# Patient Record
Sex: FEMALE | Race: WHITE | Employment: OTHER | URBAN - NONMETROPOLITAN AREA
[De-identification: names, ages, dates, MRNs, and addresses within clinical notes are randomized per-mention and may not be internally consistent; named-entity substitution may affect disease eponyms.]

---

## 2017-08-11 ENCOUNTER — HOSPITAL ENCOUNTER (INPATIENT)
Age: 72
LOS: 3 days | Discharge: HOME | DRG: 177 | End: 2017-08-14
Attending: INTERNAL MEDICINE | Admitting: INTERNAL MEDICINE
Payer: MEDICARE

## 2017-08-11 ENCOUNTER — APPOINTMENT (OUTPATIENT)
Dept: CT IMAGING | Age: 72
DRG: 177 | End: 2017-08-11
Payer: MEDICARE

## 2017-08-11 DIAGNOSIS — J18.9 PNEUMONIA DUE TO INFECTIOUS ORGANISM, UNSPECIFIED LATERALITY, UNSPECIFIED PART OF LUNG: Primary | ICD-10-CM

## 2017-08-11 PROBLEM — I95.1 ORTHOSTATIC HYPOTENSION: Status: ACTIVE | Noted: 2017-08-11

## 2017-08-11 PROBLEM — J96.21 ACUTE ON CHRONIC RESPIRATORY FAILURE WITH HYPOXIA (HCC): Status: ACTIVE | Noted: 2017-08-11

## 2017-08-11 PROBLEM — M54.50 CHRONIC MIDLINE LOW BACK PAIN WITHOUT SCIATICA: Status: ACTIVE | Noted: 2017-08-11

## 2017-08-11 PROBLEM — C34.90 RECURRENT NON-SMALL CELL LUNG CANCER (HCC): Status: ACTIVE | Noted: 2017-08-11

## 2017-08-11 PROBLEM — E86.0 DEHYDRATION: Status: ACTIVE | Noted: 2017-08-11

## 2017-08-11 PROBLEM — G89.29 CHRONIC MIDLINE LOW BACK PAIN WITHOUT SCIATICA: Status: ACTIVE | Noted: 2017-08-11

## 2017-08-11 PROBLEM — C34.91 PRIMARY CANCER OF RIGHT LUNG METASTATIC TO OTHER SITE (HCC): Status: ACTIVE | Noted: 2017-08-11

## 2017-08-11 LAB
ANION GAP SERPL CALCULATED.3IONS-SCNC: 8 MEQ/L (ref 8–16)
ANISOCYTOSIS: ABNORMAL
BASOPHILS # BLD: 0.4 %
BASOPHILS ABSOLUTE: 0 THOU/MM3 (ref 0–0.1)
BUN BLDV-MCNC: 16 MG/DL (ref 7–22)
CALCIUM SERPL-MCNC: 9.6 MG/DL (ref 8.5–10.5)
CHLORIDE BLD-SCNC: 102 MEQ/L (ref 98–111)
CO2: 34 MEQ/L (ref 23–33)
CREAT SERPL-MCNC: 0.8 MG/DL (ref 0.4–1.2)
EKG ATRIAL RATE: 105 BPM
EKG P AXIS: 0 DEGREES
EKG P-R INTERVAL: 144 MS
EKG Q-T INTERVAL: 320 MS
EKG QRS DURATION: 84 MS
EKG QTC CALCULATION (BAZETT): 422 MS
EKG R AXIS: 15 DEGREES
EKG T AXIS: 41 DEGREES
EKG VENTRICULAR RATE: 105 BPM
EOSINOPHIL # BLD: 1.5 %
EOSINOPHILS ABSOLUTE: 0.1 THOU/MM3 (ref 0–0.4)
GFR SERPL CREATININE-BSD FRML MDRD: 70 ML/MIN/1.73M2
GLUCOSE BLD-MCNC: 116 MG/DL (ref 70–108)
HCT VFR BLD CALC: 36.7 % (ref 37–47)
HEMOGLOBIN: 11.8 GM/DL (ref 12–16)
LACTIC ACID: 1 MMOL/L (ref 0.5–2.2)
LYMPHOCYTES # BLD: 11.4 %
LYMPHOCYTES ABSOLUTE: 0.9 THOU/MM3 (ref 1–4.8)
MCH RBC QN AUTO: 29.7 PG (ref 27–31)
MCHC RBC AUTO-ENTMCNC: 32.1 GM/DL (ref 33–37)
MCV RBC AUTO: 92.7 FL (ref 81–99)
MONOCYTES # BLD: 7.5 %
MONOCYTES ABSOLUTE: 0.6 THOU/MM3 (ref 0.4–1.3)
NUCLEATED RED BLOOD CELLS: 0 /100 WBC
OSMOLALITY CALCULATION: 289 MOSMOL/KG (ref 275–300)
PDW BLD-RTO: 15.5 % (ref 11.5–14.5)
PLATELET # BLD: 244 THOU/MM3 (ref 130–400)
PMV BLD AUTO: 8.1 MCM (ref 7.4–10.4)
POTASSIUM SERPL-SCNC: 4.8 MEQ/L (ref 3.5–5.2)
PROCALCITONIN: 0.23 NG/ML (ref 0.01–0.09)
RBC # BLD: 3.96 MILL/MM3 (ref 4.2–5.4)
RBC # BLD: NORMAL 10*6/UL
SEG NEUTROPHILS: 79.2 %
SEGMENTED NEUTROPHILS ABSOLUTE COUNT: 6.4 THOU/MM3 (ref 1.8–7.7)
SODIUM BLD-SCNC: 144 MEQ/L (ref 135–145)
TROPONIN T: < 0.01 NG/ML
WBC # BLD: 8.1 THOU/MM3 (ref 4.8–10.8)

## 2017-08-11 PROCEDURE — 99223 1ST HOSP IP/OBS HIGH 75: CPT | Performed by: INTERNAL MEDICINE

## 2017-08-11 PROCEDURE — B3201ZZ COMPUTERIZED TOMOGRAPHY (CT SCAN) OF THORACIC AORTA USING LOW OSMOLAR CONTRAST: ICD-10-PCS | Performed by: RADIOLOGY

## 2017-08-11 PROCEDURE — 94640 AIRWAY INHALATION TREATMENT: CPT

## 2017-08-11 PROCEDURE — 99285 EMERGENCY DEPT VISIT HI MDM: CPT

## 2017-08-11 PROCEDURE — B32T1ZZ COMPUTERIZED TOMOGRAPHY (CT SCAN) OF LEFT PULMONARY ARTERY USING LOW OSMOLAR CONTRAST: ICD-10-PCS | Performed by: RADIOLOGY

## 2017-08-11 PROCEDURE — 83605 ASSAY OF LACTIC ACID: CPT

## 2017-08-11 PROCEDURE — 87040 BLOOD CULTURE FOR BACTERIA: CPT

## 2017-08-11 PROCEDURE — 81001 URINALYSIS AUTO W/SCOPE: CPT

## 2017-08-11 PROCEDURE — 96374 THER/PROPH/DIAG INJ IV PUSH: CPT

## 2017-08-11 PROCEDURE — 2700000000 HC OXYGEN THERAPY PER DAY

## 2017-08-11 PROCEDURE — B32S1ZZ COMPUTERIZED TOMOGRAPHY (CT SCAN) OF RIGHT PULMONARY ARTERY USING LOW OSMOLAR CONTRAST: ICD-10-PCS | Performed by: RADIOLOGY

## 2017-08-11 PROCEDURE — 36415 COLL VENOUS BLD VENIPUNCTURE: CPT

## 2017-08-11 PROCEDURE — 6370000000 HC RX 637 (ALT 250 FOR IP): Performed by: PHYSICIAN ASSISTANT

## 2017-08-11 PROCEDURE — 71275 CT ANGIOGRAPHY CHEST: CPT

## 2017-08-11 PROCEDURE — 6360000002 HC RX W HCPCS: Performed by: INTERNAL MEDICINE

## 2017-08-11 PROCEDURE — 93005 ELECTROCARDIOGRAM TRACING: CPT

## 2017-08-11 PROCEDURE — 1200000003 HC TELEMETRY R&B

## 2017-08-11 PROCEDURE — 6360000004 HC RX CONTRAST MEDICATION: Performed by: PHYSICIAN ASSISTANT

## 2017-08-11 PROCEDURE — 80048 BASIC METABOLIC PNL TOTAL CA: CPT

## 2017-08-11 PROCEDURE — 84484 ASSAY OF TROPONIN QUANT: CPT

## 2017-08-11 PROCEDURE — 2580000003 HC RX 258: Performed by: PHYSICIAN ASSISTANT

## 2017-08-11 PROCEDURE — 6360000002 HC RX W HCPCS: Performed by: PHYSICIAN ASSISTANT

## 2017-08-11 PROCEDURE — 6370000000 HC RX 637 (ALT 250 FOR IP): Performed by: INTERNAL MEDICINE

## 2017-08-11 PROCEDURE — 70450 CT HEAD/BRAIN W/O DYE: CPT

## 2017-08-11 PROCEDURE — 84145 PROCALCITONIN (PCT): CPT

## 2017-08-11 PROCEDURE — 85025 COMPLETE CBC W/AUTO DIFF WBC: CPT

## 2017-08-11 RX ORDER — 0.9 % SODIUM CHLORIDE 0.9 %
2000 INTRAVENOUS SOLUTION INTRAVENOUS ONCE
Status: COMPLETED | OUTPATIENT
Start: 2017-08-11 | End: 2017-08-11

## 2017-08-11 RX ORDER — IPRATROPIUM BROMIDE AND ALBUTEROL SULFATE 2.5; .5 MG/3ML; MG/3ML
1 SOLUTION RESPIRATORY (INHALATION) ONCE
Status: COMPLETED | OUTPATIENT
Start: 2017-08-11 | End: 2017-08-11

## 2017-08-11 RX ORDER — HYDROCODONE BITARTRATE AND ACETAMINOPHEN 5; 325 MG/1; MG/1
1 TABLET ORAL EVERY 4 HOURS PRN
Status: DISCONTINUED | OUTPATIENT
Start: 2017-08-11 | End: 2017-08-14 | Stop reason: HOSPADM

## 2017-08-11 RX ORDER — SODIUM CHLORIDE AND POTASSIUM CHLORIDE .9; .15 G/100ML; G/100ML
SOLUTION INTRAVENOUS CONTINUOUS
Status: DISCONTINUED | OUTPATIENT
Start: 2017-08-11 | End: 2017-08-12

## 2017-08-11 RX ORDER — PROMETHAZINE HYDROCHLORIDE 25 MG/1
25 TABLET ORAL EVERY 6 HOURS PRN
Status: DISCONTINUED | OUTPATIENT
Start: 2017-08-11 | End: 2017-08-14 | Stop reason: HOSPADM

## 2017-08-11 RX ORDER — ONDANSETRON 2 MG/ML
4 INJECTION INTRAMUSCULAR; INTRAVENOUS ONCE
Status: COMPLETED | OUTPATIENT
Start: 2017-08-11 | End: 2017-08-11

## 2017-08-11 RX ORDER — FLUTICASONE PROPIONATE 50 MCG
1 SPRAY, SUSPENSION (ML) NASAL DAILY
Status: DISCONTINUED | OUTPATIENT
Start: 2017-08-11 | End: 2017-08-14 | Stop reason: HOSPADM

## 2017-08-11 RX ORDER — ONDANSETRON 2 MG/ML
4 INJECTION INTRAMUSCULAR; INTRAVENOUS EVERY 6 HOURS PRN
Status: DISCONTINUED | OUTPATIENT
Start: 2017-08-11 | End: 2017-08-14 | Stop reason: HOSPADM

## 2017-08-11 RX ORDER — SODIUM CHLORIDE 0.9 % (FLUSH) 0.9 %
10 SYRINGE (ML) INJECTION PRN
Status: DISCONTINUED | OUTPATIENT
Start: 2017-08-11 | End: 2017-08-14 | Stop reason: HOSPADM

## 2017-08-11 RX ORDER — SODIUM CHLORIDE 9 MG/ML
INJECTION, SOLUTION INTRAVENOUS CONTINUOUS
Status: DISCONTINUED | OUTPATIENT
Start: 2017-08-11 | End: 2017-08-11

## 2017-08-11 RX ORDER — HYDROCODONE BITARTRATE AND ACETAMINOPHEN 5; 325 MG/1; MG/1
2 TABLET ORAL EVERY 4 HOURS PRN
Status: DISCONTINUED | OUTPATIENT
Start: 2017-08-11 | End: 2017-08-14 | Stop reason: HOSPADM

## 2017-08-11 RX ORDER — SODIUM CHLORIDE 0.9 % (FLUSH) 0.9 %
10 SYRINGE (ML) INJECTION EVERY 12 HOURS SCHEDULED
Status: DISCONTINUED | OUTPATIENT
Start: 2017-08-11 | End: 2017-08-14 | Stop reason: HOSPADM

## 2017-08-11 RX ORDER — AMOXICILLIN AND CLAVULANATE POTASSIUM 875; 125 MG/1; MG/1
1 TABLET, FILM COATED ORAL EVERY 12 HOURS SCHEDULED
Status: DISCONTINUED | OUTPATIENT
Start: 2017-08-11 | End: 2017-08-14 | Stop reason: HOSPADM

## 2017-08-11 RX ORDER — ACETAMINOPHEN 325 MG/1
650 TABLET ORAL EVERY 4 HOURS PRN
Status: DISCONTINUED | OUTPATIENT
Start: 2017-08-11 | End: 2017-08-14 | Stop reason: HOSPADM

## 2017-08-11 RX ORDER — OXYBUTYNIN CHLORIDE 5 MG/1
5 TABLET, EXTENDED RELEASE ORAL DAILY
Status: DISCONTINUED | OUTPATIENT
Start: 2017-08-12 | End: 2017-08-14 | Stop reason: HOSPADM

## 2017-08-11 RX ORDER — PREDNISONE 20 MG/1
40 TABLET ORAL DAILY
Status: DISCONTINUED | OUTPATIENT
Start: 2017-08-11 | End: 2017-08-14 | Stop reason: HOSPADM

## 2017-08-11 RX ORDER — PANTOPRAZOLE SODIUM 40 MG/1
40 TABLET, DELAYED RELEASE ORAL
Status: DISCONTINUED | OUTPATIENT
Start: 2017-08-12 | End: 2017-08-14 | Stop reason: HOSPADM

## 2017-08-11 RX ORDER — MECLIZINE HYDROCHLORIDE CHEWABLE TABLETS 25 MG/1
25 TABLET, CHEWABLE ORAL ONCE
Status: COMPLETED | OUTPATIENT
Start: 2017-08-11 | End: 2017-08-11

## 2017-08-11 RX ADMIN — ENOXAPARIN SODIUM 40 MG: 40 INJECTION SUBCUTANEOUS at 15:19

## 2017-08-11 RX ADMIN — SODIUM CHLORIDE: 9 INJECTION, SOLUTION INTRAVENOUS at 09:03

## 2017-08-11 RX ADMIN — ONDANSETRON 4 MG: 2 INJECTION INTRAMUSCULAR; INTRAVENOUS at 16:23

## 2017-08-11 RX ADMIN — VANCOMYCIN HYDROCHLORIDE 1000 MG: 1 INJECTION, POWDER, LYOPHILIZED, FOR SOLUTION INTRAVENOUS at 12:13

## 2017-08-11 RX ADMIN — FLUTICASONE PROPIONATE 1 SPRAY: 50 SPRAY, METERED NASAL at 15:14

## 2017-08-11 RX ADMIN — SODIUM CHLORIDE 2000 ML: 9 INJECTION, SOLUTION INTRAVENOUS at 11:47

## 2017-08-11 RX ADMIN — HYDROCODONE BITARTRATE AND ACETAMINOPHEN 1 TABLET: 5; 325 TABLET ORAL at 18:19

## 2017-08-11 RX ADMIN — CEFEPIME HYDROCHLORIDE 2 G: 2 INJECTION, POWDER, FOR SOLUTION INTRAVENOUS at 11:47

## 2017-08-11 RX ADMIN — IPRATROPIUM BROMIDE AND ALBUTEROL SULFATE 1 AMPULE: .5; 3 SOLUTION RESPIRATORY (INHALATION) at 10:06

## 2017-08-11 RX ADMIN — ONDANSETRON 4 MG: 2 INJECTION INTRAMUSCULAR; INTRAVENOUS at 09:12

## 2017-08-11 RX ADMIN — MECLIZINE HCL 25 MG: 25 TABLET, CHEWABLE ORAL at 09:11

## 2017-08-11 RX ADMIN — Medication 2 PUFF: at 20:43

## 2017-08-11 RX ADMIN — POTASSIUM CHLORIDE AND SODIUM CHLORIDE: 900; 150 INJECTION, SOLUTION INTRAVENOUS at 15:13

## 2017-08-11 RX ADMIN — PREDNISONE 40 MG: 20 TABLET ORAL at 15:17

## 2017-08-11 RX ADMIN — AMOXICILLIN AND CLAVULANATE POTASSIUM 1 TABLET: 875; 125 TABLET, FILM COATED ORAL at 21:00

## 2017-08-11 RX ADMIN — IOPAMIDOL 85 ML: 755 INJECTION, SOLUTION INTRAVENOUS at 09:55

## 2017-08-11 ASSESSMENT — ENCOUNTER SYMPTOMS
SORE THROAT: 0
DIARRHEA: 1
VOMITING: 1
SHORTNESS OF BREATH: 1
EYE DISCHARGE: 0
EYE PAIN: 0
NAUSEA: 1
WHEEZING: 0
ABDOMINAL PAIN: 0
BACK PAIN: 0
COUGH: 1
RHINORRHEA: 1

## 2017-08-11 ASSESSMENT — PAIN SCALES - GENERAL
PAINLEVEL_OUTOF10: 2
PAINLEVEL_OUTOF10: 0
PAINLEVEL_OUTOF10: 5
PAINLEVEL_OUTOF10: 8

## 2017-08-11 ASSESSMENT — PAIN DESCRIPTION - LOCATION: LOCATION: BACK

## 2017-08-11 ASSESSMENT — PAIN DESCRIPTION - ONSET: ONSET: ON-GOING

## 2017-08-11 ASSESSMENT — PAIN DESCRIPTION - FREQUENCY: FREQUENCY: CONTINUOUS

## 2017-08-11 ASSESSMENT — PAIN DESCRIPTION - PAIN TYPE: TYPE: CHRONIC PAIN

## 2017-08-12 LAB
BACTERIA: ABNORMAL /HPF
BILIRUBIN URINE: NEGATIVE
BLOOD, URINE: NEGATIVE
CASTS 2: ABNORMAL /LPF
CASTS UA: ABNORMAL /LPF
CHARACTER, URINE: ABNORMAL
COLOR: YELLOW
CRYSTALS, UA: ABNORMAL
EPITHELIAL CELLS, UA: ABNORMAL /HPF
GLUCOSE URINE: NEGATIVE MG/DL
KETONES, URINE: NEGATIVE
LEUKOCYTE ESTERASE, URINE: NEGATIVE
MISCELLANEOUS 2: ABNORMAL
NITRITE, URINE: NEGATIVE
PH UA: 5.5
PROTEIN UA: NEGATIVE
RBC URINE: ABNORMAL /HPF
RENAL EPITHELIAL, UA: ABNORMAL
SPECIFIC GRAVITY, URINE: 1.02 (ref 1–1.03)
UROBILINOGEN, URINE: 0.2 EU/DL
WBC UA: ABNORMAL /HPF
YEAST: ABNORMAL

## 2017-08-12 PROCEDURE — 97162 PT EVAL MOD COMPLEX 30 MIN: CPT

## 2017-08-12 PROCEDURE — 97535 SELF CARE MNGMENT TRAINING: CPT

## 2017-08-12 PROCEDURE — 87899 AGENT NOS ASSAY W/OPTIC: CPT

## 2017-08-12 PROCEDURE — G8978 MOBILITY CURRENT STATUS: HCPCS

## 2017-08-12 PROCEDURE — 97110 THERAPEUTIC EXERCISES: CPT

## 2017-08-12 PROCEDURE — 2700000000 HC OXYGEN THERAPY PER DAY

## 2017-08-12 PROCEDURE — 1200000003 HC TELEMETRY R&B

## 2017-08-12 PROCEDURE — 87449 NOS EACH ORGANISM AG IA: CPT

## 2017-08-12 PROCEDURE — 2580000003 HC RX 258: Performed by: INTERNAL MEDICINE

## 2017-08-12 PROCEDURE — 97165 OT EVAL LOW COMPLEX 30 MIN: CPT

## 2017-08-12 PROCEDURE — 94640 AIRWAY INHALATION TREATMENT: CPT

## 2017-08-12 PROCEDURE — 6370000000 HC RX 637 (ALT 250 FOR IP): Performed by: INTERNAL MEDICINE

## 2017-08-12 PROCEDURE — G8979 MOBILITY GOAL STATUS: HCPCS

## 2017-08-12 PROCEDURE — 99233 SBSQ HOSP IP/OBS HIGH 50: CPT | Performed by: HOSPITALIST

## 2017-08-12 PROCEDURE — 6360000002 HC RX W HCPCS: Performed by: INTERNAL MEDICINE

## 2017-08-12 RX ADMIN — HYDROCODONE BITARTRATE AND ACETAMINOPHEN 1 TABLET: 5; 325 TABLET ORAL at 11:00

## 2017-08-12 RX ADMIN — PANTOPRAZOLE SODIUM 40 MG: 40 TABLET, DELAYED RELEASE ORAL at 05:53

## 2017-08-12 RX ADMIN — AMOXICILLIN AND CLAVULANATE POTASSIUM 1 TABLET: 875; 125 TABLET, FILM COATED ORAL at 07:46

## 2017-08-12 RX ADMIN — AMOXICILLIN AND CLAVULANATE POTASSIUM 1 TABLET: 875; 125 TABLET, FILM COATED ORAL at 22:12

## 2017-08-12 RX ADMIN — ONDANSETRON 4 MG: 2 INJECTION INTRAMUSCULAR; INTRAVENOUS at 01:00

## 2017-08-12 RX ADMIN — FLUTICASONE PROPIONATE 1 SPRAY: 50 SPRAY, METERED NASAL at 07:45

## 2017-08-12 RX ADMIN — Medication 2 PUFF: at 21:36

## 2017-08-12 RX ADMIN — HYDROCODONE BITARTRATE AND ACETAMINOPHEN 1 TABLET: 5; 325 TABLET ORAL at 06:20

## 2017-08-12 RX ADMIN — Medication 10 ML: at 22:12

## 2017-08-12 RX ADMIN — POTASSIUM CHLORIDE AND SODIUM CHLORIDE: 900; 150 INJECTION, SOLUTION INTRAVENOUS at 11:05

## 2017-08-12 RX ADMIN — POTASSIUM CHLORIDE AND SODIUM CHLORIDE: 900; 150 INJECTION, SOLUTION INTRAVENOUS at 00:54

## 2017-08-12 RX ADMIN — PREDNISONE 40 MG: 20 TABLET ORAL at 07:46

## 2017-08-12 RX ADMIN — OXYBUTYNIN CHLORIDE 5 MG: 5 TABLET, FILM COATED, EXTENDED RELEASE ORAL at 07:46

## 2017-08-12 RX ADMIN — Medication 2 PUFF: at 10:10

## 2017-08-12 RX ADMIN — HYDROCODONE BITARTRATE AND ACETAMINOPHEN 2 TABLET: 5; 325 TABLET ORAL at 14:12

## 2017-08-12 RX ADMIN — HYDROCODONE BITARTRATE AND ACETAMINOPHEN 2 TABLET: 5; 325 TABLET ORAL at 19:00

## 2017-08-12 RX ADMIN — ALBUTEROL SULFATE 2.5 MG: 2.5 SOLUTION RESPIRATORY (INHALATION) at 11:07

## 2017-08-12 RX ADMIN — ENOXAPARIN SODIUM 40 MG: 40 INJECTION SUBCUTANEOUS at 14:10

## 2017-08-12 ASSESSMENT — PAIN SCALES - GENERAL
PAINLEVEL_OUTOF10: 5
PAINLEVEL_OUTOF10: 7
PAINLEVEL_OUTOF10: 6
PAINLEVEL_OUTOF10: 6
PAINLEVEL_OUTOF10: 7
PAINLEVEL_OUTOF10: 7
PAINLEVEL_OUTOF10: 0
PAINLEVEL_OUTOF10: 0
PAINLEVEL_OUTOF10: 8
PAINLEVEL_OUTOF10: 7
PAINLEVEL_OUTOF10: 0

## 2017-08-12 ASSESSMENT — PAIN DESCRIPTION - FREQUENCY: FREQUENCY: CONTINUOUS

## 2017-08-12 ASSESSMENT — PAIN DESCRIPTION - ONSET: ONSET: ON-GOING

## 2017-08-12 ASSESSMENT — PAIN DESCRIPTION - LOCATION
LOCATION: BACK;ABDOMEN
LOCATION: ABDOMEN

## 2017-08-12 ASSESSMENT — PAIN DESCRIPTION - PAIN TYPE: TYPE: CHRONIC PAIN

## 2017-08-13 LAB
ANION GAP SERPL CALCULATED.3IONS-SCNC: 11 MEQ/L (ref 8–16)
ANISOCYTOSIS: ABNORMAL
BASOPHILS # BLD: 0.3 %
BASOPHILS ABSOLUTE: 0 THOU/MM3 (ref 0–0.1)
BUN BLDV-MCNC: 14 MG/DL (ref 7–22)
CALCIUM SERPL-MCNC: 8.9 MG/DL (ref 8.5–10.5)
CHLORIDE BLD-SCNC: 100 MEQ/L (ref 98–111)
CO2: 31 MEQ/L (ref 23–33)
CREAT SERPL-MCNC: 0.7 MG/DL (ref 0.4–1.2)
EOSINOPHIL # BLD: 0.7 %
EOSINOPHILS ABSOLUTE: 0.1 THOU/MM3 (ref 0–0.4)
GFR SERPL CREATININE-BSD FRML MDRD: 82 ML/MIN/1.73M2
GLUCOSE BLD-MCNC: 120 MG/DL (ref 70–108)
HCT VFR BLD CALC: 31.6 % (ref 37–47)
HEMOGLOBIN: 10.4 GM/DL (ref 12–16)
LYMPHOCYTES # BLD: 13.6 %
LYMPHOCYTES ABSOLUTE: 1.1 THOU/MM3 (ref 1–4.8)
MAGNESIUM: 1.4 MG/DL (ref 1.6–2.4)
MCH RBC QN AUTO: 30.6 PG (ref 27–31)
MCHC RBC AUTO-ENTMCNC: 33 GM/DL (ref 33–37)
MCV RBC AUTO: 92.8 FL (ref 81–99)
MONOCYTES # BLD: 6.6 %
MONOCYTES ABSOLUTE: 0.5 THOU/MM3 (ref 0.4–1.3)
NUCLEATED RED BLOOD CELLS: 0 /100 WBC
PDW BLD-RTO: 14.8 % (ref 11.5–14.5)
PLATELET # BLD: 268 THOU/MM3 (ref 130–400)
PMV BLD AUTO: 8.1 MCM (ref 7.4–10.4)
POTASSIUM SERPL-SCNC: 5.2 MEQ/L (ref 3.5–5.2)
RBC # BLD: 3.41 MILL/MM3 (ref 4.2–5.4)
RBC # BLD: NORMAL 10*6/UL
SEG NEUTROPHILS: 78.8 %
SEGMENTED NEUTROPHILS ABSOLUTE COUNT: 6.5 THOU/MM3 (ref 1.8–7.7)
SODIUM BLD-SCNC: 142 MEQ/L (ref 135–145)
TSH SERPL DL<=0.05 MIU/L-ACNC: 1.93 UIU/ML (ref 0.4–4.2)
WBC # BLD: 8.3 THOU/MM3 (ref 4.8–10.8)

## 2017-08-13 PROCEDURE — 2580000003 HC RX 258: Performed by: INTERNAL MEDICINE

## 2017-08-13 PROCEDURE — 36415 COLL VENOUS BLD VENIPUNCTURE: CPT

## 2017-08-13 PROCEDURE — 6360000002 HC RX W HCPCS: Performed by: INTERNAL MEDICINE

## 2017-08-13 PROCEDURE — 85025 COMPLETE CBC W/AUTO DIFF WBC: CPT

## 2017-08-13 PROCEDURE — 6370000000 HC RX 637 (ALT 250 FOR IP): Performed by: INTERNAL MEDICINE

## 2017-08-13 PROCEDURE — 94640 AIRWAY INHALATION TREATMENT: CPT

## 2017-08-13 PROCEDURE — 80048 BASIC METABOLIC PNL TOTAL CA: CPT

## 2017-08-13 PROCEDURE — 2700000000 HC OXYGEN THERAPY PER DAY

## 2017-08-13 PROCEDURE — 84443 ASSAY THYROID STIM HORMONE: CPT

## 2017-08-13 PROCEDURE — 1200000000 HC SEMI PRIVATE

## 2017-08-13 PROCEDURE — 99233 SBSQ HOSP IP/OBS HIGH 50: CPT | Performed by: HOSPITALIST

## 2017-08-13 PROCEDURE — 83735 ASSAY OF MAGNESIUM: CPT

## 2017-08-13 RX ORDER — AMLODIPINE BESYLATE 10 MG/1
10 TABLET ORAL DAILY
Status: DISCONTINUED | OUTPATIENT
Start: 2017-08-13 | End: 2017-08-13

## 2017-08-13 RX ADMIN — PROMETHAZINE HYDROCHLORIDE 25 MG: 25 TABLET ORAL at 12:46

## 2017-08-13 RX ADMIN — Medication 2 PUFF: at 20:18

## 2017-08-13 RX ADMIN — HYDROCODONE BITARTRATE AND ACETAMINOPHEN 2 TABLET: 5; 325 TABLET ORAL at 11:54

## 2017-08-13 RX ADMIN — HYDROCODONE BITARTRATE AND ACETAMINOPHEN 2 TABLET: 5; 325 TABLET ORAL at 23:28

## 2017-08-13 RX ADMIN — FLUTICASONE PROPIONATE 1 SPRAY: 50 SPRAY, METERED NASAL at 08:28

## 2017-08-13 RX ADMIN — ONDANSETRON 4 MG: 2 INJECTION INTRAMUSCULAR; INTRAVENOUS at 01:45

## 2017-08-13 RX ADMIN — ALBUTEROL SULFATE 2.5 MG: 2.5 SOLUTION RESPIRATORY (INHALATION) at 17:13

## 2017-08-13 RX ADMIN — AMOXICILLIN AND CLAVULANATE POTASSIUM 1 TABLET: 875; 125 TABLET, FILM COATED ORAL at 20:16

## 2017-08-13 RX ADMIN — PREDNISONE 40 MG: 20 TABLET ORAL at 08:29

## 2017-08-13 RX ADMIN — Medication 10 ML: at 08:29

## 2017-08-13 RX ADMIN — ENOXAPARIN SODIUM 40 MG: 40 INJECTION SUBCUTANEOUS at 12:47

## 2017-08-13 RX ADMIN — OXYBUTYNIN CHLORIDE 5 MG: 5 TABLET, FILM COATED, EXTENDED RELEASE ORAL at 08:30

## 2017-08-13 RX ADMIN — AMOXICILLIN AND CLAVULANATE POTASSIUM 1 TABLET: 875; 125 TABLET, FILM COATED ORAL at 08:29

## 2017-08-13 RX ADMIN — Medication 10 ML: at 20:16

## 2017-08-13 RX ADMIN — Medication 2 PUFF: at 08:47

## 2017-08-13 RX ADMIN — PANTOPRAZOLE SODIUM 40 MG: 40 TABLET, DELAYED RELEASE ORAL at 06:11

## 2017-08-13 ASSESSMENT — PAIN SCALES - GENERAL
PAINLEVEL_OUTOF10: 0
PAINLEVEL_OUTOF10: 2
PAINLEVEL_OUTOF10: 7
PAINLEVEL_OUTOF10: 8
PAINLEVEL_OUTOF10: 4
PAINLEVEL_OUTOF10: 0
PAINLEVEL_OUTOF10: 6

## 2017-08-14 VITALS
HEIGHT: 61 IN | TEMPERATURE: 97.7 F | SYSTOLIC BLOOD PRESSURE: 147 MMHG | DIASTOLIC BLOOD PRESSURE: 70 MMHG | RESPIRATION RATE: 16 BRPM | BODY MASS INDEX: 26.83 KG/M2 | OXYGEN SATURATION: 93 % | HEART RATE: 97 BPM | WEIGHT: 142.1 LBS

## 2017-08-14 PROCEDURE — 6360000002 HC RX W HCPCS: Performed by: INTERNAL MEDICINE

## 2017-08-14 PROCEDURE — 6370000000 HC RX 637 (ALT 250 FOR IP): Performed by: INTERNAL MEDICINE

## 2017-08-14 PROCEDURE — 2700000000 HC OXYGEN THERAPY PER DAY

## 2017-08-14 PROCEDURE — 94760 N-INVAS EAR/PLS OXIMETRY 1: CPT

## 2017-08-14 PROCEDURE — 94640 AIRWAY INHALATION TREATMENT: CPT

## 2017-08-14 PROCEDURE — 2580000003 HC RX 258: Performed by: INTERNAL MEDICINE

## 2017-08-14 PROCEDURE — 99223 1ST HOSP IP/OBS HIGH 75: CPT | Performed by: INTERNAL MEDICINE

## 2017-08-14 PROCEDURE — 99239 HOSP IP/OBS DSCHRG MGMT >30: CPT | Performed by: NURSE PRACTITIONER

## 2017-08-14 PROCEDURE — A6198 ALGINATE DRESSING > 48 SQ IN: HCPCS

## 2017-08-14 RX ORDER — AMOXICILLIN AND CLAVULANATE POTASSIUM 875; 125 MG/1; MG/1
1 TABLET, FILM COATED ORAL EVERY 12 HOURS SCHEDULED
Qty: 8 TABLET | Refills: 0 | Status: SHIPPED | OUTPATIENT
Start: 2017-08-14 | End: 2017-08-18

## 2017-08-14 RX ORDER — AMOXICILLIN AND CLAVULANATE POTASSIUM 875; 125 MG/1; MG/1
1 TABLET, FILM COATED ORAL EVERY 12 HOURS SCHEDULED
Qty: 10 TABLET | Refills: 0 | Status: SHIPPED | OUTPATIENT
Start: 2017-08-14 | End: 2017-08-14

## 2017-08-14 RX ORDER — PREDNISONE 20 MG/1
40 TABLET ORAL DAILY
Qty: 2 TABLET | Refills: 0 | Status: SHIPPED | OUTPATIENT
Start: 2017-08-14 | End: 2017-08-15

## 2017-08-14 RX ADMIN — AMOXICILLIN AND CLAVULANATE POTASSIUM 1 TABLET: 875; 125 TABLET, FILM COATED ORAL at 08:01

## 2017-08-14 RX ADMIN — FLUTICASONE PROPIONATE 1 SPRAY: 50 SPRAY, METERED NASAL at 08:01

## 2017-08-14 RX ADMIN — PROMETHAZINE HYDROCHLORIDE 25 MG: 25 TABLET ORAL at 05:36

## 2017-08-14 RX ADMIN — PREDNISONE 40 MG: 20 TABLET ORAL at 08:00

## 2017-08-14 RX ADMIN — Medication 2 PUFF: at 07:48

## 2017-08-14 RX ADMIN — PANTOPRAZOLE SODIUM 40 MG: 40 TABLET, DELAYED RELEASE ORAL at 05:36

## 2017-08-14 RX ADMIN — HYDROCODONE BITARTRATE AND ACETAMINOPHEN 1 TABLET: 5; 325 TABLET ORAL at 08:10

## 2017-08-14 RX ADMIN — OXYBUTYNIN CHLORIDE 5 MG: 5 TABLET, FILM COATED, EXTENDED RELEASE ORAL at 08:01

## 2017-08-14 RX ADMIN — Medication 10 ML: at 08:01

## 2017-08-14 RX ADMIN — ALBUTEROL SULFATE 2.5 MG: 2.5 SOLUTION RESPIRATORY (INHALATION) at 04:57

## 2017-08-14 ASSESSMENT — PAIN DESCRIPTION - FREQUENCY: FREQUENCY: CONTINUOUS

## 2017-08-14 ASSESSMENT — PAIN DESCRIPTION - LOCATION: LOCATION: ABDOMEN

## 2017-08-14 ASSESSMENT — PAIN DESCRIPTION - PAIN TYPE: TYPE: CHRONIC PAIN

## 2017-08-14 ASSESSMENT — PAIN SCALES - GENERAL
PAINLEVEL_OUTOF10: 5
PAINLEVEL_OUTOF10: 7
PAINLEVEL_OUTOF10: 0
PAINLEVEL_OUTOF10: 7

## 2017-08-14 ASSESSMENT — PAIN DESCRIPTION - ORIENTATION: ORIENTATION: LEFT;LOWER

## 2017-08-16 LAB
LEGIONELLA URINARY AG: NEGATIVE
STREP PNEUMO AG, UR: NEGATIVE

## 2017-08-17 LAB
BLOOD CULTURE, ROUTINE: NORMAL
BLOOD CULTURE, ROUTINE: NORMAL

## 2017-10-18 ENCOUNTER — HOSPITAL ENCOUNTER (OUTPATIENT)
Dept: CT IMAGING | Age: 72
Discharge: HOME | End: 2017-10-18
Payer: MEDICARE

## 2017-10-18 ENCOUNTER — HOSPITAL ENCOUNTER (OUTPATIENT)
Dept: PULMONOLOGY | Age: 72
Discharge: HOME | End: 2017-10-18
Payer: MEDICARE

## 2017-10-18 ENCOUNTER — HOSPITAL ENCOUNTER (OUTPATIENT)
Age: 72
Discharge: HOME | End: 2017-10-18
Payer: MEDICARE

## 2017-10-18 DIAGNOSIS — C34.31 MALIGNANT NEOPLASM OF LOWER LOBE, RIGHT BRONCHUS OR LUNG (HCC): ICD-10-CM

## 2017-10-18 DIAGNOSIS — C34.30 MALIGNANT NEOPLASM OF LOWER LOBE OF LUNG, UNSPECIFIED LATERALITY (HCC): ICD-10-CM

## 2017-10-18 LAB
ALBUMIN SERPL-MCNC: 3.9 G/DL (ref 3.5–5.1)
ALP BLD-CCNC: 80 U/L (ref 38–126)
ALT SERPL-CCNC: 8 U/L (ref 11–66)
ANION GAP SERPL CALCULATED.3IONS-SCNC: 14 MEQ/L (ref 8–16)
ANISOCYTOSIS: ABNORMAL
AST SERPL-CCNC: 12 U/L (ref 5–40)
BASOPHILS # BLD: 0.5 %
BASOPHILS ABSOLUTE: 0 THOU/MM3 (ref 0–0.1)
BILIRUB SERPL-MCNC: 0.2 MG/DL (ref 0.3–1.2)
BUN BLDV-MCNC: 17 MG/DL (ref 7–22)
CALCIUM SERPL-MCNC: 9.6 MG/DL (ref 8.5–10.5)
CEA: 2.7 NG/ML (ref 0–5)
CHLORIDE BLD-SCNC: 96 MEQ/L (ref 98–111)
CO2: 31 MEQ/L (ref 23–33)
CREAT SERPL-MCNC: 0.8 MG/DL (ref 0.4–1.2)
EOSINOPHIL # BLD: 2.1 %
EOSINOPHILS ABSOLUTE: 0.2 THOU/MM3 (ref 0–0.4)
GFR SERPL CREATININE-BSD FRML MDRD: 70 ML/MIN/1.73M2
GLUCOSE BLD-MCNC: 99 MG/DL (ref 70–108)
HCT VFR BLD CALC: 33.4 % (ref 37–47)
HEMOGLOBIN: 10.6 GM/DL (ref 12–16)
LYMPHOCYTES # BLD: 15 %
LYMPHOCYTES ABSOLUTE: 1.3 THOU/MM3 (ref 1–4.8)
MCH RBC QN AUTO: 28 PG (ref 27–31)
MCHC RBC AUTO-ENTMCNC: 31.9 GM/DL (ref 33–37)
MCV RBC AUTO: 88 FL (ref 81–99)
MONOCYTES # BLD: 6.5 %
MONOCYTES ABSOLUTE: 0.6 THOU/MM3 (ref 0.4–1.3)
NUCLEATED RED BLOOD CELLS: 0 /100 WBC
PDW BLD-RTO: 15.3 % (ref 11.5–14.5)
PLATELET # BLD: 338 THOU/MM3 (ref 130–400)
PMV BLD AUTO: 8.1 MCM (ref 7.4–10.4)
POC CREATININE WHOLE BLOOD: 0.9 MG/DL (ref 0.5–1.2)
POTASSIUM SERPL-SCNC: 4.7 MEQ/L (ref 3.5–5.2)
RBC # BLD: 3.8 MILL/MM3 (ref 4.2–5.4)
RBC # BLD: NORMAL 10*6/UL
SEG NEUTROPHILS: 75.9 %
SEGMENTED NEUTROPHILS ABSOLUTE COUNT: 6.5 THOU/MM3 (ref 1.8–7.7)
SODIUM BLD-SCNC: 141 MEQ/L (ref 135–145)
TOTAL PROTEIN: 7.8 G/DL (ref 6.1–8)
WBC # BLD: 8.5 THOU/MM3 (ref 4.8–10.8)

## 2017-10-18 PROCEDURE — 36415 COLL VENOUS BLD VENIPUNCTURE: CPT

## 2017-10-18 PROCEDURE — 82378 CARCINOEMBRYONIC ANTIGEN: CPT

## 2017-10-18 PROCEDURE — 71260 CT THORAX DX C+: CPT

## 2017-10-18 PROCEDURE — 94729 DIFFUSING CAPACITY: CPT

## 2017-10-18 PROCEDURE — 82565 ASSAY OF CREATININE: CPT

## 2017-10-18 PROCEDURE — 94060 EVALUATION OF WHEEZING: CPT

## 2017-10-18 PROCEDURE — 6360000004 HC RX CONTRAST MEDICATION: Performed by: INTERNAL MEDICINE

## 2017-10-18 PROCEDURE — 80053 COMPREHEN METABOLIC PANEL: CPT

## 2017-10-18 PROCEDURE — 94726 PLETHYSMOGRAPHY LUNG VOLUMES: CPT

## 2017-10-18 PROCEDURE — 85025 COMPLETE CBC W/AUTO DIFF WBC: CPT

## 2017-10-18 RX ADMIN — IOPAMIDOL 85 ML: 755 INJECTION, SOLUTION INTRAVENOUS at 10:08

## 2017-12-11 ENCOUNTER — OFFICE VISIT (OUTPATIENT)
Dept: PULMONOLOGY | Age: 72
End: 2017-12-11
Payer: MEDICARE

## 2017-12-11 VITALS
BODY MASS INDEX: 26.62 KG/M2 | SYSTOLIC BLOOD PRESSURE: 130 MMHG | DIASTOLIC BLOOD PRESSURE: 84 MMHG | OXYGEN SATURATION: 96 % | HEART RATE: 107 BPM | HEIGHT: 61 IN | WEIGHT: 141 LBS | RESPIRATION RATE: 16 BRPM | TEMPERATURE: 98.2 F

## 2017-12-11 DIAGNOSIS — J44.9 STAGE 3 SEVERE COPD BY GOLD CLASSIFICATION (HCC): Primary | ICD-10-CM

## 2017-12-11 DIAGNOSIS — Z72.0 TOBACCO ABUSE: ICD-10-CM

## 2017-12-11 DIAGNOSIS — J44.9 STAGE 3 SEVERE COPD BY GOLD CLASSIFICATION (HCC): ICD-10-CM

## 2017-12-11 PROCEDURE — 3014F SCREEN MAMMO DOC REV: CPT | Performed by: INTERNAL MEDICINE

## 2017-12-11 PROCEDURE — 3023F SPIROM DOC REV: CPT | Performed by: INTERNAL MEDICINE

## 2017-12-11 PROCEDURE — 1123F ACP DISCUSS/DSCN MKR DOCD: CPT | Performed by: INTERNAL MEDICINE

## 2017-12-11 PROCEDURE — 99215 OFFICE O/P EST HI 40 MIN: CPT | Performed by: INTERNAL MEDICINE

## 2017-12-11 PROCEDURE — 3017F COLORECTAL CA SCREEN DOC REV: CPT | Performed by: INTERNAL MEDICINE

## 2017-12-11 PROCEDURE — G8400 PT W/DXA NO RESULTS DOC: HCPCS | Performed by: INTERNAL MEDICINE

## 2017-12-11 PROCEDURE — G8484 FLU IMMUNIZE NO ADMIN: HCPCS | Performed by: INTERNAL MEDICINE

## 2017-12-11 PROCEDURE — 1036F TOBACCO NON-USER: CPT | Performed by: INTERNAL MEDICINE

## 2017-12-11 PROCEDURE — 4040F PNEUMOC VAC/ADMIN/RCVD: CPT | Performed by: INTERNAL MEDICINE

## 2017-12-11 PROCEDURE — G8427 DOCREV CUR MEDS BY ELIG CLIN: HCPCS | Performed by: INTERNAL MEDICINE

## 2017-12-11 PROCEDURE — 1090F PRES/ABSN URINE INCON ASSESS: CPT | Performed by: INTERNAL MEDICINE

## 2017-12-11 PROCEDURE — G8419 CALC BMI OUT NRM PARAM NOF/U: HCPCS | Performed by: INTERNAL MEDICINE

## 2017-12-11 PROCEDURE — 94620 6 MIN WALK TEST: CPT | Performed by: INTERNAL MEDICINE

## 2017-12-11 PROCEDURE — G8926 SPIRO NO PERF OR DOC: HCPCS | Performed by: INTERNAL MEDICINE

## 2017-12-11 RX ORDER — ALBUTEROL SULFATE 90 UG/1
2 AEROSOL, METERED RESPIRATORY (INHALATION) EVERY 6 HOURS PRN
COMMUNITY
End: 2017-12-11 | Stop reason: SDUPTHER

## 2017-12-11 RX ORDER — ALBUTEROL SULFATE 2.5 MG/3ML
2.5 SOLUTION RESPIRATORY (INHALATION) EVERY 6 HOURS PRN
Qty: 360 VIAL | Refills: 1 | Status: SHIPPED | OUTPATIENT
Start: 2017-12-11 | End: 2019-05-06 | Stop reason: SDUPTHER

## 2017-12-11 RX ORDER — ALBUTEROL SULFATE 90 UG/1
2 AEROSOL, METERED RESPIRATORY (INHALATION) EVERY 6 HOURS PRN
Qty: 3 INHALER | Refills: 2 | Status: SHIPPED | OUTPATIENT
Start: 2017-12-11 | End: 2019-05-06 | Stop reason: SDUPTHER

## 2017-12-11 NOTE — PROGRESS NOTES
Sig Dispense Refill    albuterol sulfate HFA (PROAIR HFA) 108 (90 Base) MCG/ACT inhaler Inhale 2 puffs into the lungs every 6 hours as needed for Wheezing      tiotropium (SPIRIVA RESPIMAT) 2.5 MCG/ACT AERS inhaler Inhale 2 puffs into the lungs daily      Mometasone Furo-Formoterol Fum (DULERA IN) Inhale 200 mcg into the lungs 2 times daily       OXYGEN Inhale 2 L into the lungs as needed Between 1 & 3 L depending on activity      fluticasone (FLONASE) 50 MCG/ACT nasal spray 1 spray by Nasal route daily 1 Bottle 5    oxybutynin (DITROPAN-XL) 5 MG extended release tablet Take 1 tablet by mouth daily 90 tablet 1    betamethasone dipropionate (DIPROLENE) 0.05 % ointment Apply topically daily. 50 g 5    omeprazole (PRILOSEC) 20 MG delayed release capsule Take 1 capsule by mouth daily (Patient taking differently: Take 40 mg by mouth Daily ) 90 capsule 3    HYDROcodone-acetaminophen (NORCO) 5-325 MG per tablet Take 1 tablet by mouth 3 times daily as needed for Pain 90 tablet 0    albuterol (PROVENTIL HFA;VENTOLIN HFA) 108 (90 BASE) MCG/ACT inhaler Inhale 2 puffs into the lungs every 6 hours as needed for Wheezing.  promethazine (PHENERGAN) 25 MG tablet Take 25 mg by mouth as needed       ranitidine (ZANTAC) 75 MG tablet Take 1 tablet by mouth 2 times daily (before meals) 180 tablet 3     No current facility-administered medications for this visit.         Family History   Problem Relation Age of Onset   [facility] Cancer Mother     Heart Disease Father     High Blood Pressure Father     High Cholesterol Father     Arthritis Sister     Heart Disease Sister     Cancer Sister     Heart Disease Sister     Stroke Sister     High Cholesterol Sister     High Blood Pressure Sister     Stroke Paternal Grandfather           Vitals: /84   Pulse 107   Temp 98.2 °F (36.8 °C) (Tympanic)   Resp 16   Ht 5' 1\" (1.549 m)   Wt 141 lb (64 kg)   SpO2 96% Comment: 1L at rest  BMI 26.64 kg/m²               Exam General Appearance: Patient appears moderately built and moderately nourished in no acute distress on O2 via nasal cannula  HEENT: Normal, Head is normocephalic, atraumatic. Oropharynx is clear and moist.  No oral thrush. PERRLA  Neck - Supple, No JVD present. No tracheal deviation present. Lungs - Bilateral air entry present. Bilateral aeration good. Bilateral occasional expiratory wheezes. No rales. Cardiovascular - Heart sounds are normal.  Regular rhythm normal rate without murmur, gallop or rub. Abdomen - Soft, nontender, nondistended, no masses or organomegaly. Neurologic - Awake, alert, oriented. There are no focal motor or sensory deficits  Extremities - No cyanosis, clubbing or edema. Musculoskeletal: Normal range of motion. Patient exhibits no tenderness. Lymphadenopathy:  No cervical adenopathy. Psychiatric: Patient  has a normal mood and affect. Skin - No bruising or bleeding.     Diagnostic Data:    PFTs                           Cultures    Blood cultures- negative. Urine for legionella and streptococcal antigens- Negative     Radiology    CXR    Nov 2, 2016  PROCEDURE: XR CHEST PA INSPIRATION 1 VW  1. Normal heart size. Postsurgical changes right side of chest with mild shift of heart and mediastinal structures to the right. Chronic pleural thickening both lung phrenic angle. Mild chronic fibrotic scarring left lung base. Moderate scoliotic curvatures. 2. Chest tube on left side seen on the prior study has been removed. Again, no pneumothorax is seen. Patient was discharged from our Department.       CT Scans  (See actual reports for details)  Aug 11, 2017  PROCEDURE: CTA CHEST W WO CONTRAST  1. Patchy bilateral airspace disease with a nodular component on the left, superimposed on chronic and postsurgical lung changes. Progress imaging and follow-up is recommended to exclude a true lung nodule on the left. 2. No central or large vessel pulmonary embolism.         CT chest with , DO  Hx of right lower lobe lung cancer S/p right lower lobectomy. She follows with Dr. Arvind Singh, DO  Hx of chronic tobacco smoking. She quit smoking in 2007.     Plan   Please obtain old records from [address] [facility] [place] office for review.  -Please obtain latest progress notes from Dr. Jayne Sharma, DO office  -Continue Dulera 200/5mcg spray MDI, 2 puffs via inhalation BID. -Continue Spiriva Respimat 2 INH once daily in am.  -Continue and Albuterol HFA  inhaler 2 puffs Q6h prn or Albuterol 2.5mg nebs Q6h prn (the nebulizer) at one time as rescue medication not both at the same time.  -At the request of patient, her inhalers were refilled for 3 months with 3 refills for 1year supply. - She was educated and demonstrated in my office how to use inhalers.   -Kemar Elliott advised to continue prescribed inhalers and keep good compliance. - Patient educated to update her pneumococcal vaccine with family physician and take influenza vaccine in coming season with out fail.   -Kemar Elliott needs no home O2 at rest. She needs 4LPM of home O2 with exercise. She will be evaluated for nocturnal home O2 requirement- see separte order.  -Nocturnal pulse ox study on room air to check for the continuation/discontinuation of patient current home O2 at night time. - Kemar Elliott educated about my impression and plan. She verbalizes understanding.   - Schedule patient for follow up with my clinic in 6months with letter from [address] She advised to make early appointment if needed. Kimani Weaver Dietzeducated about my impression and plan. She verbalizes understanding.       - She refused to go for Pulmonary rehab consult for pulmonary rehab therapy for her COPD. - She refused to go for [id number] level. Addendum done on 12/14/17 at 5:04 PM:  Nocturnal pulse oximetry study results:                    Plan:  Patient need no home O2 at rest. She needs 4LPM of home O2 with exercise. She need to continue 2LPM of O2 at night time.

## 2017-12-11 NOTE — PROGRESS NOTES
Neck Circumference -  13.75   Mallampati - IV    Lung Nodule Screening     [x] Qualifies    [] Does not qualify   [] Declined    [x] Completed 10/18/17

## 2017-12-11 NOTE — PATIENT INSTRUCTIONS
Plan   Please obtain old records from [address] [facility] [place] office for review.  -Please obtain latest progress notes from Dr. Chambers,  office  -Continue Dulera 200/5mcg spray MDI, 2 puffs via inhalation BID. -Continue Spiriva Respimat 2 INH once daily in am.  -Continue and Albuterol HFA  inhaler 2 puffs Q6h prn or Albuterol 2.5mg nebs Q6h prn (the nebulizer) at one time as rescue medication not both at the same time.  -At the request of patient, her inhalers were refilled for 3 months with 3 refills for 1year supply. - She was educated and demonstrated in my office how to use inhalers.   -Yazmin Barba advised to continue prescribed inhalers and keep good compliance. - Patient educated to update her pneumococcal vaccine with family physician and take influenza vaccine in coming season with out fail.   -Yazmin Barba needs no home O2 at rest. She needs 4LPM of home O2 with exercise. She will be evaluated for nocturnal home O2 requirement- see separte order.  -Nocturnal pulse ox study on room air to check for the continuation/discontinuation of patient current home O2 at night time. - Yazmin Barba educated about my impression and plan. She verbalizes understanding.   - Schedule patient for follow up with my clinic in 6months with letter from [address] She advised to make early appointment if needed. Florence Ahuja Dietzeducated about my impression and plan. She verbalizes understanding.

## 2017-12-12 ENCOUNTER — HOSPITAL ENCOUNTER (OUTPATIENT)
Dept: RESPIRATORY THERAPY | Age: 72
Discharge: HOME | End: 2017-12-12
Payer: MEDICARE

## 2017-12-12 PROCEDURE — 94762 N-INVAS EAR/PLS OXIMTRY CONT: CPT

## 2017-12-12 NOTE — PROGRESS NOTES
Instructions were given for an overnight nocturnal pulse oximetry study. The serial number of the pulse oximetry was [id number]. A log sheet was completed. Patient was instructed on documenting any events that occurred throughout the night on the log sheet. The procedure was explained to the learner(s). Patient understanding of the procedure was excellent. Patient does have a mean of transportation to bring back the study the next day. A patient task was placed in the patients chart for the  to download the nocturnal study and fax the results to the ordering provider for interpretation. The pulse oximetrys memory was cleared. Patient had no questions and was sent home with the pulse oximeter.

## 2017-12-14 DIAGNOSIS — Z72.0 TOBACCO ABUSE: ICD-10-CM

## 2017-12-14 DIAGNOSIS — J44.9 STAGE 3 SEVERE COPD BY GOLD CLASSIFICATION (HCC): ICD-10-CM

## 2018-04-19 ENCOUNTER — HOSPITAL ENCOUNTER (OUTPATIENT)
Age: 73
Discharge: HOME | End: 2018-04-19
Payer: MEDICARE

## 2018-04-19 ENCOUNTER — HOSPITAL ENCOUNTER (OUTPATIENT)
Dept: CT IMAGING | Age: 73
Discharge: HOME | End: 2018-04-19
Payer: MEDICARE

## 2018-04-19 DIAGNOSIS — C34.30 MALIGNANT NEOPLASM OF LOWER LOBE OF LUNG, UNSPECIFIED LATERALITY (HCC): ICD-10-CM

## 2018-04-19 LAB
ALBUMIN SERPL-MCNC: 4 G/DL (ref 3.5–5.1)
ALP BLD-CCNC: 85 U/L (ref 38–126)
ALT SERPL-CCNC: 10 U/L (ref 11–66)
ANION GAP SERPL CALCULATED.3IONS-SCNC: 9 MEQ/L (ref 8–16)
ANISOCYTOSIS: ABNORMAL
AST SERPL-CCNC: 14 U/L (ref 5–40)
BASOPHILS # BLD: 1 %
BASOPHILS ABSOLUTE: 0.1 THOU/MM3 (ref 0–0.1)
BILIRUB SERPL-MCNC: 0.3 MG/DL (ref 0.3–1.2)
BUN BLDV-MCNC: 12 MG/DL (ref 7–22)
CALCIUM SERPL-MCNC: 9.9 MG/DL (ref 8.5–10.5)
CEA: 3.9 NG/ML (ref 0–5)
CHLORIDE BLD-SCNC: 100 MEQ/L (ref 98–111)
CO2: 33 MEQ/L (ref 23–33)
CREAT SERPL-MCNC: 0.8 MG/DL (ref 0.4–1.2)
EOSINOPHIL # BLD: 2 %
EOSINOPHILS ABSOLUTE: 0.1 THOU/MM3 (ref 0–0.4)
GFR SERPL CREATININE-BSD FRML MDRD: 70 ML/MIN/1.73M2
GLUCOSE BLD-MCNC: 95 MG/DL (ref 70–108)
HCT VFR BLD CALC: 33.4 % (ref 37–47)
HEMOGLOBIN: 11.1 GM/DL (ref 12–16)
LYMPHOCYTES # BLD: 23.8 %
LYMPHOCYTES ABSOLUTE: 1.2 THOU/MM3 (ref 1–4.8)
MCH RBC QN AUTO: 29.8 PG (ref 27–31)
MCHC RBC AUTO-ENTMCNC: 33.3 GM/DL (ref 33–37)
MCV RBC AUTO: 89.4 FL (ref 81–99)
MONOCYTES # BLD: 8.6 %
MONOCYTES ABSOLUTE: 0.4 THOU/MM3 (ref 0.4–1.3)
NUCLEATED RED BLOOD CELLS: 0 /100 WBC
PDW BLD-RTO: 14.9 % (ref 11.5–14.5)
PLATELET # BLD: 194 THOU/MM3 (ref 130–400)
PMV BLD AUTO: 8 FL (ref 7.4–10.4)
POC CREATININE WHOLE BLOOD: 0.9 MG/DL (ref 0.5–1.2)
POTASSIUM SERPL-SCNC: 5 MEQ/L (ref 3.5–5.2)
RBC # BLD: 3.73 MILL/MM3 (ref 4.2–5.4)
SEG NEUTROPHILS: 64.6 %
SEGMENTED NEUTROPHILS ABSOLUTE COUNT: 3.3 THOU/MM3 (ref 1.8–7.7)
SODIUM BLD-SCNC: 142 MEQ/L (ref 135–145)
TOTAL PROTEIN: 7.7 G/DL (ref 6.1–8)
WBC # BLD: 5.1 THOU/MM3 (ref 4.8–10.8)

## 2018-04-19 PROCEDURE — 71260 CT THORAX DX C+: CPT

## 2018-04-19 PROCEDURE — 85025 COMPLETE CBC W/AUTO DIFF WBC: CPT

## 2018-04-19 PROCEDURE — 6360000004 HC RX CONTRAST MEDICATION: Performed by: INTERNAL MEDICINE

## 2018-04-19 PROCEDURE — 82378 CARCINOEMBRYONIC ANTIGEN: CPT

## 2018-04-19 PROCEDURE — 82565 ASSAY OF CREATININE: CPT

## 2018-04-19 PROCEDURE — 36415 COLL VENOUS BLD VENIPUNCTURE: CPT

## 2018-04-19 PROCEDURE — 80053 COMPREHEN METABOLIC PANEL: CPT

## 2018-04-19 RX ADMIN — IOPAMIDOL 85 ML: 755 INJECTION, SOLUTION INTRAVENOUS at 10:02

## 2018-04-30 RX ORDER — MIDAZOLAM HYDROCHLORIDE 1 MG/ML
1 INJECTION INTRAMUSCULAR; INTRAVENOUS ONCE
Status: CANCELLED | OUTPATIENT
Start: 2018-04-30 | End: 2018-04-30

## 2018-04-30 RX ORDER — FENTANYL CITRATE 50 UG/ML
50 INJECTION, SOLUTION INTRAMUSCULAR; INTRAVENOUS ONCE
Status: CANCELLED | OUTPATIENT
Start: 2018-04-30 | End: 2018-04-30

## 2018-04-30 RX ORDER — CLINDAMYCIN PHOSPHATE 600 MG/50ML
600 INJECTION INTRAVENOUS
Status: CANCELLED | OUTPATIENT
Start: 2018-04-30

## 2018-04-30 RX ORDER — HEPARIN SODIUM (PORCINE) LOCK FLUSH IV SOLN 100 UNIT/ML 100 UNIT/ML
500 SOLUTION INTRAVENOUS ONCE
Status: CANCELLED | OUTPATIENT
Start: 2018-04-30 | End: 2018-04-30

## 2018-04-30 RX ORDER — SODIUM CHLORIDE 450 MG/100ML
INJECTION, SOLUTION INTRAVENOUS CONTINUOUS
Status: CANCELLED | OUTPATIENT
Start: 2018-04-30

## 2018-05-01 ENCOUNTER — HOSPITAL ENCOUNTER (OUTPATIENT)
Dept: INTERVENTIONAL RADIOLOGY/VASCULAR | Age: 73
Discharge: HOME | End: 2018-05-01
Payer: MEDICARE

## 2018-05-01 VITALS
RESPIRATION RATE: 18 BRPM | WEIGHT: 145 LBS | BODY MASS INDEX: 27.4 KG/M2 | DIASTOLIC BLOOD PRESSURE: 76 MMHG | OXYGEN SATURATION: 97 % | TEMPERATURE: 98.2 F | SYSTOLIC BLOOD PRESSURE: 151 MMHG | HEART RATE: 112 BPM

## 2018-05-01 LAB
HCT VFR BLD CALC: 38.1 % (ref 37–47)
HEMOGLOBIN: 12.7 GM/DL (ref 12–16)
MCH RBC QN AUTO: 29.8 PG (ref 27–31)
MCHC RBC AUTO-ENTMCNC: 33.3 GM/DL (ref 33–37)
MCV RBC AUTO: 89.7 FL (ref 81–99)
PDW BLD-RTO: 16 % (ref 11.5–14.5)
PLATELET # BLD: 220 THOU/MM3 (ref 130–400)
PMV BLD AUTO: 8.3 FL (ref 7.4–10.4)
RBC # BLD: 4.25 MILL/MM3 (ref 4.2–5.4)
WBC # BLD: 7.4 THOU/MM3 (ref 4.8–10.8)

## 2018-05-01 PROCEDURE — 36561 INSERT TUNNELED CV CATH: CPT | Performed by: RADIOLOGY

## 2018-05-01 PROCEDURE — 77001 FLUOROGUIDE FOR VEIN DEVICE: CPT | Performed by: RADIOLOGY

## 2018-05-01 PROCEDURE — 2500000003 HC RX 250 WO HCPCS: Performed by: RADIOLOGY

## 2018-05-01 PROCEDURE — C1788 PORT, INDWELLING, IMP: HCPCS

## 2018-05-01 PROCEDURE — 2580000003 HC RX 258: Performed by: RADIOLOGY

## 2018-05-01 PROCEDURE — 6370000000 HC RX 637 (ALT 250 FOR IP)

## 2018-05-01 PROCEDURE — 2500000003 HC RX 250 WO HCPCS

## 2018-05-01 PROCEDURE — 36415 COLL VENOUS BLD VENIPUNCTURE: CPT

## 2018-05-01 PROCEDURE — 6360000002 HC RX W HCPCS

## 2018-05-01 PROCEDURE — 76937 US GUIDE VASCULAR ACCESS: CPT | Performed by: RADIOLOGY

## 2018-05-01 PROCEDURE — 85027 COMPLETE CBC AUTOMATED: CPT

## 2018-05-01 PROCEDURE — C1894 INTRO/SHEATH, NON-LASER: HCPCS

## 2018-05-01 RX ORDER — LIDOCAINE AND PRILOCAINE 25; 25 MG/G; MG/G
CREAM TOPICAL PRN
COMMUNITY

## 2018-05-01 RX ORDER — FENTANYL CITRATE 50 UG/ML
25 INJECTION, SOLUTION INTRAMUSCULAR; INTRAVENOUS ONCE
Status: COMPLETED | OUTPATIENT
Start: 2018-05-01 | End: 2018-05-01

## 2018-05-01 RX ORDER — ACETAMINOPHEN 325 MG/1
650 TABLET ORAL ONCE
Status: COMPLETED | OUTPATIENT
Start: 2018-05-01 | End: 2018-05-01

## 2018-05-01 RX ORDER — MIDAZOLAM HYDROCHLORIDE 1 MG/ML
0.5 INJECTION INTRAMUSCULAR; INTRAVENOUS ONCE
Status: COMPLETED | OUTPATIENT
Start: 2018-05-01 | End: 2018-05-01

## 2018-05-01 RX ORDER — CLINDAMYCIN PHOSPHATE 600 MG/50ML
600 INJECTION INTRAVENOUS
Status: COMPLETED | OUTPATIENT
Start: 2018-05-01 | End: 2018-05-01

## 2018-05-01 RX ORDER — FENTANYL CITRATE 50 UG/ML
50 INJECTION, SOLUTION INTRAMUSCULAR; INTRAVENOUS ONCE
Status: COMPLETED | OUTPATIENT
Start: 2018-05-01 | End: 2018-05-01

## 2018-05-01 RX ORDER — HEPARIN SODIUM (PORCINE) LOCK FLUSH IV SOLN 100 UNIT/ML 100 UNIT/ML
500 SOLUTION INTRAVENOUS ONCE
Status: COMPLETED | OUTPATIENT
Start: 2018-05-01 | End: 2018-05-01

## 2018-05-01 RX ORDER — SODIUM CHLORIDE 450 MG/100ML
INJECTION, SOLUTION INTRAVENOUS CONTINUOUS
Status: DISCONTINUED | OUTPATIENT
Start: 2018-05-01 | End: 2018-05-02 | Stop reason: HOSPADM

## 2018-05-01 RX ORDER — MIDAZOLAM HYDROCHLORIDE 1 MG/ML
1 INJECTION INTRAMUSCULAR; INTRAVENOUS ONCE
Status: COMPLETED | OUTPATIENT
Start: 2018-05-01 | End: 2018-05-01

## 2018-05-01 RX ORDER — ACETAMINOPHEN 325 MG/1
TABLET ORAL
Status: COMPLETED
Start: 2018-05-01 | End: 2018-05-01

## 2018-05-01 RX ORDER — DEXAMETHASONE 4 MG/1
4 TABLET ORAL 2 TIMES DAILY WITH MEALS
COMMUNITY
End: 2019-04-15 | Stop reason: ALTCHOICE

## 2018-05-01 RX ADMIN — SODIUM CHLORIDE: 4.5 INJECTION, SOLUTION INTRAVENOUS at 11:54

## 2018-05-01 RX ADMIN — FENTANYL CITRATE 25 MCG: 50 INJECTION, SOLUTION INTRAMUSCULAR; INTRAVENOUS at 13:45

## 2018-05-01 RX ADMIN — SODIUM CHLORIDE 50000 UNITS: 900 IRRIGANT IRRIGATION at 13:59

## 2018-05-01 RX ADMIN — MIDAZOLAM HYDROCHLORIDE 0.5 MG: 1 INJECTION INTRAMUSCULAR; INTRAVENOUS at 13:48

## 2018-05-01 RX ADMIN — CLINDAMYCIN PHOSPHATE 600 MG: 12 INJECTION, SOLUTION INTRAMUSCULAR; INTRAVENOUS at 12:03

## 2018-05-01 RX ADMIN — HEPARIN SODIUM (PORCINE) LOCK FLUSH IV SOLN 100 UNIT/ML 500 UNITS: 100 SOLUTION at 13:59

## 2018-05-01 RX ADMIN — ACETAMINOPHEN 650 MG: 325 TABLET ORAL at 14:54

## 2018-05-01 RX ADMIN — MIDAZOLAM HYDROCHLORIDE 0.5 MG: 1 INJECTION INTRAMUSCULAR; INTRAVENOUS at 13:45

## 2018-05-01 RX ADMIN — FENTANYL CITRATE 25 MCG: 50 INJECTION, SOLUTION INTRAMUSCULAR; INTRAVENOUS at 13:48

## 2018-05-01 ASSESSMENT — PAIN - FUNCTIONAL ASSESSMENT: PAIN_FUNCTIONAL_ASSESSMENT: 0-10

## 2018-05-01 ASSESSMENT — PAIN SCALES - GENERAL
PAINLEVEL_OUTOF10: 0
PAINLEVEL_OUTOF10: 5
PAINLEVEL_OUTOF10: 0
PAINLEVEL_OUTOF10: 0

## 2018-05-01 ASSESSMENT — PAIN DESCRIPTION - DESCRIPTORS
DESCRIPTORS: ACHING
DESCRIPTORS: ACHING

## 2018-05-01 ASSESSMENT — PAIN DESCRIPTION - LOCATION: LOCATION: HEAD

## 2018-05-01 ASSESSMENT — PAIN DESCRIPTION - PAIN TYPE: TYPE: ACUTE PAIN

## 2018-07-12 ENCOUNTER — HOSPITAL ENCOUNTER (OUTPATIENT)
Dept: INFUSION THERAPY | Age: 73
Discharge: HOME | End: 2018-07-12
Payer: MEDICARE

## 2018-07-12 VITALS
RESPIRATION RATE: 18 BRPM | SYSTOLIC BLOOD PRESSURE: 158 MMHG | OXYGEN SATURATION: 99 % | HEART RATE: 100 BPM | BODY MASS INDEX: 26.62 KG/M2 | HEIGHT: 61 IN | DIASTOLIC BLOOD PRESSURE: 78 MMHG | TEMPERATURE: 98.5 F | WEIGHT: 141 LBS

## 2018-07-12 DIAGNOSIS — R91.1 NODULE OF LEFT LUNG: ICD-10-CM

## 2018-07-12 DIAGNOSIS — C34.91 CARCINOMA OF RIGHT LUNG (HCC): ICD-10-CM

## 2018-07-12 DIAGNOSIS — Z85.118 H/O: LUNG CANCER: ICD-10-CM

## 2018-07-12 DIAGNOSIS — J96.21 ACUTE ON CHRONIC RESPIRATORY FAILURE WITH HYPOXIA (HCC): ICD-10-CM

## 2018-07-12 DIAGNOSIS — C34.90 RECURRENT NON-SMALL CELL LUNG CANCER (HCC): ICD-10-CM

## 2018-07-12 PROCEDURE — 2580000003 HC RX 258: Performed by: INTERNAL MEDICINE

## 2018-07-12 PROCEDURE — P9016 RBC LEUKOCYTES REDUCED: HCPCS

## 2018-07-12 PROCEDURE — 6360000002 HC RX W HCPCS: Performed by: INTERNAL MEDICINE

## 2018-07-12 PROCEDURE — 36430 TRANSFUSION BLD/BLD COMPNT: CPT

## 2018-07-12 PROCEDURE — 96374 THER/PROPH/DIAG INJ IV PUSH: CPT

## 2018-07-12 RX ORDER — FUROSEMIDE 10 MG/ML
40 INJECTION INTRAMUSCULAR; INTRAVENOUS ONCE
Status: CANCELLED
Start: 2018-07-12 | End: 2018-07-12

## 2018-07-12 RX ORDER — FUROSEMIDE 10 MG/ML
40 INJECTION INTRAMUSCULAR; INTRAVENOUS ONCE
Status: COMPLETED | OUTPATIENT
Start: 2018-07-12 | End: 2018-07-12

## 2018-07-12 RX ORDER — SODIUM CHLORIDE 0.9 % (FLUSH) 0.9 %
10 SYRINGE (ML) INJECTION PRN
Status: DISCONTINUED | OUTPATIENT
Start: 2018-07-12 | End: 2018-07-13 | Stop reason: HOSPADM

## 2018-07-12 RX ORDER — 0.9 % SODIUM CHLORIDE 0.9 %
250 INTRAVENOUS SOLUTION INTRAVENOUS ONCE
Status: CANCELLED
Start: 2018-07-12 | End: 2018-07-12

## 2018-07-12 RX ORDER — SODIUM CHLORIDE 0.9 % (FLUSH) 0.9 %
10 SYRINGE (ML) INJECTION PRN
Status: CANCELLED | OUTPATIENT
Start: 2018-07-12

## 2018-07-12 RX ORDER — SODIUM CHLORIDE 0.9 % (FLUSH) 0.9 %
20 SYRINGE (ML) INJECTION PRN
Status: CANCELLED | OUTPATIENT
Start: 2018-07-12

## 2018-07-12 RX ORDER — 0.9 % SODIUM CHLORIDE 0.9 %
250 INTRAVENOUS SOLUTION INTRAVENOUS ONCE
Status: COMPLETED | OUTPATIENT
Start: 2018-07-12 | End: 2018-07-12

## 2018-07-12 RX ORDER — HEPARIN SODIUM (PORCINE) LOCK FLUSH IV SOLN 100 UNIT/ML 100 UNIT/ML
500 SOLUTION INTRAVENOUS PRN
Status: CANCELLED | OUTPATIENT
Start: 2018-07-12

## 2018-07-12 RX ORDER — HEPARIN SODIUM (PORCINE) LOCK FLUSH IV SOLN 100 UNIT/ML 100 UNIT/ML
500 SOLUTION INTRAVENOUS PRN
Status: DISCONTINUED | OUTPATIENT
Start: 2018-07-12 | End: 2018-07-13 | Stop reason: HOSPADM

## 2018-07-12 RX ADMIN — Medication 10 ML: at 12:15

## 2018-07-12 RX ADMIN — Medication 10 ML: at 14:31

## 2018-07-12 RX ADMIN — FUROSEMIDE 40 MG: 10 INJECTION, SOLUTION INTRAMUSCULAR; INTRAVENOUS at 14:26

## 2018-07-12 RX ADMIN — SODIUM CHLORIDE 250 ML: 9 INJECTION, SOLUTION INTRAVENOUS at 12:15

## 2018-07-12 RX ADMIN — Medication 10 ML: at 14:25

## 2018-07-12 ASSESSMENT — PAIN DESCRIPTION - PAIN TYPE: TYPE: CHRONIC PAIN

## 2018-07-12 ASSESSMENT — PAIN DESCRIPTION - LOCATION: LOCATION: BACK

## 2018-07-12 ASSESSMENT — PAIN DESCRIPTION - ORIENTATION: ORIENTATION: LOWER

## 2018-07-12 ASSESSMENT — PAIN SCALES - GENERAL
PAINLEVEL_OUTOF10: 6
PAINLEVEL_OUTOF10: 8

## 2018-07-12 ASSESSMENT — PAIN DESCRIPTION - DESCRIPTORS: DESCRIPTORS: ACHING

## 2018-07-12 NOTE — PLAN OF CARE
Problem: Pain:  Intervention: Promote participation in pain management plan  Patient encouraged to take prescribed pain medications and call Physician if pain is not controlled. Goal: Control of chronic pain  Control of chronic pain   Outcome: Met This Shift  Patient states her chronic pain in back is tolerable with current medication. Problem: Musculor/Skeletal Functional Status  Intervention: Fall precautions  Verbalized understanding of fall prevention to ask for assistance with ambulation. Call light within reach. Goal: Absence of falls  Outcome: Met This Shift  No falls occurred with visit today. Problem: Intellectual/Education/Knowledge Deficit  Intervention: Verbal/written education provided  Patient educated blood product transfusion protocol:    Patient receiving 1 unit blood:      - Blood product transfusion information sheet given: questions answered and consent signed  - Take vital signs/ monitor lungs sound prior to transfusion  - Monitor patient for 15 minutes after transfusion started  - Take vital signs / monitor lungs sound in 15 minutes and post transfusion  - Assess IV site   - Monitor patient closely for potential transfusion reaction    Call MD if develop complications once discharged. Goal: Teaching initiated upon admission  Outcome: Met This Shift  Patient verbalize understanding to  verbal and written information given  on blood transfusion,procedure ,and possible reaction. Instructed to call MD if develop any complications once discharged. Problem: Discharge Planning  Intervention: Interaction with patient/family and care team  Discuss understanding of discharge instructions,follow-up appointments, and when to call the physician. Goal: Knowledge of discharge instructions  Knowledge of discharge instructions    Outcome: Met This Shift  Verbalized understanding of discharge instructions, follow-up appointments, and when to call the physician.     Comments: Care plan reviewed with patient and spouse  Patient and spouse verbalize understanding of the plan of care and contribute to goal setting.

## 2018-07-12 NOTE — PROGRESS NOTES
Patient assessed for the following post blood transfusion:    Dizziness   No  Lightheadedness  No      Acute nausea/vomiting No  Headache   No  Chest pain/pressure  No  Rash/itching   No  Shortness of breath  No    Patient kept for 20 minutes observation post blood transfusion. Patient tolerated blood transfusion without any complications. Last vital signs:   BP (!) 158/78   Pulse 100   Temp 98.5 °F (36.9 °C) (Oral)   Resp 18   Ht 5' 1\" (1.549 m)   Wt 141 lb (64 kg)   SpO2 99%   BMI 26.64 kg/m²         Patient instructed if experience any of the above symptoms following today's infusion,he/she is to notify MD immediately or go to the emergency department. Discharge instructions given to patient. Verbalizes understanding. Ambulated off unit per self with belongings accompanied by spouse.

## 2018-07-18 ENCOUNTER — HOSPITAL ENCOUNTER (OUTPATIENT)
Dept: CT IMAGING | Age: 73
Discharge: HOME | End: 2018-07-18
Payer: MEDICARE

## 2018-07-18 DIAGNOSIS — C34.31 MALIGNANT NEOPLASM OF LOWER LOBE OF RIGHT LUNG (HCC): ICD-10-CM

## 2018-07-18 DIAGNOSIS — R11.2 NAUSEA AND VOMITING, INTRACTABILITY OF VOMITING NOT SPECIFIED, UNSPECIFIED VOMITING TYPE: ICD-10-CM

## 2018-07-18 PROCEDURE — 6360000004 HC RX CONTRAST MEDICATION: Performed by: INTERNAL MEDICINE

## 2018-07-18 PROCEDURE — 6360000002 HC RX W HCPCS

## 2018-07-18 PROCEDURE — 71260 CT THORAX DX C+: CPT

## 2018-07-18 RX ORDER — HEPARIN SODIUM (PORCINE) LOCK FLUSH IV SOLN 100 UNIT/ML 100 UNIT/ML
500 SOLUTION INTRAVENOUS ONCE
Status: DISCONTINUED | OUTPATIENT
Start: 2018-07-18 | End: 2018-07-19 | Stop reason: HOSPADM

## 2018-07-18 RX ADMIN — IOPAMIDOL 85 ML: 755 INJECTION, SOLUTION INTRAVENOUS at 13:10

## 2018-07-20 ENCOUNTER — OFFICE VISIT (OUTPATIENT)
Dept: PULMONOLOGY | Age: 73
End: 2018-07-20
Payer: MEDICARE

## 2018-07-20 VITALS
HEART RATE: 129 BPM | SYSTOLIC BLOOD PRESSURE: 136 MMHG | OXYGEN SATURATION: 94 % | HEIGHT: 61 IN | WEIGHT: 141 LBS | BODY MASS INDEX: 26.62 KG/M2 | DIASTOLIC BLOOD PRESSURE: 84 MMHG

## 2018-07-20 DIAGNOSIS — J44.9 STAGE 3 SEVERE COPD BY GOLD CLASSIFICATION (HCC): ICD-10-CM

## 2018-07-20 DIAGNOSIS — Z87.891 HISTORY OF TOBACCO ABUSE: ICD-10-CM

## 2018-07-20 DIAGNOSIS — J96.11 CHRONIC RESPIRATORY FAILURE WITH HYPOXIA (HCC): ICD-10-CM

## 2018-07-20 DIAGNOSIS — Z90.2 HISTORY OF LOBECTOMY OF LUNG: ICD-10-CM

## 2018-07-20 DIAGNOSIS — C34.92 ADENOCARCINOMA, LUNG, LEFT (HCC): Primary | ICD-10-CM

## 2018-07-20 PROCEDURE — G8427 DOCREV CUR MEDS BY ELIG CLIN: HCPCS | Performed by: NURSE PRACTITIONER

## 2018-07-20 PROCEDURE — G8926 SPIRO NO PERF OR DOC: HCPCS | Performed by: NURSE PRACTITIONER

## 2018-07-20 PROCEDURE — 1123F ACP DISCUSS/DSCN MKR DOCD: CPT | Performed by: NURSE PRACTITIONER

## 2018-07-20 PROCEDURE — 1101F PT FALLS ASSESS-DOCD LE1/YR: CPT | Performed by: NURSE PRACTITIONER

## 2018-07-20 PROCEDURE — 3023F SPIROM DOC REV: CPT | Performed by: NURSE PRACTITIONER

## 2018-07-20 PROCEDURE — 1036F TOBACCO NON-USER: CPT | Performed by: NURSE PRACTITIONER

## 2018-07-20 PROCEDURE — G8400 PT W/DXA NO RESULTS DOC: HCPCS | Performed by: NURSE PRACTITIONER

## 2018-07-20 PROCEDURE — G8419 CALC BMI OUT NRM PARAM NOF/U: HCPCS | Performed by: NURSE PRACTITIONER

## 2018-07-20 PROCEDURE — 3017F COLORECTAL CA SCREEN DOC REV: CPT | Performed by: NURSE PRACTITIONER

## 2018-07-20 PROCEDURE — 99214 OFFICE O/P EST MOD 30 MIN: CPT | Performed by: NURSE PRACTITIONER

## 2018-07-20 PROCEDURE — 4040F PNEUMOC VAC/ADMIN/RCVD: CPT | Performed by: NURSE PRACTITIONER

## 2018-07-20 PROCEDURE — 1090F PRES/ABSN URINE INCON ASSESS: CPT | Performed by: NURSE PRACTITIONER

## 2018-07-20 RX ORDER — GUAIFENESIN 600 MG/1
600 TABLET, EXTENDED RELEASE ORAL 2 TIMES DAILY PRN
Qty: 60 TABLET | Refills: 2 | Status: ON HOLD | OUTPATIENT
Start: 2018-07-20 | End: 2019-07-19 | Stop reason: ALTCHOICE

## 2018-07-20 RX ORDER — FUROSEMIDE 20 MG/1
20 TABLET ORAL DAILY PRN
Qty: 5 TABLET | Refills: 5 | Status: SHIPPED | OUTPATIENT
Start: 2018-07-20 | End: 2019-05-08 | Stop reason: SDUPTHER

## 2018-07-20 ASSESSMENT — ENCOUNTER SYMPTOMS
EYES NEGATIVE: 1
WHEEZING: 1
SHORTNESS OF BREATH: 0
COUGH: 1
DIARRHEA: 0
VOMITING: 0
HEMOPTYSIS: 0
ABDOMINAL PAIN: 0
SORE THROAT: 1
NAUSEA: 0
SPUTUM PRODUCTION: 0

## 2018-07-20 NOTE — PROGRESS NOTES
HISTORY:  Past Surgical History:   Procedure Laterality Date    LOBECTOMY  10/19/2012    rt lower lobectomy     LUNG BIOPSY  8/2012     SOCIAL HISTORY:  Social History   Substance Use Topics    Smoking status: Former Smoker     Packs/day: 1.00     Years: 45.00     Types: Cigarettes     Quit date: 2007    Smokeless tobacco: Never Used    Alcohol use No     ALLERGIES:  Allergies   Allergen Reactions    Advil Cold & Sinus Liqui-Gels [Pseudoephedrine-Ibuprofen] Anaphylaxis    Percocet [Oxycodone-Acetaminophen] Nausea Only    Sulfa Antibiotics Hives     FAMILY HISTORY:  Family History   Problem Relation Age of Onset   [facility] Cancer Mother     Heart Disease Father     High Blood Pressure Father     High Cholesterol Father     Arthritis Sister     Heart Disease Sister     Cancer Sister     Heart Disease Sister     Stroke Sister     High Cholesterol Sister     High Blood Pressure Sister     Stroke Paternal Grandfather      CURRENT MEDICATIONS:  Current Outpatient Prescriptions   Medication Sig Dispense Refill    guaiFENesin (MUCINEX) 600 MG extended release tablet Take 1 tablet by mouth 2 times daily as needed for Congestion 60 tablet 2    furosemide (LASIX) 20 MG tablet Take 1 tablet by mouth daily as needed (leg swelling) 5 tablet 5    dexamethasone (DECADRON) 4 MG tablet Take 4 mg by mouth 2 times daily (with meals)      lidocaine-prilocaine (EMLA) 2.5-2.5 % cream Apply topically as needed for Pain Apply topically as needed.       albuterol sulfate HFA (PROAIR HFA) 108 (90 Base) MCG/ACT inhaler Inhale 2 puffs into the lungs every 6 hours as needed for Wheezing 3 Inhaler 2    tiotropium (SPIRIVA RESPIMAT) 2.5 MCG/ACT AERS inhaler Inhale 2 puffs into the lungs daily 3 Inhaler 3    mometasone-formoterol (DULERA) 200-5 MCG/ACT inhaler Inhale 2 puffs into the lungs 2 times daily 3 Inhaler 3    albuterol (PROVENTIL) (2.5 MG/3ML) 0.083% nebulizer solution Take 3 mLs by nebulization every 6 hours as needed JESUS Adames - CNP on 7/20/2018 at 1:25 PM  7/20/2018

## 2018-08-01 ENCOUNTER — OFFICE VISIT (OUTPATIENT)
Dept: CARDIOLOGY CLINIC | Age: 73
End: 2018-08-01
Payer: MEDICARE

## 2018-08-01 VITALS
HEART RATE: 131 BPM | SYSTOLIC BLOOD PRESSURE: 126 MMHG | BODY MASS INDEX: 25.6 KG/M2 | DIASTOLIC BLOOD PRESSURE: 83 MMHG | HEIGHT: 61 IN | WEIGHT: 135.6 LBS

## 2018-08-01 DIAGNOSIS — R00.0 SINUS TACHYCARDIA: Primary | ICD-10-CM

## 2018-08-01 DIAGNOSIS — R42 DIZZINESS: ICD-10-CM

## 2018-08-01 DIAGNOSIS — R00.2 INTERMITTENT PALPITATIONS: ICD-10-CM

## 2018-08-01 PROCEDURE — 3017F COLORECTAL CA SCREEN DOC REV: CPT | Performed by: INTERNAL MEDICINE

## 2018-08-01 PROCEDURE — 1101F PT FALLS ASSESS-DOCD LE1/YR: CPT | Performed by: INTERNAL MEDICINE

## 2018-08-01 PROCEDURE — G8400 PT W/DXA NO RESULTS DOC: HCPCS | Performed by: INTERNAL MEDICINE

## 2018-08-01 PROCEDURE — 93000 ELECTROCARDIOGRAM COMPLETE: CPT | Performed by: INTERNAL MEDICINE

## 2018-08-01 PROCEDURE — 4040F PNEUMOC VAC/ADMIN/RCVD: CPT | Performed by: INTERNAL MEDICINE

## 2018-08-01 PROCEDURE — 99204 OFFICE O/P NEW MOD 45 MIN: CPT | Performed by: INTERNAL MEDICINE

## 2018-08-01 PROCEDURE — G8427 DOCREV CUR MEDS BY ELIG CLIN: HCPCS | Performed by: INTERNAL MEDICINE

## 2018-08-01 PROCEDURE — 1090F PRES/ABSN URINE INCON ASSESS: CPT | Performed by: INTERNAL MEDICINE

## 2018-08-01 PROCEDURE — 1123F ACP DISCUSS/DSCN MKR DOCD: CPT | Performed by: INTERNAL MEDICINE

## 2018-08-01 PROCEDURE — G8419 CALC BMI OUT NRM PARAM NOF/U: HCPCS | Performed by: INTERNAL MEDICINE

## 2018-08-01 PROCEDURE — 1036F TOBACCO NON-USER: CPT | Performed by: INTERNAL MEDICINE

## 2018-08-01 RX ORDER — CITALOPRAM 20 MG/1
20 TABLET ORAL DAILY
COMMUNITY
End: 2018-12-18 | Stop reason: ALTCHOICE

## 2018-08-01 NOTE — PROGRESS NOTES
Chief Complaint   Patient presents with    New Patient     HIGH HEART RATE     GETTING THERAPY FOR lUNG c- COMPLETED rADIATION RX AND NOW ON CHEMOTHERAPY  OCT 2012 HAD rT LOWER LOBE RESECTION  HR WAS GOING UP IN THE LAST FEW WEEKS    NEW PATIENT REFERRED FOR HIGH HR.    dENIED CP    INTERMITTENT PALPITATIONS    NO FEVER OR CHILLS    COPD ON hOME O2    SOB BETTER AFTER RECENT rX FOR COPD EXACER    DIZZINESS ON GETTING UP     EKG DONE TODAY.     fHX OF EARLY cad FATHER AND HER SISTER    Patient Active Problem List   Diagnosis    [facility] lung ([facility]    COPD exacerbation ([facility]    Aspiration pneumonia of right upper lobe due to vomit ([address]    HTN (hypertension)    GERD (gastroesophageal reflux disease)    H/O: lung cancer    OAB (overactive bladder)    Nodule of left lung    Recurrent non-small cell lung cancer ([facility]    Acute on chronic respiratory failure with hypoxia (HCC)    Dehydration    Orthostatic hypotension    Chronic midline low back pain without sciatica    Pneumonia due to infectious organism    Sinus tachycardia    Intermittent palpitations    Dizziness       Past Surgical History:   Procedure Laterality Date    LOBECTOMY  10/19/2012    rt lower lobectomy     LUNG BIOPSY  8/2012       Allergies   Allergen Reactions    Advil Cold & Sinus Liqui-Gels [Pseudoephedrine-Ibuprofen] Anaphylaxis    Percocet [Oxycodone-Acetaminophen] Nausea Only    Sulfa Antibiotics Hives        Family History   Problem Relation Age of Onset    Cancer Mother     Heart Disease Father     High Blood Pressure Father     High Cholesterol Father     Arthritis Sister     Heart Disease Sister     Cancer Sister     Heart Disease Sister     Stroke Sister     High Cholesterol Sister     High Blood Pressure Sister     Stroke Paternal Grandfather         Social History     Social History    Marital status:      Spouse name: Cristina Cruz Number of children: 2    Years of education: N/A     Occupational History    Not on file. Social History Main Topics    Smoking status: Former Smoker     Packs/day: 1.00     Years: 45.00     Types: Cigarettes     Quit date: 2007    Smokeless tobacco: Never Used    Alcohol use No    Drug use: No    Sexual activity: Not on file     Other Topics Concern    Not on file     Social History Narrative    No narrative on file       Current Outpatient Prescriptions   Medication Sig Dispense Refill    citalopram (CELEXA) 20 MG tablet Take 20 mg by mouth daily      guaiFENesin (MUCINEX) 600 MG extended release tablet Take 1 tablet by mouth 2 times daily as needed for Congestion 60 tablet 2    furosemide (LASIX) 20 MG tablet Take 1 tablet by mouth daily as needed (leg swelling) 5 tablet 5    dexamethasone (DECADRON) 4 MG tablet Take 4 mg by mouth 2 times daily (with meals)      lidocaine-prilocaine (EMLA) 2.5-2.5 % cream Apply topically as needed for Pain Apply topically as needed.  albuterol sulfate HFA (PROAIR HFA) 108 (90 Base) MCG/ACT inhaler Inhale 2 puffs into the lungs every 6 hours as needed for Wheezing 3 Inhaler 2    tiotropium (SPIRIVA RESPIMAT) 2.5 MCG/ACT AERS inhaler Inhale 2 puffs into the lungs daily 3 Inhaler 3    mometasone-formoterol (DULERA) 200-5 MCG/ACT inhaler Inhale 2 puffs into the lungs 2 times daily 3 Inhaler 3    albuterol (PROVENTIL) (2.5 MG/3ML) 0.083% nebulizer solution Take 3 mLs by nebulization every 6 hours as needed for Wheezing or Shortness of Breath 360 vial 1    OXYGEN Inhale 2 L into the lungs as needed Between 1 & 3 L depending on activity      fluticasone (FLONASE) 50 MCG/ACT nasal spray 1 spray by Nasal route daily 1 Bottle 5    oxybutynin (DITROPAN-XL) 5 MG extended release tablet Take 1 tablet by mouth daily 90 tablet 1    betamethasone dipropionate (DIPROLENE) 0.05 % ointment Apply topically daily.  50 g 5    omeprazole (PRILOSEC) 20 MG delayed release capsule Take 1 capsule by mouth daily (Patient taking differently: Take 40 mg by mouth Daily ) 90 capsule 3    ranitidine (ZANTAC) 75 MG tablet Take 1 tablet by mouth 2 times daily (before meals) 180 tablet 3    HYDROcodone-acetaminophen (NORCO) 5-325 MG per tablet Take 1 tablet by mouth 3 times daily as needed for Pain 90 tablet 0    promethazine (PHENERGAN) 25 MG tablet Take 25 mg by mouth as needed        No current facility-administered medications for this visit. Review of Systems -     General ROS: negative  Psychological ROS: negative  Hematological and Lymphatic ROS: No history of blood clots or bleeding disorder. Respiratory ROS: no cough, shortness of breath, or wheezing  Cardiovascular ROS: no chest pain or dyspnea on exertion  Gastrointestinal ROS: negative  Genito-Urinary ROS: no dysuria, trouble voiding, or hematuria  Musculoskeletal ROS: negative  Neurological ROS: no TIA or stroke symptoms  Dermatological ROS: negative      Blood pressure 126/83, pulse 131, height 5' 1\" (1.549 m), weight 135 lb 9.6 oz (61.5 kg). Physical Examination:    General appearance - alert, well appearing, and in no distress  Mental status - alert, oriented to person, place, and time  Neck - supple, no significant adenopathy, no JVD, or carotid bruits  Chest - clear to auscultation, no wheezes, rales or rhonchi, symmetric air entry  Heart - normal rate, regular rhythm, normal S1, S2, no murmurs, rubs, clicks or gallops  Abdomen - soft, nontender, nondistended, no masses or organomegaly  Neurological - alert, oriented, normal speech, no focal findings or movement disorder noted  Musculoskeletal - no joint tenderness, deformity or swelling  Extremities - peripheral pulses normal, no pedal edema, no clubbing or cyanosis  Skin - normal coloration and turgor, no rashes, no suspicious skin lesions noted    Lab  No results for input(s): CKTOTAL, CKMB, CKMBINDEX, TROPONINI in the last 72 hours.   CBC:   Lab Results   Component Value Date    WBC 7.4 05/01/2018    RBC 4.25 05/01/2018 Saucerization Excision Additional Text (Leave Blank If You Do Not Want): The margin was drawn around the clinically apparent lesion.  Incisions were then made along these lines, in a tangential fashion, to the appropriate tissue plane and the lesion was extirpated.

## 2018-08-14 NOTE — PROGRESS NOTES
Confirmed with [facility] at Dr Martha Fuller office that the CBC will be drawn in their office and a copy sent with patient for our records

## 2018-08-15 ENCOUNTER — HOSPITAL ENCOUNTER (OUTPATIENT)
Dept: GENERAL RADIOLOGY | Age: 73
Discharge: HOME | End: 2018-08-15
Payer: MEDICARE

## 2018-08-15 VITALS
OXYGEN SATURATION: 95 % | WEIGHT: 133 LBS | HEIGHT: 61 IN | TEMPERATURE: 98.3 F | DIASTOLIC BLOOD PRESSURE: 71 MMHG | BODY MASS INDEX: 25.11 KG/M2 | RESPIRATION RATE: 20 BRPM | SYSTOLIC BLOOD PRESSURE: 111 MMHG | HEART RATE: 115 BPM

## 2018-08-15 DIAGNOSIS — R91.1 NODULE OF LEFT LUNG: ICD-10-CM

## 2018-08-15 DIAGNOSIS — C34.90 RECURRENT NON-SMALL CELL LUNG CANCER (HCC): ICD-10-CM

## 2018-08-15 DIAGNOSIS — Z85.118 H/O: LUNG CANCER: ICD-10-CM

## 2018-08-15 DIAGNOSIS — J96.21 ACUTE ON CHRONIC RESPIRATORY FAILURE WITH HYPOXIA (HCC): ICD-10-CM

## 2018-08-15 DIAGNOSIS — C34.91 CARCINOMA OF RIGHT LUNG (HCC): ICD-10-CM

## 2018-08-15 PROCEDURE — 6360000002 HC RX W HCPCS

## 2018-08-15 PROCEDURE — 6360000002 HC RX W HCPCS: Performed by: INTERNAL MEDICINE

## 2018-08-15 PROCEDURE — 96372 THER/PROPH/DIAG INJ SC/IM: CPT

## 2018-08-15 PROCEDURE — 2709999900 HC NON-CHARGEABLE SUPPLY

## 2018-08-15 RX ORDER — 0.9 % SODIUM CHLORIDE 0.9 %
250 INTRAVENOUS SOLUTION INTRAVENOUS ONCE
Status: CANCELLED
Start: 2018-08-15 | End: 2018-08-15

## 2018-08-15 RX ORDER — ESOMEPRAZOLE MAGNESIUM 20 MG/1
20 FOR SUSPENSION ORAL DAILY
COMMUNITY
End: 2019-04-15

## 2018-08-15 RX ORDER — CETIRIZINE HYDROCHLORIDE 10 MG/1
10 TABLET ORAL DAILY
Status: ON HOLD | COMMUNITY
End: 2019-02-28 | Stop reason: HOSPADM

## 2018-08-15 RX ORDER — HEPARIN SODIUM (PORCINE) LOCK FLUSH IV SOLN 100 UNIT/ML 100 UNIT/ML
500 SOLUTION INTRAVENOUS PRN
Status: CANCELLED | OUTPATIENT
Start: 2018-08-15

## 2018-08-15 RX ORDER — SODIUM CHLORIDE 0.9 % (FLUSH) 0.9 %
20 SYRINGE (ML) INJECTION PRN
Status: CANCELLED | OUTPATIENT
Start: 2018-08-15

## 2018-08-15 RX ORDER — SODIUM CHLORIDE 0.9 % (FLUSH) 0.9 %
10 SYRINGE (ML) INJECTION PRN
Status: CANCELLED | OUTPATIENT
Start: 2018-08-15

## 2018-08-15 RX ADMIN — ERYTHROPOIETIN 40000 UNITS: 20000 INJECTION, SOLUTION INTRAVENOUS; SUBCUTANEOUS at 11:25

## 2018-08-15 NOTE — PROGRESS NOTES
_met___ Safety:       (Environmental)   [place] to environment   Ensure ID band is correct and in place/ allergy band as needed   Assess for fall risk   Initiate fall precautions as applicable (fall band, side rails, etc.)   Call light within reach   Bed in low position/ wheels locked    __met__ Pain:        Assess pain level and characteristics   Administer analgesics as ordered   Assess effectiveness of pain management and report to MD as needed    met____ Knowledge Deficit:   Assess baseline knowledge   Provide teaching at level of understanding   Provide teaching via preferred learning method   Evaluate teaching effectiveness    __met__ Hemodynamic/Respiratory Status:       (Pre and Post Procedure Monitoring)   Assess/Monitor vital signs and LOC   Assess Baseline SpO2 prior to any sedation   Obtain weight/height   Assess vital signs/ LOC until patient meets discharge criteria   Monitor procedure site and notify MD of any issues    _met___ Infection-Risk of Central Venous Catheter:   Monitor for infection signs and symptoms (catheter site redness, temperature elevation, etc)   Assess for infection risks   Educate regarding infection prevention   Manage central venous catheter (flushes/ dressing changes per protocol)  ____ Safety:       (Environmental)   [place] to environment   Ensure ID band is correct and in place/ allergy band as needed   Assess for fall risk   Initiate fall precautions as applicable (fall band, side rails, etc.)   Call light within reach   Bed in low position/ wheels locked    ____ Pain:        Assess pain level and characteristics   Administer analgesics as ordered   Assess effectiveness of pain management and report to MD as needed    ____ Knowledge Deficit:   Assess baseline knowledge   Provide teaching at level of understanding   Provide teaching via preferred learning method   Evaluate teaching effectiveness    ____ Hemodynamic/Respiratory Status: (Pre and Post Procedure Monitoring)   Assess/Monitor vital signs and LOC   Assess Baseline SpO2 prior to any sedation   Obtain weight/height   Assess vital signs/ LOC until patient meets discharge criteria   Monitor procedure site and notify MD of any issues    ____ Infection-Risk of Central Venous Catheter:   Monitor for infection signs and symptoms (catheter site redness, temperature elevation, etc)   Assess for infection risks   Educate regarding infection prevention   Manage central venous catheter (flushes/ dressing changes per protocol)  ____ Safety:       (Environmental)   [place] to environment   Ensure ID band is correct and in place/ allergy band as needed   Assess for fall risk   Initiate fall precautions as applicable (fall band, side rails, etc.)   Call light within reach   Bed in low position/ wheels locked    ____ Pain:        Assess pain level and characteristics   Administer analgesics as ordered   Assess effectiveness of pain management and report to MD as needed    ____ Knowledge Deficit:   Assess baseline knowledge   Provide teaching at level of understanding   Provide teaching via preferred learning method   Evaluate teaching effectiveness    ____ Hemodynamic/Respiratory Status:       (Pre and Post Procedure Monitoring)   Assess/Monitor vital signs and LOC   Assess Baseline SpO2 prior to any sedation   Obtain weight/height   Assess vital signs/ LOC until patient meets discharge criteria   Monitor procedure site and notify MD of any issues    ____ Infection-Risk of Central Venous Catheter:   Monitor for infection signs and symptoms (catheter site redness, temperature elevation, etc)   Assess for infection risks   Educate regarding infection prevention   Manage central venous catheter (flushes/ dressing changes per protocol)  ____ Safety:       (Environmental)   [place] to environment   Ensure ID band is correct and in place/ allergy band as needed   Assess for fall risk   Initiate fall precautions as applicable (fall band, side rails, etc.)   Call light within reach   Bed in low position/ wheels locked    ____ Pain:        Assess pain level and characteristics   Administer analgesics as ordered   Assess effectiveness of pain management and report to MD as needed    ____ Knowledge Deficit:   Assess baseline knowledge   Provide teaching at level of understanding   Provide teaching via preferred learning method   Evaluate teaching effectiveness    ____ Hemodynamic/Respiratory Status:       (Pre and Post Procedure Monitoring)   Assess/Monitor vital signs and LOC   Assess Baseline SpO2 prior to any sedation   Obtain weight/height   Assess vital signs/ LOC until patient meets discharge criteria   Monitor procedure site and notify MD of any issues    ____ Infection-Risk of Central Venous Catheter:   Monitor for infection signs and symptoms (catheter site redness, temperature elevation, etc)   Assess for infection risks   Educate regarding infection prevention   Manage central venous catheter (flushes/ dressing changes per protocol)

## 2018-08-22 ENCOUNTER — HOSPITAL ENCOUNTER (OUTPATIENT)
Dept: GENERAL RADIOLOGY | Age: 73
Discharge: HOME | End: 2018-08-22
Payer: MEDICARE

## 2018-08-22 DIAGNOSIS — R91.1 NODULE OF LEFT LUNG: ICD-10-CM

## 2018-08-22 DIAGNOSIS — C34.90 RECURRENT NON-SMALL CELL LUNG CANCER (HCC): ICD-10-CM

## 2018-08-22 DIAGNOSIS — J96.21 ACUTE ON CHRONIC RESPIRATORY FAILURE WITH HYPOXIA (HCC): ICD-10-CM

## 2018-08-22 DIAGNOSIS — Z85.118 H/O: LUNG CANCER: ICD-10-CM

## 2018-08-22 DIAGNOSIS — C34.91 CARCINOMA OF RIGHT LUNG (HCC): ICD-10-CM

## 2018-08-22 RX ORDER — SODIUM CHLORIDE 0.9 % (FLUSH) 0.9 %
10 SYRINGE (ML) INJECTION PRN
Status: CANCELLED | OUTPATIENT
Start: 2018-08-22

## 2018-08-22 RX ORDER — 0.9 % SODIUM CHLORIDE 0.9 %
250 INTRAVENOUS SOLUTION INTRAVENOUS ONCE
Status: CANCELLED
Start: 2018-08-22 | End: 2018-08-22

## 2018-08-22 RX ORDER — HEPARIN SODIUM (PORCINE) LOCK FLUSH IV SOLN 100 UNIT/ML 100 UNIT/ML
500 SOLUTION INTRAVENOUS PRN
Status: CANCELLED | OUTPATIENT
Start: 2018-08-22

## 2018-08-22 RX ORDER — SODIUM CHLORIDE 0.9 % (FLUSH) 0.9 %
20 SYRINGE (ML) INJECTION PRN
Status: CANCELLED | OUTPATIENT
Start: 2018-08-22

## 2018-08-22 NOTE — PROGRESS NOTES
Contacted Dr Danay Mcgee office and requested Hgb and Hct results from this week be faxed to us for injection today.   Spoke woth Kathrin Leyden stated \" SHes due for in office later this morning call us back later for those\"

## 2018-08-23 ENCOUNTER — HOSPITAL ENCOUNTER (OUTPATIENT)
Dept: INFUSION THERAPY | Age: 73
Discharge: HOME | End: 2018-08-23
Payer: MEDICARE

## 2018-08-23 VITALS
TEMPERATURE: 97.6 F | RESPIRATION RATE: 22 BRPM | HEART RATE: 110 BPM | DIASTOLIC BLOOD PRESSURE: 62 MMHG | OXYGEN SATURATION: 98 % | SYSTOLIC BLOOD PRESSURE: 120 MMHG

## 2018-08-23 DIAGNOSIS — J96.21 ACUTE ON CHRONIC RESPIRATORY FAILURE WITH HYPOXIA (HCC): ICD-10-CM

## 2018-08-23 DIAGNOSIS — C34.90 RECURRENT NON-SMALL CELL LUNG CANCER (HCC): ICD-10-CM

## 2018-08-23 DIAGNOSIS — C34.91 CARCINOMA OF RIGHT LUNG (HCC): ICD-10-CM

## 2018-08-23 DIAGNOSIS — Z85.118 H/O: LUNG CANCER: ICD-10-CM

## 2018-08-23 DIAGNOSIS — R91.1 NODULE OF LEFT LUNG: ICD-10-CM

## 2018-08-23 PROCEDURE — 2709999900 HC NON-CHARGEABLE SUPPLY

## 2018-08-23 PROCEDURE — 2580000003 HC RX 258: Performed by: INTERNAL MEDICINE

## 2018-08-23 PROCEDURE — 36430 TRANSFUSION BLD/BLD COMPNT: CPT

## 2018-08-23 PROCEDURE — P9016 RBC LEUKOCYTES REDUCED: HCPCS

## 2018-08-23 PROCEDURE — 6360000002 HC RX W HCPCS: Performed by: INTERNAL MEDICINE

## 2018-08-23 RX ORDER — 0.9 % SODIUM CHLORIDE 0.9 %
250 INTRAVENOUS SOLUTION INTRAVENOUS ONCE
Status: CANCELLED
Start: 2018-08-29 | End: 2018-08-29

## 2018-08-23 RX ORDER — HEPARIN SODIUM (PORCINE) LOCK FLUSH IV SOLN 100 UNIT/ML 100 UNIT/ML
500 SOLUTION INTRAVENOUS PRN
Status: DISCONTINUED | OUTPATIENT
Start: 2018-08-23 | End: 2018-08-24 | Stop reason: HOSPADM

## 2018-08-23 RX ORDER — 0.9 % SODIUM CHLORIDE 0.9 %
250 INTRAVENOUS SOLUTION INTRAVENOUS ONCE
Status: COMPLETED | OUTPATIENT
Start: 2018-08-23 | End: 2018-08-23

## 2018-08-23 RX ORDER — HEPARIN SODIUM (PORCINE) LOCK FLUSH IV SOLN 100 UNIT/ML 100 UNIT/ML
500 SOLUTION INTRAVENOUS PRN
Status: CANCELLED | OUTPATIENT
Start: 2018-08-29

## 2018-08-23 RX ORDER — SODIUM CHLORIDE 0.9 % (FLUSH) 0.9 %
10 SYRINGE (ML) INJECTION PRN
Status: DISCONTINUED | OUTPATIENT
Start: 2018-08-23 | End: 2018-08-24 | Stop reason: HOSPADM

## 2018-08-23 RX ORDER — SODIUM CHLORIDE 0.9 % (FLUSH) 0.9 %
20 SYRINGE (ML) INJECTION PRN
Status: CANCELLED | OUTPATIENT
Start: 2018-08-29

## 2018-08-23 RX ORDER — SODIUM CHLORIDE 0.9 % (FLUSH) 0.9 %
10 SYRINGE (ML) INJECTION PRN
Status: CANCELLED | OUTPATIENT
Start: 2018-08-29

## 2018-08-23 RX ADMIN — SODIUM CHLORIDE 250 ML: 9 INJECTION, SOLUTION INTRAVENOUS at 10:45

## 2018-08-23 RX ADMIN — Medication 500 UNITS: at 13:26

## 2018-08-23 RX ADMIN — Medication 20 ML: at 13:26

## 2018-08-23 RX ADMIN — Medication 10 ML: at 10:45

## 2018-08-23 ASSESSMENT — PAIN SCALES - GENERAL
PAINLEVEL_OUTOF10: 6
PAINLEVEL_OUTOF10: 8

## 2018-08-23 ASSESSMENT — PAIN DESCRIPTION - PROGRESSION: CLINICAL_PROGRESSION: NOT CHANGED

## 2018-08-23 ASSESSMENT — PAIN DESCRIPTION - FREQUENCY: FREQUENCY: CONTINUOUS

## 2018-08-23 ASSESSMENT — PAIN DESCRIPTION - LOCATION: LOCATION: BACK

## 2018-08-23 ASSESSMENT — PAIN DESCRIPTION - ORIENTATION: ORIENTATION: LOWER

## 2018-08-23 ASSESSMENT — PAIN DESCRIPTION - DESCRIPTORS: DESCRIPTORS: CONSTANT

## 2018-08-23 NOTE — PLAN OF CARE
Problem: Pain:  Intervention: Promote participation in pain management plan  Patient encouraged to take prescribed pain medications and call Physician if pain is not controlled. Goal: Control of chronic pain  Control of chronic pain   Outcome: Met This Shift  Patient states  Chronic pain in back has improved since admission since took pain medication at home prior to appointment. Problem: Discharge Planning  Intervention: Interaction with patient/family and care team  Discuss understanding of discharge instructions,follow-up appointments, and when to call the physician. Goal: Knowledge of discharge instructions  Knowledge of discharge instructions     Outcome: Met This Shift  Verbalized understanding of discharge instructions, follow-up appointments, and when to call the physician. Problem: Intellectual/Education/Knowledge Deficit  Intervention: Verbal/written education provided  Patient educated blood product transfusion protocol:    Patient receiving 1 unit blood:      - Blood product transfusion information sheet given: questions answered and consent signed  - Take vital signs/ monitor lungs sound prior to transfusion  - Monitor patient for 15 minutes after transfusion started  - Take vital signs / monitor lungs sound in 15 minutes and post transfusion  - Assess IV site   - Monitor patient closely for potential transfusion reaction    Call MD if develop complications once discharged. Goal: Teaching initiated upon admission  Outcome: Met This Shift  Patient verbalize understanding to  verbal and written information given  on blood transfusion,procedure ,and possible reaction. Instructed to call MD if develop any complications once discharged. Problem: Musculor/Skeletal Functional Status  Intervention: Fall precautions  Verbalized understanding of fall prevention to ask for assistance with ambulation. Call light within reach.     Goal: Absence of falls  Outcome: Met This Shift  No falls occurred with visit today. Problem: Infection - Central Venous Catheter-Associated Bloodstream Infection:  Goal: Will show no infection signs and symptoms  Will show no infection signs and symptoms     Outcome: Met This Shift  Mediport site with no redness or warmth. Skin over port site intact with no signs of breakdown noted. Patient verbalizes signs/symptoms of port infection and when to notify the physician. Intervention: EDUCATE PATIENT ABOUT SIGNS AND SYMPTOMS OF INFECTION  Instructed to monitor for signs/symptoms of infection at mediport and call MD if problems develop. Comments: Care plan reviewed with patient . Patient  verbalize understanding of the plan of care and contribute to goal setting.

## 2018-09-26 PROBLEM — E86.0 DEHYDRATION: Status: RESOLVED | Noted: 2017-08-11 | Resolved: 2018-09-26

## 2018-10-14 ENCOUNTER — APPOINTMENT (OUTPATIENT)
Dept: CT IMAGING | Age: 73
DRG: 189 | End: 2018-10-14
Payer: MEDICARE

## 2018-10-14 ENCOUNTER — HOSPITAL ENCOUNTER (INPATIENT)
Age: 73
LOS: 10 days | Discharge: HOME HEALTH | DRG: 189 | End: 2018-10-24
Attending: INTERNAL MEDICINE | Admitting: INTERNAL MEDICINE
Payer: MEDICARE

## 2018-10-14 DIAGNOSIS — R93.89 OPACITY NOTED ON IMAGING STUDY: ICD-10-CM

## 2018-10-14 DIAGNOSIS — J90 BILATERAL PLEURAL EFFUSION: ICD-10-CM

## 2018-10-14 DIAGNOSIS — R06.00 DYSPNEA, UNSPECIFIED TYPE: ICD-10-CM

## 2018-10-14 DIAGNOSIS — R09.02 HYPOXEMIA: Primary | ICD-10-CM

## 2018-10-14 PROBLEM — J96.21 ACUTE ON CHRONIC RESPIRATORY FAILURE WITH HYPOXEMIA (HCC): Status: ACTIVE | Noted: 2018-10-14

## 2018-10-14 LAB
ALLEN TEST: POSITIVE
ANION GAP SERPL CALCULATED.3IONS-SCNC: 12 MEQ/L (ref 8–16)
BASE EXCESS (CALCULATED): 8.7 MMOL/L (ref -2.5–2.5)
BASOPHILS # BLD: 0.5 %
BASOPHILS ABSOLUTE: 0 THOU/MM3 (ref 0–0.1)
BUN BLDV-MCNC: 14 MG/DL (ref 7–22)
CALCIUM SERPL-MCNC: 9.5 MG/DL (ref 8.5–10.5)
CHLORIDE BLD-SCNC: 97 MEQ/L (ref 98–111)
CO2: 34 MEQ/L (ref 23–33)
COLLECTED BY:: ABNORMAL
CREAT SERPL-MCNC: 0.7 MG/DL (ref 0.4–1.2)
DEVICE: ABNORMAL
EOSINOPHIL # BLD: 0.4 %
EOSINOPHILS ABSOLUTE: 0 THOU/MM3 (ref 0–0.4)
ERYTHROCYTE [DISTWIDTH] IN BLOOD BY AUTOMATED COUNT: 16.4 % (ref 11.5–14.5)
ERYTHROCYTE [DISTWIDTH] IN BLOOD BY AUTOMATED COUNT: 54.9 FL (ref 35–45)
GFR SERPL CREATININE-BSD FRML MDRD: 82 ML/MIN/1.73M2
GLUCOSE BLD-MCNC: 112 MG/DL (ref 70–108)
HCO3: 36 MMOL/L (ref 23–28)
HCT VFR BLD CALC: 29.2 % (ref 37–47)
HEMOGLOBIN: 9 GM/DL (ref 12–16)
IMMATURE GRANS (ABS): 0.17 THOU/MM3 (ref 0–0.07)
IMMATURE GRANULOCYTES: 2.2 %
LACTIC ACID, SEPSIS: 0.9 MMOL/L (ref 0.5–1.9)
LACTIC ACID, SEPSIS: 1.4 MMOL/L (ref 0.5–1.9)
LYMPHOCYTES # BLD: 7.9 %
LYMPHOCYTES ABSOLUTE: 0.6 THOU/MM3 (ref 1–4.8)
MCH RBC QN AUTO: 29.2 PG (ref 26–33)
MCHC RBC AUTO-ENTMCNC: 30.8 GM/DL (ref 32.2–35.5)
MCV RBC AUTO: 94.8 FL (ref 81–99)
MONOCYTES # BLD: 11.9 %
MONOCYTES ABSOLUTE: 0.9 THOU/MM3 (ref 0.4–1.3)
NUCLEATED RED BLOOD CELLS: 0 /100 WBC
O2 SATURATION: 100 %
OSMOLALITY CALCULATION: 286.2 MOSMOL/KG (ref 275–300)
PCO2: 64 MMHG (ref 35–45)
PH BLOOD GAS: 7.35 (ref 7.35–7.45)
PLATELET # BLD: 274 THOU/MM3 (ref 130–400)
PMV BLD AUTO: 10.3 FL (ref 9.4–12.4)
PO2: 207 MMHG (ref 71–104)
POTASSIUM SERPL-SCNC: 4.2 MEQ/L (ref 3.5–5.2)
PRO-BNP: 3614 PG/ML (ref 0–900)
PROCALCITONIN: 0.23 NG/ML (ref 0.01–0.09)
RBC # BLD: 3.08 MILL/MM3 (ref 4.2–5.4)
SEG NEUTROPHILS: 77.1 %
SEGMENTED NEUTROPHILS ABSOLUTE COUNT: 5.9 THOU/MM3 (ref 1.8–7.7)
SODIUM BLD-SCNC: 143 MEQ/L (ref 135–145)
SOURCE, BLOOD GAS: ABNORMAL
TROPONIN T: < 0.01 NG/ML
WBC # BLD: 7.6 THOU/MM3 (ref 4.8–10.8)

## 2018-10-14 PROCEDURE — 84145 PROCALCITONIN (PCT): CPT

## 2018-10-14 PROCEDURE — 2709999900 HC NON-CHARGEABLE SUPPLY

## 2018-10-14 PROCEDURE — 87040 BLOOD CULTURE FOR BACTERIA: CPT

## 2018-10-14 PROCEDURE — 83605 ASSAY OF LACTIC ACID: CPT

## 2018-10-14 PROCEDURE — 94760 N-INVAS EAR/PLS OXIMETRY 1: CPT

## 2018-10-14 PROCEDURE — 36415 COLL VENOUS BLD VENIPUNCTURE: CPT

## 2018-10-14 PROCEDURE — 6370000000 HC RX 637 (ALT 250 FOR IP): Performed by: PHYSICIAN ASSISTANT

## 2018-10-14 PROCEDURE — 85025 COMPLETE CBC W/AUTO DIFF WBC: CPT

## 2018-10-14 PROCEDURE — 94640 AIRWAY INHALATION TREATMENT: CPT

## 2018-10-14 PROCEDURE — 2500000003 HC RX 250 WO HCPCS: Performed by: HOSPITALIST

## 2018-10-14 PROCEDURE — 80048 BASIC METABOLIC PNL TOTAL CA: CPT

## 2018-10-14 PROCEDURE — 87641 MR-STAPH DNA AMP PROBE: CPT

## 2018-10-14 PROCEDURE — 36600 WITHDRAWAL OF ARTERIAL BLOOD: CPT

## 2018-10-14 PROCEDURE — 2060000000 HC ICU INTERMEDIATE R&B

## 2018-10-14 PROCEDURE — 93005 ELECTROCARDIOGRAM TRACING: CPT | Performed by: HOSPITALIST

## 2018-10-14 PROCEDURE — 99223 1ST HOSP IP/OBS HIGH 75: CPT | Performed by: INTERNAL MEDICINE

## 2018-10-14 PROCEDURE — 6360000002 HC RX W HCPCS: Performed by: INTERNAL MEDICINE

## 2018-10-14 PROCEDURE — 87081 CULTURE SCREEN ONLY: CPT

## 2018-10-14 PROCEDURE — 93005 ELECTROCARDIOGRAM TRACING: CPT | Performed by: PHYSICIAN ASSISTANT

## 2018-10-14 PROCEDURE — 6360000004 HC RX CONTRAST MEDICATION: Performed by: PHYSICIAN ASSISTANT

## 2018-10-14 PROCEDURE — 2700000000 HC OXYGEN THERAPY PER DAY

## 2018-10-14 PROCEDURE — 6370000000 HC RX 637 (ALT 250 FOR IP): Performed by: INTERNAL MEDICINE

## 2018-10-14 PROCEDURE — 71275 CT ANGIOGRAPHY CHEST: CPT

## 2018-10-14 PROCEDURE — 83880 ASSAY OF NATRIURETIC PEPTIDE: CPT

## 2018-10-14 PROCEDURE — 2580000003 HC RX 258: Performed by: PHYSICIAN ASSISTANT

## 2018-10-14 PROCEDURE — 99285 EMERGENCY DEPT VISIT HI MDM: CPT

## 2018-10-14 PROCEDURE — 2580000003 HC RX 258: Performed by: INTERNAL MEDICINE

## 2018-10-14 PROCEDURE — 84484 ASSAY OF TROPONIN QUANT: CPT

## 2018-10-14 PROCEDURE — 82803 BLOOD GASES ANY COMBINATION: CPT

## 2018-10-14 RX ORDER — BETAMETHASONE DIPROPIONATE 0.05 %
OINTMENT (GRAM) TOPICAL DAILY
Status: DISCONTINUED | OUTPATIENT
Start: 2018-10-14 | End: 2018-10-24 | Stop reason: HOSPADM

## 2018-10-14 RX ORDER — CITALOPRAM 20 MG/1
20 TABLET ORAL DAILY
Status: DISCONTINUED | OUTPATIENT
Start: 2018-10-14 | End: 2018-10-24 | Stop reason: HOSPADM

## 2018-10-14 RX ORDER — DEXAMETHASONE 4 MG/1
4 TABLET ORAL 2 TIMES DAILY WITH MEALS
Status: DISCONTINUED | OUTPATIENT
Start: 2018-10-14 | End: 2018-10-15

## 2018-10-14 RX ORDER — LIDOCAINE AND PRILOCAINE 25; 25 MG/G; MG/G
CREAM TOPICAL PRN
Status: DISCONTINUED | OUTPATIENT
Start: 2018-10-14 | End: 2018-10-24 | Stop reason: HOSPADM

## 2018-10-14 RX ORDER — BUTALBITAL, ACETAMINOPHEN AND CAFFEINE 50; 325; 40 MG/1; MG/1; MG/1
1 TABLET ORAL ONCE
Status: COMPLETED | OUTPATIENT
Start: 2018-10-14 | End: 2018-10-14

## 2018-10-14 RX ORDER — ALBUTEROL SULFATE 90 UG/1
2 AEROSOL, METERED RESPIRATORY (INHALATION) EVERY 6 HOURS PRN
Status: DISCONTINUED | OUTPATIENT
Start: 2018-10-14 | End: 2018-10-24 | Stop reason: HOSPADM

## 2018-10-14 RX ORDER — AZITHROMYCIN 250 MG/1
250 TABLET, FILM COATED ORAL DAILY
Status: COMPLETED | OUTPATIENT
Start: 2018-10-15 | End: 2018-10-18

## 2018-10-14 RX ORDER — PROMETHAZINE HYDROCHLORIDE 25 MG/1
25 TABLET ORAL EVERY 6 HOURS PRN
Status: DISCONTINUED | OUTPATIENT
Start: 2018-10-14 | End: 2018-10-24 | Stop reason: HOSPADM

## 2018-10-14 RX ORDER — IPRATROPIUM BROMIDE AND ALBUTEROL SULFATE 2.5; .5 MG/3ML; MG/3ML
1 SOLUTION RESPIRATORY (INHALATION) ONCE
Status: COMPLETED | OUTPATIENT
Start: 2018-10-14 | End: 2018-10-14

## 2018-10-14 RX ORDER — SODIUM CHLORIDE 0.9 % (FLUSH) 0.9 %
10 SYRINGE (ML) INJECTION PRN
Status: DISCONTINUED | OUTPATIENT
Start: 2018-10-14 | End: 2018-10-24 | Stop reason: HOSPADM

## 2018-10-14 RX ORDER — ESOMEPRAZOLE MAGNESIUM 20 MG/1
20 FOR SUSPENSION ORAL DAILY
Status: DISCONTINUED | OUTPATIENT
Start: 2018-10-14 | End: 2018-10-14 | Stop reason: CLARIF

## 2018-10-14 RX ORDER — ALBUTEROL SULFATE 2.5 MG/3ML
2.5 SOLUTION RESPIRATORY (INHALATION)
Status: DISCONTINUED | OUTPATIENT
Start: 2018-10-14 | End: 2018-10-24 | Stop reason: HOSPADM

## 2018-10-14 RX ORDER — GUAIFENESIN 600 MG/1
600 TABLET, EXTENDED RELEASE ORAL 2 TIMES DAILY PRN
Status: DISCONTINUED | OUTPATIENT
Start: 2018-10-14 | End: 2018-10-24 | Stop reason: HOSPADM

## 2018-10-14 RX ORDER — AZITHROMYCIN 250 MG/1
500 TABLET, FILM COATED ORAL DAILY
Status: COMPLETED | OUTPATIENT
Start: 2018-10-14 | End: 2018-10-14

## 2018-10-14 RX ORDER — IPRATROPIUM BROMIDE AND ALBUTEROL SULFATE 2.5; .5 MG/3ML; MG/3ML
1 SOLUTION RESPIRATORY (INHALATION)
Status: DISCONTINUED | OUTPATIENT
Start: 2018-10-14 | End: 2018-10-15

## 2018-10-14 RX ORDER — ONDANSETRON 2 MG/ML
4 INJECTION INTRAMUSCULAR; INTRAVENOUS EVERY 6 HOURS PRN
Status: DISCONTINUED | OUTPATIENT
Start: 2018-10-14 | End: 2018-10-24 | Stop reason: HOSPADM

## 2018-10-14 RX ORDER — FLUTICASONE PROPIONATE 50 MCG
1 SPRAY, SUSPENSION (ML) NASAL DAILY
Status: DISCONTINUED | OUTPATIENT
Start: 2018-10-14 | End: 2018-10-24 | Stop reason: HOSPADM

## 2018-10-14 RX ORDER — SODIUM CHLORIDE 9 MG/ML
INJECTION, SOLUTION INTRAVENOUS CONTINUOUS
Status: DISCONTINUED | OUTPATIENT
Start: 2018-10-14 | End: 2018-10-14

## 2018-10-14 RX ORDER — HYDROCODONE BITARTRATE AND ACETAMINOPHEN 5; 325 MG/1; MG/1
1 TABLET ORAL 3 TIMES DAILY PRN
Status: DISCONTINUED | OUTPATIENT
Start: 2018-10-14 | End: 2018-10-24 | Stop reason: HOSPADM

## 2018-10-14 RX ORDER — BUTALBITAL, ACETAMINOPHEN AND CAFFEINE 50; 325; 40 MG/1; MG/1; MG/1
1 TABLET ORAL EVERY 4 HOURS PRN
Status: DISCONTINUED | OUTPATIENT
Start: 2018-10-14 | End: 2018-10-24 | Stop reason: HOSPADM

## 2018-10-14 RX ORDER — LABETALOL HYDROCHLORIDE 5 MG/ML
10 INJECTION, SOLUTION INTRAVENOUS EVERY 4 HOURS PRN
Status: DISCONTINUED | OUTPATIENT
Start: 2018-10-14 | End: 2018-10-24 | Stop reason: HOSPADM

## 2018-10-14 RX ORDER — SODIUM CHLORIDE 0.9 % (FLUSH) 0.9 %
10 SYRINGE (ML) INJECTION EVERY 12 HOURS SCHEDULED
Status: DISCONTINUED | OUTPATIENT
Start: 2018-10-14 | End: 2018-10-24 | Stop reason: HOSPADM

## 2018-10-14 RX ORDER — CETIRIZINE HYDROCHLORIDE 10 MG/1
10 TABLET ORAL DAILY
Status: DISCONTINUED | OUTPATIENT
Start: 2018-10-14 | End: 2018-10-24 | Stop reason: HOSPADM

## 2018-10-14 RX ORDER — HYDROCODONE POLISTIREX AND CHLORPHENIRAMINE POLISTIREX 10; 8 MG/5ML; MG/5ML
5 SUSPENSION, EXTENDED RELEASE ORAL NIGHTLY PRN
Status: DISCONTINUED | OUTPATIENT
Start: 2018-10-14 | End: 2018-10-24 | Stop reason: HOSPADM

## 2018-10-14 RX ORDER — OXYBUTYNIN CHLORIDE 5 MG/1
5 TABLET, EXTENDED RELEASE ORAL DAILY
Status: DISCONTINUED | OUTPATIENT
Start: 2018-10-14 | End: 2018-10-23

## 2018-10-14 RX ORDER — PANTOPRAZOLE SODIUM 40 MG/1
40 TABLET, DELAYED RELEASE ORAL
Status: DISCONTINUED | OUTPATIENT
Start: 2018-10-15 | End: 2018-10-24 | Stop reason: HOSPADM

## 2018-10-14 RX ORDER — SODIUM CHLORIDE 9 MG/ML
INJECTION, SOLUTION INTRAVENOUS CONTINUOUS
Status: DISCONTINUED | OUTPATIENT
Start: 2018-10-14 | End: 2018-10-15

## 2018-10-14 RX ORDER — PROMETHAZINE HYDROCHLORIDE AND CODEINE PHOSPHATE 6.25; 1 MG/5ML; MG/5ML
5 SYRUP ORAL ONCE
Status: COMPLETED | OUTPATIENT
Start: 2018-10-14 | End: 2018-10-14

## 2018-10-14 RX ADMIN — IPRATROPIUM BROMIDE AND ALBUTEROL SULFATE 1 AMPULE: .5; 3 SOLUTION RESPIRATORY (INHALATION) at 09:58

## 2018-10-14 RX ADMIN — IOPAMIDOL 80 ML: 755 INJECTION, SOLUTION INTRAVENOUS at 10:37

## 2018-10-14 RX ADMIN — LABETALOL HYDROCHLORIDE 10 MG: 5 INJECTION INTRAVENOUS at 21:27

## 2018-10-14 RX ADMIN — CEFTRIAXONE SODIUM 1 G: 1 INJECTION, POWDER, FOR SOLUTION INTRAMUSCULAR; INTRAVENOUS at 15:46

## 2018-10-14 RX ADMIN — BUTALBITAL, ACETAMINOPHEN, AND CAFFEINE 1 TABLET: 50; 325; 40 TABLET ORAL at 15:45

## 2018-10-14 RX ADMIN — SODIUM CHLORIDE: 9 INJECTION, SOLUTION INTRAVENOUS at 15:36

## 2018-10-14 RX ADMIN — CETIRIZINE HYDROCHLORIDE 10 MG: 10 TABLET ORAL at 15:45

## 2018-10-14 RX ADMIN — ONDANSETRON 4 MG: 2 INJECTION INTRAMUSCULAR; INTRAVENOUS at 15:46

## 2018-10-14 RX ADMIN — AZITHROMYCIN 500 MG: 250 TABLET, FILM COATED ORAL at 15:45

## 2018-10-14 RX ADMIN — FLUTICASONE PROPIONATE 1 SPRAY: 50 SPRAY, METERED NASAL at 15:45

## 2018-10-14 RX ADMIN — Medication 5 ML: at 11:40

## 2018-10-14 RX ADMIN — ENOXAPARIN SODIUM 40 MG: 40 INJECTION SUBCUTANEOUS at 15:46

## 2018-10-14 RX ADMIN — IPRATROPIUM BROMIDE AND ALBUTEROL SULFATE 1 AMPULE: .5; 3 SOLUTION RESPIRATORY (INHALATION) at 11:28

## 2018-10-14 RX ADMIN — SODIUM CHLORIDE 1 ML: 9 INJECTION, SOLUTION INTRAVENOUS at 10:17

## 2018-10-14 RX ADMIN — BETAMETHASONE DIPROPIONATE: 0.5 OINTMENT TOPICAL at 15:36

## 2018-10-14 RX ADMIN — IPRATROPIUM BROMIDE AND ALBUTEROL SULFATE 1 AMPULE: .5; 3 SOLUTION RESPIRATORY (INHALATION) at 22:40

## 2018-10-14 RX ADMIN — Medication 2 PUFF: at 22:40

## 2018-10-14 RX ADMIN — IPRATROPIUM BROMIDE AND ALBUTEROL SULFATE 1 AMPULE: .5; 3 SOLUTION RESPIRATORY (INHALATION) at 18:35

## 2018-10-14 ASSESSMENT — ENCOUNTER SYMPTOMS
COUGH: 1
RHINORRHEA: 0
DIARRHEA: 0
SORE THROAT: 0
NAUSEA: 0
EYE PAIN: 0
VOMITING: 0
SHORTNESS OF BREATH: 1
WHEEZING: 0
ABDOMINAL PAIN: 0
BACK PAIN: 0
EYE DISCHARGE: 0

## 2018-10-14 ASSESSMENT — PAIN SCALES - GENERAL
PAINLEVEL_OUTOF10: 0
PAINLEVEL_OUTOF10: 3
PAINLEVEL_OUTOF10: 3

## 2018-10-14 ASSESSMENT — PAIN DESCRIPTION - ONSET: ONSET: GRADUAL

## 2018-10-14 ASSESSMENT — PAIN DESCRIPTION - FREQUENCY: FREQUENCY: CONTINUOUS

## 2018-10-14 ASSESSMENT — PAIN DESCRIPTION - LOCATION: LOCATION: HEAD

## 2018-10-14 ASSESSMENT — PAIN DESCRIPTION - DESCRIPTORS: DESCRIPTORS: HEADACHE

## 2018-10-14 NOTE — H&P
(62.1 kg)   SpO2 (!) 88%   BMI 25.89 kg/m²     General appearance:  No apparent distress, appears stated age and cooperative. HEENT:  Normal cephalic, atraumatic without obvious deformity. Pupils equal, round, and reactive to light. Extra ocular muscles intact. Conjunctivae/corneas clear. Neck: Supple, with full range of motion. No jugular venous distention. Trachea midline. Respiratory: Patient has a cough. Increased respiratory effort. Clear to auscultation, bilaterally without Rales/Wheezes/Rhonchi. Cardiovascular:  Regular rate and rhythm with normal S1/S2 without murmurs, rubs or gallops. Abdomen: Soft, non-tender, non-distended with normal bowel sounds. Musculoskeletal:  No clubbing, cyanosis or edema bilaterally. Full range of motion without deformity. Skin: Skin dry, color normal.  No rashes or lesions. Neurologic:  Neurovascularly intact without any focal sensory/motor deficits. Cranial nerves: II-XII intact, grossly non-focal.  Psychiatric:  Alert and oriented, thought content appropriate, normal insight  Capillary Refill: Brisk,< 3 seconds   Peripheral Pulses: +2 palpable, equal bilaterally       Labs:     Recent Labs      10/14/18   0943   WBC  7.6   HGB  9.0*   HCT  29.2*   PLT  274     Recent Labs      10/14/18   0943   NA  143   K  4.2   CL  97*   CO2  34*   BUN  14   CREATININE  0.7   CALCIUM  9.5     No results for input(s): AST, ALT, BILIDIR, BILITOT, ALKPHOS in the last 72 hours. No results for input(s): INR in the last 72 hours. No results for input(s): Jose Thrasher in the last 72 hours. Urinalysis:      Lab Results   Component Value Date    NITRU NEGATIVE 08/11/2017    WBCUA 0-2 08/11/2017    BACTERIA NONE 08/11/2017    RBCUA 0-2 08/11/2017    BLOODU NEGATIVE 08/11/2017    SPECGRAV 1.013 10/17/2012    GLUCOSEU NEGATIVE 08/11/2017       Radiology:   I have reviewed the imaging studies with the following interpretation:  CTA CHEST W WO CONTRAST   Final Result       1.  There

## 2018-10-14 NOTE — ED PROVIDER NOTES
Associated Diagnoses: Stage 3 severe COPD by GOLD classification ([address] Tobacco abuse      albuterol (PROVENTIL) (2.5 MG/3ML) 0.083% nebulizer solution Take 3 mLs by nebulization every 6 hours as needed for Wheezing or Shortness of Breath  Qty: 360 vial, Refills: 1    Associated Diagnoses: Stage 3 severe COPD by GOLD classification ([address] Tobacco abuse      fluticasone (FLONASE) 50 MCG/ACT nasal spray 1 spray by Nasal route daily  Qty: 1 Bottle, Refills: 5      betamethasone dipropionate (DIPROLENE) 0.05 % ointment Apply topically daily. Qty: 50 g, Refills: 5      HYDROcodone-acetaminophen (NORCO) 5-325 MG per tablet Take 1 tablet by mouth 3 times daily as needed for Pain  Qty: 90 tablet, Refills: 0    Comments: Fill on or after 8/10/16  Associated Diagnoses: Carcinoma of lung, right (HCC)      promethazine (PHENERGAN) 25 MG tablet Take 25 mg by mouth as needed       citalopram (CELEXA) 20 MG tablet Take 20 mg by mouth daily      guaiFENesin (MUCINEX) 600 MG extended release tablet Take 1 tablet by mouth 2 times daily as needed for Congestion  Qty: 60 tablet, Refills: 2      furosemide (LASIX) 20 MG tablet Take 1 tablet by mouth daily as needed (leg swelling)  Qty: 5 tablet, Refills: 5      OXYGEN Inhale 2 L into the lungs as needed Between 1 & 3 L depending on activity      oxybutynin (DITROPAN-XL) 5 MG extended release tablet Take 1 tablet by mouth daily  Qty: 90 tablet, Refills: 1    Associated Diagnoses: OAB (overactive bladder)             ALLERGIES     is allergic to advil cold & sinus liqui-gels [pseudoephedrine-ibuprofen]; percocet [oxycodone-acetaminophen]; and sulfa antibiotics. FAMILY HISTORY     indicated that her mother is . She indicated that her father is . She indicated that all of her three sisters are . She indicated that the status of her paternal grandfather is unknown.    family history includes Arthritis in her sister; Cancer in her mother and sister;  Heart Disease

## 2018-10-14 NOTE — ED NOTES
Coffee and creamer provided. Pt denies all other needs at this time. Call light within reach. Family at bedside.       Elina Prieto RN  10/14/18 [id number]

## 2018-10-15 ENCOUNTER — APPOINTMENT (OUTPATIENT)
Dept: INTERVENTIONAL RADIOLOGY/VASCULAR | Age: 73
DRG: 189 | End: 2018-10-15
Payer: MEDICARE

## 2018-10-15 ENCOUNTER — APPOINTMENT (OUTPATIENT)
Dept: GENERAL RADIOLOGY | Age: 73
DRG: 189 | End: 2018-10-15
Payer: MEDICARE

## 2018-10-15 PROBLEM — E44.0 MODERATE MALNUTRITION (HCC): Chronic | Status: ACTIVE | Noted: 2018-10-15

## 2018-10-15 LAB
ABO: NORMAL
ANION GAP SERPL CALCULATED.3IONS-SCNC: 8 MEQ/L (ref 8–16)
ANTIBODY SCREEN: NORMAL
BACTERIA: ABNORMAL /HPF
BILIRUBIN URINE: NEGATIVE
BLOOD, URINE: NEGATIVE
BUN BLDV-MCNC: 12 MG/DL (ref 7–22)
CALCIUM SERPL-MCNC: 8.9 MG/DL (ref 8.5–10.5)
CASTS 2: ABNORMAL /LPF
CASTS UA: ABNORMAL /LPF
CHARACTER, URINE: CLEAR
CHLORIDE BLD-SCNC: 96 MEQ/L (ref 98–111)
CO2: 34 MEQ/L (ref 23–33)
COLOR: YELLOW
CREAT SERPL-MCNC: 0.7 MG/DL (ref 0.4–1.2)
CRYSTALS, UA: ABNORMAL
EKG ATRIAL RATE: 112 BPM
EKG ATRIAL RATE: 131 BPM
EKG ATRIAL RATE: 144 BPM
EKG P AXIS: 19 DEGREES
EKG P AXIS: 24 DEGREES
EKG P AXIS: 28 DEGREES
EKG P-R INTERVAL: 130 MS
EKG P-R INTERVAL: 134 MS
EKG P-R INTERVAL: 136 MS
EKG Q-T INTERVAL: 308 MS
EKG Q-T INTERVAL: 314 MS
EKG Q-T INTERVAL: 314 MS
EKG QRS DURATION: 84 MS
EKG QRS DURATION: 86 MS
EKG QRS DURATION: 86 MS
EKG QTC CALCULATION (BAZETT): 428 MS
EKG QTC CALCULATION (BAZETT): 454 MS
EKG QTC CALCULATION (BAZETT): 479 MS
EKG R AXIS: 13 DEGREES
EKG R AXIS: 2 DEGREES
EKG R AXIS: 20 DEGREES
EKG T AXIS: 22 DEGREES
EKG T AXIS: 27 DEGREES
EKG T AXIS: 33 DEGREES
EKG VENTRICULAR RATE: 112 BPM
EKG VENTRICULAR RATE: 131 BPM
EKG VENTRICULAR RATE: 140 BPM
EPITHELIAL CELLS, UA: ABNORMAL /HPF
ERYTHROCYTE [DISTWIDTH] IN BLOOD BY AUTOMATED COUNT: 16.3 % (ref 11.5–14.5)
ERYTHROCYTE [DISTWIDTH] IN BLOOD BY AUTOMATED COUNT: 56.4 FL (ref 35–45)
FLU A ANTIGEN: NEGATIVE
FLU B ANTIGEN: NEGATIVE
GFR SERPL CREATININE-BSD FRML MDRD: 82 ML/MIN/1.73M2
GLUCOSE BLD-MCNC: 184 MG/DL (ref 70–108)
GLUCOSE BLD-MCNC: 185 MG/DL (ref 70–108)
GLUCOSE BLD-MCNC: 95 MG/DL (ref 70–108)
GLUCOSE URINE: NEGATIVE MG/DL
HCT VFR BLD CALC: 21.7 % (ref 37–47)
HCT VFR BLD CALC: 30.4 % (ref 37–47)
HEMOGLOBIN: 6.5 GM/DL (ref 12–16)
HEMOGLOBIN: 9.4 GM/DL (ref 12–16)
KETONES, URINE: NEGATIVE
LEUKOCYTE ESTERASE, URINE: NEGATIVE
LV EF: 58 %
LVEF MODALITY: NORMAL
MCH RBC QN AUTO: 28.8 PG (ref 26–33)
MCHC RBC AUTO-ENTMCNC: 30 GM/DL (ref 32.2–35.5)
MCV RBC AUTO: 96 FL (ref 81–99)
MISCELLANEOUS 2: ABNORMAL
MRSA SCREEN RT-PCR: NEGATIVE
NITRITE, URINE: NEGATIVE
PH UA: 6
PLATELET # BLD: 267 THOU/MM3 (ref 130–400)
PMV BLD AUTO: 10 FL (ref 9.4–12.4)
POTASSIUM REFLEX MAGNESIUM: 3.8 MEQ/L (ref 3.5–5.2)
PROTEIN UA: 30
RBC # BLD: 2.26 MILL/MM3 (ref 4.2–5.4)
RBC URINE: ABNORMAL /HPF
RENAL EPITHELIAL, UA: ABNORMAL
RH FACTOR: NORMAL
SODIUM BLD-SCNC: 138 MEQ/L (ref 135–145)
SPECIFIC GRAVITY, URINE: 1.03 (ref 1–1.03)
UROBILINOGEN, URINE: 1 EU/DL
WBC # BLD: 8.1 THOU/MM3 (ref 4.8–10.8)
WBC UA: ABNORMAL /HPF
YEAST: ABNORMAL

## 2018-10-15 PROCEDURE — 85014 HEMATOCRIT: CPT

## 2018-10-15 PROCEDURE — 93010 ELECTROCARDIOGRAM REPORT: CPT | Performed by: INTERNAL MEDICINE

## 2018-10-15 PROCEDURE — 99233 SBSQ HOSP IP/OBS HIGH 50: CPT | Performed by: FAMILY MEDICINE

## 2018-10-15 PROCEDURE — 6360000002 HC RX W HCPCS: Performed by: INTERNAL MEDICINE

## 2018-10-15 PROCEDURE — 6370000000 HC RX 637 (ALT 250 FOR IP): Performed by: INTERNAL MEDICINE

## 2018-10-15 PROCEDURE — 86850 RBC ANTIBODY SCREEN: CPT

## 2018-10-15 PROCEDURE — 93970 EXTREMITY STUDY: CPT

## 2018-10-15 PROCEDURE — 80048 BASIC METABOLIC PNL TOTAL CA: CPT

## 2018-10-15 PROCEDURE — 36430 TRANSFUSION BLD/BLD COMPNT: CPT

## 2018-10-15 PROCEDURE — 85027 COMPLETE CBC AUTOMATED: CPT

## 2018-10-15 PROCEDURE — 86923 COMPATIBILITY TEST ELECTRIC: CPT

## 2018-10-15 PROCEDURE — 94669 MECHANICAL CHEST WALL OSCILL: CPT

## 2018-10-15 PROCEDURE — 86901 BLOOD TYPING SEROLOGIC RH(D): CPT

## 2018-10-15 PROCEDURE — 86900 BLOOD TYPING SEROLOGIC ABO: CPT

## 2018-10-15 PROCEDURE — 93010 ELECTROCARDIOGRAM REPORT: CPT | Performed by: NUCLEAR MEDICINE

## 2018-10-15 PROCEDURE — 6370000000 HC RX 637 (ALT 250 FOR IP): Performed by: PHYSICIAN ASSISTANT

## 2018-10-15 PROCEDURE — 36415 COLL VENOUS BLD VENIPUNCTURE: CPT

## 2018-10-15 PROCEDURE — 85018 HEMOGLOBIN: CPT

## 2018-10-15 PROCEDURE — 87804 INFLUENZA ASSAY W/OPTIC: CPT

## 2018-10-15 PROCEDURE — 87070 CULTURE OTHR SPECIMN AEROBIC: CPT

## 2018-10-15 PROCEDURE — 99222 1ST HOSP IP/OBS MODERATE 55: CPT | Performed by: INTERNAL MEDICINE

## 2018-10-15 PROCEDURE — 71045 X-RAY EXAM CHEST 1 VIEW: CPT

## 2018-10-15 PROCEDURE — 93005 ELECTROCARDIOGRAM TRACING: CPT | Performed by: FAMILY MEDICINE

## 2018-10-15 PROCEDURE — 2709999900 HC NON-CHARGEABLE SUPPLY

## 2018-10-15 PROCEDURE — P9016 RBC LEUKOCYTES REDUCED: HCPCS

## 2018-10-15 PROCEDURE — 2700000000 HC OXYGEN THERAPY PER DAY

## 2018-10-15 PROCEDURE — 2580000003 HC RX 258: Performed by: INTERNAL MEDICINE

## 2018-10-15 PROCEDURE — 81001 URINALYSIS AUTO W/SCOPE: CPT

## 2018-10-15 PROCEDURE — 2500000003 HC RX 250 WO HCPCS: Performed by: FAMILY MEDICINE

## 2018-10-15 PROCEDURE — 2060000000 HC ICU INTERMEDIATE R&B

## 2018-10-15 PROCEDURE — 87899 AGENT NOS ASSAY W/OPTIC: CPT

## 2018-10-15 PROCEDURE — 99223 1ST HOSP IP/OBS HIGH 75: CPT | Performed by: INTERNAL MEDICINE

## 2018-10-15 PROCEDURE — 2580000003 HC RX 258: Performed by: HOSPITALIST

## 2018-10-15 PROCEDURE — 87205 SMEAR GRAM STAIN: CPT

## 2018-10-15 PROCEDURE — 93306 TTE W/DOPPLER COMPLETE: CPT

## 2018-10-15 PROCEDURE — 6360000002 HC RX W HCPCS: Performed by: FAMILY MEDICINE

## 2018-10-15 PROCEDURE — 94640 AIRWAY INHALATION TREATMENT: CPT

## 2018-10-15 PROCEDURE — 82948 REAGENT STRIP/BLOOD GLUCOSE: CPT

## 2018-10-15 PROCEDURE — 94761 N-INVAS EAR/PLS OXIMETRY MLT: CPT

## 2018-10-15 PROCEDURE — 87449 NOS EACH ORGANISM AG IA: CPT

## 2018-10-15 PROCEDURE — 99223 1ST HOSP IP/OBS HIGH 75: CPT | Performed by: PHYSICIAN ASSISTANT

## 2018-10-15 RX ORDER — NICOTINE POLACRILEX 4 MG
15 LOZENGE BUCCAL PRN
Status: DISCONTINUED | OUTPATIENT
Start: 2018-10-15 | End: 2018-10-24 | Stop reason: HOSPADM

## 2018-10-15 RX ORDER — METHYLPREDNISOLONE SODIUM SUCCINATE 40 MG/ML
40 INJECTION, POWDER, LYOPHILIZED, FOR SOLUTION INTRAMUSCULAR; INTRAVENOUS EVERY 12 HOURS
Status: DISPENSED | OUTPATIENT
Start: 2018-10-15 | End: 2018-10-17

## 2018-10-15 RX ORDER — FERROUS SULFATE 325(65) MG
325 TABLET ORAL
Status: DISCONTINUED | OUTPATIENT
Start: 2018-10-16 | End: 2018-10-24 | Stop reason: HOSPADM

## 2018-10-15 RX ORDER — LEVALBUTEROL INHALATION SOLUTION 1.25 MG/3ML
1.25 SOLUTION RESPIRATORY (INHALATION) 4 TIMES DAILY
Status: DISCONTINUED | OUTPATIENT
Start: 2018-10-15 | End: 2018-10-15

## 2018-10-15 RX ORDER — ASCORBIC ACID 500 MG
500 TABLET ORAL DAILY
Status: DISCONTINUED | OUTPATIENT
Start: 2018-10-15 | End: 2018-10-24 | Stop reason: HOSPADM

## 2018-10-15 RX ORDER — GUAIFENESIN 600 MG/1
600 TABLET, EXTENDED RELEASE ORAL 2 TIMES DAILY
Status: DISCONTINUED | OUTPATIENT
Start: 2018-10-15 | End: 2018-10-24 | Stop reason: HOSPADM

## 2018-10-15 RX ORDER — DEXTROSE MONOHYDRATE 50 MG/ML
100 INJECTION, SOLUTION INTRAVENOUS PRN
Status: DISCONTINUED | OUTPATIENT
Start: 2018-10-15 | End: 2018-10-24 | Stop reason: HOSPADM

## 2018-10-15 RX ORDER — BUMETANIDE 0.25 MG/ML
1 INJECTION, SOLUTION INTRAMUSCULAR; INTRAVENOUS ONCE
Status: COMPLETED | OUTPATIENT
Start: 2018-10-15 | End: 2018-10-15

## 2018-10-15 RX ORDER — LEVALBUTEROL INHALATION SOLUTION 1.25 MG/3ML
0.63 SOLUTION RESPIRATORY (INHALATION) 4 TIMES DAILY
Status: DISCONTINUED | OUTPATIENT
Start: 2018-10-15 | End: 2018-10-23

## 2018-10-15 RX ORDER — ACETAMINOPHEN 325 MG/1
650 TABLET ORAL EVERY 4 HOURS PRN
Status: DISCONTINUED | OUTPATIENT
Start: 2018-10-15 | End: 2018-10-24 | Stop reason: HOSPADM

## 2018-10-15 RX ORDER — DEXTROSE MONOHYDRATE 25 G/50ML
12.5 INJECTION, SOLUTION INTRAVENOUS PRN
Status: DISCONTINUED | OUTPATIENT
Start: 2018-10-15 | End: 2018-10-24 | Stop reason: HOSPADM

## 2018-10-15 RX ORDER — 0.9 % SODIUM CHLORIDE 0.9 %
250 INTRAVENOUS SOLUTION INTRAVENOUS ONCE
Status: COMPLETED | OUTPATIENT
Start: 2018-10-15 | End: 2018-10-15

## 2018-10-15 RX ADMIN — LEVALBUTEROL HYDROCHLORIDE 1.25 MG: 1.25 SOLUTION RESPIRATORY (INHALATION) at 08:51

## 2018-10-15 RX ADMIN — HYDROCODONE BITARTRATE AND ACETAMINOPHEN 1 TABLET: 5; 325 TABLET ORAL at 13:17

## 2018-10-15 RX ADMIN — INSULIN LISPRO 1 UNITS: 100 INJECTION, SOLUTION INTRAVENOUS; SUBCUTANEOUS at 22:13

## 2018-10-15 RX ADMIN — AZITHROMYCIN 250 MG: 250 TABLET, FILM COATED ORAL at 08:33

## 2018-10-15 RX ADMIN — IPRATROPIUM BROMIDE 0.5 MG: 0.5 SOLUTION RESPIRATORY (INHALATION) at 17:00

## 2018-10-15 RX ADMIN — GUAIFENESIN 600 MG: 600 TABLET, EXTENDED RELEASE ORAL at 20:16

## 2018-10-15 RX ADMIN — LEVALBUTEROL HYDROCHLORIDE 0.63 MG: 1.25 SOLUTION RESPIRATORY (INHALATION) at 17:00

## 2018-10-15 RX ADMIN — CETIRIZINE HYDROCHLORIDE 10 MG: 10 TABLET ORAL at 08:33

## 2018-10-15 RX ADMIN — BUTALBITAL, ACETAMINOPHEN, AND CAFFEINE 1 TABLET: 50; 325; 40 TABLET ORAL at 05:46

## 2018-10-15 RX ADMIN — DEXAMETHASONE 4 MG: 4 TABLET ORAL at 08:32

## 2018-10-15 RX ADMIN — PIPERACILLIN SODIUM,TAZOBACTAM SODIUM 3.38 G: 3; .375 INJECTION, POWDER, FOR SOLUTION INTRAVENOUS at 14:50

## 2018-10-15 RX ADMIN — Medication 1 UNITS: at 17:01

## 2018-10-15 RX ADMIN — LABETALOL HYDROCHLORIDE 10 MG: 5 INJECTION INTRAVENOUS at 14:50

## 2018-10-15 RX ADMIN — VANCOMYCIN HYDROCHLORIDE 1000 MG: 1 INJECTION, POWDER, LYOPHILIZED, FOR SOLUTION INTRAVENOUS at 13:12

## 2018-10-15 RX ADMIN — IPRATROPIUM BROMIDE 0.5 MG: 0.5 SOLUTION RESPIRATORY (INHALATION) at 08:50

## 2018-10-15 RX ADMIN — PANTOPRAZOLE SODIUM 40 MG: 40 TABLET, DELAYED RELEASE ORAL at 05:47

## 2018-10-15 RX ADMIN — Medication 10 ML: at 08:31

## 2018-10-15 RX ADMIN — SODIUM CHLORIDE: 9 INJECTION, SOLUTION INTRAVENOUS at 01:11

## 2018-10-15 RX ADMIN — Medication 2 PUFF: at 08:35

## 2018-10-15 RX ADMIN — GUAIFENESIN 600 MG: 600 TABLET, EXTENDED RELEASE ORAL at 13:12

## 2018-10-15 RX ADMIN — SODIUM CHLORIDE 250 ML: 9 INJECTION, SOLUTION INTRAVENOUS at 06:17

## 2018-10-15 RX ADMIN — IPRATROPIUM BROMIDE 0.5 MG: 0.5 SOLUTION RESPIRATORY (INHALATION) at 12:43

## 2018-10-15 RX ADMIN — BUMETANIDE 1 MG: 0.25 INJECTION INTRAMUSCULAR; INTRAVENOUS at 22:21

## 2018-10-15 RX ADMIN — LABETALOL HYDROCHLORIDE 10 MG: 5 INJECTION INTRAVENOUS at 09:28

## 2018-10-15 RX ADMIN — LEVALBUTEROL HYDROCHLORIDE 0.63 MG: 1.25 SOLUTION RESPIRATORY (INHALATION) at 12:43

## 2018-10-15 RX ADMIN — PIPERACILLIN SODIUM,TAZOBACTAM SODIUM 3.38 G: 3; .375 INJECTION, POWDER, FOR SOLUTION INTRAVENOUS at 23:25

## 2018-10-15 RX ADMIN — OXYCODONE HYDROCHLORIDE AND ACETAMINOPHEN 500 MG: 500 TABLET ORAL at 20:16

## 2018-10-15 RX ADMIN — METHYLPREDNISOLONE SODIUM SUCCINATE 40 MG: 40 INJECTION, POWDER, FOR SOLUTION INTRAMUSCULAR; INTRAVENOUS at 20:17

## 2018-10-15 RX ADMIN — BUTALBITAL, ACETAMINOPHEN, AND CAFFEINE 1 TABLET: 50; 325; 40 TABLET ORAL at 16:59

## 2018-10-15 RX ADMIN — FLUTICASONE PROPIONATE 1 SPRAY: 50 SPRAY, METERED NASAL at 08:31

## 2018-10-15 RX ADMIN — ENOXAPARIN SODIUM 40 MG: 40 INJECTION SUBCUTANEOUS at 14:57

## 2018-10-15 ASSESSMENT — PAIN DESCRIPTION - DESCRIPTORS: DESCRIPTORS: HEADACHE

## 2018-10-15 ASSESSMENT — PAIN SCALES - GENERAL
PAINLEVEL_OUTOF10: 4
PAINLEVEL_OUTOF10: 9
PAINLEVEL_OUTOF10: 9

## 2018-10-15 ASSESSMENT — PAIN DESCRIPTION - FREQUENCY: FREQUENCY: CONTINUOUS

## 2018-10-15 ASSESSMENT — PAIN DESCRIPTION - ONSET: ONSET: GRADUAL

## 2018-10-15 ASSESSMENT — PAIN DESCRIPTION - LOCATION: LOCATION: HEAD

## 2018-10-15 ASSESSMENT — PAIN DESCRIPTION - PAIN TYPE: TYPE: ACUTE PAIN

## 2018-10-15 NOTE — PROGRESS NOTES
as above - pulm c/s 10/15  6. Recurrent non-small cell lung cancer -- oncology c/s (p) -- see above - last chemo ~2 weeks ago - monitor counts  7. BLE edema -- check BLE dopplers 10/15 -- ?need diruetic with above - ?need wrapped -- check echo 10/15/18  8. Essential HTN -- not on any tx at home anymore - prn labetalol for tachycardia  But BP borderline -> ? 9. GERD -- PPI  10. OA  11. Depression -- cont celexa  12. Moderate malnutrition -- dieticians following  13. Deconditioning -- c/s PT/OT when resp status improves -- has been declining last couple weeks. SS c/s for d/c planning  14. Code status -- palliative care c/s to assist with goals of care    Dispo  -- 10/15 --> pt clinically not improving and little worse this am -> c/s pulm and repeat CXR now - cont vapotherm and wean as able;  +wet cough -> ?need diuretic - await CXR. Cont steroids. C/s cardio with uncontrolled HR with ? MAT --> ?schedule BB but BP borderline - ?amio/dig. Check echo, tsh. Cont monitor h/h closely after transfusion. Cont monitor lytes, tele/HR, BP, CBC and await consultant recs. palliative care to also see. Chief Complaint: sob    Hospital Course: Prieto Smith is a 68 y.o. female with COPD on home O2, recurrent Lung cancer s/p RLL lobectomy undergoing tx by Dr. Salbador Palm, HTN, GERD presenting with worsening sob and hypoxia with sats in 60's on her home O2, HR up to 150's. Admitted to stepdown and placed on vapotherm/high flow O2, decadron, BD txs   -- HR persistently 120-150's - EKG with ST and repeat 10/14 MAT --> labetatol prn for high HR but BP borderline  -- h/h down to 6.5 on 10/15 -> transfused 1 unit PRBC -> oncology consulted     Subjective:   -- 10/15/2018  --> pt feeling little more sob this am - no chest pain - feels like can't get deep breath. Just went up on high flow O2 to 70% FiO2. Denies cp. Denies abd pain, n/v.  Denies f/c.  Lissy po. Afebrile since admission. Weak. Last BM ?       Medications: Normal respiratory effort. High flow O2. Diffuse rhonchi, no wheezing. Cardiovascular: Regular rate and rhythm with normal S1/S2 without murmurs, rubs or gallops. Port right upper chest wall. Abdomen: Soft, non-tender, non-distended with hypoactive bowel sounds. No rebound or guarding  Musculoskeletal: 1+ BLE pitting edema. Full range of motion without deformity. No calf tenderness palpation  Skin: Skin color, texture, turgor normal.  No rashes or lesions. Neurologic:  Neurovascularly intact without any focal sensory/motor deficits. Cranial nerves: II-XII intact, grossly non-focal.  Psychiatric: Alert and oriented, thought content appropriate, normal insight  Capillary Refill: Brisk,< 3 seconds   Peripheral Pulses: +2 palpable, equal bilaterally       Labs:   Recent Labs      10/14/18   0943  10/15/18   0415   WBC  7.6  8.1   HGB  9.0*  6.5*   HCT  29.2*  21.7*   PLT  274  267     Recent Labs      10/14/18   0943  10/15/18   0415   NA  143  138   K  4.2  3.8   CL  97*  96*   CO2  34*  34*   BUN  14  12   CREATININE  0.7  0.7   CALCIUM  9.5  8.9     No results for input(s): AST, ALT, BILIDIR, BILITOT, ALKPHOS in the last 72 hours. No results for input(s): INR in the last 72 hours. No results for input(s): Marthenia Les in the last 72 hours. Urinalysis:    Lab Results   Component Value Date    NITRU NEGATIVE 08/11/2017    WBCUA 0-2 08/11/2017    BACTERIA NONE 08/11/2017    RBCUA 0-2 08/11/2017    BLOODU NEGATIVE 08/11/2017    SPECGRAV 1.013 10/17/2012    GLUCOSEU NEGATIVE 08/11/2017       Radiology:  CTA CHEST W WO CONTRAST   Final Result       1. There is no evidence for pulmonary embolism. 2. Interval development of diffuse opacities throughout the bilateral lungs since the prior CT scan which may represent multifocal pneumonia. 3. Redemonstration of right lung parenchymal scarring/mass. There are persistent changes of right lobectomy with left to right midline shift of the mediastinum.  Similar

## 2018-10-15 NOTE — CONSULTS
[facility] [address] [place] [address]  Cardiology Consult      Patient:  Dorinda Garcia  YOB: 1945    MRN: [id number]   Acct: [de-identified]     Primary Care Physician: JESUS Christie CNP        REASON FOR CONSULT:         ECG 8/2017      CHIEF COMPLAINT:  Patient is having a hard time oxygenating      HISTORY OF PRESENT ILLNESS:          All labs, EKG's, diagnostic testing and images as well as cardiac cath, stress testing   were reviewed during this encounter    PREVIOUS CARDIAC TESTING    Ekg:     Echo:    Str. Test:    Cath:      Past Medical History:    Past Medical History:   Diagnosis Date    Arthritis     low back pain and scoliosis in lumbar    Cancer ([address] 2012    lower right lobe-lung s/p lobectomy in 2012, radiation and chemo nad later had mediatinal mets and had radationa dn chemo again, and in 2016 had reoccurence on the left side upper and was started on radiation this year and has  a follow up CT in oct 2017    Chronic bronchitis, simple ([address]     Chronic respiratory failure with hypoxia ([address]     COPD (chronic obstructive pulmonary disease) ([address]     GERD (gastroesophageal reflux disease)     HTN (hypertension)     Pneumonia     Postprocedural pneumothorax     Scoliosis     Urinary incontinence        Past Surgical History:    Past Surgical History:   Procedure Laterality Date    LOBECTOMY  10/19/2012    rt lower lobectomy     LUNG BIOPSY  8/2012       Medications Prior to Admission:    Prescriptions Prior to Admission: esomeprazole Magnesium (NEXIUM) 20 MG PACK, Take 20 mg by mouth daily  cetirizine (ZYRTEC) 10 MG tablet, Take 10 mg by mouth daily  dexamethasone (DECADRON) 4 MG tablet, Take 4 mg by mouth 2 times daily (with meals)  lidocaine-prilocaine (EMLA) 2.5-2.5 % cream, Apply topically as needed for Pain Apply topically as needed.   albuterol sulfate HFA (PROAIR HFA) 108 (90 Base) MCG/ACT inhaler, Inhale 2 puffs into the lungs every 6 hours as needed for Wheezing  tiotropium (SPIRIVA RESPIMAT) 2.5 MCG/ACT AERS inhaler, Inhale 2 puffs into the lungs daily  mometasone-formoterol (DULERA) 200-5 MCG/ACT inhaler, Inhale 2 puffs into the lungs 2 times daily  albuterol (PROVENTIL) (2.5 MG/3ML) 0.083% nebulizer solution, Take 3 mLs by nebulization every 6 hours as needed for Wheezing or Shortness of Breath  fluticasone (FLONASE) 50 MCG/ACT nasal spray, 1 spray by Nasal route daily  betamethasone dipropionate (DIPROLENE) 0.05 % ointment, Apply topically daily. HYDROcodone-acetaminophen (NORCO) 5-325 MG per tablet, Take 1 tablet by mouth 3 times daily as needed for Pain  promethazine (PHENERGAN) 25 MG tablet, Take 25 mg by mouth as needed   citalopram (CELEXA) 20 MG tablet, Take 20 mg by mouth daily  guaiFENesin (MUCINEX) 600 MG extended release tablet, Take 1 tablet by mouth 2 times daily as needed for Congestion  furosemide (LASIX) 20 MG tablet, Take 1 tablet by mouth daily as needed (leg swelling)  OXYGEN, Inhale 2 L into the lungs as needed Between 1 & 3 L depending on activity  oxybutynin (DITROPAN-XL) 5 MG extended release tablet, Take 1 tablet by mouth daily    Allergies:    Advil cold & sinus liqui-gels [pseudoephedrine-ibuprofen]; Percocet [oxycodone-acetaminophen]; and Sulfa antibiotics    Social History:    reports that she quit smoking about 11 years ago. Her smoking use included Cigarettes. She has a 45.00 pack-year smoking history. She has never used smokeless tobacco. She reports that she does not drink alcohol or use drugs. Family History:   family history includes Arthritis in her sister; Cancer in her mother and sister; Heart Disease in her father, sister, and sister; High Blood Pressure in her father and sister; High Cholesterol in her father and sister; Stroke in her paternal grandfather and sister.     REVIEW OF SYSTEMS:    Constitutional: negative for anorexia, chills and fevers,weight change  Respiratory: negative for cough, dyspnea on exertion, hemoptysis, shortness of breath and wheezing  Cardiovascular: negative for chest pain, orthopnea, palpitations and syncope  Gastrointestinal: negative for abdominal pain,nausea , vomiting, constipation, diarrhea. Hematologic/lymphatic: negative for bruising,prolonged bleeding,blood clots  Musculoskeletal:negative for muscle weakness, myalgias,wasting  Neurological: negative for coordination problems, dizziness, gait problems and vertigo  Behavioral/Psych:negative for mood/sleep disturbance      PHYSICAL EXAM:  Vitals:Patient Vitals for the past 24 hrs:   BP Temp Temp src Pulse Resp SpO2 Weight   10/15/18 1525 - - - 107 - - -   10/15/18 1430 (!) 114/55 - - 113 18 - -   10/15/18 1243 - - - - - 97 % -   10/15/18 1143 (!) 123/59 98.3 °F (36.8 °C) Oral 124 18 95 % -   10/15/18 1015 (!) 99/53 98.1 °F (36.7 °C) Oral 98 18 98 % -   10/15/18 0936 - - - - - 96 % -   10/15/18 0926 (!) 110/57 - - 129 18 - -   10/15/18 0915 (!) 114/57 - - 135 18 - -   10/15/18 0852 - - - - - 99 % -   10/15/18 0845 - - - - - 100 % -   10/15/18 0836 - - - - - 100 % -   10/15/18 0835 - - - - - 99 % -   10/15/18 0818 (!) 109/54 98.1 °F (36.7 °C) Oral 129 18 99 % -   10/15/18 0745 (!) 99/53 - - 117 20 - -   10/15/18 0720 (!) 112/58 98.1 °F (36.7 °C) Oral 113 18 100 % -   10/15/18 0655 127/87 98.6 °F (37 °C) Oral 116 18 98 % -   10/15/18 0622 (!) 104/57 98.9 °F (37.2 °C) Oral 113 18 97 % -   10/15/18 0431 - - - - 20 96 % -   10/15/18 0415 - - - - - (!) 78 % -   10/15/18 0357 (!) 103/59 98.1 °F (36.7 °C) Oral 108 16 98 % 132 lb (59.9 kg)   10/14/18 2315 109/61 97.7 °F (36.5 °C) Oral 105 18 97 % -   10/14/18 2200 (!) 110/58 - - 95 - - -   10/14/18 2127 - - - 162 - - -   10/14/18 2115 125/67 98 °F (36.7 °C) Oral 135 20 95 % -   10/14/18 2043 (!) 110/55 - - - - - -   10/14/18 2041 (!) 103/57 98.3 °F (36.8 °C) Oral 165 24 94 % -   10/14/18 1752 (!) 124/57 97.5 °F (36.4 °C) Oral 143 24 93 % -       Last 3 weights:    Wt Readings from Last 3 Encounters:

## 2018-10-16 ENCOUNTER — APPOINTMENT (OUTPATIENT)
Dept: GENERAL RADIOLOGY | Age: 73
DRG: 189 | End: 2018-10-16
Payer: MEDICARE

## 2018-10-16 LAB
ANION GAP SERPL CALCULATED.3IONS-SCNC: 7 MEQ/L (ref 8–16)
BASOPHILS # BLD: 0.1 %
BASOPHILS ABSOLUTE: 0 THOU/MM3 (ref 0–0.1)
BUN BLDV-MCNC: 13 MG/DL (ref 7–22)
CALCIUM IONIZED: 1.26 MMOL/L (ref 1.12–1.32)
CALCIUM SERPL-MCNC: 9.1 MG/DL (ref 8.5–10.5)
CHLORIDE BLD-SCNC: 98 MEQ/L (ref 98–111)
CO2: 36 MEQ/L (ref 23–33)
CREAT SERPL-MCNC: 0.7 MG/DL (ref 0.4–1.2)
EOSINOPHIL # BLD: 0 %
EOSINOPHILS ABSOLUTE: 0 THOU/MM3 (ref 0–0.4)
ERYTHROCYTE [DISTWIDTH] IN BLOOD BY AUTOMATED COUNT: 16.5 % (ref 11.5–14.5)
ERYTHROCYTE [DISTWIDTH] IN BLOOD BY AUTOMATED COUNT: 55.8 FL (ref 35–45)
GFR SERPL CREATININE-BSD FRML MDRD: 82 ML/MIN/1.73M2
GLUCOSE BLD-MCNC: 132 MG/DL (ref 70–108)
GLUCOSE BLD-MCNC: 144 MG/DL (ref 70–108)
GLUCOSE BLD-MCNC: 150 MG/DL (ref 70–108)
GLUCOSE BLD-MCNC: 161 MG/DL (ref 70–108)
GLUCOSE BLD-MCNC: 201 MG/DL (ref 70–108)
HCT VFR BLD CALC: 28.8 % (ref 37–47)
HEMOGLOBIN: 8.9 GM/DL (ref 12–16)
IMMATURE GRANS (ABS): 0.14 THOU/MM3 (ref 0–0.07)
IMMATURE GRANULOCYTES: 1.5 %
LYMPHOCYTES # BLD: 3.1 %
LYMPHOCYTES ABSOLUTE: 0.3 THOU/MM3 (ref 1–4.8)
MAGNESIUM: 1.4 MG/DL (ref 1.6–2.4)
MCH RBC QN AUTO: 29.1 PG (ref 26–33)
MCHC RBC AUTO-ENTMCNC: 30.9 GM/DL (ref 32.2–35.5)
MCV RBC AUTO: 94.1 FL (ref 81–99)
MONOCYTES # BLD: 3.7 %
MONOCYTES ABSOLUTE: 0.4 THOU/MM3 (ref 0.4–1.3)
MRSA SCREEN: NORMAL
NUCLEATED RED BLOOD CELLS: 0 /100 WBC
PHOSPHORUS: 3.5 MG/DL (ref 2.4–4.7)
PLATELET # BLD: 231 THOU/MM3 (ref 130–400)
PMV BLD AUTO: 10.1 FL (ref 9.4–12.4)
POTASSIUM SERPL-SCNC: 4.1 MEQ/L (ref 3.5–5.2)
RBC # BLD: 3.06 MILL/MM3 (ref 4.2–5.4)
SEG NEUTROPHILS: 91.6 %
SEGMENTED NEUTROPHILS ABSOLUTE COUNT: 8.7 THOU/MM3 (ref 1.8–7.7)
SODIUM BLD-SCNC: 141 MEQ/L (ref 135–145)
T4 FREE: 1.11 NG/DL (ref 0.93–1.76)
TSH SERPL DL<=0.05 MIU/L-ACNC: 0.22 UIU/ML (ref 0.4–4.2)
VRE CULTURE: NORMAL
WBC # BLD: 9.5 THOU/MM3 (ref 4.8–10.8)

## 2018-10-16 PROCEDURE — 2500000003 HC RX 250 WO HCPCS: Performed by: FAMILY MEDICINE

## 2018-10-16 PROCEDURE — 6370000000 HC RX 637 (ALT 250 FOR IP): Performed by: INTERNAL MEDICINE

## 2018-10-16 PROCEDURE — 99232 SBSQ HOSP IP/OBS MODERATE 35: CPT | Performed by: PHYSICIAN ASSISTANT

## 2018-10-16 PROCEDURE — 6360000002 HC RX W HCPCS: Performed by: INTERNAL MEDICINE

## 2018-10-16 PROCEDURE — 94669 MECHANICAL CHEST WALL OSCILL: CPT

## 2018-10-16 PROCEDURE — 80048 BASIC METABOLIC PNL TOTAL CA: CPT

## 2018-10-16 PROCEDURE — 82330 ASSAY OF CALCIUM: CPT

## 2018-10-16 PROCEDURE — 2709999900 HC NON-CHARGEABLE SUPPLY

## 2018-10-16 PROCEDURE — 6360000002 HC RX W HCPCS: Performed by: FAMILY MEDICINE

## 2018-10-16 PROCEDURE — 6370000000 HC RX 637 (ALT 250 FOR IP): Performed by: FAMILY MEDICINE

## 2018-10-16 PROCEDURE — 94640 AIRWAY INHALATION TREATMENT: CPT

## 2018-10-16 PROCEDURE — 99233 SBSQ HOSP IP/OBS HIGH 50: CPT | Performed by: FAMILY MEDICINE

## 2018-10-16 PROCEDURE — 6370000000 HC RX 637 (ALT 250 FOR IP): Performed by: PHYSICIAN ASSISTANT

## 2018-10-16 PROCEDURE — 83735 ASSAY OF MAGNESIUM: CPT

## 2018-10-16 PROCEDURE — G8979 MOBILITY GOAL STATUS: HCPCS

## 2018-10-16 PROCEDURE — 6370000000 HC RX 637 (ALT 250 FOR IP): Performed by: NURSE PRACTITIONER

## 2018-10-16 PROCEDURE — 82948 REAGENT STRIP/BLOOD GLUCOSE: CPT

## 2018-10-16 PROCEDURE — 84100 ASSAY OF PHOSPHORUS: CPT

## 2018-10-16 PROCEDURE — 2580000003 HC RX 258: Performed by: INTERNAL MEDICINE

## 2018-10-16 PROCEDURE — 94760 N-INVAS EAR/PLS OXIMETRY 1: CPT

## 2018-10-16 PROCEDURE — APPSS30 APP SPLIT SHARED TIME 16-30 MINUTES: Performed by: NURSE PRACTITIONER

## 2018-10-16 PROCEDURE — 2060000000 HC ICU INTERMEDIATE R&B

## 2018-10-16 PROCEDURE — 97110 THERAPEUTIC EXERCISES: CPT

## 2018-10-16 PROCEDURE — 84439 ASSAY OF FREE THYROXINE: CPT

## 2018-10-16 PROCEDURE — 99232 SBSQ HOSP IP/OBS MODERATE 35: CPT | Performed by: NURSE PRACTITIONER

## 2018-10-16 PROCEDURE — G8978 MOBILITY CURRENT STATUS: HCPCS

## 2018-10-16 PROCEDURE — 36415 COLL VENOUS BLD VENIPUNCTURE: CPT

## 2018-10-16 PROCEDURE — 71045 X-RAY EXAM CHEST 1 VIEW: CPT

## 2018-10-16 PROCEDURE — 98960 EDU&TRN PT SELF-MGMT NQHP 1: CPT

## 2018-10-16 PROCEDURE — 99233 SBSQ HOSP IP/OBS HIGH 50: CPT | Performed by: INTERNAL MEDICINE

## 2018-10-16 PROCEDURE — 85025 COMPLETE CBC W/AUTO DIFF WBC: CPT

## 2018-10-16 PROCEDURE — 2700000000 HC OXYGEN THERAPY PER DAY

## 2018-10-16 PROCEDURE — 6360000002 HC RX W HCPCS: Performed by: NURSE PRACTITIONER

## 2018-10-16 PROCEDURE — 97162 PT EVAL MOD COMPLEX 30 MIN: CPT

## 2018-10-16 PROCEDURE — 84443 ASSAY THYROID STIM HORMONE: CPT

## 2018-10-16 RX ORDER — VERAPAMIL HYDROCHLORIDE 240 MG/1
120 TABLET, FILM COATED, EXTENDED RELEASE ORAL NIGHTLY
Status: DISCONTINUED | OUTPATIENT
Start: 2018-10-16 | End: 2018-10-21

## 2018-10-16 RX ORDER — BUMETANIDE 0.25 MG/ML
1 INJECTION, SOLUTION INTRAMUSCULAR; INTRAVENOUS ONCE
Status: COMPLETED | OUTPATIENT
Start: 2018-10-16 | End: 2018-10-16

## 2018-10-16 RX ORDER — MAGNESIUM SULFATE IN WATER 40 MG/ML
2 INJECTION, SOLUTION INTRAVENOUS ONCE
Status: COMPLETED | OUTPATIENT
Start: 2018-10-16 | End: 2018-10-16

## 2018-10-16 RX ORDER — PREDNISONE 20 MG/1
40 TABLET ORAL DAILY
Status: DISCONTINUED | OUTPATIENT
Start: 2018-10-17 | End: 2018-10-18

## 2018-10-16 RX ADMIN — IPRATROPIUM BROMIDE 0.5 MG: 0.5 SOLUTION RESPIRATORY (INHALATION) at 18:22

## 2018-10-16 RX ADMIN — VANCOMYCIN HYDROCHLORIDE 1000 MG: 1 INJECTION, POWDER, LYOPHILIZED, FOR SOLUTION INTRAVENOUS at 12:04

## 2018-10-16 RX ADMIN — BETAMETHASONE DIPROPIONATE: 0.5 OINTMENT TOPICAL at 08:15

## 2018-10-16 RX ADMIN — ENOXAPARIN SODIUM 40 MG: 40 INJECTION SUBCUTANEOUS at 13:59

## 2018-10-16 RX ADMIN — BUMETANIDE 1 MG: 0.25 INJECTION INTRAMUSCULAR; INTRAVENOUS at 13:59

## 2018-10-16 RX ADMIN — HYDROCODONE BITARTRATE AND ACETAMINOPHEN 1 TABLET: 5; 325 TABLET ORAL at 13:59

## 2018-10-16 RX ADMIN — FLUTICASONE PROPIONATE 1 SPRAY: 50 SPRAY, METERED NASAL at 08:15

## 2018-10-16 RX ADMIN — IPRATROPIUM BROMIDE 0.5 MG: 0.5 SOLUTION RESPIRATORY (INHALATION) at 13:57

## 2018-10-16 RX ADMIN — PIPERACILLIN SODIUM,TAZOBACTAM SODIUM 3.38 G: 3; .375 INJECTION, POWDER, FOR SOLUTION INTRAVENOUS at 23:46

## 2018-10-16 RX ADMIN — LEVALBUTEROL HYDROCHLORIDE 0.63 MG: 1.25 SOLUTION RESPIRATORY (INHALATION) at 13:59

## 2018-10-16 RX ADMIN — MAGNESIUM SULFATE HEPTAHYDRATE 2 G: 40 INJECTION, SOLUTION INTRAVENOUS at 13:59

## 2018-10-16 RX ADMIN — INSULIN LISPRO 1 UNITS: 100 INJECTION, SOLUTION INTRAVENOUS; SUBCUTANEOUS at 21:16

## 2018-10-16 RX ADMIN — ONDANSETRON 4 MG: 2 INJECTION INTRAMUSCULAR; INTRAVENOUS at 13:59

## 2018-10-16 RX ADMIN — OXYCODONE HYDROCHLORIDE AND ACETAMINOPHEN 500 MG: 500 TABLET ORAL at 08:14

## 2018-10-16 RX ADMIN — PANTOPRAZOLE SODIUM 40 MG: 40 TABLET, DELAYED RELEASE ORAL at 05:56

## 2018-10-16 RX ADMIN — LEVALBUTEROL HYDROCHLORIDE 0.63 MG: 1.25 SOLUTION RESPIRATORY (INHALATION) at 09:48

## 2018-10-16 RX ADMIN — IPRATROPIUM BROMIDE 0.5 MG: 0.5 SOLUTION RESPIRATORY (INHALATION) at 09:46

## 2018-10-16 RX ADMIN — PROMETHAZINE HYDROCHLORIDE 25 MG: 25 TABLET ORAL at 12:03

## 2018-10-16 RX ADMIN — GUAIFENESIN 600 MG: 600 TABLET, EXTENDED RELEASE ORAL at 21:15

## 2018-10-16 RX ADMIN — METHYLPREDNISOLONE SODIUM SUCCINATE 40 MG: 40 INJECTION, POWDER, FOR SOLUTION INTRAMUSCULAR; INTRAVENOUS at 21:15

## 2018-10-16 RX ADMIN — CITALOPRAM 20 MG: 20 TABLET, FILM COATED ORAL at 08:15

## 2018-10-16 RX ADMIN — FERROUS SULFATE TAB 325 MG (65 MG ELEMENTAL FE) 325 MG: 325 (65 FE) TAB at 05:56

## 2018-10-16 RX ADMIN — HYDROCODONE BITARTRATE AND ACETAMINOPHEN 1 TABLET: 5; 325 TABLET ORAL at 05:56

## 2018-10-16 RX ADMIN — PIPERACILLIN SODIUM,TAZOBACTAM SODIUM 3.38 G: 3; .375 INJECTION, POWDER, FOR SOLUTION INTRAVENOUS at 06:29

## 2018-10-16 RX ADMIN — ALBUTEROL SULFATE 2.5 MG: 2.5 SOLUTION RESPIRATORY (INHALATION) at 02:23

## 2018-10-16 RX ADMIN — LEVALBUTEROL HYDROCHLORIDE 0.63 MG: 1.25 SOLUTION RESPIRATORY (INHALATION) at 18:34

## 2018-10-16 RX ADMIN — AZITHROMYCIN 250 MG: 250 TABLET, FILM COATED ORAL at 08:14

## 2018-10-16 RX ADMIN — VERAPAMIL HYDROCHLORIDE 120 MG: 240 TABLET, FILM COATED, EXTENDED RELEASE ORAL at 21:15

## 2018-10-16 RX ADMIN — Medication 10 ML: at 08:17

## 2018-10-16 RX ADMIN — METHYLPREDNISOLONE SODIUM SUCCINATE 40 MG: 40 INJECTION, POWDER, FOR SOLUTION INTRAMUSCULAR; INTRAVENOUS at 08:15

## 2018-10-16 RX ADMIN — PIPERACILLIN SODIUM,TAZOBACTAM SODIUM 3.38 G: 3; .375 INJECTION, POWDER, FOR SOLUTION INTRAVENOUS at 16:41

## 2018-10-16 RX ADMIN — Medication 1 UNITS: at 16:41

## 2018-10-16 RX ADMIN — GUAIFENESIN 600 MG: 600 TABLET, EXTENDED RELEASE ORAL at 08:16

## 2018-10-16 RX ADMIN — METOPROLOL TARTRATE 25 MG: 25 TABLET ORAL at 12:03

## 2018-10-16 RX ADMIN — LEVALBUTEROL HYDROCHLORIDE 0.63 MG: 1.25 SOLUTION RESPIRATORY (INHALATION) at 22:15

## 2018-10-16 RX ADMIN — ALBUTEROL SULFATE 2.5 MG: 2.5 SOLUTION RESPIRATORY (INHALATION) at 06:10

## 2018-10-16 RX ADMIN — Medication 10 ML: at 14:00

## 2018-10-16 RX ADMIN — Medication 2 UNITS: at 12:27

## 2018-10-16 RX ADMIN — OXYBUTYNIN CHLORIDE 5 MG: 5 TABLET, FILM COATED, EXTENDED RELEASE ORAL at 08:15

## 2018-10-16 RX ADMIN — CETIRIZINE HYDROCHLORIDE 10 MG: 10 TABLET ORAL at 08:15

## 2018-10-16 RX ADMIN — IPRATROPIUM BROMIDE 0.5 MG: 0.5 SOLUTION RESPIRATORY (INHALATION) at 22:14

## 2018-10-16 ASSESSMENT — PAIN SCALES - GENERAL
PAINLEVEL_OUTOF10: 7
PAINLEVEL_OUTOF10: 7
PAINLEVEL_OUTOF10: 9
PAINLEVEL_OUTOF10: 7
PAINLEVEL_OUTOF10: 7
PAINLEVEL_OUTOF10: 5

## 2018-10-16 ASSESSMENT — PAIN DESCRIPTION - FREQUENCY
FREQUENCY: CONTINUOUS
FREQUENCY: CONTINUOUS

## 2018-10-16 ASSESSMENT — PAIN DESCRIPTION - ONSET
ONSET: ON-GOING
ONSET: GRADUAL

## 2018-10-16 ASSESSMENT — PAIN DESCRIPTION - PAIN TYPE
TYPE: CHRONIC PAIN

## 2018-10-16 ASSESSMENT — PAIN DESCRIPTION - DESCRIPTORS
DESCRIPTORS: ACHING;SHOOTING
DESCRIPTORS: ACHING;SHOOTING

## 2018-10-16 ASSESSMENT — ACTIVITIES OF DAILY LIVING (ADL): EFFECT OF PAIN ON DAILY ACTIVITIES: SITTING TOO LONG

## 2018-10-16 ASSESSMENT — PAIN DESCRIPTION - LOCATION
LOCATION: BACK
LOCATION: BACK

## 2018-10-16 ASSESSMENT — PAIN DESCRIPTION - ORIENTATION
ORIENTATION: LOWER
ORIENTATION: LOWER

## 2018-10-16 ASSESSMENT — PAIN DESCRIPTION - PROGRESSION
CLINICAL_PROGRESSION: NOT CHANGED
CLINICAL_PROGRESSION: NOT CHANGED

## 2018-10-16 NOTE — PROGRESS NOTES
refuses transfers/gait due to fear of becoming too short of breath and that makes her anxious. Pt on high flow O2. General:  Overall Orientation Status: Within Functional Limits  Follows Commands: Within Functional Limits    Vision: Within Functional Limits    Hearing: Within functional limits         Pain:   . Pre Treatment Pain Screening  Pain at present: 7  Scale Used: Numeric Score  Intervention List: Patient able to continue with treatment  Comments / Details: Chronic LBPRN     Social/Functional History:    Lives With: Alone ( just passed away Saturday)  Type of Home: House  Home Layout: One level  Home Access: Stairs to enter with rails  Entrance Stairs - Number of Steps: 2 steps with R grab bar  Entrance Stairs - Rails: Right  Home Equipment: 4 wheeled walker, Oxygen         Ambulation Assistance: Independent  Transfer Assistance: Independent    Active : Yes     Additional Comments: Pt amb without AD, uses 2-3 L O2 at home. Objective:       RLE AROM: WFL       LLE AROM : WFL        B LE's grossly 4/5         RLE Tone: Normotonic  LLE Tone: Normotonic         Transfers  Sit to Stand: Unable to assess (Pt refuses)         Exercises:  Comments: Pt performs seated B LE AROM: ankle pumps, marches, LAQ and hip abd/add x 10 reps to increase strength for improved gait. Pt requires extended rest breaks due to fatigue. Activity Tolerance:  Activity Tolerance: Patient limited by fatigue;Patient limited by endurance    Treatment Initiated: See above exercises. Assessment: Body structures, Functions, Activity limitations: Decreased functional mobility , Decreased endurance, Decreased strength  Assessment: Pt tolerates session fair-, refuses functional mobility due to fear of becoming short of breath and anxious. Pt agreeable to attempting tomorrow. PT to continue to progress strength and functional mobility to return to OF.   Prognosis: Good    Clinical Presentation:

## 2018-10-16 NOTE — PROGRESS NOTES
Scans  (See actual reports for details)  Oct 14, 2018  PROCEDURE: CTA CHEST W WO CONTRAST  1. There is no evidence for pulmonary embolism. 2. Interval development of diffuse opacities throughout the bilateral lungs since the prior CT scan which may represent multifocal pneumonia. 3. Redemonstration of right lung parenchymal scarring/mass. There are persistent changes of right lobectomy with left to right midline shift of the mediastinum. Similar to the prior study. Jul 18, 2018  PROCEDURE: CT CHEST W CONTRAST    Stable CT chest status post right lobectomy right lower lobe parenchymal scar/mass. Slightly improved appearance of the anterior left upper lobe focal opacity. Comparison with old CT chest done in 2017. VL DUP LOWER EXTREMITY VENOUS BILATERAL  10/15/2018   No evidence of deep vein thrombosis throughout the bilateral lower extremities. Assessment   -Acute hypoxic respiratory failure due to Bilateral Pneumonia due to community acquired pneumonia ( CAP) Vs Atypical pneumonia Vs HCAP- she had last chemotherapy from Dr. Alea Duarte within last 2 week. -Severe COPD with exacerbation due to pneumonia.  -Hx of Left lower lobe Pneumonia Vs scarring- improving. She was treated with antibiotics during recent hospitalization.  -Hx of right lower lobe lung cancer S/p right lower lobectomy. She follows with Dr. Santos Francisco DO  -Hx of  spiculated density medial right upper lung and anterior left upper lung- She is following with DO Evans.  -Hx of chronic tobacco smoking. She quit smoking in 2007. Plan   -Follow legionella and streptococcal antigens. -Monitor SpO2 wean supplemental O2 to maintain SpO2 >90%  -Continue current antibiotic with azithromycin, Zosyn and Vanc to cover HCAP. -Solumedrol to 40mg IV bid, change to prednisone 40 mg PO in AM.  -Accuchecks monitoring ACHS with regular insulin coverage with Low dose regimen.  -Acapella Q4h as tolerated.   -Unable to maintain mouth seal on

## 2018-10-17 ENCOUNTER — APPOINTMENT (OUTPATIENT)
Dept: GENERAL RADIOLOGY | Age: 73
DRG: 189 | End: 2018-10-17
Payer: MEDICARE

## 2018-10-17 LAB
ANION GAP SERPL CALCULATED.3IONS-SCNC: 8 MEQ/L (ref 8–16)
BASOPHILS # BLD: 0.1 %
BASOPHILS ABSOLUTE: 0 THOU/MM3 (ref 0–0.1)
BUN BLDV-MCNC: 17 MG/DL (ref 7–22)
CALCIUM SERPL-MCNC: 9.1 MG/DL (ref 8.5–10.5)
CHLORIDE BLD-SCNC: 96 MEQ/L (ref 98–111)
CO2: 35 MEQ/L (ref 23–33)
CREAT SERPL-MCNC: 0.9 MG/DL (ref 0.4–1.2)
EOSINOPHIL # BLD: 0 %
EOSINOPHILS ABSOLUTE: 0 THOU/MM3 (ref 0–0.4)
ERYTHROCYTE [DISTWIDTH] IN BLOOD BY AUTOMATED COUNT: 16.6 % (ref 11.5–14.5)
ERYTHROCYTE [DISTWIDTH] IN BLOOD BY AUTOMATED COUNT: 57.4 FL (ref 35–45)
GFR SERPL CREATININE-BSD FRML MDRD: 61 ML/MIN/1.73M2
GLUCOSE BLD-MCNC: 126 MG/DL (ref 70–108)
GLUCOSE BLD-MCNC: 127 MG/DL (ref 70–108)
GLUCOSE BLD-MCNC: 150 MG/DL (ref 70–108)
GLUCOSE BLD-MCNC: 152 MG/DL (ref 70–108)
GLUCOSE BLD-MCNC: 164 MG/DL (ref 70–108)
GRAM STAIN RESULT: NORMAL
HCT VFR BLD CALC: 30.3 % (ref 37–47)
HEMOGLOBIN: 9 GM/DL (ref 12–16)
IMMATURE GRANS (ABS): 0.32 THOU/MM3 (ref 0–0.07)
IMMATURE GRANULOCYTES: 3.1 %
LYMPHOCYTES # BLD: 3.5 %
LYMPHOCYTES ABSOLUTE: 0.4 THOU/MM3 (ref 1–4.8)
MAGNESIUM: 1.9 MG/DL (ref 1.6–2.4)
MCH RBC QN AUTO: 28.8 PG (ref 26–33)
MCHC RBC AUTO-ENTMCNC: 29.7 GM/DL (ref 32.2–35.5)
MCV RBC AUTO: 96.8 FL (ref 81–99)
MONOCYTES # BLD: 3.9 %
MONOCYTES ABSOLUTE: 0.4 THOU/MM3 (ref 0.4–1.3)
NUCLEATED RED BLOOD CELLS: 0 /100 WBC
PLATELET # BLD: 288 THOU/MM3 (ref 130–400)
PMV BLD AUTO: 10.5 FL (ref 9.4–12.4)
POTASSIUM SERPL-SCNC: 4.4 MEQ/L (ref 3.5–5.2)
RBC # BLD: 3.13 MILL/MM3 (ref 4.2–5.4)
RESPIRATORY CULTURE: NORMAL
SEG NEUTROPHILS: 89.4 %
SEGMENTED NEUTROPHILS ABSOLUTE COUNT: 9.2 THOU/MM3 (ref 1.8–7.7)
SODIUM BLD-SCNC: 139 MEQ/L (ref 135–145)
WBC # BLD: 10.3 THOU/MM3 (ref 4.8–10.8)

## 2018-10-17 PROCEDURE — 2580000003 HC RX 258: Performed by: INTERNAL MEDICINE

## 2018-10-17 PROCEDURE — 6360000002 HC RX W HCPCS: Performed by: INTERNAL MEDICINE

## 2018-10-17 PROCEDURE — 6370000000 HC RX 637 (ALT 250 FOR IP): Performed by: NURSE PRACTITIONER

## 2018-10-17 PROCEDURE — 94640 AIRWAY INHALATION TREATMENT: CPT

## 2018-10-17 PROCEDURE — 6360000002 HC RX W HCPCS: Performed by: FAMILY MEDICINE

## 2018-10-17 PROCEDURE — 6370000000 HC RX 637 (ALT 250 FOR IP): Performed by: INTERNAL MEDICINE

## 2018-10-17 PROCEDURE — 2060000000 HC ICU INTERMEDIATE R&B

## 2018-10-17 PROCEDURE — 2500000003 HC RX 250 WO HCPCS: Performed by: FAMILY MEDICINE

## 2018-10-17 PROCEDURE — 83735 ASSAY OF MAGNESIUM: CPT

## 2018-10-17 PROCEDURE — 6370000000 HC RX 637 (ALT 250 FOR IP): Performed by: FAMILY MEDICINE

## 2018-10-17 PROCEDURE — 6370000000 HC RX 637 (ALT 250 FOR IP): Performed by: PHYSICIAN ASSISTANT

## 2018-10-17 PROCEDURE — 99233 SBSQ HOSP IP/OBS HIGH 50: CPT | Performed by: FAMILY MEDICINE

## 2018-10-17 PROCEDURE — 80048 BASIC METABOLIC PNL TOTAL CA: CPT

## 2018-10-17 PROCEDURE — 71045 X-RAY EXAM CHEST 1 VIEW: CPT

## 2018-10-17 PROCEDURE — 85025 COMPLETE CBC W/AUTO DIFF WBC: CPT

## 2018-10-17 PROCEDURE — 36591 DRAW BLOOD OFF VENOUS DEVICE: CPT

## 2018-10-17 PROCEDURE — 82948 REAGENT STRIP/BLOOD GLUCOSE: CPT

## 2018-10-17 PROCEDURE — 99232 SBSQ HOSP IP/OBS MODERATE 35: CPT | Performed by: INTERNAL MEDICINE

## 2018-10-17 PROCEDURE — 97116 GAIT TRAINING THERAPY: CPT

## 2018-10-17 PROCEDURE — 2709999900 HC NON-CHARGEABLE SUPPLY

## 2018-10-17 PROCEDURE — 94761 N-INVAS EAR/PLS OXIMETRY MLT: CPT

## 2018-10-17 PROCEDURE — G8987 SELF CARE CURRENT STATUS: HCPCS

## 2018-10-17 PROCEDURE — 94760 N-INVAS EAR/PLS OXIMETRY 1: CPT

## 2018-10-17 PROCEDURE — G8988 SELF CARE GOAL STATUS: HCPCS

## 2018-10-17 PROCEDURE — APPSS30 APP SPLIT SHARED TIME 16-30 MINUTES: Performed by: NURSE PRACTITIONER

## 2018-10-17 PROCEDURE — 99231 SBSQ HOSP IP/OBS SF/LOW 25: CPT | Performed by: NURSE PRACTITIONER

## 2018-10-17 RX ORDER — POLYETHYLENE GLYCOL 3350 17 G/17G
17 POWDER, FOR SOLUTION ORAL DAILY PRN
Status: DISCONTINUED | OUTPATIENT
Start: 2018-10-17 | End: 2018-10-24 | Stop reason: HOSPADM

## 2018-10-17 RX ORDER — DOCUSATE SODIUM 100 MG/1
100 CAPSULE, LIQUID FILLED ORAL 2 TIMES DAILY
Status: DISCONTINUED | OUTPATIENT
Start: 2018-10-17 | End: 2018-10-24 | Stop reason: HOSPADM

## 2018-10-17 RX ORDER — BUMETANIDE 0.25 MG/ML
1 INJECTION, SOLUTION INTRAMUSCULAR; INTRAVENOUS 2 TIMES DAILY
Status: COMPLETED | OUTPATIENT
Start: 2018-10-17 | End: 2018-10-17

## 2018-10-17 RX ADMIN — LEVALBUTEROL HYDROCHLORIDE 0.63 MG: 1.25 SOLUTION RESPIRATORY (INHALATION) at 20:35

## 2018-10-17 RX ADMIN — BETAMETHASONE DIPROPIONATE: 0.5 OINTMENT TOPICAL at 08:24

## 2018-10-17 RX ADMIN — BUMETANIDE 1 MG: 0.25 INJECTION INTRAMUSCULAR; INTRAVENOUS at 21:43

## 2018-10-17 RX ADMIN — AZITHROMYCIN 250 MG: 250 TABLET, FILM COATED ORAL at 08:30

## 2018-10-17 RX ADMIN — ONDANSETRON 4 MG: 2 INJECTION INTRAMUSCULAR; INTRAVENOUS at 04:19

## 2018-10-17 RX ADMIN — GUAIFENESIN 600 MG: 600 TABLET, EXTENDED RELEASE ORAL at 08:25

## 2018-10-17 RX ADMIN — DOCUSATE SODIUM 100 MG: 100 CAPSULE, LIQUID FILLED ORAL at 21:43

## 2018-10-17 RX ADMIN — FLUTICASONE PROPIONATE 1 SPRAY: 50 SPRAY, METERED NASAL at 08:25

## 2018-10-17 RX ADMIN — Medication 10 ML: at 21:42

## 2018-10-17 RX ADMIN — Medication 10 ML: at 11:24

## 2018-10-17 RX ADMIN — PREDNISONE 40 MG: 20 TABLET ORAL at 08:26

## 2018-10-17 RX ADMIN — PANTOPRAZOLE SODIUM 40 MG: 40 TABLET, DELAYED RELEASE ORAL at 06:45

## 2018-10-17 RX ADMIN — BUMETANIDE 1 MG: 0.25 INJECTION INTRAMUSCULAR; INTRAVENOUS at 11:24

## 2018-10-17 RX ADMIN — PROMETHAZINE HYDROCHLORIDE 25 MG: 25 TABLET ORAL at 17:23

## 2018-10-17 RX ADMIN — CETIRIZINE HYDROCHLORIDE 10 MG: 10 TABLET ORAL at 08:37

## 2018-10-17 RX ADMIN — Medication 1 UNITS: at 12:34

## 2018-10-17 RX ADMIN — Medication 1 UNITS: at 16:42

## 2018-10-17 RX ADMIN — IPRATROPIUM BROMIDE 0.5 MG: 0.5 SOLUTION RESPIRATORY (INHALATION) at 13:28

## 2018-10-17 RX ADMIN — ALBUTEROL SULFATE 2.5 MG: 2.5 SOLUTION RESPIRATORY (INHALATION) at 05:23

## 2018-10-17 RX ADMIN — LEVALBUTEROL HYDROCHLORIDE 0.63 MG: 1.25 SOLUTION RESPIRATORY (INHALATION) at 10:19

## 2018-10-17 RX ADMIN — Medication 5 ML: at 21:42

## 2018-10-17 RX ADMIN — IPRATROPIUM BROMIDE 0.5 MG: 0.5 SOLUTION RESPIRATORY (INHALATION) at 10:11

## 2018-10-17 RX ADMIN — HYDROCODONE BITARTRATE AND ACETAMINOPHEN 1 TABLET: 5; 325 TABLET ORAL at 07:50

## 2018-10-17 RX ADMIN — OXYBUTYNIN CHLORIDE 5 MG: 5 TABLET, FILM COATED, EXTENDED RELEASE ORAL at 08:26

## 2018-10-17 RX ADMIN — GUAIFENESIN 600 MG: 600 TABLET, EXTENDED RELEASE ORAL at 21:43

## 2018-10-17 RX ADMIN — VERAPAMIL HYDROCHLORIDE 120 MG: 240 TABLET, FILM COATED, EXTENDED RELEASE ORAL at 21:43

## 2018-10-17 RX ADMIN — ENOXAPARIN SODIUM 40 MG: 40 INJECTION SUBCUTANEOUS at 13:52

## 2018-10-17 RX ADMIN — PIPERACILLIN SODIUM,TAZOBACTAM SODIUM 3.38 G: 3; .375 INJECTION, POWDER, FOR SOLUTION INTRAVENOUS at 22:13

## 2018-10-17 RX ADMIN — LEVALBUTEROL HYDROCHLORIDE 0.63 MG: 1.25 SOLUTION RESPIRATORY (INHALATION) at 16:35

## 2018-10-17 RX ADMIN — CITALOPRAM 20 MG: 20 TABLET, FILM COATED ORAL at 08:24

## 2018-10-17 RX ADMIN — IPRATROPIUM BROMIDE 0.5 MG: 0.5 SOLUTION RESPIRATORY (INHALATION) at 16:35

## 2018-10-17 RX ADMIN — PIPERACILLIN SODIUM,TAZOBACTAM SODIUM 3.38 G: 3; .375 INJECTION, POWDER, FOR SOLUTION INTRAVENOUS at 16:33

## 2018-10-17 RX ADMIN — OXYCODONE HYDROCHLORIDE AND ACETAMINOPHEN 500 MG: 500 TABLET ORAL at 08:24

## 2018-10-17 RX ADMIN — IPRATROPIUM BROMIDE 0.5 MG: 0.5 SOLUTION RESPIRATORY (INHALATION) at 20:35

## 2018-10-17 RX ADMIN — PIPERACILLIN SODIUM,TAZOBACTAM SODIUM 3.38 G: 3; .375 INJECTION, POWDER, FOR SOLUTION INTRAVENOUS at 06:45

## 2018-10-17 RX ADMIN — LEVALBUTEROL HYDROCHLORIDE 0.63 MG: 1.25 SOLUTION RESPIRATORY (INHALATION) at 13:28

## 2018-10-17 RX ADMIN — HYDROCODONE BITARTRATE AND ACETAMINOPHEN 1 TABLET: 5; 325 TABLET ORAL at 16:43

## 2018-10-17 RX ADMIN — FERROUS SULFATE TAB 325 MG (65 MG ELEMENTAL FE) 325 MG: 325 (65 FE) TAB at 06:46

## 2018-10-17 ASSESSMENT — PAIN DESCRIPTION - DESCRIPTORS: DESCRIPTORS: ACHING

## 2018-10-17 ASSESSMENT — PAIN SCALES - GENERAL
PAINLEVEL_OUTOF10: 6
PAINLEVEL_OUTOF10: 7
PAINLEVEL_OUTOF10: 6
PAINLEVEL_OUTOF10: 6
PAINLEVEL_OUTOF10: 9
PAINLEVEL_OUTOF10: 7
PAINLEVEL_OUTOF10: 6
PAINLEVEL_OUTOF10: 9
PAINLEVEL_OUTOF10: 7

## 2018-10-17 ASSESSMENT — PAIN DESCRIPTION - ORIENTATION
ORIENTATION: LOWER

## 2018-10-17 ASSESSMENT — PAIN DESCRIPTION - PAIN TYPE
TYPE: CHRONIC PAIN

## 2018-10-17 ASSESSMENT — PAIN DESCRIPTION - LOCATION
LOCATION: BACK

## 2018-10-17 NOTE — PROGRESS NOTES
Pt alert and oriented x4. Denies pain other than chronic back pain from arthritis and scoliosis. Eyes equal, round, reactive to light (5-3mm). Hand grasp and pedal push/pull moderate and equal bilaterally. No numbness or tingling voiced in upper or lower extremities. 3+ pitting edema noted in lower extremities. Skin tan, dry, warm, and intact. Mucus membranes pink and moist.  Lung sounds diminished throughout anterior. Rhonchi heard in upper and lower lobes. Hypoactive bowel sounds x4. Pt requested a gentle laxative, primary nurse said she would talk to physician. Pt currently lying in bed with call light and table in reach.

## 2018-10-17 NOTE — PROGRESS NOTES
Ejection fraction was estimated in the range of  55 % to 65 %. There were no regional wall motion abnormalities. Prepared and signed by    Nasim Ojeda MD  Signed 03-Oct-2012 17:08:40      Radiology    CXR    10/17/2018   Greater lung volumes compared to the prior study. Persistent right hilar region possible mass or adenopathy. Stable bilateral interstitial infiltrates likely acute and chronic as discussed above. Worsening medial right lower lobe volume loss and/or pneumonia and worsening medial left lower lobe atelectasis. Worsening small-to-moderate right and small left pleural effusions. 10/16/2018   FINDINGS: Lungs/pleura: There has been near resolution of the right pleural effusion since the prior study. There is a tiny left pleural effusion blunting the left lateral costophrenic angle. There is right apical pleural thickening. There is no pneumothorax. There is a possible right hilar mass or adenopathy. There are right parahilar interstitial infiltrates with patchy mid right lung and right basilar confluence which may represent pneumonia or atelectasis. Interstitial markings are coarse and prominent throughout the left lung, similar compared to the prior study, which may represent pulmonary fibrosis, pulmonary edema, or an atypical viral-type pneumonia. There is persistent retrocardiac medial left lower lobe infiltrate or atelectasis. Heart: Heart size is normal. Mediastinum/lilian: Left hilum is unremarkable. There is again mediastinal shift to the right. Skeleton: There is again rotary levoscoliosis of lower thoracic and lumbar spine. Lines/tubes/devices: No change in the position of the right jugular Port-A-Cath, tip in the region of the central SVC. Impression:  Nearly resolved right pleural effusion. Tiny left pleural effusion. Possible right hilar mass or adenopathy. Right perihilar interstitial infiltrates and patchy mid right lung and right basilar pneumonia or atelectasis.  Stable acute versus chronic interstitial infiltrates throughout the left lung with medial left lower lobe infiltrate or atelectasis. 10/15/2018     1. Total \"whiteout\" of the entire right hemithorax due to combination of atelectasis/pneumonia and pleural fluid. There is shift of heart and mediastinal structures to the right. Note that the appearance has worsened relative to the recent CTA pulmonary embolus study, 10/14/2018. 2. Mediport in place right side, catheter tip in superior vena cava. 3. Moderately increased interstitial markings throughout the left lung, consistent with interstitial edema/pneumonitis. CT Scans  (See actual reports for details)  Oct 14, 2018  PROCEDURE: CTA CHEST W WO CONTRAST  1. There is no evidence for pulmonary embolism. 2. Interval development of diffuse opacities throughout the bilateral lungs since the prior CT scan which may represent multifocal pneumonia. 3. Redemonstration of right lung parenchymal scarring/mass. There are persistent changes of right lobectomy with left to right midline shift of the mediastinum. Similar to the prior study. Jul 18, 2018  PROCEDURE: CT CHEST W CONTRAST    Stable CT chest status post right lobectomy right lower lobe parenchymal scar/mass. Slightly improved appearance of the anterior left upper lobe focal opacity. Comparison with old CT chest done in 2017. VL DUP LOWER EXTREMITY VENOUS BILATERAL  10/15/2018   No evidence of deep vein thrombosis throughout the bilateral lower extremities. Assessment   -Acute hypoxic respiratory failure due to Bilateral Pneumonia due to community acquired pneumonia ( CAP) Vs Atypical pneumonia Vs HCAP- she had last chemotherapy from Dr. Elida Zapata within last 2 week. -Severe COPD with exacerbation due to pneumonia.  -Hx of Left lower lobe Pneumonia Vs scarring- improving. She was treated with antibiotics during recent hospitalization.  -Hx of right lower lobe lung cancer S/p right lower lobectomy.

## 2018-10-17 NOTE — PROGRESS NOTES
interstitial infiltrates and patchy mid right lung and right basilar pneumonia or atelectasis. Stable acute versus chronic interstitial infiltrates throughout the left lung with medial left lower lobe infiltrate or atelectasis. **This report has been created using voice recognition software. It may contain minor errors which are inherent in voice recognition technology. **      Final report electronically signed by Dr. Edwin Swenson on 10/16/2018 3:13 AM      VL DUP LOWER EXTREMITY VENOUS BILATERAL   Final Result   No evidence of deep vein thrombosis throughout the bilateral lower extremities. **This report has been created using voice recognition software. It may contain minor errors which are inherent in voice recognition technology. **      Final report electronically signed by Dr Savannah Mazariegos on 10/15/2018 5:17 PM      XR CHEST PORTABLE   Final Result   1. Total \"whiteout\" of the entire right hemithorax due to combination of atelectasis/pneumonia and pleural fluid. There is shift of heart and mediastinal structures to the right. Note that the appearance has worsened relative to the recent CTA pulmonary    embolus study, 10/14/2018. 2. Mediport in place right side, catheter tip in superior vena cava. 3. Moderately increased interstitial markings throughout the left lung, consistent with interstitial edema/pneumonitis. **This report has been created using voice recognition software. It may contain minor errors which are inherent in voice recognition technology. **      Final report electronically signed by Dr. Madison Shutlz on 10/15/2018 12:36 PM      CTA CHEST W [address]   Final Result       1. There is no evidence for pulmonary embolism. 2. Interval development of diffuse opacities throughout the bilateral lungs since the prior CT scan which may represent multifocal pneumonia. 3. Redemonstration of right lung parenchymal scarring/mass.  There are persistent changes of right

## 2018-10-17 NOTE — PROGRESS NOTES
levalbuterol  0.63 mg Nebulization 4x daily    guaiFENesin  600 mg Oral BID    piperacillin-tazobactam  3.375 g Intravenous Q8H    ferrous sulfate  325 mg Oral QAM AC    vitamin C  500 mg Oral Daily    oxybutynin  5 mg Oral Daily    fluticasone  1 spray Nasal Daily    citalopram  20 mg Oral Daily    cetirizine  10 mg Oral Daily    betamethasone dipropionate   Topical Daily    sodium chloride flush  10 mL Intravenous 2 times per day    enoxaparin  40 mg Subcutaneous Q24H    azithromycin  250 mg Oral Daily    influenza virus vaccine  0.5 mL Intramuscular Once    pantoprazole  40 mg Oral QAM AC      dextrose         glucose 15 g PRN   dextrose 12.5 g PRN   glucagon (rDNA) 1 mg PRN   dextrose 100 mL/hr PRN   acetaminophen 650 mg Q4H PRN   promethazine 25 mg Q6H PRN   lidocaine-prilocaine  PRN   HYDROcodone-acetaminophen 1 tablet TID PRN   guaiFENesin 600 mg BID PRN   albuterol sulfate HFA 2 puff Q6H PRN   sodium chloride flush 10 mL PRN   magnesium hydroxide 30 mL Daily PRN   ondansetron 4 mg Q6H PRN   albuterol 2.5 mg Q2H PRN   butalbital-acetaminophen-caffeine 1 tablet Q4H PRN   hydrocodone-chlorpheniramine 5 mL Nightly PRN   labetalol 10 mg Q4H PRN       Diagnostics:  EKG:  15-OCT-2018 10:07:40 [facility]-4K ROUTINE RETRIEVAL  Sinus tachycardia  Otherwise normal ECG  When compared with ECG of 14-OCT-2018 20:58,  Previous ECG has undetermined rhythm, needs review  Confirmed by Priya Pratt ([id number]) on 10/15/2018 7:43:06 PM    Echo:    Electronically signed by Taisha Berumen MD (Interpreting   physician) on 10/15/2018 at 02:55 PM   ----------------------------------------------------------------      Findings      Mitral Valve   The mitral valve was not well visualized .      Aortic Valve   The aortic valve leaflets were not well visualized.      Tricuspid Valve   Tricuspid valve was not well visualized.      Pulmonic Valve   The pulmonic valve was not well visualized .      Left Atrium   Left

## 2018-10-18 LAB
ALBUMIN SERPL-MCNC: 2.7 G/DL (ref 3.5–5.1)
ALP BLD-CCNC: 63 U/L (ref 38–126)
ALT SERPL-CCNC: 32 U/L (ref 11–66)
ANION GAP SERPL CALCULATED.3IONS-SCNC: 9 MEQ/L (ref 8–16)
AST SERPL-CCNC: 30 U/L (ref 5–40)
BILIRUB SERPL-MCNC: < 0.2 MG/DL (ref 0.3–1.2)
BUN BLDV-MCNC: 19 MG/DL (ref 7–22)
CALCIUM SERPL-MCNC: 9.1 MG/DL (ref 8.5–10.5)
CHLORIDE BLD-SCNC: 94 MEQ/L (ref 98–111)
CO2: 38 MEQ/L (ref 23–33)
CREAT SERPL-MCNC: 0.9 MG/DL (ref 0.4–1.2)
ERYTHROCYTE [DISTWIDTH] IN BLOOD BY AUTOMATED COUNT: 16.6 % (ref 11.5–14.5)
ERYTHROCYTE [DISTWIDTH] IN BLOOD BY AUTOMATED COUNT: 58.4 FL (ref 35–45)
GFR SERPL CREATININE-BSD FRML MDRD: 61 ML/MIN/1.73M2
GLUCOSE BLD-MCNC: 123 MG/DL (ref 70–108)
GLUCOSE BLD-MCNC: 142 MG/DL (ref 70–108)
GLUCOSE BLD-MCNC: 232 MG/DL (ref 70–108)
GLUCOSE BLD-MCNC: 85 MG/DL (ref 70–108)
GLUCOSE BLD-MCNC: 89 MG/DL (ref 70–108)
HCT VFR BLD CALC: 30.3 % (ref 37–47)
HEMOGLOBIN: 9 GM/DL (ref 12–16)
LEGIONELLA URINARY AG: NEGATIVE
MAGNESIUM: 1.6 MG/DL (ref 1.6–2.4)
MCH RBC QN AUTO: 28.9 PG (ref 26–33)
MCHC RBC AUTO-ENTMCNC: 29.7 GM/DL (ref 32.2–35.5)
MCV RBC AUTO: 97.4 FL (ref 81–99)
PLATELET # BLD: 282 THOU/MM3 (ref 130–400)
PMV BLD AUTO: 10.2 FL (ref 9.4–12.4)
POTASSIUM SERPL-SCNC: 4.4 MEQ/L (ref 3.5–5.2)
RBC # BLD: 3.11 MILL/MM3 (ref 4.2–5.4)
SODIUM BLD-SCNC: 141 MEQ/L (ref 135–145)
STREP PNEUMO AG, UR: NEGATIVE
TOTAL PROTEIN: 5.4 G/DL (ref 6.1–8)
WBC # BLD: 8.6 THOU/MM3 (ref 4.8–10.8)

## 2018-10-18 PROCEDURE — 6360000002 HC RX W HCPCS: Performed by: FAMILY MEDICINE

## 2018-10-18 PROCEDURE — 2700000000 HC OXYGEN THERAPY PER DAY

## 2018-10-18 PROCEDURE — 6360000002 HC RX W HCPCS: Performed by: INTERNAL MEDICINE

## 2018-10-18 PROCEDURE — 2709999900 HC NON-CHARGEABLE SUPPLY

## 2018-10-18 PROCEDURE — 6370000000 HC RX 637 (ALT 250 FOR IP): Performed by: NURSE PRACTITIONER

## 2018-10-18 PROCEDURE — 94760 N-INVAS EAR/PLS OXIMETRY 1: CPT

## 2018-10-18 PROCEDURE — 85027 COMPLETE CBC AUTOMATED: CPT

## 2018-10-18 PROCEDURE — 94640 AIRWAY INHALATION TREATMENT: CPT

## 2018-10-18 PROCEDURE — 99232 SBSQ HOSP IP/OBS MODERATE 35: CPT | Performed by: PHYSICIAN ASSISTANT

## 2018-10-18 PROCEDURE — 98960 EDU&TRN PT SELF-MGMT NQHP 1: CPT

## 2018-10-18 PROCEDURE — 80053 COMPREHEN METABOLIC PANEL: CPT

## 2018-10-18 PROCEDURE — 6370000000 HC RX 637 (ALT 250 FOR IP): Performed by: PHYSICIAN ASSISTANT

## 2018-10-18 PROCEDURE — 6370000000 HC RX 637 (ALT 250 FOR IP): Performed by: INTERNAL MEDICINE

## 2018-10-18 PROCEDURE — 99232 SBSQ HOSP IP/OBS MODERATE 35: CPT | Performed by: INTERNAL MEDICINE

## 2018-10-18 PROCEDURE — 99233 SBSQ HOSP IP/OBS HIGH 50: CPT | Performed by: FAMILY MEDICINE

## 2018-10-18 PROCEDURE — 83735 ASSAY OF MAGNESIUM: CPT

## 2018-10-18 PROCEDURE — 94669 MECHANICAL CHEST WALL OSCILL: CPT

## 2018-10-18 PROCEDURE — 2060000000 HC ICU INTERMEDIATE R&B

## 2018-10-18 PROCEDURE — 82948 REAGENT STRIP/BLOOD GLUCOSE: CPT

## 2018-10-18 PROCEDURE — 2580000003 HC RX 258: Performed by: INTERNAL MEDICINE

## 2018-10-18 PROCEDURE — 6370000000 HC RX 637 (ALT 250 FOR IP): Performed by: FAMILY MEDICINE

## 2018-10-18 PROCEDURE — APPSS30 APP SPLIT SHARED TIME 16-30 MINUTES: Performed by: NURSE PRACTITIONER

## 2018-10-18 PROCEDURE — 97110 THERAPEUTIC EXERCISES: CPT

## 2018-10-18 RX ORDER — LIDOCAINE 50 MG/G
1 PATCH TOPICAL DAILY
Status: DISPENSED | OUTPATIENT
Start: 2018-10-18 | End: 2018-10-23

## 2018-10-18 RX ORDER — PREDNISONE 20 MG/1
40 TABLET ORAL DAILY
Status: COMPLETED | OUTPATIENT
Start: 2018-10-19 | End: 2018-10-19

## 2018-10-18 RX ORDER — FUROSEMIDE 20 MG/1
20 TABLET ORAL DAILY
Status: DISCONTINUED | OUTPATIENT
Start: 2018-10-18 | End: 2018-10-24 | Stop reason: HOSPADM

## 2018-10-18 RX ORDER — PREDNISONE 10 MG/1
10 TABLET ORAL DAILY
Status: DISCONTINUED | OUTPATIENT
Start: 2018-10-26 | End: 2018-10-24 | Stop reason: HOSPADM

## 2018-10-18 RX ORDER — MAGNESIUM SULFATE IN WATER 40 MG/ML
2 INJECTION, SOLUTION INTRAVENOUS ONCE
Status: COMPLETED | OUTPATIENT
Start: 2018-10-18 | End: 2018-10-18

## 2018-10-18 RX ORDER — PREDNISONE 20 MG/1
20 TABLET ORAL DAILY
Status: DISCONTINUED | OUTPATIENT
Start: 2018-10-23 | End: 2018-10-24 | Stop reason: HOSPADM

## 2018-10-18 RX ADMIN — OXYBUTYNIN CHLORIDE 5 MG: 5 TABLET, FILM COATED, EXTENDED RELEASE ORAL at 08:46

## 2018-10-18 RX ADMIN — MAGNESIUM SULFATE HEPTAHYDRATE 2 G: 40 INJECTION, SOLUTION INTRAVENOUS at 10:24

## 2018-10-18 RX ADMIN — GUAIFENESIN 600 MG: 600 TABLET, EXTENDED RELEASE ORAL at 20:28

## 2018-10-18 RX ADMIN — IPRATROPIUM BROMIDE 0.5 MG: 0.5 SOLUTION RESPIRATORY (INHALATION) at 08:05

## 2018-10-18 RX ADMIN — Medication 5 ML: at 23:20

## 2018-10-18 RX ADMIN — CETIRIZINE HYDROCHLORIDE 10 MG: 10 TABLET ORAL at 08:48

## 2018-10-18 RX ADMIN — PIPERACILLIN SODIUM,TAZOBACTAM SODIUM 3.38 G: 3; .375 INJECTION, POWDER, FOR SOLUTION INTRAVENOUS at 23:20

## 2018-10-18 RX ADMIN — ENOXAPARIN SODIUM 40 MG: 40 INJECTION SUBCUTANEOUS at 13:39

## 2018-10-18 RX ADMIN — GUAIFENESIN 600 MG: 600 TABLET, EXTENDED RELEASE ORAL at 08:47

## 2018-10-18 RX ADMIN — Medication 1 UNITS: at 11:47

## 2018-10-18 RX ADMIN — FERROUS SULFATE TAB 325 MG (65 MG ELEMENTAL FE) 325 MG: 325 (65 FE) TAB at 06:41

## 2018-10-18 RX ADMIN — CITALOPRAM 20 MG: 20 TABLET, FILM COATED ORAL at 08:47

## 2018-10-18 RX ADMIN — PIPERACILLIN SODIUM,TAZOBACTAM SODIUM 3.38 G: 3; .375 INJECTION, POWDER, FOR SOLUTION INTRAVENOUS at 06:41

## 2018-10-18 RX ADMIN — IPRATROPIUM BROMIDE 0.5 MG: 0.5 SOLUTION RESPIRATORY (INHALATION) at 13:13

## 2018-10-18 RX ADMIN — LEVALBUTEROL HYDROCHLORIDE 0.63 MG: 1.25 SOLUTION RESPIRATORY (INHALATION) at 21:32

## 2018-10-18 RX ADMIN — INSULIN LISPRO 1 UNITS: 100 INJECTION, SOLUTION INTRAVENOUS; SUBCUTANEOUS at 20:31

## 2018-10-18 RX ADMIN — LEVALBUTEROL HYDROCHLORIDE 0.63 MG: 1.25 SOLUTION RESPIRATORY (INHALATION) at 08:05

## 2018-10-18 RX ADMIN — FLUTICASONE PROPIONATE 1 SPRAY: 50 SPRAY, METERED NASAL at 08:48

## 2018-10-18 RX ADMIN — IPRATROPIUM BROMIDE 0.5 MG: 0.5 SOLUTION RESPIRATORY (INHALATION) at 17:07

## 2018-10-18 RX ADMIN — BETAMETHASONE DIPROPIONATE: 0.5 OINTMENT TOPICAL at 08:48

## 2018-10-18 RX ADMIN — PIPERACILLIN SODIUM,TAZOBACTAM SODIUM 3.38 G: 3; .375 INJECTION, POWDER, FOR SOLUTION INTRAVENOUS at 15:01

## 2018-10-18 RX ADMIN — FUROSEMIDE 20 MG: 20 TABLET ORAL at 11:47

## 2018-10-18 RX ADMIN — METOPROLOL TARTRATE 25 MG: 25 TABLET ORAL at 17:00

## 2018-10-18 RX ADMIN — OXYCODONE HYDROCHLORIDE AND ACETAMINOPHEN 500 MG: 500 TABLET ORAL at 08:46

## 2018-10-18 RX ADMIN — HYDROCODONE BITARTRATE AND ACETAMINOPHEN 1 TABLET: 5; 325 TABLET ORAL at 14:51

## 2018-10-18 RX ADMIN — VERAPAMIL HYDROCHLORIDE 120 MG: 240 TABLET, FILM COATED, EXTENDED RELEASE ORAL at 20:28

## 2018-10-18 RX ADMIN — HYDROCODONE BITARTRATE AND ACETAMINOPHEN 1 TABLET: 5; 325 TABLET ORAL at 23:20

## 2018-10-18 RX ADMIN — PREDNISONE 40 MG: 20 TABLET ORAL at 08:46

## 2018-10-18 RX ADMIN — IPRATROPIUM BROMIDE 0.5 MG: 0.5 SOLUTION RESPIRATORY (INHALATION) at 21:32

## 2018-10-18 RX ADMIN — PANTOPRAZOLE SODIUM 40 MG: 40 TABLET, DELAYED RELEASE ORAL at 06:41

## 2018-10-18 RX ADMIN — ACETAMINOPHEN 650 MG: 325 TABLET ORAL at 12:26

## 2018-10-18 RX ADMIN — LEVALBUTEROL HYDROCHLORIDE 0.63 MG: 1.25 SOLUTION RESPIRATORY (INHALATION) at 17:07

## 2018-10-18 RX ADMIN — HYDROCODONE BITARTRATE AND ACETAMINOPHEN 1 TABLET: 5; 325 TABLET ORAL at 06:41

## 2018-10-18 RX ADMIN — AZITHROMYCIN 250 MG: 250 TABLET, FILM COATED ORAL at 08:46

## 2018-10-18 RX ADMIN — LEVALBUTEROL HYDROCHLORIDE 0.63 MG: 1.25 SOLUTION RESPIRATORY (INHALATION) at 13:13

## 2018-10-18 ASSESSMENT — PAIN SCALES - GENERAL
PAINLEVEL_OUTOF10: 7
PAINLEVEL_OUTOF10: 8
PAINLEVEL_OUTOF10: 7
PAINLEVEL_OUTOF10: 8
PAINLEVEL_OUTOF10: 6
PAINLEVEL_OUTOF10: 6
PAINLEVEL_OUTOF10: 0
PAINLEVEL_OUTOF10: 8
PAINLEVEL_OUTOF10: 6
PAINLEVEL_OUTOF10: 6
PAINLEVEL_OUTOF10: 7
PAINLEVEL_OUTOF10: 8

## 2018-10-18 ASSESSMENT — PAIN DESCRIPTION - LOCATION
LOCATION: BACK;RIB CAGE
LOCATION: BACK;RIB CAGE
LOCATION: RIB CAGE
LOCATION: BACK;RIB CAGE

## 2018-10-18 ASSESSMENT — PAIN DESCRIPTION - PAIN TYPE
TYPE: CHRONIC PAIN

## 2018-10-18 ASSESSMENT — PAIN DESCRIPTION - ORIENTATION
ORIENTATION: LOWER
ORIENTATION: LOWER

## 2018-10-18 ASSESSMENT — PAIN DESCRIPTION - PROGRESSION
CLINICAL_PROGRESSION: NOT CHANGED
CLINICAL_PROGRESSION: NOT CHANGED

## 2018-10-18 ASSESSMENT — PAIN DESCRIPTION - DESCRIPTORS
DESCRIPTORS: ACHING

## 2018-10-18 ASSESSMENT — PAIN DESCRIPTION - ONSET
ONSET: ON-GOING
ONSET: ON-GOING

## 2018-10-18 ASSESSMENT — PAIN DESCRIPTION - FREQUENCY
FREQUENCY: CONTINUOUS
FREQUENCY: CONTINUOUS

## 2018-10-18 NOTE — PROGRESS NOTES
[facility]    Patient - Doc Johnson   MRN -  [id number]   [facility]   - 1945      Date of Admission -  10/14/2018  9:14 AM  Date of evaluation -  10/18/2018  Room - [address] [facility] MD [facility] Primary Care Physician - JESUS Patel CNP     Problem List      Active Hospital Problems    Diagnosis Date Noted    Aspiration pneumonia [facility]) [J69.0]      Priority: High    Stage 3 severe COPD by GOLD classification ([address] [J44.9]     Acute pulmonary edema ([address] [J81.0]     Pleural effusion on right [J90]     Hypomagnesemia [E83.42]     Moderate malnutrition ([address] [E44.0] 10/15/2018    Primary lung cancer, right (HCC) [C34.91]     Hypoxemia [R09.02]     Multifocal atrial tachycardia (HCC) [I47.1]     Anemia [D64.9]     Bilateral leg edema [R60.0]     Physical deconditioning [R53.81]     Acute on chronic respiratory failure with hypoxemia ([address] [J96.21] 10/14/2018     Reason for Consult    For management of acute on chronic respiratory failure with hypoxia initially requiring HFNC  HPI   History Obtained From: Patient and electronic medical record. Doc Johnson is a 68 y.o. female  was initially admitted under hospitalist service. Pulmonary medicine was consulted for further management of hypoxia/Hypoxic respiratory failure. She is currently on oxygen via Hi flow. The patient is a 68 y.o. female with past medical hx of severe COPD on home O2, started having frequent cough with sputum production for last 10 days. Her cough is productive with sputum. Her sputum is dark to yellow  in color. Her above symptoms were associated with worsening of shortness of breath and decline in her routine functional status. She also admits to history of  fever, chills or rigors. During her initial work up she was found to have bilateral pneumonia. She denies any history of hemoptysis.  She gives a history of orthopnea and function was normal. Ejection fraction was estimated in the range of  55 % to 65 %. There were no regional wall motion abnormalities. Prepared and signed by    Juwan Viramontes MD  Signed 03-Oct-2012 17:08:40      Radiology    CXR    10/17/2018   Greater lung volumes compared to the prior study. Persistent right hilar region possible mass or adenopathy. Stable bilateral interstitial infiltrates likely acute and chronic as discussed above. Worsening medial right lower lobe volume loss and/or pneumonia and worsening medial left lower lobe atelectasis. Worsening small-to-moderate right and small left pleural effusions. 10/16/2018   FINDINGS: Lungs/pleura: There has been near resolution of the right pleural effusion since the prior study. There is a tiny left pleural effusion blunting the left lateral costophrenic angle. There is right apical pleural thickening. There is no pneumothorax. There is a possible right hilar mass or adenopathy. There are right parahilar interstitial infiltrates with patchy mid right lung and right basilar confluence which may represent pneumonia or atelectasis. Interstitial markings are coarse and prominent throughout the left lung, similar compared to the prior study, which may represent pulmonary fibrosis, pulmonary edema, or an atypical viral-type pneumonia. There is persistent retrocardiac medial left lower lobe infiltrate or atelectasis. Heart: Heart size is normal. Mediastinum/lilian: Left hilum is unremarkable. There is again mediastinal shift to the right. Skeleton: There is again rotary levoscoliosis of lower thoracic and lumbar spine. Lines/tubes/devices: No change in the position of the right jugular Port-A-Cath, tip in the region of the central SVC. Impression:  Nearly resolved right pleural effusion. Tiny left pleural effusion. Possible right hilar mass or adenopathy.  Right perihilar interstitial infiltrates and patchy mid right lung and right basilar pneumonia or

## 2018-10-18 NOTE — PROGRESS NOTES
home.    Subjective:  Chart Reviewed: Yes  Subjective: MADIE Stover therapy. RN stated that patient is currently on 4 liters O2 but requested that I put patient on 6 liters O2 with therapy. Patient in bedside chair upon arrival and agreeable to only complete therapeutic exercises. Patient had vistors in room during treatment session this date. Pain:   .  Pain Assessment  Pain Level: 6       Social/Functional:  Lives With: Alone (Spouse recently passed away )  Type of Home: House  Home Layout: One level  Home Access: Stairs to enter with rails  Entrance Stairs - Number of Steps: 2 steps with R grab bar  Entrance Stairs - Rails: Right  Home Equipment: 4 wheeled walker, Oxygen (2 Liters O2 at rest and 3-4L with activity. )     Objective:    Transfers  Sit to Stand: Stand by assistance  Stand to sit: Stand by assistance  Comment: Patient completed partial stand from bedside chair to readjust gown during treatment session requiring SBA. Exercises:  Exercises  Comments: Patient completed BLE seated and reclined therapeutic exercises 15x each with the performance of seated: LAQ and reclined: glute sets, ankle pumps, heel slides, quad sets, hip abduction, iso hip adduction, SLR, and SAQ. Patient required min verbal cues on proper technique for max contraction to facilitate increased BLE strength and ROM required for increased improvement with functional mobility. Patient requried multiple rest breaks with exercises due to increased fatigue. Activity Tolerance:  Activity Tolerance: Patient limited by fatigue;Patient limited by endurance    Assessment: Body structures, Functions, Activity limitations: Decreased functional mobility , Decreased endurance, Decreased strength, Decreased balance  Assessment: Patient tolerated treatment session fair. Patient requried multiple rest breaks with therapy this date due to increased fatigue.  [place] completed therapy on 6 liters O2 and therapist monitored patient's SPO2

## 2018-10-18 NOTE — PROGRESS NOTES
Intravenous 2 times per day    enoxaparin  40 mg Subcutaneous Q24H    influenza virus vaccine  0.5 mL Intramuscular Once    pantoprazole  40 mg Oral QAM AC     polyethylene glycol, glucose, dextrose, glucagon (rDNA), dextrose, acetaminophen, promethazine, lidocaine-prilocaine, HYDROcodone-acetaminophen, guaiFENesin, albuterol sulfate HFA, sodium chloride flush, magnesium hydroxide, ondansetron, albuterol, butalbital-acetaminophen-caffeine, hydrocodone-chlorpheniramine, labetalol  IV Drips/Infusions   dextrose       Past Medical History         Diagnosis Date    Arthritis     low back pain and scoliosis in lumbar    [facility] ([facility] 2012    lower right lobe-lung s/p lobectomy in 2012, radiation and chemo nad later had mediatinal mets and had radationa dn chemo again, and in 2016 had reoccurence on the left side upper and was started on radiation this year and has  a follow up CT in oct 2017    Chronic bronchitis, simple (HCC)     Chronic respiratory failure with hypoxia ([facility]     COPD (chronic obstructive pulmonary disease) ([facility]     GERD (gastroesophageal reflux disease)     HTN (hypertension)     Pneumonia     Postprocedural pneumothorax     Scoliosis     Urinary incontinence       Past Surgical History           Procedure Laterality Date    LOBECTOMY  10/19/2012    rt lower lobectomy     LUNG BIOPSY  8/2012     Diet    DIET GENERAL;  Dietary Nutrition Supplements: Standard High Calorie Oral Supplement  Allergies    Advil cold & sinus liqui-gels [pseudoephedrine-ibuprofen]; Percocet [oxycodone-acetaminophen]; and Sulfa antibiotics  Social History     Social History     Social History    Marital status:      Spouse name: Leobardo Roldan Number of children: 2    Years of education: N/A     Occupational History    Not on file.      Social History Main Topics    Smoking status: Former Smoker     Packs/day: 1.00     Years: 45.00     Types: Cigarettes     Quit date: 2007    Smokeless tobacco: Never Used  Alcohol use No    Drug use: No    Sexual activity: Not on file     Other Topics Concern    Not on file     Social History Narrative    No narrative on file     Family History      Family History   Problem Relation Age of Onset    Cancer Mother     Heart Disease Father     High Blood Pressure Father     High Cholesterol Father     Arthritis Sister     Heart Disease Sister     Cancer Sister     Heart Disease Sister     Stroke Sister     High Cholesterol Sister     High Blood Pressure Sister     Stroke Paternal Grandfather      ROS     Review of Systems   Pertinent review of systems noted in HPI, all other ROS negative. Vitals     height is 5' 1\" (1.549 m) and weight is 141 lb 12.8 oz (64.3 kg). Her oral temperature is 98.5 °F (36.9 °C). Her blood pressure is 132/60 and her pulse is 105. Her respiration is 19 and oxygen saturation is 97%. O2 Flow Rate (L/min): 4 L/min    Exam   Physical Exam   General appearance: No apparent distress, chronically ill appearing, pale, and cooperative. HEENT: Pupils equal, round, and reactive to light. Conjunctivae/corneas clear. Oral mucosa moist.   Neck: Supple, with full range of motion. Trachea midline. Respiratory:  Normal respiratory effort. Diminished breath sounds bilaterally with scattered rhonchi. Cardiovascular: Tachycardic, S1/S2  Abdomen: Soft, non-tender, non-distended with active bowel sounds. Musculoskeletal: trace bilateral lower extremity edema, no calf tenderness. Skin: Skin color, texture, turgor normal.  No rashes or lesions. Neurologic:  Neurovascularly intact without any focal sensory/motor deficits.    Psychiatric: Alert and oriented     Labs   CBC  Recent Labs      10/16/18   0442  10/17/18   0420  10/18/18   0532   WBC  9.5  10.3  8.6   RBC  3.06*  3.13*  3.11*   HGB  8.9*  9.0*  9.0*   HCT  28.8*  30.3*  30.3*   MCV  94.1  96.8  97.4   MCH  29.1  28.8  28.9   MCHC  30.9*  29.7*  29.7*   PLT  231  288  282   MPV  10.1  10.5

## 2018-10-18 NOTE — PROGRESS NOTES
Patient eating breakfast. Washed faced, would like to finish getting washed up this afternoon. Patient denies any needs at this time, call light in place.  ARIANA Lopez/EL

## 2018-10-18 NOTE — PROGRESS NOTES
influenza 10/15 (-), legionella (-), urine strep (-)  -- see #1 for O2 issues  -- PCT 0.23 on admission 10/14   -- MRSA 10/14 (-) --stopped vanc 10/16  3. Sinus tachycardia/MAT -- per EKG's - c/s cardio apprec -- cardio changed lopressor to verapamil 10/16 -- HR improving 10/17 but trending back up again 10/18 and resp status improving -_> have cardio re-eval 10/18   -- ?contrib to pulm edema  --> checked echo 10/15/18 - EF 55-60%, no wall abn, poor quality due to tachycardia  --  Free T4 10/16 WNL  -- CTA chest 10/14/18 (-) PE  -- changed albuterol to xopenex/atrovent 10/15 due to tachycardia  4. Right pleural effusion/pulmonary edema/small left effusion  -- no dx of CHF -- ?related to tachycardia - bumex 1 mg IV x 1 10/15 and improving CXR 10/16 but still with pulm edema --> repeat bumex 1mg x 1 10/16   -- re-eval 10/17 with CXR with worsening pleural effusions --> bumex 1 mg IV q 12 hrs x 2 and re-eval 10/18 still with edema --> resumed home lasix 20 mg daily 10/18 -?need increase dose  -- with met alkalosis ?need acetazolamide   -- Echo 10/15/18 EF 55-60% but poor quality  5. Hypomagnesemia -- 10/16 --> replaced and monitor closely with above   6. Acute on chronic anemia -- down to 6.5 on 10/15 -> transfused 1 unit PRBC and improved 8-9's and stable as of 10/16 -- apprec oncology assist  -- ?doubt due to chemo as last dose ~2 weeks ago --  FOB (p) -- baseline 9-12 in past -- cont monitor - no signs of bleeding  7. COPD exacerbation -- BD tx, steroids, O2 as above - pulm c/s 10/15 apprec --> duonebs changed to xopenex/atrovent 10/15 due to tachycardia  8. Nausea -- off and on - +am 10/17 --> pt states has at home and takes phenergan every am which helps more than zofran    --> added colace and miralax prn 10/17 and +BM 10/17  -- ?need KUB if no improvement  -- checked LFT 10/18 and WNL  9.  Recurrent non-small cell lung cancer -- right lung scarring/mass per CT -- oncology c/s apprec -- see above - last chemo with BILITOT  <0.2*   ALKPHOS  63     No results for input(s): INR in the last 72 hours. No results for input(s): Rudi Campanile in the last 72 hours. Urinalysis:      Lab Results   Component Value Date    NITRU NEGATIVE 10/15/2018    WBCUA 0-2 10/15/2018    BACTERIA NONE 10/15/2018    RBCUA 0-2 10/15/2018    BLOODU NEGATIVE 10/15/2018    SPECGRAV 1.013 10/17/2012    GLUCOSEU NEGATIVE 10/15/2018       Radiology:  XR CHEST PORTABLE   Final Result      Greater lung volumes compared to the prior study. Persistent right hilar region possible mass or adenopathy. Stable bilateral interstitial infiltrates likely acute and chronic as discussed above. Worsening medial right lower lobe volume loss and/or pneumonia and worsening medial left lower lobe atelectasis. Worsening small-to-moderate right and small left pleural effusions. **This report has been created using voice recognition software. It may contain minor errors which are inherent in voice recognition technology. **      Final report electronically signed by Dr. Ania Aparicio on 10/17/2018 5:21 AM      XR CHEST PORTABLE   Final Result      Nearly resolved right pleural effusion. Tiny left pleural effusion. Possible right hilar mass or adenopathy. Right perihilar interstitial infiltrates and patchy mid right lung and right basilar pneumonia or atelectasis. Stable acute versus chronic interstitial infiltrates throughout the left lung with medial left lower lobe infiltrate or atelectasis. **This report has been created using voice recognition software. It may contain minor errors which are inherent in voice recognition technology. **      Final report electronically signed by Dr. Ania Aparicio on 10/16/2018 3:13 AM      VL DUP LOWER EXTREMITY VENOUS BILATERAL   Final Result   No evidence of deep vein thrombosis throughout the bilateral lower extremities. **This report has been created using voice recognition software.  It may contain

## 2018-10-19 ENCOUNTER — APPOINTMENT (OUTPATIENT)
Dept: GENERAL RADIOLOGY | Age: 73
DRG: 189 | End: 2018-10-19
Payer: MEDICARE

## 2018-10-19 LAB
BLOOD CULTURE, ROUTINE: NORMAL
BLOOD CULTURE, ROUTINE: NORMAL
GLUCOSE BLD-MCNC: 152 MG/DL (ref 70–108)
GLUCOSE BLD-MCNC: 176 MG/DL (ref 70–108)
GLUCOSE BLD-MCNC: 95 MG/DL (ref 70–108)
MAGNESIUM: 1.8 MG/DL (ref 1.6–2.4)

## 2018-10-19 PROCEDURE — 6370000000 HC RX 637 (ALT 250 FOR IP): Performed by: NURSE PRACTITIONER

## 2018-10-19 PROCEDURE — 2060000000 HC ICU INTERMEDIATE R&B

## 2018-10-19 PROCEDURE — 6370000000 HC RX 637 (ALT 250 FOR IP): Performed by: PHYSICIAN ASSISTANT

## 2018-10-19 PROCEDURE — 6370000000 HC RX 637 (ALT 250 FOR IP): Performed by: INTERNAL MEDICINE

## 2018-10-19 PROCEDURE — 99232 SBSQ HOSP IP/OBS MODERATE 35: CPT | Performed by: INTERNAL MEDICINE

## 2018-10-19 PROCEDURE — 99231 SBSQ HOSP IP/OBS SF/LOW 25: CPT | Performed by: PHYSICIAN ASSISTANT

## 2018-10-19 PROCEDURE — 2580000003 HC RX 258: Performed by: INTERNAL MEDICINE

## 2018-10-19 PROCEDURE — 2700000000 HC OXYGEN THERAPY PER DAY

## 2018-10-19 PROCEDURE — 6360000002 HC RX W HCPCS: Performed by: INTERNAL MEDICINE

## 2018-10-19 PROCEDURE — APPSS30 APP SPLIT SHARED TIME 16-30 MINUTES: Performed by: NURSE PRACTITIONER

## 2018-10-19 PROCEDURE — 97530 THERAPEUTIC ACTIVITIES: CPT

## 2018-10-19 PROCEDURE — 2709999900 HC NON-CHARGEABLE SUPPLY

## 2018-10-19 PROCEDURE — 71046 X-RAY EXAM CHEST 2 VIEWS: CPT

## 2018-10-19 PROCEDURE — 83735 ASSAY OF MAGNESIUM: CPT

## 2018-10-19 PROCEDURE — 6360000002 HC RX W HCPCS: Performed by: FAMILY MEDICINE

## 2018-10-19 PROCEDURE — 94761 N-INVAS EAR/PLS OXIMETRY MLT: CPT

## 2018-10-19 PROCEDURE — 99233 SBSQ HOSP IP/OBS HIGH 50: CPT | Performed by: INTERNAL MEDICINE

## 2018-10-19 PROCEDURE — 82948 REAGENT STRIP/BLOOD GLUCOSE: CPT

## 2018-10-19 PROCEDURE — 94640 AIRWAY INHALATION TREATMENT: CPT

## 2018-10-19 PROCEDURE — 6370000000 HC RX 637 (ALT 250 FOR IP): Performed by: FAMILY MEDICINE

## 2018-10-19 PROCEDURE — 97535 SELF CARE MNGMENT TRAINING: CPT

## 2018-10-19 RX ADMIN — ONDANSETRON 4 MG: 2 INJECTION INTRAMUSCULAR; INTRAVENOUS at 23:20

## 2018-10-19 RX ADMIN — LEVALBUTEROL HYDROCHLORIDE 0.63 MG: 1.25 SOLUTION RESPIRATORY (INHALATION) at 08:26

## 2018-10-19 RX ADMIN — PIPERACILLIN SODIUM,TAZOBACTAM SODIUM 3.38 G: 3; .375 INJECTION, POWDER, FOR SOLUTION INTRAVENOUS at 16:32

## 2018-10-19 RX ADMIN — ONDANSETRON 4 MG: 2 INJECTION INTRAMUSCULAR; INTRAVENOUS at 06:28

## 2018-10-19 RX ADMIN — Medication 5 ML: at 21:18

## 2018-10-19 RX ADMIN — VERAPAMIL HYDROCHLORIDE 120 MG: 240 TABLET, FILM COATED, EXTENDED RELEASE ORAL at 20:07

## 2018-10-19 RX ADMIN — FERROUS SULFATE TAB 325 MG (65 MG ELEMENTAL FE) 325 MG: 325 (65 FE) TAB at 05:15

## 2018-10-19 RX ADMIN — PANTOPRAZOLE SODIUM 40 MG: 40 TABLET, DELAYED RELEASE ORAL at 05:15

## 2018-10-19 RX ADMIN — PIPERACILLIN SODIUM,TAZOBACTAM SODIUM 3.38 G: 3; .375 INJECTION, POWDER, FOR SOLUTION INTRAVENOUS at 07:00

## 2018-10-19 RX ADMIN — PIPERACILLIN SODIUM,TAZOBACTAM SODIUM 3.38 G: 3; .375 INJECTION, POWDER, FOR SOLUTION INTRAVENOUS at 23:12

## 2018-10-19 RX ADMIN — Medication 10 ML: at 09:48

## 2018-10-19 RX ADMIN — OXYCODONE HYDROCHLORIDE AND ACETAMINOPHEN 500 MG: 500 TABLET ORAL at 09:45

## 2018-10-19 RX ADMIN — METOPROLOL TARTRATE 12.5 MG: 25 TABLET ORAL at 23:20

## 2018-10-19 RX ADMIN — PREDNISONE 40 MG: 20 TABLET ORAL at 09:45

## 2018-10-19 RX ADMIN — GUAIFENESIN 600 MG: 600 TABLET, EXTENDED RELEASE ORAL at 20:07

## 2018-10-19 RX ADMIN — LEVALBUTEROL HYDROCHLORIDE 0.63 MG: 1.25 SOLUTION RESPIRATORY (INHALATION) at 17:50

## 2018-10-19 RX ADMIN — INSULIN LISPRO 1 UNITS: 100 INJECTION, SOLUTION INTRAVENOUS; SUBCUTANEOUS at 20:08

## 2018-10-19 RX ADMIN — IPRATROPIUM BROMIDE 0.5 MG: 0.5 SOLUTION RESPIRATORY (INHALATION) at 13:39

## 2018-10-19 RX ADMIN — IPRATROPIUM BROMIDE 0.5 MG: 0.5 SOLUTION RESPIRATORY (INHALATION) at 08:24

## 2018-10-19 RX ADMIN — HYDROCODONE BITARTRATE AND ACETAMINOPHEN 1 TABLET: 5; 325 TABLET ORAL at 09:45

## 2018-10-19 RX ADMIN — IPRATROPIUM BROMIDE 0.5 MG: 0.5 SOLUTION RESPIRATORY (INHALATION) at 20:52

## 2018-10-19 RX ADMIN — CETIRIZINE HYDROCHLORIDE 10 MG: 10 TABLET ORAL at 09:47

## 2018-10-19 RX ADMIN — LEVALBUTEROL HYDROCHLORIDE 0.63 MG: 1.25 SOLUTION RESPIRATORY (INHALATION) at 20:52

## 2018-10-19 RX ADMIN — BETAMETHASONE DIPROPIONATE: 0.5 OINTMENT TOPICAL at 16:35

## 2018-10-19 RX ADMIN — FLUTICASONE PROPIONATE 1 SPRAY: 50 SPRAY, METERED NASAL at 09:47

## 2018-10-19 RX ADMIN — FUROSEMIDE 20 MG: 20 TABLET ORAL at 09:47

## 2018-10-19 RX ADMIN — IPRATROPIUM BROMIDE 0.5 MG: 0.5 SOLUTION RESPIRATORY (INHALATION) at 17:49

## 2018-10-19 RX ADMIN — Medication 1 UNITS: at 16:58

## 2018-10-19 RX ADMIN — LEVALBUTEROL HYDROCHLORIDE 0.63 MG: 1.25 SOLUTION RESPIRATORY (INHALATION) at 13:39

## 2018-10-19 RX ADMIN — BUTALBITAL, ACETAMINOPHEN, AND CAFFEINE 1 TABLET: 50; 325; 40 TABLET ORAL at 14:38

## 2018-10-19 RX ADMIN — ENOXAPARIN SODIUM 40 MG: 40 INJECTION SUBCUTANEOUS at 15:15

## 2018-10-19 RX ADMIN — HYDROCODONE BITARTRATE AND ACETAMINOPHEN 1 TABLET: 5; 325 TABLET ORAL at 19:26

## 2018-10-19 RX ADMIN — METOPROLOL TARTRATE 25 MG: 25 TABLET ORAL at 09:45

## 2018-10-19 RX ADMIN — METOPROLOL TARTRATE 25 MG: 25 TABLET ORAL at 20:07

## 2018-10-19 RX ADMIN — GUAIFENESIN 600 MG: 600 TABLET, EXTENDED RELEASE ORAL at 09:45

## 2018-10-19 ASSESSMENT — PAIN DESCRIPTION - PAIN TYPE
TYPE: CHRONIC PAIN

## 2018-10-19 ASSESSMENT — PAIN DESCRIPTION - DESCRIPTORS
DESCRIPTORS: ACHING
DESCRIPTORS: ACHING

## 2018-10-19 ASSESSMENT — PAIN DESCRIPTION - LOCATION
LOCATION: BACK
LOCATION: HEAD

## 2018-10-19 ASSESSMENT — PAIN SCALES - GENERAL
PAINLEVEL_OUTOF10: 7
PAINLEVEL_OUTOF10: 0
PAINLEVEL_OUTOF10: 0
PAINLEVEL_OUTOF10: 8
PAINLEVEL_OUTOF10: 10
PAINLEVEL_OUTOF10: 10

## 2018-10-19 ASSESSMENT — PAIN DESCRIPTION - ORIENTATION: ORIENTATION: LOWER

## 2018-10-19 NOTE — PROGRESS NOTES
tenderness to palp. Musculoskeletal: Moves all extremities   Neurological: Patient is alert and oriented to person, place, and time. Skin: Warm and dry. Labs  - Old records and notes have been reviewed in [facility]   ABG  Lab Results   Component Value Date    PH 7.35 10/14/2018    PO2 207 10/14/2018    PCO2 64 10/14/2018    HCO3 36 10/14/2018    O2SAT 100 10/14/2018     No results found for: IFIO2, MODE, SETTIDVOL, SETPEEP  CBC  Recent Labs      10/17/18   0420  10/18/18   0532   WBC  10.3  8.6   RBC  3.13*  3.11*   HGB  9.0*  9.0*   HCT  30.3*  30.3*   MCV  96.8  97.4   MCH  28.8  28.9   MCHC  29.7*  29.7*   PLT  288  282   MPV  10.5  10.2      BMP  Recent Labs      10/17/18   0420  10/18/18   0532  10/19/18   0505   NA  139  141   --    K  4.4  4.4   --    CL  96*  94*   --    CO2  35*  38*   --    BUN  17 19   --    CREATININE  0.9  0.9   --    GLUCOSE  152*  89   --    MG  1.9  1.6  1.8   CALCIUM  9.1  9.1   --      LFT  Recent Labs      10/18/18   0532   AST  30   ALT  32   BILITOT  <0.2*   ALKPHOS  63     TROP  Lab Results   Component Value Date    TROPONINT < 0.010 10/14/2018    TROPONINT < 0.010 08/11/2017    TROPONINT < 0.010 04/24/2015     BNP  No results for input(s): BNP in the last 72 hours. Lactic Acid  No results for input(s): LACTA in the last 72 hours. INR  No results for input(s): INR, PROTIME in the last 72 hours. PTT  No results for input(s): APTT in the last 72 hours. Glucose  Recent Labs      10/18/18   1606  10/18/18   2030  10/19/18   0954   POCGLU  123*  232*  95     UA   No results for input(s): SPECGRAV, PHUR, COLORU, CLARITYU, MUCUS, PROTEINU, BLOODU, RBCUA, WBCUA, BACTERIA, NITRU, GLUCOSEU, BILIRUBINUR, UROBILINOGEN, KETUA, LABCAST, LABCASTTY, AMORPHOS in the last 72 hours. Invalid input(s): CRYSTALS. PFTs                   Sleep studies   No old studies in Epic. Cultures    Rapid Flu A/B-Negative  Blood cultures x2- no growth.   Strep pneumoniae-Negative Legionella-Negative  Respiratory Culture-Normal Talisha  MRSA-Negative  VRE-Negative    EKG     Echocardiogram   Name: Taisha Lyons  : [id number]  MR #: [id number]  Study date: 03-Oct-2012  Account #: [de-identified]  Ht-Wt-BSA: 62 in- 168.7 lb- 1.78 mÂ²  PULMONARY ARTERY: The size was normal. DOPPLER: Systolic pressure was within  the normal range. SUMMARY:    Left ventricle:  Size was normal.  Systolic function was normal. Ejection fraction was estimated in the range of  55 % to 65 %. There were no regional wall motion abnormalities. Prepared and signed by    Otilia Cat MD  Signed 03-Oct-2012 17:08:40      Radiology    CXR  10/19/2018  PROCEDURE: XR CHEST (2 VW)  COMPARISON: Chest x-ray dated 10/17/2018  FINDINGS:   Lungs/pleura: There are small bilateral pleural effusions, slightly improved compared to the prior chest x-ray. There are again coarse bilateral interstitial infiltrates which may represent a combination of pulmonary edema or viral-type atypical pneumonia possibly superimposed on pulmonary fibrosis. There is a persistent dense right basilar retrocardiac opacity likely representing a combination of volume loss, pneumonia, and pleural fusion. There is persistent mild medial left lower lobe infiltrate or atelectasis. No change in the right hilar masslike fullness and radiating parahilar interstitial infiltrates. Heart: There is again mild to moderate cardiomegaly. Mediastinum/lilian: There is stable mediastinal shift to the right endotracheal deviation to the right. Skeleton: No significant bone or joint abnormality. Lines/tubes/devices: Stable right jugular Mediport. Impression   Mildly improved small bilateral pleural effusions. Remaining findings unchanged. 10/17/2018   Greater lung volumes compared to the prior study. Persistent right hilar region possible mass or adenopathy. Stable bilateral interstitial infiltrates likely acute and chronic as discussed above.  Worsening medial right lower lobe volume loss and/or pneumonia and worsening medial left lower lobe atelectasis. Worsening small-to-moderate right and small left pleural effusions. 10/16/2018   FINDINGS: Lungs/pleura: There has been near resolution of the right pleural effusion since the prior study. There is a tiny left pleural effusion blunting the left lateral costophrenic angle. There is right apical pleural thickening. There is no pneumothorax. There is a possible right hilar mass or adenopathy. There are right parahilar interstitial infiltrates with patchy mid right lung and right basilar confluence which may represent pneumonia or atelectasis. Interstitial markings are coarse and prominent throughout the left lung, similar compared to the prior study, which may represent pulmonary fibrosis, pulmonary edema, or an atypical viral-type pneumonia. There is persistent retrocardiac medial left lower lobe infiltrate or atelectasis. Heart: Heart size is normal. Mediastinum/lilian: Left hilum is unremarkable. There is again mediastinal shift to the right. Skeleton: There is again rotary levoscoliosis of lower thoracic and lumbar spine. Lines/tubes/devices: No change in the position of the right jugular Port-A-Cath, tip in the region of the central SVC. Impression:  Nearly resolved right pleural effusion. Tiny left pleural effusion. Possible right hilar mass or adenopathy. Right perihilar interstitial infiltrates and patchy mid right lung and right basilar pneumonia or atelectasis. Stable acute versus chronic interstitial infiltrates throughout the left lung with medial left lower lobe infiltrate or atelectasis. 10/15/2018     1. Total \"whiteout\" of the entire right hemithorax due to combination of atelectasis/pneumonia and pleural fluid. There is shift of heart and mediastinal structures to the right. Note that the appearance has worsened relative to the recent CTA pulmonary embolus study, 10/14/2018.    2. Mediport in place

## 2018-10-19 NOTE — PROGRESS NOTES
[facility] 60  INPATIENT OCCUPATIONAL THERAPY  [facility] ICU STEPDOWN TELEMETRY 4K  DAILY NOTE    Time:  Time In: 1010  Time Out: 1120  Timed Code Treatment Minutes: 70 Minutes  Minutes: 70          Date: 10/19/2018  Patient Name: Jacques Worthy,   Gender: female      Room: [facility]21/021-A  MRN: [id number]  : 1945  (68 y.o.)  Referring Practitioner: Dr. Andres Calzada MD  Diagnosis: Acute on Chronic Respiratory Failure with Hypoxemia  Additional Pertinent Hx: Pt with hx of COPD on home O2, recurrent Lung cancer s/p RLL lobectomy undergoing tx by Dr. Lowell Ferreira, HTN, GERD presenting with worsening sob and hypoxia with sats in 60's on her home O2, HR up to 150's. Admitted to stepdown and placed on vapotherm/high flow O2, decadron, BD txs    Past Medical History:   Diagnosis Date    Arthritis     low back pain and scoliosis in lumbar    Cancer ([address]     lower right lobe-lung s/p lobectomy in , radiation and chemo nad later had mediatinal mets and had radationa dn chemo again, and in  had reoccurence on the left side upper and was started on radiation this year and has  a follow up CT in oct 2017    Chronic bronchitis, simple (HCC)     Chronic respiratory failure with hypoxia ([address]     COPD (chronic obstructive pulmonary disease) ([address]     GERD (gastroesophageal reflux disease)     HTN (hypertension)     Pneumonia     Postprocedural pneumothorax     Scoliosis     Urinary incontinence      Past Surgical History:   Procedure Laterality Date    LOBECTOMY  10/19/2012    rt lower lobectomy     LUNG BIOPSY  2012       Restrictions/Precautions:  General Precautions, Fall Risk                    Other position/activity restrictions: monitor O2       Prior Level of Function:  ADL Assistance: Independent  Homemaking Assistance: Independent  Ambulation Assistance: Independent  Transfer Assistance: Independent  Additional Comments: Pt amb without AD, uses 2-3 L O2 at home.     Subjective       Subjective:

## 2018-10-20 LAB
GLUCOSE BLD-MCNC: 138 MG/DL (ref 70–108)
GLUCOSE BLD-MCNC: 164 MG/DL (ref 70–108)
GLUCOSE BLD-MCNC: 187 MG/DL (ref 70–108)
GLUCOSE BLD-MCNC: 82 MG/DL (ref 70–108)

## 2018-10-20 PROCEDURE — 82948 REAGENT STRIP/BLOOD GLUCOSE: CPT

## 2018-10-20 PROCEDURE — 6370000000 HC RX 637 (ALT 250 FOR IP): Performed by: INTERNAL MEDICINE

## 2018-10-20 PROCEDURE — 6370000000 HC RX 637 (ALT 250 FOR IP): Performed by: NURSE PRACTITIONER

## 2018-10-20 PROCEDURE — 6370000000 HC RX 637 (ALT 250 FOR IP): Performed by: PHYSICIAN ASSISTANT

## 2018-10-20 PROCEDURE — 94669 MECHANICAL CHEST WALL OSCILL: CPT

## 2018-10-20 PROCEDURE — 2580000003 HC RX 258: Performed by: INTERNAL MEDICINE

## 2018-10-20 PROCEDURE — 99232 SBSQ HOSP IP/OBS MODERATE 35: CPT | Performed by: INTERNAL MEDICINE

## 2018-10-20 PROCEDURE — 6360000002 HC RX W HCPCS: Performed by: INTERNAL MEDICINE

## 2018-10-20 PROCEDURE — APPSS30 APP SPLIT SHARED TIME 16-30 MINUTES: Performed by: NURSE PRACTITIONER

## 2018-10-20 PROCEDURE — 94640 AIRWAY INHALATION TREATMENT: CPT

## 2018-10-20 PROCEDURE — 2700000000 HC OXYGEN THERAPY PER DAY

## 2018-10-20 PROCEDURE — 2060000000 HC ICU INTERMEDIATE R&B

## 2018-10-20 PROCEDURE — 6360000002 HC RX W HCPCS: Performed by: FAMILY MEDICINE

## 2018-10-20 PROCEDURE — 6370000000 HC RX 637 (ALT 250 FOR IP): Performed by: FAMILY MEDICINE

## 2018-10-20 RX ADMIN — VERAPAMIL HYDROCHLORIDE 120 MG: 240 TABLET, FILM COATED, EXTENDED RELEASE ORAL at 19:59

## 2018-10-20 RX ADMIN — IPRATROPIUM BROMIDE 0.5 MG: 0.5 SOLUTION RESPIRATORY (INHALATION) at 12:15

## 2018-10-20 RX ADMIN — BETAMETHASONE DIPROPIONATE: 0.5 OINTMENT TOPICAL at 08:05

## 2018-10-20 RX ADMIN — Medication 10 ML: at 19:56

## 2018-10-20 RX ADMIN — Medication 1 UNITS: at 18:08

## 2018-10-20 RX ADMIN — METOPROLOL TARTRATE 37.5 MG: 25 TABLET ORAL at 08:06

## 2018-10-20 RX ADMIN — GUAIFENESIN 600 MG: 600 TABLET, EXTENDED RELEASE ORAL at 08:06

## 2018-10-20 RX ADMIN — IPRATROPIUM BROMIDE 0.5 MG: 0.5 SOLUTION RESPIRATORY (INHALATION) at 16:15

## 2018-10-20 RX ADMIN — PIPERACILLIN SODIUM,TAZOBACTAM SODIUM 3.38 G: 3; .375 INJECTION, POWDER, FOR SOLUTION INTRAVENOUS at 15:55

## 2018-10-20 RX ADMIN — Medication 5 ML: at 22:07

## 2018-10-20 RX ADMIN — LEVALBUTEROL HYDROCHLORIDE 0.63 MG: 1.25 SOLUTION RESPIRATORY (INHALATION) at 16:15

## 2018-10-20 RX ADMIN — PIPERACILLIN SODIUM,TAZOBACTAM SODIUM 3.38 G: 3; .375 INJECTION, POWDER, FOR SOLUTION INTRAVENOUS at 06:58

## 2018-10-20 RX ADMIN — PIPERACILLIN SODIUM,TAZOBACTAM SODIUM 3.38 G: 3; .375 INJECTION, POWDER, FOR SOLUTION INTRAVENOUS at 23:43

## 2018-10-20 RX ADMIN — LEVALBUTEROL HYDROCHLORIDE 0.63 MG: 1.25 SOLUTION RESPIRATORY (INHALATION) at 12:14

## 2018-10-20 RX ADMIN — FUROSEMIDE 20 MG: 20 TABLET ORAL at 08:06

## 2018-10-20 RX ADMIN — FLUTICASONE PROPIONATE 1 SPRAY: 50 SPRAY, METERED NASAL at 08:06

## 2018-10-20 RX ADMIN — OXYCODONE HYDROCHLORIDE AND ACETAMINOPHEN 500 MG: 500 TABLET ORAL at 08:06

## 2018-10-20 RX ADMIN — PROMETHAZINE HYDROCHLORIDE 25 MG: 25 TABLET ORAL at 08:05

## 2018-10-20 RX ADMIN — METOPROLOL TARTRATE 37.5 MG: 25 TABLET ORAL at 19:59

## 2018-10-20 RX ADMIN — PANTOPRAZOLE SODIUM 40 MG: 40 TABLET, DELAYED RELEASE ORAL at 08:12

## 2018-10-20 RX ADMIN — GUAIFENESIN 600 MG: 600 TABLET, EXTENDED RELEASE ORAL at 19:58

## 2018-10-20 RX ADMIN — HYDROCODONE BITARTRATE AND ACETAMINOPHEN 1 TABLET: 5; 325 TABLET ORAL at 19:56

## 2018-10-20 RX ADMIN — FERROUS SULFATE TAB 325 MG (65 MG ELEMENTAL FE) 325 MG: 325 (65 FE) TAB at 08:06

## 2018-10-20 RX ADMIN — LEVALBUTEROL HYDROCHLORIDE 0.63 MG: 1.25 SOLUTION RESPIRATORY (INHALATION) at 20:15

## 2018-10-20 RX ADMIN — HYDROCODONE BITARTRATE AND ACETAMINOPHEN 1 TABLET: 5; 325 TABLET ORAL at 08:05

## 2018-10-20 RX ADMIN — Medication 10 ML: at 08:06

## 2018-10-20 RX ADMIN — IPRATROPIUM BROMIDE 0.5 MG: 0.5 SOLUTION RESPIRATORY (INHALATION) at 20:15

## 2018-10-20 RX ADMIN — ENOXAPARIN SODIUM 40 MG: 40 INJECTION SUBCUTANEOUS at 15:54

## 2018-10-20 RX ADMIN — PREDNISONE 30 MG: 10 TABLET ORAL at 08:06

## 2018-10-20 RX ADMIN — CETIRIZINE HYDROCHLORIDE 10 MG: 10 TABLET ORAL at 08:06

## 2018-10-20 ASSESSMENT — PAIN DESCRIPTION - DESCRIPTORS
DESCRIPTORS: ACHING
DESCRIPTORS: ACHING

## 2018-10-20 ASSESSMENT — PAIN DESCRIPTION - FREQUENCY
FREQUENCY: CONTINUOUS
FREQUENCY: CONTINUOUS

## 2018-10-20 ASSESSMENT — PAIN DESCRIPTION - ONSET: ONSET: ON-GOING

## 2018-10-20 ASSESSMENT — PAIN DESCRIPTION - LOCATION
LOCATION: BACK

## 2018-10-20 ASSESSMENT — PAIN SCALES - GENERAL
PAINLEVEL_OUTOF10: 0
PAINLEVEL_OUTOF10: 7
PAINLEVEL_OUTOF10: 8
PAINLEVEL_OUTOF10: 9
PAINLEVEL_OUTOF10: 8

## 2018-10-20 ASSESSMENT — PAIN DESCRIPTION - ORIENTATION: ORIENTATION: LOWER

## 2018-10-20 ASSESSMENT — PAIN DESCRIPTION - PROGRESSION: CLINICAL_PROGRESSION: GRADUALLY IMPROVING

## 2018-10-20 ASSESSMENT — PAIN DESCRIPTION - PAIN TYPE
TYPE: CHRONIC PAIN

## 2018-10-20 NOTE — PROGRESS NOTES
labetalol      Intake/Output Summary (Last 24 hours) at 10/20/18 1249  Last data filed at 10/20/18 0745   Gross per 24 hour   Intake           892.55 ml   Output              750 ml   Net           142.55 ml       Diet:  DIET GENERAL;  Dietary Nutrition Supplements: Standard High Calorie Oral Supplement    Exam:  /68   Pulse 91   Temp 98.3 °F (36.8 °C) (Oral)   Resp 18   Ht 5' 1\" (1.549 m)   Wt 146 lb 7 oz (66.4 kg)   SpO2 96%   BMI 27.67 kg/m²     General appearance: Chronically ill appearing  HEENT: Pupils equal, round, and reactive to light. Conjunctivae/corneas clear. Neck: Supple, with full range of motion. No jugular venous distention. Trachea midline. Respiratory:  Normal respiratory effort. Crackles bases  Cardiovascular: Regular rate and rhythm with normal S1/S2 without murmurs, rubs or gallops. Abdomen: Soft, non-tender, non-distended with normal bowel sounds. Musculoskeletal: passive and active ROM x 4 extremities. Skin: Skin color, texture, turgor normal.  No rashes or lesions. Neurologic:  Neurovascularly intact without any focal sensory/motor deficits. Cranial nerves: II-XII intact, grossly non-focal.  Psychiatric: Alert and oriented, thought content appropriate, normal insight        Labs:   Recent Labs      10/18/18   0532   WBC  8.6   HGB  9.0*   HCT  30.3*   PLT  282     Recent Labs      10/18/18   0532   NA  141   K  4.4   CL  94*   CO2  38*   BUN  19   CREATININE  0.9   CALCIUM  9.1     Recent Labs      10/18/18   0532   AST  30   ALT  32   BILITOT  <0.2*   ALKPHOS  63     No results for input(s): INR in the last 72 hours. No results for input(s): Haley Juba in the last 72 hours.     Urinalysis:      Lab Results   Component Value Date    NITRU NEGATIVE 10/15/2018    WBCUA 0-2 10/15/2018    BACTERIA NONE 10/15/2018    RBCUA 0-2 10/15/2018    BLOODU NEGATIVE 10/15/2018    SPECGRAV 1.013 10/17/2012    GLUCOSEU NEGATIVE 10/15/2018       Radiology:  XR CHEST STANDARD (2 VW) report electronically signed by Dr Padmini Francois on 10/15/2018 5:17 PM      XR CHEST PORTABLE   Final Result   1. Total \"whiteout\" of the entire right hemithorax due to combination of atelectasis/pneumonia and pleural fluid. There is shift of heart and mediastinal structures to the right. Note that the appearance has worsened relative to the recent CTA pulmonary    embolus study, 10/14/2018. 2. Mediport in place right side, catheter tip in superior vena cava. 3. Moderately increased interstitial markings throughout the left lung, consistent with interstitial edema/pneumonitis. **This report has been created using voice recognition software. It may contain minor errors which are inherent in voice recognition technology. **      Final report electronically signed by Dr. Manju Vogel on 10/15/2018 12:36 PM      CTA CHEST [address]   Final Result       1. There is no evidence for pulmonary embolism. 2. Interval development of diffuse opacities throughout the bilateral lungs since the prior CT scan which may represent multifocal pneumonia. 3. Redemonstration of right lung parenchymal scarring/mass. There are persistent changes of right lobectomy with left to right midline shift of the mediastinum. Similar to the prior study. **This report has been created using voice recognition software. It may contain minor errors which are inherent in voice recognition technology. **      Final report electronically signed by Dr Padmini Francois on 10/14/2018 11:04 AM          Diet: DIET GENERAL;  Dietary Nutrition Supplements: Standard High Calorie Oral Supplement    DVT prophylaxis: [x] Lovenox                                 [] SCDs                                 [] SQ Heparin                                 [] Encourage ambulation           [] Already on Anticoagulation     Disposition:    [x] Home       [] TCU       [] Rehab       [] Psych       [] SNF       [] [facility]

## 2018-10-20 NOTE — PROGRESS NOTES
[facility]    Patient - Brandi Dunne   MRN -  [id number]   [facility]   - 1945      Date of Admission -  10/14/2018  9:14 AM  Date of evaluation -  10/20/2018  Room - [address] David Ashton MD Primary Care Physician - JESUS Escudero - KRISTIAN     Problem List      Active Hospital Problems    Diagnosis Date Noted    Aspiration pneumonia [facility]) [J69.0]      Priority: High    Stage 3 severe COPD by GOLD classification ([address] [J44.9]     Acute pulmonary edema ([address] [J81.0]     Bilateral pleural effusion [J90]     Hypomagnesemia [E83.42]     Moderate malnutrition ([address] [E44.0] 10/15/2018    Primary lung cancer, right (HCC) [C34.91]     Hypoxemia [R09.02]     Multifocal atrial tachycardia (HCC) [I47.1]     Anemia [D64.9]     Bilateral leg edema [R60.0]     Physical deconditioning [R53.81]     Acute on chronic respiratory failure with hypoxemia [facility]) [J96.21] 10/14/2018     Reason for Consult    For management of acute on chronic respiratory failure with hypoxia initially requiring HFNC  HPI   History Obtained From: Patient and electronic medical record. Brandi Dunne is a 68 y.o. female  was initially admitted under hospitalist service. Pulmonary medicine was consulted for further management of hypoxia/Hypoxic respiratory failure. She is currently on oxygen via Hi flow. The patient is a 68 y.o. female with past medical hx of severe COPD on home O2, started having frequent cough with sputum production for last 10 days. Her cough is productive with sputum. Her sputum is dark to yellow  in color. Her above symptoms were associated with worsening of shortness of breath and decline in her routine functional status. She also admits to history of  fever, chills or rigors. During her initial work up she was found to have bilateral pneumonia. She denies any history of hemoptysis.  She gives a history of orthopnea

## 2018-10-21 LAB
EKG ATRIAL RATE: 118 BPM
EKG ATRIAL RATE: 312 BPM
EKG P AXIS: -102 DEGREES
EKG P AXIS: 29 DEGREES
EKG P-R INTERVAL: 128 MS
EKG Q-T INTERVAL: 320 MS
EKG Q-T INTERVAL: 344 MS
EKG QRS DURATION: 82 MS
EKG QRS DURATION: 84 MS
EKG QTC CALCULATION (BAZETT): 446 MS
EKG QTC CALCULATION (BAZETT): 448 MS
EKG R AXIS: 19 DEGREES
EKG R AXIS: 34 DEGREES
EKG T AXIS: 32 DEGREES
EKG T AXIS: 46 DEGREES
EKG VENTRICULAR RATE: 101 BPM
EKG VENTRICULAR RATE: 118 BPM
GLUCOSE BLD-MCNC: 149 MG/DL (ref 70–108)
GLUCOSE BLD-MCNC: 249 MG/DL (ref 70–108)
GLUCOSE BLD-MCNC: 84 MG/DL (ref 70–108)
MAGNESIUM: 1.4 MG/DL (ref 1.6–2.4)
POTASSIUM SERPL-SCNC: 4.2 MEQ/L (ref 3.5–5.2)

## 2018-10-21 PROCEDURE — 83735 ASSAY OF MAGNESIUM: CPT

## 2018-10-21 PROCEDURE — 2709999900 HC NON-CHARGEABLE SUPPLY

## 2018-10-21 PROCEDURE — 82948 REAGENT STRIP/BLOOD GLUCOSE: CPT

## 2018-10-21 PROCEDURE — 6370000000 HC RX 637 (ALT 250 FOR IP): Performed by: FAMILY MEDICINE

## 2018-10-21 PROCEDURE — 94640 AIRWAY INHALATION TREATMENT: CPT

## 2018-10-21 PROCEDURE — 93005 ELECTROCARDIOGRAM TRACING: CPT | Performed by: INTERNAL MEDICINE

## 2018-10-21 PROCEDURE — 2060000000 HC ICU INTERMEDIATE R&B

## 2018-10-21 PROCEDURE — 99232 SBSQ HOSP IP/OBS MODERATE 35: CPT | Performed by: INTERNAL MEDICINE

## 2018-10-21 PROCEDURE — 84132 ASSAY OF SERUM POTASSIUM: CPT

## 2018-10-21 PROCEDURE — 94669 MECHANICAL CHEST WALL OSCILL: CPT

## 2018-10-21 PROCEDURE — APPSS15 APP SPLIT SHARED TIME 0-15 MINUTES: Performed by: NURSE PRACTITIONER

## 2018-10-21 PROCEDURE — 93010 ELECTROCARDIOGRAM REPORT: CPT | Performed by: NUCLEAR MEDICINE

## 2018-10-21 PROCEDURE — 2700000000 HC OXYGEN THERAPY PER DAY

## 2018-10-21 PROCEDURE — 98960 EDU&TRN PT SELF-MGMT NQHP 1: CPT

## 2018-10-21 PROCEDURE — 6360000002 HC RX W HCPCS: Performed by: INTERNAL MEDICINE

## 2018-10-21 PROCEDURE — 6370000000 HC RX 637 (ALT 250 FOR IP): Performed by: INTERNAL MEDICINE

## 2018-10-21 PROCEDURE — 94760 N-INVAS EAR/PLS OXIMETRY 1: CPT

## 2018-10-21 PROCEDURE — 6370000000 HC RX 637 (ALT 250 FOR IP): Performed by: PHYSICIAN ASSISTANT

## 2018-10-21 PROCEDURE — 6370000000 HC RX 637 (ALT 250 FOR IP): Performed by: NURSE PRACTITIONER

## 2018-10-21 PROCEDURE — 2580000003 HC RX 258: Performed by: INTERNAL MEDICINE

## 2018-10-21 PROCEDURE — 6360000002 HC RX W HCPCS: Performed by: FAMILY MEDICINE

## 2018-10-21 PROCEDURE — 36592 COLLECT BLOOD FROM PICC: CPT

## 2018-10-21 PROCEDURE — 2500000003 HC RX 250 WO HCPCS: Performed by: INTERNAL MEDICINE

## 2018-10-21 RX ORDER — MAGNESIUM SULFATE IN WATER 40 MG/ML
2 INJECTION, SOLUTION INTRAVENOUS ONCE
Status: COMPLETED | OUTPATIENT
Start: 2018-10-21 | End: 2018-10-21

## 2018-10-21 RX ORDER — DILTIAZEM HYDROCHLORIDE 5 MG/ML
10 INJECTION INTRAVENOUS ONCE
Status: COMPLETED | OUTPATIENT
Start: 2018-10-21 | End: 2018-10-21

## 2018-10-21 RX ADMIN — IPRATROPIUM BROMIDE 0.5 MG: 0.5 SOLUTION RESPIRATORY (INHALATION) at 09:16

## 2018-10-21 RX ADMIN — METOPROLOL TARTRATE 37.5 MG: 25 TABLET ORAL at 09:49

## 2018-10-21 RX ADMIN — IPRATROPIUM BROMIDE 0.5 MG: 0.5 SOLUTION RESPIRATORY (INHALATION) at 21:15

## 2018-10-21 RX ADMIN — PREDNISONE 30 MG: 10 TABLET ORAL at 09:49

## 2018-10-21 RX ADMIN — HYDROCODONE BITARTRATE AND ACETAMINOPHEN 1 TABLET: 5; 325 TABLET ORAL at 05:56

## 2018-10-21 RX ADMIN — OXYCODONE HYDROCHLORIDE AND ACETAMINOPHEN 500 MG: 500 TABLET ORAL at 09:50

## 2018-10-21 RX ADMIN — CETIRIZINE HYDROCHLORIDE 10 MG: 10 TABLET ORAL at 09:50

## 2018-10-21 RX ADMIN — IPRATROPIUM BROMIDE 0.5 MG: 0.5 SOLUTION RESPIRATORY (INHALATION) at 18:15

## 2018-10-21 RX ADMIN — GUAIFENESIN 600 MG: 600 TABLET, EXTENDED RELEASE ORAL at 20:03

## 2018-10-21 RX ADMIN — ENOXAPARIN SODIUM 40 MG: 40 INJECTION SUBCUTANEOUS at 17:01

## 2018-10-21 RX ADMIN — DILTIAZEM HYDROCHLORIDE 10 MG: 5 INJECTION INTRAVENOUS at 18:29

## 2018-10-21 RX ADMIN — HYDROCODONE BITARTRATE AND ACETAMINOPHEN 1 TABLET: 5; 325 TABLET ORAL at 21:13

## 2018-10-21 RX ADMIN — PIPERACILLIN SODIUM,TAZOBACTAM SODIUM 3.38 G: 3; .375 INJECTION, POWDER, FOR SOLUTION INTRAVENOUS at 16:52

## 2018-10-21 RX ADMIN — FUROSEMIDE 20 MG: 20 TABLET ORAL at 09:49

## 2018-10-21 RX ADMIN — DILTIAZEM HYDROCHLORIDE 30 MG: 30 TABLET, FILM COATED ORAL at 22:35

## 2018-10-21 RX ADMIN — FLUTICASONE PROPIONATE 1 SPRAY: 50 SPRAY, METERED NASAL at 09:52

## 2018-10-21 RX ADMIN — HYDROCODONE BITARTRATE AND ACETAMINOPHEN 1 TABLET: 5; 325 TABLET ORAL at 12:54

## 2018-10-21 RX ADMIN — LEVALBUTEROL HYDROCHLORIDE 0.63 MG: 1.25 SOLUTION RESPIRATORY (INHALATION) at 21:15

## 2018-10-21 RX ADMIN — GUAIFENESIN 600 MG: 600 TABLET, EXTENDED RELEASE ORAL at 09:49

## 2018-10-21 RX ADMIN — PIPERACILLIN SODIUM,TAZOBACTAM SODIUM 3.38 G: 3; .375 INJECTION, POWDER, FOR SOLUTION INTRAVENOUS at 22:35

## 2018-10-21 RX ADMIN — LEVALBUTEROL HYDROCHLORIDE 0.63 MG: 1.25 SOLUTION RESPIRATORY (INHALATION) at 14:02

## 2018-10-21 RX ADMIN — FERROUS SULFATE TAB 325 MG (65 MG ELEMENTAL FE) 325 MG: 325 (65 FE) TAB at 05:55

## 2018-10-21 RX ADMIN — Medication 5 ML: at 22:35

## 2018-10-21 RX ADMIN — DILTIAZEM HYDROCHLORIDE 5 MG/HR: 5 INJECTION INTRAVENOUS at 18:23

## 2018-10-21 RX ADMIN — PANTOPRAZOLE SODIUM 40 MG: 40 TABLET, DELAYED RELEASE ORAL at 05:55

## 2018-10-21 RX ADMIN — LEVALBUTEROL HYDROCHLORIDE 0.63 MG: 1.25 SOLUTION RESPIRATORY (INHALATION) at 09:16

## 2018-10-21 RX ADMIN — BUTALBITAL, ACETAMINOPHEN, AND CAFFEINE 1 TABLET: 50; 325; 40 TABLET ORAL at 09:53

## 2018-10-21 RX ADMIN — METOPROLOL TARTRATE 37.5 MG: 25 TABLET ORAL at 21:12

## 2018-10-21 RX ADMIN — MAGNESIUM SULFATE HEPTAHYDRATE 2 G: 40 INJECTION, SOLUTION INTRAVENOUS at 20:03

## 2018-10-21 RX ADMIN — ALBUTEROL SULFATE 2.5 MG: 2.5 SOLUTION RESPIRATORY (INHALATION) at 04:50

## 2018-10-21 RX ADMIN — BETAMETHASONE DIPROPIONATE: 0.5 OINTMENT TOPICAL at 09:52

## 2018-10-21 RX ADMIN — Medication 10 ML: at 09:51

## 2018-10-21 RX ADMIN — IPRATROPIUM BROMIDE 0.5 MG: 0.5 SOLUTION RESPIRATORY (INHALATION) at 14:02

## 2018-10-21 RX ADMIN — PIPERACILLIN SODIUM,TAZOBACTAM SODIUM 3.38 G: 3; .375 INJECTION, POWDER, FOR SOLUTION INTRAVENOUS at 05:55

## 2018-10-21 RX ADMIN — LEVALBUTEROL HYDROCHLORIDE 0.63 MG: 1.25 SOLUTION RESPIRATORY (INHALATION) at 18:15

## 2018-10-21 ASSESSMENT — PAIN SCALES - GENERAL
PAINLEVEL_OUTOF10: 7
PAINLEVEL_OUTOF10: 9
PAINLEVEL_OUTOF10: 8
PAINLEVEL_OUTOF10: 8
PAINLEVEL_OUTOF10: 9

## 2018-10-21 ASSESSMENT — PAIN DESCRIPTION - DESCRIPTORS
DESCRIPTORS: ACHING
DESCRIPTORS: ACHING

## 2018-10-21 ASSESSMENT — PAIN DESCRIPTION - FREQUENCY
FREQUENCY: CONTINUOUS
FREQUENCY: CONTINUOUS

## 2018-10-21 ASSESSMENT — PAIN DESCRIPTION - LOCATION
LOCATION: BACK
LOCATION: BACK

## 2018-10-21 ASSESSMENT — PAIN DESCRIPTION - ORIENTATION
ORIENTATION: LOWER
ORIENTATION: LOWER

## 2018-10-21 ASSESSMENT — PAIN DESCRIPTION - ONSET
ONSET: AWAKENED FROM SLEEP
ONSET: ON-GOING

## 2018-10-21 ASSESSMENT — PAIN DESCRIPTION - PROGRESSION
CLINICAL_PROGRESSION: GRADUALLY IMPROVING
CLINICAL_PROGRESSION: GRADUALLY WORSENING

## 2018-10-21 ASSESSMENT — PAIN DESCRIPTION - PAIN TYPE
TYPE: CHRONIC PAIN
TYPE: CHRONIC PAIN

## 2018-10-21 NOTE — PROGRESS NOTES
and paroxysmal nocturnal dyspnea. She was diagnosed with lung cancer in the past. She used to follow with DO Evans in the past. She received chemotherapy 2 weeks back from Dr. Dennie Dupont. No previous history of pulmonary embolism. She was never diagnosed with pulmonary tuberculosis in the past. No recent hx of travel history. She is currently on treatment with   Dulera 200/5mcg spray MDI, 2 puffs via inhalation BID. Spiriva Respimat 2 INH once daily in am.  Albuterol HFA  inhaler 2 puffs Q6h prn or Albuterol 2.5mg nebs Q6h prn (the nebulizer). she is currently on no home O2 at rest. She needs 4LPM of home O2 with exercise. She need to continue 2LPM of O2 at night time.     Past 24 hrs    Looks much better today  Up in chair, alert and conversing  Back to baseline 02 needs of 3 Liters  Cough is improving and feels better, but not ready for discharge  Had 2 episodes of SOB today  Remains afebrile    PMHx   Past Medical History      Diagnosis Date    Arthritis     low back pain and scoliosis in lumbar    Cancer [address] [address] 2012    lower right lobe-lung s/p lobectomy in 2012, radiation and chemo nad later had mediatinal mets and had radationa dn chemo again, and in 2016 had reoccurence on the left side upper and was started on radiation this year and has  a follow up CT in oct 2017    Chronic bronchitis, simple (HCC)     Chronic respiratory failure with hypoxia (HCC)     COPD (chronic obstructive pulmonary disease) (HCC)     GERD (gastroesophageal reflux disease)     HTN (hypertension)     Pneumonia     Postprocedural pneumothorax     Scoliosis     Urinary incontinence       Past Surgical History        Procedure Laterality Date    LOBECTOMY  10/19/2012    rt lower lobectomy     LUNG BIOPSY  8/2012     Meds    Current Medications    insulin lispro  0-6 Units Subcutaneous BID WC    metoprolol tartrate  37.5 mg Oral BID    furosemide  20 mg Oral Daily    lidocaine  1 patch Transdermal Daily   

## 2018-10-22 ENCOUNTER — APPOINTMENT (OUTPATIENT)
Dept: GENERAL RADIOLOGY | Age: 73
DRG: 189 | End: 2018-10-22
Payer: MEDICARE

## 2018-10-22 PROBLEM — I48.92 PAROXYSMAL ATRIAL FLUTTER (HCC): Status: ACTIVE | Noted: 2018-10-22

## 2018-10-22 PROBLEM — I48.0 PAROXYSMAL ATRIAL FIBRILLATION (HCC): Status: ACTIVE | Noted: 2018-10-22

## 2018-10-22 LAB
GLUCOSE BLD-MCNC: 134 MG/DL (ref 70–108)
GLUCOSE BLD-MCNC: 177 MG/DL (ref 70–108)
GLUCOSE BLD-MCNC: 194 MG/DL (ref 70–108)
GLUCOSE BLD-MCNC: 71 MG/DL (ref 70–108)
MAGNESIUM: 2 MG/DL (ref 1.6–2.4)

## 2018-10-22 PROCEDURE — 6360000002 HC RX W HCPCS: Performed by: INTERNAL MEDICINE

## 2018-10-22 PROCEDURE — 2060000000 HC ICU INTERMEDIATE R&B

## 2018-10-22 PROCEDURE — 97530 THERAPEUTIC ACTIVITIES: CPT

## 2018-10-22 PROCEDURE — 82948 REAGENT STRIP/BLOOD GLUCOSE: CPT

## 2018-10-22 PROCEDURE — 6370000000 HC RX 637 (ALT 250 FOR IP): Performed by: FAMILY MEDICINE

## 2018-10-22 PROCEDURE — 83735 ASSAY OF MAGNESIUM: CPT

## 2018-10-22 PROCEDURE — 6370000000 HC RX 637 (ALT 250 FOR IP): Performed by: INTERNAL MEDICINE

## 2018-10-22 PROCEDURE — 6370000000 HC RX 637 (ALT 250 FOR IP): Performed by: NURSE PRACTITIONER

## 2018-10-22 PROCEDURE — 97535 SELF CARE MNGMENT TRAINING: CPT

## 2018-10-22 PROCEDURE — 94640 AIRWAY INHALATION TREATMENT: CPT

## 2018-10-22 PROCEDURE — 94669 MECHANICAL CHEST WALL OSCILL: CPT

## 2018-10-22 PROCEDURE — 2580000003 HC RX 258: Performed by: INTERNAL MEDICINE

## 2018-10-22 PROCEDURE — 98960 EDU&TRN PT SELF-MGMT NQHP 1: CPT

## 2018-10-22 PROCEDURE — 99232 SBSQ HOSP IP/OBS MODERATE 35: CPT | Performed by: INTERNAL MEDICINE

## 2018-10-22 PROCEDURE — 6360000002 HC RX W HCPCS: Performed by: FAMILY MEDICINE

## 2018-10-22 PROCEDURE — 6370000000 HC RX 637 (ALT 250 FOR IP): Performed by: PHYSICIAN ASSISTANT

## 2018-10-22 PROCEDURE — 71046 X-RAY EXAM CHEST 2 VIEWS: CPT

## 2018-10-22 PROCEDURE — 2709999900 HC NON-CHARGEABLE SUPPLY

## 2018-10-22 RX ORDER — DILTIAZEM HYDROCHLORIDE 60 MG/1
60 TABLET, FILM COATED ORAL EVERY 6 HOURS
Status: DISCONTINUED | OUTPATIENT
Start: 2018-10-22 | End: 2018-10-23

## 2018-10-22 RX ADMIN — MAGNESIUM GLUCONATE 500 MG ORAL TABLET 400 MG: 500 TABLET ORAL at 12:54

## 2018-10-22 RX ADMIN — IPRATROPIUM BROMIDE 0.5 MG: 0.5 SOLUTION RESPIRATORY (INHALATION) at 11:45

## 2018-10-22 RX ADMIN — Medication 10 ML: at 08:05

## 2018-10-22 RX ADMIN — LEVALBUTEROL HYDROCHLORIDE 0.63 MG: 1.25 SOLUTION RESPIRATORY (INHALATION) at 21:32

## 2018-10-22 RX ADMIN — DILTIAZEM HYDROCHLORIDE 30 MG: 30 TABLET, FILM COATED ORAL at 12:54

## 2018-10-22 RX ADMIN — MAGNESIUM GLUCONATE 500 MG ORAL TABLET 400 MG: 500 TABLET ORAL at 08:13

## 2018-10-22 RX ADMIN — APIXABAN 5 MG: 5 TABLET, FILM COATED ORAL at 19:14

## 2018-10-22 RX ADMIN — OXYCODONE HYDROCHLORIDE AND ACETAMINOPHEN 500 MG: 500 TABLET ORAL at 08:04

## 2018-10-22 RX ADMIN — HYDROCODONE BITARTRATE AND ACETAMINOPHEN 1 TABLET: 5; 325 TABLET ORAL at 05:21

## 2018-10-22 RX ADMIN — LEVALBUTEROL HYDROCHLORIDE 0.63 MG: 1.25 SOLUTION RESPIRATORY (INHALATION) at 08:16

## 2018-10-22 RX ADMIN — IPRATROPIUM BROMIDE 0.5 MG: 0.5 SOLUTION RESPIRATORY (INHALATION) at 21:28

## 2018-10-22 RX ADMIN — BETAMETHASONE DIPROPIONATE: 0.5 OINTMENT TOPICAL at 08:04

## 2018-10-22 RX ADMIN — IPRATROPIUM BROMIDE 0.5 MG: 0.5 SOLUTION RESPIRATORY (INHALATION) at 08:16

## 2018-10-22 RX ADMIN — PIPERACILLIN SODIUM,TAZOBACTAM SODIUM 3.38 G: 3; .375 INJECTION, POWDER, FOR SOLUTION INTRAVENOUS at 16:29

## 2018-10-22 RX ADMIN — LEVALBUTEROL HYDROCHLORIDE 0.63 MG: 1.25 SOLUTION RESPIRATORY (INHALATION) at 17:59

## 2018-10-22 RX ADMIN — FERROUS SULFATE TAB 325 MG (65 MG ELEMENTAL FE) 325 MG: 325 (65 FE) TAB at 05:21

## 2018-10-22 RX ADMIN — DILTIAZEM HYDROCHLORIDE 60 MG: 60 TABLET, FILM COATED ORAL at 19:15

## 2018-10-22 RX ADMIN — CETIRIZINE HYDROCHLORIDE 10 MG: 10 TABLET ORAL at 08:04

## 2018-10-22 RX ADMIN — PREDNISONE 30 MG: 10 TABLET ORAL at 08:04

## 2018-10-22 RX ADMIN — FLUTICASONE PROPIONATE 1 SPRAY: 50 SPRAY, METERED NASAL at 08:04

## 2018-10-22 RX ADMIN — GUAIFENESIN 600 MG: 600 TABLET, EXTENDED RELEASE ORAL at 21:12

## 2018-10-22 RX ADMIN — IPRATROPIUM BROMIDE 0.5 MG: 0.5 SOLUTION RESPIRATORY (INHALATION) at 17:59

## 2018-10-22 RX ADMIN — METOPROLOL TARTRATE 25 MG: 25 TABLET ORAL at 21:12

## 2018-10-22 RX ADMIN — DILTIAZEM HYDROCHLORIDE 30 MG: 30 TABLET, FILM COATED ORAL at 05:21

## 2018-10-22 RX ADMIN — Medication 5 ML: at 21:13

## 2018-10-22 RX ADMIN — GUAIFENESIN 600 MG: 600 TABLET, EXTENDED RELEASE ORAL at 08:04

## 2018-10-22 RX ADMIN — METOPROLOL TARTRATE 25 MG: 25 TABLET ORAL at 08:04

## 2018-10-22 RX ADMIN — Medication 1 UNITS: at 19:15

## 2018-10-22 RX ADMIN — PIPERACILLIN SODIUM,TAZOBACTAM SODIUM 3.38 G: 3; .375 INJECTION, POWDER, FOR SOLUTION INTRAVENOUS at 06:55

## 2018-10-22 RX ADMIN — PANTOPRAZOLE SODIUM 40 MG: 40 TABLET, DELAYED RELEASE ORAL at 05:21

## 2018-10-22 RX ADMIN — MAGNESIUM GLUCONATE 500 MG ORAL TABLET 400 MG: 500 TABLET ORAL at 21:12

## 2018-10-22 RX ADMIN — ENOXAPARIN SODIUM 70 MG: 80 INJECTION SUBCUTANEOUS at 08:17

## 2018-10-22 RX ADMIN — LEVALBUTEROL HYDROCHLORIDE 0.63 MG: 1.25 SOLUTION RESPIRATORY (INHALATION) at 11:45

## 2018-10-22 RX ADMIN — FUROSEMIDE 20 MG: 20 TABLET ORAL at 08:04

## 2018-10-22 RX ADMIN — HYDROCODONE BITARTRATE AND ACETAMINOPHEN 1 TABLET: 5; 325 TABLET ORAL at 21:13

## 2018-10-22 ASSESSMENT — PAIN SCALES - GENERAL
PAINLEVEL_OUTOF10: 8
PAINLEVEL_OUTOF10: 9
PAINLEVEL_OUTOF10: 8

## 2018-10-22 ASSESSMENT — PAIN DESCRIPTION - LOCATION: LOCATION: BACK

## 2018-10-22 NOTE — PROGRESS NOTES
Hospitalist Progress Note    Patient:  Prieto Smith      Unit/Bed:4K-21/021-A    YOB: 1945    MRN: [id number]       Acct: [de-identified]     PCP: JESUS Jasso CNP    Date of Admission: 10/14/2018    Chief Complaint: [facility] Course:  Pt with lung ca, copd presents with above; found to have pneumonia. On antibiotics. Feels better today. She developed afib/flutter with rvr (160) 10.21. This comes and goes. She states she has had this during chemorx but wasn't pursued. Her mag was low, replaced.    Subjective:  Still sob    Medications:  Reviewed    Infusion Medications    dextrose       Scheduled Medications    metoprolol tartrate  25 mg Oral BID    diltiazem  30 mg Oral 4 times per day    enoxaparin  1 mg/kg Subcutaneous BID    magnesium oxide  400 mg Oral TID    potassium replacement protocol   Other RX Placeholder    magnesium replacement protocol   Other RX Placeholder    insulin lispro  0-6 Units Subcutaneous BID WC    furosemide  20 mg Oral Daily    lidocaine  1 patch Transdermal Daily    predniSONE  30 mg Oral Daily    Followed by   Khadijah Cruz ON 10/23/2018] predniSONE  20 mg Oral Daily    Followed by   Khadijah Cruz ON 10/26/2018] predniSONE  10 mg Oral Daily    docusate sodium  100 mg Oral BID    ipratropium  0.5 mg Nebulization 4x daily    levalbuterol  0.63 mg Nebulization 4x daily    guaiFENesin  600 mg Oral BID    piperacillin-tazobactam  3.375 g Intravenous Q8H    ferrous sulfate  325 mg Oral QAM AC    vitamin C  500 mg Oral Daily    oxybutynin  5 mg Oral Daily    fluticasone  1 spray Nasal Daily    citalopram  20 mg Oral Daily    cetirizine  10 mg Oral Daily    betamethasone dipropionate   Topical Daily    sodium chloride flush  10 mL Intravenous 2 times per day    influenza virus vaccine  0.5 mL Intramuscular Once    pantoprazole  40 mg Oral QAM AC     PRN Meds: polyethylene glycol, glucose, dextrose, glucagon (rDNA), dextrose, acetaminophen, promethazine, lidocaine-prilocaine, HYDROcodone-acetaminophen, guaiFENesin, albuterol sulfate HFA, sodium chloride flush, magnesium hydroxide, ondansetron, albuterol, butalbital-acetaminophen-caffeine, hydrocodone-chlorpheniramine, labetalol      Intake/Output Summary (Last 24 hours) at 10/22/18 0753  Last data filed at 10/22/18 0505   Gross per 24 hour   Intake           1468.4 ml   Output              550 ml   Net            918.4 ml       Diet:  DIET GENERAL;  Dietary Nutrition Supplements: Standard High Calorie Oral Supplement    Exam:  /62   Pulse 99   Temp 97.8 °F (36.6 °C) (Oral)   Resp 16   Ht 5' 1\" (1.549 m)   Wt 148 lb 12.8 oz (67.5 kg)   SpO2 98%   BMI 28.12 kg/m²     General appearance: Chronically ill appearing  HEENT: Pupils equal, round, and reactive to light. Conjunctivae/corneas clear. Neck: Supple, with full range of motion. No jugular venous distention. Trachea midline. Respiratory:  Normal respiratory effort. Crackles bases  Cardiovascular: Regular rate and rhythm with normal S1/S2 without murmurs, rubs or gallops. Abdomen: Soft, non-tender, non-distended with normal bowel sounds. Musculoskeletal: passive and active ROM x 4 extremities. Skin: Skin color, texture, turgor normal.  No rashes or lesions. Neurologic:  Neurovascularly intact without any focal sensory/motor deficits. Cranial nerves: II-XII intact, grossly non-focal.  Psychiatric: Alert and oriented, thought content appropriate, normal insight        Labs:   No results for input(s): WBC, HGB, HCT, PLT in the last 72 hours. Recent Labs      10/21/18   1655   K  4.2     No results for input(s): AST, ALT, BILIDIR, BILITOT, ALKPHOS in the last 72 hours. No results for input(s): INR in the last 72 hours. No results for input(s): Dontae Alvine in the last 72 hours.     Urinalysis:      Lab Results   Component Value Date    NITRU NEGATIVE 10/15/2018    WBCUA 0-2 10/15/2018    BACTERIA NONE 10/15/2018    RBCUA 0-2 10/15/2018    BLOODU NEGATIVE 10/15/2018    SPECGRAV 1.013 10/17/2012    GLUCOSEU NEGATIVE 10/15/2018       Radiology:  XR CHEST STANDARD (2 VW)   Final Result      Mildly improved small bilateral pleural effusions. Remaining findings unchanged. **This report has been created using voice recognition software. It may contain minor errors which are inherent in voice recognition technology. **      Final report electronically signed by Dr. Ricky Hayden on 10/19/2018 7:19 AM      XR CHEST PORTABLE   Final Result      Greater lung volumes compared to the prior study. Persistent right hilar region possible mass or adenopathy. Stable bilateral interstitial infiltrates likely acute and chronic as discussed above. Worsening medial right lower lobe volume loss and/or pneumonia and worsening medial left lower lobe atelectasis. Worsening small-to-moderate right and small left pleural effusions. **This report has been created using voice recognition software. It may contain minor errors which are inherent in voice recognition technology. **      Final report electronically signed by Dr. Ricky Hayden on 10/17/2018 5:21 AM      XR CHEST PORTABLE   Final Result      Nearly resolved right pleural effusion. Tiny left pleural effusion. Possible right hilar mass or adenopathy. Right perihilar interstitial infiltrates and patchy mid right lung and right basilar pneumonia or atelectasis. Stable acute versus chronic interstitial infiltrates throughout the left lung with medial left lower lobe infiltrate or atelectasis. **This report has been created using voice recognition software. It may contain minor errors which are inherent in voice recognition technology. **      Final report electronically signed by Dr. Ricky Hayden on 10/16/2018 3:13 AM      VL DUP LOWER EXTREMITY VENOUS BILATERAL   Final Result   No evidence of deep vein thrombosis throughout the bilateral lower extremities.       **This report has been created using voice recognition software. It may contain minor errors which are inherent in voice recognition technology. **      Final report electronically signed by Dr Carmen Gardiner on 10/15/2018 5:17 PM      XR CHEST PORTABLE   Final Result   1. Total \"whiteout\" of the entire right hemithorax due to combination of atelectasis/pneumonia and pleural fluid. There is shift of heart and mediastinal structures to the right. Note that the appearance has worsened relative to the recent CTA pulmonary    embolus study, 10/14/2018. 2. Mediport in place right side, catheter tip in superior vena cava. 3. Moderately increased interstitial markings throughout the left lung, consistent with interstitial edema/pneumonitis. **This report has been created using voice recognition software. It may contain minor errors which are inherent in voice recognition technology. **      Final report electronically signed by Dr. Tammy Monroy on 10/15/2018 12:36 PM      CTA CHEST [address]   Final Result       1. There is no evidence for pulmonary embolism. 2. Interval development of diffuse opacities throughout the bilateral lungs since the prior CT scan which may represent multifocal pneumonia. 3. Redemonstration of right lung parenchymal scarring/mass. There are persistent changes of right lobectomy with left to right midline shift of the mediastinum. Similar to the prior study. **This report has been created using voice recognition software. It may contain minor errors which are inherent in voice recognition technology. **      Final report electronically signed by Dr Carmen Gardiner on 10/14/2018 11:04 AM      XR CHEST STANDARD (2 VW)    (Results Pending)       Diet: DIET GENERAL;  Dietary Nutrition Supplements: Standard High Calorie Oral Supplement    DVT prophylaxis: [x] Lovenox                                 [] SCDs                                 [] SQ Heparin

## 2018-10-22 NOTE — PROGRESS NOTES
was found to have bilateral pneumonia. She denies any history of hemoptysis. She gives a history of orthopnea and paroxysmal nocturnal dyspnea. She was diagnosed with lung cancer in the past. She used to follow with DO Evans in the past. She received chemotherapy 2 weeks back from Dr. Ashlyn Valle. No previous history of pulmonary embolism. She was never diagnosed with pulmonary tuberculosis in the past. No recent hx of travel history. She is currently on treatment with   Dulera 200/5mcg spray MDI, 2 puffs via inhalation BID. Spiriva Respimat 2 INH once daily in am.  Albuterol HFA  inhaler 2 puffs Q6h prn or Albuterol 2.5mg nebs Q6h prn (the nebulizer). she is currently on no home O2 at rest. She needs 4LPM of home O2 with exercise. She need to continue 2LPM of O2 at night time.     Past 24 hrs    Issues with AF prolonging her stay  CXR is stable  No increased 02 demands  Refusing PD&P  Cough is improved    PMHx   Past Medical History      Diagnosis Date    Arthritis     low back pain and scoliosis in lumbar    Cancer ([facility] [address] 2012    lower right lobe-lung s/p lobectomy in 2012, radiation and chemo nad later had mediatinal mets and had radationa dn chemo again, and in 2016 had reoccurence on the left side upper and was started on radiation this year and has  a follow up CT in oct 2017    Chronic bronchitis, simple (HCC)     Chronic respiratory failure with hypoxia (HCC)     COPD (chronic obstructive pulmonary disease) (HCC)     GERD (gastroesophageal reflux disease)     HTN (hypertension)     Pneumonia     Postprocedural pneumothorax     Scoliosis     Urinary incontinence       Past Surgical History        Procedure Laterality Date    LOBECTOMY  10/19/2012    rt lower lobectomy     LUNG BIOPSY  8/2012     Meds    Current Medications    metoprolol tartrate  25 mg Oral BID    magnesium oxide  400 mg Oral TID    apixaban  5 mg Oral BID    diltiazem  60 mg Oral Q6H    potassium replacement nasal cannula. No chest pain. Not in any distress.     Ynes Gotti MD 10/22/2018 7:22 PM

## 2018-10-23 LAB
GLUCOSE BLD-MCNC: 219 MG/DL (ref 70–108)
GLUCOSE BLD-MCNC: 88 MG/DL (ref 70–108)
MAGNESIUM: 1.8 MG/DL (ref 1.6–2.4)
POTASSIUM SERPL-SCNC: 3.7 MEQ/L (ref 3.5–5.2)

## 2018-10-23 PROCEDURE — G0008 ADMIN INFLUENZA VIRUS VAC: HCPCS | Performed by: INTERNAL MEDICINE

## 2018-10-23 PROCEDURE — 6370000000 HC RX 637 (ALT 250 FOR IP): Performed by: NURSE PRACTITIONER

## 2018-10-23 PROCEDURE — 2700000000 HC OXYGEN THERAPY PER DAY

## 2018-10-23 PROCEDURE — 99232 SBSQ HOSP IP/OBS MODERATE 35: CPT | Performed by: INTERNAL MEDICINE

## 2018-10-23 PROCEDURE — 6360000002 HC RX W HCPCS: Performed by: FAMILY MEDICINE

## 2018-10-23 PROCEDURE — 6370000000 HC RX 637 (ALT 250 FOR IP): Performed by: INTERNAL MEDICINE

## 2018-10-23 PROCEDURE — 97116 GAIT TRAINING THERAPY: CPT

## 2018-10-23 PROCEDURE — 2060000000 HC ICU INTERMEDIATE R&B

## 2018-10-23 PROCEDURE — 84132 ASSAY OF SERUM POTASSIUM: CPT

## 2018-10-23 PROCEDURE — 82948 REAGENT STRIP/BLOOD GLUCOSE: CPT

## 2018-10-23 PROCEDURE — 94640 AIRWAY INHALATION TREATMENT: CPT

## 2018-10-23 PROCEDURE — 2580000003 HC RX 258: Performed by: INTERNAL MEDICINE

## 2018-10-23 PROCEDURE — 6360000002 HC RX W HCPCS: Performed by: INTERNAL MEDICINE

## 2018-10-23 PROCEDURE — 6370000000 HC RX 637 (ALT 250 FOR IP): Performed by: FAMILY MEDICINE

## 2018-10-23 PROCEDURE — 99233 SBSQ HOSP IP/OBS HIGH 50: CPT | Performed by: INTERNAL MEDICINE

## 2018-10-23 PROCEDURE — 90686 IIV4 VACC NO PRSV 0.5 ML IM: CPT | Performed by: INTERNAL MEDICINE

## 2018-10-23 PROCEDURE — 94760 N-INVAS EAR/PLS OXIMETRY 1: CPT

## 2018-10-23 PROCEDURE — 6370000000 HC RX 637 (ALT 250 FOR IP): Performed by: PHYSICIAN ASSISTANT

## 2018-10-23 PROCEDURE — 97530 THERAPEUTIC ACTIVITIES: CPT

## 2018-10-23 PROCEDURE — 83735 ASSAY OF MAGNESIUM: CPT

## 2018-10-23 RX ORDER — ASCORBIC ACID 500 MG
500 TABLET ORAL DAILY
Qty: 30 TABLET | Refills: 3 | Status: ON HOLD | COMMUNITY
Start: 2018-10-24 | End: 2019-07-18

## 2018-10-23 RX ORDER — DILTIAZEM HYDROCHLORIDE 300 MG/1
300 CAPSULE, COATED, EXTENDED RELEASE ORAL DAILY
Status: DISCONTINUED | OUTPATIENT
Start: 2018-10-24 | End: 2018-10-24 | Stop reason: HOSPADM

## 2018-10-23 RX ORDER — DILTIAZEM HYDROCHLORIDE 240 MG/1
240 CAPSULE, COATED, EXTENDED RELEASE ORAL DAILY
Qty: 30 CAPSULE | Refills: 3 | Status: SHIPPED | OUTPATIENT
Start: 2018-10-23 | End: 2018-10-24 | Stop reason: HOSPADM

## 2018-10-23 RX ORDER — DIGOXIN 250 MCG
250 TABLET ORAL EVERY 6 HOURS
Status: COMPLETED | OUTPATIENT
Start: 2018-10-23 | End: 2018-10-23

## 2018-10-23 RX ORDER — FERROUS SULFATE 325(65) MG
325 TABLET ORAL
Qty: 30 TABLET | Refills: 3 | Status: ON HOLD | COMMUNITY
Start: 2018-10-24 | End: 2019-02-28

## 2018-10-23 RX ORDER — LIDOCAINE 4 G/G
1 PATCH TOPICAL DAILY
Status: DISCONTINUED | OUTPATIENT
Start: 2018-10-24 | End: 2018-10-24 | Stop reason: HOSPADM

## 2018-10-23 RX ORDER — DILTIAZEM HYDROCHLORIDE 240 MG/1
240 CAPSULE, COATED, EXTENDED RELEASE ORAL DAILY
Status: DISCONTINUED | OUTPATIENT
Start: 2018-10-23 | End: 2018-10-23

## 2018-10-23 RX ORDER — DIGOXIN 125 MCG
125 TABLET ORAL DAILY
Status: DISCONTINUED | OUTPATIENT
Start: 2018-10-24 | End: 2018-10-24 | Stop reason: HOSPADM

## 2018-10-23 RX ORDER — POTASSIUM CHLORIDE 20 MEQ/1
20 TABLET, EXTENDED RELEASE ORAL 2 TIMES DAILY
Status: DISCONTINUED | OUTPATIENT
Start: 2018-10-23 | End: 2018-10-24 | Stop reason: HOSPADM

## 2018-10-23 RX ADMIN — POTASSIUM CHLORIDE 20 MEQ: 20 TABLET, EXTENDED RELEASE ORAL at 15:34

## 2018-10-23 RX ADMIN — METOPROLOL TARTRATE 25 MG: 25 TABLET ORAL at 21:50

## 2018-10-23 RX ADMIN — METOPROLOL TARTRATE 25 MG: 25 TABLET ORAL at 08:19

## 2018-10-23 RX ADMIN — IPRATROPIUM BROMIDE 0.5 MG: 0.5 SOLUTION RESPIRATORY (INHALATION) at 17:52

## 2018-10-23 RX ADMIN — DILTIAZEM HYDROCHLORIDE 240 MG: 240 CAPSULE, COATED, EXTENDED RELEASE ORAL at 11:31

## 2018-10-23 RX ADMIN — PREDNISONE 20 MG: 20 TABLET ORAL at 08:20

## 2018-10-23 RX ADMIN — LEVALBUTEROL HYDROCHLORIDE 0.63 MG: 1.25 SOLUTION RESPIRATORY (INHALATION) at 13:01

## 2018-10-23 RX ADMIN — IPRATROPIUM BROMIDE 0.5 MG: 0.5 SOLUTION RESPIRATORY (INHALATION) at 13:01

## 2018-10-23 RX ADMIN — DIGOXIN 250 MCG: 250 TABLET ORAL at 15:34

## 2018-10-23 RX ADMIN — MAGNESIUM GLUCONATE 500 MG ORAL TABLET 400 MG: 500 TABLET ORAL at 08:19

## 2018-10-23 RX ADMIN — IPRATROPIUM BROMIDE 0.5 MG: 0.5 SOLUTION RESPIRATORY (INHALATION) at 21:11

## 2018-10-23 RX ADMIN — Medication 10 ML: at 12:21

## 2018-10-23 RX ADMIN — FLUTICASONE PROPIONATE 1 SPRAY: 50 SPRAY, METERED NASAL at 08:20

## 2018-10-23 RX ADMIN — HEPARIN 500 UNITS: 100 SYRINGE at 12:21

## 2018-10-23 RX ADMIN — Medication 10 ML: at 08:20

## 2018-10-23 RX ADMIN — FUROSEMIDE 20 MG: 20 TABLET ORAL at 08:20

## 2018-10-23 RX ADMIN — DIGOXIN 250 MCG: 250 TABLET ORAL at 21:50

## 2018-10-23 RX ADMIN — GUAIFENESIN 600 MG: 600 TABLET, EXTENDED RELEASE ORAL at 21:50

## 2018-10-23 RX ADMIN — Medication 2 UNITS: at 18:39

## 2018-10-23 RX ADMIN — APIXABAN 5 MG: 5 TABLET, FILM COATED ORAL at 08:19

## 2018-10-23 RX ADMIN — APIXABAN 5 MG: 5 TABLET, FILM COATED ORAL at 21:50

## 2018-10-23 RX ADMIN — INFLUENZA A VIRUS A/MICHIGAN/45/2015 X-275 (H1N1) ANTIGEN (FORMALDEHYDE INACTIVATED), INFLUENZA A VIRUS A/SINGAPORE/INFIMH-16-0019/2016 IVR-186 (H3N2) ANTIGEN (FORMALDEHYDE INACTIVATED), INFLUENZA B VIRUS B/PHUKET/3073/2013 ANTIGEN (FORMALDEHYDE INACTIVATED), AND INFLUENZA B VIRUS B/MARYLAND/15/2016 BX-69A ANTIGEN (FORMALDEHYDE INACTIVATED) 0.5 ML: 15; 15; 15; 15 INJECTION, SUSPENSION INTRAMUSCULAR at 08:27

## 2018-10-23 RX ADMIN — MAGNESIUM GLUCONATE 500 MG ORAL TABLET 400 MG: 500 TABLET ORAL at 15:34

## 2018-10-23 RX ADMIN — PANTOPRAZOLE SODIUM 40 MG: 40 TABLET, DELAYED RELEASE ORAL at 05:41

## 2018-10-23 RX ADMIN — Medication 5 ML: at 21:50

## 2018-10-23 RX ADMIN — BETAMETHASONE DIPROPIONATE: 0.5 OINTMENT TOPICAL at 08:20

## 2018-10-23 RX ADMIN — HYDROCODONE BITARTRATE AND ACETAMINOPHEN 1 TABLET: 5; 325 TABLET ORAL at 21:41

## 2018-10-23 RX ADMIN — MAGNESIUM GLUCONATE 500 MG ORAL TABLET 400 MG: 500 TABLET ORAL at 21:50

## 2018-10-23 RX ADMIN — GUAIFENESIN 600 MG: 600 TABLET, EXTENDED RELEASE ORAL at 08:20

## 2018-10-23 RX ADMIN — HYDROCODONE BITARTRATE AND ACETAMINOPHEN 1 TABLET: 5; 325 TABLET ORAL at 04:56

## 2018-10-23 RX ADMIN — DILTIAZEM HYDROCHLORIDE 60 MG: 60 TABLET, FILM COATED ORAL at 00:16

## 2018-10-23 RX ADMIN — DILTIAZEM HYDROCHLORIDE 60 MG: 60 TABLET, FILM COATED ORAL at 05:41

## 2018-10-23 RX ADMIN — OXYCODONE HYDROCHLORIDE AND ACETAMINOPHEN 500 MG: 500 TABLET ORAL at 08:20

## 2018-10-23 RX ADMIN — CETIRIZINE HYDROCHLORIDE 10 MG: 10 TABLET ORAL at 08:20

## 2018-10-23 RX ADMIN — IPRATROPIUM BROMIDE 0.5 MG: 0.5 SOLUTION RESPIRATORY (INHALATION) at 09:21

## 2018-10-23 RX ADMIN — FERROUS SULFATE TAB 325 MG (65 MG ELEMENTAL FE) 325 MG: 325 (65 FE) TAB at 05:41

## 2018-10-23 RX ADMIN — LEVALBUTEROL HYDROCHLORIDE 0.63 MG: 1.25 SOLUTION RESPIRATORY (INHALATION) at 09:27

## 2018-10-23 ASSESSMENT — PAIN DESCRIPTION - DESCRIPTORS: DESCRIPTORS: ACHING

## 2018-10-23 ASSESSMENT — PAIN DESCRIPTION - PAIN TYPE: TYPE: CHRONIC PAIN

## 2018-10-23 ASSESSMENT — PAIN SCALES - GENERAL
PAINLEVEL_OUTOF10: 8
PAINLEVEL_OUTOF10: 9
PAINLEVEL_OUTOF10: 0

## 2018-10-23 ASSESSMENT — PAIN DESCRIPTION - ORIENTATION: ORIENTATION: LOWER

## 2018-10-23 ASSESSMENT — PAIN DESCRIPTION - PROGRESSION: CLINICAL_PROGRESSION: GRADUALLY WORSENING

## 2018-10-23 ASSESSMENT — PAIN DESCRIPTION - LOCATION: LOCATION: BACK

## 2018-10-23 ASSESSMENT — PAIN DESCRIPTION - FREQUENCY: FREQUENCY: CONTINUOUS

## 2018-10-23 ASSESSMENT — PAIN DESCRIPTION - ONSET: ONSET: ON-GOING

## 2018-10-23 NOTE — PROGRESS NOTES
[facility]    Patient - Tami Nunez   MRN -  [id number]   [facility]   - 1945      Date of Admission -  10/14/2018  9:14 AM  Date of evaluation -  10/23/2018  Room - Wilmer Arana MD Primary Care Physician - JESUS Iqbal - CNP     Problem List      Active Hospital Problems    Diagnosis Date Noted    Aspiration pneumonia [facility]) [J69.0]      Priority: High    Paroxysmal atrial fibrillation ([address] [I48.0] 10/22/2018    Paroxysmal atrial flutter ([address] [I48.92] 10/22/2018    Stage 3 severe COPD by GOLD classification ([address] [J44.9]     Acute pulmonary edema (HCC) [J81.0]     Bilateral pleural effusion [J90]     Hypomagnesemia [E83.42]     Moderate malnutrition ([address] [E44.0] 10/15/2018    Primary lung cancer, right (HCC) [C34.91]     Hypoxemia [R09.02]     Multifocal atrial tachycardia (HCC) [I47.1]     Anemia [D64.9]     Bilateral leg edema [R60.0]     Physical deconditioning [R53.81]     Acute on chronic respiratory failure with hypoxemia ([address] [J96.21] 10/14/2018     Reason for Consult    For management of acute on chronic respiratory failure with hypoxia initially requiring HFNC  HPI   History Obtained From: Patient and electronic medical record. Tami Nunez is a 68 y.o. female  was initially admitted under hospitalist service. Pulmonary medicine was consulted for further management of hypoxia/Hypoxic respiratory failure. She is currently on oxygen via Hi flow. The patient is a 68 y.o. female with past medical hx of severe COPD on home O2, started having frequent cough with sputum production for last 10 days. Her cough is productive with sputum. Her sputum is dark to yellow  in color. Her above symptoms were associated with worsening of shortness of breath and decline in her routine functional status. She also admits to history of  fever, chills or rigors.  During her initial work up she was found to have bilateral pneumonia. She denies any history of hemoptysis. She gives a history of orthopnea and paroxysmal nocturnal dyspnea. She was diagnosed with lung cancer in the past. She used to follow with DO Evans in the past. She received chemotherapy 2 weeks back from Dr. Nura Colon. No previous history of pulmonary embolism. She was never diagnosed with pulmonary tuberculosis in the past. No recent hx of travel history. She is currently on treatment with   Dulera 200/5mcg spray MDI, 2 puffs via inhalation BID. Spiriva Respimat 2 INH once daily in am.  Albuterol HFA  inhaler 2 puffs Q6h prn or Albuterol 2.5mg nebs Q6h prn (the nebulizer). she is currently on no home O2 at rest. She needs 4LPM of home O2 with exercise. She need to continue 2LPM of O2 at night time.     Past 24 hrs    No changes  Remains at baseline 02 needs  Plan for discharge today    PMHx   Past Medical History      Diagnosis Date    Arthritis     low back pain and scoliosis in lumbar    Cancer ([facility] [address] 2012    lower right lobe-lung s/p lobectomy in 2012, radiation and chemo nad later had mediatinal mets and had radationa dn chemo again, and in 2016 had reoccurence on the left side upper and was started on radiation this year and has  a follow up CT in oct 2017    Chronic bronchitis, simple (HCC)     Chronic respiratory failure with hypoxia (HCC)     COPD (chronic obstructive pulmonary disease) (HCC)     GERD (gastroesophageal reflux disease)     HTN (hypertension)     Pneumonia     Postprocedural pneumothorax     Scoliosis     Urinary incontinence       Past Surgical History        Procedure Laterality Date    LOBECTOMY  10/19/2012    rt lower lobectomy     LUNG BIOPSY  8/2012     Meds    Current Medications    [START ON 10/24/2018] lidocaine  1 patch Topical Daily    [START ON 10/24/2018] diltiazem  300 mg Oral Daily    digoxin  250 mcg Oral Q6H    [START ON 10/24/2018] digoxin  125 mcg Oral Daily    10/23/18 1524   Gross per 24 hour   Intake           849.71 ml   Output             2700 ml   Net         -1850.29 ml     I/O last 3 completed shifts: In: 849.7 [P.O.:360; I.V.:489.7]  Out: 2150 [Urine:2150]   Patient Vitals for the past 96 hrs (Last 3 readings):   Weight   10/23/18 0645 145 lb 8 oz (66 kg)   10/22/18 0512 148 lb 12.8 oz (67.5 kg)   10/21/18 0330 146 lb 14.4 oz (66.6 kg)       Exam   Physical Exam   Constitutional: No distress on 3 LPM O2 per NC. No distress. Head: Normocephalic and atraumatic. Neck: Neck supple. No tracheal deviation present. Cardiovascular: HRRR normal S1 and S2 present with no murmur. +1 bilateral lower extremity edema. Pulmonary/Chest: Respirations even and unlabored. Lungs are diminished, no wheezing. Right sided chest port. Abdominal: Soft, Bowel sounds audible. No tenderness to palpation. Musculoskeletal: Moves all extremities. Neurological: Patient is alert and oriented to person, place, and time. No focal deficits. Skin: Warm and dry. Labs    ABG  Lab Results   Component Value Date    PH 7.35 10/14/2018    PO2 207 10/14/2018    PCO2 64 10/14/2018    HCO3 36 10/14/2018    O2SAT 100 10/14/2018     No results found for: IFIO2, MODE, SETTIDVOL, SETPEEP  CBC  No results for input(s): WBC, RBC, HGB, HCT, MCV, MCH, MCHC, RDW, PLT, MPV in the last 72 hours. BMP  Recent Labs      10/21/18   1655  10/22/18   0528  10/23/18   0548   K  4.2   --   3.7   MG  1.4*  2.0  1.8     LFT  No results for input(s): AST, ALT, ALB, BILITOT, ALKPHOS, LIPASE in the last 72 hours. Invalid input(s): AMYLASE  TROP  Lab Results   Component Value Date    TROPONINT < 0.010 10/14/2018    TROPONINT < 0.010 08/11/2017    TROPONINT < 0.010 04/24/2015     BNP  No results for input(s): BNP in the last 72 hours. Lactic Acid  No results for input(s): LACTA in the last 72 hours. INR  No results for input(s): INR, PROTIME in the last 72 hours. PTT  No results for input(s):  APTT in the coarse and prominent throughout the left lung, similar compared to the prior study, which may represent pulmonary fibrosis, pulmonary edema, or an atypical viral-type pneumonia. There is persistent retrocardiac medial left lower lobe infiltrate or atelectasis. Heart: Heart size is normal. Mediastinum/lilian: Left hilum is unremarkable. There is again mediastinal shift to the right. Skeleton: There is again rotary levoscoliosis of lower thoracic and lumbar spine. Lines/tubes/devices: No change in the position of the right jugular Port-A-Cath, tip in the region of the central SVC. Impression:  Nearly resolved right pleural effusion. Tiny left pleural effusion. Possible right hilar mass or adenopathy. Right perihilar interstitial infiltrates and patchy mid right lung and right basilar pneumonia or atelectasis. Stable acute versus chronic interstitial infiltrates throughout the left lung with medial left lower lobe infiltrate or atelectasis. 10/15/2018     1. Total \"whiteout\" of the entire right hemithorax due to combination of atelectasis/pneumonia and pleural fluid. There is shift of heart and mediastinal structures to the right. Note that the appearance has worsened relative to the recent CTA pulmonary embolus study, 10/14/2018. 2. Mediport in place right side, catheter tip in superior vena cava. 3. Moderately increased interstitial markings throughout the left lung, consistent with interstitial edema/pneumonitis. CT Scans  (See actual reports for details)  Oct 14, 2018  PROCEDURE: CTA CHEST W WO CONTRAST  1. There is no evidence for pulmonary embolism. 2. Interval development of diffuse opacities throughout the bilateral lungs since the prior CT scan which may represent multifocal pneumonia. 3. Redemonstration of right lung parenchymal scarring/mass. There are persistent changes of right lobectomy with left to right midline shift of the mediastinum. Similar to the prior study.       Jul 18,

## 2018-10-23 NOTE — PROGRESS NOTES
Patient was evaluated today for the diagnosis of COPD. I entered a DME order for home oxygen because the diagnosis and testing requires the patient to have supplemental oxygen. Condition will improve or be benefited by oxygen use. The patient is  able to perform good mobility in a home setting and therefore does require the use of a portable oxygen system. The need for this equipment was discussed with the patient and she understands and is in agreement.

## 2018-10-23 NOTE — PROGRESS NOTES
Yes  Patient assessed for rehabilitation services?: Yes  Response to previous treatment: Patient with no complaints from previous session  Family / Caregiver Present: Yes (Friend present and supportive)    Subjective: Patient cooperative, voicing struggles and frustrations with husbands recent passing and health concerns, max encouragement and active listening provided. Comments: RN approved session. Pt had an episode earlier today where her blood pressure and heart rate was elevated. Pt was frustrated about having her home O2 requirement changed from 2L at rest and 3L with activity to 6L with activity. \"This changes how I bring my portable oxygen with me when I leave my home. \" pt stated. Pt was reporting her pain was being controlled with her pain medication at this time. She was using 3L of O2 at rest at this time. Discussed with pt's nurse the benefit of having pastoral care speak with her about coping with loss of her spouse. The nurse indicated she would place the order for this. Overall Orientation Status: Within Functional Limits         Pain:  Pain Assessment  Patient Currently in Pain: Denies  Response to Pain Intervention: Patient Satisfied  Multiple Pain Sites: No       Objective  Overall Cognitive Status: WNL                                                         Coordination  Fine Motor: Pt was able to do simple tasks such as take a drink from her water pitcher. ADL  Additional Comments: Pt did not demonstrate at this time. Bed mobility  Supine to Sit: Unable to assess    Transfers  Transfer Comments: Pt refused at this time any mobility. Balance  Sitting Balance: Unable to assess(comment)  Standing Balance: Unable to assess(comment)     Comment: Pt did not demonstrate. Functional Mobility  Functional Mobility Comments: Pt had an episode earlier this day where her blood pressure had gotten high. She refused any walking at this time.

## 2018-10-23 NOTE — CARE COORDINATION
10/15/18, 7:22 AM      Raul Muller       Admitted from: ED    10/14/2018/ [facility] day: 1   Location: -21/021-A Reason for admit: Acute on chronic respiratory failure with hypoxemia ([address] [J96.21] Status: Inpatient  Admit order signed?: yes  PMH:  has a past medical history of Arthritis; Cancer ([address] Chronic bronchitis, simple ([address] Chronic respiratory failure with hypoxia (HCC); COPD (chronic obstructive pulmonary disease) ([address] GERD (gastroesophageal reflux disease); HTN (hypertension); Pneumonia; Postprocedural pneumothorax; Scoliosis; and Urinary incontinence. Procedure: na  Pertinent abnormal Imaging:   10/14/18  CTA chest    1. There is no evidence for pulmonary embolism. 2. Interval development of diffuse opacities throughout the bilateral lungs since the prior CT scan which may represent multifocal pneumonia. 3. Redemonstration of right lung parenchymal scarring/mass. There are persistent changes of right lobectomy with left to right midline shift of the mediastinum. Similar to the prior study.       Medications:  Scheduled Meds:   sodium chloride  250 mL Intravenous Once    oxybutynin  5 mg Oral Daily    mometasone-formoterol  2 puff Inhalation BID    fluticasone  1 spray Nasal Daily    dexamethasone  4 mg Oral BID WC    citalopram  20 mg Oral Daily    cetirizine  10 mg Oral Daily    betamethasone dipropionate   Topical Daily    sodium chloride flush  10 mL Intravenous 2 times per day    enoxaparin  40 mg Subcutaneous Q24H    ipratropium-albuterol  1 ampule Inhalation Q4H WA    cefTRIAXone (ROCEPHIN) IV  1 g Intravenous Q24H    azithromycin  250 mg Oral Daily    influenza virus vaccine  0.5 mL Intramuscular Once    pantoprazole  40 mg Oral QAM AC     Continuous Infusions:   sodium chloride 100 mL/hr at 10/15/18 0111      Pertinent Info/Orders/Treatment Plan:  HX of lung CA, on chemo, on Hi-flow oxygen, IV fluids, telemetry, IV antibiotics, palliative care consult, cardiology
10/17/18, 9:56 AM    DISCHARGE BARRIERS    Spoke with patient about discharge planning. She stated that she does not want and ECF but would consider a short TCU stay. MD updated and will place order. Discussed with patient home needs. She stated that she can afford her electric bill and it is not going to be turned off but she does not turn her air or furnace  up to needed temperature due to concerns about the bill. Patient has resource information and AAAO3 number.
10/19/18, 10:01 AM    Discharge plan discussed by  and . Discharge plan reviewed with patient/ family. Patient/ family verbalize understanding of discharge plan and are in agreement with plan. Understanding was demonstrated using the teach back method. IMM Letter  IMM Letter given to Patient/Family/Significant other/Guardian/POA/by[de-identified]   IMM Letter date given[de-identified] 10/19/18  IMM Letter time given[de-identified] 7921     Patient is a possible discharge today home with home [facility]. Referral provided to [facility]. RN updated.
10/23/18, 8:13 AM    Discharge plan discussed by  and . Discharge plan reviewed with patient/ family. Patient/ family verbalize understanding of discharge plan and are in agreement with plan. Understanding was demonstrated using the teach back method. IMM Letter  IMM Letter given to Patient/Family/Significant other/Guardian/POA/by[de-identified] updated care manager  IMM Letter date given[de-identified] 10/22/18  IMM Letter time given[de-identified] 4665     Patient is a discharge home today with new referral to [facility]. SW spoke with Patient in regards to transportation home and she indicated at this time her daughter is working and has no one to transport her home. Patient stated that she also does not have the funds for transport. DEBRA arranged [address] [place] [address] with NEELAM, currently on will-call for RN to arrange time when Patient is ready. Patient does have her own oxygen for transport.
Plan of Care:      Zone management tool for Pneumonia Diagnosis given to the patient as an education reference. Patient verbalizes understanding that the care team will be referencing this tool throughout their hospital stay and again on discharge. Nurse Svetlana Lopez notified of the reference tool being received by the patient.
Plan of care:  Imaging 10/17 Persistent right hilar region possible mass or adenopathy. Stable bilateral interstitial infiltrates likely acute and chronic as discussed above. Worsening medial right lower lobe volume loss and/or pneumonia and worsening medial left lower lobe atelectasis. Worsening small-to-moderate right and small left pleural effusions. Possible TCU eval pending. Off Vapotherm; oxygen 5L/Diuretics/Steroid/ABcontinued.  PT/OT following; await therapy input  10/17/18  11:40 AM
Plan of care: possible Right thoracentesis planned per report-discussed at rounds.  Oxygen 3L continued; Pulmonary following
need HH. DEBRA placed her on the wait list for home delivered meals and provided her with information on homemaker.       Electronically signed by MANOJ Mcgarry on 10/15/2018 at 1:45 PM

## 2018-10-24 VITALS
SYSTOLIC BLOOD PRESSURE: 124 MMHG | RESPIRATION RATE: 16 BRPM | WEIGHT: 142.3 LBS | HEIGHT: 61 IN | OXYGEN SATURATION: 90 % | TEMPERATURE: 98.4 F | DIASTOLIC BLOOD PRESSURE: 58 MMHG | BODY MASS INDEX: 26.87 KG/M2 | HEART RATE: 79 BPM

## 2018-10-24 LAB
ANION GAP SERPL CALCULATED.3IONS-SCNC: 5 MEQ/L (ref 8–16)
BUN BLDV-MCNC: 15 MG/DL (ref 7–22)
CALCIUM SERPL-MCNC: 9.1 MG/DL (ref 8.5–10.5)
CHLORIDE BLD-SCNC: 86 MEQ/L (ref 98–111)
CO2: 47 MEQ/L (ref 23–33)
CREAT SERPL-MCNC: 0.6 MG/DL (ref 0.4–1.2)
GFR SERPL CREATININE-BSD FRML MDRD: > 90 ML/MIN/1.73M2
GLUCOSE BLD-MCNC: 102 MG/DL (ref 70–108)
GLUCOSE BLD-MCNC: 105 MG/DL (ref 70–108)
GLUCOSE BLD-MCNC: 84 MG/DL (ref 70–108)
MAGNESIUM: 1.8 MG/DL (ref 1.6–2.4)
POTASSIUM SERPL-SCNC: 5.2 MEQ/L (ref 3.5–5.2)
POTASSIUM SERPL-SCNC: 5.2 MEQ/L (ref 3.5–5.2)
SODIUM BLD-SCNC: 138 MEQ/L (ref 135–145)

## 2018-10-24 PROCEDURE — 6370000000 HC RX 637 (ALT 250 FOR IP): Performed by: INTERNAL MEDICINE

## 2018-10-24 PROCEDURE — 80048 BASIC METABOLIC PNL TOTAL CA: CPT

## 2018-10-24 PROCEDURE — 99232 SBSQ HOSP IP/OBS MODERATE 35: CPT | Performed by: INTERNAL MEDICINE

## 2018-10-24 PROCEDURE — 36415 COLL VENOUS BLD VENIPUNCTURE: CPT

## 2018-10-24 PROCEDURE — APPSS15 APP SPLIT SHARED TIME 0-15 MINUTES: Performed by: NURSE PRACTITIONER

## 2018-10-24 PROCEDURE — 83735 ASSAY OF MAGNESIUM: CPT

## 2018-10-24 PROCEDURE — 6370000000 HC RX 637 (ALT 250 FOR IP): Performed by: NURSE PRACTITIONER

## 2018-10-24 PROCEDURE — 97116 GAIT TRAINING THERAPY: CPT

## 2018-10-24 PROCEDURE — 99239 HOSP IP/OBS DSCHRG MGMT >30: CPT | Performed by: INTERNAL MEDICINE

## 2018-10-24 PROCEDURE — 82948 REAGENT STRIP/BLOOD GLUCOSE: CPT

## 2018-10-24 PROCEDURE — 6370000000 HC RX 637 (ALT 250 FOR IP): Performed by: FAMILY MEDICINE

## 2018-10-24 PROCEDURE — 84132 ASSAY OF SERUM POTASSIUM: CPT

## 2018-10-24 PROCEDURE — 94640 AIRWAY INHALATION TREATMENT: CPT

## 2018-10-24 PROCEDURE — 6370000000 HC RX 637 (ALT 250 FOR IP): Performed by: PHYSICIAN ASSISTANT

## 2018-10-24 PROCEDURE — 6360000002 HC RX W HCPCS: Performed by: FAMILY MEDICINE

## 2018-10-24 RX ORDER — DILTIAZEM HYDROCHLORIDE 300 MG/1
300 CAPSULE, COATED, EXTENDED RELEASE ORAL DAILY
Qty: 30 CAPSULE | Refills: 3 | Status: ON HOLD | OUTPATIENT
Start: 2018-10-25 | End: 2019-02-28

## 2018-10-24 RX ORDER — DIGOXIN 125 MCG
125 TABLET ORAL DAILY
Qty: 30 TABLET | Refills: 3 | Status: SHIPPED | OUTPATIENT
Start: 2018-10-25 | End: 2019-04-15 | Stop reason: SDDI

## 2018-10-24 RX ADMIN — GUAIFENESIN 600 MG: 600 TABLET, EXTENDED RELEASE ORAL at 08:40

## 2018-10-24 RX ADMIN — IPRATROPIUM BROMIDE 0.5 MG: 0.5 SOLUTION RESPIRATORY (INHALATION) at 07:43

## 2018-10-24 RX ADMIN — FERROUS SULFATE TAB 325 MG (65 MG ELEMENTAL FE) 325 MG: 325 (65 FE) TAB at 05:03

## 2018-10-24 RX ADMIN — FLUTICASONE PROPIONATE 1 SPRAY: 50 SPRAY, METERED NASAL at 08:40

## 2018-10-24 RX ADMIN — BETAMETHASONE DIPROPIONATE: 0.5 OINTMENT TOPICAL at 08:43

## 2018-10-24 RX ADMIN — CETIRIZINE HYDROCHLORIDE 10 MG: 10 TABLET ORAL at 08:40

## 2018-10-24 RX ADMIN — DILTIAZEM HYDROCHLORIDE 300 MG: 300 CAPSULE, COATED, EXTENDED RELEASE ORAL at 08:40

## 2018-10-24 RX ADMIN — OXYCODONE HYDROCHLORIDE AND ACETAMINOPHEN 500 MG: 500 TABLET ORAL at 08:40

## 2018-10-24 RX ADMIN — HYDROCODONE BITARTRATE AND ACETAMINOPHEN 1 TABLET: 5; 325 TABLET ORAL at 05:03

## 2018-10-24 RX ADMIN — IPRATROPIUM BROMIDE 0.5 MG: 0.5 SOLUTION RESPIRATORY (INHALATION) at 12:02

## 2018-10-24 RX ADMIN — FUROSEMIDE 20 MG: 20 TABLET ORAL at 08:40

## 2018-10-24 RX ADMIN — MAGNESIUM GLUCONATE 500 MG ORAL TABLET 400 MG: 500 TABLET ORAL at 15:24

## 2018-10-24 RX ADMIN — METOPROLOL TARTRATE 25 MG: 25 TABLET ORAL at 08:39

## 2018-10-24 RX ADMIN — PREDNISONE 20 MG: 20 TABLET ORAL at 08:39

## 2018-10-24 RX ADMIN — MAGNESIUM GLUCONATE 500 MG ORAL TABLET 400 MG: 500 TABLET ORAL at 08:41

## 2018-10-24 RX ADMIN — DIGOXIN 125 MCG: 125 TABLET ORAL at 08:42

## 2018-10-24 RX ADMIN — APIXABAN 5 MG: 5 TABLET, FILM COATED ORAL at 08:39

## 2018-10-24 RX ADMIN — POTASSIUM CHLORIDE 20 MEQ: 20 TABLET, EXTENDED RELEASE ORAL at 08:40

## 2018-10-24 RX ADMIN — PANTOPRAZOLE SODIUM 40 MG: 40 TABLET, DELAYED RELEASE ORAL at 05:03

## 2018-10-24 ASSESSMENT — PAIN DESCRIPTION - ONSET: ONSET: ON-GOING

## 2018-10-24 ASSESSMENT — PAIN SCALES - GENERAL
PAINLEVEL_OUTOF10: 0
PAINLEVEL_OUTOF10: 8
PAINLEVEL_OUTOF10: 0

## 2018-10-24 ASSESSMENT — PAIN DESCRIPTION - LOCATION: LOCATION: BACK

## 2018-10-24 ASSESSMENT — PAIN DESCRIPTION - PROGRESSION: CLINICAL_PROGRESSION: GRADUALLY IMPROVING

## 2018-10-24 ASSESSMENT — PAIN DESCRIPTION - PAIN TYPE: TYPE: CHRONIC PAIN

## 2018-10-24 ASSESSMENT — PAIN DESCRIPTION - ORIENTATION: ORIENTATION: LOWER

## 2018-10-24 ASSESSMENT — PAIN DESCRIPTION - FREQUENCY: FREQUENCY: CONTINUOUS

## 2018-10-24 ASSESSMENT — PAIN DESCRIPTION - DESCRIPTORS: DESCRIPTORS: ACHING

## 2018-10-24 NOTE — FLOWSHEET NOTE
10/17/18 1524   Encounter Summary   Services provided to: Patient   Referral/Consult From: Aruna   Continue Visiting Yes  (10/17/18)   Complexity of Encounter Moderate   Length of Encounter 15 minutes   Spiritual Assessment Completed Yes   Spiritual/Synagogue   Type Spiritual support   Assessment Calm; Approachable; Hopeful   Intervention Active listening;Nurtured hope;Prayer   Outcome Expressed gratitude   10/17/18  S. Patient was resting in bed. Room was bright. O. Patient stated that she was doing better. Asked me if I had heard about her . When this staff said no, she said he  last Saturday. A. Patient is a person of strong horacio. Prayed with her. P. Recommend spiritual care follow up if she is still here.
10/22/18 1306   Provider Notification   Reason for Communication Evaluate   Provider Name Dr. Anne Coulter   Provider Notification Physician   Method of Communication Secure Message   Response Waiting for response   Notification Time [phone]   notified doctor of episode of extreme tachy, scheduled oral cardizem given. Patient heart rate decreased to 110-120's.
10/22/18 1555   Provider Notification   Provider Name Juan Arredondo   Provider Notification Nurse Practitioner   Method of Communication Call  (message left)   Notification Time [phone]   Updating Locke Resides, as she requested, that patient's medical records are available on the floor and in the chart.
10/23/18 1047   Provider Notification   Reason for Communication Review case   Provider Name Rick Torres   Provider Notification Physician Assistant   Method of Communication Secure Message   Response Waiting for response   Notification Time [phone]   clarifying patient is ok to discharge from cardio standpoint, questioning if follow up is needed. 1110 ok to discharge follow up with Dr. Smiley Marine 4-6 weeks.
Linda Rivera [address]  PHYSICAL THERAPY MISSED TREATMENT NOTE  ACUTE CARE    Date: 10/19/2018  Patient Name: Dorinda Garcia        MRN: [id number]   : 1945  (68 y.o.)  Gender: female   Referring Practitioner: Dr. Nathan Diego MD  Referring Practitioner: Ovidio Randall MD  Diagnosis: Acute on Chronic Respiratory Failure with Hypoxemia  Diagnosis: Acute on chronic respiratory failure with hypoxemia         REASON FOR MISSED TREATMENT:  Spoke with RN Home [facility] that stated patient just finished up with OT not long ago. RN stated that patient O2 drops with increased activity, so let the patient rest for now. Will attempt back later if time allows. Duane Bourdon
- Patient appears to be coping with this situation but in case she isn't able to adjust I provided her with the contact information for our OP . Plan: Follow-up will not be necessary as the patient will be discharged tomorrow.

## 2018-10-24 NOTE — PROGRESS NOTES
CLINICAL PHARMACY: DISCHARGE MED RECONCILIATION/REVIEW    [facility] ([facility] Select Patient?: No  Total # of Interventions Recommended: 0   -   Total # Interventions Accepted: 0  Intervention Severity:   - Level 1 Intervention Present?: No   - Level 2 #: 0   - Level 3 #: 0   Time Spent (min): 30    Additional Documentation:    Mark Wallace, PharmD, BCPS   10/24/2018  3:57 PM

## 2018-10-24 NOTE — PLAN OF CARE
Problem: Falls - Risk of:  Goal: Will remain free from falls  Will remain free from falls   Outcome: Ongoing  Patient alert and oriented x4. Bed alarmed armed. Bed wheels locked. Bedside table in reach. Patient up with assistance when ambulating. Patient verbalizes and demonstrates the use of the call light. Hourly rounding being completed.        Problem: Pain Control  Goal: Maintain pain level at or below patient's acceptable level (or 5 if patient is unable to determine acceptable level)  Outcome: Ongoing  Patient complains of pain in back (chronic). Patients current pain level is a 8/10. Patients pain goal is a 3/10. Patient is receiving norco for pain at this time.         Problem: Respiratory  Goal: O2 Sat > 90%  Outcome: Ongoing  Patient is on 3LNC at this time. Patients oxygen saturation maintains above 92% at this time. Pt has displayed no signs or symptoms of hypoxia throughout shift. Will continue to monitor.        Problem: Skin Integrity/Risk  Goal: No skin breakdown during hospitalization  Outcome: Ongoing  Some redness noted behind ears and on cheeks from oxygen. Continuing to monitor     Problem: Discharge Planning:  Goal: Discharged to appropriate level of care  Discharged to appropriate level of care   Outcome: Ongoing  Patient plans to return home at discharge and no needs voiced at this time. Patient working with social work for discharge planning.      Problem: Nutrition  Goal: Optimal nutrition therapy  Outcome: Ongoing  Patient eating three meals a day. Appetite intact. Patient tolerating PO fluids.      Comments: Care plan reviewed with patient. Patient verbalize understanding of the plan of care and contribute to goal setting.
Problem: Falls - Risk of:  Goal: Will remain free from falls  Will remain free from falls   Outcome: Ongoing  Patient alert and oriented x4. Bed alarmed armed. Bed wheels locked. Bedside table in reach. Patient up with assistance when ambulating. Patient verbalizes and demonstrates the use of the call light. Hourly rounding being completed. Problem: Pain Control  Goal: Maintain pain level at or below patient's acceptable level (or 5 if patient is unable to determine acceptable level)  Outcome: Ongoing  Patient complains of pain in back (chronic). Patients current pain level is a 5/10. Patients pain goal is a 3/10. Patient is receiving norco for pain at this time. Problem: Respiratory  Goal: O2 Sat > 90%  Outcome: Ongoing  Patient is on 3LNC at this time. Patients oxygen saturation maintains above 92% at this time. Pt has displayed no signs or symptoms of hypoxia throughout shift. Will continue to monitor. Problem: Skin Integrity/Risk  Goal: No skin breakdown during hospitalization  Outcome: Ongoing  Some redness noted behind ears and on cheeks from oxygen. Continuing to monitor    Problem: Discharge Planning:  Goal: Discharged to appropriate level of care  Discharged to appropriate level of care   Outcome: Ongoing  Patient plans to return home at discharge and no needs voiced at this time. Patient working with social work for discharge planning. Problem: Nutrition  Goal: Optimal nutrition therapy  Outcome: Ongoing  Patient eating three meals a day. Appetite intact. Patient tolerating PO fluids. Comments: Care plan reviewed with patient. Patient verbalize understanding of the plan of care and contribute to goal setting.
Problem: Nutrition  Goal: Optimal nutrition therapy  Outcome: Ongoing  Nutrition Problem: Moderate malnutrition  Intervention: Food and/or Nutrient Delivery: Continue current diet, Continue current ONS  Nutritional Goals: Pt. will consume 75% or more of meals during LOS.
Problem: RESPIRATORY  Goal: Clear lung sounds  Outcome: Ongoing  Continue supportive therapy as ordered.
Problem: RESPIRATORY  Goal: Clear lung sounds  Outcome: Ongoing  Continue treatments to improve breathsounds
Problem: RESPIRATORY  Goal: Clear lung sounds  Outcome: Ongoing  Continue with aerosols to aid in bronchodilation.   Goal: Ability to maintain adequate oxygenation will improve  Ability to maintain adequate oxygenation will improve     Outcome: Ongoing  Patient weaned down to 3L with SpO2 94%
Problem: RESPIRATORY  Goal: Clear lung sounds  Outcome: Ongoing  Continue with aerosols to aid in bronchodilation. Goal: Ability to maintain adequate oxygenation will improve  Ability to maintain adequate oxygenation will improve    Outcome: Ongoing  On vapotherm. Wean as tolerated.
Problem: Skin Integrity/Risk  Goal: No skin breakdown during hospitalization  Outcome: Ongoing  Consulted to assess patients ears. Patient noted to be wearing Oxygen tubing. Patient stated at home she also wears oxygen tubing. Gauze noted around tubing to cushion the ears. The left ear noted to be dry peeling skin, red and blanchable. Right ear noted to have a stable scabbed area. Area appears to be an unstageable. Will recommend to cushion the tubing and to keep tubing loose around the patients neck. Turned patient on to left side, patient noted in depends. Buttocks, red blanchable and intact. Will recommend zinc cream. Will continue to follow for ear. Care plan reviewed with patient and RN. Patient and RN verbalize understanding of the plan of care and contribute to goal setting. Wound Care Documentation:  Wound 10/17/18 Abrasion(s) Ear Right Unstageable (Active)   Wound Image   10/18/2018  9:34 AM   Wound Type Wound 10/18/2018  9:34 AM   Wound Unstageable 10/18/2018  9:34 AM   Dressing Status Clean;Dry; Intact 10/18/2018  8:00 AM   Dressing/Treatment Dry dressing 10/18/2018  9:34 AM   Wound Length (cm) 1.5 cm 10/18/2018  9:34 AM   Wound Width (cm) 1 cm 10/18/2018  9:34 AM   Calculated Wound Size (cm^2) (l*w) 1.5 cm^2 10/18/2018  9:34 AM   Wound Assessment Dry; Intact; Jennifer Kendall 10/18/2018  9:34 AM   Sia-wound Assessment Red 10/18/2018  9:34 AM   Number of days: 1       Comments:
contribute to goal setting.
to skin integrity associated with not repositioning as recommended. Problem: Discharge Planning:  Goal: Discharged to appropriate level of care  Discharged to appropriate level of care   Outcome: Ongoing  Consult placed for TCU/Rehab, will continue to monitor. Problem: Nutrition  Goal: Optimal nutrition therapy  Outcome: Ongoing  Pt has decent appetite this shift, will continue to encourage good choices and high PO intake. Problem: Pain:  Goal: Pain level will decrease  Pain level will decrease   Outcome: Completed Date Met: 10/07/22  duplicate  Goal: Control of acute pain  Control of acute pain   Outcome: Completed Date Met: 08/33/24  duplicate  Goal: Control of chronic pain  Control of chronic pain   Outcome: Completed Date Met: 17/08/07  Duplicate    Comments: Care plan reviewed with patient and family. Patient and family verbalize understanding of the plan of care and contribute to goal setting.

## 2018-10-25 NOTE — DISCHARGE SUMMARY
Hospital Medicine Discharge Summary      Patient Identification:   Dexter Lopez   : 1945  MRN: [id number]   Account: [de-identified]      Patient's PCP: JESUS Latif CNP    Admit Date: 10/14/2018     Discharge Date: 10/24/2018      Admitting Physician: Nikhil Watkins DO     Discharge Physician: Radha Kilpatrick MD     Discharge Diagnoses: Active Hospital Problems    Diagnosis Date Noted    Aspiration pneumonia ([address] [J69.0]      Priority: High    Paroxysmal atrial fibrillation ([address] [I48.0] 10/22/2018    Paroxysmal atrial flutter ([address] [I48.92] 10/22/2018    Stage 3 severe COPD by GOLD classification ([address] [J44.9]     Acute pulmonary edema (HCC) [J81.0]     Bilateral pleural effusion [J90]     Hypomagnesemia [E83.42]     Moderate malnutrition ([address] [E44.0] 10/15/2018    Primary lung cancer, right (HCC) [C34.91]     Hypoxemia [R09.02]     Multifocal atrial tachycardia (HCC) [I47.1]     Anemia [D64.9]     Bilateral leg edema [R60.0]     Physical deconditioning [R53.81]     Acute on chronic respiratory failure with hypoxemia ([address] [J96.21] 10/14/2018       The patient was seen and examined on day of discharge and this discharge summary is in conjunction with any daily progress note from day of discharge. Hospital Course:   Dexter Lopez is a 68 y.o. female admitted to [address] [place] [address] on 10/14/2018 for sob. She has a hx of lung ca, copd. The patient came to ER and the Hospitalist Service was asked to admit the patient. Her cxr was compatible with bilateral pneumonia and was treated as such. The pt was seen by consultants below. She developed multifocal atrial tach earlier in her stay and atrial flutter/fib later. We decided to pursue rate control. She was placed on Eliquis due to the afib/flutter. The arrhythmia would come and go. She gradually improved from her pneumonia and was discharged home with Home Health.     The patient will follow up with pcp and small-to-moderate right and small left pleural effusions. **This report has been created using voice recognition software. It may contain minor errors which are inherent in voice recognition technology. **      Final report electronically signed by Dr. Feli Xiao on 10/17/2018 5:21 AM      XR CHEST PORTABLE   Final Result      Nearly resolved right pleural effusion. Tiny left pleural effusion. Possible right hilar mass or adenopathy. Right perihilar interstitial infiltrates and patchy mid right lung and right basilar pneumonia or atelectasis. Stable acute versus chronic interstitial infiltrates throughout the left lung with medial left lower lobe infiltrate or atelectasis. **This report has been created using voice recognition software. It may contain minor errors which are inherent in voice recognition technology. **      Final report electronically signed by Dr. Feli Xiao on 10/16/2018 3:13 AM      VL DUP LOWER EXTREMITY VENOUS BILATERAL   Final Result   No evidence of deep vein thrombosis throughout the bilateral lower extremities. **This report has been created using voice recognition software. It may contain minor errors which are inherent in voice recognition technology. **      Final report electronically signed by Dr Venu Rendon on 10/15/2018 5:17 PM      XR CHEST PORTABLE   Final Result   1. Total \"whiteout\" of the entire right hemithorax due to combination of atelectasis/pneumonia and pleural fluid. There is shift of heart and mediastinal structures to the right. Note that the appearance has worsened relative to the recent CTA pulmonary    embolus study, 10/14/2018. 2. Mediport in place right side, catheter tip in superior vena cava. 3. Moderately increased interstitial markings throughout the left lung, consistent with interstitial edema/pneumonitis. **This report has been created using voice recognition software.   It may contain minor errors which are

## 2018-12-04 ENCOUNTER — TELEPHONE (OUTPATIENT)
Dept: CARDIOLOGY CLINIC | Age: 73
End: 2018-12-04

## 2018-12-11 ENCOUNTER — HOSPITAL ENCOUNTER (OUTPATIENT)
Age: 73
Setting detail: SPECIMEN
Discharge: HOME | End: 2018-12-11
Payer: MEDICARE

## 2018-12-11 PROCEDURE — 81276 KRAS GENE ADDL VARIANTS: CPT

## 2018-12-11 PROCEDURE — 81275 KRAS GENE VARIANTS EXON 2: CPT

## 2018-12-11 PROCEDURE — 81235 EGFR GENE COM VARIANTS: CPT

## 2018-12-11 PROCEDURE — 88342 IMHCHEM/IMCYTCHM 1ST ANTB: CPT

## 2018-12-11 PROCEDURE — 88381 MICRODISSECTION MANUAL: CPT

## 2018-12-11 PROCEDURE — 88360 TUMOR IMMUNOHISTOCHEM/MANUAL: CPT

## 2018-12-12 RX ORDER — MEPERIDINE HYDROCHLORIDE 50 MG/ML
12.5 INJECTION INTRAMUSCULAR; INTRAVENOUS; SUBCUTANEOUS ONCE
Status: CANCELLED | OUTPATIENT
Start: 2018-12-18 | End: 2018-12-18

## 2018-12-12 RX ORDER — METHYLPREDNISOLONE SODIUM SUCCINATE 125 MG/2ML
125 INJECTION, POWDER, LYOPHILIZED, FOR SOLUTION INTRAMUSCULAR; INTRAVENOUS ONCE
Status: CANCELLED | OUTPATIENT
Start: 2018-12-18 | End: 2018-12-18

## 2018-12-12 RX ORDER — SODIUM CHLORIDE 0.9 % (FLUSH) 0.9 %
5 SYRINGE (ML) INJECTION PRN
Status: CANCELLED | OUTPATIENT
Start: 2018-12-18

## 2018-12-12 RX ORDER — SODIUM CHLORIDE 0.9 % (FLUSH) 0.9 %
10 SYRINGE (ML) INJECTION PRN
Status: CANCELLED | OUTPATIENT
Start: 2018-12-18

## 2018-12-12 RX ORDER — EPINEPHRINE 1 MG/ML
0.3 INJECTION INTRAMUSCULAR; INTRAVENOUS; SUBCUTANEOUS PRN
Status: CANCELLED | OUTPATIENT
Start: 2018-12-18

## 2018-12-12 RX ORDER — SODIUM CHLORIDE 9 MG/ML
INJECTION, SOLUTION INTRAVENOUS CONTINUOUS
Status: CANCELLED | OUTPATIENT
Start: 2018-12-18

## 2018-12-12 RX ORDER — HEPARIN SODIUM (PORCINE) LOCK FLUSH IV SOLN 100 UNIT/ML 100 UNIT/ML
500 SOLUTION INTRAVENOUS PRN
Status: CANCELLED | OUTPATIENT
Start: 2018-12-18

## 2018-12-12 RX ORDER — SODIUM CHLORIDE 9 MG/ML
INJECTION, SOLUTION INTRAVENOUS ONCE
Status: CANCELLED | OUTPATIENT
Start: 2018-12-18 | End: 2018-12-18

## 2018-12-12 RX ORDER — DIPHENHYDRAMINE HYDROCHLORIDE 50 MG/ML
50 INJECTION INTRAMUSCULAR; INTRAVENOUS ONCE
Status: CANCELLED | OUTPATIENT
Start: 2018-12-18 | End: 2018-12-18

## 2018-12-12 RX ORDER — WATER 1000 ML/1000ML
2.2 INJECTION, SOLUTION INTRAVENOUS ONCE
Status: CANCELLED | OUTPATIENT
Start: 2018-12-18 | End: 2018-12-18

## 2018-12-14 ENCOUNTER — HOSPITAL ENCOUNTER (OUTPATIENT)
Dept: CT IMAGING | Age: 73
Discharge: HOME | End: 2018-12-14
Payer: MEDICARE

## 2018-12-14 ENCOUNTER — HOSPITAL ENCOUNTER (OUTPATIENT)
Age: 73
Discharge: HOME | End: 2018-12-14
Payer: MEDICARE

## 2018-12-14 DIAGNOSIS — C34.31 MALIGNANT NEOPLASM OF LOWER LOBE OF RIGHT LUNG (HCC): ICD-10-CM

## 2018-12-14 PROCEDURE — 6360000004 HC RX CONTRAST MEDICATION: Performed by: INTERNAL MEDICINE

## 2018-12-14 PROCEDURE — 6360000002 HC RX W HCPCS

## 2018-12-14 PROCEDURE — 71260 CT THORAX DX C+: CPT

## 2018-12-14 RX ORDER — HEPARIN SODIUM (PORCINE) LOCK FLUSH IV SOLN 100 UNIT/ML 100 UNIT/ML
500 SOLUTION INTRAVENOUS ONCE
Status: COMPLETED | OUTPATIENT
Start: 2018-12-14 | End: 2018-12-14

## 2018-12-14 RX ADMIN — HEPARIN SODIUM (PORCINE) LOCK FLUSH IV SOLN 100 UNIT/ML 500 UNITS: 100 SOLUTION at 13:07

## 2018-12-14 RX ADMIN — IOPAMIDOL 85 ML: 755 INJECTION, SOLUTION INTRAVENOUS at 12:52

## 2018-12-18 ENCOUNTER — HOSPITAL ENCOUNTER (OUTPATIENT)
Dept: INFUSION THERAPY | Age: 73
Discharge: HOME | End: 2018-12-18
Payer: MEDICARE

## 2018-12-18 VITALS
RESPIRATION RATE: 20 BRPM | TEMPERATURE: 98.5 F | DIASTOLIC BLOOD PRESSURE: 60 MMHG | HEART RATE: 95 BPM | OXYGEN SATURATION: 95 % | SYSTOLIC BLOOD PRESSURE: 129 MMHG

## 2018-12-18 DIAGNOSIS — R91.1 NODULE OF LEFT LUNG: ICD-10-CM

## 2018-12-18 DIAGNOSIS — C34.91 PRIMARY LUNG CANCER, RIGHT (HCC): ICD-10-CM

## 2018-12-18 DIAGNOSIS — C34.90 RECURRENT NON-SMALL CELL LUNG CANCER (NSCLC) (HCC): ICD-10-CM

## 2018-12-18 DIAGNOSIS — Z85.118 H/O: LUNG CANCER: ICD-10-CM

## 2018-12-18 DIAGNOSIS — J96.21 ACUTE ON CHRONIC RESPIRATORY FAILURE WITH HYPOXIA (HCC): ICD-10-CM

## 2018-12-18 PROCEDURE — 2580000003 HC RX 258: Performed by: INTERNAL MEDICINE

## 2018-12-18 PROCEDURE — 6360000002 HC RX W HCPCS: Performed by: INTERNAL MEDICINE

## 2018-12-18 PROCEDURE — 96413 CHEMO IV INFUSION 1 HR: CPT

## 2018-12-18 PROCEDURE — 2709999900 HC NON-CHARGEABLE SUPPLY

## 2018-12-18 RX ORDER — HEPARIN SODIUM (PORCINE) LOCK FLUSH IV SOLN 100 UNIT/ML 100 UNIT/ML
500 SOLUTION INTRAVENOUS PRN
Status: CANCELLED | OUTPATIENT
Start: 2018-12-18

## 2018-12-18 RX ORDER — 0.9 % SODIUM CHLORIDE 0.9 %
250 INTRAVENOUS SOLUTION INTRAVENOUS ONCE
Status: CANCELLED
Start: 2018-12-18 | End: 2018-12-18

## 2018-12-18 RX ORDER — SODIUM CHLORIDE 0.9 % (FLUSH) 0.9 %
20 SYRINGE (ML) INJECTION PRN
Status: CANCELLED | OUTPATIENT
Start: 2018-12-18

## 2018-12-18 RX ORDER — SODIUM CHLORIDE 0.9 % (FLUSH) 0.9 %
10 SYRINGE (ML) INJECTION PRN
Status: CANCELLED | OUTPATIENT
Start: 2018-12-18

## 2018-12-18 RX ORDER — SODIUM CHLORIDE 0.9 % (FLUSH) 0.9 %
20 SYRINGE (ML) INJECTION PRN
Status: DISCONTINUED | OUTPATIENT
Start: 2018-12-18 | End: 2018-12-19 | Stop reason: HOSPADM

## 2018-12-18 RX ORDER — SODIUM CHLORIDE 9 MG/ML
INJECTION, SOLUTION INTRAVENOUS ONCE
Status: COMPLETED | OUTPATIENT
Start: 2018-12-18 | End: 2018-12-18

## 2018-12-18 RX ORDER — SODIUM CHLORIDE 0.9 % (FLUSH) 0.9 %
10 SYRINGE (ML) INJECTION PRN
Status: DISCONTINUED | OUTPATIENT
Start: 2018-12-18 | End: 2018-12-19 | Stop reason: HOSPADM

## 2018-12-18 RX ORDER — HEPARIN SODIUM (PORCINE) LOCK FLUSH IV SOLN 100 UNIT/ML 100 UNIT/ML
500 SOLUTION INTRAVENOUS PRN
Status: DISCONTINUED | OUTPATIENT
Start: 2018-12-18 | End: 2018-12-19 | Stop reason: HOSPADM

## 2018-12-18 RX ADMIN — SODIUM CHLORIDE: 9 INJECTION, SOLUTION INTRAVENOUS at 10:39

## 2018-12-18 RX ADMIN — Medication 500 UNITS: at 12:15

## 2018-12-18 RX ADMIN — ATEZOLIZUMAB 1200 MG: 1200 INJECTION, SOLUTION INTRAVENOUS at 10:51

## 2018-12-18 RX ADMIN — Medication 10 ML: at 12:15

## 2018-12-18 RX ADMIN — Medication 10 ML: at 10:39

## 2018-12-18 NOTE — PROGRESS NOTES
Patient assessed for the following post chemotherapy:    Dizziness   No  Lightheadedness  No     Acute nausea/vomiting No  Headache   No  Chest pain/pressure  No  Rash/itching   No  Shortness of breath  No    Patient kept for 20 minutes observation post infusion chemotherapy. Patient tolerated chemotherapy treatment tecentriq without any complications. O2 @ 3L/NC maintained as at home. Last vital signs:   /60   Pulse 95   Temp 98.5 °F (36.9 °C) (Oral)   Resp 20   SpO2 95%     Patient instructed if experience any of the above symptoms following today's infusion,he/she is to notify MD immediately or go to the emergency department. Discharge instructions given to patient. Verbalizes understanding. Ambulated off unit per self  with belongings.

## 2018-12-18 NOTE — PLAN OF CARE
Problem: Infection - Central Venous Catheter-Associated Bloodstream Infection:  Intervention: Central line needs assessment  Discussed port maintenance, infection prevention, signs and when to call the doctor    Goal: Will show no infection signs and symptoms  Will show no infection signs and symptoms  Outcome: Met This Shift  Mediport site with no redness or warmth. Skin over port intact with no signs of breakdown noted. Patient verbalizes signs/symptoms of port infection and when to notify the physician. Problem: Musculor/Skeletal Functional Status  Intervention: Fall precautions  Discussed fall precautions, call light within reach. Goal: Absence of falls  Outcome: Met This Shift  Patient verbalizes understanding of fall precautions. Patient free from falls this visit. Problem: Intellectual/Education/Knowledge Deficit  Intervention: Verbal/written education provided  Chemotherapy Teaching     What is Chemotherapy   Drug action [x]   Method of Administration [x]   Handouts given []     Side Effects  Nausea/vomiting [x]   Diarrhea [x]   Fatigue [x]   Signs / Symptoms of infection [x]   Neutropenia [x]   Thrombocytopenia [x]   Alopecia [x]   neuropathy [x]   Kern diet &  the importance of fluids [x]       Micellaneous  Importance of nutrition [x]   Importance of oral hygiene [x]   When to call the MD [x]   Monitoring labs [x]   Use of supportive services []     Explanation of Drug Regimen / Frequency  tecentriq     Comments  Verbalized understanding to drug,action,side effects and when to call MD       Goal: Teaching initiated upon admission  Outcome: Met This Shift  Patient verbalizes understanding to verbal information given on tecentriq,action and possible side effects. Aware to call MD if develop complications. Problem: Discharge Planning  Intervention: Interaction with patient/family and care team  Discuss discharge instructions, follow ups, and when to call the doctor.     Goal: Knowledge of discharge instructions  Knowledge of discharge instructions    Outcome: Met This Shift  Verbalized understanding of discharge instructions, follow-up appointments, and when to call the physician. Comments: Care plan reviewed with patient. Patient verbalize understanding of the plan of care and contribute to goal setting.

## 2018-12-20 PROBLEM — C34.31 SQUAMOUS CELL CARCINOMA OF BRONCHUS IN RIGHT LOWER LOBE (HCC): Status: ACTIVE | Noted: 2018-12-20

## 2019-01-08 ENCOUNTER — HOSPITAL ENCOUNTER (OUTPATIENT)
Dept: INFUSION THERAPY | Age: 74
Discharge: HOME | End: 2019-01-08
Payer: MEDICARE

## 2019-01-08 VITALS
TEMPERATURE: 98.8 F | HEART RATE: 111 BPM | DIASTOLIC BLOOD PRESSURE: 56 MMHG | SYSTOLIC BLOOD PRESSURE: 118 MMHG | OXYGEN SATURATION: 99 % | RESPIRATION RATE: 20 BRPM

## 2019-01-08 DIAGNOSIS — J96.21 ACUTE ON CHRONIC RESPIRATORY FAILURE WITH HYPOXIA (HCC): ICD-10-CM

## 2019-01-08 DIAGNOSIS — R91.1 NODULE OF LEFT LUNG: ICD-10-CM

## 2019-01-08 DIAGNOSIS — C34.91 PRIMARY LUNG CANCER, RIGHT (HCC): ICD-10-CM

## 2019-01-08 DIAGNOSIS — Z85.118 H/O: LUNG CANCER: ICD-10-CM

## 2019-01-08 DIAGNOSIS — C34.92 MALIGNANT NEOPLASM OF LEFT LUNG, UNSPECIFIED PART OF LUNG (HCC): ICD-10-CM

## 2019-01-08 DIAGNOSIS — C34.90 RECURRENT NON-SMALL CELL LUNG CANCER (NSCLC) (HCC): ICD-10-CM

## 2019-01-08 DIAGNOSIS — C34.31 SQUAMOUS CELL CARCINOMA OF BRONCHUS IN RIGHT LOWER LOBE (HCC): ICD-10-CM

## 2019-01-08 LAB
T4 FREE: 1.46 NG/DL (ref 0.93–1.76)
TSH SERPL DL<=0.05 MIU/L-ACNC: 0.62 UIU/ML (ref 0.4–4.2)

## 2019-01-08 PROCEDURE — 2580000003 HC RX 258: Performed by: INTERNAL MEDICINE

## 2019-01-08 PROCEDURE — 84443 ASSAY THYROID STIM HORMONE: CPT

## 2019-01-08 PROCEDURE — 84439 ASSAY OF FREE THYROXINE: CPT

## 2019-01-08 PROCEDURE — G0463 HOSPITAL OUTPT CLINIC VISIT: HCPCS

## 2019-01-08 PROCEDURE — 96372 THER/PROPH/DIAG INJ SC/IM: CPT

## 2019-01-08 PROCEDURE — 6360000002 HC RX W HCPCS: Performed by: INTERNAL MEDICINE

## 2019-01-08 PROCEDURE — 2709999900 HC NON-CHARGEABLE SUPPLY

## 2019-01-08 PROCEDURE — 36591 DRAW BLOOD OFF VENOUS DEVICE: CPT

## 2019-01-08 RX ORDER — HEPARIN SODIUM (PORCINE) LOCK FLUSH IV SOLN 100 UNIT/ML 100 UNIT/ML
500 SOLUTION INTRAVENOUS PRN
Status: CANCELLED | OUTPATIENT
Start: 2019-01-08

## 2019-01-08 RX ORDER — 0.9 % SODIUM CHLORIDE 0.9 %
250 INTRAVENOUS SOLUTION INTRAVENOUS ONCE
Status: CANCELLED
Start: 2019-01-08 | End: 2019-01-08

## 2019-01-08 RX ORDER — SODIUM CHLORIDE 0.9 % (FLUSH) 0.9 %
20 SYRINGE (ML) INJECTION PRN
Status: DISCONTINUED | OUTPATIENT
Start: 2019-01-08 | End: 2019-01-09 | Stop reason: HOSPADM

## 2019-01-08 RX ORDER — HEPARIN SODIUM (PORCINE) LOCK FLUSH IV SOLN 100 UNIT/ML 100 UNIT/ML
500 SOLUTION INTRAVENOUS PRN
Status: DISCONTINUED | OUTPATIENT
Start: 2019-01-08 | End: 2019-01-09 | Stop reason: HOSPADM

## 2019-01-08 RX ORDER — SODIUM CHLORIDE 0.9 % (FLUSH) 0.9 %
20 SYRINGE (ML) INJECTION PRN
Status: CANCELLED | OUTPATIENT
Start: 2019-01-08

## 2019-01-08 RX ORDER — SODIUM CHLORIDE 0.9 % (FLUSH) 0.9 %
10 SYRINGE (ML) INJECTION PRN
Status: CANCELLED | OUTPATIENT
Start: 2019-01-08

## 2019-01-08 RX ORDER — SODIUM CHLORIDE 0.9 % (FLUSH) 0.9 %
10 SYRINGE (ML) INJECTION PRN
Status: DISCONTINUED | OUTPATIENT
Start: 2019-01-08 | End: 2019-01-09 | Stop reason: HOSPADM

## 2019-01-08 RX ADMIN — Medication 10 ML: at 15:00

## 2019-01-08 RX ADMIN — Medication 500 UNITS: at 15:01

## 2019-01-08 RX ADMIN — ERYTHROPOIETIN 40000 UNITS: 40000 INJECTION, SOLUTION INTRAVENOUS; SUBCUTANEOUS at 15:05

## 2019-01-08 RX ADMIN — Medication 20 ML: at 15:01

## 2019-01-08 NOTE — PLAN OF CARE
Problem: Intellectual/Education/Knowledge Deficit  Intervention: Verbal/written education provided  Discuss purpose and side effects related to Procrit injection. Discussed purpose and procedure for mediport access and lab draw. Goal: Teaching initiated upon admission  Outcome: Met This Shift  Patient verbalizes understanding to verbal information given on Procrit injection /Mediport access and blood draw ,action and possible side effects. Aware to call MD if develop complications. Problem: Musculor/Skeletal Functional Status  Intervention: Fall precautions  Discussed the need to use the call light for assistance when getting up to ambulate. Goal: Absence of falls  Outcome: Met This Shift  Free from falls while in O.P. Oncology. Problem: Discharge Planning  Intervention: Discharge to appropriate level of care  Discuss understanding of discharge instructions,follow-up appointments, and when to call the physician. Goal: Knowledge of discharge instructions  Knowledge of discharge instructions     Outcome: Met This Shift  Verbalized understanding of discharge instructions, follow-up appointments, and when to call the physician. Problem: Infection - Central Venous Catheter-Associated Bloodstream Infection:  Intervention: Infection risk assessment  Discuss port maintenance, infection prevention, signs and when to call physician. Goal: Will show no infection signs and symptoms  Will show no infection signs and symptoms   Outcome: Met This Shift  Mediport site with no redness or warmth. Skin over port site intact with no signs of breakdown noted. Patient verbalizes signs/symptoms of port infection and when to notify the physician. Comments: Care plan reviewed with patient and daughter. Patient and daughter verbalize understanding of the plan of care and contribute to goal setting.

## 2019-01-08 NOTE — PROGRESS NOTES
Spoke with office staff at Dr. All Pulido office relating to patient voicing she need labs drawn today. Dr. Antoni Sadler reported that office to fax over orders for T4 and TSH, but reported no need for CBC, Hgb reported as 8.1 from office staff. Reported okay to proceed with injection.

## 2019-01-08 NOTE — PROGRESS NOTES
Patient assessed for the following post mediport access and lab draw /Procrit injection:    Dizziness   No  Lightheadedness  No      Acute nausea/vomiting No  Headache   No  Chest pain/pressure  No  Rash/itching   No  Shortness of breath  No    Patient tolerated mediport access and lab draw /Procrit injection without any complications. Last vital signs:   BP (!) 118/56   Pulse 111   Temp 98.8 °F (37.1 °C) (Tympanic)   Resp 20   SpO2 99%     Patient instructed if experience any of the above symptoms following today's infusion, she is to notify MD immediately or go to the emergency department. Discharge instructions given to patient. Verbalizes understanding. Patient returned to her home oxygen tank at 3 liters nasal cannula. Patient assisted to wheelchair, wheelchaired off the unit accompanied by daughter and Stanford Heaton RN, with belongings.

## 2019-01-09 RX ORDER — SODIUM CHLORIDE 9 MG/ML
INJECTION, SOLUTION INTRAVENOUS ONCE
Status: CANCELLED | OUTPATIENT
Start: 2019-01-15 | End: 2019-01-15

## 2019-01-09 RX ORDER — MEPERIDINE HYDROCHLORIDE 50 MG/ML
12.5 INJECTION INTRAMUSCULAR; INTRAVENOUS; SUBCUTANEOUS ONCE
Status: CANCELLED | OUTPATIENT
Start: 2019-01-15 | End: 2019-01-15

## 2019-01-09 RX ORDER — SODIUM CHLORIDE 9 MG/ML
INJECTION, SOLUTION INTRAVENOUS CONTINUOUS
Status: CANCELLED | OUTPATIENT
Start: 2019-01-15

## 2019-01-09 RX ORDER — SODIUM CHLORIDE 0.9 % (FLUSH) 0.9 %
10 SYRINGE (ML) INJECTION PRN
Status: CANCELLED | OUTPATIENT
Start: 2019-01-15

## 2019-01-09 RX ORDER — SODIUM CHLORIDE 0.9 % (FLUSH) 0.9 %
5 SYRINGE (ML) INJECTION PRN
Status: CANCELLED | OUTPATIENT
Start: 2019-01-15

## 2019-01-09 RX ORDER — DIPHENHYDRAMINE HYDROCHLORIDE 50 MG/ML
50 INJECTION INTRAMUSCULAR; INTRAVENOUS ONCE
Status: CANCELLED | OUTPATIENT
Start: 2019-01-15 | End: 2019-01-15

## 2019-01-09 RX ORDER — METHYLPREDNISOLONE SODIUM SUCCINATE 125 MG/2ML
125 INJECTION, POWDER, LYOPHILIZED, FOR SOLUTION INTRAMUSCULAR; INTRAVENOUS ONCE
Status: CANCELLED | OUTPATIENT
Start: 2019-01-15 | End: 2019-01-15

## 2019-01-09 RX ORDER — HEPARIN SODIUM (PORCINE) LOCK FLUSH IV SOLN 100 UNIT/ML 100 UNIT/ML
500 SOLUTION INTRAVENOUS PRN
Status: CANCELLED | OUTPATIENT
Start: 2019-01-15

## 2019-01-09 RX ORDER — WATER 1000 ML/1000ML
2.2 INJECTION, SOLUTION INTRAVENOUS ONCE
Status: CANCELLED | OUTPATIENT
Start: 2019-01-15 | End: 2019-01-15

## 2019-01-09 RX ORDER — EPINEPHRINE 1 MG/ML
0.3 INJECTION INTRAMUSCULAR; INTRAVENOUS; SUBCUTANEOUS PRN
Status: CANCELLED | OUTPATIENT
Start: 2019-01-15

## 2019-01-15 ENCOUNTER — HOSPITAL ENCOUNTER (OUTPATIENT)
Dept: INFUSION THERAPY | Age: 74
Discharge: HOME | End: 2019-01-15
Payer: MEDICARE

## 2019-01-15 VITALS
HEART RATE: 104 BPM | HEIGHT: 60 IN | TEMPERATURE: 97.8 F | WEIGHT: 126 LBS | SYSTOLIC BLOOD PRESSURE: 127 MMHG | RESPIRATION RATE: 20 BRPM | DIASTOLIC BLOOD PRESSURE: 54 MMHG | OXYGEN SATURATION: 95 % | BODY MASS INDEX: 24.74 KG/M2

## 2019-01-15 DIAGNOSIS — C34.92 MALIGNANT NEOPLASM OF LEFT LUNG, UNSPECIFIED PART OF LUNG (HCC): ICD-10-CM

## 2019-01-15 DIAGNOSIS — J96.21 ACUTE ON CHRONIC RESPIRATORY FAILURE WITH HYPOXIA (HCC): ICD-10-CM

## 2019-01-15 DIAGNOSIS — C34.90 RECURRENT NON-SMALL CELL LUNG CANCER (NSCLC) (HCC): ICD-10-CM

## 2019-01-15 DIAGNOSIS — C34.31 SQUAMOUS CELL CARCINOMA OF BRONCHUS IN RIGHT LOWER LOBE (HCC): ICD-10-CM

## 2019-01-15 DIAGNOSIS — R91.1 NODULE OF LEFT LUNG: ICD-10-CM

## 2019-01-15 DIAGNOSIS — C34.91 PRIMARY LUNG CANCER, RIGHT (HCC): ICD-10-CM

## 2019-01-15 DIAGNOSIS — Z85.118 H/O: LUNG CANCER: ICD-10-CM

## 2019-01-15 PROCEDURE — 96413 CHEMO IV INFUSION 1 HR: CPT

## 2019-01-15 PROCEDURE — 2580000003 HC RX 258: Performed by: INTERNAL MEDICINE

## 2019-01-15 PROCEDURE — 2709999900 HC NON-CHARGEABLE SUPPLY

## 2019-01-15 PROCEDURE — 6360000002 HC RX W HCPCS: Performed by: INTERNAL MEDICINE

## 2019-01-15 RX ORDER — SODIUM CHLORIDE 0.9 % (FLUSH) 0.9 %
20 SYRINGE (ML) INJECTION PRN
Status: DISCONTINUED | OUTPATIENT
Start: 2019-01-15 | End: 2019-01-16 | Stop reason: HOSPADM

## 2019-01-15 RX ORDER — 0.9 % SODIUM CHLORIDE 0.9 %
250 INTRAVENOUS SOLUTION INTRAVENOUS ONCE
Status: CANCELLED
Start: 2019-01-15 | End: 2019-01-15

## 2019-01-15 RX ORDER — SODIUM CHLORIDE 9 MG/ML
INJECTION, SOLUTION INTRAVENOUS ONCE
Status: COMPLETED | OUTPATIENT
Start: 2019-01-15 | End: 2019-01-15

## 2019-01-15 RX ORDER — SODIUM CHLORIDE 0.9 % (FLUSH) 0.9 %
10 SYRINGE (ML) INJECTION PRN
Status: DISCONTINUED | OUTPATIENT
Start: 2019-01-15 | End: 2019-01-16 | Stop reason: HOSPADM

## 2019-01-15 RX ORDER — SODIUM CHLORIDE 0.9 % (FLUSH) 0.9 %
10 SYRINGE (ML) INJECTION PRN
Status: CANCELLED | OUTPATIENT
Start: 2019-01-15

## 2019-01-15 RX ORDER — HEPARIN SODIUM (PORCINE) LOCK FLUSH IV SOLN 100 UNIT/ML 100 UNIT/ML
500 SOLUTION INTRAVENOUS PRN
Status: CANCELLED | OUTPATIENT
Start: 2019-01-15

## 2019-01-15 RX ORDER — SODIUM CHLORIDE 0.9 % (FLUSH) 0.9 %
20 SYRINGE (ML) INJECTION PRN
Status: CANCELLED | OUTPATIENT
Start: 2019-01-15

## 2019-01-15 RX ORDER — HEPARIN SODIUM (PORCINE) LOCK FLUSH IV SOLN 100 UNIT/ML 100 UNIT/ML
500 SOLUTION INTRAVENOUS PRN
Status: DISCONTINUED | OUTPATIENT
Start: 2019-01-15 | End: 2019-01-16 | Stop reason: HOSPADM

## 2019-01-15 RX ADMIN — SODIUM CHLORIDE: 9 INJECTION, SOLUTION INTRAVENOUS at 10:56

## 2019-01-15 RX ADMIN — Medication 20 ML: at 12:00

## 2019-01-15 RX ADMIN — Medication 500 UNITS: at 12:00

## 2019-01-15 RX ADMIN — ATEZOLIZUMAB 1200 MG: 1200 INJECTION, SOLUTION INTRAVENOUS at 11:19

## 2019-01-15 ASSESSMENT — PAIN SCALES - GENERAL: PAINLEVEL_OUTOF10: 9

## 2019-01-15 ASSESSMENT — PAIN DESCRIPTION - LOCATION: LOCATION: BACK

## 2019-01-15 NOTE — PROGRESS NOTES
Patient assessed for the following post chemotherapy:    Dizziness   No  Lightheadedness  No      Acute nausea/vomiting No  Headache   No  Chest pain/pressure  No  Rash/itching   No  Shortness of breath  No    Patient decline to be kept for 20 minutes observation post infusion chemotherapy. Patient tolerated chemotherapy treatment Tecentriq without any complications. Last vital signs:   BP (!) 127/54   Pulse 104   Temp 97.8 °F (36.6 °C) (Oral)   Resp 20   Ht 5' (1.524 m)   Wt 126 lb (57.2 kg)   SpO2 95%   BMI 24.61 kg/m²     Patient instructed if experience any of the above symptoms following today's infusion, she is to notify MD immediately or go to the emergency department. Discharge instructions given to patient. Verbalizes understanding. Returned to home oxygen at 3 liters nasal cannula. Wheelchaired  off unit by Sonja Salinas RN, accompanied by daughter with belongings.

## 2019-01-15 NOTE — PLAN OF CARE
Problem: Pain:  Intervention: Assess barriers to pain control  Patient encouraged to take prescribed pain medications and call Physician if pain is not controlled. Goal: Control of chronic pain  Control of chronic pain   Outcome: Met This Shift  Patient rating pain a 9 out of 10 using SIMI scale, Pain located in lower back. Problem: Musculor/Skeletal Functional Status  Intervention: Fall precautions  Discussed the need to use the call light for assistance when getting up to ambulate. Goal: Absence of falls  Outcome: Met This Shift  Free from falls while in [facility].P. Oncology. Problem: Intellectual/Education/Knowledge Deficit  Intervention: Verbal/written education provided  Chemotherapy Teaching     What is Chemotherapy   Drug action [x]   Method of Administration [x]   Handouts given []     Side Effects  Nausea/vomiting [x]   Diarrhea [x]   Fatigue [x]   Signs / Symptoms of infection [x]   Neutropenia [x]   Thrombocytopenia [x]   Alopecia [x]   neuropathy [x]   [place] diet &  the importance of fluids [x]       Micellaneous  Importance of nutrition [x]   Importance of oral hygiene [x]   When to call the MD [x]   Monitoring labs [x]   Use of supportive services []     Explanation of Drug Regimen / Frequency  Tecentriq D1C2     Comments  Verbalized understanding to drug,action,side effects and when to call MD       Goal: Teaching initiated upon admission  Outcome: Met This Shift  Patient verbalizes understanding to verbal information given on Tecentriq ,action and possible side effects. Aware to call MD if develop complications. Problem: Discharge Planning  Intervention: Interaction with patient/family and care team  Discuss understanding of discharge instructions,follow-up appointments, and when to call the physician.     Goal: Knowledge of discharge instructions  Knowledge of discharge instructions     Outcome: Met This Shift  Verbalized understanding of discharge instructions, follow-up appointments, and when to call the physician. Problem: Infection - Central Venous Catheter-Associated Bloodstream Infection:  Intervention: Infection risk assessment  Discuss port maintenance, infection prevention, signs and when to call physician. Goal: Will show no infection signs and symptoms  Will show no infection signs and symptoms   Outcome: Met This Shift  Mediport site with no redness or warmth. Skin over port site intact with no signs of breakdown noted. Patient verbalizes signs/symptoms of port infection and when to notify the physician. Comments: Care plan reviewed with patient and daughter. Patient and daughter verbalize understanding of the plan of care and contribute to goal setting.

## 2019-01-15 NOTE — ONCOLOGY
Chemotherapy Administration    Pre-assessment Data: Antineoplastic Agents  Other:   See toxicity flow sheet for assessment [x]     Physician Notification of Concerns Related to Chemotherapy Administration:   Physician Notified Gerilyn Shone / Time of Notification      Interventions:   Lab work assessed  [x]   Height / Weight verified for dose [x]   Current MAR reviewed [x]   Emergency drugs available as appropriate [x]   Anaphylaxis assessment completed [x]   Pre-medications administered as ordered [x]   Blood return noted upon initiation of chemotherapy [x]   Blood return noted each 1-2ml of a vesicant medication if given IV push []   Blood return noted each 2-3ml of a non-vesicant medication if given IV push []   Monitor for signs / symptoms of hypersensitivity reaction [x]   Chemotherapy orders (drug/dose/rate) verified by 2 Chemo certified RNs [x]   Monitor IV site and blood return throughout the infusion of the medication [x]   Document IV site checks on the IV assessment form [x]   Document chemotherapy teaching on the Patient Education tab [x]   Document patient verbalizes understanding of medications being administered [x]   If IV infiltration, see ONS Guidelines []   Other:     Tecentriq T4U2 []

## 2019-01-17 ENCOUNTER — HOSPITAL ENCOUNTER (OUTPATIENT)
Dept: MRI IMAGING | Age: 74
Discharge: HOME | End: 2019-01-17
Payer: MEDICARE

## 2019-01-17 DIAGNOSIS — C34.90 MALIGNANT NEOPLASM OF LUNG, UNSPECIFIED LATERALITY, UNSPECIFIED PART OF LUNG (HCC): ICD-10-CM

## 2019-01-30 RX ORDER — METHYLPREDNISOLONE SODIUM SUCCINATE 125 MG/2ML
125 INJECTION, POWDER, LYOPHILIZED, FOR SOLUTION INTRAMUSCULAR; INTRAVENOUS ONCE
Status: CANCELLED | OUTPATIENT
Start: 2019-02-05 | End: 2019-02-05

## 2019-01-30 RX ORDER — HEPARIN SODIUM (PORCINE) LOCK FLUSH IV SOLN 100 UNIT/ML 100 UNIT/ML
500 SOLUTION INTRAVENOUS PRN
Status: CANCELLED | OUTPATIENT
Start: 2019-02-05

## 2019-01-30 RX ORDER — MEPERIDINE HYDROCHLORIDE 50 MG/ML
12.5 INJECTION INTRAMUSCULAR; INTRAVENOUS; SUBCUTANEOUS ONCE
Status: CANCELLED | OUTPATIENT
Start: 2019-02-05 | End: 2019-02-05

## 2019-01-30 RX ORDER — SODIUM CHLORIDE 9 MG/ML
INJECTION, SOLUTION INTRAVENOUS ONCE
Status: CANCELLED | OUTPATIENT
Start: 2019-02-05 | End: 2019-02-05

## 2019-01-30 RX ORDER — EPINEPHRINE 1 MG/ML
0.3 INJECTION INTRAMUSCULAR; INTRAVENOUS; SUBCUTANEOUS PRN
Status: CANCELLED | OUTPATIENT
Start: 2019-02-05

## 2019-01-30 RX ORDER — SODIUM CHLORIDE 0.9 % (FLUSH) 0.9 %
5 SYRINGE (ML) INJECTION PRN
Status: CANCELLED | OUTPATIENT
Start: 2019-02-05

## 2019-01-30 RX ORDER — SODIUM CHLORIDE 9 MG/ML
INJECTION, SOLUTION INTRAVENOUS CONTINUOUS
Status: CANCELLED | OUTPATIENT
Start: 2019-02-05

## 2019-01-30 RX ORDER — DIPHENHYDRAMINE HYDROCHLORIDE 50 MG/ML
50 INJECTION INTRAMUSCULAR; INTRAVENOUS ONCE
Status: CANCELLED | OUTPATIENT
Start: 2019-02-05 | End: 2019-02-05

## 2019-01-30 RX ORDER — WATER 1000 ML/1000ML
2.2 INJECTION, SOLUTION INTRAVENOUS ONCE
Status: CANCELLED | OUTPATIENT
Start: 2019-02-05 | End: 2019-02-05

## 2019-01-30 RX ORDER — SODIUM CHLORIDE 0.9 % (FLUSH) 0.9 %
10 SYRINGE (ML) INJECTION PRN
Status: CANCELLED | OUTPATIENT
Start: 2019-02-05

## 2019-02-04 ENCOUNTER — OFFICE VISIT (OUTPATIENT)
Dept: PULMONOLOGY | Age: 74
End: 2019-02-04
Payer: MEDICARE

## 2019-02-04 VITALS
BODY MASS INDEX: 23.98 KG/M2 | TEMPERATURE: 97.3 F | OXYGEN SATURATION: 84 % | DIASTOLIC BLOOD PRESSURE: 68 MMHG | WEIGHT: 127 LBS | HEIGHT: 61 IN | SYSTOLIC BLOOD PRESSURE: 110 MMHG | HEART RATE: 102 BPM

## 2019-02-04 DIAGNOSIS — M79.89 LEG SWELLING: ICD-10-CM

## 2019-02-04 DIAGNOSIS — Z87.891 HISTORY OF TOBACCO ABUSE: ICD-10-CM

## 2019-02-04 DIAGNOSIS — J44.9 STAGE 3 SEVERE COPD BY GOLD CLASSIFICATION (HCC): ICD-10-CM

## 2019-02-04 DIAGNOSIS — C34.31 SQUAMOUS CELL CARCINOMA OF BRONCHUS IN RIGHT LOWER LOBE (HCC): Primary | ICD-10-CM

## 2019-02-04 DIAGNOSIS — J96.21 ACUTE ON CHRONIC RESPIRATORY FAILURE WITH HYPOXEMIA (HCC): ICD-10-CM

## 2019-02-04 PROCEDURE — G8482 FLU IMMUNIZE ORDER/ADMIN: HCPCS | Performed by: NURSE PRACTITIONER

## 2019-02-04 PROCEDURE — 1101F PT FALLS ASSESS-DOCD LE1/YR: CPT | Performed by: NURSE PRACTITIONER

## 2019-02-04 PROCEDURE — 1123F ACP DISCUSS/DSCN MKR DOCD: CPT | Performed by: NURSE PRACTITIONER

## 2019-02-04 PROCEDURE — 1036F TOBACCO NON-USER: CPT | Performed by: NURSE PRACTITIONER

## 2019-02-04 PROCEDURE — 3017F COLORECTAL CA SCREEN DOC REV: CPT | Performed by: NURSE PRACTITIONER

## 2019-02-04 PROCEDURE — 1090F PRES/ABSN URINE INCON ASSESS: CPT | Performed by: NURSE PRACTITIONER

## 2019-02-04 PROCEDURE — G8400 PT W/DXA NO RESULTS DOC: HCPCS | Performed by: NURSE PRACTITIONER

## 2019-02-04 PROCEDURE — G8926 SPIRO NO PERF OR DOC: HCPCS | Performed by: NURSE PRACTITIONER

## 2019-02-04 PROCEDURE — G8420 CALC BMI NORM PARAMETERS: HCPCS | Performed by: NURSE PRACTITIONER

## 2019-02-04 PROCEDURE — 4040F PNEUMOC VAC/ADMIN/RCVD: CPT | Performed by: NURSE PRACTITIONER

## 2019-02-04 PROCEDURE — G8428 CUR MEDS NOT DOCUMENT: HCPCS | Performed by: NURSE PRACTITIONER

## 2019-02-04 PROCEDURE — 99215 OFFICE O/P EST HI 40 MIN: CPT | Performed by: NURSE PRACTITIONER

## 2019-02-04 PROCEDURE — 3023F SPIROM DOC REV: CPT | Performed by: NURSE PRACTITIONER

## 2019-02-04 ASSESSMENT — ENCOUNTER SYMPTOMS
DIARRHEA: 0
ALLERGIC/IMMUNOLOGIC NEGATIVE: 1
STRIDOR: 0
EYES NEGATIVE: 1
COUGH: 1
NAUSEA: 0
BACK PAIN: 0
SHORTNESS OF BREATH: 0
WHEEZING: 0
CHEST TIGHTNESS: 0

## 2019-02-05 ENCOUNTER — HOSPITAL ENCOUNTER (OUTPATIENT)
Dept: INFUSION THERAPY | Age: 74
Discharge: HOME | End: 2019-02-05
Payer: MEDICARE

## 2019-02-05 VITALS
OXYGEN SATURATION: 95 % | TEMPERATURE: 98.5 F | WEIGHT: 128 LBS | RESPIRATION RATE: 18 BRPM | HEART RATE: 88 BPM | SYSTOLIC BLOOD PRESSURE: 98 MMHG | DIASTOLIC BLOOD PRESSURE: 55 MMHG | BODY MASS INDEX: 24.17 KG/M2 | HEIGHT: 61 IN

## 2019-02-05 DIAGNOSIS — C34.31 SQUAMOUS CELL CARCINOMA OF BRONCHUS IN RIGHT LOWER LOBE (HCC): ICD-10-CM

## 2019-02-05 DIAGNOSIS — Z85.118 H/O: LUNG CANCER: ICD-10-CM

## 2019-02-05 DIAGNOSIS — C34.90 RECURRENT NON-SMALL CELL LUNG CANCER (NSCLC) (HCC): ICD-10-CM

## 2019-02-05 DIAGNOSIS — C34.91 PRIMARY LUNG CANCER, RIGHT (HCC): ICD-10-CM

## 2019-02-05 DIAGNOSIS — R91.1 NODULE OF LEFT LUNG: ICD-10-CM

## 2019-02-05 DIAGNOSIS — C34.92 MALIGNANT NEOPLASM OF LEFT LUNG, UNSPECIFIED PART OF LUNG (HCC): ICD-10-CM

## 2019-02-05 DIAGNOSIS — J96.21 ACUTE ON CHRONIC RESPIRATORY FAILURE WITH HYPOXIA (HCC): ICD-10-CM

## 2019-02-05 PROCEDURE — 96372 THER/PROPH/DIAG INJ SC/IM: CPT

## 2019-02-05 PROCEDURE — 96413 CHEMO IV INFUSION 1 HR: CPT

## 2019-02-05 PROCEDURE — 2709999900 HC NON-CHARGEABLE SUPPLY

## 2019-02-05 PROCEDURE — 2580000003 HC RX 258: Performed by: INTERNAL MEDICINE

## 2019-02-05 PROCEDURE — 6360000002 HC RX W HCPCS: Performed by: INTERNAL MEDICINE

## 2019-02-05 RX ORDER — HEPARIN SODIUM (PORCINE) LOCK FLUSH IV SOLN 100 UNIT/ML 100 UNIT/ML
500 SOLUTION INTRAVENOUS PRN
Status: DISCONTINUED | OUTPATIENT
Start: 2019-02-05 | End: 2019-02-05

## 2019-02-05 RX ORDER — SODIUM CHLORIDE 0.9 % (FLUSH) 0.9 %
10 SYRINGE (ML) INJECTION PRN
Status: DISCONTINUED | OUTPATIENT
Start: 2019-02-05 | End: 2019-02-05

## 2019-02-05 RX ORDER — HEPARIN SODIUM (PORCINE) LOCK FLUSH IV SOLN 100 UNIT/ML 100 UNIT/ML
500 SOLUTION INTRAVENOUS PRN
Status: CANCELLED | OUTPATIENT
Start: 2019-02-05

## 2019-02-05 RX ORDER — SODIUM CHLORIDE 0.9 % (FLUSH) 0.9 %
20 SYRINGE (ML) INJECTION PRN
Status: CANCELLED | OUTPATIENT
Start: 2019-02-05

## 2019-02-05 RX ORDER — SODIUM CHLORIDE 0.9 % (FLUSH) 0.9 %
20 SYRINGE (ML) INJECTION PRN
Status: DISCONTINUED | OUTPATIENT
Start: 2019-02-05 | End: 2019-02-05

## 2019-02-05 RX ORDER — SODIUM CHLORIDE 0.9 % (FLUSH) 0.9 %
10 SYRINGE (ML) INJECTION PRN
Status: CANCELLED | OUTPATIENT
Start: 2019-02-05

## 2019-02-05 RX ORDER — 0.9 % SODIUM CHLORIDE 0.9 %
250 INTRAVENOUS SOLUTION INTRAVENOUS ONCE
Status: CANCELLED
Start: 2019-02-05 | End: 2019-02-05

## 2019-02-05 RX ORDER — HEPARIN SODIUM (PORCINE) LOCK FLUSH IV SOLN 100 UNIT/ML 100 UNIT/ML
500 SOLUTION INTRAVENOUS PRN
Status: DISCONTINUED | OUTPATIENT
Start: 2019-02-05 | End: 2019-02-06 | Stop reason: HOSPADM

## 2019-02-05 RX ORDER — SODIUM CHLORIDE 9 MG/ML
INJECTION, SOLUTION INTRAVENOUS ONCE
Status: COMPLETED | OUTPATIENT
Start: 2019-02-05 | End: 2019-02-05

## 2019-02-05 RX ADMIN — ERYTHROPOIETIN 40000 UNITS: 20000 INJECTION, SOLUTION INTRAVENOUS; SUBCUTANEOUS at 11:33

## 2019-02-05 RX ADMIN — ATEZOLIZUMAB 1200 MG: 1200 INJECTION, SOLUTION INTRAVENOUS at 11:00

## 2019-02-05 RX ADMIN — Medication 500 UNITS: at 11:45

## 2019-02-05 RX ADMIN — SODIUM CHLORIDE: 9 INJECTION, SOLUTION INTRAVENOUS at 10:55

## 2019-02-05 ASSESSMENT — PAIN DESCRIPTION - LOCATION: LOCATION: BACK

## 2019-02-05 ASSESSMENT — PAIN SCALES - GENERAL: PAINLEVEL_OUTOF10: 9

## 2019-02-05 ASSESSMENT — PAIN DESCRIPTION - PAIN TYPE: TYPE: CHRONIC PAIN

## 2019-02-05 NOTE — ONCOLOGY
Chemotherapy Administration    Pre-assessment Data: Antineoplastic Agents  Other:   See toxicity flow sheet for assessment [x]     Physician Notification of Concerns Related to Chemotherapy Administration:   Physician Notified Bronson Lions / Time of Notification      Interventions:   Lab work assessed  [x]   Height / Weight verified for dose [x]   Current MAR reviewed [x]   Emergency drugs available as appropriate [x]   Anaphylaxis assessment completed [x]   Pre-medications administered as ordered [x]   Blood return noted upon initiation of chemotherapy [x]   Blood return noted each 1-2ml of a vesicant medication if given IV push []   Blood return noted each 2-3ml of a non-vesicant medication if given IV push []   Monitor for signs / symptoms of hypersensitivity reaction [x]   Chemotherapy orders (drug/dose/rate) verified by 2 Chemo certified RNs [x]   Monitor IV site and blood return throughout the infusion of the medication [x]   Document IV site checks on the IV assessment form [x]   Document chemotherapy teaching on the Patient Education tab [x]   Document patient verbalizes understanding of medications being administered [x]   If IV infiltration, see ONS Guidelines []   Other:     Tecentriq /Procrit []

## 2019-02-05 NOTE — PROGRESS NOTES
Patient assessed for the following post chemotherapy:    Dizziness   No  Lightheadedness  No      Acute nausea/vomiting No  Headache   No  Chest pain/pressure  No  Rash/itching   No  Shortness of breath  No    Patient declined to be kept for 20 minutes observation post infusion chemotherapy. Patient tolerated chemotherapy treatment Tecentriq /Procrit without any complications. Last vital signs:   BP (!) 98/55   Pulse 88   Temp 98.5 °F (36.9 °C) (Oral)   Resp 18   Ht 5' 1\" (1.549 m)   Wt 128 lb (58.1 kg)   SpO2 95%   BMI 24.19 kg/m²     Patient instructed if experience any of the above symptoms following today's infusion, she is to notify MD immediately or go to the emergency department. Discharge instructions given to patient. Verbalizes understanding. Assisted to wheelchair, accompanied by daughter off unit, with belongings.

## 2019-02-05 NOTE — PLAN OF CARE
instructions  Knowledge of discharge instructions     Outcome: Met This Shift  Verbalized understanding of discharge instructions, follow-up appointments, and when to call the physician. Problem: Infection - Central Venous Catheter-Associated Bloodstream Infection:  Intervention: Infection risk assessment  Discuss port maintenance, infection prevention, signs and when to call physician. Goal: Will show no infection signs and symptoms  Will show no infection signs and symptoms   Outcome: Met This Shift  Mediport site with no redness or warmth. Skin over port site intact with no signs of breakdown noted. Patient verbalizes signs/symptoms of port infection and when to notify the physician. Comments: Care plan reviewed with patient and daughter. Patient and daughter verbalize understanding of the plan of care and contribute to goal setting.

## 2019-02-07 RX ORDER — SODIUM CHLORIDE 0.9 % (FLUSH) 0.9 %
5 SYRINGE (ML) INJECTION PRN
Status: CANCELLED | OUTPATIENT
Start: 2019-05-15

## 2019-02-07 RX ORDER — EPINEPHRINE 1 MG/ML
0.3 INJECTION INTRAMUSCULAR; INTRAVENOUS; SUBCUTANEOUS PRN
Status: CANCELLED | OUTPATIENT
Start: 2019-05-15

## 2019-02-07 RX ORDER — SODIUM CHLORIDE 9 MG/ML
INJECTION, SOLUTION INTRAVENOUS CONTINUOUS
Status: CANCELLED | OUTPATIENT
Start: 2019-05-15

## 2019-02-07 RX ORDER — MEPERIDINE HYDROCHLORIDE 50 MG/ML
12.5 INJECTION INTRAMUSCULAR; INTRAVENOUS; SUBCUTANEOUS ONCE
Status: CANCELLED | OUTPATIENT
Start: 2019-05-15 | End: 2019-02-26

## 2019-02-07 RX ORDER — DIPHENHYDRAMINE HYDROCHLORIDE 50 MG/ML
50 INJECTION INTRAMUSCULAR; INTRAVENOUS ONCE
Status: CANCELLED | OUTPATIENT
Start: 2019-05-15 | End: 2019-02-26

## 2019-02-07 RX ORDER — METHYLPREDNISOLONE SODIUM SUCCINATE 125 MG/2ML
125 INJECTION, POWDER, LYOPHILIZED, FOR SOLUTION INTRAMUSCULAR; INTRAVENOUS ONCE
Status: CANCELLED | OUTPATIENT
Start: 2019-05-15 | End: 2019-02-26

## 2019-02-07 RX ORDER — SODIUM CHLORIDE 9 MG/ML
INJECTION, SOLUTION INTRAVENOUS ONCE
Status: CANCELLED | OUTPATIENT
Start: 2019-05-15 | End: 2019-02-26

## 2019-02-07 RX ORDER — WATER 1000 ML/1000ML
2.2 INJECTION, SOLUTION INTRAVENOUS ONCE
Status: CANCELLED | OUTPATIENT
Start: 2019-05-15 | End: 2019-02-26

## 2019-02-07 RX ORDER — HEPARIN SODIUM (PORCINE) LOCK FLUSH IV SOLN 100 UNIT/ML 100 UNIT/ML
500 SOLUTION INTRAVENOUS PRN
Status: CANCELLED | OUTPATIENT
Start: 2019-05-15

## 2019-02-07 RX ORDER — SODIUM CHLORIDE 0.9 % (FLUSH) 0.9 %
10 SYRINGE (ML) INJECTION PRN
Status: CANCELLED | OUTPATIENT
Start: 2019-05-15

## 2019-02-12 ENCOUNTER — HOSPITAL ENCOUNTER (OUTPATIENT)
Dept: INFUSION THERAPY | Age: 74
Discharge: HOME | End: 2019-02-12
Payer: MEDICARE

## 2019-02-12 VITALS
HEART RATE: 109 BPM | SYSTOLIC BLOOD PRESSURE: 130 MMHG | RESPIRATION RATE: 18 BRPM | DIASTOLIC BLOOD PRESSURE: 57 MMHG | TEMPERATURE: 98.7 F | OXYGEN SATURATION: 100 %

## 2019-02-12 DIAGNOSIS — C34.92 MALIGNANT NEOPLASM OF LEFT LUNG, UNSPECIFIED PART OF LUNG (HCC): ICD-10-CM

## 2019-02-12 DIAGNOSIS — R91.1 NODULE OF LEFT LUNG: ICD-10-CM

## 2019-02-12 DIAGNOSIS — C34.90 RECURRENT NON-SMALL CELL LUNG CANCER (NSCLC) (HCC): ICD-10-CM

## 2019-02-12 DIAGNOSIS — C34.31 SQUAMOUS CELL CARCINOMA OF BRONCHUS IN RIGHT LOWER LOBE (HCC): ICD-10-CM

## 2019-02-12 DIAGNOSIS — J96.21 ACUTE ON CHRONIC RESPIRATORY FAILURE WITH HYPOXIA (HCC): ICD-10-CM

## 2019-02-12 DIAGNOSIS — C34.91 PRIMARY LUNG CANCER, RIGHT (HCC): ICD-10-CM

## 2019-02-12 DIAGNOSIS — Z85.118 H/O: LUNG CANCER: ICD-10-CM

## 2019-02-12 PROCEDURE — 96372 THER/PROPH/DIAG INJ SC/IM: CPT

## 2019-02-12 PROCEDURE — 6360000002 HC RX W HCPCS: Performed by: INTERNAL MEDICINE

## 2019-02-12 RX ADMIN — ERYTHROPOIETIN 40000 UNITS: 20000 INJECTION, SOLUTION INTRAVENOUS; SUBCUTANEOUS at 14:56

## 2019-02-12 ASSESSMENT — PAIN DESCRIPTION - PROGRESSION: CLINICAL_PROGRESSION: NOT CHANGED

## 2019-02-12 ASSESSMENT — PAIN DESCRIPTION - DESCRIPTORS: DESCRIPTORS: CONSTANT

## 2019-02-12 ASSESSMENT — PAIN DESCRIPTION - FREQUENCY: FREQUENCY: CONTINUOUS

## 2019-02-12 ASSESSMENT — PAIN SCALES - GENERAL: PAINLEVEL_OUTOF10: 6

## 2019-02-12 ASSESSMENT — PAIN DESCRIPTION - PAIN TYPE: TYPE: CHRONIC PAIN

## 2019-02-12 ASSESSMENT — PAIN DESCRIPTION - ONSET: ONSET: ON-GOING

## 2019-02-12 NOTE — PLAN OF CARE
Problem: Pain:  Intervention: Promote participation in pain management plan  Patient encouraged to take pain med as prescribed and to call the physician if pain is uncontrolled. Goal: Control of chronic pain  Control of chronic pain   Outcome: Met This Shift  Patient rates chronic pain @ \"6\" upon admission and discharge. Problem: Musculor/Skeletal Functional Status  Intervention: Fall precautions  Discussed the need to use the call light for assistance when getting up to ambulate. Patient on O2 continuously. Call light within reach. Goal: Absence of falls  Outcome: Met This Shift  Free from falls while in [facility].P. Oncology. Problem: Intellectual/Education/Knowledge Deficit  Intervention: Verbal/written education provided  Discuss understanding to verbal information given on procrit subq injection. Goal: Teaching initiated upon admission  Outcome: Met This Shift  Patient verbalizes understanding to verbal information given on procrit,action and possible side effects. Aware to call MD if develop complications. Problem: Discharge Planning  Intervention: Interaction with patient/family and care team  Provide discharge instructions. Goal: Knowledge of discharge instructions  Knowledge of discharge instructions     Outcome: Met This Shift  Verbalize understanding of discharge instructions, follow up appointments, and when to call Physician. Comments: Care plan reviewed with patient and daughter. Patient and daughter verbalize understanding of the plan of care and contribute to goal setting.

## 2019-02-12 NOTE — PROGRESS NOTES
Patient assessed for the following post procrit injection:    Dizziness   No  Lightheadedness  No     Acute nausea/vomiting No  Headache   No  Chest pain/pressure  No  Rash/itching   No  Shortness of breath                No    Patient tolerated procrit subq injection without any complications. O2 @ 6L/NC maintained as @ home. Last vital signs:   BP (!) 130/57   Pulse 109   Temp 98.7 °F (37.1 °C) (Oral)   Resp 18   SpO2 100%     Patient instructed if experience any of the above symptoms following today's injection, he/she is to notify MD immediately or go to the emergency department. Discharge instructions given to patient. Verbalizes understanding. Ambulated off unit per self with daughter with belongings.

## 2019-02-23 ENCOUNTER — APPOINTMENT (OUTPATIENT)
Dept: CT IMAGING | Age: 74
DRG: 189 | End: 2019-02-23
Payer: MEDICARE

## 2019-02-23 ENCOUNTER — APPOINTMENT (OUTPATIENT)
Dept: INTERVENTIONAL RADIOLOGY/VASCULAR | Age: 74
DRG: 189 | End: 2019-02-23
Payer: MEDICARE

## 2019-02-23 ENCOUNTER — HOSPITAL ENCOUNTER (INPATIENT)
Age: 74
LOS: 5 days | Discharge: HOME HEALTH | DRG: 189 | End: 2019-02-28
Attending: OPHTHALMOLOGY | Admitting: OPHTHALMOLOGY
Payer: MEDICARE

## 2019-02-23 DIAGNOSIS — J96.11 CHRONIC RESPIRATORY FAILURE WITH HYPOXIA (HCC): ICD-10-CM

## 2019-02-23 DIAGNOSIS — J44.1 COPD EXACERBATION (HCC): ICD-10-CM

## 2019-02-23 DIAGNOSIS — Z85.118 H/O: LUNG CANCER: ICD-10-CM

## 2019-02-23 DIAGNOSIS — R09.02 HYPOXEMIA: ICD-10-CM

## 2019-02-23 DIAGNOSIS — E87.29 RESPIRATORY ACIDOSIS: ICD-10-CM

## 2019-02-23 DIAGNOSIS — C34.32 MALIGNANT NEOPLASM OF LOWER LOBE OF LEFT LUNG (HCC): ICD-10-CM

## 2019-02-23 DIAGNOSIS — J90 RECURRENT LEFT PLEURAL EFFUSION: ICD-10-CM

## 2019-02-23 DIAGNOSIS — J18.1 LUNG CONSOLIDATION (HCC): Primary | ICD-10-CM

## 2019-02-23 DIAGNOSIS — J44.9 STAGE 3 SEVERE COPD BY GOLD CLASSIFICATION (HCC): ICD-10-CM

## 2019-02-23 DIAGNOSIS — C34.31 SQUAMOUS CELL CARCINOMA OF BRONCHUS IN RIGHT LOWER LOBE (HCC): ICD-10-CM

## 2019-02-23 PROBLEM — J96.90 RESPIRATORY FAILURE (HCC): Status: ACTIVE | Noted: 2019-02-23

## 2019-02-23 PROBLEM — J81.0 ACUTE PULMONARY EDEMA (HCC): Status: ACTIVE | Noted: 2019-02-23

## 2019-02-23 PROBLEM — R79.89 ELEVATED LACTIC ACID LEVEL: Status: ACTIVE | Noted: 2019-02-23

## 2019-02-23 LAB
ALLEN TEST: POSITIVE
ANION GAP SERPL CALCULATED.3IONS-SCNC: 6 MEQ/L (ref 8–16)
BACTERIA: ABNORMAL /HPF
BASE EXCESS (CALCULATED): 18.6 MMOL/L (ref -2.5–2.5)
BASOPHILS # BLD: 0.3 %
BASOPHILS ABSOLUTE: 0 THOU/MM3 (ref 0–0.1)
BILIRUBIN URINE: NEGATIVE
BLOOD, URINE: NEGATIVE
BUN BLDV-MCNC: 18 MG/DL (ref 7–22)
CALCIUM SERPL-MCNC: 9.4 MG/DL (ref 8.5–10.5)
CASTS 2: ABNORMAL /LPF
CASTS UA: ABNORMAL /LPF
CHARACTER, URINE: CLEAR
CHLORIDE BLD-SCNC: 94 MEQ/L (ref 98–111)
CO2: 46 MEQ/L (ref 23–33)
COLLECTED BY:: ABNORMAL
COLOR: YELLOW
CREAT SERPL-MCNC: 0.6 MG/DL (ref 0.4–1.2)
CRYSTALS, UA: ABNORMAL
DEVICE: ABNORMAL
DIGOXIN LEVEL: < 0.3 NG/ML (ref 0.5–2)
EOSINOPHIL # BLD: 0.3 %
EOSINOPHILS ABSOLUTE: 0 THOU/MM3 (ref 0–0.4)
EPITHELIAL CELLS, UA: ABNORMAL /HPF
ERYTHROCYTE [DISTWIDTH] IN BLOOD BY AUTOMATED COUNT: 16.3 % (ref 11.5–14.5)
ERYTHROCYTE [DISTWIDTH] IN BLOOD BY AUTOMATED COUNT: 61.6 FL (ref 35–45)
FLU A ANTIGEN: NEGATIVE
FLU B ANTIGEN: NEGATIVE
GFR SERPL CREATININE-BSD FRML MDRD: > 90 ML/MIN/1.73M2
GLUCOSE BLD-MCNC: 104 MG/DL (ref 70–108)
GLUCOSE URINE: NEGATIVE MG/DL
HCO3: 49 MMOL/L (ref 23–28)
HCT VFR BLD CALC: 35.2 % (ref 37–47)
HEMOGLOBIN: 9.4 GM/DL (ref 12–16)
HYPOCHROMIA: PRESENT
IFIO2: 8
IMMATURE GRANS (ABS): 0.02 THOU/MM3 (ref 0–0.07)
IMMATURE GRANULOCYTES: 0.3 %
KETONES, URINE: ABNORMAL
LACTIC ACID, SEPSIS: 1 MMOL/L (ref 0.5–1.9)
LACTIC ACID, SEPSIS: 2.1 MMOL/L (ref 0.5–1.9)
LEUKOCYTE ESTERASE, URINE: NEGATIVE
LYMPHOCYTES # BLD: 4.9 %
LYMPHOCYTES ABSOLUTE: 0.3 THOU/MM3 (ref 1–4.8)
MAGNESIUM: 1.6 MG/DL (ref 1.6–2.4)
MCH RBC QN AUTO: 27.9 PG (ref 26–33)
MCHC RBC AUTO-ENTMCNC: 26.7 GM/DL (ref 32.2–35.5)
MCV RBC AUTO: 104.5 FL (ref 81–99)
MISCELLANEOUS 2: ABNORMAL
MONOCYTES # BLD: 8 %
MONOCYTES ABSOLUTE: 0.5 THOU/MM3 (ref 0.4–1.3)
MRSA SCREEN RT-PCR: NEGATIVE
NITRITE, URINE: NEGATIVE
NUCLEATED RED BLOOD CELLS: 0 /100 WBC
O2 SATURATION: 98 %
OSMOLALITY CALCULATION: 292.8 MOSMOL/KG (ref 275–300)
PCO2: 100 MMHG (ref 35–45)
PH BLOOD GAS: 7.3 (ref 7.35–7.45)
PH UA: 7.5
PLATELET # BLD: 135 THOU/MM3 (ref 130–400)
PLATELET ESTIMATE: ADEQUATE
PMV BLD AUTO: 11 FL (ref 9.4–12.4)
PO2: 134 MMHG (ref 71–104)
POTASSIUM SERPL-SCNC: 4.9 MEQ/L (ref 3.5–5.2)
PRO-BNP: 2109 PG/ML (ref 0–900)
PROCALCITONIN: 0.1 NG/ML (ref 0.01–0.09)
PROTEIN UA: ABNORMAL
RBC # BLD: 3.37 MILL/MM3 (ref 4.2–5.4)
RBC URINE: ABNORMAL /HPF
RENAL EPITHELIAL, UA: ABNORMAL
SCAN OF BLOOD SMEAR: NORMAL
SEG NEUTROPHILS: 86.2 %
SEGMENTED NEUTROPHILS ABSOLUTE COUNT: 5.6 THOU/MM3 (ref 1.8–7.7)
SODIUM BLD-SCNC: 146 MEQ/L (ref 135–145)
SOURCE, BLOOD GAS: ABNORMAL
SPECIFIC GRAVITY, URINE: > 1.03 (ref 1–1.03)
TROPONIN T: 0.01 NG/ML
TROPONIN T: < 0.01 NG/ML
TROPONIN T: < 0.01 NG/ML
UROBILINOGEN, URINE: 1 EU/DL
VANCOMYCIN RESISTANT ENTEROCOCCUS: NEGATIVE
WBC # BLD: 6.5 THOU/MM3 (ref 4.8–10.8)
WBC UA: ABNORMAL /HPF
YEAST: ABNORMAL

## 2019-02-23 PROCEDURE — 94640 AIRWAY INHALATION TREATMENT: CPT

## 2019-02-23 PROCEDURE — 80162 ASSAY OF DIGOXIN TOTAL: CPT

## 2019-02-23 PROCEDURE — 99285 EMERGENCY DEPT VISIT HI MDM: CPT

## 2019-02-23 PROCEDURE — 70450 CT HEAD/BRAIN W/O DYE: CPT

## 2019-02-23 PROCEDURE — 2709999900 HC NON-CHARGEABLE SUPPLY

## 2019-02-23 PROCEDURE — 87040 BLOOD CULTURE FOR BACTERIA: CPT

## 2019-02-23 PROCEDURE — 83615 LACTATE (LD) (LDH) ENZYME: CPT

## 2019-02-23 PROCEDURE — 89050 BODY FLUID CELL COUNT: CPT

## 2019-02-23 PROCEDURE — 36600 WITHDRAWAL OF ARTERIAL BLOOD: CPT

## 2019-02-23 PROCEDURE — 37799 UNLISTED PX VASCULAR SURGERY: CPT

## 2019-02-23 PROCEDURE — 6370000000 HC RX 637 (ALT 250 FOR IP): Performed by: OPHTHALMOLOGY

## 2019-02-23 PROCEDURE — 51702 INSERT TEMP BLADDER CATH: CPT

## 2019-02-23 PROCEDURE — 87500 VANOMYCIN DNA AMP PROBE: CPT

## 2019-02-23 PROCEDURE — 93970 EXTREMITY STUDY: CPT

## 2019-02-23 PROCEDURE — 84484 ASSAY OF TROPONIN QUANT: CPT

## 2019-02-23 PROCEDURE — 71275 CT ANGIOGRAPHY CHEST: CPT

## 2019-02-23 PROCEDURE — 6360000002 HC RX W HCPCS: Performed by: OPHTHALMOLOGY

## 2019-02-23 PROCEDURE — 93005 ELECTROCARDIOGRAM TRACING: CPT | Performed by: OPHTHALMOLOGY

## 2019-02-23 PROCEDURE — 83735 ASSAY OF MAGNESIUM: CPT

## 2019-02-23 PROCEDURE — 94761 N-INVAS EAR/PLS OXIMETRY MLT: CPT

## 2019-02-23 PROCEDURE — 2580000003 HC RX 258: Performed by: INTERNAL MEDICINE

## 2019-02-23 PROCEDURE — 93005 ELECTROCARDIOGRAM TRACING: CPT | Performed by: INTERNAL MEDICINE

## 2019-02-23 PROCEDURE — 6360000004 HC RX CONTRAST MEDICATION: Performed by: PHYSICIAN ASSISTANT

## 2019-02-23 PROCEDURE — 83986 ASSAY PH BODY FLUID NOS: CPT

## 2019-02-23 PROCEDURE — 84145 PROCALCITONIN (PCT): CPT

## 2019-02-23 PROCEDURE — 87081 CULTURE SCREEN ONLY: CPT

## 2019-02-23 PROCEDURE — 87075 CULTR BACTERIA EXCEPT BLOOD: CPT

## 2019-02-23 PROCEDURE — 99223 1ST HOSP IP/OBS HIGH 75: CPT | Performed by: INTERNAL MEDICINE

## 2019-02-23 PROCEDURE — 2580000003 HC RX 258: Performed by: PHYSICIAN ASSISTANT

## 2019-02-23 PROCEDURE — 2060000000 HC ICU INTERMEDIATE R&B

## 2019-02-23 PROCEDURE — 83880 ASSAY OF NATRIURETIC PEPTIDE: CPT

## 2019-02-23 PROCEDURE — 80048 BASIC METABOLIC PNL TOTAL CA: CPT

## 2019-02-23 PROCEDURE — 36415 COLL VENOUS BLD VENIPUNCTURE: CPT

## 2019-02-23 PROCEDURE — 87804 INFLUENZA ASSAY W/OPTIC: CPT

## 2019-02-23 PROCEDURE — 82945 GLUCOSE OTHER FLUID: CPT

## 2019-02-23 PROCEDURE — 81001 URINALYSIS AUTO W/SCOPE: CPT

## 2019-02-23 PROCEDURE — 2700000000 HC OXYGEN THERAPY PER DAY

## 2019-02-23 PROCEDURE — 87641 MR-STAPH DNA AMP PROBE: CPT

## 2019-02-23 PROCEDURE — 87205 SMEAR GRAM STAIN: CPT

## 2019-02-23 PROCEDURE — 82803 BLOOD GASES ANY COMBINATION: CPT

## 2019-02-23 PROCEDURE — 87070 CULTURE OTHR SPECIMN AEROBIC: CPT

## 2019-02-23 PROCEDURE — 85025 COMPLETE CBC W/AUTO DIFF WBC: CPT

## 2019-02-23 PROCEDURE — 6370000000 HC RX 637 (ALT 250 FOR IP): Performed by: PHYSICIAN ASSISTANT

## 2019-02-23 PROCEDURE — 99223 1ST HOSP IP/OBS HIGH 75: CPT | Performed by: OPHTHALMOLOGY

## 2019-02-23 PROCEDURE — 6370000000 HC RX 637 (ALT 250 FOR IP): Performed by: INTERNAL MEDICINE

## 2019-02-23 PROCEDURE — 83605 ASSAY OF LACTIC ACID: CPT

## 2019-02-23 PROCEDURE — 6360000002 HC RX W HCPCS: Performed by: INTERNAL MEDICINE

## 2019-02-23 PROCEDURE — 6360000002 HC RX W HCPCS: Performed by: PHYSICIAN ASSISTANT

## 2019-02-23 PROCEDURE — 84157 ASSAY OF PROTEIN OTHER: CPT

## 2019-02-23 RX ORDER — LEVOFLOXACIN 5 MG/ML
750 INJECTION, SOLUTION INTRAVENOUS ONCE
Status: COMPLETED | OUTPATIENT
Start: 2019-02-23 | End: 2019-02-23

## 2019-02-23 RX ORDER — ARFORMOTEROL TARTRATE 15 UG/2ML
15 SOLUTION RESPIRATORY (INHALATION) 2 TIMES DAILY
Status: DISCONTINUED | OUTPATIENT
Start: 2019-02-23 | End: 2019-02-23 | Stop reason: CLARIF

## 2019-02-23 RX ORDER — FLUTICASONE PROPIONATE 50 MCG
1 SPRAY, SUSPENSION (ML) NASAL DAILY
Status: DISCONTINUED | OUTPATIENT
Start: 2019-02-23 | End: 2019-02-28 | Stop reason: HOSPADM

## 2019-02-23 RX ORDER — FUROSEMIDE 20 MG/1
20 TABLET ORAL DAILY PRN
Status: DISCONTINUED | OUTPATIENT
Start: 2019-02-23 | End: 2019-02-28 | Stop reason: HOSPADM

## 2019-02-23 RX ORDER — FORMOTEROL FUMARATE 20 UG/2ML
20 SOLUTION RESPIRATORY (INHALATION) 2 TIMES DAILY
Status: DISCONTINUED | OUTPATIENT
Start: 2019-02-23 | End: 2019-02-28 | Stop reason: HOSPADM

## 2019-02-23 RX ORDER — IPRATROPIUM BROMIDE AND ALBUTEROL SULFATE 2.5; .5 MG/3ML; MG/3ML
1 SOLUTION RESPIRATORY (INHALATION) ONCE
Status: COMPLETED | OUTPATIENT
Start: 2019-02-23 | End: 2019-02-23

## 2019-02-23 RX ORDER — PANTOPRAZOLE SODIUM 40 MG/1
40 TABLET, DELAYED RELEASE ORAL
Status: DISCONTINUED | OUTPATIENT
Start: 2019-02-23 | End: 2019-02-28 | Stop reason: HOSPADM

## 2019-02-23 RX ORDER — FERROUS SULFATE 325(65) MG
325 TABLET ORAL
Status: DISCONTINUED | OUTPATIENT
Start: 2019-02-23 | End: 2019-02-28 | Stop reason: HOSPADM

## 2019-02-23 RX ORDER — 0.9 % SODIUM CHLORIDE 0.9 %
500 INTRAVENOUS SOLUTION INTRAVENOUS ONCE
Status: COMPLETED | OUTPATIENT
Start: 2019-02-23 | End: 2019-02-23

## 2019-02-23 RX ORDER — ESOMEPRAZOLE MAGNESIUM 20 MG/1
20 FOR SUSPENSION ORAL DAILY
Status: DISCONTINUED | OUTPATIENT
Start: 2019-02-23 | End: 2019-02-23 | Stop reason: CLARIF

## 2019-02-23 RX ORDER — METHYLPREDNISOLONE SODIUM SUCCINATE 40 MG/ML
40 INJECTION, POWDER, LYOPHILIZED, FOR SOLUTION INTRAMUSCULAR; INTRAVENOUS EVERY 6 HOURS
Status: DISCONTINUED | OUTPATIENT
Start: 2019-02-23 | End: 2019-02-23

## 2019-02-23 RX ORDER — ONDANSETRON 2 MG/ML
4 INJECTION INTRAMUSCULAR; INTRAVENOUS EVERY 6 HOURS PRN
Status: DISCONTINUED | OUTPATIENT
Start: 2019-02-23 | End: 2019-02-28 | Stop reason: HOSPADM

## 2019-02-23 RX ORDER — LEVOFLOXACIN 5 MG/ML
500 INJECTION, SOLUTION INTRAVENOUS EVERY 24 HOURS
Status: DISCONTINUED | OUTPATIENT
Start: 2019-02-24 | End: 2019-02-27

## 2019-02-23 RX ORDER — DILTIAZEM HYDROCHLORIDE 300 MG/1
300 CAPSULE, COATED, EXTENDED RELEASE ORAL DAILY
Status: DISCONTINUED | OUTPATIENT
Start: 2019-02-23 | End: 2019-02-28 | Stop reason: HOSPADM

## 2019-02-23 RX ORDER — HYDROCODONE BITARTRATE AND ACETAMINOPHEN 5; 325 MG/1; MG/1
1 TABLET ORAL EVERY 6 HOURS PRN
Status: DISCONTINUED | OUTPATIENT
Start: 2019-02-23 | End: 2019-02-28 | Stop reason: HOSPADM

## 2019-02-23 RX ORDER — METHYLPREDNISOLONE SODIUM SUCCINATE 40 MG/ML
40 INJECTION, POWDER, LYOPHILIZED, FOR SOLUTION INTRAMUSCULAR; INTRAVENOUS DAILY
Status: DISCONTINUED | OUTPATIENT
Start: 2019-02-24 | End: 2019-02-25

## 2019-02-23 RX ORDER — LANOLIN ALCOHOL/MO/W.PET/CERES
3 CREAM (GRAM) TOPICAL
Status: COMPLETED | OUTPATIENT
Start: 2019-02-23 | End: 2019-02-23

## 2019-02-23 RX ORDER — GUAIFENESIN 600 MG/1
600 TABLET, EXTENDED RELEASE ORAL 2 TIMES DAILY PRN
Status: DISCONTINUED | OUTPATIENT
Start: 2019-02-23 | End: 2019-02-28 | Stop reason: HOSPADM

## 2019-02-23 RX ADMIN — GUAIFENESIN 600 MG: 600 TABLET, EXTENDED RELEASE ORAL at 12:22

## 2019-02-23 RX ADMIN — LEVOFLOXACIN 750 MG: 5 INJECTION, SOLUTION INTRAVENOUS at 09:54

## 2019-02-23 RX ADMIN — IOPAMIDOL 80 ML: 755 INJECTION, SOLUTION INTRAVENOUS at 08:09

## 2019-02-23 RX ADMIN — APIXABAN 5 MG: 5 TABLET, FILM COATED ORAL at 12:22

## 2019-02-23 RX ADMIN — MEROPENEM 1 G: 1 INJECTION, POWDER, FOR SOLUTION INTRAVENOUS at 17:34

## 2019-02-23 RX ADMIN — HYDROCODONE BITARTRATE AND ACETAMINOPHEN 1 TABLET: 5; 325 TABLET ORAL at 14:23

## 2019-02-23 RX ADMIN — DILTIAZEM HYDROCHLORIDE 300 MG: 300 CAPSULE, COATED, EXTENDED RELEASE ORAL at 13:44

## 2019-02-23 RX ADMIN — SODIUM CHLORIDE 500 ML: 9 INJECTION, SOLUTION INTRAVENOUS at 09:55

## 2019-02-23 RX ADMIN — Medication 3 MG: at 21:41

## 2019-02-23 RX ADMIN — MEROPENEM 1 G: 1 INJECTION, POWDER, FOR SOLUTION INTRAVENOUS at 23:10

## 2019-02-23 RX ADMIN — METHYLPREDNISOLONE SODIUM SUCCINATE 40 MG: 40 INJECTION, POWDER, FOR SOLUTION INTRAMUSCULAR; INTRAVENOUS at 12:18

## 2019-02-23 RX ADMIN — FERROUS SULFATE TAB 325 MG (65 MG ELEMENTAL FE) 325 MG: 325 (65 FE) TAB at 12:22

## 2019-02-23 RX ADMIN — FUROSEMIDE 20 MG: 20 TABLET ORAL at 12:22

## 2019-02-23 RX ADMIN — IPRATROPIUM BROMIDE AND ALBUTEROL SULFATE 1 AMPULE: .5; 3 SOLUTION RESPIRATORY (INHALATION) at 06:49

## 2019-02-23 RX ADMIN — METOPROLOL TARTRATE 25 MG: 25 TABLET, FILM COATED ORAL at 20:17

## 2019-02-23 RX ADMIN — PANTOPRAZOLE SODIUM 40 MG: 40 TABLET, DELAYED RELEASE ORAL at 12:24

## 2019-02-23 RX ADMIN — CEFTRIAXONE SODIUM 1 G: 1 INJECTION, POWDER, FOR SOLUTION INTRAMUSCULAR; INTRAVENOUS at 09:19

## 2019-02-23 RX ADMIN — METOPROLOL TARTRATE 25 MG: 25 TABLET, FILM COATED ORAL at 13:44

## 2019-02-23 RX ADMIN — FORMOTEROL FUMARATE DIHYDRATE 20 MCG: 20 SOLUTION RESPIRATORY (INHALATION) at 21:56

## 2019-02-23 RX ADMIN — HYDROCODONE BITARTRATE AND ACETAMINOPHEN 1 TABLET: 5; 325 TABLET ORAL at 20:23

## 2019-02-23 RX ADMIN — IPRATROPIUM BROMIDE 0.5 MG: 0.5 SOLUTION RESPIRATORY (INHALATION) at 21:51

## 2019-02-23 ASSESSMENT — PAIN SCALES - GENERAL
PAINLEVEL_OUTOF10: 8

## 2019-02-23 ASSESSMENT — ENCOUNTER SYMPTOMS
COUGH: 1
WHEEZING: 0
SORE THROAT: 0
RHINORRHEA: 0
BACK PAIN: 0
ABDOMINAL PAIN: 0
EYE DISCHARGE: 0
VOMITING: 0
DIARRHEA: 0
SHORTNESS OF BREATH: 1
EYE PAIN: 0
NAUSEA: 0

## 2019-02-23 ASSESSMENT — PAIN DESCRIPTION - FREQUENCY
FREQUENCY: CONTINUOUS
FREQUENCY: CONTINUOUS

## 2019-02-23 ASSESSMENT — PAIN DESCRIPTION - PAIN TYPE
TYPE: CHRONIC PAIN
TYPE: CHRONIC PAIN

## 2019-02-23 ASSESSMENT — PAIN DESCRIPTION - ORIENTATION: ORIENTATION: LOWER

## 2019-02-23 ASSESSMENT — PAIN DESCRIPTION - DESCRIPTORS: DESCRIPTORS: CONSTANT

## 2019-02-23 ASSESSMENT — PAIN DESCRIPTION - ONSET
ONSET: ON-GOING
ONSET: ON-GOING

## 2019-02-23 ASSESSMENT — PAIN DESCRIPTION - PROGRESSION
CLINICAL_PROGRESSION: NOT CHANGED
CLINICAL_PROGRESSION: NOT CHANGED

## 2019-02-23 ASSESSMENT — PAIN DESCRIPTION - LOCATION
LOCATION: BACK
LOCATION: BACK

## 2019-02-24 ENCOUNTER — APPOINTMENT (OUTPATIENT)
Dept: GENERAL RADIOLOGY | Age: 74
DRG: 189 | End: 2019-02-24
Payer: MEDICARE

## 2019-02-24 ENCOUNTER — APPOINTMENT (OUTPATIENT)
Dept: ULTRASOUND IMAGING | Age: 74
DRG: 189 | End: 2019-02-24
Payer: MEDICARE

## 2019-02-24 LAB
ALBUMIN SERPL-MCNC: 2.9 G/DL (ref 3.5–5.1)
ALP BLD-CCNC: 47 U/L (ref 38–126)
ALT SERPL-CCNC: 9 U/L (ref 11–66)
ANION GAP SERPL CALCULATED.3IONS-SCNC: 7 MEQ/L (ref 8–16)
AST SERPL-CCNC: 17 U/L (ref 5–40)
BILIRUB SERPL-MCNC: 0.2 MG/DL (ref 0.3–1.2)
BUN BLDV-MCNC: 16 MG/DL (ref 7–22)
CALCIUM SERPL-MCNC: 8.9 MG/DL (ref 8.5–10.5)
CHLORIDE BLD-SCNC: 91 MEQ/L (ref 98–111)
CO2: 42 MEQ/L (ref 23–33)
CREAT SERPL-MCNC: 0.7 MG/DL (ref 0.4–1.2)
EKG ATRIAL RATE: 91 BPM
EKG ATRIAL RATE: 95 BPM
EKG P-R INTERVAL: 126 MS
EKG Q-T INTERVAL: 334 MS
EKG Q-T INTERVAL: 340 MS
EKG QRS DURATION: 80 MS
EKG QRS DURATION: 84 MS
EKG QTC CALCULATION (BAZETT): 418 MS
EKG QTC CALCULATION (BAZETT): 419 MS
EKG R AXIS: 22 DEGREES
EKG R AXIS: 51 DEGREES
EKG T AXIS: -3 DEGREES
EKG T AXIS: 30 DEGREES
EKG VENTRICULAR RATE: 91 BPM
EKG VENTRICULAR RATE: 95 BPM
ERYTHROCYTE [DISTWIDTH] IN BLOOD BY AUTOMATED COUNT: 16.7 % (ref 11.5–14.5)
ERYTHROCYTE [DISTWIDTH] IN BLOOD BY AUTOMATED COUNT: 59.3 FL (ref 35–45)
GFR SERPL CREATININE-BSD FRML MDRD: 82 ML/MIN/1.73M2
GLUCOSE BLD-MCNC: 123 MG/DL (ref 70–108)
HCT VFR BLD CALC: 30.8 % (ref 37–47)
HEMOGLOBIN: 8.8 GM/DL (ref 12–16)
INR BLD: 1.1 (ref 0.85–1.13)
LACTIC ACID: 0.5 MMOL/L (ref 0.5–2.2)
MAGNESIUM: 1.4 MG/DL (ref 1.6–2.4)
MCH RBC QN AUTO: 27.8 PG (ref 26–33)
MCHC RBC AUTO-ENTMCNC: 28.6 GM/DL (ref 32.2–35.5)
MCV RBC AUTO: 97.5 FL (ref 81–99)
PHOSPHORUS: 2.3 MG/DL (ref 2.4–4.7)
PLATELET # BLD: 134 THOU/MM3 (ref 130–400)
PMV BLD AUTO: 11.1 FL (ref 9.4–12.4)
POTASSIUM SERPL-SCNC: 4.2 MEQ/L (ref 3.5–5.2)
PROCALCITONIN: 0.08 NG/ML (ref 0.01–0.09)
RBC # BLD: 3.16 MILL/MM3 (ref 4.2–5.4)
SODIUM BLD-SCNC: 140 MEQ/L (ref 135–145)
TOTAL PROTEIN: 5.6 G/DL (ref 6.1–8)
TROPONIN T: < 0.01 NG/ML
WBC # BLD: 4.5 THOU/MM3 (ref 4.8–10.8)

## 2019-02-24 PROCEDURE — 71046 X-RAY EXAM CHEST 2 VIEWS: CPT

## 2019-02-24 PROCEDURE — 2709999900 HC NON-CHARGEABLE SUPPLY

## 2019-02-24 PROCEDURE — 80053 COMPREHEN METABOLIC PANEL: CPT

## 2019-02-24 PROCEDURE — 84100 ASSAY OF PHOSPHORUS: CPT

## 2019-02-24 PROCEDURE — 6370000000 HC RX 637 (ALT 250 FOR IP): Performed by: OPHTHALMOLOGY

## 2019-02-24 PROCEDURE — 93010 ELECTROCARDIOGRAM REPORT: CPT | Performed by: INTERNAL MEDICINE

## 2019-02-24 PROCEDURE — 83605 ASSAY OF LACTIC ACID: CPT

## 2019-02-24 PROCEDURE — 6360000002 HC RX W HCPCS: Performed by: INTERNAL MEDICINE

## 2019-02-24 PROCEDURE — 94640 AIRWAY INHALATION TREATMENT: CPT

## 2019-02-24 PROCEDURE — 2060000000 HC ICU INTERMEDIATE R&B

## 2019-02-24 PROCEDURE — 2580000003 HC RX 258: Performed by: INTERNAL MEDICINE

## 2019-02-24 PROCEDURE — 97162 PT EVAL MOD COMPLEX 30 MIN: CPT

## 2019-02-24 PROCEDURE — 99233 SBSQ HOSP IP/OBS HIGH 50: CPT | Performed by: INTERNAL MEDICINE

## 2019-02-24 PROCEDURE — 85027 COMPLETE CBC AUTOMATED: CPT

## 2019-02-24 PROCEDURE — 85610 PROTHROMBIN TIME: CPT

## 2019-02-24 PROCEDURE — 84484 ASSAY OF TROPONIN QUANT: CPT

## 2019-02-24 PROCEDURE — 99232 SBSQ HOSP IP/OBS MODERATE 35: CPT | Performed by: INTERNAL MEDICINE

## 2019-02-24 PROCEDURE — 6360000002 HC RX W HCPCS: Performed by: OPHTHALMOLOGY

## 2019-02-24 PROCEDURE — 94760 N-INVAS EAR/PLS OXIMETRY 1: CPT

## 2019-02-24 PROCEDURE — 83735 ASSAY OF MAGNESIUM: CPT

## 2019-02-24 PROCEDURE — 97110 THERAPEUTIC EXERCISES: CPT

## 2019-02-24 PROCEDURE — 2700000000 HC OXYGEN THERAPY PER DAY

## 2019-02-24 PROCEDURE — 94761 N-INVAS EAR/PLS OXIMETRY MLT: CPT

## 2019-02-24 PROCEDURE — 36415 COLL VENOUS BLD VENIPUNCTURE: CPT

## 2019-02-24 PROCEDURE — 84145 PROCALCITONIN (PCT): CPT

## 2019-02-24 RX ORDER — MAGNESIUM SULFATE IN WATER 40 MG/ML
2 INJECTION, SOLUTION INTRAVENOUS ONCE
Status: COMPLETED | OUTPATIENT
Start: 2019-02-24 | End: 2019-02-24

## 2019-02-24 RX ADMIN — IPRATROPIUM BROMIDE 0.5 MG: 0.5 SOLUTION RESPIRATORY (INHALATION) at 21:11

## 2019-02-24 RX ADMIN — DILTIAZEM HYDROCHLORIDE 300 MG: 300 CAPSULE, COATED, EXTENDED RELEASE ORAL at 10:42

## 2019-02-24 RX ADMIN — GUAIFENESIN 600 MG: 600 TABLET, EXTENDED RELEASE ORAL at 20:32

## 2019-02-24 RX ADMIN — METOPROLOL TARTRATE 25 MG: 25 TABLET, FILM COATED ORAL at 20:28

## 2019-02-24 RX ADMIN — IPRATROPIUM BROMIDE 0.5 MG: 0.5 SOLUTION RESPIRATORY (INHALATION) at 09:29

## 2019-02-24 RX ADMIN — IPRATROPIUM BROMIDE 0.5 MG: 0.5 SOLUTION RESPIRATORY (INHALATION) at 17:31

## 2019-02-24 RX ADMIN — METOPROLOL TARTRATE 25 MG: 25 TABLET, FILM COATED ORAL at 08:32

## 2019-02-24 RX ADMIN — FORMOTEROL FUMARATE DIHYDRATE 20 MCG: 20 SOLUTION RESPIRATORY (INHALATION) at 21:17

## 2019-02-24 RX ADMIN — HYDROCODONE BITARTRATE AND ACETAMINOPHEN 1 TABLET: 5; 325 TABLET ORAL at 17:20

## 2019-02-24 RX ADMIN — MAGNESIUM SULFATE HEPTAHYDRATE 2 G: 40 INJECTION, SOLUTION INTRAVENOUS at 15:44

## 2019-02-24 RX ADMIN — ONDANSETRON 4 MG: 2 INJECTION INTRAMUSCULAR; INTRAVENOUS at 17:20

## 2019-02-24 RX ADMIN — IPRATROPIUM BROMIDE 0.5 MG: 0.5 SOLUTION RESPIRATORY (INHALATION) at 13:13

## 2019-02-24 RX ADMIN — PANTOPRAZOLE SODIUM 40 MG: 40 TABLET, DELAYED RELEASE ORAL at 05:24

## 2019-02-24 RX ADMIN — MEROPENEM 1 G: 1 INJECTION, POWDER, FOR SOLUTION INTRAVENOUS at 15:43

## 2019-02-24 RX ADMIN — METHYLPREDNISOLONE SODIUM SUCCINATE 40 MG: 40 INJECTION, POWDER, FOR SOLUTION INTRAMUSCULAR; INTRAVENOUS at 08:32

## 2019-02-24 RX ADMIN — GUAIFENESIN 600 MG: 600 TABLET, EXTENDED RELEASE ORAL at 08:32

## 2019-02-24 RX ADMIN — MEROPENEM 1 G: 1 INJECTION, POWDER, FOR SOLUTION INTRAVENOUS at 23:31

## 2019-02-24 RX ADMIN — MEROPENEM 1 G: 1 INJECTION, POWDER, FOR SOLUTION INTRAVENOUS at 08:31

## 2019-02-24 RX ADMIN — ONDANSETRON 4 MG: 2 INJECTION INTRAMUSCULAR; INTRAVENOUS at 00:25

## 2019-02-24 RX ADMIN — FORMOTEROL FUMARATE DIHYDRATE 20 MCG: 20 SOLUTION RESPIRATORY (INHALATION) at 09:36

## 2019-02-24 RX ADMIN — LEVOFLOXACIN 500 MG: 5 INJECTION, SOLUTION INTRAVENOUS at 10:42

## 2019-02-24 RX ADMIN — HYDROCODONE BITARTRATE AND ACETAMINOPHEN 1 TABLET: 5; 325 TABLET ORAL at 10:45

## 2019-02-24 RX ADMIN — APIXABAN 5 MG: 5 TABLET, FILM COATED ORAL at 08:32

## 2019-02-24 RX ADMIN — FUROSEMIDE 20 MG: 20 TABLET ORAL at 17:21

## 2019-02-24 ASSESSMENT — PAIN DESCRIPTION - PAIN TYPE
TYPE: CHRONIC PAIN
TYPE: CHRONIC PAIN

## 2019-02-24 ASSESSMENT — PAIN DESCRIPTION - ONSET: ONSET: ON-GOING

## 2019-02-24 ASSESSMENT — PAIN SCALES - GENERAL
PAINLEVEL_OUTOF10: 8
PAINLEVEL_OUTOF10: 5
PAINLEVEL_OUTOF10: 6
PAINLEVEL_OUTOF10: 8

## 2019-02-24 ASSESSMENT — PAIN DESCRIPTION - DESCRIPTORS: DESCRIPTORS: CONSTANT;ACHING

## 2019-02-24 ASSESSMENT — PAIN DESCRIPTION - LOCATION
LOCATION: BACK
LOCATION: BACK

## 2019-02-24 ASSESSMENT — PAIN DESCRIPTION - ORIENTATION
ORIENTATION: LOWER
ORIENTATION: LOWER

## 2019-02-24 ASSESSMENT — PAIN DESCRIPTION - FREQUENCY: FREQUENCY: CONTINUOUS

## 2019-02-24 ASSESSMENT — PAIN DESCRIPTION - PROGRESSION: CLINICAL_PROGRESSION: NOT CHANGED

## 2019-02-25 ENCOUNTER — APPOINTMENT (OUTPATIENT)
Dept: GENERAL RADIOLOGY | Age: 74
DRG: 189 | End: 2019-02-25
Payer: MEDICARE

## 2019-02-25 ENCOUNTER — APPOINTMENT (OUTPATIENT)
Dept: ULTRASOUND IMAGING | Age: 74
DRG: 189 | End: 2019-02-25
Payer: MEDICARE

## 2019-02-25 PROBLEM — E43 SEVERE MALNUTRITION (HCC): Chronic | Status: ACTIVE | Noted: 2019-02-25

## 2019-02-25 LAB
BODY FLUID RBC: < 2000 /CUMM
CHARACTER, BODY FLUID: CLEAR
COLOR: YELLOW
GLUCOSE, FLUID: 152 MG/DL
LD, FLUID: 54 U/L
LD: 154 U/L (ref 100–190)
MAGNESIUM: 1.6 MG/DL (ref 1.6–2.4)
MONONUCLEAR CELLS BODY FLUID: 84.6 %
MRSA SCREEN: NORMAL
PATHOLOGIST REVIEW: NORMAL
PH FLUID: 7.43
POLYMORPHONUCLEAR CELLS BODY FLUID: 15.4 %
PROTEIN FLUID: 2 GM/DL
SPECIMEN: NORMAL
TOTAL NUCLEATED CELLS BODY FLUID: 65 /CUMM (ref 0–500)
TOTAL VOLUME RECEIVED BODY FLUID: 80 ML

## 2019-02-25 PROCEDURE — 6360000002 HC RX W HCPCS: Performed by: OPHTHALMOLOGY

## 2019-02-25 PROCEDURE — 6360000002 HC RX W HCPCS: Performed by: INTERNAL MEDICINE

## 2019-02-25 PROCEDURE — 83735 ASSAY OF MAGNESIUM: CPT

## 2019-02-25 PROCEDURE — 2580000003 HC RX 258: Performed by: INTERNAL MEDICINE

## 2019-02-25 PROCEDURE — 2060000000 HC ICU INTERMEDIATE R&B

## 2019-02-25 PROCEDURE — 87205 SMEAR GRAM STAIN: CPT

## 2019-02-25 PROCEDURE — 32555 ASPIRATE PLEURA W/ IMAGING: CPT

## 2019-02-25 PROCEDURE — 99232 SBSQ HOSP IP/OBS MODERATE 35: CPT | Performed by: INTERNAL MEDICINE

## 2019-02-25 PROCEDURE — 92610 EVALUATE SWALLOWING FUNCTION: CPT

## 2019-02-25 PROCEDURE — 0W9B3ZZ DRAINAGE OF LEFT PLEURAL CAVITY, PERCUTANEOUS APPROACH: ICD-10-PCS | Performed by: RADIOLOGY

## 2019-02-25 PROCEDURE — 6370000000 HC RX 637 (ALT 250 FOR IP): Performed by: OPHTHALMOLOGY

## 2019-02-25 PROCEDURE — 97110 THERAPEUTIC EXERCISES: CPT

## 2019-02-25 PROCEDURE — 36415 COLL VENOUS BLD VENIPUNCTURE: CPT

## 2019-02-25 PROCEDURE — 2709999900 HC NON-CHARGEABLE SUPPLY

## 2019-02-25 PROCEDURE — 87102 FUNGUS ISOLATION CULTURE: CPT

## 2019-02-25 PROCEDURE — 71045 X-RAY EXAM CHEST 1 VIEW: CPT

## 2019-02-25 PROCEDURE — 87116 MYCOBACTERIA CULTURE: CPT

## 2019-02-25 PROCEDURE — 94640 AIRWAY INHALATION TREATMENT: CPT

## 2019-02-25 PROCEDURE — 88305 TISSUE EXAM BY PATHOLOGIST: CPT

## 2019-02-25 PROCEDURE — 2700000000 HC OXYGEN THERAPY PER DAY

## 2019-02-25 PROCEDURE — 83615 LACTATE (LD) (LDH) ENZYME: CPT

## 2019-02-25 PROCEDURE — 94761 N-INVAS EAR/PLS OXIMETRY MLT: CPT

## 2019-02-25 PROCEDURE — 88112 CYTOPATH CELL ENHANCE TECH: CPT

## 2019-02-25 PROCEDURE — APPSS30 APP SPLIT SHARED TIME 16-30 MINUTES: Performed by: NURSE PRACTITIONER

## 2019-02-25 RX ORDER — METHYLPREDNISOLONE SODIUM SUCCINATE 40 MG/ML
40 INJECTION, POWDER, LYOPHILIZED, FOR SOLUTION INTRAMUSCULAR; INTRAVENOUS EVERY 12 HOURS
Status: DISCONTINUED | OUTPATIENT
Start: 2019-02-25 | End: 2019-02-26

## 2019-02-25 RX ADMIN — DILTIAZEM HYDROCHLORIDE 300 MG: 300 CAPSULE, COATED, EXTENDED RELEASE ORAL at 08:58

## 2019-02-25 RX ADMIN — IPRATROPIUM BROMIDE 0.5 MG: 0.5 SOLUTION RESPIRATORY (INHALATION) at 08:26

## 2019-02-25 RX ADMIN — METOPROLOL TARTRATE 25 MG: 25 TABLET, FILM COATED ORAL at 21:32

## 2019-02-25 RX ADMIN — ONDANSETRON 4 MG: 2 INJECTION INTRAMUSCULAR; INTRAVENOUS at 14:33

## 2019-02-25 RX ADMIN — IPRATROPIUM BROMIDE 0.5 MG: 0.5 SOLUTION RESPIRATORY (INHALATION) at 21:47

## 2019-02-25 RX ADMIN — FORMOTEROL FUMARATE DIHYDRATE 20 MCG: 20 SOLUTION RESPIRATORY (INHALATION) at 21:47

## 2019-02-25 RX ADMIN — FORMOTEROL FUMARATE DIHYDRATE 20 MCG: 20 SOLUTION RESPIRATORY (INHALATION) at 08:26

## 2019-02-25 RX ADMIN — FLUTICASONE PROPIONATE 1 SPRAY: 50 SPRAY, METERED NASAL at 08:58

## 2019-02-25 RX ADMIN — IPRATROPIUM BROMIDE 0.5 MG: 0.5 SOLUTION RESPIRATORY (INHALATION) at 17:43

## 2019-02-25 RX ADMIN — METOPROLOL TARTRATE 25 MG: 25 TABLET, FILM COATED ORAL at 08:58

## 2019-02-25 RX ADMIN — MEROPENEM 1 G: 1 INJECTION, POWDER, FOR SOLUTION INTRAVENOUS at 16:10

## 2019-02-25 RX ADMIN — HYDROCODONE BITARTRATE AND ACETAMINOPHEN 1 TABLET: 5; 325 TABLET ORAL at 06:16

## 2019-02-25 RX ADMIN — LEVOFLOXACIN 500 MG: 5 INJECTION, SOLUTION INTRAVENOUS at 10:40

## 2019-02-25 RX ADMIN — HYDROCODONE BITARTRATE AND ACETAMINOPHEN 1 TABLET: 5; 325 TABLET ORAL at 12:31

## 2019-02-25 RX ADMIN — HYDROCODONE BITARTRATE AND ACETAMINOPHEN 1 TABLET: 5; 325 TABLET ORAL at 23:19

## 2019-02-25 RX ADMIN — MEROPENEM 1 G: 1 INJECTION, POWDER, FOR SOLUTION INTRAVENOUS at 08:47

## 2019-02-25 RX ADMIN — PANTOPRAZOLE SODIUM 40 MG: 40 TABLET, DELAYED RELEASE ORAL at 06:16

## 2019-02-25 RX ADMIN — IPRATROPIUM BROMIDE 0.5 MG: 0.5 SOLUTION RESPIRATORY (INHALATION) at 12:36

## 2019-02-25 RX ADMIN — METHYLPREDNISOLONE SODIUM SUCCINATE 40 MG: 40 INJECTION, POWDER, FOR SOLUTION INTRAMUSCULAR; INTRAVENOUS at 21:39

## 2019-02-25 RX ADMIN — METHYLPREDNISOLONE SODIUM SUCCINATE 40 MG: 40 INJECTION, POWDER, FOR SOLUTION INTRAMUSCULAR; INTRAVENOUS at 08:49

## 2019-02-25 ASSESSMENT — PAIN SCALES - GENERAL
PAINLEVEL_OUTOF10: 6
PAINLEVEL_OUTOF10: 9
PAINLEVEL_OUTOF10: 8
PAINLEVEL_OUTOF10: 7
PAINLEVEL_OUTOF10: 8

## 2019-02-25 ASSESSMENT — PAIN DESCRIPTION - DESCRIPTORS
DESCRIPTORS: ACHING
DESCRIPTORS: CONSTANT;ACHING;DISCOMFORT
DESCRIPTORS: ACHING

## 2019-02-25 ASSESSMENT — PAIN DESCRIPTION - ONSET: ONSET: ON-GOING

## 2019-02-25 ASSESSMENT — PAIN DESCRIPTION - PROGRESSION: CLINICAL_PROGRESSION: NOT CHANGED

## 2019-02-25 ASSESSMENT — PAIN DESCRIPTION - PAIN TYPE
TYPE: CHRONIC PAIN

## 2019-02-25 ASSESSMENT — PAIN DESCRIPTION - LOCATION
LOCATION: BACK

## 2019-02-25 ASSESSMENT — PAIN DESCRIPTION - ORIENTATION
ORIENTATION: LOWER
ORIENTATION: LOWER

## 2019-02-25 ASSESSMENT — PAIN DESCRIPTION - FREQUENCY: FREQUENCY: CONTINUOUS

## 2019-02-26 ENCOUNTER — HOSPITAL ENCOUNTER (OUTPATIENT)
Dept: INFUSION THERAPY | Age: 74
End: 2019-02-26
Payer: MEDICARE

## 2019-02-26 PROCEDURE — 97110 THERAPEUTIC EXERCISES: CPT

## 2019-02-26 PROCEDURE — 99232 SBSQ HOSP IP/OBS MODERATE 35: CPT | Performed by: INTERNAL MEDICINE

## 2019-02-26 PROCEDURE — APPSS30 APP SPLIT SHARED TIME 16-30 MINUTES: Performed by: NURSE PRACTITIONER

## 2019-02-26 PROCEDURE — 6360000002 HC RX W HCPCS: Performed by: INTERNAL MEDICINE

## 2019-02-26 PROCEDURE — 97530 THERAPEUTIC ACTIVITIES: CPT

## 2019-02-26 PROCEDURE — 6360000002 HC RX W HCPCS: Performed by: OPHTHALMOLOGY

## 2019-02-26 PROCEDURE — 6370000000 HC RX 637 (ALT 250 FOR IP): Performed by: OPHTHALMOLOGY

## 2019-02-26 PROCEDURE — 2709999900 HC NON-CHARGEABLE SUPPLY

## 2019-02-26 PROCEDURE — 97116 GAIT TRAINING THERAPY: CPT

## 2019-02-26 PROCEDURE — 2580000003 HC RX 258: Performed by: INTERNAL MEDICINE

## 2019-02-26 PROCEDURE — 2060000000 HC ICU INTERMEDIATE R&B

## 2019-02-26 PROCEDURE — 2700000000 HC OXYGEN THERAPY PER DAY

## 2019-02-26 PROCEDURE — 94761 N-INVAS EAR/PLS OXIMETRY MLT: CPT

## 2019-02-26 PROCEDURE — 94640 AIRWAY INHALATION TREATMENT: CPT

## 2019-02-26 RX ORDER — METHYLPREDNISOLONE SODIUM SUCCINATE 40 MG/ML
40 INJECTION, POWDER, LYOPHILIZED, FOR SOLUTION INTRAMUSCULAR; INTRAVENOUS DAILY
Status: DISCONTINUED | OUTPATIENT
Start: 2019-02-27 | End: 2019-02-27

## 2019-02-26 RX ADMIN — METHYLPREDNISOLONE SODIUM SUCCINATE 40 MG: 40 INJECTION, POWDER, FOR SOLUTION INTRAMUSCULAR; INTRAVENOUS at 08:37

## 2019-02-26 RX ADMIN — FLUTICASONE PROPIONATE 1 SPRAY: 50 SPRAY, METERED NASAL at 08:41

## 2019-02-26 RX ADMIN — MEROPENEM 1 G: 1 INJECTION, POWDER, FOR SOLUTION INTRAVENOUS at 08:36

## 2019-02-26 RX ADMIN — IPRATROPIUM BROMIDE 0.5 MG: 0.5 SOLUTION RESPIRATORY (INHALATION) at 13:15

## 2019-02-26 RX ADMIN — HYDROCODONE BITARTRATE AND ACETAMINOPHEN 1 TABLET: 5; 325 TABLET ORAL at 17:19

## 2019-02-26 RX ADMIN — FORMOTEROL FUMARATE DIHYDRATE 20 MCG: 20 SOLUTION RESPIRATORY (INHALATION) at 09:50

## 2019-02-26 RX ADMIN — FUROSEMIDE 20 MG: 20 TABLET ORAL at 08:41

## 2019-02-26 RX ADMIN — IPRATROPIUM BROMIDE 0.5 MG: 0.5 SOLUTION RESPIRATORY (INHALATION) at 21:04

## 2019-02-26 RX ADMIN — HYDROCODONE BITARTRATE AND ACETAMINOPHEN 1 TABLET: 5; 325 TABLET ORAL at 08:43

## 2019-02-26 RX ADMIN — DILTIAZEM HYDROCHLORIDE 300 MG: 300 CAPSULE, COATED, EXTENDED RELEASE ORAL at 08:42

## 2019-02-26 RX ADMIN — GUAIFENESIN 600 MG: 600 TABLET, EXTENDED RELEASE ORAL at 08:41

## 2019-02-26 RX ADMIN — FORMOTEROL FUMARATE DIHYDRATE 20 MCG: 20 SOLUTION RESPIRATORY (INHALATION) at 21:10

## 2019-02-26 RX ADMIN — MEROPENEM 1 G: 1 INJECTION, POWDER, FOR SOLUTION INTRAVENOUS at 00:51

## 2019-02-26 RX ADMIN — ONDANSETRON 4 MG: 2 INJECTION INTRAMUSCULAR; INTRAVENOUS at 11:52

## 2019-02-26 RX ADMIN — MEROPENEM 1 G: 1 INJECTION, POWDER, FOR SOLUTION INTRAVENOUS at 23:32

## 2019-02-26 RX ADMIN — IPRATROPIUM BROMIDE 0.5 MG: 0.5 SOLUTION RESPIRATORY (INHALATION) at 09:50

## 2019-02-26 RX ADMIN — APIXABAN 5 MG: 5 TABLET, FILM COATED ORAL at 12:35

## 2019-02-26 RX ADMIN — FERROUS SULFATE TAB 325 MG (65 MG ELEMENTAL FE) 325 MG: 325 (65 FE) TAB at 06:13

## 2019-02-26 RX ADMIN — IPRATROPIUM BROMIDE 0.5 MG: 0.5 SOLUTION RESPIRATORY (INHALATION) at 16:52

## 2019-02-26 RX ADMIN — HYDROCODONE BITARTRATE AND ACETAMINOPHEN 1 TABLET: 5; 325 TABLET ORAL at 23:32

## 2019-02-26 RX ADMIN — PANTOPRAZOLE SODIUM 40 MG: 40 TABLET, DELAYED RELEASE ORAL at 06:12

## 2019-02-26 RX ADMIN — METOPROLOL TARTRATE 25 MG: 25 TABLET, FILM COATED ORAL at 08:42

## 2019-02-26 RX ADMIN — LEVOFLOXACIN 500 MG: 5 INJECTION, SOLUTION INTRAVENOUS at 11:50

## 2019-02-26 RX ADMIN — MEROPENEM 1 G: 1 INJECTION, POWDER, FOR SOLUTION INTRAVENOUS at 17:18

## 2019-02-26 RX ADMIN — APIXABAN 5 MG: 5 TABLET, FILM COATED ORAL at 21:28

## 2019-02-26 ASSESSMENT — PAIN DESCRIPTION - LOCATION
LOCATION: BACK

## 2019-02-26 ASSESSMENT — PAIN SCALES - GENERAL
PAINLEVEL_OUTOF10: 6
PAINLEVEL_OUTOF10: 7
PAINLEVEL_OUTOF10: 7
PAINLEVEL_OUTOF10: 2
PAINLEVEL_OUTOF10: 8
PAINLEVEL_OUTOF10: 4
PAINLEVEL_OUTOF10: 6
PAINLEVEL_OUTOF10: 0

## 2019-02-26 ASSESSMENT — PAIN DESCRIPTION - ORIENTATION
ORIENTATION: LOWER

## 2019-02-26 ASSESSMENT — PAIN - FUNCTIONAL ASSESSMENT: PAIN_FUNCTIONAL_ASSESSMENT: ACTIVITIES ARE NOT PREVENTED

## 2019-02-26 ASSESSMENT — PAIN DESCRIPTION - DESCRIPTORS
DESCRIPTORS: ACHING

## 2019-02-26 ASSESSMENT — PAIN DESCRIPTION - ONSET
ONSET: ON-GOING
ONSET: ON-GOING

## 2019-02-26 ASSESSMENT — PAIN DESCRIPTION - PAIN TYPE
TYPE: CHRONIC PAIN

## 2019-02-26 ASSESSMENT — PAIN DESCRIPTION - FREQUENCY
FREQUENCY: CONTINUOUS

## 2019-02-26 ASSESSMENT — PAIN DESCRIPTION - PROGRESSION: CLINICAL_PROGRESSION: NOT CHANGED

## 2019-02-27 PROBLEM — J96.21 ACUTE ON CHRONIC RESPIRATORY FAILURE WITH HYPOXIA AND HYPERCAPNIA (HCC): Status: ACTIVE | Noted: 2019-02-23

## 2019-02-27 PROBLEM — J96.22 ACUTE ON CHRONIC RESPIRATORY FAILURE WITH HYPOXIA AND HYPERCAPNIA (HCC): Status: ACTIVE | Noted: 2019-02-23

## 2019-02-27 LAB
ALLEN TEST: POSITIVE
ANION GAP SERPL CALCULATED.3IONS-SCNC: 6 MEQ/L (ref 8–16)
BASE EXCESS (CALCULATED): 14 MMOL/L (ref -2.5–2.5)
BUN BLDV-MCNC: 32 MG/DL (ref 7–22)
CALCIUM SERPL-MCNC: 8.2 MG/DL (ref 8.5–10.5)
CHLORIDE BLD-SCNC: 88 MEQ/L (ref 98–111)
CO2: 42 MEQ/L (ref 23–33)
COLLECTED BY:: ABNORMAL
CREAT SERPL-MCNC: 0.7 MG/DL (ref 0.4–1.2)
DEVICE: ABNORMAL
ERYTHROCYTE [DISTWIDTH] IN BLOOD BY AUTOMATED COUNT: 16.7 % (ref 11.5–14.5)
ERYTHROCYTE [DISTWIDTH] IN BLOOD BY AUTOMATED COUNT: 58.7 FL (ref 35–45)
GFR SERPL CREATININE-BSD FRML MDRD: 82 ML/MIN/1.73M2
GLUCOSE BLD-MCNC: 110 MG/DL (ref 70–108)
HCO3: 43 MMOL/L (ref 23–28)
HCT VFR BLD CALC: 38.3 % (ref 37–47)
HEMOGLOBIN: 11.1 GM/DL (ref 12–16)
IFIO2: 3
MCH RBC QN AUTO: 27.8 PG (ref 26–33)
MCHC RBC AUTO-ENTMCNC: 29 GM/DL (ref 32.2–35.5)
MCV RBC AUTO: 95.8 FL (ref 81–99)
O2 SATURATION: 94 %
PCO2: 71 MMHG (ref 35–45)
PH BLOOD GAS: 7.39 (ref 7.35–7.45)
PLATELET # BLD: 178 THOU/MM3 (ref 130–400)
PMV BLD AUTO: 10 FL (ref 9.4–12.4)
PO2: 77 MMHG (ref 71–104)
POTASSIUM SERPL-SCNC: 4.5 MEQ/L (ref 3.5–5.2)
PROCALCITONIN: 0.09 NG/ML (ref 0.01–0.09)
RBC # BLD: 4 MILL/MM3 (ref 4.2–5.4)
SODIUM BLD-SCNC: 136 MEQ/L (ref 135–145)
SOURCE, BLOOD GAS: ABNORMAL
WBC # BLD: 8 THOU/MM3 (ref 4.8–10.8)

## 2019-02-27 PROCEDURE — 85027 COMPLETE CBC AUTOMATED: CPT

## 2019-02-27 PROCEDURE — 84145 PROCALCITONIN (PCT): CPT

## 2019-02-27 PROCEDURE — 80048 BASIC METABOLIC PNL TOTAL CA: CPT

## 2019-02-27 PROCEDURE — 6360000002 HC RX W HCPCS: Performed by: OPHTHALMOLOGY

## 2019-02-27 PROCEDURE — 2709999900 HC NON-CHARGEABLE SUPPLY

## 2019-02-27 PROCEDURE — 6370000000 HC RX 637 (ALT 250 FOR IP): Performed by: OPHTHALMOLOGY

## 2019-02-27 PROCEDURE — 97530 THERAPEUTIC ACTIVITIES: CPT

## 2019-02-27 PROCEDURE — 36600 WITHDRAWAL OF ARTERIAL BLOOD: CPT

## 2019-02-27 PROCEDURE — 94762 N-INVAS EAR/PLS OXIMTRY CONT: CPT

## 2019-02-27 PROCEDURE — 2700000000 HC OXYGEN THERAPY PER DAY

## 2019-02-27 PROCEDURE — 94761 N-INVAS EAR/PLS OXIMETRY MLT: CPT

## 2019-02-27 PROCEDURE — 6360000002 HC RX W HCPCS: Performed by: INTERNAL MEDICINE

## 2019-02-27 PROCEDURE — 94760 N-INVAS EAR/PLS OXIMETRY 1: CPT

## 2019-02-27 PROCEDURE — 82803 BLOOD GASES ANY COMBINATION: CPT

## 2019-02-27 PROCEDURE — 99232 SBSQ HOSP IP/OBS MODERATE 35: CPT | Performed by: INTERNAL MEDICINE

## 2019-02-27 PROCEDURE — 2060000000 HC ICU INTERMEDIATE R&B

## 2019-02-27 PROCEDURE — 6370000000 HC RX 637 (ALT 250 FOR IP): Performed by: INTERNAL MEDICINE

## 2019-02-27 PROCEDURE — APPSS30 APP SPLIT SHARED TIME 16-30 MINUTES: Performed by: NURSE PRACTITIONER

## 2019-02-27 PROCEDURE — 97110 THERAPEUTIC EXERCISES: CPT

## 2019-02-27 PROCEDURE — 6360000002 HC RX W HCPCS: Performed by: NURSE PRACTITIONER

## 2019-02-27 PROCEDURE — 36415 COLL VENOUS BLD VENIPUNCTURE: CPT

## 2019-02-27 PROCEDURE — 94640 AIRWAY INHALATION TREATMENT: CPT

## 2019-02-27 RX ORDER — DIPHENHYDRAMINE HYDROCHLORIDE 50 MG/ML
25 INJECTION INTRAMUSCULAR; INTRAVENOUS NIGHTLY PRN
Status: DISCONTINUED | OUTPATIENT
Start: 2019-02-27 | End: 2019-02-28 | Stop reason: HOSPADM

## 2019-02-27 RX ORDER — POTASSIUM CHLORIDE 20 MEQ/1
20 TABLET, EXTENDED RELEASE ORAL
Status: DISCONTINUED | OUTPATIENT
Start: 2019-02-27 | End: 2019-02-28 | Stop reason: HOSPADM

## 2019-02-27 RX ORDER — FUROSEMIDE 20 MG/1
20 TABLET ORAL DAILY
Status: DISCONTINUED | OUTPATIENT
Start: 2019-02-27 | End: 2019-02-28 | Stop reason: HOSPADM

## 2019-02-27 RX ORDER — LEVOFLOXACIN 500 MG/1
500 TABLET, FILM COATED ORAL DAILY
Status: DISCONTINUED | OUTPATIENT
Start: 2019-02-27 | End: 2019-02-28 | Stop reason: HOSPADM

## 2019-02-27 RX ORDER — PREDNISONE 20 MG/1
40 TABLET ORAL DAILY
Status: DISCONTINUED | OUTPATIENT
Start: 2019-02-28 | End: 2019-02-28 | Stop reason: HOSPADM

## 2019-02-27 RX ADMIN — LEVOFLOXACIN 500 MG: 500 TABLET, FILM COATED ORAL at 11:18

## 2019-02-27 RX ADMIN — FUROSEMIDE 20 MG: 20 TABLET ORAL at 15:39

## 2019-02-27 RX ADMIN — METOPROLOL TARTRATE 25 MG: 25 TABLET, FILM COATED ORAL at 19:45

## 2019-02-27 RX ADMIN — DIPHENHYDRAMINE HYDROCHLORIDE 25 MG: 50 INJECTION, SOLUTION INTRAMUSCULAR; INTRAVENOUS at 22:09

## 2019-02-27 RX ADMIN — ONDANSETRON 4 MG: 2 INJECTION INTRAMUSCULAR; INTRAVENOUS at 17:27

## 2019-02-27 RX ADMIN — HYDROCODONE BITARTRATE AND ACETAMINOPHEN 1 TABLET: 5; 325 TABLET ORAL at 17:27

## 2019-02-27 RX ADMIN — FERROUS SULFATE TAB 325 MG (65 MG ELEMENTAL FE) 325 MG: 325 (65 FE) TAB at 06:48

## 2019-02-27 RX ADMIN — FORMOTEROL FUMARATE DIHYDRATE 20 MCG: 20 SOLUTION RESPIRATORY (INHALATION) at 10:23

## 2019-02-27 RX ADMIN — IPRATROPIUM BROMIDE 0.5 MG: 0.5 SOLUTION RESPIRATORY (INHALATION) at 10:14

## 2019-02-27 RX ADMIN — APIXABAN 5 MG: 5 TABLET, FILM COATED ORAL at 19:46

## 2019-02-27 RX ADMIN — IPRATROPIUM BROMIDE 0.5 MG: 0.5 SOLUTION RESPIRATORY (INHALATION) at 17:48

## 2019-02-27 RX ADMIN — APIXABAN 5 MG: 5 TABLET, FILM COATED ORAL at 07:50

## 2019-02-27 RX ADMIN — METHYLPREDNISOLONE SODIUM SUCCINATE 40 MG: 40 INJECTION, POWDER, FOR SOLUTION INTRAMUSCULAR; INTRAVENOUS at 07:50

## 2019-02-27 RX ADMIN — FLUTICASONE PROPIONATE 1 SPRAY: 50 SPRAY, METERED NASAL at 07:50

## 2019-02-27 RX ADMIN — FUROSEMIDE 20 MG: 20 TABLET ORAL at 07:50

## 2019-02-27 RX ADMIN — PANTOPRAZOLE SODIUM 40 MG: 40 TABLET, DELAYED RELEASE ORAL at 06:48

## 2019-02-27 RX ADMIN — FORMOTEROL FUMARATE DIHYDRATE 20 MCG: 20 SOLUTION RESPIRATORY (INHALATION) at 21:06

## 2019-02-27 RX ADMIN — ONDANSETRON 4 MG: 2 INJECTION INTRAMUSCULAR; INTRAVENOUS at 04:13

## 2019-02-27 RX ADMIN — POTASSIUM CHLORIDE 20 MEQ: 1500 TABLET, EXTENDED RELEASE ORAL at 15:39

## 2019-02-27 RX ADMIN — IPRATROPIUM BROMIDE 0.5 MG: 0.5 SOLUTION RESPIRATORY (INHALATION) at 21:02

## 2019-02-27 RX ADMIN — ONDANSETRON 4 MG: 2 INJECTION INTRAMUSCULAR; INTRAVENOUS at 09:56

## 2019-02-27 RX ADMIN — IPRATROPIUM BROMIDE 0.5 MG: 0.5 SOLUTION RESPIRATORY (INHALATION) at 14:28

## 2019-02-27 ASSESSMENT — PAIN DESCRIPTION - ONSET: ONSET: ON-GOING

## 2019-02-27 ASSESSMENT — PAIN SCALES - GENERAL
PAINLEVEL_OUTOF10: 0
PAINLEVEL_OUTOF10: 0
PAINLEVEL_OUTOF10: 8
PAINLEVEL_OUTOF10: 0
PAINLEVEL_OUTOF10: 0
PAINLEVEL_OUTOF10: 7

## 2019-02-27 ASSESSMENT — PAIN DESCRIPTION - DESCRIPTORS: DESCRIPTORS: ACHING

## 2019-02-27 ASSESSMENT — PAIN DESCRIPTION - PAIN TYPE: TYPE: CHRONIC PAIN

## 2019-02-27 ASSESSMENT — PAIN DESCRIPTION - PROGRESSION: CLINICAL_PROGRESSION: NOT CHANGED

## 2019-02-27 ASSESSMENT — PAIN - FUNCTIONAL ASSESSMENT: PAIN_FUNCTIONAL_ASSESSMENT: ACTIVITIES ARE NOT PREVENTED

## 2019-02-27 ASSESSMENT — PAIN DESCRIPTION - ORIENTATION: ORIENTATION: LOWER

## 2019-02-27 ASSESSMENT — PAIN DESCRIPTION - FREQUENCY: FREQUENCY: CONTINUOUS

## 2019-02-27 ASSESSMENT — PAIN DESCRIPTION - LOCATION: LOCATION: BACK

## 2019-02-28 VITALS
WEIGHT: 137.5 LBS | HEART RATE: 99 BPM | BODY MASS INDEX: 25.3 KG/M2 | OXYGEN SATURATION: 96 % | DIASTOLIC BLOOD PRESSURE: 68 MMHG | RESPIRATION RATE: 18 BRPM | SYSTOLIC BLOOD PRESSURE: 123 MMHG | HEIGHT: 62 IN | TEMPERATURE: 99.1 F

## 2019-02-28 LAB
ANION GAP SERPL CALCULATED.3IONS-SCNC: 10 MEQ/L (ref 8–16)
BLOOD CULTURE, ROUTINE: NORMAL
BLOOD CULTURE, ROUTINE: NORMAL
BUN BLDV-MCNC: 36 MG/DL (ref 7–22)
CALCIUM SERPL-MCNC: 8.5 MG/DL (ref 8.5–10.5)
CHLORIDE BLD-SCNC: 93 MEQ/L (ref 98–111)
CO2: 40 MEQ/L (ref 23–33)
CREAT SERPL-MCNC: 0.7 MG/DL (ref 0.4–1.2)
GFR SERPL CREATININE-BSD FRML MDRD: 82 ML/MIN/1.73M2
GLUCOSE BLD-MCNC: 91 MG/DL (ref 70–108)
POTASSIUM SERPL-SCNC: 4.6 MEQ/L (ref 3.5–5.2)
SODIUM BLD-SCNC: 143 MEQ/L (ref 135–145)

## 2019-02-28 PROCEDURE — 80048 BASIC METABOLIC PNL TOTAL CA: CPT

## 2019-02-28 PROCEDURE — 6360000002 HC RX W HCPCS: Performed by: INTERNAL MEDICINE

## 2019-02-28 PROCEDURE — 94640 AIRWAY INHALATION TREATMENT: CPT

## 2019-02-28 PROCEDURE — APPSS30 APP SPLIT SHARED TIME 16-30 MINUTES: Performed by: NURSE PRACTITIONER

## 2019-02-28 PROCEDURE — 97110 THERAPEUTIC EXERCISES: CPT

## 2019-02-28 PROCEDURE — 97116 GAIT TRAINING THERAPY: CPT

## 2019-02-28 PROCEDURE — 99222 1ST HOSP IP/OBS MODERATE 55: CPT | Performed by: INTERNAL MEDICINE

## 2019-02-28 PROCEDURE — 6370000000 HC RX 637 (ALT 250 FOR IP): Performed by: OPHTHALMOLOGY

## 2019-02-28 PROCEDURE — 36591 DRAW BLOOD OFF VENOUS DEVICE: CPT

## 2019-02-28 PROCEDURE — 99232 SBSQ HOSP IP/OBS MODERATE 35: CPT | Performed by: INTERNAL MEDICINE

## 2019-02-28 PROCEDURE — 6370000000 HC RX 637 (ALT 250 FOR IP): Performed by: INTERNAL MEDICINE

## 2019-02-28 PROCEDURE — 2700000000 HC OXYGEN THERAPY PER DAY

## 2019-02-28 RX ORDER — FERROUS SULFATE 325(65) MG
TABLET ORAL
Qty: 30 TABLET | Refills: 3 | Status: SHIPPED | OUTPATIENT
Start: 2019-02-28 | End: 2020-02-03 | Stop reason: SDUPTHER

## 2019-02-28 RX ORDER — DIPHENHYDRAMINE HYDROCHLORIDE 50 MG/ML
25 INJECTION INTRAMUSCULAR; INTRAVENOUS NIGHTLY PRN
COMMUNITY
Start: 2019-02-28 | End: 2019-02-28 | Stop reason: HOSPADM

## 2019-02-28 RX ORDER — DILTIAZEM HYDROCHLORIDE 300 MG/1
300 CAPSULE, COATED, EXTENDED RELEASE ORAL DAILY
Qty: 30 CAPSULE | Refills: 3 | Status: SHIPPED | OUTPATIENT
Start: 2019-02-28 | End: 2019-05-08 | Stop reason: SDUPTHER

## 2019-02-28 RX ADMIN — APIXABAN 5 MG: 5 TABLET, FILM COATED ORAL at 10:30

## 2019-02-28 RX ADMIN — FERROUS SULFATE TAB 325 MG (65 MG ELEMENTAL FE) 325 MG: 325 (65 FE) TAB at 04:18

## 2019-02-28 RX ADMIN — METOPROLOL TARTRATE 25 MG: 25 TABLET, FILM COATED ORAL at 07:56

## 2019-02-28 RX ADMIN — PANTOPRAZOLE SODIUM 40 MG: 40 TABLET, DELAYED RELEASE ORAL at 04:18

## 2019-02-28 RX ADMIN — HYDROCODONE BITARTRATE AND ACETAMINOPHEN 1 TABLET: 5; 325 TABLET ORAL at 07:49

## 2019-02-28 RX ADMIN — DILTIAZEM HYDROCHLORIDE 300 MG: 300 CAPSULE, COATED, EXTENDED RELEASE ORAL at 07:56

## 2019-02-28 RX ADMIN — IPRATROPIUM BROMIDE 0.5 MG: 0.5 SOLUTION RESPIRATORY (INHALATION) at 11:24

## 2019-02-28 RX ADMIN — LEVOFLOXACIN 500 MG: 500 TABLET, FILM COATED ORAL at 10:30

## 2019-02-28 RX ADMIN — POTASSIUM CHLORIDE 20 MEQ: 1500 TABLET, EXTENDED RELEASE ORAL at 10:30

## 2019-02-28 RX ADMIN — PREDNISONE 40 MG: 20 TABLET ORAL at 10:30

## 2019-02-28 RX ADMIN — SODIUM CHLORIDE, PRESERVATIVE FREE 500 UNITS: 5 INJECTION INTRAVENOUS at 13:02

## 2019-02-28 RX ADMIN — FUROSEMIDE 20 MG: 20 TABLET ORAL at 10:31

## 2019-02-28 ASSESSMENT — PAIN SCALES - GENERAL
PAINLEVEL_OUTOF10: 0
PAINLEVEL_OUTOF10: 7

## 2019-03-02 LAB
ANAEROBIC CULTURE: NORMAL
BODY FLUID CULTURE, STERILE: NORMAL
GRAM STAIN RESULT: NORMAL

## 2019-03-27 ENCOUNTER — TELEPHONE (OUTPATIENT)
Dept: ONCOLOGY | Age: 74
End: 2019-03-27

## 2019-04-01 LAB
FUNGUS IDENTIFIED: NORMAL
FUNGUS SMEAR: NORMAL

## 2019-04-09 ASSESSMENT — ENCOUNTER SYMPTOMS
COUGH: 1
ABDOMINAL DISTENTION: 0
ABDOMINAL PAIN: 0
RECTAL PAIN: 0
CONSTIPATION: 0
SORE THROAT: 0
COLOR CHANGE: 0
DIARRHEA: 0
VOMITING: 0
CHEST TIGHTNESS: 0
BACK PAIN: 0
FACIAL SWELLING: 0
EYE DISCHARGE: 0
NAUSEA: 0
WHEEZING: 0
TROUBLE SWALLOWING: 0
BLOOD IN STOOL: 0
SHORTNESS OF BREATH: 1

## 2019-04-15 ENCOUNTER — OFFICE VISIT (OUTPATIENT)
Dept: ONCOLOGY | Age: 74
End: 2019-04-15
Payer: MEDICARE

## 2019-04-15 ENCOUNTER — HOSPITAL ENCOUNTER (OUTPATIENT)
Dept: INFUSION THERAPY | Age: 74
Discharge: HOME | End: 2019-04-15
Payer: MEDICARE

## 2019-04-15 VITALS
HEART RATE: 65 BPM | DIASTOLIC BLOOD PRESSURE: 49 MMHG | SYSTOLIC BLOOD PRESSURE: 102 MMHG | RESPIRATION RATE: 18 BRPM | HEIGHT: 62 IN | BODY MASS INDEX: 25.17 KG/M2 | OXYGEN SATURATION: 89 % | TEMPERATURE: 96.6 F | WEIGHT: 136.8 LBS

## 2019-04-15 VITALS
HEART RATE: 50 BPM | RESPIRATION RATE: 20 BRPM | SYSTOLIC BLOOD PRESSURE: 87 MMHG | TEMPERATURE: 97.6 F | DIASTOLIC BLOOD PRESSURE: 48 MMHG | OXYGEN SATURATION: 95 %

## 2019-04-15 DIAGNOSIS — C34.31 MALIGNANT NEOPLASM OF LOWER LOBE, RIGHT BRONCHUS OR LUNG (HCC): Primary | ICD-10-CM

## 2019-04-15 DIAGNOSIS — C34.90 RECURRENT NON-SMALL CELL LUNG CANCER (NSCLC) (HCC): ICD-10-CM

## 2019-04-15 DIAGNOSIS — J43.9 PULMONARY EMPHYSEMA, UNSPECIFIED EMPHYSEMA TYPE (HCC): ICD-10-CM

## 2019-04-15 DIAGNOSIS — Z99.81 OXYGEN DEPENDENT: ICD-10-CM

## 2019-04-15 DIAGNOSIS — C34.90 MALIGNANT NEOPLASM OF LUNG, UNSPECIFIED LATERALITY, UNSPECIFIED PART OF LUNG (HCC): ICD-10-CM

## 2019-04-15 DIAGNOSIS — J18.9 PNEUMONIA DUE TO INFECTIOUS ORGANISM, UNSPECIFIED LATERALITY, UNSPECIFIED PART OF LUNG: ICD-10-CM

## 2019-04-15 DIAGNOSIS — C34.31 SQUAMOUS CELL CARCINOMA OF BRONCHUS IN RIGHT LOWER LOBE (HCC): Primary | ICD-10-CM

## 2019-04-15 LAB
AFB CULTURE & SMEAR: NORMAL
ALBUMIN SERPL-MCNC: 3.3 G/DL (ref 3.5–5.1)
ALP BLD-CCNC: 78 U/L (ref 38–126)
ALT SERPL-CCNC: 13 U/L (ref 11–66)
AST SERPL-CCNC: 20 U/L (ref 5–40)
BASINOPHIL, AUTOMATED: 0 % (ref 0–3)
BILIRUB SERPL-MCNC: < 0.2 MG/DL (ref 0.3–1.2)
BILIRUBIN DIRECT: < 0.2 MG/DL (ref 0–0.3)
BUN, WHOLE BLOOD: 16 MG/DL (ref 8–26)
CHLORIDE, WHOLE BLOOD: 89 MEQ/L (ref 98–109)
CREATININE, WHOLE BLOOD: 0.9 MG/DL (ref 0.5–1.2)
EOSINOPHILS RELATIVE PERCENT: 3 % (ref 0–4)
GFR, ESTIMATED: 65 ML/MIN/1.73M2
GLUCOSE, WHOLE BLOOD: 90 MG/DL (ref 70–108)
HCT VFR BLD CALC: 27.9 % (ref 37–47)
HEMOGLOBIN: 9 GM/DL (ref 12–16)
IONIZED CALCIUM, WHOLE BLOOD: 1.13 MMOL/L (ref 1.12–1.32)
LYMPHOCYTES # BLD: 16 % (ref 15–47)
MCH RBC QN AUTO: 27.9 PG (ref 27–31)
MCHC RBC AUTO-ENTMCNC: 32.3 GM/DL (ref 33–37)
MCV RBC AUTO: 86 FL (ref 81–99)
MONOCYTES: 8 % (ref 0–12)
PDW BLD-RTO: 16.4 % (ref 11.5–14.5)
PLATELET # BLD: 195 THOU/MM3 (ref 130–400)
PMV BLD AUTO: 7.2 FL (ref 7.4–10.4)
POTASSIUM, WHOLE BLOOD: 4.2 MEQ/L (ref 3.5–4.9)
RBC # BLD: 3.23 MILL/MM3 (ref 4.2–5.4)
SEG NEUTROPHILS: 74 % (ref 43–75)
SODIUM, WHOLE BLOOD: 139 MEQ/L (ref 138–146)
TOTAL CO2, WHOLE BLOOD: 41 MEQ/L (ref 23–33)
TOTAL PROTEIN: 6.2 G/DL (ref 6.1–8)
WBC # BLD: 6.2 THOU/MM3 (ref 4.8–10.8)

## 2019-04-15 PROCEDURE — 6360000002 HC RX W HCPCS: Performed by: INTERNAL MEDICINE

## 2019-04-15 PROCEDURE — 1090F PRES/ABSN URINE INCON ASSESS: CPT | Performed by: INTERNAL MEDICINE

## 2019-04-15 PROCEDURE — G8419 CALC BMI OUT NRM PARAM NOF/U: HCPCS | Performed by: INTERNAL MEDICINE

## 2019-04-15 PROCEDURE — 1123F ACP DISCUSS/DSCN MKR DOCD: CPT | Performed by: INTERNAL MEDICINE

## 2019-04-15 PROCEDURE — G8427 DOCREV CUR MEDS BY ELIG CLIN: HCPCS | Performed by: INTERNAL MEDICINE

## 2019-04-15 PROCEDURE — 3023F SPIROM DOC REV: CPT | Performed by: INTERNAL MEDICINE

## 2019-04-15 PROCEDURE — 80076 HEPATIC FUNCTION PANEL: CPT

## 2019-04-15 PROCEDURE — 1036F TOBACCO NON-USER: CPT | Performed by: INTERNAL MEDICINE

## 2019-04-15 PROCEDURE — 36591 DRAW BLOOD OFF VENOUS DEVICE: CPT

## 2019-04-15 PROCEDURE — 99214 OFFICE O/P EST MOD 30 MIN: CPT | Performed by: INTERNAL MEDICINE

## 2019-04-15 PROCEDURE — G8926 SPIRO NO PERF OR DOC: HCPCS | Performed by: INTERNAL MEDICINE

## 2019-04-15 PROCEDURE — 2580000003 HC RX 258: Performed by: INTERNAL MEDICINE

## 2019-04-15 PROCEDURE — 80047 BASIC METABLC PNL IONIZED CA: CPT

## 2019-04-15 PROCEDURE — 3017F COLORECTAL CA SCREEN DOC REV: CPT | Performed by: INTERNAL MEDICINE

## 2019-04-15 PROCEDURE — 85025 COMPLETE CBC W/AUTO DIFF WBC: CPT

## 2019-04-15 PROCEDURE — 99211 OFF/OP EST MAY X REQ PHY/QHP: CPT

## 2019-04-15 PROCEDURE — G8400 PT W/DXA NO RESULTS DOC: HCPCS | Performed by: INTERNAL MEDICINE

## 2019-04-15 PROCEDURE — 2709999900 HC NON-CHARGEABLE SUPPLY

## 2019-04-15 PROCEDURE — 4040F PNEUMOC VAC/ADMIN/RCVD: CPT | Performed by: INTERNAL MEDICINE

## 2019-04-15 RX ORDER — DIGOXIN 125 MCG
125 TABLET ORAL DAILY
Qty: 30 TABLET | Refills: 3
Start: 2019-04-15 | End: 2019-05-15 | Stop reason: SDUPTHER

## 2019-04-15 RX ORDER — SODIUM CHLORIDE 0.9 % (FLUSH) 0.9 %
10 SYRINGE (ML) INJECTION PRN
Status: DISCONTINUED | OUTPATIENT
Start: 2019-04-15 | End: 2019-04-16 | Stop reason: HOSPADM

## 2019-04-15 RX ORDER — HEPARIN SODIUM (PORCINE) LOCK FLUSH IV SOLN 100 UNIT/ML 100 UNIT/ML
500 SOLUTION INTRAVENOUS PRN
Status: CANCELLED | OUTPATIENT
Start: 2019-04-15

## 2019-04-15 RX ORDER — SODIUM CHLORIDE 0.9 % (FLUSH) 0.9 %
20 SYRINGE (ML) INJECTION PRN
Status: CANCELLED | OUTPATIENT
Start: 2019-04-15

## 2019-04-15 RX ORDER — SODIUM CHLORIDE 0.9 % (FLUSH) 0.9 %
20 SYRINGE (ML) INJECTION PRN
Status: DISCONTINUED | OUTPATIENT
Start: 2019-04-15 | End: 2019-04-16 | Stop reason: HOSPADM

## 2019-04-15 RX ORDER — SODIUM CHLORIDE 0.9 % (FLUSH) 0.9 %
10 SYRINGE (ML) INJECTION PRN
Status: CANCELLED | OUTPATIENT
Start: 2019-04-15

## 2019-04-15 RX ORDER — HEPARIN SODIUM (PORCINE) LOCK FLUSH IV SOLN 100 UNIT/ML 100 UNIT/ML
500 SOLUTION INTRAVENOUS PRN
Status: DISCONTINUED | OUTPATIENT
Start: 2019-04-15 | End: 2019-04-16 | Stop reason: HOSPADM

## 2019-04-15 RX ADMIN — Medication 10 ML: at 15:35

## 2019-04-15 RX ADMIN — Medication 20 ML: at 15:36

## 2019-04-15 RX ADMIN — Medication 500 UNITS: at 15:36

## 2019-04-15 ASSESSMENT — ENCOUNTER SYMPTOMS
FACIAL SWELLING: 0
COUGH: 1
BLOOD IN STOOL: 0
TROUBLE SWALLOWING: 0
NAUSEA: 0
ABDOMINAL PAIN: 0
COLOR CHANGE: 0
CHEST TIGHTNESS: 0
DIARRHEA: 0
SORE THROAT: 0
SHORTNESS OF BREATH: 1
VOMITING: 0
ABDOMINAL DISTENTION: 0
CONSTIPATION: 0
BACK PAIN: 0
RECTAL PAIN: 0
EYE DISCHARGE: 0
WHEEZING: 0

## 2019-04-15 ASSESSMENT — PAIN SCALES - GENERAL: PAINLEVEL_OUTOF10: 8

## 2019-04-15 ASSESSMENT — PAIN DESCRIPTION - PAIN TYPE: TYPE: CHRONIC PAIN

## 2019-04-15 ASSESSMENT — PAIN DESCRIPTION - DESCRIPTORS: DESCRIPTORS: CONSTANT

## 2019-04-15 ASSESSMENT — PAIN DESCRIPTION - LOCATION: LOCATION: BACK

## 2019-04-15 NOTE — PLAN OF CARE
Problem: Musculor/Skeletal Functional Status  Goal: Absence of falls  Outcome: Met This Shift  Note:   Pt free of falls this visit. Intervention: Fall precautions  Note:   Fall risks assessed. Precautions reviewed with pt. Call light within reach. Assistance provided with activity to and from wheelchair during outpatient visit. Problem: Discharge Planning  Goal: Knowledge of discharge instructions  Description  Knowledge of discharge instructions     Outcome: Met This Shift  Note:   Patient verbalizes understanding of discharge instructions, follow up appointment, and when to call physician if needed   Intervention: Interaction with patient/family and care team  Note:   Discharge instructions given to pt and reviewed. Follow up appointments discussed. Problem: Infection - Central Venous Catheter-Associated Bloodstream Infection:  Goal: Will show no infection signs and symptoms  Description  Will show no infection signs and symptoms  Outcome: Met This Shift  Note:   Mediport site with no redness or warmth. Skin over port site intact with no signs of breakdown noted. Patient verbalizes signs/symptoms of port infection and when to notify the physician. Intervention: Infection risk assessment  Note:   Discuss port maintenance, infection prevention, signs of infection, and when to call the doctor. Care plan reviewed with patient. Patient verbalizes understanding of the plan of care and contributes to goal setting.

## 2019-04-15 NOTE — PROGRESS NOTES
Pt tolerated lab draw via mediport without any complications. Discharge instructions given to patient. Patient verbalizes understanding. Pt wheeled off unit via wheelchair accompanied by family member with belongings.

## 2019-04-15 NOTE — PROGRESS NOTES
[facility]    Reason for Clinic visit:      Metastatic left lung cancer    CLIF Ruiz is a 76 y.o. female with metastatic NSCLC, she is a former Dr. Joetta Blizzard patient. Patient presents to the medical oncology clinic at [facility] where she would like to transfer her care. Her history of lung cancer is presented below:  She was diagnosed with  stage 2A non-small-cell lung cancer in 2012. On October 22, 2012 she underwent right lower lobe lung, lobectomy. Final pathology report showed:  A - Right lower lobe lung, lobectomy:      Moderately differentiated adenocarcinoma, mixed subtype      (predominantly papillary), pT2b      Emphysematous changes in non-neoplastic lung parenchyma.      Margins negative for malignancy. B - Peribronchial lymph node, excision:   One lymph node with caseous necrosis/calcification  negative for malignancy, pN0. It doesn't look like the patient had any mediastinal lymph node evaluation. She received adjuvant chemotherapy with carboplatin and gemcitabine and completed that on 02/04/2013. According to Dr. Joetta Blizzard note CT chest on 02/18/2013 showed a soft tissue density in the middle and posterior mediastinum. PET on 02/28/2013  showed avid mediastinal right infrahilar adenopathy, highly suspicious for metastatic disease. Due to disease progression she underwent chemotherapy with carboplatin and Taxol and concurrent radiation therapy that she started in 03/2013, completed radiation on 05/14/2013 and chemotherapy in 08/2013. The PET scan done on 09/09/2013 showed postradiation changes only. She then was diagnosed with a new stage 1A left lung cancer in 10/2016. CT on 09/19/2016 showed a left apical lung nodule that measured 10 mm, evaluated by PET that showed an uptake of 5.2, but no other areas were seen. CT-guided biopsy 10/31/2016 showed poorly differentiated adenocarcinoma.  She was referred back to Cardiovascular surgery for excision of a lung nodule for curative intent. She was deemed nonsurgical by Dr. Marine Reyez, therefore she received stereotactic ablative radiation therapy to the left lung mass. SBRT completed on 01/11/2017. The patient had restaging  CT chest  on 04/19/2018 that showed new irregular 10 x 19 mm left upper lobe nodule that increased in size since 10/2017. She started chemo with single agent Taxotere. After seven cycles Taxotere was discontinued due to side effects,  losing her toenails and her fingernails. Lung biopsy from 2016 was tested for PD-L1, it showed high expression, Tumor Proportion Score:  81-90%. KRAS was detected. EGFR not detected. Her treatment was changed to Tecentriq on 12/18/2018. The patient has had Tecentriq twice. She is a former smoker with severe COPD requiring home O2 supplementation bynasal cannula oxygen at 4 L. The patient was hospitalized from 02/23/2019 till 02/28/2018 for acute on chronic respiratory failure with hypoxemia and hypercapnia. CTA showed worsening right lung consolidation, differential diagnosis included community acquired pneumonia, atypical pulm edema, immunotherapy induced pneumonitis versus malignancy. Patient I  was on empiric antibotic treatment for pneumonia. She was also placed on steroids for the possible drug induced pneumonitis. She had a thoracentesis of 300 ml which appeared to be a transudate. There were no malignant cells on cytology. Due to mental status changes she had Ct of the brain that showed  inconclusive findings. Brain MRI recommended, however the patient refused. Intermittent history on April 15, 2019:    Patient presents to the medical oncology clinic at [facility] to establish care with me. She reports that her respiratory status is improved. She denies having any headaches. Her appetite is fine. No fevers. Persistent 1+ pitting edema in both legs.       Past Medical History         Diagnosis Date    Arthritis     low back pain and scoliosis in lumbar    Cancer ([facility] [address]     lower right lobe-lung s/p lobectomy in , radiation and chemo nad later had mediatinal mets and had radationa dn chemo again, and in  had reoccurence on the left side upper and was started on radiation this year and has  a follow up CT in oct 2017    Chronic bronchitis, simple ([facility] [address]     Chronic respiratory failure with hypoxia ([facility] [address]     COPD (chronic obstructive pulmonary disease) ([facility] [address]     GERD (gastroesophageal reflux disease)     HTN (hypertension)     Pneumonia     Postprocedural pneumothorax     Scoliosis     Urinary incontinence       Past Surgical History           Procedure Laterality Date    LOBECTOMY  10/19/2012    rt lower lobectomy     LUNG BIOPSY  2012       Allergies   Advil cold & sinus liqui-gels [pseudoephedrine-ibuprofen];  Percocet [oxycodone-acetaminophen]; and Sulfa antibiotics  Social History     Social History     Socioeconomic History    Marital status:      Spouse name: Price Martin Number of children: 2    Years of education: Not on file    Highest education level: Not on file   Occupational History    Not on file   Social Needs    Financial resource strain: Not on file    Food insecurity:     Worry: Not on file     Inability: Not on file    Transportation needs:     Medical: Not on file     Non-medical: Not on file   Tobacco Use    Smoking status: Former Smoker     Packs/day: 1.00     Years: 45.00     Pack years: 45.00     Types: Cigarettes     Last attempt to quit:      Years since quittin.3    Smokeless tobacco: Never Used   Substance and Sexual Activity    Alcohol use: No    Drug use: No    Sexual activity: Not on file   Lifestyle    Physical activity:     Days per week: Not on file     Minutes per session: Not on file    Stress: Not on file   Relationships    Social connections:     Talks on phone: Not on file     Gets together: Not on file     Attends [employer] service: Not on file Active member of club or organization: Not on file     Attends meetings of clubs or organizations: Not on file     Relationship status: Not on file    Intimate partner violence:     Fear of current or ex partner: Not on file     Emotionally abused: Not on file     Physically abused: Not on file     Forced sexual activity: Not on file   Other Topics Concern    Not on file   Social History Narrative    Not on file     Family History          Problem Relation Age of Onset    Cancer Mother     Heart Disease Father     High Blood Pressure Father     High Cholesterol Father     Arthritis Sister     Heart Disease Sister     Cancer Sister     Heart Disease Sister     Stroke Sister     High Cholesterol Sister     High Blood Pressure Sister     Stroke Paternal Grandfather      ROS     Review of Systems   Constitutional: Positive for activity change, appetite change, fatigue and fever. HENT: Negative for congestion, dental problem, facial swelling, hearing loss, mouth sores, nosebleeds, sore throat, tinnitus and trouble swallowing. Eyes: Negative for discharge and visual disturbance. Respiratory: Positive for cough and shortness of breath. Negative for chest tightness and wheezing. Cardiovascular: Positive for palpitations and leg swelling. Negative for chest pain. Gastrointestinal: Negative for abdominal distention, abdominal pain, blood in stool, constipation, diarrhea, nausea, rectal pain and vomiting. Endocrine: Negative for cold intolerance, polydipsia and polyuria. Genitourinary: Negative for decreased urine volume, difficulty urinating, dysuria, flank pain, hematuria and urgency. Musculoskeletal: Negative for arthralgias, back pain, gait problem, joint swelling, myalgias and neck stiffness. Skin: Negative for color change, rash and wound. Neurological: Positive for weakness. Negative for dizziness, tremors, seizures, speech difficulty, light-headedness, numbness and headaches. Hematological: Negative for adenopathy. Does not bruise/bleed easily. Psychiatric/Behavioral: Negative for confusion and sleep disturbance. The patient is not nervous/anxious. Vitals     height is 5' 2\" (1.575 m) and weight is 136 lb 12.8 oz (62.1 kg). Her tympanic temperature is 96.6 °F (35.9 °C). Her blood pressure is 102/49 (abnormal) and her pulse is 65. Her respiration is 18 and oxygen saturation is 89% (abnormal). Exam   Physical Exam   Constitutional: She is oriented to person, place, and time. She appears well-developed and well-nourished. No distress. HENT:   Head: Normocephalic. Mouth/Throat: Oropharynx is clear and moist. No oropharyngeal exudate. Eyes: Pupils are equal, round, and reactive to light. EOM are normal. Right eye exhibits no discharge. Left eye exhibits no discharge. No scleral icterus. Neck: Normal range of motion. Neck supple. No JVD present. No tracheal deviation present. No thyromegaly present. Cardiovascular: Normal rate and normal heart sounds. Exam reveals no gallop and no friction rub. No murmur heard. Pulmonary/Chest: No stridor. Tachypnea noted. She is in respiratory distress. She has decreased breath sounds in the right middle field, the right lower field, the left middle field and the left lower field. She has no wheezes. She has no rales. She exhibits no tenderness. Abdominal: Soft. Bowel sounds are normal. She exhibits no distension and no mass. There is no tenderness. There is no rebound. Musculoskeletal: Normal range of motion. She exhibits no edema. Good range of motion in all four extremities. Lymphadenopathy:     She has no cervical adenopathy. Neurological: She is alert and oriented to person, place, and time. She has normal reflexes. No cranial nerve deficit. She exhibits normal muscle tone. Skin: Skin is warm. No rash noted. No erythema. Psychiatric: She has a normal mood and affect.  Her behavior is normal. Judgment and thought content normal.   Vitals reviewed. Labs     Lab Results   Component Value Date    WBC 6.2 04/15/2019    HGB 9.0 (L) 04/15/2019    HCT 27.9 (L) 04/15/2019    MCV 86 04/15/2019     04/15/2019       Chemistry        Component Value Date/Time     04/15/2019 1535     02/28/2019 0423    K 4.2 04/15/2019 1535    K 4.6 02/28/2019 0423    K 3.8 10/15/2018 0415    CL 93 (L) 02/28/2019 0423    CO2 40 (HH) 02/28/2019 0423    BUN 36 (H) 02/28/2019 0423    CREATININE 0.9 04/15/2019 1535    CREATININE 0.7 02/28/2019 0423        Component Value Date/Time    CALCIUM 8.5 02/28/2019 0423    ALKPHOS 78 04/15/2019 1535    AST 20 04/15/2019 1535    ALT 13 04/15/2019 1535    BILITOT <0.2 (L) 04/15/2019 1535              Radiology        Cta Chest W Wo Contrast  Result Date: 2/23/2019   1. No evidence to suggest a pulmonary embolus. 2. More extensive consolidation and collapse is present throughout the right lung which may correspond to edema however, underlying infectious infiltrate is not excluded. Underlying neoplastic process is also not excluded. 3. There is a small to moderate left-sided pleural effusion. Vl Dup Lower Extremity Venous Bilateral  Result Date: 2/23/2019  No sonographic evidence of lower extremity DVT. Assessment/ Plan     1. Metastatic left lung cancer. Treated with Taxotere followed by 2 cycles of Tecentriq. Last dose given in January 2019. Further treatment held due to hospitalization for acute on chronic respiratory failure. 2. Acute on chronic respiratory failure with hypoxemia and hypercapnia. CTA showed worsening right lung consolidation: differential diagnosis included community acquired pneumonia, atypical pulm edema, immunotherapy induced pneumonitis versus malignancy. Patient responded to course of antibiotics given for treatment for pneumonia. She was also placed on steroids for the possible drug induced pneumonitis.  Currently off steroids without exacerbation of shortness of breath. 3. History of mental status changes. Resolved. Inconclusive CT of the brain. Brain MRI recommended, however the patient refused. Her labs are WNL today. Her respiratory status is significantly  Improved. Before making any decisions about further treatment the patient will have PET scan.       Electronically signed by     Johanna Dennis MD on 4/27/2019 at 4:20 PM

## 2019-04-17 ENCOUNTER — TELEPHONE (OUTPATIENT)
Dept: ONCOLOGY | Age: 74
End: 2019-04-17

## 2019-04-23 ENCOUNTER — HOSPITAL ENCOUNTER (OUTPATIENT)
Dept: PET IMAGING | Age: 74
Discharge: HOME | End: 2019-04-23
Payer: MEDICARE

## 2019-04-23 DIAGNOSIS — C34.31 MALIGNANT NEOPLASM OF LOWER LOBE, RIGHT BRONCHUS OR LUNG (HCC): ICD-10-CM

## 2019-04-23 DIAGNOSIS — C34.90 MALIGNANT NEOPLASM OF LUNG, UNSPECIFIED LATERALITY, UNSPECIFIED PART OF LUNG (HCC): ICD-10-CM

## 2019-04-23 PROCEDURE — A9552 F18 FDG: HCPCS | Performed by: INTERNAL MEDICINE

## 2019-04-23 PROCEDURE — 78815 PET IMAGE W/CT SKULL-THIGH: CPT

## 2019-04-23 PROCEDURE — 3430000000 HC RX DIAGNOSTIC RADIOPHARMACEUTICAL: Performed by: INTERNAL MEDICINE

## 2019-04-23 RX ORDER — FLUDEOXYGLUCOSE F 18 200 MCI/ML
10 INJECTION, SOLUTION INTRAVENOUS
Status: COMPLETED | OUTPATIENT
Start: 2019-04-23 | End: 2019-04-23

## 2019-04-23 RX ADMIN — FLUDEOXYGLUCOSE F 18 13.76 MILLICURIE: 200 INJECTION, SOLUTION INTRAVENOUS at 08:52

## 2019-04-26 ENCOUNTER — HOSPITAL ENCOUNTER (OUTPATIENT)
Dept: INFUSION THERAPY | Age: 74
Discharge: HOME | End: 2019-04-26
Payer: MEDICARE

## 2019-04-26 ENCOUNTER — OFFICE VISIT (OUTPATIENT)
Dept: ONCOLOGY | Age: 74
End: 2019-04-26
Payer: MEDICARE

## 2019-04-26 VITALS
OXYGEN SATURATION: 91 % | DIASTOLIC BLOOD PRESSURE: 54 MMHG | RESPIRATION RATE: 18 BRPM | WEIGHT: 132.8 LBS | HEART RATE: 119 BPM | HEIGHT: 62 IN | TEMPERATURE: 98.7 F | SYSTOLIC BLOOD PRESSURE: 97 MMHG | BODY MASS INDEX: 24.44 KG/M2

## 2019-04-26 DIAGNOSIS — Z99.81 OXYGEN DEPENDENT: ICD-10-CM

## 2019-04-26 DIAGNOSIS — C34.91 PRIMARY LUNG CANCER, RIGHT (HCC): Primary | ICD-10-CM

## 2019-04-26 DIAGNOSIS — C34.90 RECURRENT NON-SMALL CELL LUNG CANCER (NSCLC) (HCC): ICD-10-CM

## 2019-04-26 DIAGNOSIS — Z85.118 H/O: LUNG CANCER: ICD-10-CM

## 2019-04-26 DIAGNOSIS — J43.9 PULMONARY EMPHYSEMA, UNSPECIFIED EMPHYSEMA TYPE (HCC): ICD-10-CM

## 2019-04-26 DIAGNOSIS — D64.9 ANEMIA, UNSPECIFIED TYPE: ICD-10-CM

## 2019-04-26 PROCEDURE — 1036F TOBACCO NON-USER: CPT | Performed by: INTERNAL MEDICINE

## 2019-04-26 PROCEDURE — 4040F PNEUMOC VAC/ADMIN/RCVD: CPT | Performed by: INTERNAL MEDICINE

## 2019-04-26 PROCEDURE — 99214 OFFICE O/P EST MOD 30 MIN: CPT | Performed by: INTERNAL MEDICINE

## 2019-04-26 PROCEDURE — G8427 DOCREV CUR MEDS BY ELIG CLIN: HCPCS | Performed by: INTERNAL MEDICINE

## 2019-04-26 PROCEDURE — 3017F COLORECTAL CA SCREEN DOC REV: CPT | Performed by: INTERNAL MEDICINE

## 2019-04-26 PROCEDURE — 1123F ACP DISCUSS/DSCN MKR DOCD: CPT | Performed by: INTERNAL MEDICINE

## 2019-04-26 PROCEDURE — G8420 CALC BMI NORM PARAMETERS: HCPCS | Performed by: INTERNAL MEDICINE

## 2019-04-26 PROCEDURE — 99211 OFF/OP EST MAY X REQ PHY/QHP: CPT

## 2019-04-26 PROCEDURE — G8926 SPIRO NO PERF OR DOC: HCPCS | Performed by: INTERNAL MEDICINE

## 2019-04-26 PROCEDURE — G8400 PT W/DXA NO RESULTS DOC: HCPCS | Performed by: INTERNAL MEDICINE

## 2019-04-26 PROCEDURE — 1090F PRES/ABSN URINE INCON ASSESS: CPT | Performed by: INTERNAL MEDICINE

## 2019-04-26 PROCEDURE — 3023F SPIROM DOC REV: CPT | Performed by: INTERNAL MEDICINE

## 2019-04-26 RX ORDER — MULTIVIT-MIN/IRON/FOLIC ACID/K 18-600-40
CAPSULE ORAL DAILY
Status: ON HOLD | COMMUNITY
End: 2019-07-19 | Stop reason: ALTCHOICE

## 2019-04-27 ASSESSMENT — ENCOUNTER SYMPTOMS
VOMITING: 0
COUGH: 1
BACK PAIN: 0
SORE THROAT: 0
BLOOD IN STOOL: 0
CHEST TIGHTNESS: 0
ABDOMINAL PAIN: 0
DIARRHEA: 0
ABDOMINAL DISTENTION: 0
WHEEZING: 0
EYE DISCHARGE: 0
NAUSEA: 0
CONSTIPATION: 0
SHORTNESS OF BREATH: 1
FACIAL SWELLING: 0
TROUBLE SWALLOWING: 0
RECTAL PAIN: 0
COLOR CHANGE: 0

## 2019-04-27 NOTE — PROGRESS NOTES
[facility]    Reason for Clinic visit:      Metastatic left lung cancer    CLIF Christensen is a 76 y.o. female with metastatic NSCLC, she is a former Dr. Lo Doran patient. Patient presents to the medical oncology clinic at [facility] where she would like to transfer her care. Her history of lung cancer is presented below:  She was diagnosed with  stage 2A non-small-cell lung cancer in 2012. On October 22, 2012 she underwent right lower lobe lung, lobectomy. Final pathology report showed:  A - Right lower lobe lung, lobectomy:      Moderately differentiated adenocarcinoma, mixed subtype      (predominantly papillary), pT2b      Emphysematous changes in non-neoplastic lung parenchyma.      Margins negative for malignancy. B - Peribronchial lymph node, excision:   One lymph node with caseous necrosis/calcification  negative for malignancy, pN0. It doesn't look like the patient had any mediastinal lymph node evaluation. She received adjuvant chemotherapy with carboplatin and gemcitabine and completed that on 02/04/2013. According to Dr. Lo Doran note CT chest on 02/18/2013 showed a soft tissue density in the middle and posterior mediastinum. PET on 02/28/2013  showed avid mediastinal right infrahilar adenopathy, highly suspicious for metastatic disease. Due to disease progression she underwent chemotherapy with carboplatin and Taxol and concurrent radiation therapy that she started in 03/2013, completed radiation on 05/14/2013 and chemotherapy in 08/2013. The PET scan done on 09/09/2013 showed postradiation changes only. She then was diagnosed with a new stage 1A left lung cancer in 10/2016. CT on 09/19/2016 showed a left apical lung nodule that measured 10 mm, evaluated by PET that showed an uptake of 5.2, but no other areas were seen. CT-guided biopsy 10/31/2016 showed poorly differentiated adenocarcinoma.  She was referred back to Cardiovascular surgery for excision of a lung nodule for curative intent. She was deemed nonsurgical by Dr. Monse Gonzales, therefore she received stereotactic ablative radiation therapy to the left lung mass. SBRT completed on 01/11/2017. The patient had restaging  CT chest  on 04/19/2018 that showed new irregular 10 x 19 mm left upper lobe nodule that increased in size since 10/2017. She started chemo with single agent Taxotere. After seven cycles Taxotere was discontinued due to side effects,  losing her toenails and her fingernails. Lung biopsy from 2016 was tested for PD-L1, it showed high expression, Tumor Proportion Score:  81-90%. KRAS was detected. EGFR not detected. Her treatment was changed to Tecentriq on 12/18/2018. The patient has had Tecentriq twice. She is a former smoker with severe COPD requiring home O2 supplementation bynasal cannula oxygen at 4 L. The patient was hospitalized from 02/23/2019 till 02/28/2018 for acute on chronic respiratory failure with hypoxemia and hypercapnia. CTA showed worsening right lung consolidation, differential diagnosis included community acquired pneumonia, atypical pulm edema, immunotherapy induced pneumonitis versus malignancy. Patient I  was on empiric antibotic treatment for pneumonia. She was also placed on steroids for the possible drug induced pneumonitis. She had a thoracentesis of 300 ml which appeared to be a transudate. There were no malignant cells on cytology. Due to mental status changes she had Ct of the brain that showed  inconclusive findings. Brain MRI recommended, however the patient refused. Intermittent history on April 26, 2019:    Patient presents to the medical oncology clinic to review results of PET scan and discuss further treatment. .  She reports that her respiratory status is improved. She denies having any headaches. Her appetite is fine. No fevers.  She is more active at home, walking and with more activity her legs swelling is improved significantly. Past Medical History         Diagnosis Date    Arthritis     low back pain and scoliosis in lumbar    Cancer ([facility] [address]     lower right lobe-lung s/p lobectomy in , radiation and chemo nad later had mediatinal mets and had radationa dn chemo again, and in  had reoccurence on the left side upper and was started on radiation this year and has  a follow up CT in oct 2017    Chronic bronchitis, simple ([facility] [address]     Chronic respiratory failure with hypoxia ([facility] [address]     COPD (chronic obstructive pulmonary disease) (HCC)     GERD (gastroesophageal reflux disease)     HTN (hypertension)     Pneumonia     Postprocedural pneumothorax     Scoliosis     Urinary incontinence       Past Surgical History           Procedure Laterality Date    LOBECTOMY  10/19/2012    rt lower lobectomy     LUNG BIOPSY  2012       Allergies   Advil cold & sinus liqui-gels [pseudoephedrine-ibuprofen];  Percocet [oxycodone-acetaminophen]; and Sulfa antibiotics  Social History     Social History     Socioeconomic History    Marital status:      Spouse name: Jessica Mccloud Number of children: 2    Years of education: Not on file    Highest education level: Not on file   Occupational History    Not on file   Social Needs    Financial resource strain: Not on file    Food insecurity:     Worry: Not on file     Inability: Not on file   [email or web address] needs:     Medical: Not on file     Non-medical: Not on file   Tobacco Use    Smoking status: Former Smoker     Packs/day: 1.00     Years: 45.00     Pack years: 45.00     Types: Cigarettes     Last attempt to quit:      Years since quittin.3    Smokeless tobacco: Never Used   Substance and Sexual Activity    Alcohol use: No    Drug use: No    Sexual activity: Not on file   Lifestyle    Physical activity:     Days per week: Not on file     Minutes per session: Not on file    Stress: Not on file   Relationships    Social connections:     Talks on phone: Not on file     Gets together: Not on file     Attends Judaism service: Not on file     Active member of club or organization: Not on file     Attends meetings of clubs or organizations: Not on file     Relationship status: Not on file    Intimate partner violence:     Fear of current or ex partner: Not on file     Emotionally abused: Not on file     Physically abused: Not on file     Forced sexual activity: Not on file   Other Topics Concern    Not on file   Social History Narrative    Not on file     Family History          Problem Relation Age of Onset    Cancer Mother     Heart Disease Father     High Blood Pressure Father     High Cholesterol Father     Arthritis Sister     Heart Disease Sister     Cancer Sister     Heart Disease Sister     Stroke Sister     High Cholesterol Sister     High Blood Pressure Sister     Stroke Paternal Grandfather      ROS     Review of Systems   Constitutional: Positive for activity change, appetite change, fatigue and fever. HENT: Negative for congestion, dental problem, facial swelling, hearing loss, mouth sores, nosebleeds, sore throat, tinnitus and trouble swallowing. Eyes: Negative for discharge and visual disturbance. Respiratory: Positive for cough and shortness of breath. Negative for chest tightness and wheezing. Cardiovascular: Positive for palpitations and leg swelling. Negative for chest pain. Gastrointestinal: Negative for abdominal distention, abdominal pain, blood in stool, constipation, diarrhea, nausea, rectal pain and vomiting. Endocrine: Negative for cold intolerance, polydipsia and polyuria. Genitourinary: Negative for decreased urine volume, difficulty urinating, dysuria, flank pain, hematuria and urgency. Musculoskeletal: Negative for arthralgias, back pain, gait problem, joint swelling, myalgias and neck stiffness. Skin: Negative for color change, rash and wound. Neurological: Positive for weakness.  Negative for dizziness, tremors, seizures, speech difficulty, light-headedness, numbness and headaches. Hematological: Negative for adenopathy. Does not bruise/bleed easily. Psychiatric/Behavioral: Negative for confusion and sleep disturbance. The patient is not nervous/anxious. Vitals     height is 5' 2.01\" (1.575 m) and weight is 132 lb 12.8 oz (60.2 kg). Her oral temperature is 98.7 °F (37.1 °C). Her blood pressure is 97/54 (abnormal) and her pulse is 119. Her respiration is 18 and oxygen saturation is 91%. Exam   Physical Exam   Constitutional: She is oriented to person, place, and time. She appears well-developed and well-nourished. No distress. HENT:   Head: Normocephalic. Mouth/Throat: Oropharynx is clear and moist. No oropharyngeal exudate. Eyes: Pupils are equal, round, and reactive to light. EOM are normal. Right eye exhibits no discharge. Left eye exhibits no discharge. No scleral icterus. Neck: Normal range of motion. Neck supple. No JVD present. No tracheal deviation present. No thyromegaly present. Cardiovascular: Normal rate and normal heart sounds. Exam reveals no gallop and no friction rub. No murmur heard. Pulmonary/Chest: No stridor. Tachypnea noted. She is in respiratory distress. She has decreased breath sounds in the right middle field, the right lower field, the left middle field and the left lower field. She has no wheezes. She has no rales. She exhibits no tenderness. Abdominal: Soft. Bowel sounds are normal. She exhibits no distension and no mass. There is no tenderness. There is no rebound. Musculoskeletal: Normal range of motion. She exhibits no edema. Good range of motion in all four extremities. Lymphadenopathy:     She has no cervical adenopathy. Neurological: She is alert and oriented to person, place, and time. She has normal reflexes. No cranial nerve deficit. She exhibits normal muscle tone. Skin: Skin is warm. No rash noted. No erythema. Psychiatric: She has a normal mood and affect. Her behavior is normal. Judgment and thought content normal.   Vitals reviewed. Labs     Lab Results   Component Value Date    WBC 6.2 04/15/2019    HGB 9.0 (L) 04/15/2019    HCT 27.9 (L) 04/15/2019    MCV 86 04/15/2019     04/15/2019       Chemistry        Component Value Date/Time     04/15/2019 1535     02/28/2019 0423    K 4.2 04/15/2019 1535    K 4.6 02/28/2019 0423    K 3.8 10/15/2018 0415    CL 93 (L) 02/28/2019 0423    CO2 40 (HH) 02/28/2019 0423    BUN 36 (H) 02/28/2019 0423    CREATININE 0.9 04/15/2019 1535    CREATININE 0.7 02/28/2019 0423        Component Value Date/Time    CALCIUM 8.5 02/28/2019 0423    ALKPHOS 78 04/15/2019 1535    AST 20 04/15/2019 1535    ALT 13 04/15/2019 1535    BILITOT <0.2 (L) 04/15/2019 1535              Radiology      PET scan on 04/23/2019 showed: Impression   Essentially stable examination. There is a fairly intense activity in the thyroid gland which may be related to thyroiditis. This can be a normal finding. Similar to the prior exam. Slightly greater amount of atelectatic lung currently with    minimal activity above background but similar to the prior exam. Interval resolution of previously seen left upper lobe hypermetabolic nodule. Assessment/ Plan     1. Metastatic left lung cancer. Treated with Taxotere followed by 2 cycles of Tecentriq. Last dose given in January 2019. Further treatment held due to hospitalization for acute on chronic respiratory failure. Recent PET scan on 04/23/2019 showed interval resolution of previously seen left upper lobe hypermetabolic nodule. The patient's performance status is significantly improved. She is able to do most of the activity of daily living, and instrumental activities of daily living without any assistance. We will give her additional 2 weeks off. She will RTC on May 15, 2019 to start treatment with thTecentriq  2.  Acute on chronic respiratory failure with hypoxemia and hypercapnia. Stable chronic respiratory failure secondary to COPD. . Currently off steroids, off antibiotics, back to baseline. On chronic O2 supplementation at home. 3. History of mental status changes. Resolved. Inconclusive CT of the brain. Brain MRI recommended, however the patient refused. I recommend at least repetition of CT of the  4. Chronic normocytic anemia. Will monitor.      Electronically signed by     Colin Smith MD on 4/27/2019 at 4:30 PM

## 2019-05-06 ENCOUNTER — OFFICE VISIT (OUTPATIENT)
Dept: PULMONOLOGY | Age: 74
End: 2019-05-06
Payer: MEDICARE

## 2019-05-06 VITALS
RESPIRATION RATE: 20 BRPM | OXYGEN SATURATION: 96 % | HEART RATE: 114 BPM | HEIGHT: 62 IN | SYSTOLIC BLOOD PRESSURE: 130 MMHG | DIASTOLIC BLOOD PRESSURE: 76 MMHG | TEMPERATURE: 98.7 F | WEIGHT: 131 LBS | BODY MASS INDEX: 24.11 KG/M2

## 2019-05-06 DIAGNOSIS — Z72.0 TOBACCO ABUSE: ICD-10-CM

## 2019-05-06 DIAGNOSIS — C34.92 ADENOCARCINOMA, LUNG, LEFT (HCC): ICD-10-CM

## 2019-05-06 DIAGNOSIS — J94.8 PLEURAL EFFUSION TRANSUDATIVE: ICD-10-CM

## 2019-05-06 DIAGNOSIS — J44.9 STAGE 3 SEVERE COPD BY GOLD CLASSIFICATION (HCC): ICD-10-CM

## 2019-05-06 DIAGNOSIS — J96.12 CHRONIC RESPIRATORY FAILURE WITH HYPERCAPNIA (HCC): ICD-10-CM

## 2019-05-06 DIAGNOSIS — J96.11 CHRONIC RESPIRATORY FAILURE WITH HYPOXIA (HCC): Primary | ICD-10-CM

## 2019-05-06 PROCEDURE — G8926 SPIRO NO PERF OR DOC: HCPCS | Performed by: NURSE PRACTITIONER

## 2019-05-06 PROCEDURE — 1090F PRES/ABSN URINE INCON ASSESS: CPT | Performed by: NURSE PRACTITIONER

## 2019-05-06 PROCEDURE — G8420 CALC BMI NORM PARAMETERS: HCPCS | Performed by: NURSE PRACTITIONER

## 2019-05-06 PROCEDURE — G8400 PT W/DXA NO RESULTS DOC: HCPCS | Performed by: NURSE PRACTITIONER

## 2019-05-06 PROCEDURE — 3023F SPIROM DOC REV: CPT | Performed by: NURSE PRACTITIONER

## 2019-05-06 PROCEDURE — 4040F PNEUMOC VAC/ADMIN/RCVD: CPT | Performed by: NURSE PRACTITIONER

## 2019-05-06 PROCEDURE — G8427 DOCREV CUR MEDS BY ELIG CLIN: HCPCS | Performed by: NURSE PRACTITIONER

## 2019-05-06 PROCEDURE — 99214 OFFICE O/P EST MOD 30 MIN: CPT | Performed by: NURSE PRACTITIONER

## 2019-05-06 PROCEDURE — 1036F TOBACCO NON-USER: CPT | Performed by: NURSE PRACTITIONER

## 2019-05-06 PROCEDURE — 1123F ACP DISCUSS/DSCN MKR DOCD: CPT | Performed by: NURSE PRACTITIONER

## 2019-05-06 PROCEDURE — 3017F COLORECTAL CA SCREEN DOC REV: CPT | Performed by: NURSE PRACTITIONER

## 2019-05-06 RX ORDER — ALBUTEROL SULFATE 2.5 MG/3ML
2.5 SOLUTION RESPIRATORY (INHALATION) EVERY 6 HOURS PRN
Qty: 360 VIAL | Refills: 3 | Status: SHIPPED | OUTPATIENT
Start: 2019-05-06 | End: 2020-06-15

## 2019-05-06 RX ORDER — CITALOPRAM 20 MG/1
20 TABLET ORAL DAILY
Qty: 30 TABLET | Refills: 1 | Status: SHIPPED | OUTPATIENT
Start: 2019-05-06 | End: 2019-05-08

## 2019-05-06 RX ORDER — ALBUTEROL SULFATE 90 UG/1
2 AEROSOL, METERED RESPIRATORY (INHALATION) EVERY 6 HOURS PRN
Qty: 1 INHALER | Refills: 5 | Status: SHIPPED | OUTPATIENT
Start: 2019-05-06 | End: 2019-05-08 | Stop reason: SDUPTHER

## 2019-05-06 NOTE — PROGRESS NOTES
[facility]     Patient: Armando Mcgee, 76 y.o.   : 1945    Pt of Dr. Jose G Cintron MD      Subjective     Chief Complaint   Patient presents with   [address] from [facility] f/u CXR ordered- pt said she had PET Scan done 19, CBC 4-15-19, Thoracentesis 19        HPI  Lupillo Gomes is here for hospital  follow up for acute on chronic respiratory failure. Was on home BiPAP- was having issues with mask, Download showing maxima leaks/min 45.6, AHI 3.9. On  spontaneous mode. [facility] has attempted to work with the patient to get her re-fitted for mask but she has been unable to get there. Patient is requesting full face mask, currently using nasal pillows. Currently on 2LPM at rest, 6LPM with activity - these are most recent setting from home O2 eval in hospital  PMH bilateral NSCLC (adenocarcinoma) either second primary or metastatic but recurrent in both sides, currently under the care of oncology with Dr Amanda Flores ( previously was following with Dr Boston Sandoval)   Recent PET/CT- one lung nodule completely resolved, the other nodule showing decrease in size, has f/u scheduled with Dr Amanda Flores to discuss if going to resume chemotherapy     Severe COPD, FEV 1 35% on PFT   Chronic hypercapnia - ABG compensated with pH 7.39, PCO2 71  C/o SOB with exertion, feels back to baseline.  Chronic fatigue, minimal cough  Denies fever/chills/ night sweats/ weight changes    Past Medical hx   PMH:  Past Medical History:   Diagnosis Date    Arthritis     low back pain and scoliosis in lumbar    Cancer ([address]     lower right lobe-lung s/p lobectomy in , radiation and chemo nad later had mediatinal mets and had radationa dn chemo again, and in  had reoccurence on the left side upper and was started on radiation this year and has  a follow up CT in oct 2017    Chronic bronchitis, simple (HCC)     Chronic respiratory failure with hypoxia ([address]     COPD (chronic obstructive pulmonary disease) (HCC)     GERD (gastroesophageal reflux disease)     HTN (hypertension)     Pneumonia     Postprocedural pneumothorax     Scoliosis     Urinary incontinence      SURGICAL HISTORY:  Past Surgical History:   Procedure Laterality Date    LOBECTOMY  10/19/2012    rt lower lobectomy     LUNG BIOPSY  2012     SOCIAL HISTORY:  Social History     Tobacco Use    Smoking status: Former Smoker     Packs/day: 1.00     Years: 45.00     Pack years: 45.00     Types: Cigarettes     Last attempt to quit:      Years since quittin.3    Smokeless tobacco: Never Used   Substance Use Topics    Alcohol use: No    Drug use: No     ALLERGIES:  Allergies   Allergen Reactions    Advil Cold & Sinus Liqui-Gels [Pseudoephedrine-Ibuprofen] Anaphylaxis    Percocet [Oxycodone-Acetaminophen] Nausea Only    Sulfa Antibiotics Hives     FAMILY HISTORY:  Family History   Problem Relation Age of Onset    Cancer Mother     Heart Disease Father     High Blood Pressure Father     High Cholesterol Father     Arthritis Sister     Heart Disease Sister     Cancer Sister     Heart Disease Sister     Stroke Sister     High Cholesterol Sister     High Blood Pressure Sister     Stroke Paternal Grandfather      CURRENT MEDICATIONS:  Current Outpatient Medications   Medication Sig Dispense Refill    albuterol (PROVENTIL) (2.5 MG/3ML) 0.083% nebulizer solution Take 3 mLs by nebulization every 6 hours as needed for Wheezing or Shortness of Breath 360 vial 3    albuterol sulfate HFA (PROAIR HFA) 108 (90 Base) MCG/ACT inhaler Inhale 2 puffs into the lungs every 6 hours as needed for Wheezing 1 Inhaler 5    citalopram (CELEXA) 20 MG tablet Take 1 tablet by mouth daily 30 tablet 1    NONFORMULARY as needed Indications: c;laritin nasal spray      Multiple Vitamin (MULTI-VITAMIN DAILY PO) Take by mouth      Cholecalciferol (VITAMIN D) 2000 units CAPS capsule Take by mouth daily      Multiple Vitamins-Minerals (HAIR SKIN AND NAILS FORMULA PO) Take by mouth daily      digoxin (LANOXIN) 125 MCG tablet Take 1 tablet by mouth daily 30 tablet 3    esomeprazole (NEXIUM) 20 MG delayed release capsule Take 1 capsule by mouth daily (Patient taking differently: Take 20 mg by mouth as needed ) 30 capsule 3    Handicap Placard MISC by Does not apply route Diagnosis: Malignant neoplasm of lung, unspecified laterality, unspecified part of lung (HCC) [C34.90]  Expiration Date: 4/15/2020 1 each 0    ferrous sulfate 325 (65 Fe) MG tablet One every other day take with food and vitamin C 500 mg 30 tablet 3    diltiazem (CARDIZEM CD) 300 MG extended release capsule Take 1 capsule by mouth daily 30 capsule 3    metoprolol tartrate (LOPRESSOR) 25 MG tablet Take 1 tablet by mouth 2 times daily 60 tablet 3    vitamin C (VITAMIN C) 500 MG tablet Take 1 tablet by mouth daily 30 tablet 3    guaiFENesin (MUCINEX) 600 MG extended release tablet Take 1 tablet by mouth 2 times daily as needed for Congestion 60 tablet 2    furosemide (LASIX) 20 MG tablet Take 1 tablet by mouth daily as needed (leg swelling) (Patient taking differently: Take 40 mg by mouth daily as needed (leg swelling) ) 5 tablet 5    lidocaine-prilocaine (EMLA) 2.5-2.5 % cream Apply topically as needed for Pain Apply topically as needed.  mometasone-formoterol (DULERA) 200-5 MCG/ACT inhaler Inhale 2 puffs into the lungs 2 times daily 3 Inhaler 3    fluticasone (FLONASE) 50 MCG/ACT nasal spray 1 spray by Nasal route daily 1 Bottle 5    betamethasone dipropionate (DIPROLENE) 0.05 % ointment Apply topically daily.  50 g 5    HYDROcodone-acetaminophen (NORCO) 5-325 MG per tablet Take 1 tablet by mouth 3 times daily as needed for Pain 90 tablet 0    promethazine (PHENERGAN) 25 MG tablet Take 25 mg by mouth as needed       tiotropium (SPIRIVA RESPIMAT) 2.5 MCG/ACT AERS inhaler Inhale 2 puffs into the lungs daily 3 Inhaler 3     No current facility-administered medications for this visit. Pérez BRAN   Review of Systems   Constitutional: Positive for fatigue. Negative for chills and fever. HENT: Negative. Eyes: Negative. Respiratory: Positive for cough and shortness of breath. Negative for wheezing. Cardiovascular: Positive for leg swelling. Negative for chest pain and palpitations. Gastrointestinal: Negative for abdominal pain, diarrhea, nausea and vomiting. Genitourinary: Negative. Musculoskeletal: Negative. Skin: Negative. Neurological: Negative. Hematological: Bruises/bleeds easily. Psychiatric/Behavioral: Negative for suicidal ideas. Physical exam   /76 (Site: Left Upper Arm, Position: Sitting, Cuff Size: Medium Adult)   Pulse 114   Temp 98.7 °F (37.1 °C) Comment: Tympanic  Resp 20   Ht 5' 2\" (1.575 m)   Wt 131 lb (59.4 kg)   SpO2 96% Comment: on 2 L  BMI 23.96 kg/m²        Physical Exam   Constitutional: She is oriented to person, place, and time. She appears well-developed and well-nourished. No distress. HENT:   Mouth/Throat: No oropharyngeal exudate. Eyes: Conjunctivae are normal. Right eye exhibits no discharge. No scleral icterus. Neck: Neck supple. No JVD present. Cardiovascular: Normal rate and regular rhythm. Exam reveals no friction rub. No murmur heard. Pulmonary/Chest: Effort normal and breath sounds normal. No respiratory distress. She has no wheezes. She has no rales. She exhibits no tenderness. Abdominal: Soft. Musculoskeletal: She exhibits edema (3+). She exhibits no tenderness. Lymphadenopathy:     She has no cervical adenopathy. Neurological: She is alert and oriented to person, place, and time. Skin: Skin is warm and dry. Capillary refill takes less than 2 seconds. Psychiatric: She has a normal mood and affect. Her behavior is normal. Judgment and thought content normal.   Nursing note and vitals reviewed.        Test results   Lung Nodule Screening     [] Qualifies    [x]Does not qualify - ( active cancer)  [] Declined    [] Completed    Results for Marin Camejo (MRN [id number]) as of 5/6/2019 14:22   Ref. Range 2/27/2019 10:13   Source: Unknown R Radial   pH, Blood Gas Latest Ref Range: 7.35 - 7.45  7.39   PCO2 Latest Ref Range: 35 - 45 mmhg 71 (HH)   pO2 Latest Ref Range: 71 - 104 mmhg 77   HCO3 Latest Ref Range: 23 - 28 mmol/l 43 (H)   Base Excess (Calculated) Latest Ref Range: -2.5 - 2.5 mmol/l 14.0 (H)   O2 Sat Latest Units: % 94   Geraldo Test Unknown Positive   IFIO2 Unknown 3   DEVICE Unknown Cannula   COLLECTED BY: Unknown [id number]     PET/CT 4/23/19- ordered by Dr Keren Bravo MD      Impression   Essentially stable examination. There is a fairly intense activity in the thyroid gland which may be related to thyroiditis. This can be a normal finding. Similar to the prior exam. Slightly greater amount of atelectatic lung currently with    minimal activity above background but similar to the prior exam. Interval resolution of previously seen left upper lobe hypermetabolic nodule.               **This report has been created using voice recognition software.  It may contain minor errors which are inherent in voice recognition technology. **       Final report electronically signed by Dr. Myra Mckinley on 4/23/2019 3:49 PM       Assessment      Diagnosis Orders   1. Chronic respiratory failure with hypoxia (HCC)  DME Order for CPAP as OP   2. Stage 3 severe COPD by GOLD classification (HCC)  albuterol (PROVENTIL) (2.5 MG/3ML) 0.083% nebulizer solution    albuterol sulfate HFA (PROAIR HFA) 108 (90 Base) MCG/ACT inhaler    DME Order for CPAP as OP   3. Tobacco abuse  albuterol (PROVENTIL) (2.5 MG/3ML) 0.083% nebulizer solution    albuterol sulfate HFA (PROAIR HFA) 108 (90 Base) MCG/ACT inhaler   4. Chronic respiratory failure with hypercapnia (HCC)  DME Order for CPAP as OP   5. Pleural effusion transudative     6.  Adenocarcinoma, lung, left ([address]       - small right pleural effusion on CT imaging, asymptomatic. - will continue to monitor. S/p left thoracentesis - transudate, neg cytology and neg cultures      -S/P RLL lobectomy  -Chronic fibrotic right lung/ hemithorax changes poss due to XRT/surgery    Plan   -continue home BiPAP 14/6 with 2LPM O2 bleed in  -script for new PAP supplies for full face mask - pt to make appt with DME to get mask re-fit appt, discussed the importance of compliance and risks of resp failure, poss. Hospitalization without use.     -continue home O2 at current settings: 2LPM rest/ sleep, 6LPM activity    -COPD maintenance: continue Dulera /Spiriva/Albuterol    -start Celexa 20 mg PO daily for depression - time spent discussing SSRI potential S/E's including black box gerber ing of suicidal tendencies. Patient voices understanding and willing to take risk.  If suicidal thoughts she is to stop medication and call 911.    -refer to Kenrick Woodard MD for primary care per patient request (currently has no PCP)    -keep follow ups with oncology     Will see Dorita Robertson in: 4 weeks for medication follow up     Electronically signed by JESUS Ivy CNP on 5/7/2019 at 7:01 AM

## 2019-05-06 NOTE — PATIENT INSTRUCTIONS
Patient Education        Learning about Antidepressants  What are antidepressants? Antidepressants are medicines that change the levels of some chemicals in the brain. They can help affect moods. There are different types that work on brain chemicals in different ways. These medicines can help treat depression. They are also used for anxiety, eating disorders, post-traumatic stress disorder, and chronic pain. What are some examples? Here are some examples of antidepressants. For each item in the list, the generic name is first, followed by any brand names. · amitriptyline  · bupropion (Wellbutrin)  · citalopram (Celexa)  · fluoxetine (Prozac)  · sertraline (Zoloft)  · venlafaxine (Effexor)  This is not a complete list of antidepressants. What are the side effects? Side effects may vary. They depend on the medicine you take. But common ones include:  · Nausea. · Dry mouth. · Loss of appetite. · Diarrhea or constipation. · Sexual problems. (These may include loss of desire or erection problems.)  · Headaches. · Trouble falling asleep. Or you may wake up a lot at night. · Weight gain. · Feeling nervous or on edge. · Feeling drowsy in the daytime. Problems with sexual arousal and a lack of interest in sex are common side effects. If this happens to you, talk to your doctor. There are other medicines that may help with these problems. Mild side effects may go away after you take the medicine for a few weeks. If the side effects bother you, talk with your doctor. He or she may prescribe a different medicine. Or the doctor may suggest ways to manage your side effects. How do you take antidepressants safely? If your doctor has prescribed antidepressants, here are some tips to help you get the most from your medicine. · Share your health history with your doctor. Tell your doctor about your other medicines and health conditions.  This information can affect which antidepressant your doctor prescribes for you. Tell your doctor if you or anyone in your family has had bipolar disorder or used antidepressants before. · Take your medicine as prescribed. It works best and has fewer side effects when you take it exactly as your doctor prescribed. If you miss a dose, and if it's not too late in the day, it's okay to take it. Don't double up doses. · Keep taking your medicine for a while. Taking it for at least 6 months after you feel better can help keep you from getting symptoms again. · Be prepared to try different medicines and doses. You may start to feel better within 1 to 3 weeks after you start the medicine. If you have not improved at all in 3 weeks, your doctor may increase your dose. Or you may need to try a different medicine. Over time, your doctor may need to adjust your dose again to manage your symptoms better. · Don't take any new medicines unless you talk to your doctor first.   Even common medicines like aspirin and some vitamins and herbs can cause problems if you use them while you take antidepressants. · Do not drink alcohol. It can make the side effects worse. · Don't stop taking your medicine on your own. Quitting antidepressants too quickly can cause withdrawal symptoms. It can also cause depression to return. If you are having a problem with your medicine or are ready to quit taking it, work with your doctor. He or she can help you to slowly reduce the dose over a span of a few weeks. · Call your doctor right away for serious side effects. Examples include:  ? Warning signs of suicide. These include talking or writing about death, giving away belongings, or withdrawing from family and friends. ? Manic behavior. This includes having very high energy, sleeping less than normal, being impulsive, or being grouchy or restless. How might you feel about taking antidepressants?   You may feel nervous, relieved, or a little surprised that your doctor is talking to you about antidepressants. And the thought of needing to take medicine for a long time can be scary. But whether you are depressed or you have another condition, you are taking a step to help yourself feel better. Follow-up care is a key part of your treatment and safety. Be sure to make and go to all appointments, and call your doctor if you are having problems. It's also a good idea to know your test results and keep a list of the medicines you take. Where can you learn more? Go to [email or web address]. org and sign in to your [employer] account. Enter A230 in the [employer] box to learn more about \"Learning about Antidepressants. \"     If you do not have an account, please click on the \"Sign Up Now\" link. Current as of: September 11, 2018  Content Version: 11.9  © 2279-2276 [employer]. Care instructions adapted under license by [facility] ([facility]). If you have questions about a medical condition or this instruction, always ask your healthcare professional. [address] any warranty or liability for your use of this information.

## 2019-05-07 ENCOUNTER — TELEPHONE (OUTPATIENT)
Dept: INTERNAL MEDICINE CLINIC | Age: 74
End: 2019-05-07

## 2019-05-07 ASSESSMENT — ENCOUNTER SYMPTOMS
COUGH: 1
WHEEZING: 0
DIARRHEA: 0
SHORTNESS OF BREATH: 1
EYES NEGATIVE: 1
NAUSEA: 0
VOMITING: 0
ABDOMINAL PAIN: 0

## 2019-05-08 ENCOUNTER — OFFICE VISIT (OUTPATIENT)
Dept: INTERNAL MEDICINE CLINIC | Age: 74
End: 2019-05-08
Payer: MEDICARE

## 2019-05-08 VITALS
OXYGEN SATURATION: 96 % | RESPIRATION RATE: 14 BRPM | SYSTOLIC BLOOD PRESSURE: 122 MMHG | BODY MASS INDEX: 24.81 KG/M2 | DIASTOLIC BLOOD PRESSURE: 68 MMHG | HEART RATE: 110 BPM | WEIGHT: 131.4 LBS | HEIGHT: 61 IN

## 2019-05-08 DIAGNOSIS — J44.9 STAGE 3 SEVERE COPD BY GOLD CLASSIFICATION (HCC): ICD-10-CM

## 2019-05-08 DIAGNOSIS — G47.33 OSA (OBSTRUCTIVE SLEEP APNEA): ICD-10-CM

## 2019-05-08 DIAGNOSIS — I10 ESSENTIAL HYPERTENSION: ICD-10-CM

## 2019-05-08 DIAGNOSIS — Z91.81 AT HIGH RISK FOR FALLS: Primary | ICD-10-CM

## 2019-05-08 DIAGNOSIS — I50.9 CHRONIC CONGESTIVE HEART FAILURE, UNSPECIFIED HEART FAILURE TYPE (HCC): ICD-10-CM

## 2019-05-08 DIAGNOSIS — R00.0 SINUS TACHYCARDIA: ICD-10-CM

## 2019-05-08 DIAGNOSIS — R60.0 BILATERAL LEG EDEMA: ICD-10-CM

## 2019-05-08 DIAGNOSIS — C34.90 RECURRENT NON-SMALL CELL LUNG CANCER (NSCLC) (HCC): ICD-10-CM

## 2019-05-08 PROCEDURE — 1123F ACP DISCUSS/DSCN MKR DOCD: CPT | Performed by: NURSE PRACTITIONER

## 2019-05-08 PROCEDURE — 3023F SPIROM DOC REV: CPT | Performed by: NURSE PRACTITIONER

## 2019-05-08 PROCEDURE — G8419 CALC BMI OUT NRM PARAM NOF/U: HCPCS | Performed by: NURSE PRACTITIONER

## 2019-05-08 PROCEDURE — 3017F COLORECTAL CA SCREEN DOC REV: CPT | Performed by: NURSE PRACTITIONER

## 2019-05-08 PROCEDURE — 4040F PNEUMOC VAC/ADMIN/RCVD: CPT | Performed by: NURSE PRACTITIONER

## 2019-05-08 PROCEDURE — 1090F PRES/ABSN URINE INCON ASSESS: CPT | Performed by: NURSE PRACTITIONER

## 2019-05-08 PROCEDURE — G8926 SPIRO NO PERF OR DOC: HCPCS | Performed by: NURSE PRACTITIONER

## 2019-05-08 PROCEDURE — 1036F TOBACCO NON-USER: CPT | Performed by: NURSE PRACTITIONER

## 2019-05-08 PROCEDURE — G8427 DOCREV CUR MEDS BY ELIG CLIN: HCPCS | Performed by: NURSE PRACTITIONER

## 2019-05-08 PROCEDURE — G8400 PT W/DXA NO RESULTS DOC: HCPCS | Performed by: NURSE PRACTITIONER

## 2019-05-08 PROCEDURE — 99214 OFFICE O/P EST MOD 30 MIN: CPT | Performed by: NURSE PRACTITIONER

## 2019-05-08 RX ORDER — DILTIAZEM HYDROCHLORIDE 300 MG/1
300 CAPSULE, COATED, EXTENDED RELEASE ORAL DAILY
Qty: 30 CAPSULE | Refills: 3 | Status: SHIPPED | OUTPATIENT
Start: 2019-05-08 | End: 2019-05-08 | Stop reason: SDUPTHER

## 2019-05-08 RX ORDER — DILTIAZEM HYDROCHLORIDE 300 MG/1
300 CAPSULE, COATED, EXTENDED RELEASE ORAL DAILY
Qty: 30 CAPSULE | Refills: 3 | Status: ON HOLD | OUTPATIENT
Start: 2019-05-08 | End: 2019-07-18 | Stop reason: ALTCHOICE

## 2019-05-08 RX ORDER — ALBUTEROL SULFATE 90 UG/1
2 AEROSOL, METERED RESPIRATORY (INHALATION) EVERY 6 HOURS PRN
Qty: 1 INHALER | Refills: 5 | Status: SHIPPED | OUTPATIENT
Start: 2019-05-08 | End: 2020-02-05 | Stop reason: SDUPTHER

## 2019-05-08 RX ORDER — PROMETHAZINE HYDROCHLORIDE 25 MG/1
25 TABLET ORAL EVERY 8 HOURS PRN
Qty: 30 TABLET | Refills: 1 | Status: SHIPPED | OUTPATIENT
Start: 2019-05-08 | End: 2019-05-08 | Stop reason: SDUPTHER

## 2019-05-08 RX ORDER — FUROSEMIDE 20 MG/1
20 TABLET ORAL DAILY PRN
Qty: 5 TABLET | Refills: 5 | Status: ON HOLD | OUTPATIENT
Start: 2019-05-08 | End: 2020-01-26 | Stop reason: HOSPADM

## 2019-05-08 RX ORDER — PROMETHAZINE HYDROCHLORIDE 25 MG/1
25 TABLET ORAL EVERY 8 HOURS PRN
Qty: 30 TABLET | Refills: 1 | Status: SHIPPED | OUTPATIENT
Start: 2019-05-08 | End: 2019-06-26 | Stop reason: SDUPTHER

## 2019-05-08 ASSESSMENT — PATIENT HEALTH QUESTIONNAIRE - PHQ9
1. LITTLE INTEREST OR PLEASURE IN DOING THINGS: 1
SUM OF ALL RESPONSES TO PHQ QUESTIONS 1-9: 2
SUM OF ALL RESPONSES TO PHQ9 QUESTIONS 1 & 2: 2
SUM OF ALL RESPONSES TO PHQ QUESTIONS 1-9: 2
2. FEELING DOWN, DEPRESSED OR HOPELESS: 1

## 2019-05-08 NOTE — PROGRESS NOTES
[facility]  PHYSICIANS [facility] [address], [address] [facility]  Dept: [phone]  Dept Fax: Doug Lara  1945  [id number]  19    Chief Complaint   Patient presents with   [facility] Establish Care     new patient       This patient presents for medical evaluation to establish care. I have not previously seen this patient. Past Medical History-  Lung cancer, COPD, HTN, CHF, stress incontinence, arthritis, GERD, ARCHIE  Past Surgical History-   Thoracentesis, lobectomy  Family History-   Mom is - lymphoma. Father is - heart disease, HTN, and hyperlipidemia. 3 sisters all of whom are - arthritis, heart disease, lung cancer, HTN, hyperlipidemia, CVA. 1 brother whom is living- HTN      COPD/ARCHIE/Lung [address] reports she had a right lower lobectomy for lung cancer in 10/2012. She received chemotherapy, completed 13. PET scan on 13 showed avid mediastinal right infrahilar adenopathy, highly suspicious for metastatic disease. She then completed a different chemotherapy treatment and radiation. In  she was diagnosed with a new lung cancer. She was referred to [facility] for excision but was not a candidate for operation. She then received stereotactic ablative radiation therapy. In 2018 she had a new irregular left upper lobe nodule. She was treated with chemotherapy, but had to be discontinued due to side effects. She is now on Tecentriq chemotherapy, managed by Dr. Favio Morris. She has been oxygen dependent since 10/2012. Initially she only need it with activity, now she wears it continuously. She requires 2L when resting and 6L with activity. She reports that she quit smoking 10 years ago. Wears a CPAP faithfully with sleep for ARCHIE. Follows with Dr. Israel Watters. HTN/CHF-  John Romero reports that she is taking medications as instructed, with no medication side effects. Home BP monitoring in range of 137-239'T systolic over 92-48'F diastolic. Today in office was 122/68. She denies chest pain, palpitations, and orthostatic dizziness or lightheadedness. Does have stable dyspnea on exertion, unchanged. Also has swelling of ankles, which is not new or worsened either. Her heart rate today is 110. She reports that she forgot to take her medicines prior to appointment. 2+ pitting edema to lower extremities bilaterally. She states this is not new for her and is unchanged. She does appear dry. Mucous membranes are pink but dry. Skin tenting noted. Encouraged oral intake. Will prescribe Lasix 20 mg daily as need for lower extremity edema. She is scheduled to see Dr. Keren Bravo 5/15, Karla Samano will discuss with her as well.        Patient Active Problem List   Diagnosis    Malignant neoplasm of left lung ([facility] [address]    HTN (hypertension)    GERD (gastroesophageal reflux disease)    H/O: lung cancer    OAB (overactive bladder)    Nodule of left lung    Recurrent non-small cell lung cancer (NSCLC) (HCC)    Acute on chronic respiratory failure with hypoxia (HCC)    Orthostatic hypotension    Chronic midline low back pain without sciatica    Pneumonia due to organism    Sinus tachycardia    Acute on chronic respiratory failure with hypoxemia (HCC)    Primary lung cancer, right (HCC)    Multifocal atrial tachycardia (HCC)    Anemia    Bilateral leg edema    Physical deconditioning    Stage 3 severe COPD by GOLD classification (HCC)    Recurrent left pleural effusion    Paroxysmal atrial fibrillation (HCC)    Squamous cell carcinoma of bronchus in right lower lobe (HCC)    Acute pulmonary edema (HCC)    Lung consolidation (HCC)    Severe malnutrition (HCC)       Current Outpatient Medications   Medication Sig Dispense Refill    albuterol sulfate HFA (PROAIR HFA) 108 (90 Base) MCG/ACT inhaler Inhale 2 puffs into the lungs every 6 hours as needed for Wheezing 1 Inhaler 5    furosemide (LASIX) 20 MG tablet Take 1 tablet by mouth daily as needed (leg swelling) 5 tablet 5    promethazine (PHENERGAN) 25 MG tablet Take 1 tablet by mouth every 8 hours as needed for Nausea 30 tablet 1    diltiazem (CARDIZEM CD) 300 MG extended release capsule Take 1 capsule by mouth daily 30 capsule 3    albuterol (PROVENTIL) (2.5 MG/3ML) 0.083% nebulizer solution Take 3 mLs by nebulization every 6 hours as needed for Wheezing or Shortness of Breath 360 vial 3    NONFORMULARY as needed Indications: c;laritin nasal spray      Multiple Vitamin (MULTI-VITAMIN DAILY PO) Take by mouth      Cholecalciferol (VITAMIN D) 2000 units CAPS capsule Take by mouth daily      Multiple Vitamins-Minerals (HAIR SKIN AND NAILS FORMULA PO) Take by mouth daily      esomeprazole (NEXIUM) 20 MG delayed release capsule Take 1 capsule by mouth daily (Patient taking differently: Take 20 mg by mouth as needed ) 30 capsule 3    Handicap Placard MISC by Does not apply route Diagnosis: Malignant neoplasm of lung, unspecified laterality, unspecified part of lung (HCC) [C34.90]  Expiration Date: 4/15/2020 1 each 0    ferrous sulfate 325 (65 Fe) MG tablet One every other day take with food and vitamin C 500 mg 30 tablet 3    vitamin C (VITAMIN C) 500 MG tablet Take 1 tablet by mouth daily 30 tablet 3    guaiFENesin (MUCINEX) 600 MG extended release tablet Take 1 tablet by mouth 2 times daily as needed for Congestion 60 tablet 2    lidocaine-prilocaine (EMLA) 2.5-2.5 % cream Apply topically as needed for Pain Apply topically as needed.  mometasone-formoterol (DULERA) 200-5 MCG/ACT inhaler Inhale 2 puffs into the lungs 2 times daily 3 Inhaler 3    fluticasone (FLONASE) 50 MCG/ACT nasal spray 1 spray by Nasal route daily 1 Bottle 5    betamethasone dipropionate (DIPROLENE) 0.05 % ointment Apply topically daily.  50 g 5    HYDROcodone-acetaminophen (NORCO) 5-325 MG per tablet Take 1 tablet by mouth 3 times daily as needed for Pain 90 tablet 0    digoxin (LANOXIN) 125 MCG tablet Take 1 tablet by mouth daily 30 4. Recurrent non-small cell lung cancer (NSCLC) ([facility]     5. Sinus tachycardia     6. Bilateral leg edema     7. ARCHIE (obstructive sleep apnea)     8. Chronic congestive heart failure, unspecified heart failure type ([facility]         Plan:    1. Patient does not wish to start an SSRI at this time for depression. PHQ-9 at next visit. 2. Medications reviewed, continue current regimen. Refills sent to pharmacy. 3. Keep follow ups with Dr. Ciarra Morrison and Dr. Copeland Friendly  4. Increase fluid intake  5. Furosemide 20 mg PO daily PRN for weight gain > 2 pounds in 1 day or 5 pounds in 1 week. 6. Daily weights  7. Please call me if shortness of breath or swelling increases  8. Fall precautions discussed at length. Understanding voiced. 9. Follow up in 6 weeks, sooner if needed             Electronically signed by JESUS Tyler CNP 05/24/19 10:16 AM     [address], [address]     Phone number: [phone]  Fax number: [phone]  On the basis of positive falls risk screening, assessment and plan is as follows: home safety tips provided.

## 2019-05-08 NOTE — PATIENT INSTRUCTIONS
Call me in 1 week with heart rate readings [phone]. If consistently greater than 110, call me sooner.

## 2019-05-09 ENCOUNTER — TELEPHONE (OUTPATIENT)
Dept: PULMONOLOGY | Age: 74
End: 2019-05-09

## 2019-05-14 DIAGNOSIS — C34.91 PRIMARY LUNG CANCER, RIGHT (HCC): Primary | ICD-10-CM

## 2019-05-14 DIAGNOSIS — C34.90 RECURRENT NON-SMALL CELL LUNG CANCER (NSCLC) (HCC): ICD-10-CM

## 2019-05-15 ENCOUNTER — OFFICE VISIT (OUTPATIENT)
Dept: ONCOLOGY | Age: 74
End: 2019-05-15
Payer: MEDICARE

## 2019-05-15 ENCOUNTER — HOSPITAL ENCOUNTER (OUTPATIENT)
Dept: INFUSION THERAPY | Age: 74
Discharge: HOME | End: 2019-05-15
Payer: MEDICARE

## 2019-05-15 VITALS
WEIGHT: 136.2 LBS | TEMPERATURE: 97.6 F | SYSTOLIC BLOOD PRESSURE: 119 MMHG | RESPIRATION RATE: 18 BRPM | OXYGEN SATURATION: 97 % | HEIGHT: 61 IN | BODY MASS INDEX: 25.71 KG/M2 | HEART RATE: 93 BPM | DIASTOLIC BLOOD PRESSURE: 57 MMHG

## 2019-05-15 VITALS
HEART RATE: 79 BPM | RESPIRATION RATE: 18 BRPM | TEMPERATURE: 97.9 F | OXYGEN SATURATION: 98 % | DIASTOLIC BLOOD PRESSURE: 57 MMHG | BODY MASS INDEX: 26.74 KG/M2 | HEIGHT: 60 IN | SYSTOLIC BLOOD PRESSURE: 112 MMHG | WEIGHT: 136.2 LBS

## 2019-05-15 DIAGNOSIS — C34.31 SQUAMOUS CELL CARCINOMA OF BRONCHUS IN RIGHT LOWER LOBE (HCC): ICD-10-CM

## 2019-05-15 DIAGNOSIS — C34.90 RECURRENT NON-SMALL CELL LUNG CANCER (NSCLC) (HCC): ICD-10-CM

## 2019-05-15 DIAGNOSIS — Z99.81 OXYGEN DEPENDENT: ICD-10-CM

## 2019-05-15 DIAGNOSIS — J43.9 PULMONARY EMPHYSEMA, UNSPECIFIED EMPHYSEMA TYPE (HCC): ICD-10-CM

## 2019-05-15 DIAGNOSIS — C34.31 MALIGNANT NEOPLASM OF LOWER LOBE, RIGHT BRONCHUS OR LUNG (HCC): ICD-10-CM

## 2019-05-15 DIAGNOSIS — C34.91 PRIMARY LUNG CANCER, RIGHT (HCC): Primary | ICD-10-CM

## 2019-05-15 LAB
ALBUMIN SERPL-MCNC: 4.2 G/DL (ref 3.5–5.1)
ALP BLD-CCNC: 74 U/L (ref 38–126)
ALT SERPL-CCNC: 7 U/L (ref 11–66)
AST SERPL-CCNC: 20 U/L (ref 5–40)
BILIRUB SERPL-MCNC: 0.2 MG/DL (ref 0.3–1.2)
BILIRUBIN DIRECT: < 0.2 MG/DL (ref 0–0.3)
BUN, WHOLE BLOOD: 16 MG/DL (ref 8–26)
CHLORIDE, WHOLE BLOOD: 92 MEQ/L (ref 98–109)
CREATININE, WHOLE BLOOD: 1.3 MG/DL (ref 0.5–1.2)
GFR, ESTIMATED: 43 ML/MIN/1.73M2
GLUCOSE, WHOLE BLOOD: 137 MG/DL (ref 70–108)
HCT VFR BLD CALC: 30.3 % (ref 37–47)
HEMOGLOBIN: 10 GM/DL (ref 12–16)
IONIZED CALCIUM, WHOLE BLOOD: 1.17 MMOL/L (ref 1.12–1.32)
MCH RBC QN AUTO: 28.5 PG (ref 27–31)
MCHC RBC AUTO-ENTMCNC: 33.1 GM/DL (ref 33–37)
MCV RBC AUTO: 86 FL (ref 81–99)
PDW BLD-RTO: 15.3 % (ref 11.5–14.5)
PLATELET # BLD: 193 THOU/MM3 (ref 130–400)
PMV BLD AUTO: 7.2 FL (ref 7.4–10.4)
POTASSIUM, WHOLE BLOOD: 3.6 MEQ/L (ref 3.5–4.9)
RBC # BLD: 3.52 MILL/MM3 (ref 4.2–5.4)
SEG NEUTROPHILS: 71 % (ref 43–75)
SEGMENTED NEUTROPHILS ABSOLUTE COUNT: 3.5 THOU/MM3 (ref 1.8–7.7)
SODIUM, WHOLE BLOOD: 138 MEQ/L (ref 138–146)
TOTAL CO2, WHOLE BLOOD: 34 MEQ/L (ref 23–33)
TOTAL PROTEIN: 6.9 G/DL (ref 6.1–8)
WBC # BLD: 4.9 THOU/MM3 (ref 4.8–10.8)

## 2019-05-15 PROCEDURE — 1123F ACP DISCUSS/DSCN MKR DOCD: CPT | Performed by: INTERNAL MEDICINE

## 2019-05-15 PROCEDURE — G0463 HOSPITAL OUTPT CLINIC VISIT: HCPCS

## 2019-05-15 PROCEDURE — 6360000002 HC RX W HCPCS: Performed by: INTERNAL MEDICINE

## 2019-05-15 PROCEDURE — 36591 DRAW BLOOD OFF VENOUS DEVICE: CPT

## 2019-05-15 PROCEDURE — 96413 CHEMO IV INFUSION 1 HR: CPT

## 2019-05-15 PROCEDURE — 1036F TOBACCO NON-USER: CPT | Performed by: INTERNAL MEDICINE

## 2019-05-15 PROCEDURE — G8926 SPIRO NO PERF OR DOC: HCPCS | Performed by: INTERNAL MEDICINE

## 2019-05-15 PROCEDURE — 99214 OFFICE O/P EST MOD 30 MIN: CPT | Performed by: INTERNAL MEDICINE

## 2019-05-15 PROCEDURE — 2580000003 HC RX 258: Performed by: INTERNAL MEDICINE

## 2019-05-15 PROCEDURE — 80047 BASIC METABLC PNL IONIZED CA: CPT

## 2019-05-15 PROCEDURE — G8427 DOCREV CUR MEDS BY ELIG CLIN: HCPCS | Performed by: INTERNAL MEDICINE

## 2019-05-15 PROCEDURE — 1090F PRES/ABSN URINE INCON ASSESS: CPT | Performed by: INTERNAL MEDICINE

## 2019-05-15 PROCEDURE — 4040F PNEUMOC VAC/ADMIN/RCVD: CPT | Performed by: INTERNAL MEDICINE

## 2019-05-15 PROCEDURE — G8400 PT W/DXA NO RESULTS DOC: HCPCS | Performed by: INTERNAL MEDICINE

## 2019-05-15 PROCEDURE — 85027 COMPLETE CBC AUTOMATED: CPT

## 2019-05-15 PROCEDURE — 2709999900 HC NON-CHARGEABLE SUPPLY

## 2019-05-15 PROCEDURE — G8419 CALC BMI OUT NRM PARAM NOF/U: HCPCS | Performed by: INTERNAL MEDICINE

## 2019-05-15 PROCEDURE — 80076 HEPATIC FUNCTION PANEL: CPT

## 2019-05-15 PROCEDURE — 3017F COLORECTAL CA SCREEN DOC REV: CPT | Performed by: INTERNAL MEDICINE

## 2019-05-15 PROCEDURE — 3023F SPIROM DOC REV: CPT | Performed by: INTERNAL MEDICINE

## 2019-05-15 RX ORDER — SODIUM CHLORIDE 0.9 % (FLUSH) 0.9 %
20 SYRINGE (ML) INJECTION PRN
Status: CANCELLED | OUTPATIENT
Start: 2019-05-15

## 2019-05-15 RX ORDER — SODIUM CHLORIDE 0.9 % (FLUSH) 0.9 %
10 SYRINGE (ML) INJECTION PRN
Status: DISCONTINUED | OUTPATIENT
Start: 2019-05-15 | End: 2019-05-16 | Stop reason: HOSPADM

## 2019-05-15 RX ORDER — SODIUM CHLORIDE 9 MG/ML
INJECTION, SOLUTION INTRAVENOUS ONCE
Status: COMPLETED | OUTPATIENT
Start: 2019-05-15 | End: 2019-05-15

## 2019-05-15 RX ORDER — SODIUM CHLORIDE 0.9 % (FLUSH) 0.9 %
10 SYRINGE (ML) INJECTION PRN
Status: CANCELLED | OUTPATIENT
Start: 2019-05-15

## 2019-05-15 RX ORDER — DIGOXIN 125 MCG
125 TABLET ORAL DAILY
Qty: 30 TABLET | Refills: 3 | Status: SHIPPED | OUTPATIENT
Start: 2019-05-15 | End: 2019-11-04 | Stop reason: SDUPTHER

## 2019-05-15 RX ORDER — SODIUM CHLORIDE 0.9 % (FLUSH) 0.9 %
20 SYRINGE (ML) INJECTION PRN
Status: DISCONTINUED | OUTPATIENT
Start: 2019-05-15 | End: 2019-05-16 | Stop reason: HOSPADM

## 2019-05-15 RX ORDER — HEPARIN SODIUM (PORCINE) LOCK FLUSH IV SOLN 100 UNIT/ML 100 UNIT/ML
500 SOLUTION INTRAVENOUS PRN
Status: DISCONTINUED | OUTPATIENT
Start: 2019-05-15 | End: 2019-05-16 | Stop reason: HOSPADM

## 2019-05-15 RX ORDER — HEPARIN SODIUM (PORCINE) LOCK FLUSH IV SOLN 100 UNIT/ML 100 UNIT/ML
500 SOLUTION INTRAVENOUS PRN
Status: CANCELLED | OUTPATIENT
Start: 2019-05-15

## 2019-05-15 RX ADMIN — Medication 20 ML: at 09:36

## 2019-05-15 RX ADMIN — Medication 20 ML: at 11:58

## 2019-05-15 RX ADMIN — Medication 10 ML: at 09:35

## 2019-05-15 RX ADMIN — ATEZOLIZUMAB 1200 MG: 1200 INJECTION, SOLUTION INTRAVENOUS at 11:16

## 2019-05-15 RX ADMIN — Medication 10 ML: at 11:05

## 2019-05-15 RX ADMIN — Medication 500 UNITS: at 11:58

## 2019-05-15 RX ADMIN — SODIUM CHLORIDE: 9 INJECTION, SOLUTION INTRAVENOUS at 11:05

## 2019-05-15 ASSESSMENT — PAIN SCALES - GENERAL
PAINLEVEL_OUTOF10: 7
PAINLEVEL_OUTOF10: 7

## 2019-05-15 ASSESSMENT — PAIN DESCRIPTION - DESCRIPTORS: DESCRIPTORS: ACHING;CONSTANT

## 2019-05-15 ASSESSMENT — ENCOUNTER SYMPTOMS
BACK PAIN: 0
FACIAL SWELLING: 0
COLOR CHANGE: 0
WHEEZING: 0
ABDOMINAL PAIN: 0
COUGH: 1
EYE DISCHARGE: 0
ABDOMINAL DISTENTION: 0
BLOOD IN STOOL: 0
NAUSEA: 0
TROUBLE SWALLOWING: 0
VOMITING: 0
SHORTNESS OF BREATH: 1
CONSTIPATION: 0
SORE THROAT: 0
RECTAL PAIN: 0
CHEST TIGHTNESS: 0
DIARRHEA: 0

## 2019-05-15 ASSESSMENT — PAIN DESCRIPTION - PAIN TYPE: TYPE: CHRONIC PAIN

## 2019-05-15 ASSESSMENT — PAIN DESCRIPTION - ORIENTATION: ORIENTATION: LOWER

## 2019-05-15 ASSESSMENT — PAIN DESCRIPTION - PROGRESSION: CLINICAL_PROGRESSION: NOT CHANGED

## 2019-05-15 ASSESSMENT — PAIN DESCRIPTION - LOCATION: LOCATION: BACK

## 2019-05-15 ASSESSMENT — PAIN DESCRIPTION - FREQUENCY: FREQUENCY: CONTINUOUS

## 2019-05-15 ASSESSMENT — PAIN DESCRIPTION - ONSET: ONSET: ON-GOING

## 2019-05-15 NOTE — PROGRESS NOTES
Lab specimen obtained from [facility] without any problems. Ambulated off unit to examination room for appointment with Dr. Ly Quiroga [facility].

## 2019-05-15 NOTE — PROGRESS NOTES
[facility]    Reason for Clinic visit:      Metastatic left lung cancer    CLIF Diaz is a 76 y.o. female with metastatic NSCLC, she is a former Dr. Tereasa Bamberger patient. Patient presents to the medical oncology clinic at [facility] where she would like to transfer her care. Her history of lung cancer is presented below:  She was diagnosed with  stage 2A non-small-cell lung cancer in 2012. On October 22, 2012 she underwent right lower lobe lung, lobectomy. Final pathology report showed:  A - Right lower lobe lung, lobectomy:      Moderately differentiated adenocarcinoma, mixed subtype      (predominantly papillary), pT2b      Emphysematous changes in non-neoplastic lung parenchyma.      Margins negative for malignancy. B - Peribronchial lymph node, excision:   One lymph node with caseous necrosis/calcification  negative for malignancy, pN0. It doesn't look like the patient had any mediastinal lymph node evaluation. She received adjuvant chemotherapy with carboplatin and gemcitabine and completed that on 02/04/2013. According to Dr. Tereasa Bamberger note CT chest on 02/18/2013 showed a soft tissue density in the middle and posterior mediastinum. PET on 02/28/2013  showed avid mediastinal right infrahilar adenopathy, highly suspicious for metastatic disease. Due to disease progression she underwent chemotherapy with carboplatin and Taxol and concurrent radiation therapy that she started in 03/2013, completed radiation on 05/14/2013 and chemotherapy in 08/2013. The PET scan done on 09/09/2013 showed postradiation changes only. She then was diagnosed with a new stage 1A left lung cancer in 10/2016. CT on 09/19/2016 showed a left apical lung nodule that measured 10 mm, evaluated by PET that showed an uptake of 5.2, but no other areas were seen. CT-guided biopsy 10/31/2016 showed poorly differentiated adenocarcinoma.  She was referred back to Cardiovascular surgery for excision of a lung nodule for curative intent. She was deemed nonsurgical by Dr. Gaby Kimble, therefore she received stereotactic ablative radiation therapy to the left lung mass. SBRT completed on 01/11/2017. The patient had restaging  CT chest  on 04/19/2018 that showed new irregular 10 x 19 mm left upper lobe nodule that increased in size since 10/2017. She started chemo with single agent Taxotere. After seven cycles Taxotere was discontinued due to side effects,  losing her toenails and her fingernails. Lung biopsy from 2016 was tested for PD-L1, it showed high expression, Tumor Proportion Score:  81-90%. KRAS was detected. EGFR not detected. Her treatment was changed to Tecentriq on 12/18/2018. The patient has had Tecentriq twice. She is a former smoker with severe COPD requiring home O2 supplementation bynasal cannula oxygen at 4 L. The patient was hospitalized from 02/23/2019 till 02/28/2018 for acute on chronic respiratory failure with hypoxemia and hypercapnia. CTA showed worsening right lung consolidation, differential diagnosis included community acquired pneumonia, atypical pulm edema, immunotherapy induced pneumonitis versus malignancy. Patient I  was on empiric antibotic treatment for pneumonia. She was also placed on steroids for the possible drug induced pneumonitis. She had a thoracentesis of 300 ml which appeared to be a transudate. There were no malignant cells on cytology. Due to mental status changes she had Ct of the brain that showed  inconclusive findings. Brain MRI recommended, however the patient refused. Intermittent history on May 15, 2019:    Patient presents to the medical oncology clinic to restart immunotherapy with Tecentriq. She reports that her respiratory status is improved. She denies having any headaches. Her appetite is fine. No fevers. She is more active at home, walking and with more activity her legs swelling is improved significantly.       Past Medical History Negative for dizziness, tremors, seizures, speech difficulty, light-headedness, numbness and headaches. Hematological: Negative for adenopathy. Does not bruise/bleed easily. Psychiatric/Behavioral: Negative for confusion and sleep disturbance. The patient is not nervous/anxious. Vitals     height is 5' 0.51\" (1.537 m) and weight is 136 lb 3.2 oz (61.8 kg). Her oral temperature is 97.6 °F (36.4 °C). Her blood pressure is 119/57 (abnormal) and her pulse is 93. Her respiration is 18 and oxygen saturation is 97%. Exam   Physical Exam   Constitutional: She is oriented to person, place, and time. She appears well-developed and well-nourished. No distress. HENT:   Head: Normocephalic. Mouth/Throat: Oropharynx is clear and moist. No oropharyngeal exudate. Eyes: Pupils are equal, round, and reactive to light. EOM are normal. Right eye exhibits no discharge. Left eye exhibits no discharge. No scleral icterus. Neck: Normal range of motion. Neck supple. No JVD present. No tracheal deviation present. No thyromegaly present. Cardiovascular: Normal rate and normal heart sounds. Exam reveals no gallop and no friction rub. No murmur heard. Pulmonary/Chest: No stridor. Tachypnea noted. She is in respiratory distress. She has decreased breath sounds in the right middle field, the right lower field, the left middle field and the left lower field. She has no wheezes. She has no rales. She exhibits no tenderness. Abdominal: Soft. Bowel sounds are normal. She exhibits no distension and no mass. There is no tenderness. There is no rebound. Musculoskeletal: Normal range of motion. She exhibits no edema. Good range of motion in all four extremities. Lymphadenopathy:     She has no cervical adenopathy. Neurological: She is alert and oriented to person, place, and time. She has normal reflexes. No cranial nerve deficit. She exhibits normal muscle tone. Skin: Skin is warm. No rash noted.  No Acute on chronic respiratory failure with hypoxemia and hypercapnia. Stable chronic respiratory failure secondary to COPD . Currently off steroids, off antibiotics, back to baseline. On chronic O2 supplementation at home. 3. History of mental status changes. Resolved. Inconclusive CT of the brain. Brain MRI recommended, however the patient refused. I recommend at least repetition of CT of the  4. Chronic normocytic anemia. Hemoglobin is improved to 10.0 today her MCV is 86. Will monitor. 5. Elevated creatinine. 1.3 today.   The patient was encouraged to increase oral fluid intake      Electronically signed by     Shanelle Mendoza MD on 5/15/2019 at 6:48 PM

## 2019-05-15 NOTE — PROGRESS NOTES
Patient assessed for the following post chemotherapy:    Dizziness   No  Lightheadedness  No      Acute nausea/vomiting No  Headache   No  Chest pain/pressure  No  Rash/itching   No  Shortness of breath  No    Patient opted not to stay  for 20 minutes observation post infusion chemotherapy. Patient tolerated chemotherapy treatment Tecentriq without any complications. Last vital signs:   BP (!) 112/57   Pulse 79   Temp 97.9 °F (36.6 °C) (Oral)   Resp 18   Ht 5' (1.524 m)   Wt 136 lb 3.2 oz (61.8 kg)   SpO2 98%   BMI 26.60 kg/m²         Patient instructed if experience any of the above symptoms following today's infusion,he/she is to notify MD immediately or go to the emergency department. Discharge instructions given to patient. Verbalizes understanding. Ambulated off unit per self with belongings.

## 2019-05-15 NOTE — PLAN OF CARE
Problem: Pain:  Description  Pain management should include both nonpharmacologic and pharmacologic interventions. Goal: Control of chronic pain  Description  Control of chronic pain  Outcome: Met This Shift  Note:   Patient rating pain a 7 out of 10 using SIMI scale, Pain located in  back. Intervention: Promote participation in pain management plan  Description  Promote participation in pain management plan - REMINDER(s):  Involve patient/family in pain management plan. Update patient/family of any treatment changes. Note:   Patient encouraged to take prescribed pain medications and call Physician if pain is not controlled. Problem: Musculor/Skeletal Functional Status  Goal: Absence of falls  Outcome: Met This Shift  Note:   No falls occurred with visit today. Intervention: Fall precautions  Note:   Verbalized understanding of fall prevention to ask for assistance with ambulation. Call light within reach. Problem: Discharge Planning  Goal: Knowledge of discharge instructions  Description  Knowledge of discharge instructions     Outcome: Met This Shift  Note:   Verbalized understanding of discharge instructions, follow-up appointments, and when to call the physician. Intervention: Interaction with patient/family and care team  Note:   Discuss understanding of discharge instructions,follow-up appointments, and when to call the physician. Problem: Infection - Central Venous Catheter-Associated Bloodstream Infection:  Goal: Will show no infection signs and symptoms  Description  Will show no infection signs and symptoms  Outcome: Met This Shift  Note:   Mediport site with no redness or warmth. Skin over port site intact with no signs of breakdown noted. Patient verbalizes signs/symptoms of port infection and when to notify the physician.    Intervention: Infection risk assessment  Description  Infection risk assessment  Note:   Instructed to monitor for signs/symptoms of infection at [facility] and call MD if problems develop. Care plan reviewed with patient . Patient  verbalize understanding of the plan of care and contribute to goal setting.

## 2019-05-15 NOTE — PATIENT INSTRUCTIONS
1. Proceed with Tecentriq today  2.   RTC in 3 weeks to see me for labs: CBC, BMP, LFTs in the next dose of immunotherapy

## 2019-05-15 NOTE — ONCOLOGY
Chemotherapy Administration    Pre-assessment Data: Antineoplastic Agents  Other:   See toxicity flow sheet for assessment [x]     Physician Notification of Concerns Related to Chemotherapy Administration:   Physician Notified Corrinne Mc / Time of Notification      Interventions:   Lab work assessed  [x]   Height / Weight verified for dose [x]   Current MAR reviewed [x]   Emergency drugs available as appropriate [x]   Anaphylaxis assessment completed [x]   Pre-medications administered as ordered [x]   Blood return noted upon initiation of chemotherapy [x]   Blood return noted each 1-2ml of a vesicant medication if given IV push []   Blood return noted each 2-3ml of a non-vesicant medication if given IV push []   Monitor for signs / symptoms of hypersensitivity reaction [x]   Chemotherapy orders (drug/dose/rate) verified by 2 Chemo certified RNs [x]   Monitor IV site and blood return throughout the infusion of the medication [x]   Document IV site checks on the IV assessment form [x]   Document chemotherapy teaching on the Patient Education tab [x]   Document patient verbalizes understanding of medications being administered [x]   If IV infiltration, see ONS Guidelines []   Other:      []

## 2019-05-24 PROBLEM — R79.89 ELEVATED LACTIC ACID LEVEL: Status: RESOLVED | Noted: 2019-02-23 | Resolved: 2019-05-24

## 2019-05-24 PROBLEM — E44.0 MODERATE MALNUTRITION (HCC): Chronic | Status: RESOLVED | Noted: 2018-10-15 | Resolved: 2019-05-24

## 2019-05-24 PROBLEM — J96.21 ACUTE ON CHRONIC RESPIRATORY FAILURE WITH HYPOXIA AND HYPERCAPNIA (HCC): Status: RESOLVED | Noted: 2019-02-23 | Resolved: 2019-05-24

## 2019-05-24 PROBLEM — J96.22 ACUTE ON CHRONIC RESPIRATORY FAILURE WITH HYPOXIA AND HYPERCAPNIA (HCC): Status: RESOLVED | Noted: 2019-02-23 | Resolved: 2019-05-24

## 2019-05-24 PROBLEM — R42 DIZZINESS: Status: RESOLVED | Noted: 2018-08-01 | Resolved: 2019-05-24

## 2019-05-24 PROBLEM — I48.92 PAROXYSMAL ATRIAL FLUTTER (HCC): Status: RESOLVED | Noted: 2018-10-22 | Resolved: 2019-05-24

## 2019-05-24 PROBLEM — R00.2 INTERMITTENT PALPITATIONS: Status: RESOLVED | Noted: 2018-08-01 | Resolved: 2019-05-24

## 2019-06-05 DIAGNOSIS — C34.31 MALIGNANT NEOPLASM OF LOWER LOBE, RIGHT BRONCHUS OR LUNG (HCC): Primary | ICD-10-CM

## 2019-06-05 RX ORDER — METHYLPREDNISOLONE SODIUM SUCCINATE 125 MG/2ML
125 INJECTION, POWDER, LYOPHILIZED, FOR SOLUTION INTRAMUSCULAR; INTRAVENOUS ONCE
Status: CANCELLED | OUTPATIENT
Start: 2019-06-06

## 2019-06-05 RX ORDER — 0.9 % SODIUM CHLORIDE 0.9 %
10 VIAL (ML) INJECTION ONCE
Status: CANCELLED | OUTPATIENT
Start: 2019-06-06

## 2019-06-05 RX ORDER — HEPARIN SODIUM (PORCINE) LOCK FLUSH IV SOLN 100 UNIT/ML 100 UNIT/ML
500 SOLUTION INTRAVENOUS PRN
Status: CANCELLED | OUTPATIENT
Start: 2019-06-06

## 2019-06-05 RX ORDER — MEPERIDINE HYDROCHLORIDE 50 MG/ML
12.5 INJECTION INTRAMUSCULAR; INTRAVENOUS; SUBCUTANEOUS ONCE
Status: CANCELLED | OUTPATIENT
Start: 2019-06-06

## 2019-06-05 RX ORDER — SODIUM CHLORIDE 9 MG/ML
INJECTION, SOLUTION INTRAVENOUS ONCE
Status: CANCELLED | OUTPATIENT
Start: 2019-06-06

## 2019-06-05 RX ORDER — SODIUM CHLORIDE 0.9 % (FLUSH) 0.9 %
5 SYRINGE (ML) INJECTION PRN
Status: CANCELLED | OUTPATIENT
Start: 2019-06-06

## 2019-06-05 RX ORDER — SODIUM CHLORIDE 9 MG/ML
INJECTION, SOLUTION INTRAVENOUS CONTINUOUS
Status: CANCELLED | OUTPATIENT
Start: 2019-06-06

## 2019-06-05 RX ORDER — SODIUM CHLORIDE 0.9 % (FLUSH) 0.9 %
10 SYRINGE (ML) INJECTION PRN
Status: CANCELLED | OUTPATIENT
Start: 2019-06-06

## 2019-06-05 RX ORDER — DIPHENHYDRAMINE HYDROCHLORIDE 50 MG/ML
50 INJECTION INTRAMUSCULAR; INTRAVENOUS ONCE
Status: CANCELLED | OUTPATIENT
Start: 2019-06-06

## 2019-06-05 ASSESSMENT — ENCOUNTER SYMPTOMS
ABDOMINAL PAIN: 0
RECTAL PAIN: 0
WHEEZING: 0
CONSTIPATION: 0
SHORTNESS OF BREATH: 1
TROUBLE SWALLOWING: 0
VOMITING: 0
DIARRHEA: 0
CHEST TIGHTNESS: 0
ABDOMINAL DISTENTION: 0
COUGH: 1
BACK PAIN: 0
FACIAL SWELLING: 0
NAUSEA: 0
BLOOD IN STOOL: 0
SORE THROAT: 0
COLOR CHANGE: 0
EYE DISCHARGE: 0

## 2019-06-06 ENCOUNTER — OFFICE VISIT (OUTPATIENT)
Dept: ONCOLOGY | Age: 74
End: 2019-06-06
Payer: MEDICARE

## 2019-06-06 ENCOUNTER — HOSPITAL ENCOUNTER (OUTPATIENT)
Dept: INFUSION THERAPY | Age: 74
Discharge: HOME | End: 2019-06-06
Payer: MEDICARE

## 2019-06-06 VITALS
DIASTOLIC BLOOD PRESSURE: 59 MMHG | WEIGHT: 141.4 LBS | TEMPERATURE: 98 F | SYSTOLIC BLOOD PRESSURE: 115 MMHG | OXYGEN SATURATION: 93 % | BODY MASS INDEX: 26.7 KG/M2 | HEART RATE: 60 BPM | RESPIRATION RATE: 18 BRPM | HEIGHT: 61 IN

## 2019-06-06 VITALS
SYSTOLIC BLOOD PRESSURE: 140 MMHG | BODY MASS INDEX: 26.7 KG/M2 | TEMPERATURE: 98.2 F | WEIGHT: 141.4 LBS | DIASTOLIC BLOOD PRESSURE: 68 MMHG | HEART RATE: 69 BPM | HEIGHT: 61 IN | RESPIRATION RATE: 18 BRPM | OXYGEN SATURATION: 94 %

## 2019-06-06 DIAGNOSIS — R91.1 NODULE OF LEFT LUNG: ICD-10-CM

## 2019-06-06 DIAGNOSIS — Z99.81 OXYGEN DEPENDENT: ICD-10-CM

## 2019-06-06 DIAGNOSIS — J44.9 STAGE 3 SEVERE COPD BY GOLD CLASSIFICATION (HCC): ICD-10-CM

## 2019-06-06 DIAGNOSIS — J96.21 ACUTE ON CHRONIC RESPIRATORY FAILURE WITH HYPOXEMIA (HCC): ICD-10-CM

## 2019-06-06 DIAGNOSIS — C34.90 RECURRENT NON-SMALL CELL LUNG CANCER (NSCLC) (HCC): ICD-10-CM

## 2019-06-06 DIAGNOSIS — D64.9 ANEMIA, UNSPECIFIED TYPE: ICD-10-CM

## 2019-06-06 DIAGNOSIS — C34.31 MALIGNANT NEOPLASM OF LOWER LOBE, RIGHT BRONCHUS OR LUNG (HCC): ICD-10-CM

## 2019-06-06 DIAGNOSIS — C34.31 MALIGNANT NEOPLASM OF LOWER LOBE, RIGHT BRONCHUS OR LUNG (HCC): Primary | ICD-10-CM

## 2019-06-06 DIAGNOSIS — C34.91 PRIMARY LUNG CANCER, RIGHT (HCC): ICD-10-CM

## 2019-06-06 DIAGNOSIS — C34.92 MALIGNANT NEOPLASM OF LEFT LUNG, UNSPECIFIED PART OF LUNG (HCC): ICD-10-CM

## 2019-06-06 DIAGNOSIS — J96.21 ACUTE ON CHRONIC RESPIRATORY FAILURE WITH HYPOXIA (HCC): ICD-10-CM

## 2019-06-06 DIAGNOSIS — Z51.11 ENCOUNTER FOR ANTINEOPLASTIC CHEMOTHERAPY AND IMMUNOTHERAPY: ICD-10-CM

## 2019-06-06 DIAGNOSIS — J43.9 PULMONARY EMPHYSEMA, UNSPECIFIED EMPHYSEMA TYPE (HCC): ICD-10-CM

## 2019-06-06 DIAGNOSIS — Z51.12 ENCOUNTER FOR ANTINEOPLASTIC CHEMOTHERAPY AND IMMUNOTHERAPY: ICD-10-CM

## 2019-06-06 DIAGNOSIS — C34.31 SQUAMOUS CELL CARCINOMA OF BRONCHUS IN RIGHT LOWER LOBE (HCC): Primary | ICD-10-CM

## 2019-06-06 DIAGNOSIS — Z85.118 H/O: LUNG CANCER: ICD-10-CM

## 2019-06-06 LAB
ALBUMIN SERPL-MCNC: 3.8 G/DL (ref 3.5–5.1)
ALP BLD-CCNC: 79 U/L (ref 38–126)
ALT SERPL-CCNC: 12 U/L (ref 11–66)
AST SERPL-CCNC: 23 U/L (ref 5–40)
BILIRUB SERPL-MCNC: 0.2 MG/DL (ref 0.3–1.2)
BILIRUBIN DIRECT: < 0.2 MG/DL (ref 0–0.3)
BUN, WHOLE BLOOD: 17 MG/DL (ref 8–26)
CHLORIDE, WHOLE BLOOD: 91 MEQ/L (ref 98–109)
CREATININE, WHOLE BLOOD: 1.2 MG/DL (ref 0.5–1.2)
GFR, ESTIMATED: 47 ML/MIN/1.73M2
GLUCOSE, WHOLE BLOOD: 89 MG/DL (ref 70–108)
HCT VFR BLD CALC: 29 % (ref 37–47)
HEMOGLOBIN: 9.6 GM/DL (ref 12–16)
IONIZED CALCIUM, WHOLE BLOOD: 1.17 MMOL/L (ref 1.12–1.32)
MCH RBC QN AUTO: 29.3 PG (ref 27–31)
MCHC RBC AUTO-ENTMCNC: 33.2 GM/DL (ref 33–37)
MCV RBC AUTO: 88 FL (ref 81–99)
PDW BLD-RTO: 14.5 % (ref 11.5–14.5)
PLATELET # BLD: 156 THOU/MM3 (ref 130–400)
PMV BLD AUTO: 7.3 FL (ref 7.4–10.4)
POTASSIUM, WHOLE BLOOD: 4.1 MEQ/L (ref 3.5–4.9)
RBC # BLD: 3.28 MILL/MM3 (ref 4.2–5.4)
SEG NEUTROPHILS: 66 % (ref 43–75)
SEGMENTED NEUTROPHILS ABSOLUTE COUNT: 2.7 THOU/MM3 (ref 1.8–7.7)
SODIUM, WHOLE BLOOD: 136 MEQ/L (ref 138–146)
TOTAL CO2, WHOLE BLOOD: 36 MEQ/L (ref 23–33)
TOTAL PROTEIN: 6.6 G/DL (ref 6.1–8)
WBC # BLD: 4.1 THOU/MM3 (ref 4.8–10.8)

## 2019-06-06 PROCEDURE — 1090F PRES/ABSN URINE INCON ASSESS: CPT | Performed by: INTERNAL MEDICINE

## 2019-06-06 PROCEDURE — 1036F TOBACCO NON-USER: CPT | Performed by: INTERNAL MEDICINE

## 2019-06-06 PROCEDURE — G8419 CALC BMI OUT NRM PARAM NOF/U: HCPCS | Performed by: INTERNAL MEDICINE

## 2019-06-06 PROCEDURE — 96413 CHEMO IV INFUSION 1 HR: CPT

## 2019-06-06 PROCEDURE — 99211 OFF/OP EST MAY X REQ PHY/QHP: CPT

## 2019-06-06 PROCEDURE — 4040F PNEUMOC VAC/ADMIN/RCVD: CPT | Performed by: INTERNAL MEDICINE

## 2019-06-06 PROCEDURE — 1123F ACP DISCUSS/DSCN MKR DOCD: CPT | Performed by: INTERNAL MEDICINE

## 2019-06-06 PROCEDURE — 80047 BASIC METABLC PNL IONIZED CA: CPT

## 2019-06-06 PROCEDURE — G8427 DOCREV CUR MEDS BY ELIG CLIN: HCPCS | Performed by: INTERNAL MEDICINE

## 2019-06-06 PROCEDURE — 80076 HEPATIC FUNCTION PANEL: CPT

## 2019-06-06 PROCEDURE — 6360000002 HC RX W HCPCS: Performed by: INTERNAL MEDICINE

## 2019-06-06 PROCEDURE — 36591 DRAW BLOOD OFF VENOUS DEVICE: CPT

## 2019-06-06 PROCEDURE — G8926 SPIRO NO PERF OR DOC: HCPCS | Performed by: INTERNAL MEDICINE

## 2019-06-06 PROCEDURE — 2709999900 HC NON-CHARGEABLE SUPPLY

## 2019-06-06 PROCEDURE — 85027 COMPLETE CBC AUTOMATED: CPT

## 2019-06-06 PROCEDURE — 2580000003 HC RX 258: Performed by: INTERNAL MEDICINE

## 2019-06-06 PROCEDURE — 99214 OFFICE O/P EST MOD 30 MIN: CPT | Performed by: INTERNAL MEDICINE

## 2019-06-06 PROCEDURE — 3017F COLORECTAL CA SCREEN DOC REV: CPT | Performed by: INTERNAL MEDICINE

## 2019-06-06 PROCEDURE — 3023F SPIROM DOC REV: CPT | Performed by: INTERNAL MEDICINE

## 2019-06-06 PROCEDURE — G8400 PT W/DXA NO RESULTS DOC: HCPCS | Performed by: INTERNAL MEDICINE

## 2019-06-06 RX ORDER — SODIUM CHLORIDE 0.9 % (FLUSH) 0.9 %
20 SYRINGE (ML) INJECTION PRN
Status: DISCONTINUED | OUTPATIENT
Start: 2019-06-06 | End: 2019-06-07 | Stop reason: HOSPADM

## 2019-06-06 RX ORDER — SODIUM CHLORIDE 0.9 % (FLUSH) 0.9 %
10 SYRINGE (ML) INJECTION PRN
Status: CANCELLED | OUTPATIENT
Start: 2019-06-06

## 2019-06-06 RX ORDER — POTASSIUM CHLORIDE 20 MEQ/1
20 TABLET, EXTENDED RELEASE ORAL DAILY
Qty: 30 TABLET | Refills: 0 | Status: ON HOLD | OUTPATIENT
Start: 2019-06-06 | End: 2020-01-26 | Stop reason: HOSPADM

## 2019-06-06 RX ORDER — SODIUM CHLORIDE 0.9 % (FLUSH) 0.9 %
20 SYRINGE (ML) INJECTION PRN
Status: CANCELLED | OUTPATIENT
Start: 2019-06-06

## 2019-06-06 RX ORDER — HEPARIN SODIUM (PORCINE) LOCK FLUSH IV SOLN 100 UNIT/ML 100 UNIT/ML
500 SOLUTION INTRAVENOUS PRN
Status: DISCONTINUED | OUTPATIENT
Start: 2019-06-06 | End: 2019-06-07 | Stop reason: HOSPADM

## 2019-06-06 RX ORDER — SODIUM CHLORIDE 9 MG/ML
INJECTION, SOLUTION INTRAVENOUS ONCE
Status: COMPLETED | OUTPATIENT
Start: 2019-06-06 | End: 2019-06-06

## 2019-06-06 RX ORDER — SODIUM CHLORIDE 0.9 % (FLUSH) 0.9 %
10 SYRINGE (ML) INJECTION PRN
Status: DISCONTINUED | OUTPATIENT
Start: 2019-06-06 | End: 2019-06-07 | Stop reason: HOSPADM

## 2019-06-06 RX ORDER — HEPARIN SODIUM (PORCINE) LOCK FLUSH IV SOLN 100 UNIT/ML 100 UNIT/ML
500 SOLUTION INTRAVENOUS PRN
Status: CANCELLED | OUTPATIENT
Start: 2019-06-06

## 2019-06-06 RX ADMIN — SODIUM CHLORIDE: 9 INJECTION, SOLUTION INTRAVENOUS at 09:36

## 2019-06-06 RX ADMIN — Medication 10 ML: at 10:38

## 2019-06-06 RX ADMIN — Medication 10 ML: at 08:42

## 2019-06-06 RX ADMIN — ATEZOLIZUMAB 1200 MG: 1200 INJECTION, SOLUTION INTRAVENOUS at 09:44

## 2019-06-06 RX ADMIN — Medication 20 ML: at 08:43

## 2019-06-06 RX ADMIN — Medication 500 UNITS: at 10:38

## 2019-06-06 ASSESSMENT — PAIN DESCRIPTION - DESCRIPTORS: DESCRIPTORS: DISCOMFORT;ACHING

## 2019-06-06 ASSESSMENT — PAIN SCALES - GENERAL: PAINLEVEL_OUTOF10: 7

## 2019-06-06 ASSESSMENT — PAIN DESCRIPTION - ORIENTATION: ORIENTATION: LOWER

## 2019-06-06 ASSESSMENT — PAIN DESCRIPTION - PAIN TYPE: TYPE: CHRONIC PAIN

## 2019-06-06 ASSESSMENT — PAIN DESCRIPTION - LOCATION: LOCATION: BACK

## 2019-06-06 NOTE — ONCOLOGY
Chemotherapy Administration    Pre-assessment Data: Antineoplastic Agents  Other:   See toxicity flow sheet for assessment [x]     Physician Notification of Concerns Related to Chemotherapy Administration:   Physician Notified Corrinne Mc / Time of Notification      Interventions:   Lab work assessed  [x]   Height / Weight verified for dose [x]   Current MAR reviewed [x]   Emergency drugs available as appropriate [x]   Anaphylaxis assessment completed [x]   Pre-medications administered as ordered [x]   Blood return noted upon initiation of chemotherapy [x]   Blood return noted each 1-2ml of a vesicant medication if given IV push []   Blood return noted each 2-3ml of a non-vesicant medication if given IV push []   Monitor for signs / symptoms of hypersensitivity reaction [x]   Chemotherapy orders (drug/dose/rate) verified by 2 Chemo certified RNs [x]   Monitor IV site and blood return throughout the infusion of the medication [x]   Document IV site checks on the IV assessment form [x]   Document chemotherapy teaching on the Patient Education tab [x]   Document patient verbalizes understanding of medications being administered-  Tecentriq [x]   If IV infiltration, see ONS Guidelines []   Other:      []

## 2019-06-06 NOTE — PROGRESS NOTES
[facility]    Reason for Clinic visit:      Metastatic left lung cancer    HPI   Sergio Reddy is a 76 y.o. female with metastatic NSCLC, she is a former Dr. Doug Jose patient. Patient presents to the medical oncology clinic at [facility] where she would like to transfer her care. Her history of lung cancer is presented below:  She was diagnosed with  stage 2A non-small-cell lung cancer in 2012. On October 22, 2012 she underwent right lower lobe lung, lobectomy. Final pathology report showed:  A - Right lower lobe lung, lobectomy:      Moderately differentiated adenocarcinoma, mixed subtype      (predominantly papillary), pT2b      Emphysematous changes in non-neoplastic lung parenchyma.      Margins negative for malignancy. B - Peribronchial lymph node, excision:   One lymph node with caseous necrosis/calcification  negative for malignancy, pN0. It doesn't look like the patient had any mediastinal lymph node evaluation. She received adjuvant chemotherapy with carboplatin and gemcitabine and completed that on 02/04/2013. According to Dr. Doug Jose note CT chest on 02/18/2013 showed a soft tissue density in the middle and posterior mediastinum. PET on 02/28/2013  showed avid mediastinal right infrahilar adenopathy, highly suspicious for metastatic disease. Due to disease progression she underwent chemotherapy with carboplatin and Taxol and concurrent radiation therapy that she started in 03/2013, completed radiation on 05/14/2013 and chemotherapy in 08/2013. The PET scan done on 09/09/2013 showed postradiation changes only. She then was diagnosed with a new stage 1A left lung cancer in 10/2016. CT on 09/19/2016 showed a left apical lung nodule that measured 10 mm, evaluated by PET that showed an uptake of 5.2, but no other areas were seen. CT-guided biopsy 10/31/2016 showed poorly differentiated adenocarcinoma.  She was referred back to Cardiovascular surgery for excision of a lung nodule for curative intent. She was deemed nonsurgical by Dr. Gaby Kimble, therefore she received stereotactic ablative radiation therapy to the left lung mass. SBRT completed on 01/11/2017. The patient had restaging  CT chest  on 04/19/2018 that showed new irregular 10 x 19 mm left upper lobe nodule that increased in size since 10/2017. She started chemo with single agent Taxotere. After seven cycles Taxotere was discontinued due to side effects,  losing her toenails and her fingernails. Lung biopsy from 2016 was tested for PD-L1, it showed high expression, Tumor Proportion Score:  81-90%. KRAS was detected. EGFR not detected. Her treatment was changed to Tecentriq on 12/18/2018. The patient has had Tecentriq twice. She is a former smoker with severe COPD requiring home O2 supplementation by nasal cannula oxygen at 4 L. The patient was hospitalized from 02/23/2019 till 02/28/2018 for acute on chronic respiratory failure with hypoxemia and hypercapnia. CTA showed worsening right lung consolidation, differential diagnosis included community acquired pneumonia, atypical pulm edema, immunotherapy induced pneumonitis versus malignancy. Patient I  was on empiric antibiotic treatment for pneumonia. She was also placed on steroids for the possible drug induced pneumonitis. She had a thoracentesis of 300 ml which appeared to be a transudate. There were no malignant cells on cytology. Due to mental status changes she had Ct of the brain that showed  inconclusive findings. Brain MRI recommended, however the patient refused. She restarted Tecentriq on May 15, 2019. Intermittent history on May 15, 2019:    Patient presents to the medical oncology clinic to receive next infusion of immunotherapy with Tecentriq. She denies having any side effects from immunotherapy. No diarrhea, if anything she is mildly constipated. No skin rash.   Her breathing is a little bit worse today she attributes this to humidity. She denies having any headaches. Her appetite is fine. No fevers. She is more active at home. She reports gradually worsening of leg swelling over the last 3-4 days. She stopped taking Lasix. Denies having any fevers no signs or symptoms of infection. Past Medical History         Diagnosis Date    Arthritis     low back pain and scoliosis in lumbar    Cancer ([facility] [address]     lower right lobe-lung s/p lobectomy in , radiation and chemo nad later had mediatinal mets and had radationa dn chemo again, and in  had reoccurence on the left side upper and was started on radiation this year and has  a follow up CT in oct 2017    Chronic bronchitis, simple ([facility] [address]     Chronic respiratory failure with hypoxia ([address]     COPD (chronic obstructive pulmonary disease) (HCC)     GERD (gastroesophageal reflux disease)     HTN (hypertension)     Pneumonia     Postprocedural pneumothorax     Scoliosis     Urinary incontinence       Past Surgical History           Procedure Laterality Date    LOBECTOMY  10/19/2012    rt lower lobectomy     LUNG BIOPSY  2012       Allergies   Advil cold & sinus liqui-gels [pseudoephedrine-ibuprofen];  Percocet [oxycodone-acetaminophen]; and Sulfa antibiotics  Social History     Social History     Socioeconomic History    Marital status:      Spouse name: Pinky Kumar Number of children: 2    Years of education: Not on file    Highest education level: Not on file   Occupational History    Not on file   Social Needs    Financial resource strain: Not on file    Food insecurity:     Worry: Not on file     Inability: Not on file   Red Seraphim needs:     Medical: Not on file     Non-medical: Not on file   Tobacco Use    Smoking status: Former Smoker     Packs/day: 1.00     Years: 45.00     Pack years: 45.00     Types: Cigarettes     Last attempt to quit:      Years since quittin.4    Smokeless tobacco: Never Used   Substance and Sexual Activity    urine volume, difficulty urinating, dysuria, flank pain, hematuria and urgency. Musculoskeletal: Negative for arthralgias, back pain, gait problem, joint swelling, myalgias and neck stiffness. Skin: Negative for color change, rash and wound. Neurological: Positive for weakness. Negative for dizziness, tremors, seizures, speech difficulty, light-headedness, numbness and headaches. Hematological: Negative for adenopathy. Does not bruise/bleed easily. Psychiatric/Behavioral: Negative for confusion and sleep disturbance. The patient is not nervous/anxious. Vitals     height is 5' 0.51\" (1.537 m) and weight is 141 lb 6.4 oz (64.1 kg). Her oral temperature is 98.2 °F (36.8 °C). Her blood pressure is 140/68 (abnormal) and her pulse is 69. Her respiration is 18 and oxygen saturation is 94%. Exam   Physical Exam   Constitutional: She is oriented to person, place, and time. She appears well-developed and well-nourished. No distress. HENT:   Head: Normocephalic. Mouth/Throat: Oropharynx is clear and moist. No oropharyngeal exudate. Eyes: Pupils are equal, round, and reactive to light. EOM are normal. Right eye exhibits no discharge. Left eye exhibits no discharge. No scleral icterus. Neck: Normal range of motion. Neck supple. No JVD present. No tracheal deviation present. No thyromegaly present. Cardiovascular: Normal rate and normal heart sounds. Exam reveals no gallop and no friction rub. No murmur heard. Pulmonary/Chest: No stridor. Tachypnea noted. She is in respiratory distress. She has decreased breath sounds in the right middle field, the right lower field, the left middle field and the left lower field. She has no wheezes. She has no rales. She exhibits no tenderness. Abdominal: Soft. Bowel sounds are normal. She exhibits no distension and no mass. There is no tenderness. There is no rebound. Musculoskeletal: Normal range of motion. She exhibits no edema.    Good range of performance status has significantly improved. Therefore on May 15, 2019 she restarted treatment with  Tecentriq. 2.  Palliative immunotherapy with Tecentriq. She tolerated her last infusion very well. No diarrhea, no skin rash, no worsening shortness of breath. Her vital signs are stable today, labs are within normal limits so we'll proceed with Tecentriq infusion today  2. Acute on chronic respiratory failure with hypoxemia and hypercapnia. Stable chronic respiratory failure secondary to COPD . Currently off steroids, off antibiotics, back to baseline. On chronic O2 supplementation at home. She stopped taking Lasix, her lungs are more congested today. The patient was instructed to take Lasix for 3 days  3. History of mental status changes. Resolved. Inconclusive CT of the brain. Brain MRI recommended, however the patient refused. I recommend at least repetition of CT of the  4. Chronic normocytic anemia. Hemoglobin is stable at 9.6 today her MCV is 88. Will monitor. 5. Elevated creatinine. 1.2 today.       Electronically signed by     Miguel Ornelas MD on 6/6/2019 at 2:17 PM

## 2019-06-06 NOTE — PROGRESS NOTES
Patient assessed for the following post chemotherapy:    Dizziness   No  Lightheadedness  No      Acute nausea/vomiting No  Headache   No  Chest pain/pressure  No  Rash/itching   No  Shortness of breath  No    Patient kept for 20 minutes observation post infusion chemotherapy. Patient tolerated chemotherapy treatment Tecentriq without any complications. Last vital signs:   BP (!) 115/59   Pulse 60   Temp 98 °F (36.7 °C) (Oral)   Resp 18   Ht 5' 0.5\" (1.537 m)   Wt 141 lb 6.4 oz (64.1 kg)   SpO2 93%   BMI 27.16 kg/m²       Patient instructed if experience any of the above symptoms following today's infusion,he/she is to notify MD immediately or go to the emergency department. Discharge instructions given to patient. Verbalizes understanding. Ambulated off unit per self in stable condition with belongings.

## 2019-06-06 NOTE — PLAN OF CARE
Problem: Pain:  Description  Pain management should include both nonpharmacologic and pharmacologic interventions. Goal: Control of chronic pain  Description  Control of chronic pain  Outcome: Met This Shift  Note:   Patient rating pain a 7 out of 10 using SIMI scale, Pain located in lower back. Intervention: Opioid analgesia side-effects  Note:   Patient encouraged to take prescribed pain medications and call Physician if pain is not controlled. Problem: Musculor/Skeletal Functional Status  Goal: Absence of falls  Outcome: Met This Shift  Note:   Free from falls while in O.P. Oncology. Intervention: Fall precautions  Note:   Discussed the need to use the call light for assistance when getting up to ambulate. Problem: Discharge Planning  Goal: Knowledge of discharge instructions  Description  Knowledge of discharge instructions     Outcome: Met This Shift  Note:   Verbalize understanding of discharge instructions, follow up appointments, and when to call Physician. Intervention: Interaction with patient/family and care team  Note:   Discuss understanding of discharge instructions, follow up appointments and when to call Physician. Problem: Intellectual/Education/Knowledge Deficit  Goal: Teaching initiated upon admission  Outcome: Met This Shift  Note:   Patient verbalizes understanding to verbal information given on Tecentriq, action and possible side effects. Aware to call MD if develop complications. Intervention: Verbal/written education provided  Note:   Chemotherapy Teaching     What is Chemotherapy   Drug action ? Method of Administration ? Handouts given ? Side Effects  Nausea/vomiting ? Diarrhea ? Fatigue ? Signs / Symptoms of infection ? Neutropenia ? Thrombocytopenia ? Alopecia ? neuropathy ? [place] diet &  the importance of fluids ? Micellaneous  Importance of nutrition ? Importance of oral hygiene ? When to call the MD ?   Monitoring labs ? Use of supportive services ? Explanation of Drug Regimen / Frequency  Day 1 cycle 5, Tecentriq     Comments  Verbalized understanding to drug,action,side effects and when to call MD         Problem: Infection - Central Venous Catheter-Associated Bloodstream Infection:  Goal: Will show no infection signs and symptoms  Description  Will show no infection signs and symptoms  Outcome: Met This Shift  Note:   Instructed to monitor for signs/symptoms of infection at medi-port site and call MD if problems develop. Intervention: Infection risk assessment  Note:   Mediport site with no redness or warmth. Skin over port site intact with no signs of breakdown noted. Patient verbalizes signs/symptoms of port infection and when to notify the physician. Care plan reviewed with patient. Patient verbalize understanding of the plan of care and contribute to goal setting.

## 2019-06-26 ENCOUNTER — OFFICE VISIT (OUTPATIENT)
Dept: INTERNAL MEDICINE CLINIC | Age: 74
End: 2019-06-26
Payer: MEDICARE

## 2019-06-26 VITALS
RESPIRATION RATE: 16 BRPM | OXYGEN SATURATION: 94 % | WEIGHT: 140.5 LBS | BODY MASS INDEX: 26.53 KG/M2 | SYSTOLIC BLOOD PRESSURE: 112 MMHG | HEIGHT: 61 IN | DIASTOLIC BLOOD PRESSURE: 66 MMHG | HEART RATE: 107 BPM

## 2019-06-26 DIAGNOSIS — I10 ESSENTIAL HYPERTENSION: ICD-10-CM

## 2019-06-26 DIAGNOSIS — R00.0 TACHYCARDIA: ICD-10-CM

## 2019-06-26 DIAGNOSIS — C34.31 MALIGNANT NEOPLASM OF LOWER LOBE, RIGHT BRONCHUS OR LUNG (HCC): Primary | ICD-10-CM

## 2019-06-26 DIAGNOSIS — R53.0 NEOPLASTIC MALIGNANT RELATED FATIGUE: Primary | ICD-10-CM

## 2019-06-26 DIAGNOSIS — K59.03 DRUG-INDUCED CONSTIPATION: ICD-10-CM

## 2019-06-26 DIAGNOSIS — R06.02 SHORTNESS OF BREATH ON EXERTION: ICD-10-CM

## 2019-06-26 PROCEDURE — 1090F PRES/ABSN URINE INCON ASSESS: CPT | Performed by: NURSE PRACTITIONER

## 2019-06-26 PROCEDURE — G8400 PT W/DXA NO RESULTS DOC: HCPCS | Performed by: NURSE PRACTITIONER

## 2019-06-26 PROCEDURE — G8419 CALC BMI OUT NRM PARAM NOF/U: HCPCS | Performed by: NURSE PRACTITIONER

## 2019-06-26 PROCEDURE — 4040F PNEUMOC VAC/ADMIN/RCVD: CPT | Performed by: NURSE PRACTITIONER

## 2019-06-26 PROCEDURE — 1123F ACP DISCUSS/DSCN MKR DOCD: CPT | Performed by: NURSE PRACTITIONER

## 2019-06-26 PROCEDURE — 99214 OFFICE O/P EST MOD 30 MIN: CPT | Performed by: NURSE PRACTITIONER

## 2019-06-26 PROCEDURE — G8427 DOCREV CUR MEDS BY ELIG CLIN: HCPCS | Performed by: NURSE PRACTITIONER

## 2019-06-26 PROCEDURE — 3017F COLORECTAL CA SCREEN DOC REV: CPT | Performed by: NURSE PRACTITIONER

## 2019-06-26 PROCEDURE — 1036F TOBACCO NON-USER: CPT | Performed by: NURSE PRACTITIONER

## 2019-06-26 RX ORDER — MEPERIDINE HYDROCHLORIDE 25 MG/ML
12.5 INJECTION INTRAMUSCULAR; INTRAVENOUS; SUBCUTANEOUS ONCE
Status: CANCELLED | OUTPATIENT
Start: 2019-06-27

## 2019-06-26 RX ORDER — HEPARIN SODIUM (PORCINE) LOCK FLUSH IV SOLN 100 UNIT/ML 100 UNIT/ML
500 SOLUTION INTRAVENOUS PRN
Status: CANCELLED | OUTPATIENT
Start: 2019-06-27

## 2019-06-26 RX ORDER — SODIUM CHLORIDE 0.9 % (FLUSH) 0.9 %
10 SYRINGE (ML) INJECTION PRN
Status: CANCELLED | OUTPATIENT
Start: 2019-06-27

## 2019-06-26 RX ORDER — PROMETHAZINE HYDROCHLORIDE 25 MG/1
25 TABLET ORAL EVERY 8 HOURS PRN
Qty: 30 TABLET | Refills: 1 | Status: SHIPPED | OUTPATIENT
Start: 2019-06-26 | End: 2019-09-27 | Stop reason: SDUPTHER

## 2019-06-26 RX ORDER — SODIUM CHLORIDE 0.9 % (FLUSH) 0.9 %
5 SYRINGE (ML) INJECTION PRN
Status: CANCELLED | OUTPATIENT
Start: 2019-06-27

## 2019-06-26 RX ORDER — 0.9 % SODIUM CHLORIDE 0.9 %
10 VIAL (ML) INJECTION ONCE
Status: CANCELLED | OUTPATIENT
Start: 2019-06-27

## 2019-06-26 RX ORDER — DIPHENHYDRAMINE HYDROCHLORIDE 50 MG/ML
50 INJECTION INTRAMUSCULAR; INTRAVENOUS ONCE
Status: CANCELLED | OUTPATIENT
Start: 2019-06-27

## 2019-06-26 RX ORDER — SODIUM CHLORIDE 9 MG/ML
INJECTION, SOLUTION INTRAVENOUS CONTINUOUS
Status: CANCELLED | OUTPATIENT
Start: 2019-06-27

## 2019-06-26 RX ORDER — METHYLPREDNISOLONE SODIUM SUCCINATE 125 MG/2ML
125 INJECTION, POWDER, LYOPHILIZED, FOR SOLUTION INTRAMUSCULAR; INTRAVENOUS ONCE
Status: CANCELLED | OUTPATIENT
Start: 2019-06-27

## 2019-06-26 NOTE — PROGRESS NOTES
Wheezing Yes JESUS Resendiz CNP   furosemide (LASIX) 20 MG tablet Take 1 tablet by mouth daily as needed (leg swelling) Yes JESUS Resendiz CNP   albuterol (PROVENTIL) (2.5 MG/3ML) 0.083% nebulizer solution Take 3 mLs by nebulization every 6 hours as needed for Wheezing or Shortness of Breath Yes Jason SwethaJESUS CNP   Handicap Placard MISC by Does not apply route Diagnosis: Malignant neoplasm of lung, unspecified laterality, unspecified part of lung ([facility] [address] [C34.90]  Expiration Date: 4/15/2020 Yes Renzo Contreras MD   ferrous sulfate 325 (65 Fe) MG tablet One every other day take with food and vitamin C 500 mg  Patient taking differently: One every other day take with food. Yes Keturah Becerra MD   lidocaine-prilocaine (EMLA) 2.5-2.5 % cream Apply topically as needed for Pain Apply topically as needed. Yes Historical Provider, MD   mometasone-formoterol (DULERA) 200-5 MCG/ACT inhaler Inhale 2 puffs into the lungs 2 times daily Yes Reji Malagon MD   fluticasone (FLONASE) 50 MCG/ACT nasal spray 1 spray by Nasal route daily Yes Demian Valentino MD   betamethasone dipropionate (DIPROLENE) 0.05 % ointment Apply topically daily.  Yes Demian Valentino MD   HYDROcodone-acetaminophen (NORCO) 5-325 MG per tablet Take 1 tablet by mouth 3 times daily as needed for Pain Yes Demian Valentino MD   omeprazole (PRILOSEC) 40 MG delayed release capsule Take 40 mg by mouth daily  Historical Provider, MD   ondansetron (ZOFRAN) 8 MG tablet Take 8 mg by mouth every 8 hours as needed for Nausea or Vomiting  Historical Provider, MD   Loratadine (CLARITIN PO) Take by mouth as needed  Historical Provider, MD   Probiotic Product (PROBIOTIC DAILY PO) Take 1 capsule by mouth 3 times daily  Historical Provider, MD   metoprolol tartrate (LOPRESSOR) 25 MG tablet Take 1 tablet by mouth 2 times daily  JESUS Resendiz CNP        Social History     Tobacco Use    Smoking status:

## 2019-06-27 ENCOUNTER — HOSPITAL ENCOUNTER (OUTPATIENT)
Dept: INFUSION THERAPY | Age: 74
Discharge: HOME | End: 2019-06-27
Payer: MEDICARE

## 2019-06-27 ENCOUNTER — OFFICE VISIT (OUTPATIENT)
Dept: ONCOLOGY | Age: 74
End: 2019-06-27
Payer: MEDICARE

## 2019-06-27 VITALS
OXYGEN SATURATION: 98 % | HEIGHT: 61 IN | WEIGHT: 141.2 LBS | RESPIRATION RATE: 16 BRPM | SYSTOLIC BLOOD PRESSURE: 119 MMHG | DIASTOLIC BLOOD PRESSURE: 58 MMHG | HEART RATE: 96 BPM | TEMPERATURE: 98 F | BODY MASS INDEX: 26.66 KG/M2

## 2019-06-27 VITALS
RESPIRATION RATE: 16 BRPM | TEMPERATURE: 98.3 F | DIASTOLIC BLOOD PRESSURE: 72 MMHG | SYSTOLIC BLOOD PRESSURE: 124 MMHG | WEIGHT: 141.2 LBS | HEART RATE: 101 BPM | OXYGEN SATURATION: 94 % | BODY MASS INDEX: 26.66 KG/M2 | HEIGHT: 61 IN

## 2019-06-27 DIAGNOSIS — C34.91 PRIMARY LUNG CANCER, RIGHT (HCC): ICD-10-CM

## 2019-06-27 DIAGNOSIS — Z51.11 ENCOUNTER FOR ANTINEOPLASTIC CHEMOTHERAPY AND IMMUNOTHERAPY: ICD-10-CM

## 2019-06-27 DIAGNOSIS — C34.31 MALIGNANT NEOPLASM OF LOWER LOBE, RIGHT BRONCHUS OR LUNG (HCC): Primary | ICD-10-CM

## 2019-06-27 DIAGNOSIS — C34.31 SQUAMOUS CELL CARCINOMA OF BRONCHUS IN RIGHT LOWER LOBE (HCC): ICD-10-CM

## 2019-06-27 DIAGNOSIS — Z51.12 ENCOUNTER FOR ANTINEOPLASTIC CHEMOTHERAPY AND IMMUNOTHERAPY: ICD-10-CM

## 2019-06-27 DIAGNOSIS — C34.90 RECURRENT NON-SMALL CELL LUNG CANCER (NSCLC) (HCC): ICD-10-CM

## 2019-06-27 DIAGNOSIS — J43.9 PULMONARY EMPHYSEMA, UNSPECIFIED EMPHYSEMA TYPE (HCC): ICD-10-CM

## 2019-06-27 DIAGNOSIS — J44.9 STAGE 3 SEVERE COPD BY GOLD CLASSIFICATION (HCC): ICD-10-CM

## 2019-06-27 DIAGNOSIS — J96.21 ACUTE ON CHRONIC RESPIRATORY FAILURE WITH HYPOXEMIA (HCC): ICD-10-CM

## 2019-06-27 DIAGNOSIS — C34.92 MALIGNANT NEOPLASM OF LEFT LUNG, UNSPECIFIED PART OF LUNG (HCC): ICD-10-CM

## 2019-06-27 DIAGNOSIS — J96.21 ACUTE ON CHRONIC RESPIRATORY FAILURE WITH HYPOXIA (HCC): ICD-10-CM

## 2019-06-27 DIAGNOSIS — Z85.118 H/O: LUNG CANCER: ICD-10-CM

## 2019-06-27 DIAGNOSIS — D64.9 ANEMIA, UNSPECIFIED TYPE: ICD-10-CM

## 2019-06-27 DIAGNOSIS — R91.1 NODULE OF LEFT LUNG: ICD-10-CM

## 2019-06-27 DIAGNOSIS — Z99.81 OXYGEN DEPENDENT: ICD-10-CM

## 2019-06-27 LAB
ALBUMIN SERPL-MCNC: 4 G/DL (ref 3.5–5.1)
ALP BLD-CCNC: 68 U/L (ref 38–126)
ALT SERPL-CCNC: 20 U/L (ref 11–66)
AST SERPL-CCNC: 32 U/L (ref 5–40)
BASINOPHIL, AUTOMATED: 0 % (ref 0–3)
BILIRUB SERPL-MCNC: < 0.2 MG/DL (ref 0.3–1.2)
BILIRUBIN DIRECT: < 0.2 MG/DL (ref 0–0.3)
BUN, WHOLE BLOOD: 15 MG/DL (ref 8–26)
CHLORIDE, WHOLE BLOOD: 87 MEQ/L (ref 98–109)
CREATININE, WHOLE BLOOD: 1.2 MG/DL (ref 0.5–1.2)
EOSINOPHILS RELATIVE PERCENT: 2 % (ref 0–4)
GFR, ESTIMATED: 47 ML/MIN/1.73M2
GLUCOSE, WHOLE BLOOD: 94 MG/DL (ref 70–108)
HCT VFR BLD CALC: 26.8 % (ref 37–47)
HEMOGLOBIN: 9.1 GM/DL (ref 12–16)
IONIZED CALCIUM, WHOLE BLOOD: 1.13 MMOL/L (ref 1.12–1.32)
LYMPHOCYTES # BLD: 20 % (ref 15–47)
MCH RBC QN AUTO: 30.4 PG (ref 27–31)
MCHC RBC AUTO-ENTMCNC: 33.8 GM/DL (ref 33–37)
MCV RBC AUTO: 90 FL (ref 81–99)
MONOCYTES: 10 % (ref 0–12)
PDW BLD-RTO: 14.8 % (ref 11.5–14.5)
PLATELET # BLD: 155 THOU/MM3 (ref 130–400)
PMV BLD AUTO: 7.3 FL (ref 7.4–10.4)
POTASSIUM, WHOLE BLOOD: 3.8 MEQ/L (ref 3.5–4.9)
RBC # BLD: 2.98 MILL/MM3 (ref 4.2–5.4)
SEG NEUTROPHILS: 69 % (ref 43–75)
SODIUM, WHOLE BLOOD: 138 MEQ/L (ref 138–146)
TOTAL CO2, WHOLE BLOOD: 41 MEQ/L (ref 23–33)
TOTAL PROTEIN: 6.4 G/DL (ref 6.1–8)
WBC # BLD: 4 THOU/MM3 (ref 4.8–10.8)

## 2019-06-27 PROCEDURE — G8419 CALC BMI OUT NRM PARAM NOF/U: HCPCS | Performed by: INTERNAL MEDICINE

## 2019-06-27 PROCEDURE — 99211 OFF/OP EST MAY X REQ PHY/QHP: CPT

## 2019-06-27 PROCEDURE — 4040F PNEUMOC VAC/ADMIN/RCVD: CPT | Performed by: INTERNAL MEDICINE

## 2019-06-27 PROCEDURE — G8926 SPIRO NO PERF OR DOC: HCPCS | Performed by: INTERNAL MEDICINE

## 2019-06-27 PROCEDURE — 36591 DRAW BLOOD OFF VENOUS DEVICE: CPT

## 2019-06-27 PROCEDURE — 3023F SPIROM DOC REV: CPT | Performed by: INTERNAL MEDICINE

## 2019-06-27 PROCEDURE — 2580000003 HC RX 258: Performed by: INTERNAL MEDICINE

## 2019-06-27 PROCEDURE — 80076 HEPATIC FUNCTION PANEL: CPT

## 2019-06-27 PROCEDURE — G8427 DOCREV CUR MEDS BY ELIG CLIN: HCPCS | Performed by: INTERNAL MEDICINE

## 2019-06-27 PROCEDURE — 6360000002 HC RX W HCPCS: Performed by: INTERNAL MEDICINE

## 2019-06-27 PROCEDURE — 99214 OFFICE O/P EST MOD 30 MIN: CPT | Performed by: INTERNAL MEDICINE

## 2019-06-27 PROCEDURE — 1090F PRES/ABSN URINE INCON ASSESS: CPT | Performed by: INTERNAL MEDICINE

## 2019-06-27 PROCEDURE — G8400 PT W/DXA NO RESULTS DOC: HCPCS | Performed by: INTERNAL MEDICINE

## 2019-06-27 PROCEDURE — 1123F ACP DISCUSS/DSCN MKR DOCD: CPT | Performed by: INTERNAL MEDICINE

## 2019-06-27 PROCEDURE — 3017F COLORECTAL CA SCREEN DOC REV: CPT | Performed by: INTERNAL MEDICINE

## 2019-06-27 PROCEDURE — 96413 CHEMO IV INFUSION 1 HR: CPT

## 2019-06-27 PROCEDURE — 85025 COMPLETE CBC W/AUTO DIFF WBC: CPT

## 2019-06-27 PROCEDURE — 80047 BASIC METABLC PNL IONIZED CA: CPT

## 2019-06-27 PROCEDURE — 1036F TOBACCO NON-USER: CPT | Performed by: INTERNAL MEDICINE

## 2019-06-27 PROCEDURE — 2709999900 HC NON-CHARGEABLE SUPPLY

## 2019-06-27 RX ORDER — SODIUM CHLORIDE 0.9 % (FLUSH) 0.9 %
20 SYRINGE (ML) INJECTION PRN
Status: CANCELLED | OUTPATIENT
Start: 2019-06-27

## 2019-06-27 RX ORDER — SODIUM CHLORIDE 0.9 % (FLUSH) 0.9 %
10 SYRINGE (ML) INJECTION PRN
Status: DISCONTINUED | OUTPATIENT
Start: 2019-06-27 | End: 2019-06-28 | Stop reason: HOSPADM

## 2019-06-27 RX ORDER — SODIUM CHLORIDE 9 MG/ML
INJECTION, SOLUTION INTRAVENOUS ONCE
Status: COMPLETED | OUTPATIENT
Start: 2019-06-27 | End: 2019-06-27

## 2019-06-27 RX ORDER — SODIUM CHLORIDE 0.9 % (FLUSH) 0.9 %
10 SYRINGE (ML) INJECTION PRN
Status: CANCELLED | OUTPATIENT
Start: 2019-06-27

## 2019-06-27 RX ORDER — HEPARIN SODIUM (PORCINE) LOCK FLUSH IV SOLN 100 UNIT/ML 100 UNIT/ML
500 SOLUTION INTRAVENOUS PRN
Status: DISCONTINUED | OUTPATIENT
Start: 2019-06-27 | End: 2019-06-28 | Stop reason: HOSPADM

## 2019-06-27 RX ORDER — HEPARIN SODIUM (PORCINE) LOCK FLUSH IV SOLN 100 UNIT/ML 100 UNIT/ML
500 SOLUTION INTRAVENOUS PRN
Status: CANCELLED | OUTPATIENT
Start: 2019-06-27

## 2019-06-27 RX ORDER — SODIUM CHLORIDE 0.9 % (FLUSH) 0.9 %
20 SYRINGE (ML) INJECTION PRN
Status: DISCONTINUED | OUTPATIENT
Start: 2019-06-27 | End: 2019-06-28 | Stop reason: HOSPADM

## 2019-06-27 RX ADMIN — Medication 10 ML: at 09:45

## 2019-06-27 RX ADMIN — Medication 10 ML: at 10:50

## 2019-06-27 RX ADMIN — Medication 500 UNITS: at 11:37

## 2019-06-27 RX ADMIN — Medication 10 ML: at 11:36

## 2019-06-27 RX ADMIN — Medication 20 ML: at 09:46

## 2019-06-27 RX ADMIN — ATEZOLIZUMAB 1200 MG: 1200 INJECTION, SOLUTION INTRAVENOUS at 10:58

## 2019-06-27 RX ADMIN — SODIUM CHLORIDE: 9 INJECTION, SOLUTION INTRAVENOUS at 10:50

## 2019-06-27 ASSESSMENT — PAIN SCALES - GENERAL
PAINLEVEL_OUTOF10: 8
PAINLEVEL_OUTOF10: 8

## 2019-06-27 ASSESSMENT — PAIN DESCRIPTION - ORIENTATION: ORIENTATION: LOWER

## 2019-06-27 ASSESSMENT — PAIN DESCRIPTION - LOCATION: LOCATION: BACK

## 2019-06-27 ASSESSMENT — PAIN DESCRIPTION - ONSET: ONSET: ON-GOING

## 2019-06-27 ASSESSMENT — PAIN DESCRIPTION - PAIN TYPE: TYPE: CHRONIC PAIN

## 2019-06-27 ASSESSMENT — PAIN DESCRIPTION - FREQUENCY: FREQUENCY: CONTINUOUS

## 2019-06-27 ASSESSMENT — PAIN DESCRIPTION - PROGRESSION: CLINICAL_PROGRESSION: NOT CHANGED

## 2019-06-27 ASSESSMENT — PAIN DESCRIPTION - DESCRIPTORS: DESCRIPTORS: CONSTANT;DULL;DISCOMFORT

## 2019-06-27 NOTE — PROGRESS NOTES
Patient assessed for the following post chemotherapy:    Dizziness   No  Lightheadedness  No      Acute nausea/vomiting No  Headache   No  Chest pain/pressure  No  Rash/itching   No  Shortness of breath  No    Patient opted to not stay for 20 minutes observation post infusion chemotherapy. Patient tolerated chemotherapy treatment Tecentriq without any complications. Last vital signs:   BP (!) 119/58   Pulse 96   Temp 98 °F (36.7 °C) (Oral)   Resp 16   Ht 5' 1\" (1.549 m)   Wt 141 lb 3.2 oz (64 kg)   SpO2 98%   BMI 26.68 kg/m²         Patient instructed if experience any of the above symptoms following today's infusion,he/she is to notify MD immediately or go to the emergency department. Discharge instructions given to patient. Verbalizes understanding. Transported off unit in wheelchair with belongings escorted by me to her car.

## 2019-06-27 NOTE — ONCOLOGY
Chemotherapy Administration    Pre-assessment Data: Antineoplastic Agents  Other:   See toxicity flow sheet for assessment [x]     Physician Notification of Concerns Related to Chemotherapy Administration:   Physician Notified Anthony Plain / Time of Notification      Interventions:   Lab work assessed  [x]   Height / Weight verified for dose [x]   Current MAR reviewed [x]   Emergency drugs available as appropriate [x]   Anaphylaxis assessment completed [x]   Pre-medications administered as ordered [x]   Blood return noted upon initiation of chemotherapy [x]   Blood return noted each 1-2ml of a vesicant medication if given IV push []   Blood return noted each 2-3ml of a non-vesicant medication if given IV push []   Monitor for signs / symptoms of hypersensitivity reaction [x]   Chemotherapy orders (drug/dose/rate) verified by 2 Chemo certified RNs [x]   Monitor IV site and blood return throughout the infusion of the medication [x]   Document IV site checks on the IV assessment form [x]   Document chemotherapy teaching on the Patient Education tab [x]   Document patient verbalizes understanding of medications being administered [x]   If IV infiltration, see ONS Guidelines []   Other:      []

## 2019-06-27 NOTE — PLAN OF CARE
Problem: Pain:  Description  Pain management should include both nonpharmacologic and pharmacologic interventions. Goal: Control of chronic pain  Description  Control of chronic pain  Outcome: Met This Shift  Note:   Patient rating pain a 8 out of 10 using SIMI scale, Pain located in  back. Intervention: Opioid analgesia side-effects  Description  Opioid analgesia side-effects - REMINDER(s):  Bradycardia. Confusion. Constipation. Respiratory function, decreased. Note:   Patient encouraged to take prescribed pain medications and call Physician if pain is not controlled. Problem: Intellectual/Education/Knowledge Deficit  Goal: Teaching initiated upon admission  Outcome: Met This Shift  Note:   Patient verbalizes understanding to verbal information given on Tecentriq,action and possible side effects. Aware to call MD if develop complications. Intervention: Verbal/written education provided  Note:   Chemotherapy Teaching     What is Chemotherapy   Drug action ? Method of Administration ? Handouts given ? Side Effects  Nausea/vomiting ? Diarrhea ? Fatigue ? Signs / Symptoms of infection ? Neutropenia ? Thrombocytopenia ? Alopecia ? neuropathy ? [place] diet &  the importance of fluids ? Micellaneous  Importance of nutrition ? Importance of oral hygiene ? When to call the MD ?   Monitoring labs ? Use of supportive services ? Explanation of Drug Regimen / Frequency  Tecentriq every 3 weeks- C6D1     Comments  Verbalized understanding to drug,action,side effects and when to call MD         Problem: Musculor/Skeletal Functional Status  Goal: Absence of falls  Outcome: Met This Shift  Note:   No falls occurred with visit today. Intervention: Fall precautions  Note:   Verbalized understanding of fall prevention to ask for assistance with ambulation. Call light within reach.  Transported off unit in wheelchair     Problem: Discharge Planning  Goal: Knowledge of discharge instructions  Description  Knowledge of discharge instructions     Outcome: Met This Shift  Note:   Verbalized understanding of discharge instructions, follow-up appointments, and when to call the physician. Intervention: Interaction with patient/family and care team  Note:   Discuss understanding of discharge instructions,follow-up appointments, and when to call the physician. Problem: Infection - Central Venous Catheter-Associated Bloodstream Infection:  Goal: Will show no infection signs and symptoms  Description  Will show no infection signs and symptoms  Outcome: Met This Shift  Note:   Mediport site with no redness or warmth. Skin over port site intact with no signs of breakdown noted. Patient verbalizes signs/symptoms of port infection and when to notify the physician. Intervention: Infection risk assessment  Description  Infection risk assessment  Note:   Instructed to monitor for signs/symptoms of infection at [address] [facility] [address] [facility] and call MD if problems develop. Care plan reviewed with patient . Patient  verbalize understanding of the plan of care and contribute to goal setting.

## 2019-06-27 NOTE — PROGRESS NOTES
Lab specimen obtained from [facility] without any problems. Ambulated off unit to examination room for appointment with dr. Barry Lyons escorted by [facility].

## 2019-06-27 NOTE — PATIENT INSTRUCTIONS
1. Proceed with Tecentriq today  2. RTC in 3 weeks to see me, for labs: CBC, BMP today, LFTs and next cycle of Tecentriq  3.   fEW Days before RTC CT of the chest

## 2019-06-28 ASSESSMENT — ENCOUNTER SYMPTOMS
COLOR CHANGE: 0
TROUBLE SWALLOWING: 0
COUGH: 1
ABDOMINAL DISTENTION: 0
BACK PAIN: 0
SORE THROAT: 0
CHEST TIGHTNESS: 0
WHEEZING: 0
BLOOD IN STOOL: 0
FACIAL SWELLING: 0
SHORTNESS OF BREATH: 1
VOMITING: 0
NAUSEA: 0
CONSTIPATION: 0
EYE DISCHARGE: 0
DIARRHEA: 0
ABDOMINAL PAIN: 0
RECTAL PAIN: 0

## 2019-06-28 NOTE — PROGRESS NOTES
[facility]    Reason for Clinic visit:      Metastatic left lung cancer    CLIF Valladares is a 76 y.o. female with metastatic NSCLC, she is a former Dr. Ravi Iglesias patient. Patient presents to the medical oncology clinic at [facility] where she would like to transfer her care. Her history of lung cancer is presented below:  She was diagnosed with  stage 2A non-small-cell lung cancer in 2012. On October 22, 2012 she underwent right lower lobe lung, lobectomy. Final pathology report showed:  A - Right lower lobe lung, lobectomy:      Moderately differentiated adenocarcinoma, mixed subtype      (predominantly papillary), pT2b      Emphysematous changes in non-neoplastic lung parenchyma.      Margins negative for malignancy. B - Peribronchial lymph node, excision:   One lymph node with caseous necrosis/calcification  negative for malignancy, pN0. It doesn't look like the patient had any mediastinal lymph node evaluation. She received adjuvant chemotherapy with carboplatin and gemcitabine and completed that on 02/04/2013. According to Dr. Ravi Iglesias note CT chest on 02/18/2013 showed a soft tissue density in the middle and posterior mediastinum. PET on 02/28/2013  showed avid mediastinal right infrahilar adenopathy, highly suspicious for metastatic disease. Due to disease progression she underwent chemotherapy with carboplatin and Taxol and concurrent radiation therapy that she started in 03/2013, completed radiation on 05/14/2013 and chemotherapy in 08/2013. The PET scan done on 09/09/2013 showed postradiation changes only. She then was diagnosed with a new stage 1A left lung cancer in 10/2016. CT on 09/19/2016 showed a left apical lung nodule that measured 10 mm, evaluated by PET that showed an uptake of 5.2, but no other areas were seen. CT-guided biopsy 10/31/2016 showed poorly differentiated adenocarcinoma.  She was referred back to Cardiovascular surgery for excision of a lung nodule for curative intent. She was deemed nonsurgical by Dr. George Martell, therefore she received stereotactic ablative radiation therapy to the left lung mass. SBRT completed on 01/11/2017. The patient had restaging  CT chest  on 04/19/2018 that showed new irregular 10 x 19 mm left upper lobe nodule that increased in size since 10/2017. She started chemo with single agent Taxotere. After seven cycles Taxotere was discontinued due to side effects,  losing her toenails and her fingernails. Lung biopsy from 2016 was tested for PD-L1, it showed high expression, Tumor Proportion Score:  81-90%. KRAS was detected. EGFR not detected. Her treatment was changed to Tecentriq on 12/18/2018. The patient has had Tecentriq twice. She is a former smoker with severe COPD requiring home O2 supplementation by nasal cannula oxygen at 4 L. The patient was hospitalized from 02/23/2019 till 02/28/2018 for acute on chronic respiratory failure with hypoxemia and hypercapnia. CTA showed worsening right lung consolidation, differential diagnosis included community acquired pneumonia, atypical pulm edema, immunotherapy induced pneumonitis versus malignancy. Patient I  was on empiric antibiotic treatment for pneumonia. She was also placed on steroids for the possible drug induced pneumonitis. She had a thoracentesis of 300 ml which appeared to be a transudate. There were no malignant cells on cytology. Due to mental status changes she had Ct of the brain that showed  inconclusive findings. Brain MRI recommended, however the patient refused. She restarted Tecentriq on May 15, 2019. Intermittent history on June 27, 2019:    Patient presents to the medical oncology clinic to receive next infusion of immunotherapy with Tecentriq. She denies having any side effects from immunotherapy. No diarrhea, if anything she is mildly constipated. No skin rash.   Her breathing is a little bit worse today she attributes this to activity:     Days per week: Not on file     Minutes per session: Not on file    Stress: Not on file   Relationships    Social connections:     Talks on phone: Not on file     Gets together: Not on file     Attends Holiness service: Not on file     Active member of club or organization: Not on file     Attends meetings of clubs or organizations: Not on file     Relationship status: Not on file    Intimate partner violence:     Fear of current or ex partner: Not on file     Emotionally abused: Not on file     Physically abused: Not on file     Forced sexual activity: Not on file   Other Topics Concern    Not on file   Social History Narrative    Not on file     Family History          Problem Relation Age of Onset    Cancer Mother     Heart Disease Father     High Blood Pressure Father     High Cholesterol Father     Arthritis Sister     Heart Disease Sister     Cancer Sister         lung cancer    Heart Disease Sister     Stroke Sister     High Cholesterol Sister     High Blood Pressure Sister     Stroke Paternal Grandfather     High Blood Pressure Brother      ROS     Review of Systems   Constitutional: Positive for activity change, appetite change, fatigue and fever. HENT: Negative for congestion, dental problem, facial swelling, hearing loss, mouth sores, nosebleeds, sore throat, tinnitus and trouble swallowing. Eyes: Negative for discharge and visual disturbance. Respiratory: Positive for cough and shortness of breath. Negative for chest tightness and wheezing. Cardiovascular: Positive for palpitations and leg swelling. Negative for chest pain. Gastrointestinal: Negative for abdominal distention, abdominal pain, blood in stool, constipation, diarrhea, nausea, rectal pain and vomiting. Endocrine: Negative for cold intolerance, polydipsia and polyuria. Genitourinary: Negative for decreased urine volume, difficulty urinating, dysuria, flank pain, hematuria and urgency. Musculoskeletal: Negative for arthralgias, back pain, gait problem, joint swelling, myalgias and neck stiffness. Skin: Negative for color change, rash and wound. Neurological: Positive for weakness. Negative for dizziness, tremors, seizures, speech difficulty, light-headedness, numbness and headaches. Hematological: Negative for adenopathy. Does not bruise/bleed easily. Psychiatric/Behavioral: Negative for confusion and sleep disturbance. The patient is not nervous/anxious. Vitals     height is 5' 0.51\" (1.537 m) and weight is 141 lb 3.2 oz (64 kg). Her oral temperature is 98.3 °F (36.8 °C). Her blood pressure is 124/72 and her pulse is 101. Her respiration is 16 and oxygen saturation is 94%. Exam   Physical Exam   Constitutional: She is oriented to person, place, and time. She appears well-developed and well-nourished. No distress. HENT:   Head: Normocephalic. Mouth/Throat: Oropharynx is clear and moist. No oropharyngeal exudate. Eyes: Pupils are equal, round, and reactive to light. EOM are normal. Right eye exhibits no discharge. Left eye exhibits no discharge. No scleral icterus. Neck: Normal range of motion. Neck supple. No JVD present. No tracheal deviation present. No thyromegaly present. Cardiovascular: Normal rate and normal heart sounds. Exam reveals no gallop and no friction rub. No murmur heard. Pulmonary/Chest: No stridor. Tachypnea noted. She is in respiratory distress. She has decreased breath sounds in the right middle field, the right lower field, the left middle field and the left lower field. She has no wheezes. She has no rales. She exhibits no tenderness. Abdominal: Soft. Bowel sounds are normal. She exhibits no distension and no mass. There is no tenderness. There is no rebound. Musculoskeletal: Normal range of motion. She exhibits no edema. Good range of motion in all four extremities. Lymphadenopathy:     She has no cervical adenopathy. Neurological: She is alert and oriented to person, place, and time. She has normal reflexes. No cranial nerve deficit. She exhibits normal muscle tone. Skin: Skin is warm. No rash noted. No erythema. Psychiatric: She has a normal mood and affect. Her behavior is normal. Judgment and thought content normal.   Vitals reviewed. Labs     Lab Results   Component Value Date    WBC 4.0 (L) 06/27/2019    HGB 9.1 (L) 06/27/2019    HCT 26.8 (L) 06/27/2019    MCV 90 06/27/2019     06/27/2019       Chemistry        Component Value Date/Time     06/27/2019 0945     02/28/2019 0423    K 3.8 06/27/2019 0945    K 4.6 02/28/2019 0423    K 3.8 10/15/2018 0415    CL 93 (L) 02/28/2019 0423    CO2 40 (HH) 02/28/2019 0423    BUN 36 (H) 02/28/2019 0423    CREATININE 1.2 06/27/2019 0945    CREATININE 0.7 02/28/2019 0423        Component Value Date/Time    CALCIUM 8.5 02/28/2019 0423    ALKPHOS 68 06/27/2019 0945    AST 32 06/27/2019 0945    ALT 20 06/27/2019 0945    BILITOT <0.2 (L) 06/27/2019 0945              Radiology      PET scan on 04/23/2019 showed: Impression   Essentially stable examination. There is a fairly intense activity in the thyroid gland which may be related to thyroiditis. This can be a normal finding. Similar to the prior exam. Slightly greater amount of atelectatic lung currently with    minimal activity above background but similar to the prior exam. Interval resolution of previously seen left upper lobe hypermetabolic nodule. Assessment/ Plan     1. Metastatic left lung cancer. Treated with Taxotere followed by 2 cycles of Tecentriq. Last dose given in January 2019. Further treatment held due to hospitalization for acute on chronic respiratory failure. Recent PET scan on 04/23/2019 showed interval resolution of previously seen left upper lobe hypermetabolic nodule. The patient's performance status has significantly improved.   Therefore on May 15, 2019 she restarted treatment with  Tecentriq. 2.  Palliative immunotherapy with Tecentriq. She tolerated her last infusion very well. No diarrhea, no skin rash, no worsening shortness of breath. Her vital signs are stable today, labs are within normal limits so we'll proceed with Tecentriq infusion today. After Today's treatment of the patient will have CT of the chest  2. Acute on chronic respiratory failure with hypoxemia and hypercapnia. Stable chronic respiratory failure secondary to COPD . Currently off steroids, off antibiotics, back to baseline. On chronic O2 supplementation at home. She stopped taking Lasix, her lungs are more congested today. The patient was instructed to take Lasix for 3 days  3. History of mental status changes. Resolved. Inconclusive CT of the brain. Brain MRI recommended, however the patient refused. I recommend at least repetition of CT of the  4. Chronic normocytic anemia. Hemoglobin is stable at 9.1 today her MCV is 88. Will monitor. 5. Elevated creatinine. Stable 1.2 today.       Electronically signed by     Joaquim English MD on 6/28/2019 at 2:34 PM

## 2019-07-01 ENCOUNTER — TELEPHONE (OUTPATIENT)
Dept: INTERNAL MEDICINE CLINIC | Age: 74
End: 2019-07-01

## 2019-07-11 ENCOUNTER — HOSPITAL ENCOUNTER (OUTPATIENT)
Dept: CT IMAGING | Age: 74
Discharge: HOME | End: 2019-07-11
Payer: MEDICARE

## 2019-07-11 DIAGNOSIS — C34.90 RECURRENT NON-SMALL CELL LUNG CANCER (NSCLC) (HCC): ICD-10-CM

## 2019-07-11 DIAGNOSIS — C34.31 MALIGNANT NEOPLASM OF LOWER LOBE, RIGHT BRONCHUS OR LUNG (HCC): ICD-10-CM

## 2019-07-11 PROCEDURE — 71260 CT THORAX DX C+: CPT

## 2019-07-11 PROCEDURE — 6360000004 HC RX CONTRAST MEDICATION: Performed by: INTERNAL MEDICINE

## 2019-07-11 RX ADMIN — IOPAMIDOL 85 ML: 755 INJECTION, SOLUTION INTRAVENOUS at 08:44

## 2019-07-17 DIAGNOSIS — C34.31 MALIGNANT NEOPLASM OF LOWER LOBE, RIGHT BRONCHUS OR LUNG (HCC): Primary | ICD-10-CM

## 2019-07-17 RX ORDER — SODIUM CHLORIDE 0.9 % (FLUSH) 0.9 %
5 SYRINGE (ML) INJECTION PRN
Status: CANCELLED | OUTPATIENT
Start: 2019-08-16

## 2019-07-17 RX ORDER — METHYLPREDNISOLONE SODIUM SUCCINATE 125 MG/2ML
125 INJECTION, POWDER, LYOPHILIZED, FOR SOLUTION INTRAMUSCULAR; INTRAVENOUS ONCE
Status: CANCELLED | OUTPATIENT
Start: 2019-08-16

## 2019-07-17 RX ORDER — SODIUM CHLORIDE 9 MG/ML
INJECTION, SOLUTION INTRAVENOUS ONCE
Status: CANCELLED | OUTPATIENT
Start: 2019-08-16

## 2019-07-17 RX ORDER — DIPHENHYDRAMINE HYDROCHLORIDE 50 MG/ML
50 INJECTION INTRAMUSCULAR; INTRAVENOUS ONCE
Status: CANCELLED | OUTPATIENT
Start: 2019-08-16

## 2019-07-17 RX ORDER — SODIUM CHLORIDE 0.9 % (FLUSH) 0.9 %
10 SYRINGE (ML) INJECTION PRN
Status: CANCELLED | OUTPATIENT
Start: 2019-08-16

## 2019-07-17 RX ORDER — SODIUM CHLORIDE 9 MG/ML
INJECTION, SOLUTION INTRAVENOUS CONTINUOUS
Status: CANCELLED | OUTPATIENT
Start: 2019-08-16

## 2019-07-17 RX ORDER — MEPERIDINE HYDROCHLORIDE 25 MG/ML
12.5 INJECTION INTRAMUSCULAR; INTRAVENOUS; SUBCUTANEOUS ONCE
Status: CANCELLED | OUTPATIENT
Start: 2019-08-16

## 2019-07-17 RX ORDER — HEPARIN SODIUM (PORCINE) LOCK FLUSH IV SOLN 100 UNIT/ML 100 UNIT/ML
500 SOLUTION INTRAVENOUS PRN
Status: CANCELLED | OUTPATIENT
Start: 2019-08-16

## 2019-07-17 RX ORDER — 0.9 % SODIUM CHLORIDE 0.9 %
10 VIAL (ML) INJECTION ONCE
Status: CANCELLED | OUTPATIENT
Start: 2019-08-16

## 2019-07-18 ENCOUNTER — HOSPITAL ENCOUNTER (INPATIENT)
Age: 74
LOS: 7 days | Discharge: HOME HEALTH | DRG: 314 | End: 2019-07-25
Attending: INTERNAL MEDICINE | Admitting: INTERNAL MEDICINE
Payer: MEDICARE

## 2019-07-18 ENCOUNTER — HOSPITAL ENCOUNTER (OUTPATIENT)
Dept: INFUSION THERAPY | Age: 74
Discharge: HOME | DRG: 314 | End: 2019-07-18
Payer: MEDICARE

## 2019-07-18 ENCOUNTER — OFFICE VISIT (OUTPATIENT)
Dept: ONCOLOGY | Age: 74
End: 2019-07-18
Payer: MEDICARE

## 2019-07-18 ENCOUNTER — APPOINTMENT (OUTPATIENT)
Dept: GENERAL RADIOLOGY | Age: 74
DRG: 314 | End: 2019-07-18
Payer: MEDICARE

## 2019-07-18 VITALS
OXYGEN SATURATION: 96 % | BODY MASS INDEX: 28.98 KG/M2 | RESPIRATION RATE: 16 BRPM | TEMPERATURE: 98.5 F | HEART RATE: 85 BPM | SYSTOLIC BLOOD PRESSURE: 136 MMHG | HEIGHT: 60 IN | WEIGHT: 147.6 LBS | DIASTOLIC BLOOD PRESSURE: 73 MMHG

## 2019-07-18 VITALS
DIASTOLIC BLOOD PRESSURE: 73 MMHG | SYSTOLIC BLOOD PRESSURE: 136 MMHG | HEART RATE: 85 BPM | OXYGEN SATURATION: 96 % | WEIGHT: 147.6 LBS | TEMPERATURE: 98.5 F | HEIGHT: 61 IN | BODY MASS INDEX: 27.87 KG/M2 | RESPIRATION RATE: 16 BRPM

## 2019-07-18 DIAGNOSIS — C34.31 SQUAMOUS CELL CARCINOMA OF BRONCHUS IN RIGHT LOWER LOBE (HCC): Primary | ICD-10-CM

## 2019-07-18 DIAGNOSIS — Z99.81 OXYGEN DEPENDENT: ICD-10-CM

## 2019-07-18 DIAGNOSIS — J43.9 PULMONARY EMPHYSEMA, UNSPECIFIED EMPHYSEMA TYPE (HCC): ICD-10-CM

## 2019-07-18 DIAGNOSIS — Z85.118 H/O: LUNG CANCER: ICD-10-CM

## 2019-07-18 DIAGNOSIS — C34.90 RECURRENT NON-SMALL CELL LUNG CANCER (NSCLC) (HCC): ICD-10-CM

## 2019-07-18 DIAGNOSIS — C34.31 MALIGNANT NEOPLASM OF LOWER LOBE, RIGHT BRONCHUS OR LUNG (HCC): ICD-10-CM

## 2019-07-18 DIAGNOSIS — J18.9 PNEUMONIA DUE TO INFECTIOUS ORGANISM, UNSPECIFIED LATERALITY, UNSPECIFIED PART OF LUNG: ICD-10-CM

## 2019-07-18 DIAGNOSIS — J96.21 ACUTE ON CHRONIC RESPIRATORY FAILURE WITH HYPOXIA (HCC): ICD-10-CM

## 2019-07-18 DIAGNOSIS — I30.9 PERICARDIAL EFFUSION, ACUTE: ICD-10-CM

## 2019-07-18 DIAGNOSIS — C34.31 MALIGNANT NEOPLASM OF LOWER LOBE, RIGHT BRONCHUS OR LUNG (HCC): Primary | ICD-10-CM

## 2019-07-18 DIAGNOSIS — J96.21 ACUTE ON CHRONIC RESPIRATORY FAILURE WITH HYPOXEMIA (HCC): ICD-10-CM

## 2019-07-18 DIAGNOSIS — C34.92 MALIGNANT NEOPLASM OF LEFT LUNG, UNSPECIFIED PART OF LUNG (HCC): ICD-10-CM

## 2019-07-18 DIAGNOSIS — R91.1 NODULE OF LEFT LUNG: ICD-10-CM

## 2019-07-18 DIAGNOSIS — C34.91 PRIMARY LUNG CANCER, RIGHT (HCC): ICD-10-CM

## 2019-07-18 DIAGNOSIS — C34.32 MALIGNANT NEOPLASM OF LOWER LOBE OF LEFT LUNG (HCC): ICD-10-CM

## 2019-07-18 DIAGNOSIS — J44.9 STAGE 3 SEVERE COPD BY GOLD CLASSIFICATION (HCC): ICD-10-CM

## 2019-07-18 DIAGNOSIS — D64.9 ANEMIA, UNSPECIFIED TYPE: ICD-10-CM

## 2019-07-18 DIAGNOSIS — R06.02 SHORTNESS OF BREATH: Primary | ICD-10-CM

## 2019-07-18 DIAGNOSIS — I31.39 PERICARDIAL EFFUSION: ICD-10-CM

## 2019-07-18 DIAGNOSIS — J90 RECURRENT LEFT PLEURAL EFFUSION: ICD-10-CM

## 2019-07-18 LAB
ALBUMIN SERPL-MCNC: 3.8 G/DL (ref 3.5–5.1)
ALBUMIN SERPL-MCNC: 4 G/DL (ref 3.5–5.1)
ALLEN TEST: POSITIVE
ALP BLD-CCNC: 68 U/L (ref 38–126)
ALP BLD-CCNC: 71 U/L (ref 38–126)
ALT SERPL-CCNC: 35 U/L (ref 11–66)
ALT SERPL-CCNC: 35 U/L (ref 11–66)
ANION GAP SERPL CALCULATED.3IONS-SCNC: 9 MEQ/L (ref 8–16)
AST SERPL-CCNC: 42 U/L (ref 5–40)
AST SERPL-CCNC: 43 U/L (ref 5–40)
BASE EXCESS (CALCULATED): 14.8 MMOL/L (ref -2.5–2.5)
BASOPHILS # BLD: 1 %
BASOPHILS ABSOLUTE: 0 THOU/MM3 (ref 0–0.1)
BILIRUB SERPL-MCNC: 0.2 MG/DL (ref 0.3–1.2)
BILIRUB SERPL-MCNC: 0.2 MG/DL (ref 0.3–1.2)
BILIRUBIN DIRECT: < 0.2 MG/DL (ref 0–0.3)
BILIRUBIN DIRECT: < 0.2 MG/DL (ref 0–0.3)
BUN BLDV-MCNC: 16 MG/DL (ref 7–22)
BUN, WHOLE BLOOD: 18 MG/DL (ref 8–26)
CALCIUM SERPL-MCNC: 9.1 MG/DL (ref 8.5–10.5)
CHLORIDE BLD-SCNC: 94 MEQ/L (ref 98–111)
CHLORIDE, WHOLE BLOOD: 89 MEQ/L (ref 98–109)
CO2: 39 MEQ/L (ref 23–33)
COLLECTED BY:: ABNORMAL
CREAT SERPL-MCNC: 1.1 MG/DL (ref 0.4–1.2)
CREATININE, WHOLE BLOOD: 1.2 MG/DL (ref 0.5–1.2)
DEVICE: ABNORMAL
EKG ATRIAL RATE: 86 BPM
EKG P AXIS: 55 DEGREES
EKG P-R INTERVAL: 138 MS
EKG Q-T INTERVAL: 360 MS
EKG QRS DURATION: 86 MS
EKG QTC CALCULATION (BAZETT): 430 MS
EKG R AXIS: 10 DEGREES
EKG T AXIS: 23 DEGREES
EKG VENTRICULAR RATE: 86 BPM
EOSINOPHIL # BLD: 1.7 %
EOSINOPHILS ABSOLUTE: 0.1 THOU/MM3 (ref 0–0.4)
ERYTHROCYTE [DISTWIDTH] IN BLOOD BY AUTOMATED COUNT: 15 % (ref 11.5–14.5)
ERYTHROCYTE [DISTWIDTH] IN BLOOD BY AUTOMATED COUNT: 56 FL (ref 35–45)
FLU A ANTIGEN: NEGATIVE
FLU B ANTIGEN: NEGATIVE
GFR SERPL CREATININE-BSD FRML MDRD: 48 ML/MIN/1.73M2
GFR, ESTIMATED: 47 ML/MIN/1.73M2
GLUCOSE BLD-MCNC: 109 MG/DL (ref 70–108)
GLUCOSE BLD-MCNC: 88 MG/DL (ref 70–108)
GLUCOSE BLD-MCNC: 91 MG/DL (ref 70–108)
GLUCOSE, WHOLE BLOOD: 97 MG/DL (ref 70–108)
HCO3: 43 MMOL/L (ref 23–28)
HCT VFR BLD CALC: 27.4 % (ref 37–47)
HCT VFR BLD CALC: 30.1 % (ref 37–47)
HEMOGLOBIN: 8.9 GM/DL (ref 12–16)
HEMOGLOBIN: 9.1 GM/DL (ref 12–16)
IFIO2: 4
IMMATURE GRANS (ABS): 0.01 THOU/MM3 (ref 0–0.07)
IMMATURE GRANULOCYTES: 0 %
INR BLD: 1 (ref 0.85–1.13)
IONIZED CALCIUM, WHOLE BLOOD: 1.17 MMOL/L (ref 1.12–1.32)
LIPASE: 75 U/L (ref 5.6–51.3)
LV EF: 55 %
LVEF MODALITY: NORMAL
LYMPHOCYTES # BLD: 15.2 %
LYMPHOCYTES ABSOLUTE: 0.6 THOU/MM3 (ref 1–4.8)
MCH RBC QN AUTO: 30.1 PG (ref 26–33)
MCH RBC QN AUTO: 30.7 PG (ref 27–31)
MCHC RBC AUTO-ENTMCNC: 29.6 GM/DL (ref 32.2–35.5)
MCHC RBC AUTO-ENTMCNC: 33.4 GM/DL (ref 33–37)
MCV RBC AUTO: 101.7 FL (ref 81–99)
MCV RBC AUTO: 92 FL (ref 81–99)
MONOCYTES # BLD: 7.8 %
MONOCYTES ABSOLUTE: 0.3 THOU/MM3 (ref 0.4–1.3)
NUCLEATED RED BLOOD CELLS: 0 /100 WBC
O2 SATURATION: 96 %
OSMOLALITY CALCULATION: 283.9 MOSMOL/KG (ref 275–300)
PCO2: 73 MMHG (ref 35–45)
PDW BLD-RTO: 14.4 % (ref 11.5–14.5)
PH BLOOD GAS: 7.38 (ref 7.35–7.45)
PLATELET # BLD: 110 THOU/MM3 (ref 130–400)
PLATELET # BLD: 131 THOU/MM3 (ref 130–400)
PMV BLD AUTO: 6.8 FL (ref 7.4–10.4)
PMV BLD AUTO: 9.8 FL (ref 9.4–12.4)
PO2: 87 MMHG (ref 71–104)
POTASSIUM SERPL-SCNC: 4.5 MEQ/L (ref 3.5–5.2)
POTASSIUM, WHOLE BLOOD: 4 MEQ/L (ref 3.5–4.9)
PRO-BNP: 590.5 PG/ML (ref 0–900)
PRO-BNP: 625.1 PG/ML (ref 0–900)
RBC # BLD: 2.96 MILL/MM3 (ref 4.2–5.4)
RBC # BLD: 2.98 MILL/MM3 (ref 4.2–5.4)
SEG NEUTROPHILS: 73 % (ref 43–75)
SEG NEUTROPHILS: 74.1 %
SEGMENTED NEUTROPHILS ABSOLUTE COUNT: 3.1 THOU/MM3 (ref 1.8–7.7)
SEGMENTED NEUTROPHILS ABSOLUTE COUNT: 3.1 THOU/MM3 (ref 1.8–7.7)
SODIUM BLD-SCNC: 142 MEQ/L (ref 135–145)
SODIUM, WHOLE BLOOD: 139 MEQ/L (ref 138–146)
SOURCE, BLOOD GAS: ABNORMAL
TOTAL CO2, WHOLE BLOOD: 40 MEQ/L (ref 23–33)
TOTAL PROTEIN: 6.4 G/DL (ref 6.1–8)
TOTAL PROTEIN: 6.4 G/DL (ref 6.1–8)
WBC # BLD: 4.2 THOU/MM3 (ref 4.8–10.8)
WBC # BLD: 4.2 THOU/MM3 (ref 4.8–10.8)

## 2019-07-18 PROCEDURE — G8427 DOCREV CUR MEDS BY ELIG CLIN: HCPCS | Performed by: INTERNAL MEDICINE

## 2019-07-18 PROCEDURE — 1090F PRES/ABSN URINE INCON ASSESS: CPT | Performed by: INTERNAL MEDICINE

## 2019-07-18 PROCEDURE — 83880 ASSAY OF NATRIURETIC PEPTIDE: CPT

## 2019-07-18 PROCEDURE — 71045 X-RAY EXAM CHEST 1 VIEW: CPT

## 2019-07-18 PROCEDURE — 83690 ASSAY OF LIPASE: CPT

## 2019-07-18 PROCEDURE — 99214 OFFICE O/P EST MOD 30 MIN: CPT | Performed by: INTERNAL MEDICINE

## 2019-07-18 PROCEDURE — G8926 SPIRO NO PERF OR DOC: HCPCS | Performed by: INTERNAL MEDICINE

## 2019-07-18 PROCEDURE — 80047 BASIC METABLC PNL IONIZED CA: CPT

## 2019-07-18 PROCEDURE — 80076 HEPATIC FUNCTION PANEL: CPT

## 2019-07-18 PROCEDURE — 1036F TOBACCO NON-USER: CPT | Performed by: INTERNAL MEDICINE

## 2019-07-18 PROCEDURE — 94761 N-INVAS EAR/PLS OXIMETRY MLT: CPT

## 2019-07-18 PROCEDURE — 36415 COLL VENOUS BLD VENIPUNCTURE: CPT

## 2019-07-18 PROCEDURE — 82948 REAGENT STRIP/BLOOD GLUCOSE: CPT

## 2019-07-18 PROCEDURE — 93005 ELECTROCARDIOGRAM TRACING: CPT | Performed by: INTERNAL MEDICINE

## 2019-07-18 PROCEDURE — 87040 BLOOD CULTURE FOR BACTERIA: CPT

## 2019-07-18 PROCEDURE — 93010 ELECTROCARDIOGRAM REPORT: CPT | Performed by: NUCLEAR MEDICINE

## 2019-07-18 PROCEDURE — 3023F SPIROM DOC REV: CPT | Performed by: INTERNAL MEDICINE

## 2019-07-18 PROCEDURE — 4040F PNEUMOC VAC/ADMIN/RCVD: CPT | Performed by: INTERNAL MEDICINE

## 2019-07-18 PROCEDURE — 2709999900 HC NON-CHARGEABLE SUPPLY

## 2019-07-18 PROCEDURE — G8419 CALC BMI OUT NRM PARAM NOF/U: HCPCS | Performed by: INTERNAL MEDICINE

## 2019-07-18 PROCEDURE — 82803 BLOOD GASES ANY COMBINATION: CPT

## 2019-07-18 PROCEDURE — 36591 DRAW BLOOD OFF VENOUS DEVICE: CPT

## 2019-07-18 PROCEDURE — 1123F ACP DISCUSS/DSCN MKR DOCD: CPT | Performed by: INTERNAL MEDICINE

## 2019-07-18 PROCEDURE — 94640 AIRWAY INHALATION TREATMENT: CPT

## 2019-07-18 PROCEDURE — 94660 CPAP INITIATION&MGMT: CPT

## 2019-07-18 PROCEDURE — 6370000000 HC RX 637 (ALT 250 FOR IP): Performed by: INTERNAL MEDICINE

## 2019-07-18 PROCEDURE — 85027 COMPLETE CBC AUTOMATED: CPT

## 2019-07-18 PROCEDURE — 2580000003 HC RX 258: Performed by: INTERNAL MEDICINE

## 2019-07-18 PROCEDURE — 85610 PROTHROMBIN TIME: CPT

## 2019-07-18 PROCEDURE — 2140000000 HC CCU INTERMEDIATE R&B

## 2019-07-18 PROCEDURE — 99285 EMERGENCY DEPT VISIT HI MDM: CPT

## 2019-07-18 PROCEDURE — 80048 BASIC METABOLIC PNL TOTAL CA: CPT

## 2019-07-18 PROCEDURE — 36600 WITHDRAWAL OF ARTERIAL BLOOD: CPT

## 2019-07-18 PROCEDURE — G8400 PT W/DXA NO RESULTS DOC: HCPCS | Performed by: INTERNAL MEDICINE

## 2019-07-18 PROCEDURE — 99223 1ST HOSP IP/OBS HIGH 75: CPT | Performed by: INTERNAL MEDICINE

## 2019-07-18 PROCEDURE — 85025 COMPLETE CBC W/AUTO DIFF WBC: CPT

## 2019-07-18 PROCEDURE — 99211 OFF/OP EST MAY X REQ PHY/QHP: CPT

## 2019-07-18 PROCEDURE — 87804 INFLUENZA ASSAY W/OPTIC: CPT

## 2019-07-18 PROCEDURE — 3017F COLORECTAL CA SCREEN DOC REV: CPT | Performed by: INTERNAL MEDICINE

## 2019-07-18 PROCEDURE — 93306 TTE W/DOPPLER COMPLETE: CPT

## 2019-07-18 RX ORDER — IPRATROPIUM BROMIDE AND ALBUTEROL SULFATE 2.5; .5 MG/3ML; MG/3ML
1 SOLUTION RESPIRATORY (INHALATION) ONCE
Status: COMPLETED | OUTPATIENT
Start: 2019-07-18 | End: 2019-07-18

## 2019-07-18 RX ORDER — ALBUTEROL SULFATE 2.5 MG/3ML
2.5 SOLUTION RESPIRATORY (INHALATION) EVERY 6 HOURS PRN
Status: DISCONTINUED | OUTPATIENT
Start: 2019-07-18 | End: 2019-07-26 | Stop reason: HOSPADM

## 2019-07-18 RX ORDER — FUROSEMIDE 20 MG/1
20 TABLET ORAL DAILY PRN
Status: DISCONTINUED | OUTPATIENT
Start: 2019-07-18 | End: 2019-07-19

## 2019-07-18 RX ORDER — LIDOCAINE AND PRILOCAINE 25; 25 MG/G; MG/G
CREAM TOPICAL PRN
Status: DISCONTINUED | OUTPATIENT
Start: 2019-07-18 | End: 2019-07-26 | Stop reason: HOSPADM

## 2019-07-18 RX ORDER — SODIUM CHLORIDE 0.9 % (FLUSH) 0.9 %
10 SYRINGE (ML) INJECTION PRN
Status: CANCELLED | OUTPATIENT
Start: 2019-07-18

## 2019-07-18 RX ORDER — PROMETHAZINE HYDROCHLORIDE 25 MG/1
25 TABLET ORAL EVERY 8 HOURS PRN
Status: DISCONTINUED | OUTPATIENT
Start: 2019-07-18 | End: 2019-07-26 | Stop reason: HOSPADM

## 2019-07-18 RX ORDER — SODIUM CHLORIDE 0.9 % (FLUSH) 0.9 %
20 SYRINGE (ML) INJECTION PRN
Status: DISCONTINUED | OUTPATIENT
Start: 2019-07-18 | End: 2019-07-19 | Stop reason: HOSPADM

## 2019-07-18 RX ORDER — HEPARIN SODIUM (PORCINE) LOCK FLUSH IV SOLN 100 UNIT/ML 100 UNIT/ML
500 SOLUTION INTRAVENOUS PRN
Status: DISCONTINUED | OUTPATIENT
Start: 2019-07-18 | End: 2019-07-19 | Stop reason: HOSPADM

## 2019-07-18 RX ORDER — PANTOPRAZOLE SODIUM 40 MG/1
40 TABLET, DELAYED RELEASE ORAL
Status: DISCONTINUED | OUTPATIENT
Start: 2019-07-19 | End: 2019-07-26 | Stop reason: HOSPADM

## 2019-07-18 RX ORDER — FERROUS SULFATE 325(65) MG
500 TABLET ORAL
Status: DISCONTINUED | OUTPATIENT
Start: 2019-07-19 | End: 2019-07-26 | Stop reason: HOSPADM

## 2019-07-18 RX ORDER — POTASSIUM CHLORIDE 20 MEQ/1
20 TABLET, EXTENDED RELEASE ORAL
Status: DISCONTINUED | OUTPATIENT
Start: 2019-07-19 | End: 2019-07-26 | Stop reason: HOSPADM

## 2019-07-18 RX ORDER — SODIUM CHLORIDE 0.9 % (FLUSH) 0.9 %
10 SYRINGE (ML) INJECTION PRN
Status: DISCONTINUED | OUTPATIENT
Start: 2019-07-18 | End: 2019-07-19 | Stop reason: HOSPADM

## 2019-07-18 RX ORDER — SODIUM CHLORIDE 0.9 % (FLUSH) 0.9 %
20 SYRINGE (ML) INJECTION PRN
Status: CANCELLED | OUTPATIENT
Start: 2019-07-18

## 2019-07-18 RX ORDER — LACTOBACILLUS RHAMNOSUS GG 10B CELL
1 CAPSULE ORAL 3 TIMES DAILY
Status: DISCONTINUED | OUTPATIENT
Start: 2019-07-18 | End: 2019-07-26 | Stop reason: HOSPADM

## 2019-07-18 RX ORDER — HYDROCODONE BITARTRATE AND ACETAMINOPHEN 5; 325 MG/1; MG/1
1 TABLET ORAL 3 TIMES DAILY PRN
Status: DISCONTINUED | OUTPATIENT
Start: 2019-07-18 | End: 2019-07-26 | Stop reason: HOSPADM

## 2019-07-18 RX ORDER — HEPARIN SODIUM (PORCINE) LOCK FLUSH IV SOLN 100 UNIT/ML 100 UNIT/ML
500 SOLUTION INTRAVENOUS PRN
Status: CANCELLED | OUTPATIENT
Start: 2019-07-18

## 2019-07-18 RX ORDER — GUAIFENESIN 600 MG/1
600 TABLET, EXTENDED RELEASE ORAL 2 TIMES DAILY PRN
Status: DISCONTINUED | OUTPATIENT
Start: 2019-07-18 | End: 2019-07-26 | Stop reason: HOSPADM

## 2019-07-18 RX ORDER — BETAMETHASONE DIPROPIONATE 0.05 %
OINTMENT (GRAM) TOPICAL DAILY
Status: DISCONTINUED | OUTPATIENT
Start: 2019-07-18 | End: 2019-07-26 | Stop reason: HOSPADM

## 2019-07-18 RX ORDER — ALBUTEROL SULFATE 90 UG/1
2 AEROSOL, METERED RESPIRATORY (INHALATION) EVERY 6 HOURS PRN
Status: DISCONTINUED | OUTPATIENT
Start: 2019-07-18 | End: 2019-07-18

## 2019-07-18 RX ORDER — FLUTICASONE PROPIONATE 50 MCG
1 SPRAY, SUSPENSION (ML) NASAL DAILY
Status: DISCONTINUED | OUTPATIENT
Start: 2019-07-18 | End: 2019-07-26 | Stop reason: HOSPADM

## 2019-07-18 RX ORDER — DIGOXIN 125 MCG
125 TABLET ORAL DAILY
Status: DISCONTINUED | OUTPATIENT
Start: 2019-07-18 | End: 2019-07-26 | Stop reason: HOSPADM

## 2019-07-18 RX ADMIN — Medication 20 ML: at 09:46

## 2019-07-18 RX ADMIN — Medication 1 CAPSULE: at 21:02

## 2019-07-18 RX ADMIN — METOPROLOL TARTRATE 25 MG: 25 TABLET ORAL at 21:02

## 2019-07-18 RX ADMIN — IPRATROPIUM BROMIDE AND ALBUTEROL SULFATE 1 AMPULE: .5; 3 SOLUTION RESPIRATORY (INHALATION) at 12:09

## 2019-07-18 RX ADMIN — Medication 10 ML: at 09:45

## 2019-07-18 RX ADMIN — Medication 2 PUFF: at 21:30

## 2019-07-18 RX ADMIN — BETAMETHASONE DIPROPIONATE: 0.5 OINTMENT TOPICAL at 21:02

## 2019-07-18 RX ADMIN — HYDROCODONE BITARTRATE AND ACETAMINOPHEN 1 TABLET: 5; 325 TABLET ORAL at 21:02

## 2019-07-18 RX ADMIN — PROMETHAZINE HYDROCHLORIDE 25 MG: 25 TABLET ORAL at 21:02

## 2019-07-18 ASSESSMENT — ENCOUNTER SYMPTOMS
ABDOMINAL DISTENTION: 0
EYE DISCHARGE: 0
SORE THROAT: 0
DIARRHEA: 0
WHEEZING: 0
SORE THROAT: 0
EYE PAIN: 0
FACIAL SWELLING: 0
RECTAL PAIN: 0
VOMITING: 0
SHORTNESS OF BREATH: 1
CHEST TIGHTNESS: 0
WHEEZING: 0
NAUSEA: 0
ABDOMINAL PAIN: 0
BLOOD IN STOOL: 0
COUGH: 1
COLOR CHANGE: 0
RHINORRHEA: 0
SHORTNESS OF BREATH: 1
VOMITING: 0
NAUSEA: 1
TROUBLE SWALLOWING: 0
DIARRHEA: 0
ABDOMINAL PAIN: 0
BACK PAIN: 0
BACK PAIN: 0
EYE DISCHARGE: 0
COUGH: 1
CONSTIPATION: 0

## 2019-07-18 ASSESSMENT — PAIN SCALES - GENERAL
PAINLEVEL_OUTOF10: 8
PAINLEVEL_OUTOF10: 0

## 2019-07-18 ASSESSMENT — PAIN - FUNCTIONAL ASSESSMENT: PAIN_FUNCTIONAL_ASSESSMENT: ACTIVITIES ARE NOT PREVENTED

## 2019-07-18 ASSESSMENT — PAIN DESCRIPTION - DESCRIPTORS: DESCRIPTORS: CONSTANT;DISCOMFORT;DULL

## 2019-07-18 ASSESSMENT — PAIN DESCRIPTION - FREQUENCY: FREQUENCY: CONTINUOUS

## 2019-07-18 ASSESSMENT — PAIN DESCRIPTION - ONSET: ONSET: ON-GOING

## 2019-07-18 ASSESSMENT — PAIN DESCRIPTION - PROGRESSION: CLINICAL_PROGRESSION: NOT CHANGED

## 2019-07-18 ASSESSMENT — PAIN DESCRIPTION - PAIN TYPE: TYPE: CHRONIC PAIN

## 2019-07-18 ASSESSMENT — PAIN DESCRIPTION - LOCATION: LOCATION: BACK

## 2019-07-18 ASSESSMENT — PAIN DESCRIPTION - ORIENTATION: ORIENTATION: LOWER

## 2019-07-18 NOTE — PROGRESS NOTES
Lab specimen obtained from [facility] without any problems. Ambulated off unit to examination room for appointment with Dr. Nabila De escorted by [facility].

## 2019-07-18 NOTE — ED TRIAGE NOTES
Pt presents to ER from chemo treatment center without receiving chemo treatment. Pt states sob x1 week. Pt reports scheduled CT for lung CA last week with results of pericardial effusion today. Pt resting comfortably in bed without signs of sob or distress at this time. Pt denies pain.

## 2019-07-18 NOTE — ED PROVIDER NOTES
syncope, weakness, light-headedness, numbness and headaches. Hematological: Negative for adenopathy. Psychiatric/Behavioral: Negative for confusion and suicidal ideas. The patient is not nervous/anxious. PAST MEDICAL HISTORY    has a past medical history of Arthritis, Cancer ([address] Chronic bronchitis, simple ([address] Chronic respiratory failure with hypoxia ([facility] [address] COPD (chronic obstructive pulmonary disease) ([facility] [address] GERD (gastroesophageal reflux disease), HTN (hypertension), Pneumonia, Postprocedural pneumothorax, Scoliosis, and Urinary incontinence. SURGICAL HISTORY      has a past surgical history that includes Lung biopsy (8/2012) and lobectomy (10/19/2012). CURRENT MEDICATIONS       Previous Medications    ALBUTEROL (PROVENTIL) (2.5 MG/3ML) 0.083% NEBULIZER SOLUTION    Take 3 mLs by nebulization every 6 hours as needed for Wheezing or Shortness of Breath    ALBUTEROL SULFATE HFA (PROAIR HFA) 108 (90 BASE) MCG/ACT INHALER    Inhale 2 puffs into the lungs every 6 hours as needed for Wheezing    BETAMETHASONE DIPROPIONATE (DIPROLENE) 0.05 % OINTMENT    Apply topically daily.     CHOLECALCIFEROL (VITAMIN D) 2000 UNITS CAPS CAPSULE    Take by mouth daily    DIGOXIN (LANOXIN) 125 MCG TABLET    Take 1 tablet by mouth daily    DILTIAZEM (CARDIZEM CD) 300 MG EXTENDED RELEASE CAPSULE    Take 1 capsule by mouth daily    ESOMEPRAZOLE (NEXIUM) 20 MG DELAYED RELEASE CAPSULE    Take 1 capsule by mouth daily    FERROUS SULFATE 325 (65 FE) MG TABLET    One every other day take with food and vitamin C 500 mg    FLUTICASONE (FLONASE) 50 MCG/ACT NASAL SPRAY    1 spray by Nasal route daily    FUROSEMIDE (LASIX) 20 MG TABLET    Take 1 tablet by mouth daily as needed (leg swelling)    GUAIFENESIN (MUCINEX) 600 MG EXTENDED RELEASE TABLET    Take 1 tablet by mouth 2 times daily as needed for Congestion    HANDICAP PLACARD MISC    by Does not apply route Diagnosis: Malignant neoplasm of lung, unspecified laterality, unspecified part of lung ([facility] [C34.90]  Expiration Date: 4/15/2020    HYDROCODONE-ACETAMINOPHEN (NORCO) 5-325 MG PER TABLET    Take 1 tablet by mouth 3 times daily as needed for Pain    LIDOCAINE-PRILOCAINE (EMLA) 2.5-2.5 % CREAM    Apply topically as needed for Pain Apply topically as needed. METOPROLOL TARTRATE (LOPRESSOR) 25 MG TABLET    Take 1 tablet by mouth 2 times daily    MOMETASONE-FORMOTEROL (DULERA) 200-5 MCG/ACT INHALER    Inhale 2 puffs into the lungs 2 times daily    MULTIPLE VITAMIN (MULTI-VITAMIN DAILY PO)    Take by mouth    MULTIPLE VITAMINS-MINERALS (HAIR SKIN AND NAILS FORMULA PO)    Take by mouth daily    NONFORMULARY    as needed Indications: c;laritin nasal spray    POTASSIUM CHLORIDE (KLOR-CON M) 20 MEQ EXTENDED RELEASE TABLET    Take 1 tablet by mouth daily    PROMETHAZINE (PHENERGAN) 25 MG TABLET    Take 1 tablet by mouth every 8 hours as needed for Nausea    VITAMIN C (VITAMIN C) 500 MG TABLET    Take 1 tablet by mouth daily       ALLERGIES     is allergic to advil cold & sinus liqui-gels [pseudoephedrine-ibuprofen]; percocet [oxycodone-acetaminophen]; and sulfa antibiotics. FAMILY HISTORY     She indicated that her mother is . She indicated that her father is . She indicated that all of her three sisters are . She indicated that her brother is alive. She indicated that the status of her paternal grandfather is unknown.   family history includes Arthritis in her sister; Cancer in her mother and sister; Heart Disease in her father, sister, and sister; High Blood Pressure in her brother, father, and sister; High Cholesterol in her father and sister; Stroke in her paternal grandfather and sister. SOCIAL HISTORY      reports that she quit smoking about 12 years ago. Her smoking use included cigarettes. She has a 45.00 pack-year smoking history. She has never used smokeless tobacco. She reports that she does not drink alcohol or use drugs.     PHYSICAL EXAM appearance to prior study. Findings could be related to atelectasis and/or infiltrate. Correlation is advised. Enlargement of the cardiac silhouette. **This report has been created using voice recognition software. It may contain minor errors which are inherent in voice recognition technology. **      Final report electronically signed by Dr. Karis Snow on 7/18/2019 12:19 PM          LABS:   Labs Reviewed   CBC WITH AUTO DIFFERENTIAL - Abnormal; Notable for the following components:       Result Value    WBC 4.2 (*)     RBC 2.96 (*)     Hemoglobin 8.9 (*)     Hematocrit 30.1 (*)     .7 (*)     MCHC 29.6 (*)     RDW-CV 15.0 (*)     RDW-SD 56.0 (*)     Platelets 968 (*)     Lymphocytes # 0.6 (*)     Monocytes # 0.3 (*)     All other components within normal limits   BASIC METABOLIC PANEL - Abnormal; Notable for the following components:    Chloride 94 (*)     CO2 39 (*)     All other components within normal limits   HEPATIC FUNCTION PANEL - Abnormal; Notable for the following components: Total Bilirubin 0.2 (*)     AST 43 (*)     All other components within normal limits   LIPASE - Abnormal; Notable for the following components:    Lipase 75.0 (*)     All other components within normal limits   GLOMERULAR FILTRATION RATE, ESTIMATED - Abnormal; Notable for the following components:    Est, Glom Filt Rate 48 (*)     All other components within normal limits   RAPID INFLUENZA A/B ANTIGENS   CULTURE BLOOD #2   CULTURE BLOOD #1   BRAIN NATRIURETIC PEPTIDE   PROTIME-INR   ANION GAP   OSMOLALITY   URINE RT REFLEX TO CULTURE       EMERGENCYDEPARTMENT COURSE:   Vitals:    Vitals:    07/18/19 1143 07/18/19 1209 07/18/19 1245 07/18/19 1409   BP: (!) 144/79  137/79 123/85   Pulse: 87  84 83   Resp: 16  16 16   Temp: 98.4 °F (36.9 °C)      TempSrc: Oral      SpO2: 97% 95% 98% 96%       11:40 AM: The patient was seen and evaluated.     11:45 AM: Bedside US of the heart showed dilated left ventricle and pericardial effusion. MDM:  And was given breathing treatment in the emergency department which helped her breathing. I talked with the cardiology Dr. Harjinder Blue and advised to order stat echocardiogram which was done. Dr. Harjinder Blue will be the admitting physician. All the patient's labs the testing were reviewed discussed with the patient and also with admitting physician patient's lipase was slightly elevated 75 chest x-ray showed volume loss patchy opacities    CRITICAL CARE:   None    CONSULTS:  Dr. Diane Downey:  None     FINAL IMPRESSION      1. Shortness of breath          DISPOSITION/PLAN   Admit    PATIENT REFERRED TO:  Sean Moore, APRN - CNP  [address] [address] [facility].  [address]  [place] [address]  [phone]            DISCHARGE MEDICATIONS:  New Prescriptions    No medications on file       (Please note that portions of this note were completed with a voice recognition program.  Efforts were made to edit the dictations but occasionally words aremis-transcribed.)    Scribe:  Delmi Ralph 7/18/19 11:46 AM Scribing for and in the presence of Kim Bunn MD.    Signed by: Joshua Martinez, 07/18/19 2:12 PM    Provider:  I personally performed theservices described in the documentation, reviewed and edited the documentation which was dictated to the scribe in my presence, and it accurately records my words and actions.     Kim Bunn MD 7/18/19 2:12 PM        Kim Bunn MD  07/18/19 [id number]

## 2019-07-19 PROCEDURE — 69210 REMOVE IMPACTED EAR WAX UNI: CPT | Performed by: PHYSICIAN ASSISTANT

## 2019-07-19 PROCEDURE — 97166 OT EVAL MOD COMPLEX 45 MIN: CPT

## 2019-07-19 PROCEDURE — 2700000000 HC OXYGEN THERAPY PER DAY

## 2019-07-19 PROCEDURE — 6370000000 HC RX 637 (ALT 250 FOR IP): Performed by: INTERNAL MEDICINE

## 2019-07-19 PROCEDURE — 2140000000 HC CCU INTERMEDIATE R&B

## 2019-07-19 PROCEDURE — 97530 THERAPEUTIC ACTIVITIES: CPT

## 2019-07-19 PROCEDURE — 2580000003 HC RX 258: Performed by: INTERNAL MEDICINE

## 2019-07-19 PROCEDURE — APPSS30 APP SPLIT SHARED TIME 16-30 MINUTES: Performed by: NURSE PRACTITIONER

## 2019-07-19 PROCEDURE — 99231 SBSQ HOSP IP/OBS SF/LOW 25: CPT | Performed by: PHYSICIAN ASSISTANT

## 2019-07-19 PROCEDURE — 94660 CPAP INITIATION&MGMT: CPT

## 2019-07-19 PROCEDURE — 99233 SBSQ HOSP IP/OBS HIGH 50: CPT | Performed by: INTERNAL MEDICINE

## 2019-07-19 PROCEDURE — 94640 AIRWAY INHALATION TREATMENT: CPT

## 2019-07-19 PROCEDURE — 2709999900 HC NON-CHARGEABLE SUPPLY

## 2019-07-19 RX ORDER — OMEPRAZOLE 40 MG/1
40 CAPSULE, DELAYED RELEASE ORAL DAILY
COMMUNITY
End: 2020-02-28 | Stop reason: SDUPTHER

## 2019-07-19 RX ORDER — ROPINIROLE 0.25 MG/1
0.25 TABLET, FILM COATED ORAL NIGHTLY
Status: DISCONTINUED | OUTPATIENT
Start: 2019-07-19 | End: 2019-07-22

## 2019-07-19 RX ORDER — SODIUM CHLORIDE 9 MG/ML
INJECTION, SOLUTION INTRAVENOUS CONTINUOUS
Status: DISCONTINUED | OUTPATIENT
Start: 2019-07-19 | End: 2019-07-26 | Stop reason: HOSPADM

## 2019-07-19 RX ORDER — FUROSEMIDE 40 MG/1
40 TABLET ORAL DAILY
Status: DISCONTINUED | OUTPATIENT
Start: 2019-07-19 | End: 2019-07-26 | Stop reason: HOSPADM

## 2019-07-19 RX ORDER — ONDANSETRON HYDROCHLORIDE 8 MG/1
8 TABLET, FILM COATED ORAL EVERY 8 HOURS PRN
COMMUNITY
End: 2019-12-23 | Stop reason: SDUPTHER

## 2019-07-19 RX ADMIN — Medication 2 PUFF: at 10:52

## 2019-07-19 RX ADMIN — METOPROLOL TARTRATE 25 MG: 25 TABLET ORAL at 11:15

## 2019-07-19 RX ADMIN — Medication 1 CAPSULE: at 17:50

## 2019-07-19 RX ADMIN — DIGOXIN 125 MCG: 125 TABLET ORAL at 11:17

## 2019-07-19 RX ADMIN — SODIUM CHLORIDE: 9 INJECTION, SOLUTION INTRAVENOUS at 21:21

## 2019-07-19 RX ADMIN — PROMETHAZINE HYDROCHLORIDE 25 MG: 25 TABLET ORAL at 22:42

## 2019-07-19 RX ADMIN — ROPINIROLE HYDROCHLORIDE 0.25 MG: 0.25 TABLET, FILM COATED ORAL at 21:05

## 2019-07-19 RX ADMIN — FERROUS SULFATE TAB 325 MG (65 MG ELEMENTAL FE) 487.5 MG: 325 (65 FE) TAB at 11:16

## 2019-07-19 RX ADMIN — Medication 1 CAPSULE: at 11:16

## 2019-07-19 RX ADMIN — Medication 2 PUFF: at 20:10

## 2019-07-19 RX ADMIN — VITAMIN D, TAB 1000IU (100/BT) 2000 UNITS: 25 TAB at 11:15

## 2019-07-19 RX ADMIN — METOPROLOL TARTRATE 25 MG: 25 TABLET ORAL at 21:05

## 2019-07-19 RX ADMIN — HYDROCODONE BITARTRATE AND ACETAMINOPHEN 1 TABLET: 5; 325 TABLET ORAL at 21:06

## 2019-07-19 RX ADMIN — POTASSIUM CHLORIDE 20 MEQ: 20 TABLET, EXTENDED RELEASE ORAL at 11:15

## 2019-07-19 RX ADMIN — FLUTICASONE PROPIONATE 1 SPRAY: 50 SPRAY, METERED NASAL at 11:14

## 2019-07-19 RX ADMIN — PANTOPRAZOLE SODIUM 40 MG: 40 TABLET, DELAYED RELEASE ORAL at 11:15

## 2019-07-19 ASSESSMENT — PAIN DESCRIPTION - LOCATION
LOCATION: BACK

## 2019-07-19 ASSESSMENT — PAIN - FUNCTIONAL ASSESSMENT
PAIN_FUNCTIONAL_ASSESSMENT: PREVENTS OR INTERFERES SOME ACTIVE ACTIVITIES AND ADLS

## 2019-07-19 ASSESSMENT — PAIN DESCRIPTION - ORIENTATION
ORIENTATION: LOWER;MID

## 2019-07-19 ASSESSMENT — PAIN DESCRIPTION - DESCRIPTORS
DESCRIPTORS: CONSTANT

## 2019-07-19 ASSESSMENT — PAIN DESCRIPTION - FREQUENCY
FREQUENCY: CONTINUOUS

## 2019-07-19 ASSESSMENT — PAIN DESCRIPTION - ONSET
ONSET: ON-GOING

## 2019-07-19 ASSESSMENT — PAIN DESCRIPTION - PAIN TYPE
TYPE: CHRONIC PAIN

## 2019-07-19 ASSESSMENT — PAIN DESCRIPTION - PROGRESSION
CLINICAL_PROGRESSION: NOT CHANGED

## 2019-07-19 ASSESSMENT — PAIN SCALES - GENERAL
PAINLEVEL_OUTOF10: 6
PAINLEVEL_OUTOF10: 7
PAINLEVEL_OUTOF10: 7
PAINLEVEL_OUTOF10: 6
PAINLEVEL_OUTOF10: 6

## 2019-07-19 NOTE — PROGRESS NOTES
Negative for arthralgias, back pain, gait problem, joint swelling, myalgias and neck stiffness. Skin: Negative for color change, rash and wound. Neurological: Positive for weakness. Negative for dizziness, tremors, seizures, speech difficulty, light-headedness, numbness and headaches. Hematological: Negative for adenopathy. Does not bruise/bleed easily. Psychiatric/Behavioral: Negative for confusion and sleep disturbance. The patient is not nervous/anxious. Vitals     height is 5' 0.98\" (1.549 m) and weight is 147 lb 9.6 oz (67 kg). Her oral temperature is 98.5 °F (36.9 °C). Her blood pressure is 136/73 and her pulse is 85. Her respiration is 16 and oxygen saturation is 96%. Exam   Physical Exam   Constitutional: She is oriented to person, place, and time. She appears well-developed and well-nourished. No distress. HENT:   Head: Normocephalic. Mouth/Throat: Oropharynx is clear and moist. No oropharyngeal exudate. Eyes: Pupils are equal, round, and reactive to light. EOM are normal. Right eye exhibits no discharge. Left eye exhibits no discharge. No scleral icterus. Neck: Normal range of motion. Neck supple. No JVD present. No tracheal deviation present. No thyromegaly present. Cardiovascular: Normal rate and normal heart sounds. Exam reveals no gallop and no friction rub. No murmur heard. Pulmonary/Chest: No stridor. Tachypnea noted. She is in respiratory distress. She has decreased breath sounds in the right middle field, the right lower field, the left middle field and the left lower field. She has no wheezes. She has no rales. She exhibits no tenderness. Abdominal: Soft. Bowel sounds are normal. She exhibits no distension and no mass. There is no tenderness. There is no rebound. Musculoskeletal: Normal range of motion. She exhibits no edema. Good range of motion in all four extremities. Lymphadenopathy:     She has no cervical adenopathy.    Neurological: She is alert for acute on chronic respiratory failure. Recent PET scan on 04/23/2019 showed interval resolution of previously seen left upper lobe hypermetabolic nodule. The patient's performance status has significantly improved. Therefore on May 15, 2019 she restarted treatment with  Tecentriq. 2.  Palliative immunotherapy with Tecentriq. She tolerated her last infusion very well. No diarrhea, no skin rash. 2.  Shortness of breath. CT of the chest showed interval development of pericardial effusion. In addition there is worsening of right lower lobe consolidation. The patient will be admitted to the hospital for further work-up of her pericardial effusion and for treatment of possible pneumonia  3. History of mental status changes. Resolved. Inconclusive CT of the brain. Brain MRI recommended, however the patient refused. I recommend at least repetition of CT of the  4. Chronic normocytic anemia. Hemoglobin is stable at 9.1 today her MCV is 88. Will monitor. 5. Elevated creatinine. Stable 1.2 today.       Electronically signed by     Ernst Anand MD on 8/24/2019 at 11:21 PM

## 2019-07-19 NOTE — PLAN OF CARE
Problem: Impaired respiratory status  Goal: Clear lung sounds  Outcome: Ongoing   Breath sounds are clear and diminished at this time. Problem: Impaired respiratory status  Goal: Normal spontaneous ventilation  Outcome: Ongoing   Pt placed on overnight bipap, tolerating okay. Problem: Impaired respiratory status  Goal: Absence of new skin breakdown  Outcome: Met This Shift   Skin looks good, skin barrier applied to face at this time.

## 2019-07-19 NOTE — CONSULTS
breakfast  fluticasone (FLONASE) 50 MCG/ACT nasal spray 1 spray, 1 spray, Nasal, Daily  furosemide (LASIX) tablet 20 mg, 20 mg, Oral, Daily PRN  guaiFENesin (MUCINEX) extended release tablet 600 mg, 600 mg, Oral, BID PRN  lidocaine-prilocaine (EMLA) cream, , Topical, PRN  HYDROcodone-acetaminophen (NORCO) 5-325 MG per tablet 1 tablet, 1 tablet, Oral, TID PRN  metoprolol tartrate (LOPRESSOR) tablet 25 mg, 25 mg, Oral, BID  mometasone-formoterol (DULERA) 200-5 MCG/ACT inhaler 2 puff, 2 puff, Inhalation, BID  potassium chloride (KLOR-CON M) extended release tablet 20 mEq, 20 mEq, Oral, Once per day on Mon Wed Fri  lactobacillus (CULTURELLE) capsule 1 capsule, 1 capsule, Oral, TID  promethazine (PHENERGAN) tablet 25 mg, 25 mg, Oral, Q8H PRN  tiotropium (SPIRIVA) inhalation capsule 18 mcg, 18 mcg, Inhalation, Daily  [Held by provider] enoxaparin (LOVENOX) injection 40 mg, 40 mg, Subcutaneous, Daily    Allergies: Allergies   Allergen Reactions    Advil Cold & Sinus Liqui-Gels [Pseudoephedrine-Ibuprofen] Anaphylaxis    Food Anaphylaxis     mushrooms    Percocet [Oxycodone-Acetaminophen] Nausea Only    Sulfa Antibiotics Hives        Social History:    TOBACCO:   reports that she quit smoking about 12 years ago. Her smoking use included cigarettes. She has a 45.00 pack-year smoking history. She has never used smokeless tobacco.  ETOH:   reports that she does not drink alcohol. DRUGS:   reports that she does not use drugs.     Family History:       Problem Relation Age of Onset    Cancer Mother     Heart Disease Father     High Blood Pressure Father     High Cholesterol Father     Arthritis Sister     Heart Disease Sister     Cancer Sister         lung cancer    Heart Disease Sister     Stroke Sister     High Cholesterol Sister     High Blood Pressure Sister     Stroke Paternal Grandfather     High Blood Pressure Brother        REVIEW OF SYSTEMS:    EYES:  negative for  double vision and dry eyes  HEENT:

## 2019-07-19 NOTE — PROGRESS NOTES
activity- DME note done  -BiPAP 14/6 with 2LPM O2 bleed in to used bedtime, daytime napping and PRN respiratory distress. She is following with Ms Alden Bennett at [facility] office.     Past 24 hrs   -On 4 LPM via NC  -Afebrile  -Tolerated hospital PAP last night reports some discomfort to bridge of nose slight redness noted blanchable  -Shortness breath worse with laying flat   All other systems reviewed    PMHx   Past Medical History      Diagnosis Date    Arthritis     low back pain and scoliosis in lumbar    Cancer ([facility] [address] 2012    lower right lobe-lung s/p lobectomy in 2012, radiation and chemo nad later had mediatinal mets and had radationa dn chemo again, and in 2016 had reoccurence on the left side upper and was started on radiation this year and has  a follow up CT in oct 2017    CHF (congestive heart failure) (HCC)     Chronic bronchitis, simple (HCC)     Chronic respiratory failure with hypoxia (HCC)     COPD (chronic obstructive pulmonary disease) (HCC)     GERD (gastroesophageal reflux disease)     HTN (hypertension)     Pneumonia     Postprocedural pneumothorax     Scoliosis     Sleep apnea     Urinary incontinence     UTI (urinary tract infection)       Past Surgical History        Procedure Laterality Date    LOBECTOMY  10/19/2012    rt lower lobectomy     LUNG BIOPSY  8/2012     Meds    Current Medications    betamethasone dipropionate   Topical Daily    vitamin D  2 tablet Oral Daily    digoxin  125 mcg Oral Daily    pantoprazole  40 mg Oral QAM AC    ferrous sulfate  487.5 mg Oral Daily with breakfast    fluticasone  1 spray Nasal Daily    metoprolol tartrate  25 mg Oral BID    mometasone-formoterol  2 puff Inhalation BID    potassium chloride  20 mEq Oral Once per day on Mon Wed Fri    lactobacillus  1 capsule Oral TID    tiotropium  18 mcg Inhalation Daily    [Held by provider] enoxaparin  40 mg Subcutaneous Daily     albuterol, furosemide, guaiFENesin, lidocaine-prilocaine, MG per tablet, Take 1 tablet by mouth 3 times daily as needed for Pain  [DISCONTINUED] NONFORMULARY, as needed Indications: c;laritin nasal spray  [DISCONTINUED] Multiple Vitamin (MULTI-VITAMIN DAILY PO), Take by mouth  [DISCONTINUED] vitamin C (VITAMIN C) 500 MG tablet, Take 1 tablet by mouth daily  Diet    DIET CARDIAC; Allergies    Advil cold & sinus liqui-gels [pseudoephedrine-ibuprofen]; Food; Percocet [oxycodone-acetaminophen]; and Sulfa antibiotics  Family History          Problem Relation Age of Onset    Cancer Mother     Heart Disease Father     High Blood Pressure Father     High Cholesterol Father     Arthritis Sister     Heart Disease Sister     Cancer Sister         lung cancer    Heart Disease Sister     Stroke Sister     High Cholesterol Sister     High Blood Pressure Sister     Stroke Paternal Grandfather     High Blood Pressure Brother      Sleep History    Never diagnosed with sleep apnea in the past. She was started on Home BiPAP for her chronic respiratory failure from severe COPD at last admission.     Social History     Social History     Socioeconomic History    Marital status:      Spouse name: Michael Orona Number of children: 2    Years of education: Not on file    Highest education level: Not on file   Occupational History    Not on file   Social Needs    Financial resource strain: Not on file    Food insecurity:     Worry: Not on file     Inability: Not on file   Picolight needs:     Medical: Not on file     Non-medical: Not on file   Tobacco Use    Smoking status: Former Smoker     Packs/day: 1.00     Years: 45.00     Pack years: 45.00     Types: Cigarettes     Last attempt to quit:      Years since quittin.5    Smokeless tobacco: Never Used   Substance and Sexual Activity    Alcohol use: No    Drug use: No    Sexual activity: Not on file   Lifestyle    Physical activity:     Days per week: Not on file     Minutes per session: Not on file    [facility]   ABG  Lab Results   Component Value Date    PH 7.38 07/18/2019    PO2 87 07/18/2019    PCO2 73 07/18/2019    HCO3 43 07/18/2019    O2SAT 96 07/18/2019     Lab Results   Component Value Date    IFIO2 4 07/18/2019     CBC  Recent Labs     07/18/19  0945 07/18/19  1233   WBC 4.2* 4.2*   RBC 2.98* 2.96*   HGB 9.1* 8.9*   HCT 27.4* 30.1*   MCV 92 101.7*   MCH 30.7 30.1   MCHC 33.4 29.6*   RDW 14.4  --     110*   MPV 6.8* 9.8      BMP  Recent Labs     07/18/19  0945 07/18/19  1233    142   K 4.0 4.5   CL  --  94*   CO2  --  39*   BUN  --  16   CREATININE 1.2 1.1   GLUCOSE  --  91   CALCIUM  --  9.1     LFT  Recent Labs     07/18/19  0945 07/18/19  1233   AST 42* 43*   ALT 35 35   BILITOT 0.2* 0.2*   ALKPHOS 68 71   LIPASE  --  75.0*     TROP  Lab Results   Component Value Date    TROPONINT < 0.010 02/24/2019    TROPONINT < 0.010 02/23/2019    TROPONINT 0.011 02/23/2019     BNP  No results for input(s): BNP in the last 72 hours. Lactic Acid  No results for input(s): LACTA in the last 72 hours. INR  Recent Labs     07/18/19  1233   INR 1.00     PTT  No results for input(s): APTT in the last 72 hours. Glucose  Recent Labs     07/18/19  1610 07/18/19 2025   POCGLU 88 109*     UA No results for input(s): SPECGRAV, PHUR, COLORU, CLARITYU, MUCUS, PROTEINU, BLOODU, RBCUA, WBCUA, BACTERIA, NITRU, GLUCOSEU, BILIRUBINUR, UROBILINOGEN, KETUA, LABCAST, LABCASTTY, AMORPHOS in the last 72 hours. Invalid input(s): CRYSTALS. Invalid input(s): CRYSTALS. Results for Betty Brown (MRN [id number]) as of 2/25/2019 13:29    Ref.  Range 2/23/2019 10:00   Character, Body Fluid Unknown CLEAR   Glucose, Fluid Latest Units: mg/dl 152   LD, Fluid Latest Units: U/L 54   pH, Fluid Unknown 7.43   Protein, Fluid Latest Units: gm/dl 2.0   Body Fluid RBC Latest Units: /cumm < 2000   Total Nucleated cells Body Fluid Latest Ref Range: 0 - 500 /cumm 65   Total Volume Received Body Fluid Latest Units: ml 80.0      Serum LDH: 154  Serum Total Protein: 5.6     Total protein ratio: 2.0/5.6=0.36  LD ratio: 54/154=  0.35     Gram Stain: Rare segmented neutrophils observed. No bacteria seen. Culture: no growth; prelim  FINAL RESULTS:     No malignant cells seen.       Reactive mesothelial cells. PFTs                  Date 2/27/19  Overnight pulse oximetry done and reviewed-  pt desat <88% total of 17 minutes    Sleep studies   None in Epic. Cultures    Rapid swab for influenza A and B: Negative  Blood cultures x2-No prelim growth    Echocardiogram   Summary   Normal left ventricle size and systolic function. Ejection fraction was   estimated at 55-%. There were no regional left ventricular wall motion   abnormalities and wall thickness was within normal limits. moderate pericardial effusion . no tamponade      Signature      ----------------------------------------------------------------   Electronically signed by Steve Watson MD (Interpreting   physician) on 07/19/2019 at 05:43 AM         Conclusions      Summary   Technically difficult examination.  EXTREMELY TACHYCARDIC   Normal left ventricle size and systolic function. Ejection fraction was   estimated at 55-60%.  There were no regional left ventricular wall motion   abnormalities and wall thickness was within normal limits.      Signature      ----------------------------------------------------------------   Electronically signed by Christiano Willard MD (Interpreting   physician) on 10/15/2018 at 02:55 PM   ----------------------------------------------------------------      Findings      Mitral Valve   The mitral valve was not well visualized .      Aortic Valve   The aortic valve leaflets were not well visualized.      Tricuspid Valve   Tricuspid valve was not well visualized.      Pulmonic Valve   The pulmonic valve was not well visualized .      Left Atrium   Left atrial size was normal.      Left Ventricle   Normal left ventricle per Light's Criteria  -L lung cancer bx 2016 pulmonary adenocarcinoma currently followed by Dr. Tre Hernandez 200/5mcg spray MDI, 2 puffs via inhalation BID. -Continue Spiriva 18mcg/Cap DPI. 1Cap via inhalation daily.   -Continue Albuterol 2.5mg nebs Q6h prn for shortness of breath.  -Avoid all sedative meds if she is drowsy.  -Titrate Oxygen to keep Spo2 >90%. -Will resume Dulera, Albuterol and Spiriva at time of DC  -Oxygen 2LPM at rest 6LPM with activity- DME note done  -BiPAP 14/6 with 2LPM O2 bleed in to used bedtime, daytime napping and PRN respiratory distress ordered and DME note done  - evaluation to restart her BiPAP therapy from [employer]. Order placed for new mask fitting  -Pericardial effusion management per Dr. Jennifer Navarro and Dr. Esther Kerns  -Deep Venous Thrombosis Prophylaxis: Lovenox if ok with primary service       Case discussed with Registered nurse taking care of patient.  -Discussed with Dr. Neisha Lerma regarding patient condition and management plan. Trevor Thomas Dietzeducated about my impression and plan. She verbalizes understanding. Questions and concerns addressed. Electronically signed by   JESUS Tucker CNP on 7/19/2019 at 10:14 AM     Addendum by Dr. Sindhu Garcia MD:  I have seen and examined the patient independently. Face to face evaluation and examination was performed. The above evaluation and note has been reviewed. Labs and radiographs were reviewed. I Have discussed with Mr. Car King CNP about this patient in detail. The above assessment and plan has been reviewed. Please see my modifications mentioned below. My modifications: On 4LPM via nasal cannula. Not in any distress.  -Discussed with patient and her family including her daughter and explained in detail about current patient condition, clinical status and prognosis. All questions and concers were answered.     Sid Sanches MD 7/19/2019 5:11 PM

## 2019-07-20 LAB
BACTERIA: ABNORMAL /HPF
BILIRUBIN URINE: NEGATIVE
BLOOD, URINE: NEGATIVE
CASTS 2: ABNORMAL /LPF
CASTS UA: ABNORMAL /LPF
CHARACTER, URINE: CLEAR
COLOR: YELLOW
CRYSTALS, UA: ABNORMAL
EPITHELIAL CELLS, UA: ABNORMAL /HPF
GLUCOSE URINE: NEGATIVE MG/DL
KETONES, URINE: NEGATIVE
LEUKOCYTE ESTERASE, URINE: ABNORMAL
MISCELLANEOUS 2: ABNORMAL
NITRITE, URINE: NEGATIVE
PH UA: 7.5 (ref 5–9)
PROTEIN UA: NEGATIVE
RBC URINE: ABNORMAL /HPF
RENAL EPITHELIAL, UA: ABNORMAL
SPECIFIC GRAVITY, URINE: 1.01 (ref 1–1.03)
UROBILINOGEN, URINE: 0.2 EU/DL (ref 0–1)
WBC UA: ABNORMAL /HPF
YEAST: ABNORMAL

## 2019-07-20 PROCEDURE — 2140000000 HC CCU INTERMEDIATE R&B

## 2019-07-20 PROCEDURE — 99232 SBSQ HOSP IP/OBS MODERATE 35: CPT | Performed by: INTERNAL MEDICINE

## 2019-07-20 PROCEDURE — 94640 AIRWAY INHALATION TREATMENT: CPT

## 2019-07-20 PROCEDURE — 81001 URINALYSIS AUTO W/SCOPE: CPT

## 2019-07-20 PROCEDURE — 93307 TTE W/O DOPPLER COMPLETE: CPT

## 2019-07-20 PROCEDURE — 6370000000 HC RX 637 (ALT 250 FOR IP): Performed by: INTERNAL MEDICINE

## 2019-07-20 PROCEDURE — 2709999900 HC NON-CHARGEABLE SUPPLY

## 2019-07-20 PROCEDURE — 99223 1ST HOSP IP/OBS HIGH 75: CPT | Performed by: THORACIC SURGERY (CARDIOTHORACIC VASCULAR SURGERY)

## 2019-07-20 PROCEDURE — 2700000000 HC OXYGEN THERAPY PER DAY

## 2019-07-20 PROCEDURE — 6360000004 HC RX CONTRAST MEDICATION: Performed by: INTERNAL MEDICINE

## 2019-07-20 PROCEDURE — 6360000002 HC RX W HCPCS: Performed by: INTERNAL MEDICINE

## 2019-07-20 RX ADMIN — Medication 2 PUFF: at 18:07

## 2019-07-20 RX ADMIN — HYDROCODONE BITARTRATE AND ACETAMINOPHEN 1 TABLET: 5; 325 TABLET ORAL at 21:08

## 2019-07-20 RX ADMIN — FERROUS SULFATE TAB 325 MG (65 MG ELEMENTAL FE) 487.5 MG: 325 (65 FE) TAB at 08:25

## 2019-07-20 RX ADMIN — Medication 1 CAPSULE: at 12:40

## 2019-07-20 RX ADMIN — ALBUTEROL SULFATE 2.5 MG: 2.5 SOLUTION RESPIRATORY (INHALATION) at 15:21

## 2019-07-20 RX ADMIN — PROMETHAZINE HYDROCHLORIDE 25 MG: 25 TABLET ORAL at 16:55

## 2019-07-20 RX ADMIN — FUROSEMIDE 40 MG: 40 TABLET ORAL at 08:25

## 2019-07-20 RX ADMIN — BETAMETHASONE DIPROPIONATE: 0.5 OINTMENT TOPICAL at 08:25

## 2019-07-20 RX ADMIN — FLUTICASONE PROPIONATE 1 SPRAY: 50 SPRAY, METERED NASAL at 08:25

## 2019-07-20 RX ADMIN — PANTOPRAZOLE SODIUM 40 MG: 40 TABLET, DELAYED RELEASE ORAL at 04:34

## 2019-07-20 RX ADMIN — Medication 1 CAPSULE: at 16:15

## 2019-07-20 RX ADMIN — METOPROLOL TARTRATE 25 MG: 25 TABLET ORAL at 08:25

## 2019-07-20 RX ADMIN — DIGOXIN 125 MCG: 125 TABLET ORAL at 08:25

## 2019-07-20 RX ADMIN — PERFLUTREN 1.43 MG: 6.52 INJECTION, SUSPENSION INTRAVENOUS at 10:35

## 2019-07-20 RX ADMIN — ROPINIROLE HYDROCHLORIDE 0.25 MG: 0.25 TABLET, FILM COATED ORAL at 21:03

## 2019-07-20 RX ADMIN — VITAMIN D, TAB 1000IU (100/BT) 2000 UNITS: 25 TAB at 08:25

## 2019-07-20 RX ADMIN — METOPROLOL TARTRATE 25 MG: 25 TABLET ORAL at 21:03

## 2019-07-20 RX ADMIN — Medication 1 CAPSULE: at 08:25

## 2019-07-20 ASSESSMENT — PAIN DESCRIPTION - FREQUENCY
FREQUENCY: CONTINUOUS
FREQUENCY: CONTINUOUS

## 2019-07-20 ASSESSMENT — PAIN SCALES - GENERAL
PAINLEVEL_OUTOF10: 7
PAINLEVEL_OUTOF10: 7
PAINLEVEL_OUTOF10: 6

## 2019-07-20 ASSESSMENT — PAIN DESCRIPTION - ONSET
ONSET: ON-GOING
ONSET: ON-GOING

## 2019-07-20 ASSESSMENT — PAIN - FUNCTIONAL ASSESSMENT
PAIN_FUNCTIONAL_ASSESSMENT: ACTIVITIES ARE NOT PREVENTED
PAIN_FUNCTIONAL_ASSESSMENT: ACTIVITIES ARE NOT PREVENTED

## 2019-07-20 ASSESSMENT — PAIN DESCRIPTION - PROGRESSION
CLINICAL_PROGRESSION: NOT CHANGED
CLINICAL_PROGRESSION: NOT CHANGED

## 2019-07-20 ASSESSMENT — ENCOUNTER SYMPTOMS
COLOR CHANGE: 0
EYE DISCHARGE: 0
ABDOMINAL DISTENTION: 0
SHORTNESS OF BREATH: 1

## 2019-07-20 ASSESSMENT — PAIN DESCRIPTION - LOCATION
LOCATION: BACK
LOCATION: BACK

## 2019-07-20 ASSESSMENT — PAIN DESCRIPTION - DESCRIPTORS
DESCRIPTORS: CONSTANT;DULL;ACHING
DESCRIPTORS: CONSTANT

## 2019-07-20 ASSESSMENT — PAIN DESCRIPTION - ORIENTATION
ORIENTATION: MID;LOWER
ORIENTATION: MID;LOWER

## 2019-07-20 ASSESSMENT — PAIN DESCRIPTION - PAIN TYPE
TYPE: CHRONIC PAIN
TYPE: CHRONIC PAIN

## 2019-07-20 NOTE — ONCOLOGY
[facility] CANCER CENTER  CANCER NETWORK OF [facility]  [facility] [address]  [address] [address]  Dept: [phone]  Dept Fax: [phone]: [phone]    07/20/19     Chief Complaint:  Maki Client     Subjective:  Patient appears weak, lying in bed, anorexic, mild shortness of breath    Objective:  Vitals:    07/20/19 1535   BP: 124/65   Pulse: 81   Resp: 16   Temp: 98.2 °F (36.8 °C)   SpO2: 100%   Vitals reviewed and are stable. Constitutional: Elderly, frail. No acute distress. HENT: Normocephalic and atraumatic. Oral mucosa clear. Eyes: Pupils are equal and reactive. No scleral icterus. Neck: Bilateral appearance is symmetrical. No identifiable masses. Chest: Mediport in place in the upper chest. Appears stable. Cardiovascular: Regular rate and rhythm normal S1 and S2. Abdominal: Soft. Bowel sounds present. No hepatomegaly or splenomegaly. Musculoskeletal: Gait is abnormal. Muscle strength and tone decreased in all four extremities. Neurological: Alert and oriented to person, place, and time. Judgment and thought content normal.  Skin: Scattered ecchymosis noted on bilateral upper forearms. Psychiatric: Mood and affect appropriate for the clinical situation. Behavior is normal.     Assessment and Recommendations:  Cardiology monitoring pericardial fluid. Cardiac echo repeated today. Appears that monitoring pericardial fluid may be the present plan. Anticipate further immunotherapy after discharge. Encourage patient to increase oral intake and ambulation. Keturah Rosen M.D.                                                                          Medical Director: [facility]  Cancer [place] [address] [address] [facility], [address] [place] [address], [address] [place] [address] Ave

## 2019-07-20 NOTE — PROGRESS NOTES
Dr. Barrett Lobo on the unit. He said to schedule patient for pericardiocentesis on Monday at 0700 in the cath lab with Echo available. 18:20: called Cath lab scheduling, Zaina Ibarra and notified him of the procedure with Echo. He said they would get it scheduled and arrange Echo.

## 2019-07-20 NOTE — PROGRESS NOTES
[facility]    Patient - Sanjeev Gale   MRN -  [id number]   [facility]   - 1945      Date of Admission -  2019 11:28 AM  Date of evaluation -  2019  Room - 3B-[facility]-A   Hospital Day - [address], [place] Primary Care Physician - JESUS Teague - CNP     Problem List      Active Hospital Problems    Diagnosis Date Noted    Chronic respiratory failure with hypercapnia (HCC) [J96.12]     Abnormal CT of the chest [R93.89]     Shortness of breath [R06.02] 2019     Reason for Consult    For management of COPD and chronic respiratory failure  HPI   History Obtained From: Patient and electronic medical record. Sanjeev Gale is a 76 y.o. female  was initially admitted under MD Saroj service. Pulmonary medicine was consulted for further management of COPD. The patient is a 76 y.o. female who was sent from Duy Cope MD- Medical Oncologist office today to for further evaluation of pericardial effusion noted during recent follow up CT chest. She also gives a hx of  worsening of shortness of breath for the last 2 weeks with associated leg swelling. She is occasional cough with clear sputum  production. She was evaluated in the Emergency room by ER physician and admitted under hospitalist service for further evaluation. She is having shortness of breath: Yes  Onset: gradual   Duration: 2weeks. Diurnal variation: None. Current functional capacity on level ground: Very limited on level ground. She denies orthopnea. She admits to paroxysmal nocturnal dyspnea. She is having cough: Yes  Her cough is associated with sputum production: Yes   The sputum color: clear  Hemoptysis:No  Diurnal variation: None. Relieving factors: No  Aggravating factors: No    She gives a history of fever: No    She is currently on    -Dulera 200/5mcg spray MDI, 2 puffs via inhalation BID. -Spiriva 18mcg/Cap DPI.  1Cap via inhalation daily.   -Albuterol 2.5mg nebs Q6h prn for shortness of breath.  -Oxygen 2LPM at rest 6LPM with activity- DME note done  -BiPAP 14/6 with 2LPM O2 bleed in to used bedtime, daytime napping and PRN respiratory distress. She is following with Ms Chi Philip at [facility] office.     Past 24 hrs   -On 4 LPM via NC  -Afebrile  -F/U ECHO in progress, effusion seen, no tamponade physiology noted, report pending  -CTS recommends against window  -Patient looks good, stable VS  -Does not like BIPAP mask    All other systems reviewed    PMHx   Past Medical History      Diagnosis Date    Arthritis     low back pain and scoliosis in lumbar    Cancer ([facility] [address] 2012    lower right lobe-lung s/p lobectomy in 2012, radiation and chemo nad later had mediatinal mets and had radationa dn chemo again, and in 2016 had reoccurence on the left side upper and was started on radiation this year and has  a follow up CT in oct 2017    CHF (congestive heart failure) (HCC)     Chronic bronchitis, simple (HCC)     Chronic respiratory failure with hypoxia (HCC)     COPD (chronic obstructive pulmonary disease) (HCC)     GERD (gastroesophageal reflux disease)     HTN (hypertension)     Pneumonia     Postprocedural pneumothorax     Scoliosis     Sleep apnea     Urinary incontinence     UTI (urinary tract infection)       Past Surgical History        Procedure Laterality Date    LOBECTOMY  10/19/2012    rt lower lobectomy     LUNG BIOPSY  8/2012     Meds    Current Medications    rOPINIRole  0.25 mg Oral Nightly    furosemide  40 mg Oral Daily    betamethasone dipropionate   Topical Daily    vitamin D  2 tablet Oral Daily    digoxin  125 mcg Oral Daily    pantoprazole  40 mg Oral QAM AC    ferrous sulfate  487.5 mg Oral Daily with breakfast    fluticasone  1 spray Nasal Daily    metoprolol tartrate  25 mg Oral BID    mometasone-formoterol  2 puff Inhalation BID    potassium chloride  20 mEq Oral Once per day on Mon Wed Fri    lactobacillus  1 capsule Oral TID    tiotropium  18 mcg Inhalation Daily     albuterol, guaiFENesin, lidocaine-prilocaine, HYDROcodone-acetaminophen, promethazine  IV Drips/Infusions   sodium chloride 20 mL/hr at 07/19/19 2121     Home Medications  Medications Prior to Admission: omeprazole (PRILOSEC) 40 MG delayed release capsule, Take 40 mg by mouth daily  ondansetron (ZOFRAN) 8 MG tablet, Take 8 mg by mouth every 8 hours as needed for Nausea or Vomiting  Loratadine (CLARITIN PO), Take by mouth as needed  Probiotic Product (PROBIOTIC DAILY PO), Take 1 capsule by mouth 3 times daily  metoprolol tartrate (LOPRESSOR) 25 MG tablet, Take 1 tablet by mouth 2 times daily  promethazine (PHENERGAN) 25 MG tablet, Take 1 tablet by mouth every 8 hours as needed for Nausea  potassium chloride (KLOR-CON M) 20 MEQ extended release tablet, Take 1 tablet by mouth daily (Patient taking differently: Take 20 mEq by mouth three times a week )  digoxin (LANOXIN) 125 MCG tablet, Take 1 tablet by mouth daily  albuterol sulfate HFA (PROAIR HFA) 108 (90 Base) MCG/ACT inhaler, Inhale 2 puffs into the lungs every 6 hours as needed for Wheezing  furosemide (LASIX) 20 MG tablet, Take 1 tablet by mouth daily as needed (leg swelling)  albuterol (PROVENTIL) (2.5 MG/3ML) 0.083% nebulizer solution, Take 3 mLs by nebulization every 6 hours as needed for Wheezing or Shortness of Breath  Handicap Placard MISC, by Does not apply route Diagnosis: Malignant neoplasm of lung, unspecified laterality, unspecified part of lung (HCC) [C34.90] Expiration Date: 4/15/2020  ferrous sulfate 325 (65 Fe) MG tablet, One every other day take with food and vitamin C 500 mg (Patient taking differently: One every other day take with food.)  lidocaine-prilocaine (EMLA) 2.5-2.5 % cream, Apply topically as needed for Pain Apply topically as needed.   mometasone-formoterol (DULERA) 200-5 MCG/ACT inhaler, Inhale 2 puffs into the lungs 2 times daily  fluticasone (FLONASE) 50

## 2019-07-20 NOTE — CONSULTS
Historical Provider, MD   Probiotic Product (PROBIOTIC DAILY PO) Take 1 capsule by mouth 3 times daily   Yes Historical Provider, MD   metoprolol tartrate (LOPRESSOR) 25 MG tablet Take 1 tablet by mouth 2 times daily 7/1/19  Yes JESUS Gimenez CNP   promethazine (PHENERGAN) 25 MG tablet Take 1 tablet by mouth every 8 hours as needed for Nausea 6/26/19  Yes JESUS Gimenez CNP   potassium chloride (KLOR-CON M) 20 MEQ extended release tablet Take 1 tablet by mouth daily  Patient taking differently: Take 20 mEq by mouth three times a week  6/6/19  Yes Arabella Alba MD   digoxin (LANOXIN) 125 MCG tablet Take 1 tablet by mouth daily 5/15/19  Yes Arabella Alba MD   albuterol sulfate HFA (PROAIR HFA) 108 (90 Base) MCG/ACT inhaler Inhale 2 puffs into the lungs every 6 hours as needed for Wheezing 5/8/19  Yes JESUS Gimenez CNP   furosemide (LASIX) 20 MG tablet Take 1 tablet by mouth daily as needed (leg swelling) 5/8/19  Yes JESUS Gimenez CNP   albuterol (PROVENTIL) (2.5 MG/3ML) 0.083% nebulizer solution Take 3 mLs by nebulization every 6 hours as needed for Wheezing or Shortness of Breath 5/6/19 5/5/20 Yes JESUS Maciel CNP   Handicap Placard MISC by Does not apply route Diagnosis: Malignant neoplasm of lung, unspecified laterality, unspecified part of lung [facility] [C34.90]  Expiration Date: 4/15/2020 4/15/19  Yes Arabella Alba MD   ferrous sulfate 325 (65 Fe) MG tablet One every other day take with food and vitamin C 500 mg  Patient taking differently: One every other day take with food. 2/28/19  Yes Marisa Denis MD   lidocaine-prilocaine (EMLA) 2.5-2.5 % cream Apply topically as needed for Pain Apply topically as needed.    Yes Historical Provider, MD   mometasone-formoterol [facility]) 200-5 MCG/ACT inhaler Inhale 2 puffs into the lungs 2 times daily 12/11/17 4/15/20 Yes Brian Mckeon MD   fluticasone (FLONASE) 50 MCG/ACT nasal spray 1 spray by Nasal route daily 11/8/16  Yes Montrell Lopez MD   betamethasone dipropionate (DIPROLENE) 0.05 % ointment Apply topically daily. 9/12/16  Yes Montrell Lopez MD   HYDROcodone-acetaminophen [facility] 5-325 MG per tablet Take 1 tablet by mouth 3 times daily as needed for Pain 8/10/16  Yes Montrell Lopez MD       Allergies:  Advil cold & sinus liqui-gels [pseudoephedrine-ibuprofen]; Food; Percocet [oxycodone-acetaminophen]; and Sulfa antibiotics    Social History:      TOBACCO:   reports that she quit smoking about 12 years ago. Her smoking use included cigarettes. She has a 45.00 pack-year smoking history. She has never used smokeless tobacco.  ETOH:   reports that she does not drink alcohol. Social History     Substance and Sexual Activity   Drug Use No         Family History:          Problem Relation Age of Onset    Cancer Mother     Heart Disease Father     High Blood Pressure Father     High Cholesterol Father     Arthritis Sister     Heart Disease Sister     Cancer Sister         lung cancer    Heart Disease Sister     Stroke Sister     High Cholesterol Sister     High Blood Pressure Sister     Stroke Paternal Grandfather     High Blood Pressure Brother        REVIEW OFSYSTEMS:      Review of Systems   Constitutional: Positive for fatigue. HENT: Negative for congestion. Eyes: Negative for discharge. Respiratory: Positive for shortness of breath. Cardiovascular: Negative for chest pain. Gastrointestinal: Negative for abdominal distention. Endocrine: Negative for cold intolerance. Genitourinary: Negative for difficulty urinating. Musculoskeletal: Negative for arthralgias. Skin: Negative for color change. Allergic/Immunologic: Negative for environmental allergies. Neurological: Negative for dizziness. Psychiatric/Behavioral: Negative for agitation.        PHYSICALEXAM:    BP (!) 128/58   Pulse 76   Temp 97.8 °F (36.6 °C) (Oral)   Resp 16   Ht 5' (1.524 m) Wt 148 lb 11.2 oz (67.4 kg)   SpO2 100%   BMI 29.04 kg/m²     General appearance:  No apparent distress, appears stated age and cooperative. HEENT:  Normal cephalic, atraumatic withoutobvious deformity. Pupils equal, round, and reactive to light. Extra ocular muscles intact. Conjunctivae/corneas clear. Neck: Supple, with full range of motion. No jugular venous distention. Trachea midline. Respiratory:  Normal respiratory effort. Decreased breath sounds right base  Cardiovascular:  Regular rate and rhythm with normal S1/S2 without murmurs, rubs or gallops. Abdomen: Soft,non-tender, non-distended with normal bowel sounds. Musculoskeletal:  No clubbing, cyanosis or edemabilaterally. Full range of motion without deformity. Skin: Skin color, texture, turgor normal.  No rashes orlesions. Neurologic:  Neurovascularly intact without any focal sensory/motor deficits. Cranial nerves: II-XIIintact, grossly non-focal.  Psychiatric:  Alert and oriented, thought content appropriate, normal insight  Capillary Refill: Brisk,< 3 seconds   PeripheralPulses: +2 palpable, equal bilaterally       Labs:    Recent Labs     07/18/19  0945 07/18/19  1233   WBC 4.2* 4.2*   HGB 9.1* 8.9*   HCT 27.4* 30.1*    110*     Recent Labs     07/18/19  0945 07/18/19  1233    142   K 4.0 4.5   CL  --  94*   CO2  --  39*   BUN  --  16   CREATININE 1.2 1.1   CALCIUM  --  9.1     Recent Labs     07/18/19  0945 07/18/19  1233   AST 42* 43*   ALT 35 35   BILIDIR <0.2 <0.2   BILITOT 0.2* 0.2*   ALKPHOS 68 71     Recent Labs     07/18/19  1233   INR 1.00     No results for input(s): CKTOTAL, TROPONINI in the last 72 hours.     Urinalysis:      Lab Results   Component Value Date    NITRU NEGATIVE 07/20/2019    WBCUA 5-10 07/20/2019    BACTERIA NONE 07/20/2019    RBCUA 0-2 07/20/2019    BLOODU NEGATIVE 07/20/2019    SPECGRAV 1.013 10/17/2012    GLUCOSEU NEGATIVE 07/20/2019       ECHO (images personally reviewed)  Results for orders placed during the hospital encounter of 10/14/18   ECHO Complete 2D W Doppler W Color    Narrative Transthoracic Echocardiography Report (TTE)     Demographics      Patient Name     Sheryl Daguherty Gender                 Female      MR #             [id number]     Race                                                        Ethnicity      Account #        [de-identified]     Room Number            0021      Accession Number [id number]     Date of Study          10/15/2018      Date of Birth    1945    Referring Physician    Nechama Hammans MD                                                         [facility]      Age              68 year(s)    [facility]                                     Interpreting Physician Prosper Roy MD     Procedure    Type of Study      TTE procedure:ECHOCARDIOGRAM COMPLETE 2D W DOPPLER W COLOR. Procedure Date  Date: 10/15/2018 Start: 02:11 PM    Study Location: Bedside  Technical Quality: Limited visualization due to lung interface. Indications:Shortness of breath. Additional Medical History:Edema, Elevated BNP, Lung cancer, Right lower  lobestomy, Scoliosis, Pneumonia, Chemo, Palpitations, Hypertension, GERD,  COPD, Arthritis, Tachycardia    Patient Status: Routine    Height: 60.63 inches Weight: 130.07 pounds BSA: 1.57 m^2 BMI: 24.88 kg/m^2    BP: 123/59 mmHg     Conclusions      Summary   Technically difficult examination. EXTREMELY TACHYCARDIC   Normal left ventricle size and systolic function. Ejection fraction was   estimated at 55-60%. There were no regional left ventricular wall motion   abnormalities and wall thickness was within normal limits.       Signature      ----------------------------------------------------------------   Electronically signed by Prosper Roy MD (Interpreting   physician) on 10/15/2018 at 02:55 PM   ----------------------------------------------------------------      Findings      Mitral Valve   The

## 2019-07-21 PROCEDURE — 86923 COMPATIBILITY TEST ELECTRIC: CPT

## 2019-07-21 PROCEDURE — 94660 CPAP INITIATION&MGMT: CPT

## 2019-07-21 PROCEDURE — 94640 AIRWAY INHALATION TREATMENT: CPT

## 2019-07-21 PROCEDURE — 97162 PT EVAL MOD COMPLEX 30 MIN: CPT

## 2019-07-21 PROCEDURE — 86900 BLOOD TYPING SEROLOGIC ABO: CPT

## 2019-07-21 PROCEDURE — 2709999900 HC NON-CHARGEABLE SUPPLY

## 2019-07-21 PROCEDURE — 99232 SBSQ HOSP IP/OBS MODERATE 35: CPT | Performed by: INTERNAL MEDICINE

## 2019-07-21 PROCEDURE — 2700000000 HC OXYGEN THERAPY PER DAY

## 2019-07-21 PROCEDURE — 80048 BASIC METABOLIC PNL TOTAL CA: CPT

## 2019-07-21 PROCEDURE — 2140000000 HC CCU INTERMEDIATE R&B

## 2019-07-21 PROCEDURE — 94761 N-INVAS EAR/PLS OXIMETRY MLT: CPT

## 2019-07-21 PROCEDURE — 97116 GAIT TRAINING THERAPY: CPT

## 2019-07-21 PROCEDURE — 86850 RBC ANTIBODY SCREEN: CPT

## 2019-07-21 PROCEDURE — 6370000000 HC RX 637 (ALT 250 FOR IP): Performed by: INTERNAL MEDICINE

## 2019-07-21 PROCEDURE — 36591 DRAW BLOOD OFF VENOUS DEVICE: CPT

## 2019-07-21 PROCEDURE — 85027 COMPLETE CBC AUTOMATED: CPT

## 2019-07-21 PROCEDURE — 86901 BLOOD TYPING SEROLOGIC RH(D): CPT

## 2019-07-21 RX ADMIN — FLUTICASONE PROPIONATE 1 SPRAY: 50 SPRAY, METERED NASAL at 08:44

## 2019-07-21 RX ADMIN — FUROSEMIDE 40 MG: 40 TABLET ORAL at 08:45

## 2019-07-21 RX ADMIN — PROMETHAZINE HYDROCHLORIDE 25 MG: 25 TABLET ORAL at 11:02

## 2019-07-21 RX ADMIN — PANTOPRAZOLE SODIUM 40 MG: 40 TABLET, DELAYED RELEASE ORAL at 06:27

## 2019-07-21 RX ADMIN — HYDROCODONE BITARTRATE AND ACETAMINOPHEN 1 TABLET: 5; 325 TABLET ORAL at 19:42

## 2019-07-21 RX ADMIN — HYDROCODONE BITARTRATE AND ACETAMINOPHEN 1 TABLET: 5; 325 TABLET ORAL at 06:29

## 2019-07-21 RX ADMIN — Medication 1 CAPSULE: at 12:27

## 2019-07-21 RX ADMIN — DIGOXIN 125 MCG: 125 TABLET ORAL at 08:45

## 2019-07-21 RX ADMIN — Medication 1 CAPSULE: at 08:46

## 2019-07-21 RX ADMIN — VITAMIN D, TAB 1000IU (100/BT) 2000 UNITS: 25 TAB at 08:45

## 2019-07-21 RX ADMIN — Medication 2 PUFF: at 08:31

## 2019-07-21 RX ADMIN — Medication 2 PUFF: at 21:50

## 2019-07-21 RX ADMIN — BETAMETHASONE DIPROPIONATE: 0.5 OINTMENT TOPICAL at 08:47

## 2019-07-21 RX ADMIN — TIOTROPIUM BROMIDE 18 MCG: 18 CAPSULE ORAL; RESPIRATORY (INHALATION) at 08:31

## 2019-07-21 RX ADMIN — METOPROLOL TARTRATE 25 MG: 25 TABLET ORAL at 19:42

## 2019-07-21 RX ADMIN — METOPROLOL TARTRATE 25 MG: 25 TABLET ORAL at 08:45

## 2019-07-21 RX ADMIN — FERROUS SULFATE TAB 325 MG (65 MG ELEMENTAL FE) 487.5 MG: 325 (65 FE) TAB at 08:45

## 2019-07-21 RX ADMIN — Medication 1 CAPSULE: at 16:46

## 2019-07-21 RX ADMIN — ROPINIROLE HYDROCHLORIDE 0.25 MG: 0.25 TABLET, FILM COATED ORAL at 19:42

## 2019-07-21 ASSESSMENT — PAIN DESCRIPTION - ONSET
ONSET: ON-GOING
ONSET: ON-GOING

## 2019-07-21 ASSESSMENT — PAIN DESCRIPTION - FREQUENCY
FREQUENCY: CONTINUOUS
FREQUENCY: CONTINUOUS

## 2019-07-21 ASSESSMENT — PAIN DESCRIPTION - LOCATION
LOCATION: BACK

## 2019-07-21 ASSESSMENT — PAIN SCALES - GENERAL
PAINLEVEL_OUTOF10: 7
PAINLEVEL_OUTOF10: 7
PAINLEVEL_OUTOF10: 8
PAINLEVEL_OUTOF10: 7
PAINLEVEL_OUTOF10: 7
PAINLEVEL_OUTOF10: 0

## 2019-07-21 ASSESSMENT — PAIN DESCRIPTION - ORIENTATION
ORIENTATION: MID;LOWER
ORIENTATION: MID

## 2019-07-21 ASSESSMENT — PAIN DESCRIPTION - PROGRESSION

## 2019-07-21 ASSESSMENT — PAIN DESCRIPTION - PAIN TYPE
TYPE: CHRONIC PAIN

## 2019-07-21 ASSESSMENT — PAIN DESCRIPTION - DESCRIPTORS
DESCRIPTORS: ACHING
DESCRIPTORS: ACHING

## 2019-07-21 NOTE — PROGRESS NOTES
with functional mobility for improved independence and quality of life. Assessment: Body structures, Functions, Activity limitations: Decreased functional mobility , Decreased endurance, Decreased strength, Decreased balance  Assessment: Patient tolerated PT session fairly well. She currently presents with some impairments in strength, balance and endurance, and is now needing a RW for safe mobility when prior she was only occasionally using a cane. She will benefit from continued PT services in order to maximize her safety and functional independence. Prognosis: Good    REQUIRES PT FOLLOW UP: Yes    Discharge Recommendations:  Discharge Recommendations: Home with Home health PT    Patient Education:  Patient Education: Role of PT, POC, transfers, gait, safety    Equipment Recommendations:  Equipment Needed: No    Plan:  Times per week: 5x GM  Current Treatment Recommendations: Strengthening, Equipment Evaluation, Education, & procurement, Transfer Training, Gait Training, Safety Education & Training, Functional Mobility Training, Balance Training, Endurance Training, Stair training, Patient/Caregiver Education & Training    Goals:  Patient goals : Go home  Short term goals  Time Frame for Short term goals: 2 weeks  Short term goal 1: Patient will transfer supine <--> sit with mod I in order to get into/out of bed. Short term goal 2: Patient will transfer sit <--> stand with mod I in order to get up to ambulate. Short term goal 3: Patient will ambulate >50' with LRAD and mod I for household distances. Short term goal 4: Patient will negotiate 2 steps with right hand rail and SBA for home entry.   Long term goals  Time Frame for Long term goals : no LTGs due to short ELOS       AM-PAC Inpatient Mobility without Stair Climbing Raw Score : 15  AM-PAC Inpatient without Stair Climbing T-Scale Score : 43.03  Mobility Inpatient CMS 0-100% Score: 47.43  Mobility Inpatient without Stair CMS G-Code Modifier :

## 2019-07-21 NOTE — PROGRESS NOTES
/cumm 65   Total Volume Received Body Fluid Latest Units: ml 80.0      Serum LDH: 154  Serum Total Protein: 5.6     Total protein ratio: 2.0/5.6=0.36  LD ratio: 54/154=  0.35     Gram Stain: Rare segmented neutrophils observed. No bacteria seen. Culture: no growth; prelim  FINAL RESULTS:     No malignant cells seen.       Reactive mesothelial cells. PFTs                  Date 2/27/19  Overnight pulse oximetry done and reviewed-  pt desat <88% total of 17 minutes    Sleep studies   None in Epic. Cultures    Rapid swab for influenza A and B: Negative  Blood cultures x2-No prelim growth    Echocardiogram   Summary   Normal left ventricle size and systolic function. Ejection fraction was   estimated at 55-%. There were no regional left ventricular wall motion   abnormalities and wall thickness was within normal limits. moderate pericardial effusion . no tamponade      Signature      ----------------------------------------------------------------   Electronically signed by Carol Llanes MD (Interpreting   physician) on 07/19/2019 at 05:43 AM         Conclusions      Summary   Technically difficult examination.  EXTREMELY TACHYCARDIC   Normal left ventricle size and systolic function. Ejection fraction was   estimated at 55-60%.  There were no regional left ventricular wall motion   abnormalities and wall thickness was within normal limits.      Signature      ----------------------------------------------------------------   Electronically signed by Moris Jaquez MD (Interpreting   physician) on 10/15/2018 at 02:55 PM   ----------------------------------------------------------------      Findings      Mitral Valve   The mitral valve was not well visualized .      Aortic Valve   The aortic valve leaflets were not well visualized.      Tricuspid Valve   Tricuspid valve was not well visualized.      Pulmonic Valve   The pulmonic valve was not well visualized .      Left Atrium   Left atrial size was normal.      Left Ventricle   Normal left ventricle size and systolic function. Ejection fraction was   estimated at 55-60%. There were no regional left ventricular wall motion   abnormalities and wall thickness was within normal limits.      Right Atrium   Right atrial size was normal.      Right Ventricle   The right ventricular size was normal with normal systolic function and   wall thickness.      Pericardial Effusion   The pericardium was normal in appearance with no evidence of a pericardial   effusion.      Pleural Effusion   No evidence of pleural effusion. Radiology    CXR  Jul 18, 2019   PROCEDURE: XR CHEST PORTABLE   Volume loss right lung with patchy perihilar opacity similar in appearance to prior study. Findings could be related to atelectasis and/or infiltrate. Correlation is advised. Enlargement of the cardiac silhouette. CT Scans  (See actual reports for details)  Jul 11, 2019   PROCEDURE: CT CHEST W CONTRAST   1. Interval development of a moderate pericardial effusion. 2. Worsening right lower lobe consolidation and collapse. 3. Increased size of left anterior upper lung focal consolidation somewhat more concerning for additional lung mass. CTA of chest done on 2/23/19  1. No evidence to suggest a pulmonary embolus. 2. More extensive consolidation and collapse is present throughout the right lung which may correspond to edema however, underlying infectious infiltrate is not excluded. Underlying neoplastic process is also not excluded. 3. There is a small to moderate left-sided pleural effusion         Assessment   -Moderate pericardial effusion, no tamponade, CTS recommending no window  Charles Urban considering to only monitor fluid instead of pericardiocentesis  -Follow up ECHO done, report pending  -Chronic respiratory failure with hypoxemia and hypercapnia-Stable  -Severe COPD- under moderate control  -She is on Home BiPAP with 14/6 cm H20.  How ever she using her BiPAP

## 2019-07-22 ENCOUNTER — APPOINTMENT (OUTPATIENT)
Dept: GENERAL RADIOLOGY | Age: 74
DRG: 314 | End: 2019-07-22
Payer: MEDICARE

## 2019-07-22 ENCOUNTER — APPOINTMENT (OUTPATIENT)
Dept: CARDIAC CATH/INVASIVE PROCEDURES | Age: 74
DRG: 314 | End: 2019-07-22
Payer: MEDICARE

## 2019-07-22 LAB
ABO: NORMAL
ANION GAP SERPL CALCULATED.3IONS-SCNC: 7 MEQ/L (ref 8–16)
ANTIBODY SCREEN: NORMAL
BODY FLUID RBC: < 2000 /CUMM
BUN BLDV-MCNC: 13 MG/DL (ref 7–22)
CALCIUM SERPL-MCNC: 8.5 MG/DL (ref 8.5–10.5)
CHARACTER, BODY FLUID: CLEAR
CHLORIDE BLD-SCNC: 88 MEQ/L (ref 98–111)
CO2: 43 MEQ/L (ref 23–33)
COLOR: YELLOW
CREAT SERPL-MCNC: 1.1 MG/DL (ref 0.4–1.2)
ERYTHROCYTE [DISTWIDTH] IN BLOOD BY AUTOMATED COUNT: 15.2 % (ref 11.5–14.5)
ERYTHROCYTE [DISTWIDTH] IN BLOOD BY AUTOMATED COUNT: 55.3 FL (ref 35–45)
GFR SERPL CREATININE-BSD FRML MDRD: 48 ML/MIN/1.73M2
GLUCOSE BLD-MCNC: 80 MG/DL (ref 70–108)
GLUCOSE, FLUID: 89 MG/DL
HCT VFR BLD CALC: 28.7 % (ref 37–47)
HEMOGLOBIN: 8.9 GM/DL (ref 12–16)
LD, FLUID: 155 U/L
MCH RBC QN AUTO: 30.6 PG (ref 26–33)
MCHC RBC AUTO-ENTMCNC: 31 GM/DL (ref 32.2–35.5)
MCV RBC AUTO: 98.6 FL (ref 81–99)
MESOTHELIAL CELLS BODY FLUID: NORMAL
MONONUCLEAR CELLS BODY FLUID: 40.8 %
PATHOLOGIST REVIEW: NORMAL
PH FLUID: 7.76
PLATELET # BLD: 125 THOU/MM3 (ref 130–400)
PMV BLD AUTO: 10.2 FL (ref 9.4–12.4)
POLYMORPHONUCLEAR CELLS BODY FLUID: 59.2 %
POTASSIUM SERPL-SCNC: 3.4 MEQ/L (ref 3.5–5.2)
POTASSIUM SERPL-SCNC: 4.2 MEQ/L (ref 3.5–5.2)
PROTEIN FLUID: 4.9 GM/DL
RBC # BLD: 2.91 MILL/MM3 (ref 4.2–5.4)
RH FACTOR: NORMAL
SODIUM BLD-SCNC: 138 MEQ/L (ref 135–145)
SPECIMEN: NORMAL
TOTAL NUCLEATED CELLS BODY FLUID: 277 /CUMM (ref 0–500)
TOTAL VOLUME RECEIVED BODY FLUID: 240 ML
WBC # BLD: 3.6 THOU/MM3 (ref 4.8–10.8)

## 2019-07-22 PROCEDURE — 88305 TISSUE EXAM BY PATHOLOGIST: CPT

## 2019-07-22 PROCEDURE — 75989 ABSCESS DRAINAGE UNDER X-RAY: CPT | Performed by: INTERNAL MEDICINE

## 2019-07-22 PROCEDURE — 99232 SBSQ HOSP IP/OBS MODERATE 35: CPT | Performed by: INTERNAL MEDICINE

## 2019-07-22 PROCEDURE — 93307 TTE W/O DOPPLER COMPLETE: CPT

## 2019-07-22 PROCEDURE — 36591 DRAW BLOOD OFF VENOUS DEVICE: CPT

## 2019-07-22 PROCEDURE — 33999 UNLISTED PX CARDIAC SURGERY: CPT | Performed by: INTERNAL MEDICINE

## 2019-07-22 PROCEDURE — 83615 LACTATE (LD) (LDH) ENZYME: CPT

## 2019-07-22 PROCEDURE — 99232 SBSQ HOSP IP/OBS MODERATE 35: CPT | Performed by: NURSE PRACTITIONER

## 2019-07-22 PROCEDURE — 88112 CYTOPATH CELL ENHANCE TECH: CPT

## 2019-07-22 PROCEDURE — 89050 BODY FLUID CELL COUNT: CPT

## 2019-07-22 PROCEDURE — 94640 AIRWAY INHALATION TREATMENT: CPT

## 2019-07-22 PROCEDURE — 87075 CULTR BACTERIA EXCEPT BLOOD: CPT

## 2019-07-22 PROCEDURE — APPSS30 APP SPLIT SHARED TIME 16-30 MINUTES: Performed by: NURSE PRACTITIONER

## 2019-07-22 PROCEDURE — C1729 CATH, DRAINAGE: HCPCS

## 2019-07-22 PROCEDURE — 2700000000 HC OXYGEN THERAPY PER DAY

## 2019-07-22 PROCEDURE — 76930 HC US GUIDE PERCARDIO S&I: CPT | Performed by: INTERNAL MEDICINE

## 2019-07-22 PROCEDURE — 71045 X-RAY EXAM CHEST 1 VIEW: CPT

## 2019-07-22 PROCEDURE — 94660 CPAP INITIATION&MGMT: CPT

## 2019-07-22 PROCEDURE — 84132 ASSAY OF SERUM POTASSIUM: CPT

## 2019-07-22 PROCEDURE — 6360000002 HC RX W HCPCS

## 2019-07-22 PROCEDURE — 82945 GLUCOSE OTHER FLUID: CPT

## 2019-07-22 PROCEDURE — C1894 INTRO/SHEATH, NON-LASER: HCPCS

## 2019-07-22 PROCEDURE — 6360000002 HC RX W HCPCS: Performed by: INTERNAL MEDICINE

## 2019-07-22 PROCEDURE — 2140000000 HC CCU INTERMEDIATE R&B

## 2019-07-22 PROCEDURE — 94761 N-INVAS EAR/PLS OXIMETRY MLT: CPT

## 2019-07-22 PROCEDURE — 2709999900 HC NON-CHARGEABLE SUPPLY

## 2019-07-22 PROCEDURE — 83986 ASSAY PH BODY FLUID NOS: CPT

## 2019-07-22 PROCEDURE — 87116 MYCOBACTERIA CULTURE: CPT

## 2019-07-22 PROCEDURE — 2500000003 HC RX 250 WO HCPCS

## 2019-07-22 PROCEDURE — C1769 GUIDE WIRE: HCPCS

## 2019-07-22 PROCEDURE — 33010 HC PERICARDIOCENTESIS INTIAL: CPT | Performed by: INTERNAL MEDICINE

## 2019-07-22 PROCEDURE — 84157 ASSAY OF PROTEIN OTHER: CPT

## 2019-07-22 PROCEDURE — 87070 CULTURE OTHR SPECIMN AEROBIC: CPT

## 2019-07-22 PROCEDURE — 0W9D3ZZ DRAINAGE OF PERICARDIAL CAVITY, PERCUTANEOUS APPROACH: ICD-10-PCS | Performed by: INTERNAL MEDICINE

## 2019-07-22 PROCEDURE — 6370000000 HC RX 637 (ALT 250 FOR IP): Performed by: INTERNAL MEDICINE

## 2019-07-22 PROCEDURE — 87205 SMEAR GRAM STAIN: CPT

## 2019-07-22 RX ORDER — ROPINIROLE 0.25 MG/1
0.25 TABLET, FILM COATED ORAL 2 TIMES DAILY
Status: DISCONTINUED | OUTPATIENT
Start: 2019-07-22 | End: 2019-07-26 | Stop reason: HOSPADM

## 2019-07-22 RX ORDER — CEFAZOLIN SODIUM 1 G/50ML
1 INJECTION, SOLUTION INTRAVENOUS EVERY 8 HOURS
Status: DISCONTINUED | OUTPATIENT
Start: 2019-07-22 | End: 2019-07-25

## 2019-07-22 RX ADMIN — FUROSEMIDE 40 MG: 40 TABLET ORAL at 18:23

## 2019-07-22 RX ADMIN — BETAMETHASONE DIPROPIONATE: 0.5 OINTMENT TOPICAL at 09:32

## 2019-07-22 RX ADMIN — ROPINIROLE HYDROCHLORIDE 0.25 MG: 0.25 TABLET, FILM COATED ORAL at 09:24

## 2019-07-22 RX ADMIN — METOPROLOL TARTRATE 25 MG: 25 TABLET ORAL at 09:24

## 2019-07-22 RX ADMIN — CEFAZOLIN SODIUM 1 G: 1 INJECTION, SOLUTION INTRAVENOUS at 23:09

## 2019-07-22 RX ADMIN — DIGOXIN 125 MCG: 125 TABLET ORAL at 09:24

## 2019-07-22 RX ADMIN — POTASSIUM CHLORIDE 20 MEQ: 20 TABLET, EXTENDED RELEASE ORAL at 09:28

## 2019-07-22 RX ADMIN — HYDROCODONE BITARTRATE AND ACETAMINOPHEN 1 TABLET: 5; 325 TABLET ORAL at 23:12

## 2019-07-22 RX ADMIN — VITAMIN D, TAB 1000IU (100/BT) 2000 UNITS: 25 TAB at 09:24

## 2019-07-22 RX ADMIN — POTASSIUM BICARBONATE 40 MEQ: 391 TABLET, EFFERVESCENT ORAL at 06:07

## 2019-07-22 RX ADMIN — Medication 1 CAPSULE: at 09:24

## 2019-07-22 RX ADMIN — FLUTICASONE PROPIONATE 1 SPRAY: 50 SPRAY, METERED NASAL at 09:25

## 2019-07-22 RX ADMIN — PANTOPRAZOLE SODIUM 40 MG: 40 TABLET, DELAYED RELEASE ORAL at 03:59

## 2019-07-22 RX ADMIN — Medication 2 PUFF: at 19:49

## 2019-07-22 RX ADMIN — CEFAZOLIN SODIUM 1 G: 1 INJECTION, SOLUTION INTRAVENOUS at 15:58

## 2019-07-22 RX ADMIN — METOPROLOL TARTRATE 25 MG: 25 TABLET ORAL at 20:15

## 2019-07-22 RX ADMIN — ROPINIROLE HYDROCHLORIDE 0.25 MG: 0.25 TABLET, FILM COATED ORAL at 20:15

## 2019-07-22 RX ADMIN — PROMETHAZINE HYDROCHLORIDE 25 MG: 25 TABLET ORAL at 04:07

## 2019-07-22 RX ADMIN — FERROUS SULFATE TAB 325 MG (65 MG ELEMENTAL FE) 487.5 MG: 325 (65 FE) TAB at 09:24

## 2019-07-22 RX ADMIN — HYDROCODONE BITARTRATE AND ACETAMINOPHEN 1 TABLET: 5; 325 TABLET ORAL at 10:07

## 2019-07-22 RX ADMIN — PROMETHAZINE HYDROCHLORIDE 25 MG: 25 TABLET ORAL at 23:12

## 2019-07-22 ASSESSMENT — PAIN - FUNCTIONAL ASSESSMENT
PAIN_FUNCTIONAL_ASSESSMENT: ACTIVITIES ARE NOT PREVENTED

## 2019-07-22 ASSESSMENT — PAIN SCALES - GENERAL
PAINLEVEL_OUTOF10: 0
PAINLEVEL_OUTOF10: 7
PAINLEVEL_OUTOF10: 7
PAINLEVEL_OUTOF10: 2
PAINLEVEL_OUTOF10: 1
PAINLEVEL_OUTOF10: 7
PAINLEVEL_OUTOF10: 5
PAINLEVEL_OUTOF10: 7

## 2019-07-22 ASSESSMENT — PAIN DESCRIPTION - LOCATION
LOCATION: CHEST
LOCATION: BACK
LOCATION: CHEST

## 2019-07-22 ASSESSMENT — PAIN DESCRIPTION - PROGRESSION
CLINICAL_PROGRESSION: NOT CHANGED

## 2019-07-22 ASSESSMENT — PAIN DESCRIPTION - DESCRIPTORS
DESCRIPTORS: ACHING
DESCRIPTORS: SORE
DESCRIPTORS: SORE

## 2019-07-22 ASSESSMENT — PAIN DESCRIPTION - PAIN TYPE
TYPE: SURGICAL PAIN
TYPE: CHRONIC PAIN
TYPE: ACUTE PAIN;SURGICAL PAIN

## 2019-07-22 ASSESSMENT — PAIN DESCRIPTION - ORIENTATION
ORIENTATION: MID;LOWER
ORIENTATION: MID
ORIENTATION: MID

## 2019-07-22 ASSESSMENT — PAIN DESCRIPTION - DIRECTION: RADIATING_TOWARDS: NO

## 2019-07-22 ASSESSMENT — PAIN DESCRIPTION - ONSET
ONSET: ON-GOING

## 2019-07-22 ASSESSMENT — PAIN DESCRIPTION - FREQUENCY
FREQUENCY: CONTINUOUS

## 2019-07-22 NOTE — PROGRESS NOTES
[address] [address]   Sedation/Analgesia History and Physical    Pt Name: Sunil Israel  MRN: [id number]  YOB: 1945  Provider Performing Procedure: Leeann Serrato MD  Primary Care Physician: Diamante Strange, APRN - CNP      Pre-Procedure: {CARDIAC SYMPTOMS:[id number]::\"sobpericardial effusion    Consent: I have discussed with the patient and/or the patient representative the indication, alternatives, and the possible risks and/or complications of the planned procedure and the anesthesia methods. The patient and/or representative appear to understand and agree to proceed. Medical History:   has a past medical history of Arthritis, Cancer ([facility] [address] CHF (congestive heart failure) ([address] Chronic bronchitis, simple ([address] Chronic respiratory failure with hypoxia ([address] COPD (chronic obstructive pulmonary disease) ([address] GERD (gastroesophageal reflux disease), HTN (hypertension), Pneumonia, Postprocedural pneumothorax, Scoliosis, Sleep apnea, Urinary incontinence, and UTI (urinary tract infection). .    Surgical History:     has a past surgical history that includes Lung biopsy (8/2012) and lobectomy (10/19/2012). Allergies:    Allergies as of 07/18/2019 - Review Complete 07/18/2019   Allergen Reaction Noted    Advil cold & sinus liqui-gels [pseudoephedrine-ibuprofen] Anaphylaxis 07/20/2018    Food Anaphylaxis 07/18/2019    Percocet [oxycodone-acetaminophen] Nausea Only 10/13/2016    Sulfa antibiotics Hives 07/13/2012       Medications:   Coumadin use last 5 days:  No  Antiplatelet drug therapy use last 5 days:  Yes  Other anticoagulant use last 5 days:  No   rOPINIRole  0.25 mg Oral Nightly    furosemide  40 mg Oral Daily    betamethasone dipropionate   Topical Daily    vitamin D  2 tablet Oral Daily    digoxin  125 mcg Oral Daily    pantoprazole  40 mg Oral QAM AC    ferrous sulfate  487.5 mg Oral Daily with breakfast    fluticasone  1 spray Nasal Daily    metoprolol tartrate  25 mg Date: 4/15/2020 4/15/19  Yes Albertina Pérez MD   ferrous sulfate 325 (65 Fe) MG tablet One every other day take with food and vitamin C 500 mg  Patient taking differently: One every other day take with food. 2/28/19  Yes Rikki Dickerson MD   lidocaine-prilocaine (EMLA) 2.5-2.5 % cream Apply topically as needed for Pain Apply topically as needed. Yes Historical Provider, MD   mometasone-formoterol [facility]) 200-5 MCG/ACT inhaler Inhale 2 puffs into the lungs 2 times daily 12/11/17 4/15/20 Yes Kahlil Velasco MD   fluticasone (FLONASE) 50 MCG/ACT nasal spray 1 spray by Nasal route daily 11/8/16  Yes Bert Owens MD   betamethasone dipropionate (DIPROLENE) 0.05 % ointment Apply topically daily. 9/12/16  Yes Bert Owens MD   HYDROcodone-acetaminophen [facility]) 5-325 MG per tablet Take 1 tablet by mouth 3 times daily as needed for Pain 8/10/16  Yes Bert Owens MD       Vital Signs  Vitals:    07/21/19 2157   BP:    Pulse:    Resp: 12   Temp:    SpO2:        Physical:  Heart:  regular rate and rhythm  Lungs:  Clear  Abdomen:  Soft  Mental Status:  Alert and Oriented    Planned Procedure:  {CHP CATH LAB PROCEDURES:20126::pericardiocentesis Sedation/ Anesthesia Plan: Midazolam and Sublimaze    ASA Classification: Class 3 - A patient with severe systemic disease that limits activity but is not incapacitating    Mallampati Airway Classification: II (soft palate, uvula, fauces visible)    · Pre-procedure diagnostic studies complete and results available. · Previous sedation/anesthesia experiences assessed. · The patient is an appropriate candidate to undergo the planned procedure sedation and anesthesia. (Refer to nursing sedation/analgesia documentation record)  · Formulation and discussion of sedation/procedure plan, risks, and expectations with patient and/or responsible adult completed. · Patient examined immediately prior to the procedure.  (Refer to nursing sedation/analgesia documentation record)    Pauline Irene MD  Electronically signed 7/21/2019 at 10:43 PM  Patient Name: Tristan Monteiro   Medical Record Number: [id number]  Date: 7/21/2019   Time: 10:43 PM   Room/Bed: 3B-27/027-A

## 2019-07-22 NOTE — PROGRESS NOTES
height is 5' (1.524 m) and weight is 140 lb 4.8 oz (63.6 kg). Her oral temperature is 97.7 °F (36.5 °C). Her blood pressure is 95/60 and her pulse is 81. Her respiration is 17 and oxygen saturation is 92%. O2 Flow Rate (L/min): 2 L/min    Exam   Physical Exam   General appearance: Pale, sleepy and cooperative. HEENT: Pupils equal, round, and reactive to light. Conjunctivae/corneas clear. Oral mucosa moist  Neck: Supple, with full range of motion. Trachea midline. Respiratory:  Normal respiratory effort. Clear to auscultation, bilaterally without Rales/Wheezes/Rhonchi. Cardiovascular: Regular rate and rhythm with normal S1/S2 without murmurs, rubs or gallops. Has pericardial drain with 300 ml in bag  Abdomen: Soft, non-tender, non-distended with active bowel sounds. Musculoskeletal: No clubbing, cyanosis or edema bilaterally. Full range of motion without deformity. Skin: Skin color, texture, turgor normal.  No rashes or lesions. Neurologic:  Neurovascularly intact without any focal sensory/motor deficits. Cranial nerves: II-XII intact, grossly non-focal.  Psychiatric: Alert and oriented, thought content appropriate, normal insight  Capillary Refill: Brisk,< 3 seconds   Peripheral Pulses: +2 palpable, equal bilaterally        Labs   CBC  Recent Labs     07/21/19  2310   WBC 3.6*   RBC 2.91*   HGB 8.9*   HCT 28.7*   MCV 98.6   MCH 30.6   MCHC 31.0*   *   MPV 10.2      BMP  Recent Labs     07/21/19  2310 07/22/19  1014     --    K 3.4* 4.2   CL 88*  --    CO2 43*  --    BUN 13  --    CREATININE 1.1  --    GLUCOSE 80  --    CALCIUM 8.5  --      LFT  No results for input(s): AST, ALT, ALB, BILITOT, ALKPHOS, LIPASE in the last 72 hours. Invalid input(s): AMYLASE  INR  No results for input(s): INR, PROTIME in the last 72 hours. PTT  No results for input(s): APTT in the last 72 hours.     Radiology        Ct Chest W Contrast    Result Date: 7/11/2019  PROCEDURE: CT CHEST W CONTRAST CLINICAL

## 2019-07-22 NOTE — PROGRESS NOTES
mouth  [DISCONTINUED] vitamin C (VITAMIN C) 500 MG tablet, Take 1 tablet by mouth daily  Diet    DIET CARDIAC; Allergies    Advil cold & sinus liqui-gels [pseudoephedrine-ibuprofen]; Food; Percocet [oxycodone-acetaminophen]; and Sulfa antibiotics  Family History          Problem Relation Age of Onset    Cancer Mother     Heart Disease Father     High Blood Pressure Father     High Cholesterol Father     Arthritis Sister     Heart Disease Sister     Cancer Sister         lung cancer    Heart Disease Sister     Stroke Sister     High Cholesterol Sister     High Blood Pressure Sister     Stroke Paternal Grandfather     High Blood Pressure Brother      Sleep History    Never diagnosed with sleep apnea in the past. She was started on Home BiPAP for her chronic respiratory failure from severe COPD at last admission.     Social History     Social History     Socioeconomic History    Marital status:      Spouse name: Lauren Montano Number of children: 2    Years of education: Not on file    Highest education level: Not on file   Occupational History    Not on file   Social Needs    Financial resource strain: Not on file    Food insecurity:     Worry: Not on file     Inability: Not on file   [employer] needs:     Medical: Not on file     Non-medical: Not on file   Tobacco Use    Smoking status: Former Smoker     Packs/day: 1.00     Years: 45.00     Pack years: 45.00     Types: Cigarettes     Last attempt to quit:      Years since quittin.5    Smokeless tobacco: Never Used   Substance and Sexual Activity    Alcohol use: No    Drug use: No    Sexual activity: Not on file   Lifestyle    Physical activity:     Days per week: Not on file     Minutes per session: Not on file    Stress: Not on file   Relationships    Social connections:     Talks on phone: Not on file     Gets together: Not on file     Attends Spiritism service: Not on file     Active member of club or organization: Not on [facility]   ABG  Lab Results   Component Value Date    PH 7.38 07/18/2019    PO2 87 07/18/2019    PCO2 73 07/18/2019    HCO3 43 07/18/2019    O2SAT 96 07/18/2019     Lab Results   Component Value Date    IFIO2 4 07/18/2019     CBC  Recent Labs     07/21/19  2310   WBC 3.6*   RBC 2.91*   HGB 8.9*   HCT 28.7*   MCV 98.6   MCH 30.6   MCHC 31.0*   *   MPV 10.2      BMP  Recent Labs     07/21/19  2310 07/22/19  1014     --    K 3.4* 4.2   CL 88*  --    CO2 43*  --    BUN 13  --    CREATININE 1.1  --    GLUCOSE 80  --    CALCIUM 8.5  --      LFT  No results for input(s): AST, ALT, ALB, BILITOT, ALKPHOS, LIPASE in the last 72 hours. Invalid input(s): AMYLASE  TROP  Lab Results   Component Value Date    TROPONINT < 0.010 02/24/2019    TROPONINT < 0.010 02/23/2019    TROPONINT 0.011 02/23/2019     BNP  No results for input(s): BNP in the last 72 hours. Lactic Acid  No results for input(s): LACTA in the last 72 hours. INR  No results for input(s): INR, PROTIME in the last 72 hours. PTT  No results for input(s): APTT in the last 72 hours. Glucose  No results for input(s): POCGLU in the last 72 hours. UA   Recent Labs     07/20/19  0500 07/22/19  0900   PHUR 7.5  --    COLORU YELLOW YELLOW   PROTEINU NEGATIVE  --    BLOODU NEGATIVE  --    RBCUA 0-2  --    WBCUA 5-10  --    BACTERIA NONE  --    NITRU NEGATIVE  --    GLUCOSEU NEGATIVE  --    BILIRUBINUR NEGATIVE  --    UROBILINOGEN 0.2  --    KETUA NEGATIVE  --    .    Invalid input(s): CRYSTALS. Results for Lydia Christensen (MRN [id number]) as of 2/25/2019 13:29    Ref.  Range 2/23/2019 10:00   Character, Body Fluid Unknown CLEAR   Glucose, Fluid Latest Units: mg/dl 152   LD, Fluid Latest Units: U/L 54   pH, Fluid Unknown 7.43   Protein, Fluid Latest Units: gm/dl 2.0   Body Fluid RBC Latest Units: /cumm < 2000   Total Nucleated cells Body Fluid Latest Ref Range: 0 - 500 /cumm 65   Total Volume Received Body Fluid Latest Units: ml 80.0      Serum LDH: 154  Serum 11:26 AM     Addendum by Dr. Daxa Anton MD:  I have seen and examined the patient independently. Face to face evaluation and examination was performed. The above evaluation and note has been reviewed. Labs and radiographs were reviewed. I Have discussed with Ms. Marjie Holter, APRN- CNP about this patient in detail. The above assessment and plan has been reviewed. Please see my modifications mentioned below. My modifications:  Not in any distress. Continue nebs.       Theo Meza MD 7/22/2019 4:12 PM

## 2019-07-23 PROCEDURE — APPSS30 APP SPLIT SHARED TIME 16-30 MINUTES: Performed by: NURSE PRACTITIONER

## 2019-07-23 PROCEDURE — 99232 SBSQ HOSP IP/OBS MODERATE 35: CPT | Performed by: INTERNAL MEDICINE

## 2019-07-23 PROCEDURE — 97116 GAIT TRAINING THERAPY: CPT

## 2019-07-23 PROCEDURE — 94760 N-INVAS EAR/PLS OXIMETRY 1: CPT

## 2019-07-23 PROCEDURE — 2140000000 HC CCU INTERMEDIATE R&B

## 2019-07-23 PROCEDURE — 6370000000 HC RX 637 (ALT 250 FOR IP): Performed by: INTERNAL MEDICINE

## 2019-07-23 PROCEDURE — 2580000003 HC RX 258: Performed by: INTERNAL MEDICINE

## 2019-07-23 PROCEDURE — 2709999900 HC NON-CHARGEABLE SUPPLY

## 2019-07-23 PROCEDURE — 99231 SBSQ HOSP IP/OBS SF/LOW 25: CPT | Performed by: NURSE PRACTITIONER

## 2019-07-23 PROCEDURE — 97530 THERAPEUTIC ACTIVITIES: CPT

## 2019-07-23 PROCEDURE — 6360000002 HC RX W HCPCS: Performed by: INTERNAL MEDICINE

## 2019-07-23 PROCEDURE — 97110 THERAPEUTIC EXERCISES: CPT

## 2019-07-23 PROCEDURE — 2700000000 HC OXYGEN THERAPY PER DAY

## 2019-07-23 PROCEDURE — 97535 SELF CARE MNGMENT TRAINING: CPT

## 2019-07-23 PROCEDURE — 6370000000 HC RX 637 (ALT 250 FOR IP): Performed by: NURSE PRACTITIONER

## 2019-07-23 PROCEDURE — 94640 AIRWAY INHALATION TREATMENT: CPT

## 2019-07-23 RX ORDER — POLYETHYLENE GLYCOL 3350 17 G/17G
17 POWDER, FOR SOLUTION ORAL DAILY
Status: DISCONTINUED | OUTPATIENT
Start: 2019-07-23 | End: 2019-07-26 | Stop reason: HOSPADM

## 2019-07-23 RX ADMIN — METOPROLOL TARTRATE 25 MG: 25 TABLET ORAL at 08:50

## 2019-07-23 RX ADMIN — SODIUM CHLORIDE: 9 INJECTION, SOLUTION INTRAVENOUS at 21:46

## 2019-07-23 RX ADMIN — PROMETHAZINE HYDROCHLORIDE 25 MG: 25 TABLET ORAL at 08:50

## 2019-07-23 RX ADMIN — Medication 1 CAPSULE: at 16:38

## 2019-07-23 RX ADMIN — Medication 1 CAPSULE: at 12:03

## 2019-07-23 RX ADMIN — PANTOPRAZOLE SODIUM 40 MG: 40 TABLET, DELAYED RELEASE ORAL at 08:50

## 2019-07-23 RX ADMIN — Medication 2 PUFF: at 20:39

## 2019-07-23 RX ADMIN — ROPINIROLE HYDROCHLORIDE 0.25 MG: 0.25 TABLET, FILM COATED ORAL at 21:00

## 2019-07-23 RX ADMIN — CEFAZOLIN SODIUM 1 G: 1 INJECTION, SOLUTION INTRAVENOUS at 06:25

## 2019-07-23 RX ADMIN — BETAMETHASONE DIPROPIONATE: 0.5 OINTMENT TOPICAL at 08:53

## 2019-07-23 RX ADMIN — CEFAZOLIN SODIUM 1 G: 1 INJECTION, SOLUTION INTRAVENOUS at 15:08

## 2019-07-23 RX ADMIN — TIOTROPIUM BROMIDE 18 MCG: 18 CAPSULE ORAL; RESPIRATORY (INHALATION) at 10:10

## 2019-07-23 RX ADMIN — FLUTICASONE PROPIONATE 1 SPRAY: 50 SPRAY, METERED NASAL at 08:54

## 2019-07-23 RX ADMIN — Medication 2 PUFF: at 10:10

## 2019-07-23 RX ADMIN — DIGOXIN 125 MCG: 125 TABLET ORAL at 08:50

## 2019-07-23 RX ADMIN — HYDROCODONE BITARTRATE AND ACETAMINOPHEN 1 TABLET: 5; 325 TABLET ORAL at 12:15

## 2019-07-23 RX ADMIN — ROPINIROLE HYDROCHLORIDE 0.25 MG: 0.25 TABLET, FILM COATED ORAL at 08:50

## 2019-07-23 RX ADMIN — POLYETHYLENE GLYCOL 3350 17 G: 17 POWDER, FOR SOLUTION ORAL at 12:06

## 2019-07-23 RX ADMIN — METOPROLOL TARTRATE 25 MG: 25 TABLET ORAL at 21:00

## 2019-07-23 RX ADMIN — CEFAZOLIN SODIUM 1 G: 1 INJECTION, SOLUTION INTRAVENOUS at 22:30

## 2019-07-23 RX ADMIN — FUROSEMIDE 40 MG: 40 TABLET ORAL at 08:51

## 2019-07-23 ASSESSMENT — PAIN SCALES - GENERAL
PAINLEVEL_OUTOF10: 8
PAINLEVEL_OUTOF10: 7

## 2019-07-23 ASSESSMENT — PAIN DESCRIPTION - LOCATION: LOCATION: CHEST

## 2019-07-23 ASSESSMENT — PAIN DESCRIPTION - DESCRIPTORS: DESCRIPTORS: SORE

## 2019-07-23 ASSESSMENT — PAIN DESCRIPTION - PAIN TYPE: TYPE: SURGICAL PAIN

## 2019-07-23 ASSESSMENT — PAIN DESCRIPTION - PROGRESSION: CLINICAL_PROGRESSION: NOT CHANGED

## 2019-07-23 ASSESSMENT — PAIN DESCRIPTION - ORIENTATION: ORIENTATION: MID

## 2019-07-23 ASSESSMENT — PAIN - FUNCTIONAL ASSESSMENT: PAIN_FUNCTIONAL_ASSESSMENT: ACTIVITIES ARE NOT PREVENTED

## 2019-07-23 ASSESSMENT — PAIN DESCRIPTION - ONSET: ONSET: ON-GOING

## 2019-07-23 ASSESSMENT — PAIN DESCRIPTION - FREQUENCY: FREQUENCY: CONTINUOUS

## 2019-07-23 NOTE — PROGRESS NOTES
Food; Percocet [oxycodone-acetaminophen]; and Sulfa antibiotics  Social History     Social History     Socioeconomic History    Marital status:      Spouse name: Nishant Stallings Number of children: 2    Years of education: Not on file    Highest education level: Not on file   Occupational History    Not on file   Social Needs    Financial resource strain: Not on file    Food insecurity:     Worry: Not on file     Inability: Not on file   [employer] needs:     Medical: Not on file     Non-medical: Not on file   Tobacco Use    Smoking status: Former Smoker     Packs/day: 1.00     Years: 45.00     Pack years: 45.00     Types: Cigarettes     Last attempt to quit:      Years since quittin.5    Smokeless tobacco: Never Used   Substance and Sexual Activity    Alcohol use: No    Drug use: No    Sexual activity: Not on file   Lifestyle    Physical activity:     Days per week: Not on file     Minutes per session: Not on file    Stress: Not on file   Relationships    Social connections:     Talks on phone: Not on file     Gets together: Not on file     Attends Restoration service: Not on file     Active member of club or organization: Not on file     Attends meetings of clubs or organizations: Not on file     Relationship status: Not on file    Intimate partner violence:     Fear of current or ex partner: Not on file     Emotionally abused: Not on file     Physically abused: Not on file     Forced sexual activity: Not on file   Other Topics Concern    Not on file   Social History Narrative    Not on file     Family History          Problem Relation Age of Onset    Cancer Mother     Heart Disease Father     High Blood Pressure Father     High Cholesterol Father     Arthritis Sister     Heart Disease Sister     Cancer Sister         lung cancer    Heart Disease Sister     Stroke Sister     High Cholesterol Sister     High Blood Pressure Sister     Stroke Paternal Otilia Baird High for input(s): APTT in the last 72 hours. Radiology        Ct Chest W Contrast    Result Date: 7/11/2019  PROCEDURE: CT CHEST W CONTRAST CLINICAL INFORMATION: Malignant neoplasm of lower lobe, right bronchus or lung ([address] Recurrent non-small cell lung cancer (NSCLC) ([facility] [address] . COMPARISON: PET/CT 4/23/2019 and CT chest 2/23/2019 TECHNIQUE: 2-D multiplanar post contrast images of the chest Isovue-370 IV contrast. All CT scans at this facility use dose modulation, iterative reconstruction, and/or weight-based dosing when appropriate to reduce radiation dose to as low as reasonably achievable. FINDINGS: Prior right lower lobectomy. Worsening consolidation and fluid attenuation right lower lobe. Very small left pleural effusion stable compared to the most recent previous PET/CT. There has been interval development of A moderate-sized pericardial effusion. This measures up to 18 mm. Previous focal consolidation anterior left lung has increased and shows denser consolidation, measures 3.6 cm compared to 2 cm on the previous PET/CT. This had minimal FDG hypermetabolism on the PET image. No new masses are identified. No mediastinal adenopathy is identified. No axillary lymphadenopathy. Upper abdomen Stable appearance of the upper abdomen. Stable endplate deformities T6 8 and nine. 1. Interval development of a moderate pericardial effusion. 2. Worsening right lower lobe consolidation and collapse. 3. Increased size of left anterior upper lung focal consolidation somewhat more concerning for additional lung mass. **This report has been created using voice recognition software. It may contain minor errors which are inherent in voice recognition technology. ** Final report electronically signed by Dr. Salvatore Potts on 7/11/2019 9:48 AM    Xr Chest Portable    Result Date: 7/22/2019  PROCEDURE: XR CHEST PORTABLE CLINICAL INFORMATION: s/p cardiocentesis .  COMPARISON: 7/18/2019 TECHNIQUE: Portable supine FINDINGS: There is been pericardial drain placed 7/22  - Awaiting cytology results  - Cultures neg to date    COPD  - at baseline oxygen of 2 L (6 L with activity)  - Leg swelling improved        Case discussed with nurse and patient/family. Questions and concerns addressed.   Plan made in collaboration with Dr. Yahaira Bray    Electronically signed by   JESUS Ro NP on 7/23/2019 at 12:58 PM

## 2019-07-23 NOTE — PLAN OF CARE
Problem: Impaired respiratory status  Goal: Clear lung sounds  7/23/2019 1015 by Pema Chavez RCP  Outcome: Ongoing  Note:   Spiriva and Dulera to improve breath sounds.

## 2019-07-23 NOTE — PROGRESS NOTES
[employer] service: Not on file     Active member of club or organization: Not on file     Attends meetings of clubs or organizations: Not on file     Relationship status: Not on file    Intimate partner violence:     Fear of current or ex partner: Not on file     Emotionally abused: Not on file     Physically abused: Not on file     Forced sexual activity: Not on file   Other Topics Concern    Not on file   Social History Narrative    Not on file     Vitals     height is 5' (1.524 m) and weight is 140 lb 3.2 oz (63.6 kg). Her oral temperature is 97.9 °F (36.6 °C). Her blood pressure is 122/66 and her pulse is 91. Her respiration is 18 and oxygen saturation is 94%. Body mass index is 27.38 kg/m². SUPPLEMENTAL O2: O2 Flow Rate (L/min): 5 L/min       I/O        Intake/Output Summary (Last 24 hours) at 7/23/2019 1039  Last data filed at 7/23/2019 0607  Gross per 24 hour   Intake 1187.37 ml   Output 2075 ml   Net -887.63 ml     I/O last 3 completed shifts: In: 1547.4 [P.O.:880; I.V.:667.4]  Out: 2075 [Urine:1650; Drains:425]   Patient Vitals for the past 96 hrs (Last 3 readings):   Weight   07/23/19 0354 140 lb 3.2 oz (63.6 kg)   07/22/19 0410 140 lb 4.8 oz (63.6 kg)   07/21/19 0352 144 lb 14.4 oz (65.7 kg)       Exam   Physical Exam   Constitutional: No distress on O2 3LPM per NC. Patient appears moderately built and moderately nourished. C/o constipation   Mouth/Throat: Oropharynx is clear and moist.  No oral thrush. Neck: Neck supple. No tracheal deviation present. No JVD  Cardiovascular: HRRR. S1 and S2 with no murmur. No peripheral edema. Pericardial drain in place to drain bag. Pulmonary/Chest: RRR. Normal effort with bilateral air entry, clear breath sounds. No stridor. No respiratory distress. Patient exhibits no tenderness. No wheezing/rales. Mediport right chest  Abdominal: Soft. Bowel sounds audible. No distension or tenderness to palp.    Musculoskeletal: Moves all extremities  Neurological: Patient is alert and oriented to person, place, and time. Skin: Warm and dry. Pale. No cyanosis. No bruising or bleeding. Labs  - Old records and notes have been reviewed in [facility]   ABG  Lab Results   Component Value Date    PH 7.38 07/18/2019    PO2 87 07/18/2019    PCO2 73 07/18/2019    HCO3 43 07/18/2019    O2SAT 96 07/18/2019     Lab Results   Component Value Date    IFIO2 4 07/18/2019     CBC  Recent Labs     07/21/19  2310   WBC 3.6*   RBC 2.91*   HGB 8.9*   HCT 28.7*   MCV 98.6   MCH 30.6   MCHC 31.0*   *   MPV 10.2      BMP  Recent Labs     07/21/19  2310 07/22/19  1014     --    K 3.4* 4.2   CL 88*  --    CO2 43*  --    BUN 13  --    CREATININE 1.1  --    GLUCOSE 80  --    CALCIUM 8.5  --      LFT  No results for input(s): AST, ALT, ALB, BILITOT, ALKPHOS, LIPASE in the last 72 hours. Invalid input(s): AMYLASE  TROP  Lab Results   Component Value Date    TROPONINT < 0.010 02/24/2019    TROPONINT < 0.010 02/23/2019    TROPONINT 0.011 02/23/2019     BNP  No results for input(s): BNP in the last 72 hours. Lactic Acid  No results for input(s): LACTA in the last 72 hours. INR  No results for input(s): INR, PROTIME in the last 72 hours. PTT  No results for input(s): APTT in the last 72 hours. Glucose  No results for input(s): POCGLU in the last 72 hours. UA   Recent Labs     07/22/19  0900   COLORU YELLOW   . Invalid input(s): CRYSTALS. Results for Zack Hudson (MRN [id number]) as of 2/25/2019 13:29    Ref.  Range 2/23/2019 10:00   Character, Body Fluid Unknown CLEAR   Glucose, Fluid Latest Units: mg/dl 152   LD, Fluid Latest Units: U/L 54   pH, Fluid Unknown 7.43   Protein, Fluid Latest Units: gm/dl 2.0   Body Fluid RBC Latest Units: /cumm < 2000   Total Nucleated cells Body Fluid Latest Ref Range: 0 - 500 /cumm 65   Total Volume Received Body Fluid Latest Units: ml 80.0      Serum LDH: 154  Serum Total Protein: 5.6     Total protein ratio: 2.0/5.6=0.36  LD ratio: 54/154= using her BiPAP with poor compliance. 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 Priscilla Bates  -Full David Lulas to keep > 90%  -Titrate supplemental O2 to keep > 90%  -Following cultures. -Dulera 200/5mcg spray MDI, 2 puffs via inhalation BID. -Spiriva 18mcg/Cap DPI. 1Cap via inhalation daily.   -Albuterol 2.5mg nebs Q6h prn for shortness of breath.  -Avoid all sedative meds if she is drowsy. -Miralax 17 gm daily for constipation (pt takes at home) hasn't had BM in almost 1 week  -Will resume Dulera, Albuterol and Spiriva at time of DC  -Oxygen 2LPM at rest 6LPM with activity- DME note done  -BiPAP 14/6 with 2LPM O2 bleed in to used bedtime, daytime napping and PRN respiratory distress ordered and DME note done  - evaluation to restart her BiPAP therapy from [employer]. Order placed for new mask fitting. [facility] home medical equipment to come today to fit for new mask to help aid with compliance.   -Pericardial effusion management per Dr. Ghulam Arzola and Dr. Amanda Story  -DVT Prophylaxis: will defer to primary  -GI Prophylaxis: Protonix  -Stable from pulmonary standpoint  -F/U on 9/3/19 @ 1:30 PM with Jovan Ordoñez CNP at [facility]. CXR prior to appt; ordered in [address] discussed with patient and primary RN  -Meds and orders reviewed. -Questions and concerns addressed. Electronically signed by   JESUS Hollins - CNP on 7/23/2019 at 10:39 AM     Addendum by Dr. Esa Blackburn MD:  I have seen and examined the patient independently. Face to face evaluation and examination was performed. The above evaluation and note has been reviewed. Labs and radiographs were reviewed.    I Have discussed with

## 2019-07-24 LAB
BLOOD CULTURE, ROUTINE: NORMAL
BLOOD CULTURE, ROUTINE: NORMAL

## 2019-07-24 PROCEDURE — 2140000000 HC CCU INTERMEDIATE R&B

## 2019-07-24 PROCEDURE — 6360000002 HC RX W HCPCS: Performed by: INTERNAL MEDICINE

## 2019-07-24 PROCEDURE — 97530 THERAPEUTIC ACTIVITIES: CPT

## 2019-07-24 PROCEDURE — 94760 N-INVAS EAR/PLS OXIMETRY 1: CPT

## 2019-07-24 PROCEDURE — 97110 THERAPEUTIC EXERCISES: CPT

## 2019-07-24 PROCEDURE — APPSS30 APP SPLIT SHARED TIME 16-30 MINUTES: Performed by: NURSE PRACTITIONER

## 2019-07-24 PROCEDURE — 94640 AIRWAY INHALATION TREATMENT: CPT

## 2019-07-24 PROCEDURE — 6370000000 HC RX 637 (ALT 250 FOR IP): Performed by: INTERNAL MEDICINE

## 2019-07-24 PROCEDURE — 2709999900 HC NON-CHARGEABLE SUPPLY

## 2019-07-24 PROCEDURE — 2700000000 HC OXYGEN THERAPY PER DAY

## 2019-07-24 PROCEDURE — 99232 SBSQ HOSP IP/OBS MODERATE 35: CPT | Performed by: INTERNAL MEDICINE

## 2019-07-24 RX ADMIN — CEFAZOLIN SODIUM 1 G: 1 INJECTION, SOLUTION INTRAVENOUS at 16:07

## 2019-07-24 RX ADMIN — HYDROCODONE BITARTRATE AND ACETAMINOPHEN 1 TABLET: 5; 325 TABLET ORAL at 00:46

## 2019-07-24 RX ADMIN — VITAMIN D, TAB 1000IU (100/BT) 2000 UNITS: 25 TAB at 10:01

## 2019-07-24 RX ADMIN — FLUTICASONE PROPIONATE 1 SPRAY: 50 SPRAY, METERED NASAL at 08:43

## 2019-07-24 RX ADMIN — PANTOPRAZOLE SODIUM 40 MG: 40 TABLET, DELAYED RELEASE ORAL at 06:48

## 2019-07-24 RX ADMIN — Medication 2 PUFF: at 09:08

## 2019-07-24 RX ADMIN — BETAMETHASONE DIPROPIONATE: 0.5 OINTMENT TOPICAL at 08:43

## 2019-07-24 RX ADMIN — PROMETHAZINE HYDROCHLORIDE 25 MG: 25 TABLET ORAL at 00:46

## 2019-07-24 RX ADMIN — TIOTROPIUM BROMIDE 18 MCG: 18 CAPSULE ORAL; RESPIRATORY (INHALATION) at 09:07

## 2019-07-24 RX ADMIN — ROPINIROLE HYDROCHLORIDE 0.25 MG: 0.25 TABLET, FILM COATED ORAL at 10:02

## 2019-07-24 RX ADMIN — Medication 1 CAPSULE: at 08:43

## 2019-07-24 RX ADMIN — POTASSIUM CHLORIDE 20 MEQ: 20 TABLET, EXTENDED RELEASE ORAL at 08:43

## 2019-07-24 RX ADMIN — DIGOXIN 125 MCG: 125 TABLET ORAL at 10:02

## 2019-07-24 RX ADMIN — CEFAZOLIN SODIUM 1 G: 1 INJECTION, SOLUTION INTRAVENOUS at 06:50

## 2019-07-24 RX ADMIN — Medication 1 CAPSULE: at 16:07

## 2019-07-24 RX ADMIN — HYDROCODONE BITARTRATE AND ACETAMINOPHEN 1 TABLET: 5; 325 TABLET ORAL at 18:21

## 2019-07-24 RX ADMIN — PROMETHAZINE HYDROCHLORIDE 25 MG: 25 TABLET ORAL at 10:01

## 2019-07-24 RX ADMIN — FUROSEMIDE 40 MG: 40 TABLET ORAL at 08:43

## 2019-07-24 RX ADMIN — FERROUS SULFATE TAB 325 MG (65 MG ELEMENTAL FE) 487.5 MG: 325 (65 FE) TAB at 08:43

## 2019-07-24 RX ADMIN — METOPROLOL TARTRATE 25 MG: 25 TABLET ORAL at 21:13

## 2019-07-24 RX ADMIN — ROPINIROLE HYDROCHLORIDE 0.25 MG: 0.25 TABLET, FILM COATED ORAL at 21:13

## 2019-07-24 RX ADMIN — GUAIFENESIN 600 MG: 600 TABLET, EXTENDED RELEASE ORAL at 21:13

## 2019-07-24 RX ADMIN — METOPROLOL TARTRATE 25 MG: 25 TABLET ORAL at 10:02

## 2019-07-24 RX ADMIN — CEFAZOLIN SODIUM 1 G: 1 INJECTION, SOLUTION INTRAVENOUS at 22:48

## 2019-07-24 ASSESSMENT — PAIN DESCRIPTION - FREQUENCY
FREQUENCY: CONTINUOUS
FREQUENCY: CONTINUOUS

## 2019-07-24 ASSESSMENT — ENCOUNTER SYMPTOMS
SHORTNESS OF BREATH: 1
DIARRHEA: 0
COUGH: 1
NAUSEA: 0
CONSTIPATION: 1
SORE THROAT: 0
SINUS PRESSURE: 0
ABDOMINAL PAIN: 0
SINUS PAIN: 0
WHEEZING: 0
TROUBLE SWALLOWING: 0
PHOTOPHOBIA: 0
ABDOMINAL DISTENTION: 0
BLOOD IN STOOL: 0
VOMITING: 0

## 2019-07-24 ASSESSMENT — PAIN DESCRIPTION - LOCATION
LOCATION: BACK

## 2019-07-24 ASSESSMENT — PAIN SCALES - GENERAL
PAINLEVEL_OUTOF10: 10
PAINLEVEL_OUTOF10: 7
PAINLEVEL_OUTOF10: 6

## 2019-07-24 ASSESSMENT — PAIN DESCRIPTION - PAIN TYPE
TYPE: CHRONIC PAIN

## 2019-07-24 ASSESSMENT — PAIN DESCRIPTION - DESCRIPTORS
DESCRIPTORS: ACHING;CONSTANT;DULL
DESCRIPTORS: ACHING;CONSTANT;DULL

## 2019-07-24 ASSESSMENT — PAIN DESCRIPTION - ONSET
ONSET: ON-GOING
ONSET: ON-GOING

## 2019-07-24 ASSESSMENT — PAIN DESCRIPTION - ORIENTATION
ORIENTATION: MID;LOWER
ORIENTATION: MID;LOWER

## 2019-07-24 ASSESSMENT — PAIN DESCRIPTION - PROGRESSION
CLINICAL_PROGRESSION: NOT CHANGED
CLINICAL_PROGRESSION: NOT CHANGED

## 2019-07-24 NOTE — PLAN OF CARE
Problem: Cardiovascular  Goal: No DVT, peripheral vascular complications  Outcome: Ongoing  Note:   Wearing scds and working with therapy      Problem: Cardiovascular  Goal: Anticoagulate/Hct stable  Outcome: Ongoing     Problem: Respiratory  Goal: No pulmonary complications  Outcome: Ongoing  Note:   On 4L NC at this time, wears 2L at home      Problem:   Goal: Adequate urinary output  Outcome: Ongoing  Note:   Frequent urine output      Problem: Nutrition  Goal: Optimal nutrition therapy  Outcome: Ongoing  Note:   Reports nausea but is eating approprietly      Problem: Skin Integrity/Risk  Goal: No skin breakdown during hospitalization  Outcome: Ongoing  Note:   No new signs of skin breakdown      Problem: Impaired respiratory status  Goal: Clear lung sounds  7/24/2019 0919 by Zhen Gan RCP  Outcome: Ongoing  Note:   Mdi to maintain open airways     Problem: Pain Control  Goal: Improvement in pain related behaviors BP/HR WNL  Outcome: Completed     Problem: Cardiovascular  Goal: Hemodynamic stability  Outcome: Completed     Problem: Pain Control  Goal: Maintain pain level at or below patient's acceptable level (or 5 if patient is unable to determine acceptable level)  Flowsheets (Taken 7/24/2019 1303)  Patient's Stated Pain Goal: 6  Note:   Reports chronic back pain. Goal is 6/10.  Refusing pain medications

## 2019-07-24 NOTE — PLAN OF CARE
Problem: Impaired respiratory status  Goal: Clear lung sounds  7/24/2019 0919 by Mayda Chavez RCP  Outcome: Ongoing  Note:   Mdi to maintain open airways

## 2019-07-24 NOTE — PLAN OF CARE
Chong Osborne RN  Outcome: Ongoing     Problem: Respiratory  Goal: No pulmonary complications  5/75/8727 2150 by Emory Dominguez RN  Outcome: Ongoing  Note:   Diminished lung sounds. Encouraged to cough and deep breathe.   7/23/2019 1706 by Merline Poke, RN  Outcome: Ongoing  Note:   Lungs diminished throughout. Continue to monitor. Slight SOB with exertion. Goal: O2 Sat > 90%  7/23/2019 2150 by Emory Dominguez RN  Outcome: Ongoing  7/23/2019 1706 by Merline Poke, RN  Outcome: Ongoing  Goal: Supplemental O2 requirements decreased  7/23/2019 2150 by Emory Dominguez RN  Outcome: Ongoing  7/23/2019 1706 by Merline Poke, RN  Outcome: Ongoing  Note:   6 liters nasal canula this morning, decreased to 3 liters today  Goal: Agreement to quit smoking  7/23/2019 2150 by Emory Dominguez RN  Outcome: Ongoing  7/23/2019 1706 by Merline Poke, RN  Outcome: Ongoing  Goal: Pneumonia vaccine at discharge as needed  7/23/2019 2150 by Emory Dominguez RN  Outcome: Ongoing  7/23/2019 1706 by Merline Poke, RN  Outcome: Ongoing     Problem:   Goal: Adequate urinary output  7/23/2019 2150 by Emory Dominguez RN  Outcome: Ongoing  Note:   Strict I&Os recorded. 7/23/2019 1706 by Merline Poke, RN  Outcome: Met This Shift  Goal: No urinary complication  1/13/9886 7947 by Emory Dominguez RN  Outcome: Ongoing  7/23/2019 1706 by Merline Poke, RN  Outcome: Met This Shift     Problem: Nutrition  Goal: Optimal nutrition therapy  7/23/2019 2150 by Emory Dominguez RN  Outcome: Ongoing  Note:   Patient has decreased appetite. No nausea currently.    7/23/2019 1706 by Merline Poke, RN  Outcome: Ongoing  Goal: Understanding of nutritional guidelines  7/23/2019 2150 by Emory Dominguez RN  Outcome: Ongoing  7/23/2019 1706 by Merline Poke, RN  Outcome: Ongoing     Problem: Skin Integrity/Risk  Goal: No skin breakdown during hospitalization  7/23/2019 2150 by Emory Dominguez RN  Outcome: Ongoing  Note:   Ongoing assessment & interventions provided throughout shift. Skin assessments provided. Encouraging/assisting patient to turn as needed. No new skin issues noted. 7/23/2019 1706 by Maki Merritt RN  Outcome: Ongoing  Note:   Continue with skin assessment. Goal: Wound healing  7/23/2019 2150 by Analy Crawford RN  Outcome: Ongoing  7/23/2019 1706 by Maki Merritt RN  Outcome: Ongoing     Problem: Musculor/Skeletal Functional Status  Goal: Highest potential functional level  7/23/2019 2150 by Analy Crawford RN  Outcome: Ongoing  Note:   PT/OT on. Patient ambulated in [place] today. 7/23/2019 1706 by Maki Merritt RN  Outcome: Ongoing  Note:   Continue to work with PT/OT. Goal: Absence of falls  7/23/2019 2150 by Analy Crawford RN  Outcome: Ongoing  7/23/2019 1706 by Maki Merritt RN  Outcome: Met This Shift  Note:   Continue with fall precautions. Up with assist.     Care plan reviewed with patient. Patient verbalizes understanding of the care plan and contributed to goal setting.

## 2019-07-24 NOTE — PROGRESS NOTES
PROFILE:  Lab Results   Component Value Date    APTT 28.9 10/17/2012    INR 1.00 07/18/2019       Assessment:     Active Problems:    Metastatic lung carcinoma, left (HCC)    Shortness of breath    Chronic respiratory failure with hypercapnia (HCC)    Abnormal CT of the chest    Pericardial effusion  Resolved Problems:    * No resolved hospital problems.  *      Plan:     no more drainage from pericardial tube    tube removed    afebrile    weak    ambulate    echo  At am   home soon

## 2019-07-24 NOTE — PROGRESS NOTES
output thus far  -Follow up ECHO done showed moderate pericardial effusion  -Chronic respiratory failure with hypoxemia and hypercapnia-Stable  -Severe COPD- under moderate control  -She is on Home BiPAP with 14/6 cm H20. How ever she using her BiPAP with poor compliance. 7/23/19 patient received new mask  -S/P RLL lobectomy with chronic post surgical changes  -Chronic fibrotic right lung/ hemithorax changes poss due to XRT/surgery  -Hx of Small to moderate L pleural effusion-s/p L thoracentesis 300 mL; transudate per Light's Criteria  -L lung cancer bx 2016 pulmonary adenocarcinoma currently followed by Dr. Navarro Azevedo  -Full Jerome Chalk to keep > 90%  -Titrate supplemental O2 to keep > 90%  -Following cultures; Cytology on pericardial fluid (-) malignant cells  -Dulera 200/5mcg spray MDI, 2 puffs via inhalation BID. -Spiriva 18mcg/Cap DPI. 1Cap via inhalation daily.   -Albuterol 2.5mg nebs Q6h prn for shortness of breath.  -Avoid all sedative meds if she is drowsy. -Miralax 17 gm daily for constipation   -Will resume Dulera, Albuterol and Spiriva at time of DC  -Oxygen 2LPM at rest 6LPM with activity- DME note done  -BiPAP 14/6 with 2LPM O2 bleed in to used bedtime, daytime napping and PRN respiratory distress ordered and DME note done  -Patient received new mask yesterday  -Pericardial effusion management per Dr. Austin Pierson and Dr. Claribel Zapata  -DVT Prophylaxis: will defer to primary  -GI Prophylaxis: Protonix  -Stable from pulmonary standpoint  -F/U on 9/3/19 @ 1:30 PM with Anil Adamson CNP at [facility]. CXR prior to appt; ordered in [address] discussed with patient and primary RN  -Meds and orders reviewed. -Questions and concerns addressed. Electronically signed by   JESUS Luis - CNP on 7/24/2019 at 2:15 PM     Addendum by Dr. Mark Corrales MD:  I have seen and examined the patient independently. Face to face evaluation and examination was performed.    The

## 2019-07-25 ENCOUNTER — TELEPHONE (OUTPATIENT)
Dept: ONCOLOGY | Age: 74
End: 2019-07-25

## 2019-07-25 ENCOUNTER — TELEPHONE (OUTPATIENT)
Dept: INTERNAL MEDICINE CLINIC | Age: 74
End: 2019-07-25

## 2019-07-25 VITALS
DIASTOLIC BLOOD PRESSURE: 79 MMHG | RESPIRATION RATE: 18 BRPM | HEIGHT: 60 IN | HEART RATE: 90 BPM | OXYGEN SATURATION: 95 % | BODY MASS INDEX: 27.92 KG/M2 | TEMPERATURE: 98.1 F | WEIGHT: 142.2 LBS | SYSTOLIC BLOOD PRESSURE: 145 MMHG

## 2019-07-25 LAB — POTASSIUM SERPL-SCNC: 3.9 MEQ/L (ref 3.5–5.2)

## 2019-07-25 PROCEDURE — 2709999900 HC NON-CHARGEABLE SUPPLY

## 2019-07-25 PROCEDURE — 97110 THERAPEUTIC EXERCISES: CPT

## 2019-07-25 PROCEDURE — 2700000000 HC OXYGEN THERAPY PER DAY

## 2019-07-25 PROCEDURE — APPSS30 APP SPLIT SHARED TIME 16-30 MINUTES: Performed by: NURSE PRACTITIONER

## 2019-07-25 PROCEDURE — 6370000000 HC RX 637 (ALT 250 FOR IP): Performed by: INTERNAL MEDICINE

## 2019-07-25 PROCEDURE — 97116 GAIT TRAINING THERAPY: CPT

## 2019-07-25 PROCEDURE — 84132 ASSAY OF SERUM POTASSIUM: CPT

## 2019-07-25 PROCEDURE — 99232 SBSQ HOSP IP/OBS MODERATE 35: CPT | Performed by: INTERNAL MEDICINE

## 2019-07-25 PROCEDURE — 6370000000 HC RX 637 (ALT 250 FOR IP): Performed by: NURSE PRACTITIONER

## 2019-07-25 PROCEDURE — 94640 AIRWAY INHALATION TREATMENT: CPT

## 2019-07-25 PROCEDURE — 97530 THERAPEUTIC ACTIVITIES: CPT

## 2019-07-25 PROCEDURE — 6360000002 HC RX W HCPCS: Performed by: INTERNAL MEDICINE

## 2019-07-25 PROCEDURE — 93307 TTE W/O DOPPLER COMPLETE: CPT

## 2019-07-25 RX ORDER — POTASSIUM CHLORIDE 20 MEQ/1
20 TABLET, EXTENDED RELEASE ORAL ONCE
Status: COMPLETED | OUTPATIENT
Start: 2019-07-25 | End: 2019-07-25

## 2019-07-25 RX ADMIN — TIOTROPIUM BROMIDE 18 MCG: 18 CAPSULE ORAL; RESPIRATORY (INHALATION) at 07:47

## 2019-07-25 RX ADMIN — POTASSIUM CHLORIDE 20 MEQ: 20 TABLET, EXTENDED RELEASE ORAL at 19:33

## 2019-07-25 RX ADMIN — CEFAZOLIN SODIUM 1 G: 1 INJECTION, SOLUTION INTRAVENOUS at 07:29

## 2019-07-25 RX ADMIN — Medication 1 CAPSULE: at 19:33

## 2019-07-25 RX ADMIN — Medication 2 PUFF: at 07:46

## 2019-07-25 RX ADMIN — FUROSEMIDE 40 MG: 40 TABLET ORAL at 10:20

## 2019-07-25 RX ADMIN — FLUTICASONE PROPIONATE 1 SPRAY: 50 SPRAY, METERED NASAL at 10:21

## 2019-07-25 RX ADMIN — POLYETHYLENE GLYCOL 3350 17 G: 17 POWDER, FOR SOLUTION ORAL at 10:19

## 2019-07-25 RX ADMIN — PANTOPRAZOLE SODIUM 40 MG: 40 TABLET, DELAYED RELEASE ORAL at 07:29

## 2019-07-25 RX ADMIN — Medication 1 CAPSULE: at 14:05

## 2019-07-25 RX ADMIN — METOPROLOL TARTRATE 25 MG: 25 TABLET ORAL at 10:21

## 2019-07-25 RX ADMIN — ROPINIROLE HYDROCHLORIDE 0.25 MG: 0.25 TABLET, FILM COATED ORAL at 10:21

## 2019-07-25 RX ADMIN — FERROUS SULFATE TAB 325 MG (65 MG ELEMENTAL FE) 487.5 MG: 325 (65 FE) TAB at 10:19

## 2019-07-25 RX ADMIN — Medication 1 CAPSULE: at 10:20

## 2019-07-25 RX ADMIN — DIGOXIN 125 MCG: 125 TABLET ORAL at 10:19

## 2019-07-25 ASSESSMENT — PAIN DESCRIPTION - FREQUENCY: FREQUENCY: CONTINUOUS

## 2019-07-25 ASSESSMENT — PAIN DESCRIPTION - ONSET: ONSET: ON-GOING

## 2019-07-25 ASSESSMENT — PAIN DESCRIPTION - DESCRIPTORS: DESCRIPTORS: ACHING;DULL;CONSTANT

## 2019-07-25 ASSESSMENT — PAIN - FUNCTIONAL ASSESSMENT: PAIN_FUNCTIONAL_ASSESSMENT: ACTIVITIES ARE NOT PREVENTED

## 2019-07-25 ASSESSMENT — PAIN SCALES - GENERAL
PAINLEVEL_OUTOF10: 0
PAINLEVEL_OUTOF10: 0
PAINLEVEL_OUTOF10: 6

## 2019-07-25 ASSESSMENT — PAIN DESCRIPTION - PROGRESSION: CLINICAL_PROGRESSION: NOT CHANGED

## 2019-07-25 ASSESSMENT — PAIN DESCRIPTION - LOCATION: LOCATION: BACK

## 2019-07-25 ASSESSMENT — PAIN DESCRIPTION - ORIENTATION: ORIENTATION: MID

## 2019-07-25 ASSESSMENT — PAIN DESCRIPTION - PAIN TYPE: TYPE: CHRONIC PAIN

## 2019-07-25 NOTE — PROGRESS NOTES
Musculoskeletal: Moves all extremities; no cyanosis or clubbing  Neurological: Patient is alert and oriented to person, place, and time. Skin: Warm and dry. Pale. No bruising or bleeding. Labs  - Old records and notes have been reviewed in [facility]   ABG  Lab Results   Component Value Date    PH 7.38 07/18/2019    PO2 87 07/18/2019    PCO2 73 07/18/2019    HCO3 43 07/18/2019    O2SAT 96 07/18/2019     Lab Results   Component Value Date    IFIO2 4 07/18/2019     CBC  No results for input(s): WBC, RBC, HGB, HCT, MCV, MCH, MCHC, RDW, PLT, MPV in the last 72 hours. BMP  Recent Labs     07/22/19  1014   K 4.2     LFT  No results for input(s): AST, ALT, ALB, BILITOT, ALKPHOS, LIPASE in the last 72 hours. Invalid input(s): AMYLASE  TROP  Lab Results   Component Value Date    TROPONINT < 0.010 02/24/2019    TROPONINT < 0.010 02/23/2019    TROPONINT 0.011 02/23/2019     BNP  No results for input(s): BNP in the last 72 hours. Lactic Acid  No results for input(s): LACTA in the last 72 hours. INR  No results for input(s): INR, PROTIME in the last 72 hours. PTT  No results for input(s): APTT in the last 72 hours. Glucose  No results for input(s): POCGLU in the last 72 hours. UA   No results for input(s): SPECGRAV, PHUR, COLORU, CLARITYU, MUCUS, PROTEINU, BLOODU, RBCUA, WBCUA, BACTERIA, NITRU, GLUCOSEU, BILIRUBINUR, UROBILINOGEN, KETUA, LABCAST, LABCASTTY, AMORPHOS in the last 72 hours. Invalid input(s): CRYSTALS. Invalid input(s): CRYSTALS. Results for Augusta Kurtz (MRN [id number]) as of 2/25/2019 13:29    Ref.  Range 2/23/2019 10:00   Character, Body Fluid Unknown CLEAR   Glucose, Fluid Latest Units: mg/dl 152   LD, Fluid Latest Units: U/L 54   pH, Fluid Unknown 7.43   Protein, Fluid Latest Units: gm/dl 2.0   Body Fluid RBC Latest Units: /cumm < 2000   Total Nucleated cells Body Fluid Latest Ref Range: 0 - 500 /cumm 65   Total Volume Received Body Fluid Latest Units: ml 80.0      Serum LDH: 154  Serum control  -She is on Home BiPAP with 14/6 cm H20. How ever she using her BiPAP with poor compliance. 7/23/19 patient received new mask  -S/P RLL lobectomy with chronic post surgical changes.  -Chronic fibrotic right lung/ hemithorax changes poss due to XRT/surgery  -Hx of Small to moderate L pleural effusion-s/p L thoracentesis 300 mL; transudate per Light's Criteria  -L lung cancer bx 2016 pulmonary adenocarcinoma currently followed by Dr. Cherry Murillo to keep > 90%  -Titrate supplemental O2 to keep > 90%  -Following cultures; Cytology on pericardial fluid (-) malignant cells  -Dulera 200/5mcg spray MDI, 2 puffs via inhalation BID. -Spiriva 18mcg/Cap DPI. 1Cap via inhalation daily.   -Albuterol 2.5mg nebs Q6h prn for shortness of breath.  -Avoid all sedative meds if she is drowsy. -Miralax 17 gm daily for constipation   -Will resume Dulera, Albuterol and Spiriva at time of DC  -Oxygen 2LPM at rest 6LPM with activity- DME note done  -To use home BiPAP 14/6 with 2LPM O2 bleed in to used bedtime, daytime napping and PRN respiratory distress   -Patient received new mask yesterday  -Pericardial effusion management per Dr. Maria T Hopson and Dr. Christine Browning  -DVT Prophylaxis: will defer to primary  -GI Prophylaxis: Protonix  -Stable from pulmonary standpoint  -F/U on 9/3/19 @ 1:30 PM with Antoni Oliva CNP at [facility]. CXR prior to appt; ordered in Epic  -No samples at this time in the office to give to patient  -OK to discharge from pulmonary     -Plan of care discussed with patient and primary RN  -Meds and orders reviewed. -Questions and concerns addressed. Electronically signed by   JESUS Almaraz - CNP on 7/25/2019 at 9:43 AM     Addendum by Dr. Sol Blum MD:  I have seen and examined the patient independently. Face to face evaluation and examination was performed. The above evaluation and note has been reviewed. Labs and radiographs were reviewed.    I Have discussed with Ms.Lianne Valdes, APRN- CNP about this patient in detail. The above assessment and plan has been reviewed. Please see my modifications mentioned below. My modifications:  She is on 2LPM via nasal cannula. Feels better. Pericardial fluid is negative for malignant cells. Follow up as above.     Iman Stern MD 7/25/2019 5:19 PM

## 2019-07-25 NOTE — PROGRESS NOTES
Admit Date: 7/18/2019  Hospital day 1    Subjective:     Patient sob . Medication side effects: none    Scheduled Meds:   polyethylene glycol  17 g Oral Daily    potassium (CARDIAC) replacement protocol   Other RX Placeholder    rOPINIRole  0.25 mg Oral BID    furosemide  40 mg Oral Daily    betamethasone dipropionate   Topical Daily    vitamin D  2 tablet Oral Daily    digoxin  125 mcg Oral Daily    pantoprazole  40 mg Oral QAM AC    ferrous sulfate  487.5 mg Oral Daily with breakfast    fluticasone  1 spray Nasal Daily    metoprolol tartrate  25 mg Oral BID    mometasone-formoterol  2 puff Inhalation BID    potassium chloride  20 mEq Oral Once per day on Mon Wed Fri    lactobacillus  1 capsule Oral TID    tiotropium  18 mcg Inhalation Daily     Continuous Infusions:   sodium chloride 20 mL/hr at 07/23/19 2146     PRN Meds:heparin flush (100 units/mL), albuterol, guaiFENesin, lidocaine-prilocaine, HYDROcodone-acetaminophen, promethazine    Review of Systems  Pertinent items are noted in HPI. Objective:     Patient Vitals for the past 8 hrs:   BP Temp Temp src Pulse Resp SpO2   07/25/19 1707 (!) 145/79 98.1 °F (36.7 °C) Oral 90 18 95 %   07/25/19 1153 137/66 98 °F (36.7 °C) Oral 82 18 94 %   07/25/19 1019 115/68 97.8 °F (36.6 °C) Oral 92 18 93 %     I/O last 3 completed shifts: In: 1062.8 [P.O.:470; I.V.:592.8]  Out: 15 [Drains:15]    No change     ECG: normal sinus rhythm, no blocks or conduction defects, no ischemic changes.    Data Review:   CBC:  Lab Results   Component Value Date    WBC 3.6 07/21/2019    RBC 2.91 07/21/2019    HGB 8.9 07/21/2019    HCT 28.7 07/21/2019    MCV 98.6 07/21/2019    MCH 30.6 07/21/2019    MCHC 31.0 07/21/2019    RDW 14.4 07/18/2019     07/21/2019    MPV 10.2 07/21/2019     BMP  Lab Results   Component Value Date     07/21/2019    K 3.9 07/25/2019    K 3.8 10/15/2018    CL 88 07/21/2019    CO2 43 07/21/2019    BUN 13 07/21/2019    CREATININE 1.1 07/21/2019    CALCIUM 8.5 07/21/2019      COAG PROFILE:  Lab Results   Component Value Date    APTT 28.9 10/17/2012    INR 1.00 07/18/2019       Assessment:     Active Problems:    Metastatic lung carcinoma, left (HCC)    Shortness of breath    Chronic respiratory failure with hypercapnia (HCC)    Abnormal CT of the chest    Pericardial effusion  Resolved Problems:    * No resolved hospital problems.  *      Plan:     **still very sob    will get pulmonary service to see    possible chf    pericardial effusion    will shawn echo    anaemia   Get cardiovascular consult    continue supportive rx *

## 2019-07-25 NOTE — CARE COORDINATION
7/25/19, 2:51 PM    DISCHARGE BARRIERS  SW called [facility] and spoke with Earline. Gave them permission to [facility] an ambulette to [facility] back home. DEBRA made Sadia Booker RN aware she will just have to call them when they have a time that Alexi Single will be ready for transport.

## 2019-07-25 NOTE — PROGRESS NOTES
[facility] [address] [place] [address]  Occupational Therapy  Daily Note  Time:  Time In: 6481  Time Out: 1038  Timed Code Treatment Minutes: 23 Minutes  Minutes: 23          Date: 2019  Patient Name: Tristan Monteiro,   Gender: female      Room: [facility]27/027-A  MRN: [id number]  : 1945  (76 y.o.)  Referring Practitioner: Dr. Madeline Gandhi  Diagnosis: SOB  Additional Pertinent Hx: Per ER note on 19: 76 y.o. female who was sent from Gopi Ingram MD- Medical Oncologist office today to for further evaluation of pericardial effusion noted during recent follow up CT chest. She also gives a hx of  worsening of shortness of breath for the last 2 weeks with associated leg swelling. She is occasional cough with clear sputum  production. She was evaluated in the Emergency room by ER physician and admitted under hospitalist service for further evaluation. Restrictions/Precautions:  Restrictions/Precautions: Fall Risk  Position Activity Restriction  Other position/activity restrictions: 4L O2 on 19    SUBJECTIVE: pt lying in bed when arrived. Pt agreeable to OT     PAIN: 410:     COGNITION: WFL    ADL:   No ADL's completed this session. .  Pt declined all ADLS     BALANCE:  Sitting Balance:  Stand By Assistance  Standing Balance: Stand By Assistance    BED MOBILITY:  Supine to Sit: Stand By Assistance use of bedrails  Sit to Supine: Stand By Assistance use of bedrails     TRANSFERS:  Sit to Stand:  Stand By Assistance. from EOB   Stand to Sit: Stand By Assistance. to EOB     FUNCTIONAL MOBILITY:  Assistive Device: Rolling Walker  Assist Level:  Stand By Assistance.    Distance: pt ambulated with RW with no LOB, good psoture throughout        ADDITIONAL ACTIVITIES:  .Pt completed BUE strengthening exercises x15 reps x1 set this date with a min resistive band  in all joints and all planes in order to increase strength and improve activity tolerance required for functional toilet & shower transfers and ADL

## 2019-07-25 NOTE — PROGRESS NOTES
1500:  Dr. Juhi Lane office stated she is ok with discharge and to have the patient call their office tomorrow or the first day she is able to call them. Diane Araujo, NP with pulmonology rounded and stated they are ok with discharge and there is already a follow up scheduled. appt made with Dr. Araceli Friend office for the patient. appt also made with PCP and Dr. Alexandre Og gave verbal consent over the phone for home health orders for pt/ot and nursing with pcp to follow. 1905:  Called Dr. Alexandre Og as there are some meds on the med rec that were not addressed. He had Mirela from cath lab call me back and she stated he said have the doctors address the meds and to have me resume the home meds. Kam Gregg let him know since he had already started it he would need to finish it and he stated to tell the patient to resume what she was taking at home. I handed the phone to the night nurse that will be discharging this patient and Kam Gregg repeated this information to Magan Huff RN. Also, when Dr. Alexandre Og was here I asked him if he wanted any special orders given regarding how to care for the pericardial effusion line site and he stated no. I asked him if she could shower and he stated yes. I updated the night nurse with this information. I also spoke with 5K nurse earlier to de access the medi port, which she did complete.      1945: Patient had family come pick her up, the ambulette is not needed, called [facility] to update them and they stated she is not on her list.

## 2019-07-26 NOTE — H&P
[address] [place] [address]                              HISTORY AND PHYSICAL    PATIENT NAME: James Mar                      :        1945  MED REC NO:   [id number]                           ROOM:       [id number]  ACCOUNT NO:   [de-identified]                           ADMIT DATE: 2019  PROVIDER:     Dax Ferguson. Minda Casillas M.D. REASON FOR EVALUATION:  Shortness of breath and pericardial effusion. HISTORY OF PRESENT ILLNESS:  This is a 72-year-old nice lady who  unfortunately had history of CA of the lung of the right base and  history of resection and also recurrent in the left upper lobe. The  patient has been getting short of breath and she had a CAT scan, did  show pericardial effusion. She was admitted to the hospital with that. The patient was short of breath. She is not aware of any history of  coronary artery disease or MI in the past.  She denied palpitations,  dizziness, or syncopal episodes. She had generalized weakness. She  uses a walker. The patient, however, has been able to perform her daily  activity. She denies PND or orthopnea. REVIEW OF SYSTEMS:  She has no history of CVA. She has no history of  thyroid disease. She has a history of COPD, history of CA of the lung  with resection of the right lower lobe and is recurrent. She has a  new-onset pericardial effusion and shortness of breath. She has a  history of COPD. She has a history of gastroesophageal reflux. She has  a history of pancytopenia and anemia.  _____ could be related to  chemotherapy. The patient has no active GI bleed. She denied  claudication. She denied fever, chills, or rigors. She had generalized  fatigue and shortness of breath. She is not aware of diabetes. SOCIAL HISTORY:  She is an ex-smoker. Denies alcohol or recreational  drug abuse. ALLERGIES:  The patient has intolerance to ADVIL.     FAMILY HISTORY: failure, on  oxygen supplement with utilization of expiratory muscles and the patient  will be seen by pulmonary service. 3.  The patient has been on chemotherapy. 4.  History of scoliosis of the dorsal spine. 5.  History of sleep apnea, utilizing CPAP. 6.  The patient will have echo and the patient needs to have evaluation  and treatment of her pericardial effusion. Get the supportive treatment  from pulmonary service, oncology service. The patient understands that her overall prognosis is not the greatest.        Ross Suazo M.D.    D: 07/25/2019 17:33:01       T: 07/25/2019 22:27:10     [id number]  Job#: [id number]     Doc#: [id number]    CC:  Nehemiah Blue M.D.        Referring Services

## 2019-07-27 LAB
AEROBIC CULTURE: NORMAL
ANAEROBIC CULTURE: NORMAL
GRAM STAIN RESULT: NORMAL

## 2019-07-29 ENCOUNTER — TELEPHONE (OUTPATIENT)
Dept: ONCOLOGY | Age: 74
End: 2019-07-29

## 2019-08-12 DIAGNOSIS — C34.31 MALIGNANT NEOPLASM OF LOWER LOBE, RIGHT BRONCHUS OR LUNG (HCC): Primary | ICD-10-CM

## 2019-08-13 ENCOUNTER — HOSPITAL ENCOUNTER (OUTPATIENT)
Dept: INFUSION THERAPY | Age: 74
Discharge: HOME | End: 2019-08-13
Payer: MEDICARE

## 2019-08-13 ENCOUNTER — OFFICE VISIT (OUTPATIENT)
Dept: ONCOLOGY | Age: 74
End: 2019-08-13
Payer: MEDICARE

## 2019-08-13 VITALS
HEIGHT: 60 IN | WEIGHT: 142.4 LBS | HEART RATE: 80 BPM | BODY MASS INDEX: 27.96 KG/M2 | OXYGEN SATURATION: 96 % | SYSTOLIC BLOOD PRESSURE: 120 MMHG | DIASTOLIC BLOOD PRESSURE: 57 MMHG | RESPIRATION RATE: 18 BRPM | TEMPERATURE: 98.5 F

## 2019-08-13 VITALS
RESPIRATION RATE: 18 BRPM | HEART RATE: 80 BPM | OXYGEN SATURATION: 96 % | TEMPERATURE: 98.5 F | DIASTOLIC BLOOD PRESSURE: 57 MMHG | SYSTOLIC BLOOD PRESSURE: 120 MMHG

## 2019-08-13 DIAGNOSIS — Z99.81 OXYGEN DEPENDENT: ICD-10-CM

## 2019-08-13 DIAGNOSIS — C78.02 METASTATIC LUNG CARCINOMA, LEFT (HCC): ICD-10-CM

## 2019-08-13 DIAGNOSIS — I31.39 PERICARDIAL EFFUSION: ICD-10-CM

## 2019-08-13 DIAGNOSIS — J44.9 STAGE 3 SEVERE COPD BY GOLD CLASSIFICATION (HCC): ICD-10-CM

## 2019-08-13 DIAGNOSIS — D64.9 ANEMIA, UNSPECIFIED TYPE: ICD-10-CM

## 2019-08-13 DIAGNOSIS — C34.31 MALIGNANT NEOPLASM OF LOWER LOBE, RIGHT BRONCHUS OR LUNG (HCC): Primary | ICD-10-CM

## 2019-08-13 DIAGNOSIS — J43.9 PULMONARY EMPHYSEMA, UNSPECIFIED EMPHYSEMA TYPE (HCC): ICD-10-CM

## 2019-08-13 DIAGNOSIS — J96.21 ACUTE ON CHRONIC RESPIRATORY FAILURE WITH HYPOXIA (HCC): ICD-10-CM

## 2019-08-13 DIAGNOSIS — J96.21 ACUTE ON CHRONIC RESPIRATORY FAILURE WITH HYPOXEMIA (HCC): ICD-10-CM

## 2019-08-13 DIAGNOSIS — C34.91 PRIMARY LUNG CANCER, RIGHT (HCC): ICD-10-CM

## 2019-08-13 DIAGNOSIS — C34.90 RECURRENT NON-SMALL CELL LUNG CANCER (NSCLC) (HCC): ICD-10-CM

## 2019-08-13 DIAGNOSIS — R91.1 NODULE OF LEFT LUNG: ICD-10-CM

## 2019-08-13 DIAGNOSIS — C34.31 SQUAMOUS CELL CARCINOMA OF BRONCHUS IN RIGHT LOWER LOBE (HCC): ICD-10-CM

## 2019-08-13 DIAGNOSIS — Z85.118 H/O: LUNG CANCER: ICD-10-CM

## 2019-08-13 LAB
ALBUMIN SERPL-MCNC: 4.1 G/DL (ref 3.5–5.1)
ALP BLD-CCNC: 62 U/L (ref 38–126)
ALT SERPL-CCNC: 49 U/L (ref 11–66)
AST SERPL-CCNC: 64 U/L (ref 5–40)
BILIRUB SERPL-MCNC: 0.2 MG/DL (ref 0.3–1.2)
BILIRUBIN DIRECT: < 0.2 MG/DL (ref 0–0.3)
BUN, WHOLE BLOOD: 20 MG/DL (ref 8–26)
CHLORIDE, WHOLE BLOOD: 90 MEQ/L (ref 98–109)
CREATININE, WHOLE BLOOD: 1.3 MG/DL (ref 0.5–1.2)
GFR, ESTIMATED: 42 ML/MIN/1.73M2
GLUCOSE, WHOLE BLOOD: 78 MG/DL (ref 70–108)
HCT VFR BLD CALC: 25.9 % (ref 37–47)
HEMOGLOBIN: 8.7 GM/DL (ref 12–16)
IONIZED CALCIUM, WHOLE BLOOD: 1.12 MMOL/L (ref 1.12–1.32)
MCH RBC QN AUTO: 30.3 PG (ref 27–31)
MCHC RBC AUTO-ENTMCNC: 33.5 GM/DL (ref 33–37)
MCV RBC AUTO: 90 FL (ref 81–99)
PDW BLD-RTO: 14.2 % (ref 11.5–14.5)
PLATELET # BLD: 151 THOU/MM3 (ref 130–400)
PMV BLD AUTO: 6.8 FL (ref 7.4–10.4)
POTASSIUM, WHOLE BLOOD: 4 MEQ/L (ref 3.5–4.9)
RBC # BLD: 2.86 MILL/MM3 (ref 4.2–5.4)
SEG NEUTROPHILS: 67 % (ref 43–75)
SEGMENTED NEUTROPHILS ABSOLUTE COUNT: 2.4 THOU/MM3 (ref 1.8–7.7)
SODIUM, WHOLE BLOOD: 138 MEQ/L (ref 138–146)
TOTAL CO2, WHOLE BLOOD: 39 MEQ/L (ref 23–33)
TOTAL PROTEIN: 6.6 G/DL (ref 6.1–8)
WBC # BLD: 3.6 THOU/MM3 (ref 4.8–10.8)

## 2019-08-13 PROCEDURE — 6360000002 HC RX W HCPCS: Performed by: INTERNAL MEDICINE

## 2019-08-13 PROCEDURE — 3023F SPIROM DOC REV: CPT | Performed by: INTERNAL MEDICINE

## 2019-08-13 PROCEDURE — 1111F DSCHRG MED/CURRENT MED MERGE: CPT | Performed by: INTERNAL MEDICINE

## 2019-08-13 PROCEDURE — 80076 HEPATIC FUNCTION PANEL: CPT

## 2019-08-13 PROCEDURE — G8419 CALC BMI OUT NRM PARAM NOF/U: HCPCS | Performed by: INTERNAL MEDICINE

## 2019-08-13 PROCEDURE — 80047 BASIC METABLC PNL IONIZED CA: CPT

## 2019-08-13 PROCEDURE — 99211 OFF/OP EST MAY X REQ PHY/QHP: CPT

## 2019-08-13 PROCEDURE — 1090F PRES/ABSN URINE INCON ASSESS: CPT | Performed by: INTERNAL MEDICINE

## 2019-08-13 PROCEDURE — 4040F PNEUMOC VAC/ADMIN/RCVD: CPT | Performed by: INTERNAL MEDICINE

## 2019-08-13 PROCEDURE — G8926 SPIRO NO PERF OR DOC: HCPCS | Performed by: INTERNAL MEDICINE

## 2019-08-13 PROCEDURE — 1036F TOBACCO NON-USER: CPT | Performed by: INTERNAL MEDICINE

## 2019-08-13 PROCEDURE — 1123F ACP DISCUSS/DSCN MKR DOCD: CPT | Performed by: INTERNAL MEDICINE

## 2019-08-13 PROCEDURE — 85027 COMPLETE CBC AUTOMATED: CPT

## 2019-08-13 PROCEDURE — 2580000003 HC RX 258: Performed by: INTERNAL MEDICINE

## 2019-08-13 PROCEDURE — G8400 PT W/DXA NO RESULTS DOC: HCPCS | Performed by: INTERNAL MEDICINE

## 2019-08-13 PROCEDURE — 3017F COLORECTAL CA SCREEN DOC REV: CPT | Performed by: INTERNAL MEDICINE

## 2019-08-13 PROCEDURE — G8427 DOCREV CUR MEDS BY ELIG CLIN: HCPCS | Performed by: INTERNAL MEDICINE

## 2019-08-13 PROCEDURE — 99214 OFFICE O/P EST MOD 30 MIN: CPT | Performed by: INTERNAL MEDICINE

## 2019-08-13 PROCEDURE — 36591 DRAW BLOOD OFF VENOUS DEVICE: CPT

## 2019-08-13 RX ORDER — SODIUM CHLORIDE 0.9 % (FLUSH) 0.9 %
10 SYRINGE (ML) INJECTION PRN
Status: DISCONTINUED | OUTPATIENT
Start: 2019-08-13 | End: 2019-08-14 | Stop reason: HOSPADM

## 2019-08-13 RX ORDER — HEPARIN SODIUM (PORCINE) LOCK FLUSH IV SOLN 100 UNIT/ML 100 UNIT/ML
500 SOLUTION INTRAVENOUS PRN
Status: DISCONTINUED | OUTPATIENT
Start: 2019-08-13 | End: 2019-08-14 | Stop reason: HOSPADM

## 2019-08-13 RX ORDER — SODIUM CHLORIDE 0.9 % (FLUSH) 0.9 %
10 SYRINGE (ML) INJECTION PRN
Status: CANCELLED | OUTPATIENT
Start: 2019-08-13

## 2019-08-13 RX ORDER — HEPARIN SODIUM (PORCINE) LOCK FLUSH IV SOLN 100 UNIT/ML 100 UNIT/ML
500 SOLUTION INTRAVENOUS PRN
Status: CANCELLED | OUTPATIENT
Start: 2019-08-13

## 2019-08-13 RX ORDER — SODIUM CHLORIDE 0.9 % (FLUSH) 0.9 %
20 SYRINGE (ML) INJECTION PRN
Status: CANCELLED | OUTPATIENT
Start: 2019-08-13

## 2019-08-13 RX ORDER — SODIUM CHLORIDE 0.9 % (FLUSH) 0.9 %
20 SYRINGE (ML) INJECTION PRN
Status: DISCONTINUED | OUTPATIENT
Start: 2019-08-13 | End: 2019-08-14 | Stop reason: HOSPADM

## 2019-08-13 RX ADMIN — Medication 500 UNITS: at 09:29

## 2019-08-13 RX ADMIN — Medication 20 ML: at 08:26

## 2019-08-13 RX ADMIN — Medication 10 ML: at 08:25

## 2019-08-13 RX ADMIN — Medication 10 ML: at 09:29

## 2019-08-13 ASSESSMENT — ENCOUNTER SYMPTOMS
CONSTIPATION: 0
CHEST TIGHTNESS: 0
WHEEZING: 0
DIARRHEA: 0
COLOR CHANGE: 0
BACK PAIN: 0
TROUBLE SWALLOWING: 0
VOMITING: 0
SHORTNESS OF BREATH: 1
NAUSEA: 0
COUGH: 1
BLOOD IN STOOL: 0
EYE DISCHARGE: 0
ABDOMINAL DISTENTION: 0
ABDOMINAL PAIN: 0
SORE THROAT: 0
FACIAL SWELLING: 0
RECTAL PAIN: 0

## 2019-08-13 NOTE — PROGRESS NOTES
of a lung nodule for curative intent. She was deemed nonsurgical by Dr. Indiana Vale, therefore she received stereotactic ablative radiation therapy to the left lung mass. SBRT completed on 01/11/2017. The patient had restaging  CT chest  on 04/19/2018 that showed new irregular 10 x 19 mm left upper lobe nodule that increased in size since 10/2017. She started chemo with single agent Taxotere. After seven cycles Taxotere was discontinued due to side effects,  losing her toenails and her fingernails. Lung biopsy from 2016 was tested for PD-L1, it showed high expression, Tumor Proportion Score:  81-90%. KRAS was detected. EGFR not detected. Her treatment was changed to Tecentriq on 12/18/2018. The patient has had Tecentriq twice. She is a former smoker with severe COPD requiring home O2 supplementation by nasal cannula oxygen at 4 L. The patient was hospitalized from 02/23/2019 till 02/28/2018 for acute on chronic respiratory failure with hypoxemia and hypercapnia. CTA showed worsening right lung consolidation, differential diagnosis included community acquired pneumonia, atypical pulm edema, immunotherapy induced pneumonitis versus malignancy. Patient I  was on empiric antibiotic treatment for pneumonia. She was also placed on steroids for the possible drug induced pneumonitis. She had a thoracentesis of 300 ml which appeared to be a transudate. There were no malignant cells on cytology. Due to mental status changes she had Ct of the brain that showed  inconclusive findings. Brain MRI recommended, however the patient refused. She restarted Tecentriq on May 15, 2019. Intermittent history on 08/13/2019:  She did have a CAT scan of the chest that showed  pericardial effusion. The patient was admitted to the hospital on July 18, 2019 for further evaluation. She had an echo which showed at least moderate pericardial effusion with fibrinous formation.   Cardiovascular consult with the surgeon Dr. Kingsley Burch, was obtained. He did not feel that a window will be  the appropriate thing for her considering her comorbidity. Decision was made to have pericardiocentesis. Almost 700 mL of fluid was drained at the time of the procedure and in the 48 hours after that. Repeat echo after the  drain was removed showed no accumulation of the pericardial effusion. Cytology of the fluid was negative for malignant cells. The patient  improved significantly after her pericardiocentesis. The patient's  ejection fraction improved after the pericardiocentesis. She was discharged home in a stable condition. Her EKG  showed sinus rhythm, no acute changes. The patient reports that her shortness of breath is significantly improved. She denies having any cough no chest pain. She denies having any fevers at home. Tomorrow she has follow-up with cardiology      Past Medical History         Diagnosis Date    Arthritis     low back pain and scoliosis in lumbar    Cancer ([address] 2012    lower right lobe-lung s/p lobectomy in 2012, radiation and chemo nad later had mediatinal mets and had radationa dn chemo again, and in 2016 had reoccurence on the left side upper and was started on radiation this year and has  a follow up CT in oct 2017    CHF (congestive heart failure) (HCC)     Chronic bronchitis, simple (HCC)     Chronic respiratory failure with hypoxia ([address]     COPD (chronic obstructive pulmonary disease) ([address]     GERD (gastroesophageal reflux disease)     HTN (hypertension)     Pneumonia     Postprocedural pneumothorax     Scoliosis     Sleep apnea     Urinary incontinence     UTI (urinary tract infection)       Past Surgical History           Procedure Laterality Date    LOBECTOMY  10/19/2012    rt lower lobectomy     LUNG BIOPSY  8/2012       Allergies   Advil cold & sinus liqui-gels [pseudoephedrine-ibuprofen];  Food; Percocet [oxycodone-acetaminophen]; and Sulfa antibiotics  Social History     Social History Socioeconomic History    Marital status:      Spouse name: Lola Landers Number of children: 2    Years of education: Not on file    Highest education level: Not on file   Occupational History    Not on file   Social Needs    Financial resource strain: Not on file    Food insecurity:     Worry: Not on file     Inability: Not on file    Transportation needs:     Medical: Not on file     Non-medical: Not on file   Tobacco Use    Smoking status: Former Smoker     Packs/day: 1.00     Years: 45.00     Pack years: 45.00     Types: Cigarettes     Last attempt to quit:      Years since quittin.6    Smokeless tobacco: Never Used   Substance and Sexual Activity    Alcohol use: No    Drug use: No    Sexual activity: Not on file   Lifestyle    Physical activity:     Days per week: Not on file     Minutes per session: Not on file    Stress: Not on file   Relationships    Social connections:     Talks on phone: Not on file     Gets together: Not on file     Attends [employer] service: Not on file     Active member of club or organization: Not on file     Attends meetings of clubs or organizations: Not on file     Relationship status: Not on file    Intimate partner violence:     Fear of current or ex partner: Not on file     Emotionally abused: Not on file     Physically abused: Not on file     Forced sexual activity: Not on file   Other Topics Concern    Not on file   Social History Narrative    Not on file     Family History          Problem Relation Age of Onset    Cancer Mother     Heart Disease Father     High Blood Pressure Father     High Cholesterol Father     Arthritis Sister     Heart Disease Sister     Cancer Sister         lung cancer    Heart Disease Sister     Stroke Sister     High Cholesterol Sister     High Blood Pressure Sister     Stroke Paternal Grandfather     High Blood Pressure Brother      ROS     Review of Systems   Constitutional: Positive for activity change,

## 2019-08-13 NOTE — PROGRESS NOTES
Patient returned to infusion unit accompanied by Dr. All Romero. Hold treatment today.  Patient to return Thursday or Friday for treatment if cleared by cardiologist.

## 2019-08-13 NOTE — PROGRESS NOTES
Patient tolerated lab draw via mediport without issues. Patient ambulated off unit to exam room for appointment with physician accompanied by LPN.

## 2019-08-13 NOTE — PROGRESS NOTES
Pt tolerated lab draw via mediport without any complications. Treatment held until patient has follow up with cardiologist. Patient instructed to call office on Thursday AM to update regarding cardiologist appointment and schedule possible treatment. Patient verbalizes understanding. Discharge instructions given to patient. Patient verbalizes understanding. Pt ambulated off unit accompanied by family member with belongings.

## 2019-08-15 ENCOUNTER — TELEPHONE (OUTPATIENT)
Dept: ONCOLOGY | Age: 74
End: 2019-08-15

## 2019-08-16 ENCOUNTER — HOSPITAL ENCOUNTER (OUTPATIENT)
Age: 74
Discharge: HOME | End: 2019-08-16
Payer: MEDICARE

## 2019-08-16 ENCOUNTER — HOSPITAL ENCOUNTER (OUTPATIENT)
Dept: INFUSION THERAPY | Age: 74
Discharge: HOME | End: 2019-08-16
Payer: MEDICARE

## 2019-08-16 ENCOUNTER — HOSPITAL ENCOUNTER (OUTPATIENT)
Dept: GENERAL RADIOLOGY | Age: 74
Discharge: HOME | End: 2019-08-16
Payer: MEDICARE

## 2019-08-16 VITALS
HEART RATE: 70 BPM | OXYGEN SATURATION: 100 % | SYSTOLIC BLOOD PRESSURE: 106 MMHG | TEMPERATURE: 98.4 F | RESPIRATION RATE: 18 BRPM | DIASTOLIC BLOOD PRESSURE: 55 MMHG

## 2019-08-16 DIAGNOSIS — I31.39 PERICARDIAL EFFUSION: ICD-10-CM

## 2019-08-16 DIAGNOSIS — J90 RECURRENT LEFT PLEURAL EFFUSION: ICD-10-CM

## 2019-08-16 DIAGNOSIS — C34.91 PRIMARY LUNG CANCER, RIGHT (HCC): Primary | ICD-10-CM

## 2019-08-16 DIAGNOSIS — R06.02 SHORTNESS OF BREATH: ICD-10-CM

## 2019-08-16 PROCEDURE — 71046 X-RAY EXAM CHEST 2 VIEWS: CPT

## 2019-08-16 PROCEDURE — 2580000003 HC RX 258: Performed by: INTERNAL MEDICINE

## 2019-08-16 PROCEDURE — 6360000002 HC RX W HCPCS: Performed by: INTERNAL MEDICINE

## 2019-08-16 PROCEDURE — 96413 CHEMO IV INFUSION 1 HR: CPT

## 2019-08-16 RX ORDER — HEPARIN SODIUM (PORCINE) LOCK FLUSH IV SOLN 100 UNIT/ML 100 UNIT/ML
500 SOLUTION INTRAVENOUS PRN
Status: DISCONTINUED | OUTPATIENT
Start: 2019-08-16 | End: 2019-08-17 | Stop reason: HOSPADM

## 2019-08-16 RX ORDER — SODIUM CHLORIDE 0.9 % (FLUSH) 0.9 %
10 SYRINGE (ML) INJECTION PRN
Status: DISCONTINUED | OUTPATIENT
Start: 2019-08-16 | End: 2019-08-17 | Stop reason: HOSPADM

## 2019-08-16 RX ORDER — SODIUM CHLORIDE 9 MG/ML
INJECTION, SOLUTION INTRAVENOUS ONCE
Status: COMPLETED | OUTPATIENT
Start: 2019-08-16 | End: 2019-08-16

## 2019-08-16 RX ADMIN — Medication 10 ML: at 13:07

## 2019-08-16 RX ADMIN — SODIUM CHLORIDE: 9 INJECTION, SOLUTION INTRAVENOUS at 13:07

## 2019-08-16 RX ADMIN — Medication 500 UNITS: at 14:00

## 2019-08-16 RX ADMIN — ATEZOLIZUMAB 1200 MG: 1200 INJECTION, SOLUTION INTRAVENOUS at 13:14

## 2019-08-16 NOTE — PROGRESS NOTES
Patient assessed for the following post chemotherapy:    Dizziness   No  Lightheadedness  No      Acute nausea/vomiting No  Headache   No  Chest pain/pressure  No  Rash/itching   No  Shortness of breath  Yes- not any worse than on admission    Patient opted to not stay for 20 minutes observation post infusion chemotherapy. Patient tolerated chemotherapy treatment tecentriq without any complications. Last vital signs:   BP (!) 106/55   Pulse 70   Temp 98.4 °F (36.9 °C) (Oral)   Resp 18   SpO2 100%         Patient instructed if experience any of the above symptoms following today's infusion,he/she is to notify MD immediately or go to the emergency department. Discharge instructions given to patient. Verbalizes understanding. Transported off unit in wheelchair with belongings escorted by myself.

## 2019-08-16 NOTE — ONCOLOGY
Chemotherapy Administration    Pre-assessment Data: Antineoplastic Agents  Other:   See toxicity flow sheet for assessment [x]     Physician Notification of Concerns Related to Chemotherapy Administration:   Physician Notified Sourav Laguerre / Time of Notification      Interventions:   Lab work assessed  [x]   Height / Weight verified for dose [x]   Current MAR reviewed [x]   Emergency drugs available as appropriate [x]   Anaphylaxis assessment completed [x]   Pre-medications administered as ordered [x]   Blood return noted upon initiation of chemotherapy [x]   Blood return noted each 1-2ml of a vesicant medication if given IV push []   Blood return noted each 2-3ml of a non-vesicant medication if given IV push []   Monitor for signs / symptoms of hypersensitivity reaction [x]   Chemotherapy orders (drug/dose/rate) verified by 2 Chemo certified RNs [x]   Monitor IV site and blood return throughout the infusion of the medication [x]   Document IV site checks on the IV assessment form [x]   Document chemotherapy teaching on the Patient Education tab [x]   Document patient verbalizes understanding of medications being administered [x]   If IV infiltration, see ONS Guidelines []   Other:      []

## 2019-09-03 ENCOUNTER — OFFICE VISIT (OUTPATIENT)
Dept: PULMONOLOGY | Age: 74
End: 2019-09-03
Payer: MEDICARE

## 2019-09-03 VITALS
BODY MASS INDEX: 28.54 KG/M2 | TEMPERATURE: 98.4 F | DIASTOLIC BLOOD PRESSURE: 72 MMHG | HEART RATE: 88 BPM | OXYGEN SATURATION: 98 % | HEIGHT: 60 IN | WEIGHT: 145.4 LBS | SYSTOLIC BLOOD PRESSURE: 116 MMHG

## 2019-09-03 DIAGNOSIS — C34.92 ADENOCARCINOMA, LUNG, LEFT (HCC): ICD-10-CM

## 2019-09-03 DIAGNOSIS — J44.9 STAGE 3 SEVERE COPD BY GOLD CLASSIFICATION (HCC): Primary | ICD-10-CM

## 2019-09-03 DIAGNOSIS — Z87.891 HISTORY OF TOBACCO ABUSE: ICD-10-CM

## 2019-09-03 DIAGNOSIS — J96.11 CHRONIC RESPIRATORY FAILURE WITH HYPOXIA AND HYPERCAPNIA (HCC): ICD-10-CM

## 2019-09-03 DIAGNOSIS — C34.31 SQUAMOUS CELL CARCINOMA OF BRONCHUS IN RIGHT LOWER LOBE (HCC): ICD-10-CM

## 2019-09-03 DIAGNOSIS — J96.12 CHRONIC RESPIRATORY FAILURE WITH HYPOXIA AND HYPERCAPNIA (HCC): ICD-10-CM

## 2019-09-03 PROCEDURE — 1036F TOBACCO NON-USER: CPT | Performed by: NURSE PRACTITIONER

## 2019-09-03 PROCEDURE — 3017F COLORECTAL CA SCREEN DOC REV: CPT | Performed by: NURSE PRACTITIONER

## 2019-09-03 PROCEDURE — 1123F ACP DISCUSS/DSCN MKR DOCD: CPT | Performed by: NURSE PRACTITIONER

## 2019-09-03 PROCEDURE — G8427 DOCREV CUR MEDS BY ELIG CLIN: HCPCS | Performed by: NURSE PRACTITIONER

## 2019-09-03 PROCEDURE — 4040F PNEUMOC VAC/ADMIN/RCVD: CPT | Performed by: NURSE PRACTITIONER

## 2019-09-03 PROCEDURE — 99214 OFFICE O/P EST MOD 30 MIN: CPT | Performed by: NURSE PRACTITIONER

## 2019-09-03 PROCEDURE — G8400 PT W/DXA NO RESULTS DOC: HCPCS | Performed by: NURSE PRACTITIONER

## 2019-09-03 PROCEDURE — G8926 SPIRO NO PERF OR DOC: HCPCS | Performed by: NURSE PRACTITIONER

## 2019-09-03 PROCEDURE — 1090F PRES/ABSN URINE INCON ASSESS: CPT | Performed by: NURSE PRACTITIONER

## 2019-09-03 PROCEDURE — 3023F SPIROM DOC REV: CPT | Performed by: NURSE PRACTITIONER

## 2019-09-03 PROCEDURE — G8419 CALC BMI OUT NRM PARAM NOF/U: HCPCS | Performed by: NURSE PRACTITIONER

## 2019-09-03 RX ORDER — ROPINIROLE 0.25 MG/1
0.25 TABLET, FILM COATED ORAL 2 TIMES DAILY
COMMUNITY
End: 2019-09-06 | Stop reason: SDUPTHER

## 2019-09-03 RX ORDER — FEXOFENADINE HCL 180 MG/1
180 TABLET ORAL DAILY
COMMUNITY
End: 2019-09-25

## 2019-09-03 ASSESSMENT — ENCOUNTER SYMPTOMS
DIARRHEA: 0
NAUSEA: 0
EYE DISCHARGE: 1
VOMITING: 0
ABDOMINAL PAIN: 0
TROUBLE SWALLOWING: 0
WHEEZING: 1
RHINORRHEA: 1
COUGH: 1
SHORTNESS OF BREATH: 1

## 2019-09-05 DIAGNOSIS — C34.31 MALIGNANT NEOPLASM OF LOWER LOBE, RIGHT BRONCHUS OR LUNG (HCC): Primary | ICD-10-CM

## 2019-09-06 ENCOUNTER — HOSPITAL ENCOUNTER (OUTPATIENT)
Dept: INFUSION THERAPY | Age: 74
Discharge: HOME | End: 2019-09-06
Payer: MEDICARE

## 2019-09-06 ENCOUNTER — OFFICE VISIT (OUTPATIENT)
Dept: ONCOLOGY | Age: 74
End: 2019-09-06
Payer: MEDICARE

## 2019-09-06 VITALS
RESPIRATION RATE: 16 BRPM | TEMPERATURE: 97.7 F | OXYGEN SATURATION: 97 % | SYSTOLIC BLOOD PRESSURE: 119 MMHG | HEART RATE: 79 BPM | DIASTOLIC BLOOD PRESSURE: 75 MMHG

## 2019-09-06 VITALS
WEIGHT: 145.2 LBS | SYSTOLIC BLOOD PRESSURE: 128 MMHG | BODY MASS INDEX: 28.51 KG/M2 | RESPIRATION RATE: 18 BRPM | OXYGEN SATURATION: 95 % | HEART RATE: 82 BPM | HEIGHT: 60 IN | TEMPERATURE: 97.6 F | DIASTOLIC BLOOD PRESSURE: 71 MMHG

## 2019-09-06 DIAGNOSIS — I31.39 PERICARDIAL EFFUSION: ICD-10-CM

## 2019-09-06 DIAGNOSIS — C34.91 PRIMARY LUNG CANCER, RIGHT (HCC): ICD-10-CM

## 2019-09-06 DIAGNOSIS — Z99.81 OXYGEN DEPENDENT: ICD-10-CM

## 2019-09-06 DIAGNOSIS — E07.9 DISORDER OF THYROID: ICD-10-CM

## 2019-09-06 DIAGNOSIS — R91.1 NODULE OF LEFT LUNG: ICD-10-CM

## 2019-09-06 DIAGNOSIS — C78.02 METASTATIC LUNG CARCINOMA, LEFT (HCC): ICD-10-CM

## 2019-09-06 DIAGNOSIS — C34.90 RECURRENT NON-SMALL CELL LUNG CANCER (NSCLC) (HCC): ICD-10-CM

## 2019-09-06 DIAGNOSIS — J44.9 STAGE 3 SEVERE COPD BY GOLD CLASSIFICATION (HCC): ICD-10-CM

## 2019-09-06 DIAGNOSIS — C34.31 MALIGNANT NEOPLASM OF LOWER LOBE, RIGHT BRONCHUS OR LUNG (HCC): ICD-10-CM

## 2019-09-06 DIAGNOSIS — Z51.12 ENCOUNTER FOR ANTINEOPLASTIC CHEMOTHERAPY AND IMMUNOTHERAPY: ICD-10-CM

## 2019-09-06 DIAGNOSIS — Z85.118 H/O: LUNG CANCER: ICD-10-CM

## 2019-09-06 DIAGNOSIS — J96.21 ACUTE ON CHRONIC RESPIRATORY FAILURE WITH HYPOXEMIA (HCC): ICD-10-CM

## 2019-09-06 DIAGNOSIS — D64.9 ANEMIA, UNSPECIFIED TYPE: ICD-10-CM

## 2019-09-06 DIAGNOSIS — J43.9 PULMONARY EMPHYSEMA, UNSPECIFIED EMPHYSEMA TYPE (HCC): ICD-10-CM

## 2019-09-06 DIAGNOSIS — C34.91 CARCINOMA OF LUNG, RIGHT (HCC): ICD-10-CM

## 2019-09-06 DIAGNOSIS — Z51.11 ENCOUNTER FOR ANTINEOPLASTIC CHEMOTHERAPY AND IMMUNOTHERAPY: ICD-10-CM

## 2019-09-06 DIAGNOSIS — C34.31 SQUAMOUS CELL CARCINOMA OF BRONCHUS IN RIGHT LOWER LOBE (HCC): Primary | ICD-10-CM

## 2019-09-06 DIAGNOSIS — J96.21 ACUTE ON CHRONIC RESPIRATORY FAILURE WITH HYPOXIA (HCC): ICD-10-CM

## 2019-09-06 DIAGNOSIS — C34.90 RECURRENT NON-SMALL CELL LUNG CANCER (NSCLC) (HCC): Primary | ICD-10-CM

## 2019-09-06 LAB
ALBUMIN SERPL-MCNC: 4.1 G/DL (ref 3.5–5.1)
ALP BLD-CCNC: 70 U/L (ref 38–126)
ALT SERPL-CCNC: 106 U/L (ref 11–66)
AST SERPL-CCNC: 116 U/L (ref 5–40)
BILIRUB SERPL-MCNC: 0.3 MG/DL (ref 0.3–1.2)
BILIRUBIN DIRECT: < 0.2 MG/DL (ref 0–0.3)
BUN, WHOLE BLOOD: 13 MG/DL (ref 8–26)
CHLORIDE, WHOLE BLOOD: 90 MEQ/L (ref 98–109)
CREATININE, WHOLE BLOOD: 1.3 MG/DL (ref 0.5–1.2)
GFR, ESTIMATED: 42 ML/MIN/1.73M2
GLUCOSE, WHOLE BLOOD: 79 MG/DL (ref 70–108)
HCT VFR BLD CALC: 25.9 % (ref 37–47)
HEMOGLOBIN: 8.8 GM/DL (ref 12–16)
IONIZED CALCIUM, WHOLE BLOOD: 1.23 MMOL/L (ref 1.12–1.32)
MCH RBC QN AUTO: 31.1 PG (ref 27–31)
MCHC RBC AUTO-ENTMCNC: 34 GM/DL (ref 33–37)
MCV RBC AUTO: 91 FL (ref 81–99)
PDW BLD-RTO: 15.3 % (ref 11.5–14.5)
PLATELET # BLD: 135 THOU/MM3 (ref 130–400)
PMV BLD AUTO: 6.8 FL (ref 7.4–10.4)
POTASSIUM, WHOLE BLOOD: 4.6 MEQ/L (ref 3.5–4.9)
RBC # BLD: 2.84 MILL/MM3 (ref 4.2–5.4)
SEG NEUTROPHILS: 73 % (ref 43–75)
SEGMENTED NEUTROPHILS ABSOLUTE COUNT: 2.9 THOU/MM3 (ref 1.8–7.7)
SODIUM, WHOLE BLOOD: 135 MEQ/L (ref 138–146)
TOTAL CO2, WHOLE BLOOD: 39 MEQ/L (ref 23–33)
TOTAL PROTEIN: 6.7 G/DL (ref 6.1–8)
TSH SERPL DL<=0.05 MIU/L-ACNC: 125.1 UIU/ML (ref 0.4–4.2)
WBC # BLD: 4 THOU/MM3 (ref 4.8–10.8)

## 2019-09-06 PROCEDURE — 2580000003 HC RX 258: Performed by: INTERNAL MEDICINE

## 2019-09-06 PROCEDURE — 99214 OFFICE O/P EST MOD 30 MIN: CPT | Performed by: INTERNAL MEDICINE

## 2019-09-06 PROCEDURE — 36591 DRAW BLOOD OFF VENOUS DEVICE: CPT

## 2019-09-06 PROCEDURE — G8400 PT W/DXA NO RESULTS DOC: HCPCS | Performed by: INTERNAL MEDICINE

## 2019-09-06 PROCEDURE — 85027 COMPLETE CBC AUTOMATED: CPT

## 2019-09-06 PROCEDURE — 1090F PRES/ABSN URINE INCON ASSESS: CPT | Performed by: INTERNAL MEDICINE

## 2019-09-06 PROCEDURE — G8419 CALC BMI OUT NRM PARAM NOF/U: HCPCS | Performed by: INTERNAL MEDICINE

## 2019-09-06 PROCEDURE — G8926 SPIRO NO PERF OR DOC: HCPCS | Performed by: INTERNAL MEDICINE

## 2019-09-06 PROCEDURE — 84443 ASSAY THYROID STIM HORMONE: CPT

## 2019-09-06 PROCEDURE — 1036F TOBACCO NON-USER: CPT | Performed by: INTERNAL MEDICINE

## 2019-09-06 PROCEDURE — 6360000002 HC RX W HCPCS: Performed by: INTERNAL MEDICINE

## 2019-09-06 PROCEDURE — 1123F ACP DISCUSS/DSCN MKR DOCD: CPT | Performed by: INTERNAL MEDICINE

## 2019-09-06 PROCEDURE — 3023F SPIROM DOC REV: CPT | Performed by: INTERNAL MEDICINE

## 2019-09-06 PROCEDURE — 4040F PNEUMOC VAC/ADMIN/RCVD: CPT | Performed by: INTERNAL MEDICINE

## 2019-09-06 PROCEDURE — 80047 BASIC METABLC PNL IONIZED CA: CPT

## 2019-09-06 PROCEDURE — 80076 HEPATIC FUNCTION PANEL: CPT

## 2019-09-06 PROCEDURE — 3017F COLORECTAL CA SCREEN DOC REV: CPT | Performed by: INTERNAL MEDICINE

## 2019-09-06 PROCEDURE — 99211 OFF/OP EST MAY X REQ PHY/QHP: CPT

## 2019-09-06 PROCEDURE — G8428 CUR MEDS NOT DOCUMENT: HCPCS | Performed by: INTERNAL MEDICINE

## 2019-09-06 PROCEDURE — 96413 CHEMO IV INFUSION 1 HR: CPT

## 2019-09-06 RX ORDER — SODIUM CHLORIDE 0.9 % (FLUSH) 0.9 %
10 SYRINGE (ML) INJECTION PRN
Status: DISCONTINUED | OUTPATIENT
Start: 2019-09-06 | End: 2019-09-07 | Stop reason: HOSPADM

## 2019-09-06 RX ORDER — SODIUM CHLORIDE 0.9 % (FLUSH) 0.9 %
5 SYRINGE (ML) INJECTION PRN
Status: CANCELLED | OUTPATIENT
Start: 2019-09-06

## 2019-09-06 RX ORDER — HYDROCODONE BITARTRATE AND ACETAMINOPHEN 5; 325 MG/1; MG/1
1 TABLET ORAL 3 TIMES DAILY PRN
Qty: 90 TABLET | Refills: 0 | Status: SHIPPED | OUTPATIENT
Start: 2019-09-06 | End: 2019-10-06

## 2019-09-06 RX ORDER — SODIUM CHLORIDE 9 MG/ML
INJECTION, SOLUTION INTRAVENOUS ONCE
Status: COMPLETED | OUTPATIENT
Start: 2019-09-06 | End: 2019-09-06

## 2019-09-06 RX ORDER — SODIUM CHLORIDE 9 MG/ML
INJECTION, SOLUTION INTRAVENOUS CONTINUOUS
Status: CANCELLED | OUTPATIENT
Start: 2019-09-06

## 2019-09-06 RX ORDER — HEPARIN SODIUM (PORCINE) LOCK FLUSH IV SOLN 100 UNIT/ML 100 UNIT/ML
500 SOLUTION INTRAVENOUS PRN
Status: DISCONTINUED | OUTPATIENT
Start: 2019-09-06 | End: 2019-09-07 | Stop reason: HOSPADM

## 2019-09-06 RX ORDER — MEPERIDINE HYDROCHLORIDE 25 MG/ML
12.5 INJECTION INTRAMUSCULAR; INTRAVENOUS; SUBCUTANEOUS ONCE
Status: CANCELLED | OUTPATIENT
Start: 2019-09-06

## 2019-09-06 RX ORDER — SODIUM CHLORIDE 0.9 % (FLUSH) 0.9 %
10 SYRINGE (ML) INJECTION PRN
Status: CANCELLED | OUTPATIENT
Start: 2019-09-06

## 2019-09-06 RX ORDER — HEPARIN SODIUM (PORCINE) LOCK FLUSH IV SOLN 100 UNIT/ML 100 UNIT/ML
500 SOLUTION INTRAVENOUS PRN
Status: CANCELLED | OUTPATIENT
Start: 2019-09-06

## 2019-09-06 RX ORDER — ROPINIROLE 0.25 MG/1
0.25 TABLET, FILM COATED ORAL 2 TIMES DAILY
Qty: 60 TABLET | Refills: 1 | Status: SHIPPED | OUTPATIENT
Start: 2019-09-06 | End: 2019-09-27 | Stop reason: SDUPTHER

## 2019-09-06 RX ORDER — DIPHENHYDRAMINE HYDROCHLORIDE 50 MG/ML
50 INJECTION INTRAMUSCULAR; INTRAVENOUS ONCE
Status: CANCELLED | OUTPATIENT
Start: 2019-09-06

## 2019-09-06 RX ORDER — 0.9 % SODIUM CHLORIDE 0.9 %
10 VIAL (ML) INJECTION ONCE
Status: CANCELLED | OUTPATIENT
Start: 2019-09-06

## 2019-09-06 RX ORDER — SODIUM CHLORIDE 0.9 % (FLUSH) 0.9 %
20 SYRINGE (ML) INJECTION PRN
Status: CANCELLED | OUTPATIENT
Start: 2019-09-06

## 2019-09-06 RX ORDER — METHYLPREDNISOLONE SODIUM SUCCINATE 125 MG/2ML
125 INJECTION, POWDER, LYOPHILIZED, FOR SOLUTION INTRAMUSCULAR; INTRAVENOUS ONCE
Status: CANCELLED | OUTPATIENT
Start: 2019-09-06

## 2019-09-06 RX ORDER — SODIUM CHLORIDE 0.9 % (FLUSH) 0.9 %
20 SYRINGE (ML) INJECTION PRN
Status: DISCONTINUED | OUTPATIENT
Start: 2019-09-06 | End: 2019-09-07 | Stop reason: HOSPADM

## 2019-09-06 RX ADMIN — SODIUM CHLORIDE: 9 INJECTION, SOLUTION INTRAVENOUS at 10:31

## 2019-09-06 RX ADMIN — ATEZOLIZUMAB 1200 MG: 1200 INJECTION, SOLUTION INTRAVENOUS at 11:00

## 2019-09-06 RX ADMIN — Medication 10 ML: at 09:45

## 2019-09-06 RX ADMIN — Medication 10 ML: at 10:31

## 2019-09-06 RX ADMIN — Medication 10 ML: at 11:49

## 2019-09-06 RX ADMIN — Medication 20 ML: at 09:46

## 2019-09-06 RX ADMIN — HEPARIN SODIUM (PORCINE) LOCK FLUSH IV SOLN 100 UNIT/ML 500 UNITS: 100 SOLUTION at 11:49

## 2019-09-06 ASSESSMENT — ENCOUNTER SYMPTOMS
SHORTNESS OF BREATH: 1
VOMITING: 0
COUGH: 1
NAUSEA: 0
WHEEZING: 0
TROUBLE SWALLOWING: 0
BLOOD IN STOOL: 0
CHEST TIGHTNESS: 0
COLOR CHANGE: 0
RECTAL PAIN: 0
ABDOMINAL PAIN: 0
SORE THROAT: 0
BACK PAIN: 0
DIARRHEA: 0
ABDOMINAL DISTENTION: 0
CONSTIPATION: 0
FACIAL SWELLING: 0
EYE DISCHARGE: 0

## 2019-09-06 ASSESSMENT — PAIN SCALES - GENERAL
PAINLEVEL_OUTOF10: 8
PAINLEVEL_OUTOF10: 8

## 2019-09-06 ASSESSMENT — PAIN DESCRIPTION - LOCATION: LOCATION: BACK

## 2019-09-06 ASSESSMENT — PAIN DESCRIPTION - PAIN TYPE: TYPE: CHRONIC PAIN

## 2019-09-06 NOTE — PROGRESS NOTES
Chemotherapy Administration    Pre-assessment Data: Antineoplastic Agents  Other:   See toxicity flow sheet for assessment [x]     Physician Notification of Concerns Related to Chemotherapy Administration:   Physician Notified Merlinda Isaac / Time of Notification Patient saw Dr Darshana Craft today     Interventions:   Lab work assessed  [x]   Height / Weight verified for dose [x]   Current MAR reviewed [x]   Emergency drugs available as appropriate [x]   Anaphylaxis assessment completed [x]   Pre-medications administered as ordered [x]   Blood return noted upon initiation of chemotherapy [x]   Blood return noted each 1-2ml of a vesicant medication if given IV push []   Blood return noted each 2-3ml of a non-vesicant medication if given IV push []   Monitor for signs / symptoms of hypersensitivity reaction [x]   Chemotherapy orders (drug/dose/rate) verified by 2 Chemo certified RNs [x]   Monitor IV site and blood return throughout the infusion of the medication [x]   Document IV site checks on the IV assessment form [x]   Document chemotherapy teaching on the Patient Education tab [x]   Document patient verbalizes understanding of medications being administered [x]   If IV infiltration, see ONS Guidelines []   Other:      []

## 2019-09-06 NOTE — PLAN OF CARE
Problem: Pain:  Goal: Control of chronic pain  Description  Control of chronic pain  Outcome: Met This Shift  Note:   Patient verbalizes understanding to verbal information given on home pain medication,action and possible side effects. Aware to call MD if develop complications. Problem: Infection - Central Venous Catheter-Associated Bloodstream Infection:  Goal: Will show no infection signs and symptoms  Description  Will show no infection signs and symptoms  Outcome: Met This Shift  Note:   Mediport site with no redness or warmth. Skin over port intact with no signs of breakdown noted. Patient verbalizes signs/symptoms of port infection and when to notify the physician. Intervention: Infection risk assessment  Note:   Discussed port maintenance, infection prevention, signs and when to call the doctor     Problem: Musculor/Skeletal Functional Status  Goal: Absence of falls  Outcome: Met This Shift  Note:   Patient verbalizes understanding of fall precautions. Patient free from falls this visit. Intervention: Fall precautions  Note:   Discussed fall precautions, call light within reach. Problem: Intellectual/Education/Knowledge Deficit  Goal: Teaching initiated upon admission  Outcome: Met This Shift  Note:   Patient verbalizes understanding to verbal information given on tecentriq,action and possible side effects. Aware to call MD if develop complications. Intervention: Verbal/written education provided  Note:   Chemotherapy Teaching     What is Chemotherapy   Drug action ? Method of Administration ? Handouts given ? Side Effects  Nausea/vomiting ? Diarrhea ? Fatigue ? Signs / Symptoms of infection ? Neutropenia ? Thrombocytopenia ? Alopecia ? neuropathy ? [place] diet &  the importance of fluids ? Micellaneous  Importance of nutrition ? Importance of oral hygiene ? When to call the MD ?   Monitoring labs ? Use of supportive services ?      Explanation of Drug Regimen / Frequency  tecentriq     Comments  Verbalized understanding to drug,action,side effects and when to call MD        Problem: Discharge Planning  Goal: Knowledge of discharge instructions  Description  Knowledge of discharge instructions     Outcome: Met This Shift  Note:   Verbalized understanding of discharge instructions, follow-up appointments, and when to call the physician. Intervention: Discharge to appropriate level of care  Note:   Discuss discharge instructions, follow ups, and when to call the doctor. Care plan reviewed with patient. Patient verbalizes understanding of the plan of care and contribute to goal setting.

## 2019-09-06 NOTE — PROGRESS NOTES
Cytology of the fluid was negative for malignant cells    Intermittent history on 09/06/2019:    3 weeks ago we restarted Tecentriq which overall the patient tolerated well. The patient reports that her shortness of breath is significantly improved. She denies having any cough no chest pain. She denies having any diarrhea, no skin rash. She has follow-up with cardiologist next week at this time she will have echo to monitor her pericardial effusion. The patient denies having any headaches. She continues to be oxygen dependent    Past Medical History         Diagnosis Date    Arthritis     low back pain and scoliosis in lumbar    Cancer ([address] 2012    lower right lobe-lung s/p lobectomy in 2012, radiation and chemo nad later had mediatinal mets and had radationa dn chemo again, and in 2016 had reoccurence on the left side upper and was started on radiation this year and has  a follow up CT in oct 2017    CHF (congestive heart failure) (HCC)     Chronic bronchitis, simple (HCC)     Chronic respiratory failure with hypoxia ([address]     COPD (chronic obstructive pulmonary disease) ([address]     GERD (gastroesophageal reflux disease)     HTN (hypertension)     Pneumonia     Postprocedural pneumothorax     Scoliosis     Sleep apnea     Urinary incontinence     UTI (urinary tract infection)       Past Surgical History           Procedure Laterality Date    LOBECTOMY  10/19/2012    rt lower lobectomy     LUNG BIOPSY  8/2012       Allergies   Advil cold & sinus liqui-gels [pseudoephedrine-ibuprofen];  Food; Percocet [oxycodone-acetaminophen]; and Sulfa antibiotics  Social History     Social History     Socioeconomic History    Marital status:      Spouse name: Adenike Labs Number of children: 2    Years of education: Not on file    Highest education level: Not on file   Occupational History    Not on file   Social Needs    Financial resource strain: Not on file    Food insecurity:     Worry: Not on file field, the right lower field, the left middle field and the left lower field. She has no wheezes. She has no rales. She exhibits no tenderness. Abdominal: Soft. Bowel sounds are normal. She exhibits no distension and no mass. There is no tenderness. There is no rebound. Musculoskeletal: Normal range of motion. She exhibits no edema. Good range of motion in all four extremities. Lymphadenopathy:     She has no cervical adenopathy. Neurological: She is alert and oriented to person, place, and time. She has normal reflexes. No cranial nerve deficit. She exhibits normal muscle tone. Skin: Skin is warm. No rash noted. No erythema. Psychiatric: She has a normal mood and affect. Her behavior is normal. Judgment and thought content normal.   Vitals reviewed. Labs     Lab Results   Component Value Date    WBC 4.0 (L) 09/06/2019    HGB 8.8 (L) 09/06/2019    HCT 25.9 (L) 09/06/2019    MCV 91 09/06/2019     09/06/2019       Chemistry        Component Value Date/Time     (L) 09/06/2019 0945     07/21/2019 2310    K 4.6 09/06/2019 0945    K 3.9 07/25/2019 1150    K 3.8 10/15/2018 0415    CL 88 (L) 07/21/2019 2310    CO2 43 (HH) 07/21/2019 2310    BUN 13 07/21/2019 2310    CREATININE 1.3 (H) 09/06/2019 0945    CREATININE 1.1 07/21/2019 2310        Component Value Date/Time    CALCIUM 8.5 07/21/2019 2310    ALKPHOS 70 09/06/2019 0945     (H) 09/06/2019 0945     (H) 09/06/2019 0945    BILITOT 0.3 09/06/2019 0945              Radiology      PET scan on 04/23/2019 showed: Impression   Essentially stable examination. There is a fairly intense activity in the thyroid gland which may be related to thyroiditis. This can be a normal finding. Similar to the prior exam. Slightly greater amount of atelectatic lung currently with    minimal activity above background but similar to the prior exam. Interval resolution of previously seen left upper lobe hypermetabolic nodule. Assessment/ Plan     1. Metastatic left lung cancer. Treated with Taxotere followed by 2 cycles of Tecentriq. Last dose given in January 2019. Further treatment held due to hospitalization for acute on chronic respiratory failure. Recent PET scan on 04/23/2019 showed interval resolution of previously seen left upper lobe hypermetabolic nodule. The patient's performance status has significantly improved. Therefore on May 15, 2019 she restarted treatment with  Tecentriq. 2.  Palliative immunotherapy with Tecentriq. She tolerated her last infusion very well. No diarrhea, no skin rash, no worsening shortness of breath. Her vital signs are stable today, labs are within normal limits so we'll proceed with Tecentriq infusion today. 2.  Pericardial effusion. Status post  pericardiocentesis. Almost 700 mL of fluid was drained during procedure. Cytology was negative for evidence of malignant cells. Patient sees a cardiologist next week. 4. Chronic normocytic anemia. Hemoglobin is stable at 8.8 today her MCV is 91  Will monitor. 5. Elevated creatinine. Stable 1.3 today.       Electronically signed by     Devin Juarez MD on 9/6/2019 at 7:27 PM

## 2019-09-06 NOTE — PROGRESS NOTES
Patient assessed for the following post chemotherapy:    Dizziness   No  Lightheadedness  No      Acute nausea/vomiting No  Headache   No  Chest pain/pressure  No  Rash/itching   No  Shortness of breath  No    Patient kept for 20 minutes observation post infusion chemotherapy. Patient tolerated chemotherapy treatment tecentriq without any complications. Last vital signs:   /75   Pulse 79   Temp 97.7 °F (36.5 °C) (Oral)   Resp 16   SpO2 97%       Patient instructed if experience any of the above symptoms following today's infusion,she is to notify MD immediately or go to the emergency department. Discharge instructions given to patient. Verbalizes understanding. Off unit in wheelchair per nurse with belongings and home oxygen.

## 2019-09-09 LAB — AFB CULTURE & SMEAR: NORMAL

## 2019-09-09 RX ORDER — LEVOTHYROXINE SODIUM 0.03 MG/1
25 TABLET ORAL DAILY
Qty: 30 TABLET | Refills: 5 | Status: SHIPPED | OUTPATIENT
Start: 2019-09-09 | End: 2019-09-27 | Stop reason: DRUGHIGH

## 2019-09-19 ENCOUNTER — OFFICE VISIT (OUTPATIENT)
Dept: PULMONOLOGY | Age: 74
End: 2019-09-19
Payer: MEDICARE

## 2019-09-19 VITALS
HEART RATE: 77 BPM | DIASTOLIC BLOOD PRESSURE: 60 MMHG | SYSTOLIC BLOOD PRESSURE: 104 MMHG | HEIGHT: 59 IN | BODY MASS INDEX: 27.7 KG/M2 | WEIGHT: 137.4 LBS | TEMPERATURE: 98.2 F | OXYGEN SATURATION: 95 %

## 2019-09-19 DIAGNOSIS — J96.11 CHRONIC RESPIRATORY FAILURE WITH HYPOXIA AND HYPERCAPNIA (HCC): ICD-10-CM

## 2019-09-19 DIAGNOSIS — Z87.891 HISTORY OF TOBACCO ABUSE: ICD-10-CM

## 2019-09-19 DIAGNOSIS — C34.92 ADENOCARCINOMA, LUNG, LEFT (HCC): ICD-10-CM

## 2019-09-19 DIAGNOSIS — J44.9 STAGE 3 SEVERE COPD BY GOLD CLASSIFICATION (HCC): Primary | ICD-10-CM

## 2019-09-19 DIAGNOSIS — N30.00 ACUTE CYSTITIS WITHOUT HEMATURIA: ICD-10-CM

## 2019-09-19 DIAGNOSIS — J96.12 CHRONIC RESPIRATORY FAILURE WITH HYPOXIA AND HYPERCAPNIA (HCC): ICD-10-CM

## 2019-09-19 PROCEDURE — 94618 PULMONARY STRESS TESTING: CPT | Performed by: NURSE PRACTITIONER

## 2019-09-19 PROCEDURE — 4040F PNEUMOC VAC/ADMIN/RCVD: CPT | Performed by: NURSE PRACTITIONER

## 2019-09-19 PROCEDURE — G8419 CALC BMI OUT NRM PARAM NOF/U: HCPCS | Performed by: NURSE PRACTITIONER

## 2019-09-19 PROCEDURE — 1123F ACP DISCUSS/DSCN MKR DOCD: CPT | Performed by: NURSE PRACTITIONER

## 2019-09-19 PROCEDURE — G8400 PT W/DXA NO RESULTS DOC: HCPCS | Performed by: NURSE PRACTITIONER

## 2019-09-19 PROCEDURE — 3023F SPIROM DOC REV: CPT | Performed by: NURSE PRACTITIONER

## 2019-09-19 PROCEDURE — 1090F PRES/ABSN URINE INCON ASSESS: CPT | Performed by: NURSE PRACTITIONER

## 2019-09-19 PROCEDURE — 1036F TOBACCO NON-USER: CPT | Performed by: NURSE PRACTITIONER

## 2019-09-19 PROCEDURE — G8926 SPIRO NO PERF OR DOC: HCPCS | Performed by: NURSE PRACTITIONER

## 2019-09-19 PROCEDURE — 3017F COLORECTAL CA SCREEN DOC REV: CPT | Performed by: NURSE PRACTITIONER

## 2019-09-19 PROCEDURE — G8427 DOCREV CUR MEDS BY ELIG CLIN: HCPCS | Performed by: NURSE PRACTITIONER

## 2019-09-19 PROCEDURE — 99214 OFFICE O/P EST MOD 30 MIN: CPT | Performed by: NURSE PRACTITIONER

## 2019-09-19 RX ORDER — NITROFURANTOIN MACROCRYSTALS 100 MG/1
100 CAPSULE ORAL 4 TIMES DAILY
Qty: 20 CAPSULE | Refills: 0 | Status: SHIPPED | OUTPATIENT
Start: 2019-09-19 | End: 2019-09-24

## 2019-09-19 ASSESSMENT — ENCOUNTER SYMPTOMS
DIARRHEA: 0
COUGH: 1
NAUSEA: 0
SHORTNESS OF BREATH: 1
ALLERGIC/IMMUNOLOGIC NEGATIVE: 1
EYES NEGATIVE: 1
WHEEZING: 0
BACK PAIN: 1
CHEST TIGHTNESS: 1
STRIDOR: 0

## 2019-09-25 ENCOUNTER — TELEPHONE (OUTPATIENT)
Dept: ONCOLOGY | Age: 74
End: 2019-09-25

## 2019-09-25 ENCOUNTER — OFFICE VISIT (OUTPATIENT)
Dept: INTERNAL MEDICINE CLINIC | Age: 74
End: 2019-09-25
Payer: MEDICARE

## 2019-09-25 VITALS
BODY MASS INDEX: 27.69 KG/M2 | SYSTOLIC BLOOD PRESSURE: 135 MMHG | RESPIRATION RATE: 12 BRPM | HEART RATE: 61 BPM | WEIGHT: 137.35 LBS | DIASTOLIC BLOOD PRESSURE: 72 MMHG | OXYGEN SATURATION: 92 % | HEIGHT: 59 IN

## 2019-09-25 DIAGNOSIS — J30.2 SEASONAL ALLERGIC RHINITIS, UNSPECIFIED TRIGGER: ICD-10-CM

## 2019-09-25 DIAGNOSIS — E03.2 HYPOTHYROIDISM DUE TO MEDICATION: Primary | ICD-10-CM

## 2019-09-25 PROCEDURE — 99214 OFFICE O/P EST MOD 30 MIN: CPT | Performed by: NURSE PRACTITIONER

## 2019-09-25 PROCEDURE — 3017F COLORECTAL CA SCREEN DOC REV: CPT | Performed by: NURSE PRACTITIONER

## 2019-09-25 PROCEDURE — G8419 CALC BMI OUT NRM PARAM NOF/U: HCPCS | Performed by: NURSE PRACTITIONER

## 2019-09-25 PROCEDURE — 4040F PNEUMOC VAC/ADMIN/RCVD: CPT | Performed by: NURSE PRACTITIONER

## 2019-09-25 PROCEDURE — 1123F ACP DISCUSS/DSCN MKR DOCD: CPT | Performed by: NURSE PRACTITIONER

## 2019-09-25 PROCEDURE — 1036F TOBACCO NON-USER: CPT | Performed by: NURSE PRACTITIONER

## 2019-09-25 PROCEDURE — G8427 DOCREV CUR MEDS BY ELIG CLIN: HCPCS | Performed by: NURSE PRACTITIONER

## 2019-09-25 PROCEDURE — 1090F PRES/ABSN URINE INCON ASSESS: CPT | Performed by: NURSE PRACTITIONER

## 2019-09-25 PROCEDURE — G8400 PT W/DXA NO RESULTS DOC: HCPCS | Performed by: NURSE PRACTITIONER

## 2019-09-25 RX ORDER — LEVOCETIRIZINE DIHYDROCHLORIDE 5 MG/1
5 TABLET, FILM COATED ORAL NIGHTLY
Qty: 30 TABLET | Refills: 2 | Status: SHIPPED | OUTPATIENT
Start: 2019-09-25 | End: 2019-12-23 | Stop reason: ALTCHOICE

## 2019-09-26 RX ORDER — DIPHENHYDRAMINE HYDROCHLORIDE 50 MG/ML
50 INJECTION INTRAMUSCULAR; INTRAVENOUS ONCE
Status: CANCELLED | OUTPATIENT
Start: 2019-09-27

## 2019-09-26 RX ORDER — SODIUM CHLORIDE 0.9 % (FLUSH) 0.9 %
10 SYRINGE (ML) INJECTION PRN
Status: CANCELLED | OUTPATIENT
Start: 2019-09-27

## 2019-09-26 RX ORDER — MEPERIDINE HYDROCHLORIDE 50 MG/ML
12.5 INJECTION INTRAMUSCULAR; INTRAVENOUS; SUBCUTANEOUS ONCE
Status: CANCELLED | OUTPATIENT
Start: 2019-09-27

## 2019-09-26 RX ORDER — SODIUM CHLORIDE 9 MG/ML
INJECTION, SOLUTION INTRAVENOUS ONCE
Status: CANCELLED | OUTPATIENT
Start: 2019-09-27

## 2019-09-26 RX ORDER — EPINEPHRINE 1 MG/ML
0.3 INJECTION, SOLUTION, CONCENTRATE INTRAVENOUS PRN
Status: CANCELLED | OUTPATIENT
Start: 2019-09-27

## 2019-09-26 RX ORDER — METHYLPREDNISOLONE SODIUM SUCCINATE 125 MG/2ML
125 INJECTION, POWDER, LYOPHILIZED, FOR SOLUTION INTRAMUSCULAR; INTRAVENOUS ONCE
Status: CANCELLED | OUTPATIENT
Start: 2019-09-27

## 2019-09-26 RX ORDER — SODIUM CHLORIDE 9 MG/ML
INJECTION, SOLUTION INTRAVENOUS CONTINUOUS
Status: CANCELLED | OUTPATIENT
Start: 2019-09-27

## 2019-09-26 RX ORDER — HEPARIN SODIUM (PORCINE) LOCK FLUSH IV SOLN 100 UNIT/ML 100 UNIT/ML
500 SOLUTION INTRAVENOUS PRN
Status: CANCELLED | OUTPATIENT
Start: 2019-09-27

## 2019-09-26 RX ORDER — SODIUM CHLORIDE 0.9 % (FLUSH) 0.9 %
5 SYRINGE (ML) INJECTION PRN
Status: CANCELLED | OUTPATIENT
Start: 2019-09-27

## 2019-09-26 RX ORDER — 0.9 % SODIUM CHLORIDE 0.9 %
10 VIAL (ML) INJECTION ONCE
Status: CANCELLED | OUTPATIENT
Start: 2019-09-27

## 2019-09-26 ASSESSMENT — ENCOUNTER SYMPTOMS
TROUBLE SWALLOWING: 0
ABDOMINAL DISTENTION: 0
ABDOMINAL PAIN: 0
CHEST TIGHTNESS: 0
FACIAL SWELLING: 0
VOMITING: 0
BACK PAIN: 0
SHORTNESS OF BREATH: 1
DIARRHEA: 0
WHEEZING: 0
CONSTIPATION: 0
EYE DISCHARGE: 0
COLOR CHANGE: 0
COUGH: 1
SORE THROAT: 0
RECTAL PAIN: 0
NAUSEA: 0
BLOOD IN STOOL: 0

## 2019-09-27 ENCOUNTER — HOSPITAL ENCOUNTER (OUTPATIENT)
Dept: INFUSION THERAPY | Age: 74
Discharge: HOME | End: 2019-09-27
Payer: MEDICARE

## 2019-09-27 ENCOUNTER — OFFICE VISIT (OUTPATIENT)
Dept: ONCOLOGY | Age: 74
End: 2019-09-27
Payer: MEDICARE

## 2019-09-27 VITALS
WEIGHT: 137.2 LBS | TEMPERATURE: 97.6 F | HEART RATE: 75 BPM | BODY MASS INDEX: 27.66 KG/M2 | HEIGHT: 59 IN | RESPIRATION RATE: 18 BRPM | OXYGEN SATURATION: 100 % | DIASTOLIC BLOOD PRESSURE: 68 MMHG | SYSTOLIC BLOOD PRESSURE: 139 MMHG

## 2019-09-27 VITALS
WEIGHT: 137.2 LBS | HEART RATE: 75 BPM | OXYGEN SATURATION: 97 % | TEMPERATURE: 97.9 F | SYSTOLIC BLOOD PRESSURE: 136 MMHG | DIASTOLIC BLOOD PRESSURE: 74 MMHG | HEIGHT: 59 IN | BODY MASS INDEX: 27.66 KG/M2 | RESPIRATION RATE: 16 BRPM

## 2019-09-27 DIAGNOSIS — D64.9 ANEMIA, UNSPECIFIED TYPE: ICD-10-CM

## 2019-09-27 DIAGNOSIS — C34.91 PRIMARY LUNG CANCER, RIGHT (HCC): ICD-10-CM

## 2019-09-27 DIAGNOSIS — Z51.12 ENCOUNTER FOR ANTINEOPLASTIC CHEMOTHERAPY AND IMMUNOTHERAPY: ICD-10-CM

## 2019-09-27 DIAGNOSIS — E03.2 HYPOTHYROIDISM DUE TO MEDICATION: ICD-10-CM

## 2019-09-27 DIAGNOSIS — R91.1 NODULE OF LEFT LUNG: ICD-10-CM

## 2019-09-27 DIAGNOSIS — J43.9 PULMONARY EMPHYSEMA, UNSPECIFIED EMPHYSEMA TYPE (HCC): ICD-10-CM

## 2019-09-27 DIAGNOSIS — J96.21 ACUTE ON CHRONIC RESPIRATORY FAILURE WITH HYPOXEMIA (HCC): ICD-10-CM

## 2019-09-27 DIAGNOSIS — C34.31 SQUAMOUS CELL CARCINOMA OF BRONCHUS IN RIGHT LOWER LOBE (HCC): ICD-10-CM

## 2019-09-27 DIAGNOSIS — C78.02 METASTATIC LUNG CARCINOMA, LEFT (HCC): ICD-10-CM

## 2019-09-27 DIAGNOSIS — C34.91 CARCINOMA OF LUNG, RIGHT (HCC): Primary | ICD-10-CM

## 2019-09-27 DIAGNOSIS — Z85.118 H/O: LUNG CANCER: ICD-10-CM

## 2019-09-27 DIAGNOSIS — J44.9 STAGE 3 SEVERE COPD BY GOLD CLASSIFICATION (HCC): ICD-10-CM

## 2019-09-27 DIAGNOSIS — E07.9 DISORDER OF THYROID: ICD-10-CM

## 2019-09-27 DIAGNOSIS — C34.90 RECURRENT NON-SMALL CELL LUNG CANCER (NSCLC) (HCC): ICD-10-CM

## 2019-09-27 DIAGNOSIS — Z51.11 ENCOUNTER FOR ANTINEOPLASTIC CHEMOTHERAPY AND IMMUNOTHERAPY: ICD-10-CM

## 2019-09-27 DIAGNOSIS — J96.21 ACUTE ON CHRONIC RESPIRATORY FAILURE WITH HYPOXIA (HCC): ICD-10-CM

## 2019-09-27 DIAGNOSIS — Z99.81 OXYGEN DEPENDENT: ICD-10-CM

## 2019-09-27 LAB
ALBUMIN SERPL-MCNC: 4.6 G/DL (ref 3.5–5.1)
ALP BLD-CCNC: 75 U/L (ref 38–126)
ALT SERPL-CCNC: 18 U/L (ref 11–66)
AST SERPL-CCNC: 36 U/L (ref 5–40)
BILIRUB SERPL-MCNC: 0.3 MG/DL (ref 0.3–1.2)
BILIRUBIN DIRECT: < 0.2 MG/DL (ref 0–0.3)
BUN, WHOLE BLOOD: 24 MG/DL (ref 8–26)
CHLORIDE, WHOLE BLOOD: 88 MEQ/L (ref 98–109)
CREATININE, WHOLE BLOOD: 1.3 MG/DL (ref 0.5–1.2)
GFR, ESTIMATED: 42 ML/MIN/1.73M2
GLUCOSE, WHOLE BLOOD: 83 MG/DL (ref 70–108)
HCT VFR BLD CALC: 27.7 % (ref 37–47)
HEMOGLOBIN: 9.3 GM/DL (ref 12–16)
IONIZED CALCIUM, WHOLE BLOOD: 1.15 MMOL/L (ref 1.12–1.32)
MCH RBC QN AUTO: 31 PG (ref 27–31)
MCHC RBC AUTO-ENTMCNC: 33.6 GM/DL (ref 33–37)
MCV RBC AUTO: 93 FL (ref 81–99)
PDW BLD-RTO: 14.6 % (ref 11.5–14.5)
PLATELET # BLD: 166 THOU/MM3 (ref 130–400)
PMV BLD AUTO: 7.2 FL (ref 7.4–10.4)
POTASSIUM, WHOLE BLOOD: 4.1 MEQ/L (ref 3.5–4.9)
RBC # BLD: 2.99 MILL/MM3 (ref 4.2–5.4)
SEG NEUTROPHILS: 68 % (ref 43–75)
SEGMENTED NEUTROPHILS ABSOLUTE COUNT: 3.1 THOU/MM3 (ref 1.8–7.7)
SODIUM, WHOLE BLOOD: 135 MEQ/L (ref 138–146)
T4 FREE: 0.23 NG/DL (ref 0.93–1.76)
TOTAL CO2, WHOLE BLOOD: 39 MEQ/L (ref 23–33)
TOTAL PROTEIN: 7.2 G/DL (ref 6.1–8)
TSH SERPL DL<=0.05 MIU/L-ACNC: 121.7 UIU/ML (ref 0.4–4.2)
WBC # BLD: 4.5 THOU/MM3 (ref 4.8–10.8)

## 2019-09-27 PROCEDURE — 80076 HEPATIC FUNCTION PANEL: CPT

## 2019-09-27 PROCEDURE — 1123F ACP DISCUSS/DSCN MKR DOCD: CPT | Performed by: INTERNAL MEDICINE

## 2019-09-27 PROCEDURE — G8400 PT W/DXA NO RESULTS DOC: HCPCS | Performed by: INTERNAL MEDICINE

## 2019-09-27 PROCEDURE — 2580000003 HC RX 258: Performed by: INTERNAL MEDICINE

## 2019-09-27 PROCEDURE — 84443 ASSAY THYROID STIM HORMONE: CPT

## 2019-09-27 PROCEDURE — 85027 COMPLETE CBC AUTOMATED: CPT

## 2019-09-27 PROCEDURE — G8926 SPIRO NO PERF OR DOC: HCPCS | Performed by: INTERNAL MEDICINE

## 2019-09-27 PROCEDURE — 99211 OFF/OP EST MAY X REQ PHY/QHP: CPT

## 2019-09-27 PROCEDURE — G8427 DOCREV CUR MEDS BY ELIG CLIN: HCPCS | Performed by: INTERNAL MEDICINE

## 2019-09-27 PROCEDURE — 1090F PRES/ABSN URINE INCON ASSESS: CPT | Performed by: INTERNAL MEDICINE

## 2019-09-27 PROCEDURE — 36591 DRAW BLOOD OFF VENOUS DEVICE: CPT

## 2019-09-27 PROCEDURE — 80047 BASIC METABLC PNL IONIZED CA: CPT

## 2019-09-27 PROCEDURE — 6360000002 HC RX W HCPCS: Performed by: INTERNAL MEDICINE

## 2019-09-27 PROCEDURE — 3017F COLORECTAL CA SCREEN DOC REV: CPT | Performed by: INTERNAL MEDICINE

## 2019-09-27 PROCEDURE — 4040F PNEUMOC VAC/ADMIN/RCVD: CPT | Performed by: INTERNAL MEDICINE

## 2019-09-27 PROCEDURE — 36415 COLL VENOUS BLD VENIPUNCTURE: CPT

## 2019-09-27 PROCEDURE — 99214 OFFICE O/P EST MOD 30 MIN: CPT | Performed by: INTERNAL MEDICINE

## 2019-09-27 PROCEDURE — G8419 CALC BMI OUT NRM PARAM NOF/U: HCPCS | Performed by: INTERNAL MEDICINE

## 2019-09-27 PROCEDURE — 1036F TOBACCO NON-USER: CPT | Performed by: INTERNAL MEDICINE

## 2019-09-27 PROCEDURE — 3023F SPIROM DOC REV: CPT | Performed by: INTERNAL MEDICINE

## 2019-09-27 PROCEDURE — 84439 ASSAY OF FREE THYROXINE: CPT

## 2019-09-27 PROCEDURE — 96413 CHEMO IV INFUSION 1 HR: CPT

## 2019-09-27 RX ORDER — NITROFURANTOIN MACROCRYSTALS 100 MG/1
100 CAPSULE ORAL 4 TIMES DAILY
Status: ON HOLD | COMMUNITY
End: 2020-01-26 | Stop reason: HOSPADM

## 2019-09-27 RX ORDER — HEPARIN SODIUM (PORCINE) LOCK FLUSH IV SOLN 100 UNIT/ML 100 UNIT/ML
500 SOLUTION INTRAVENOUS PRN
Status: DISCONTINUED | OUTPATIENT
Start: 2019-09-27 | End: 2019-09-28 | Stop reason: HOSPADM

## 2019-09-27 RX ORDER — SODIUM CHLORIDE 0.9 % (FLUSH) 0.9 %
10 SYRINGE (ML) INJECTION PRN
Status: CANCELLED | OUTPATIENT
Start: 2019-09-27

## 2019-09-27 RX ORDER — SODIUM CHLORIDE 0.9 % (FLUSH) 0.9 %
10 SYRINGE (ML) INJECTION PRN
Status: DISCONTINUED | OUTPATIENT
Start: 2019-09-27 | End: 2019-09-28 | Stop reason: HOSPADM

## 2019-09-27 RX ORDER — HEPARIN SODIUM (PORCINE) LOCK FLUSH IV SOLN 100 UNIT/ML 100 UNIT/ML
500 SOLUTION INTRAVENOUS PRN
Status: CANCELLED | OUTPATIENT
Start: 2019-09-27

## 2019-09-27 RX ORDER — SODIUM CHLORIDE 0.9 % (FLUSH) 0.9 %
20 SYRINGE (ML) INJECTION PRN
Status: CANCELLED | OUTPATIENT
Start: 2019-09-27

## 2019-09-27 RX ORDER — LEVOTHYROXINE SODIUM 0.05 MG/1
50 TABLET ORAL DAILY
Qty: 90 TABLET | Refills: 1 | Status: SHIPPED | OUTPATIENT
Start: 2019-09-27 | End: 2019-12-04 | Stop reason: DRUGHIGH

## 2019-09-27 RX ORDER — SODIUM CHLORIDE 0.9 % (FLUSH) 0.9 %
20 SYRINGE (ML) INJECTION PRN
Status: DISCONTINUED | OUTPATIENT
Start: 2019-09-27 | End: 2019-09-28 | Stop reason: HOSPADM

## 2019-09-27 RX ORDER — ROPINIROLE 0.25 MG/1
0.25 TABLET, FILM COATED ORAL 2 TIMES DAILY
Qty: 60 TABLET | Refills: 1 | Status: SHIPPED | OUTPATIENT
Start: 2019-09-27 | End: 2019-12-23 | Stop reason: SDUPTHER

## 2019-09-27 RX ORDER — SODIUM CHLORIDE 9 MG/ML
INJECTION, SOLUTION INTRAVENOUS ONCE
Status: COMPLETED | OUTPATIENT
Start: 2019-09-27 | End: 2019-09-27

## 2019-09-27 RX ORDER — PROMETHAZINE HYDROCHLORIDE 25 MG/1
25 TABLET ORAL EVERY 8 HOURS PRN
Qty: 30 TABLET | Refills: 1 | Status: SHIPPED | OUTPATIENT
Start: 2019-09-27 | End: 2020-05-15 | Stop reason: SDUPTHER

## 2019-09-27 RX ADMIN — HEPARIN SODIUM (PORCINE) LOCK FLUSH IV SOLN 100 UNIT/ML 500 UNITS: 100 SOLUTION at 11:37

## 2019-09-27 RX ADMIN — Medication 20 ML: at 09:01

## 2019-09-27 RX ADMIN — SODIUM CHLORIDE: 9 INJECTION, SOLUTION INTRAVENOUS at 10:28

## 2019-09-27 RX ADMIN — Medication 10 ML: at 10:28

## 2019-09-27 RX ADMIN — Medication 10 ML: at 09:00

## 2019-09-27 RX ADMIN — Medication 10 ML: at 11:37

## 2019-09-27 RX ADMIN — ATEZOLIZUMAB 1200 MG: 1200 INJECTION, SOLUTION INTRAVENOUS at 10:34

## 2019-09-27 NOTE — ONCOLOGY
Chemotherapy Administration    Pre-assessment Data: Antineoplastic Agents  Other:   See toxicity flow sheet for assessment [x]     Physician Notification of Concerns Related to Chemotherapy Administration:   Physician Notified Vega Junior / Time of Notification      Interventions:   Lab work assessed  [x]   Height / Weight verified for dose [x]   Current MAR reviewed [x]   Emergency drugs available as appropriate [x]   Anaphylaxis assessment completed [x]   Pre-medications administered as ordered [x]   Blood return noted upon initiation of chemotherapy [x]   Blood return noted each 1-2ml of a vesicant medication if given IV push []   Blood return noted each 2-3ml of a non-vesicant medication if given IV push []   Monitor for signs / symptoms of hypersensitivity reaction [x]   Chemotherapy orders (drug/dose/rate) verified by 2 Chemo certified RNs [x]   Monitor IV site and blood return throughout the infusion of the medication [x]   Document IV site checks on the IV assessment form [x]   Document chemotherapy teaching on the Patient Education tab [x]   Document patient verbalizes understanding of medications being administered [x]   If IV infiltration, see ONS Guidelines []   Other:      []

## 2019-09-27 NOTE — PLAN OF CARE
Problem: Musculor/Skeletal Functional Status  Goal: Absence of falls  Outcome: Met This Shift  Note:   Patient free of falls this visit. Intervention: Fall precautions  Note:   Fall risks assessed. Precautions discussed. Call light within reach. Patient ambulated with stand by assist during visit. Problem: Intellectual/Education/Knowledge Deficit  Goal: Teaching initiated upon admission  Outcome: Met This Shift  Note:   Patient verbalizes understanding to verbal information given on tecentriq, including action and possible side effects. Aware to call MD if develop complications. Intervention: Verbal/written education provided  Note:   Chemotherapy Teaching     What is Chemotherapy   Drug action ? Method of Administration ? Handouts given ? Side Effects  Nausea/vomiting ? Diarrhea ? Fatigue ? Signs / Symptoms of infection ? Neutropenia ? Thrombocytopenia ? Alopecia ? neuropathy ? [place] diet &  the importance of fluids ? Micellaneous  Importance of nutrition ? Importance of oral hygiene ? When to call the MD ?   Monitoring labs ? Use of supportive services ? Explanation of Drug Regimen / Frequency  tecentriq     Comments  Verbalized understanding to drug,action,side effects and when to call MD         Problem: Discharge Planning  Goal: Knowledge of discharge instructions  Description  Knowledge of discharge instructions     Outcome: Met This Shift  Note:   Patient verbalizes understanding of discharge instructions, follow up appointment, and when to call physician if needed   Intervention: Interaction with patient/family and care team  Note:   Discharge instructions given to pt and reviewed. Follow up appointments discussed.        Problem: Infection - Central Venous Catheter-Associated Bloodstream Infection:  Goal: Will show no infection signs and symptoms  Description  Will show no infection signs and symptoms  Outcome: Met This Shift  Note:   \[facility]\"" with no redness or warmth. Skin over port site intact with no signs of breakdown noted. Patient verbalizes signs/symptoms of port infection and when to notify the physician. Intervention: Infection risk assessment  Note:   Discuss port maintenance, infection prevention, signs of infection, and when to call the doctor. Care plan reviewed with patient. Patient verbalizes understanding of the plan of care and contributes to goal setting.

## 2019-09-27 NOTE — PROGRESS NOTES
Patient assessed for the following post chemotherapy:    Dizziness   No  Lightheadedness  No      Acute nausea/vomiting No  Headache   No  Chest pain/pressure  No  Rash/itching   No  Shortness of breath  No    Patient kept for 20 minutes observation post infusion chemotherapy. Patient tolerated chemotherapy treatment tecentriq without any complications. Last vital signs:   /68   Pulse 75   Temp 97.6 °F (36.4 °C) (Oral)   Resp 18   Ht 4' 11\" (1.499 m)   Wt 137 lb 3.2 oz (62.2 kg)   SpO2 100%   BMI 27.71 kg/m²       Patient instructed if experience any of the above symptoms following today's infusion, he is to notify MD immediately or go to the emergency department. Discharge instructions given to patient. Verbalizes understanding. Patient wheeled off unit via wheelchair per [address] with belongings.

## 2019-09-27 NOTE — PROGRESS NOTES
Use    Smoking status: Former Smoker     Packs/day: 1.00     Years: 45.00     Pack years: 45.00     Types: Cigarettes     Last attempt to quit: 2007     Years since quittin.7    Smokeless tobacco: Never Used   Substance and Sexual Activity    Alcohol use: No    Drug use: No    Sexual activity: Not on file   Lifestyle    Physical activity:     Days per week: Not on file     Minutes per session: Not on file    Stress: Not on file   Relationships    Social connections:     Talks on phone: Not on file     Gets together: Not on file     Attends Confucianist service: Not on file     Active member of club or organization: Not on file     Attends meetings of clubs or organizations: Not on file     Relationship status: Not on file    Intimate partner violence:     Fear of current or ex partner: Not on file     Emotionally abused: Not on file     Physically abused: Not on file     Forced sexual activity: Not on file   Other Topics Concern    Not on file   Social History Narrative    Not on file     Family History          Problem Relation Age of Onset    Cancer Mother     Heart Disease Father     High Blood Pressure Father     High Cholesterol Father     Arthritis Sister     Heart Disease Sister     Cancer Sister         lung cancer    Heart Disease Sister     Stroke Sister     High Cholesterol Sister     High Blood Pressure Sister     Stroke Paternal Grandfather     High Blood Pressure Brother      ROS     Review of Systems   Constitutional: Positive for activity change, appetite change, fatigue and fever. HENT: Negative for congestion, dental problem, facial swelling, hearing loss, mouth sores, nosebleeds, sore throat, tinnitus and trouble swallowing. Eyes: Negative for discharge and visual disturbance. Respiratory: Positive for cough and shortness of breath. Negative for chest tightness and wheezing. Cardiovascular: Positive for palpitations and leg swelling. Negative for chest pain. January 2019. Further treatment held due to hospitalization for acute on chronic respiratory failure. Recent PET scan on 04/23/2019 showed interval resolution of previously seen left upper lobe hypermetabolic nodule. The patient's performance status has significantly improved. Therefore on May 15, 2019 she restarted treatment with  Tecentriq. 2.  Palliative immunotherapy with Tecentriq. She tolerated her last infusion very well. No diarrhea, no skin rash, no worsening shortness of breath. Her vital signs are stable today, labs are within normal limits so we'll proceed with Tecentriq infusion today. 2.  Pericardial effusion. Status post  pericardiocentesis. Almost 700 mL of fluid was drained during procedure. Cytology was negative for evidence of malignant cells. Patient sees a cardiologist next week. 4. Chronic normocytic anemia. Hemoglobin is stable at 8.8 today her MCV is 91  Will monitor. 5. Elevated creatinine. Stable 1.3 today. 6.  Hypothyroidism. Secondary to immunotherapy. The patient was placed on Synthroid 25 mcg 3 weeks ago, she tolerates it well.   Today the dose is increased to 50 mcg daily    Electronically signed by     Joie Gaines MD on 9/27/2019 at 7:25 PM

## 2019-10-01 ENCOUNTER — TELEPHONE (OUTPATIENT)
Dept: INTERNAL MEDICINE CLINIC | Age: 74
End: 2019-10-01

## 2019-10-01 DIAGNOSIS — E03.2 HYPOTHYROIDISM DUE TO MEDICATION: Primary | ICD-10-CM

## 2019-10-01 RX ORDER — LEVOTHYROXINE SODIUM 0.07 MG/1
75 TABLET ORAL DAILY
Qty: 90 TABLET | Refills: 1 | Status: SHIPPED | OUTPATIENT
Start: 2019-10-01 | End: 2019-12-04 | Stop reason: SDUPTHER

## 2019-10-17 RX ORDER — HEPARIN SODIUM (PORCINE) LOCK FLUSH IV SOLN 100 UNIT/ML 100 UNIT/ML
500 SOLUTION INTRAVENOUS PRN
Status: CANCELLED | OUTPATIENT
Start: 2019-10-18

## 2019-10-17 RX ORDER — MEPERIDINE HYDROCHLORIDE 25 MG/ML
12.5 INJECTION INTRAMUSCULAR; INTRAVENOUS; SUBCUTANEOUS ONCE
Status: CANCELLED | OUTPATIENT
Start: 2019-10-18

## 2019-10-17 RX ORDER — 0.9 % SODIUM CHLORIDE 0.9 %
10 VIAL (ML) INJECTION ONCE
Status: CANCELLED | OUTPATIENT
Start: 2019-10-18

## 2019-10-17 RX ORDER — SODIUM CHLORIDE 0.9 % (FLUSH) 0.9 %
10 SYRINGE (ML) INJECTION PRN
Status: CANCELLED | OUTPATIENT
Start: 2019-10-18

## 2019-10-17 RX ORDER — SODIUM CHLORIDE 9 MG/ML
INJECTION, SOLUTION INTRAVENOUS CONTINUOUS
Status: CANCELLED | OUTPATIENT
Start: 2019-10-18

## 2019-10-17 RX ORDER — DIPHENHYDRAMINE HYDROCHLORIDE 50 MG/ML
50 INJECTION INTRAMUSCULAR; INTRAVENOUS ONCE
Status: CANCELLED | OUTPATIENT
Start: 2019-10-18

## 2019-10-17 RX ORDER — SODIUM CHLORIDE 0.9 % (FLUSH) 0.9 %
5 SYRINGE (ML) INJECTION PRN
Status: CANCELLED | OUTPATIENT
Start: 2019-10-18

## 2019-10-17 RX ORDER — METHYLPREDNISOLONE SODIUM SUCCINATE 125 MG/2ML
125 INJECTION, POWDER, LYOPHILIZED, FOR SOLUTION INTRAMUSCULAR; INTRAVENOUS ONCE
Status: CANCELLED | OUTPATIENT
Start: 2019-10-18

## 2019-10-18 ENCOUNTER — HOSPITAL ENCOUNTER (OUTPATIENT)
Dept: INFUSION THERAPY | Age: 74
Discharge: HOME | End: 2019-10-18
Payer: MEDICARE

## 2019-10-18 VITALS
WEIGHT: 135 LBS | HEART RATE: 80 BPM | TEMPERATURE: 98.3 F | SYSTOLIC BLOOD PRESSURE: 144 MMHG | DIASTOLIC BLOOD PRESSURE: 71 MMHG | OXYGEN SATURATION: 99 % | BODY MASS INDEX: 27.21 KG/M2 | RESPIRATION RATE: 18 BRPM | HEIGHT: 59 IN

## 2019-10-18 DIAGNOSIS — C34.31 SQUAMOUS CELL CARCINOMA OF BRONCHUS IN RIGHT LOWER LOBE (HCC): ICD-10-CM

## 2019-10-18 DIAGNOSIS — J96.21 ACUTE ON CHRONIC RESPIRATORY FAILURE WITH HYPOXIA (HCC): ICD-10-CM

## 2019-10-18 DIAGNOSIS — R91.1 NODULE OF LEFT LUNG: ICD-10-CM

## 2019-10-18 DIAGNOSIS — Z51.11 ENCOUNTER FOR ANTINEOPLASTIC CHEMOTHERAPY AND IMMUNOTHERAPY: ICD-10-CM

## 2019-10-18 DIAGNOSIS — Z51.12 ENCOUNTER FOR ANTINEOPLASTIC CHEMOTHERAPY AND IMMUNOTHERAPY: ICD-10-CM

## 2019-10-18 DIAGNOSIS — J43.9 PULMONARY EMPHYSEMA, UNSPECIFIED EMPHYSEMA TYPE (HCC): ICD-10-CM

## 2019-10-18 DIAGNOSIS — Z85.118 H/O: LUNG CANCER: ICD-10-CM

## 2019-10-18 DIAGNOSIS — E03.2 HYPOTHYROIDISM DUE TO MEDICATION: ICD-10-CM

## 2019-10-18 DIAGNOSIS — J96.21 ACUTE ON CHRONIC RESPIRATORY FAILURE WITH HYPOXEMIA (HCC): ICD-10-CM

## 2019-10-18 DIAGNOSIS — C34.91 CARCINOMA OF LUNG, RIGHT (HCC): Primary | ICD-10-CM

## 2019-10-18 DIAGNOSIS — C34.91 PRIMARY LUNG CANCER, RIGHT (HCC): ICD-10-CM

## 2019-10-18 DIAGNOSIS — D64.9 ANEMIA, UNSPECIFIED TYPE: ICD-10-CM

## 2019-10-18 DIAGNOSIS — C78.02 METASTATIC LUNG CARCINOMA, LEFT (HCC): ICD-10-CM

## 2019-10-18 DIAGNOSIS — J44.9 STAGE 3 SEVERE COPD BY GOLD CLASSIFICATION (HCC): ICD-10-CM

## 2019-10-18 DIAGNOSIS — C34.90 RECURRENT NON-SMALL CELL LUNG CANCER (NSCLC) (HCC): ICD-10-CM

## 2019-10-18 LAB
ALBUMIN SERPL-MCNC: 4.3 G/DL (ref 3.5–5.1)
ALP BLD-CCNC: 65 U/L (ref 38–126)
ALT SERPL-CCNC: 10 U/L (ref 11–66)
AST SERPL-CCNC: 19 U/L (ref 5–40)
BILIRUB SERPL-MCNC: 0.3 MG/DL (ref 0.3–1.2)
BILIRUBIN DIRECT: < 0.2 MG/DL (ref 0–0.3)
BUN, WHOLE BLOOD: 21 MG/DL (ref 8–26)
CHLORIDE, WHOLE BLOOD: 88 MEQ/L (ref 98–109)
CREATININE, WHOLE BLOOD: 1.2 MG/DL (ref 0.5–1.2)
GFR, ESTIMATED: 47 ML/MIN/1.73M2
GLUCOSE, WHOLE BLOOD: 88 MG/DL (ref 70–108)
HCT VFR BLD CALC: 26.2 % (ref 37–47)
HEMOGLOBIN: 8.9 GM/DL (ref 12–16)
IONIZED CALCIUM, WHOLE BLOOD: 1.15 MMOL/L (ref 1.12–1.32)
MCH RBC QN AUTO: 31.2 PG (ref 27–31)
MCHC RBC AUTO-ENTMCNC: 33.8 GM/DL (ref 33–37)
MCV RBC AUTO: 92 FL (ref 81–99)
PDW BLD-RTO: 14.8 % (ref 11.5–14.5)
PLATELET # BLD: 164 THOU/MM3 (ref 130–400)
PMV BLD AUTO: 6.9 FL (ref 7.4–10.4)
POTASSIUM, WHOLE BLOOD: 4.1 MEQ/L (ref 3.5–4.9)
RBC # BLD: 2.84 MILL/MM3 (ref 4.2–5.4)
SEG NEUTROPHILS: 63 % (ref 43–75)
SEGMENTED NEUTROPHILS ABSOLUTE COUNT: 2.4 THOU/MM3 (ref 1.8–7.7)
SODIUM, WHOLE BLOOD: 135 MEQ/L (ref 138–146)
T4 FREE: 0.55 NG/DL (ref 0.93–1.76)
TOTAL CO2, WHOLE BLOOD: 39 MEQ/L (ref 23–33)
TOTAL PROTEIN: 7 G/DL (ref 6.1–8)
TSH SERPL DL<=0.05 MIU/L-ACNC: 96.23 UIU/ML (ref 0.4–4.2)
WBC # BLD: 3.8 THOU/MM3 (ref 4.8–10.8)

## 2019-10-18 PROCEDURE — 2580000003 HC RX 258: Performed by: INTERNAL MEDICINE

## 2019-10-18 PROCEDURE — 96413 CHEMO IV INFUSION 1 HR: CPT

## 2019-10-18 PROCEDURE — 85027 COMPLETE CBC AUTOMATED: CPT

## 2019-10-18 PROCEDURE — 84443 ASSAY THYROID STIM HORMONE: CPT

## 2019-10-18 PROCEDURE — 84439 ASSAY OF FREE THYROXINE: CPT

## 2019-10-18 PROCEDURE — 6360000002 HC RX W HCPCS: Performed by: INTERNAL MEDICINE

## 2019-10-18 PROCEDURE — 80076 HEPATIC FUNCTION PANEL: CPT

## 2019-10-18 PROCEDURE — 36591 DRAW BLOOD OFF VENOUS DEVICE: CPT

## 2019-10-18 PROCEDURE — 80047 BASIC METABLC PNL IONIZED CA: CPT

## 2019-10-18 PROCEDURE — 99211 OFF/OP EST MAY X REQ PHY/QHP: CPT

## 2019-10-18 RX ORDER — SODIUM CHLORIDE 0.9 % (FLUSH) 0.9 %
20 SYRINGE (ML) INJECTION PRN
Status: DISCONTINUED | OUTPATIENT
Start: 2019-10-18 | End: 2019-10-19 | Stop reason: HOSPADM

## 2019-10-18 RX ORDER — HEPARIN SODIUM (PORCINE) LOCK FLUSH IV SOLN 100 UNIT/ML 100 UNIT/ML
500 SOLUTION INTRAVENOUS PRN
Status: CANCELLED | OUTPATIENT
Start: 2019-10-18

## 2019-10-18 RX ORDER — HEPARIN SODIUM (PORCINE) LOCK FLUSH IV SOLN 100 UNIT/ML 100 UNIT/ML
500 SOLUTION INTRAVENOUS PRN
Status: DISCONTINUED | OUTPATIENT
Start: 2019-10-18 | End: 2019-10-19 | Stop reason: HOSPADM

## 2019-10-18 RX ORDER — SODIUM CHLORIDE 0.9 % (FLUSH) 0.9 %
20 SYRINGE (ML) INJECTION PRN
Status: CANCELLED | OUTPATIENT
Start: 2019-10-18

## 2019-10-18 RX ORDER — SODIUM CHLORIDE 0.9 % (FLUSH) 0.9 %
10 SYRINGE (ML) INJECTION PRN
Status: CANCELLED | OUTPATIENT
Start: 2019-10-18

## 2019-10-18 RX ORDER — SODIUM CHLORIDE 0.9 % (FLUSH) 0.9 %
10 SYRINGE (ML) INJECTION PRN
Status: DISCONTINUED | OUTPATIENT
Start: 2019-10-18 | End: 2019-10-19 | Stop reason: HOSPADM

## 2019-10-18 RX ORDER — SODIUM CHLORIDE 9 MG/ML
INJECTION, SOLUTION INTRAVENOUS ONCE
Status: COMPLETED | OUTPATIENT
Start: 2019-10-18 | End: 2019-10-18

## 2019-10-18 RX ADMIN — Medication 10 ML: at 12:25

## 2019-10-18 RX ADMIN — ATEZOLIZUMAB 1200 MG: 1200 INJECTION, SOLUTION INTRAVENOUS at 11:30

## 2019-10-18 RX ADMIN — HEPARIN SODIUM (PORCINE) LOCK FLUSH IV SOLN 100 UNIT/ML 500 UNITS: 100 SOLUTION at 12:25

## 2019-10-18 RX ADMIN — SODIUM CHLORIDE: 9 INJECTION, SOLUTION INTRAVENOUS at 11:10

## 2019-10-18 ASSESSMENT — PAIN DESCRIPTION - FREQUENCY
FREQUENCY: CONTINUOUS
FREQUENCY: CONTINUOUS

## 2019-10-18 ASSESSMENT — PAIN DESCRIPTION - ORIENTATION
ORIENTATION: LOWER
ORIENTATION: LOWER

## 2019-10-18 ASSESSMENT — PAIN DESCRIPTION - DESCRIPTORS
DESCRIPTORS: ACHING
DESCRIPTORS: ACHING

## 2019-10-18 ASSESSMENT — PAIN DESCRIPTION - LOCATION
LOCATION: BACK
LOCATION: BACK

## 2019-10-18 ASSESSMENT — PAIN SCALES - GENERAL
PAINLEVEL_OUTOF10: 8
PAINLEVEL_OUTOF10: 8

## 2019-10-18 ASSESSMENT — PAIN DESCRIPTION - ONSET
ONSET: ON-GOING
ONSET: ON-GOING

## 2019-10-18 NOTE — PLAN OF CARE
Problem: Pain:  Goal: Control of acute pain  Description  Control of acute pain  Outcome: Met This Shift  Goal: Control of chronic pain  Description  Control of chronic pain  10/18/2019 1434 by Balaji Antonio RN  Outcome: Met This Shift  Note:   Patient rating pain a 8 out of 10 using SIMI scale, Pain located in lower back. 10/18/2019 1422 by Balaji Antonio RN  Outcome: Met This Shift  Intervention: Opioid analgesia side-effects  Note:   Patient encouraged to take prescribed pain medications and call Physician if pain is not controlled. Problem: Musculor/Skeletal Functional Status  Goal: Absence of falls  10/18/2019 1434 by Balaji Antonio RN  Outcome: Met This Shift  Note:   Free from falls while in [facility]. 10/18/2019 1422 by Balaji Antonio RN  Outcome: Met This Shift  Intervention: Fall precautions  Note:   Discussed the need to use the call light for assistance when getting up to ambulate. Call light within reach. Problem: Intellectual/Education/Knowledge Deficit  Goal: Teaching initiated upon admission  10/18/2019 1434 by Balaji Antonio RN  Outcome: Met This Shift  Note:   Patient verbalizes understanding to verbal information given on tecentriq,action and possible side effects. Aware to call MD if develop complications. 10/18/2019 1422 by Balaji Antonio RN  Outcome: Met This Shift  Intervention: Verbal/written education provided  Note:   Chemotherapy Teaching     What is Chemotherapy   Drug action ? Method of Administration ? Handouts given ? Side Effects  Nausea/vomiting ? Diarrhea ? Fatigue ? Signs / Symptoms of infection ? Neutropenia ? Thrombocytopenia ? Alopecia ? neuropathy ? [place] diet &  the importance of fluids ? Micellaneous  Importance of nutrition ? Importance of oral hygiene ? When to call the MD ?   Monitoring labs ? Use of supportive services ?      Explanation of Drug Regimen / Frequency  Tecentriq   D1 C10

## 2019-10-18 NOTE — PROGRESS NOTES
Patient assessed for the following post chemotherapy:    Dizziness   No  Lightheadedness  No    Acute nausea/vomiting No  Headache   No  Chest pain/pressure  No  Rash/itching   No  Shortness of breath  No    Patient kept for 20 minutes observation post infusion chemotherapy. Patient tolerated chemotherapy treatment tecentriq without any complications. Labs drawn per mediport. Last vital signs:   BP (!) 144/71   Pulse 80   Temp 98.3 °F (36.8 °C) (Oral)   Resp 18   Ht 4' 11\" (1.499 m)   Wt 135 lb (61.2 kg)   SpO2 99%   BMI 27.27 kg/m²     Attending physician notified of maintaining O2 as @ home, orders received: Yes    Patient instructed if experience any of the above symptoms following today's infusion,he/she is to notify MD immediately or go to the emergency department. Discharge instructions given to patient. Verbalizes understanding. Ambulated off unit per self with belongings.

## 2019-10-18 NOTE — ONCOLOGY
Chemotherapy Administration    Pre-assessment Data: Antineoplastic Agents  Other:   See toxicity flow sheet for assessment [x]     Physician Notification of Concerns Related to Chemotherapy Administration:   Physician Notified Oli Rivera / Time of Notification      Interventions:   Lab work assessed  [x]   Height / Weight verified for dose [x]   Current MAR reviewed [x]   Emergency drugs available as appropriate [x]   Anaphylaxis assessment completed [x]   Pre-medications administered as ordered [x]   Blood return noted upon initiation of chemotherapy [x]   Blood return noted each 1-2ml of a vesicant medication if given IV push []   Blood return noted each 2-3ml of a non-vesicant medication if given IV push []   Monitor for signs / symptoms of hypersensitivity reaction [x]   Chemotherapy orders (drug/dose/rate) verified by 2 Chemo certified RNs [x]   Monitor IV site and blood return throughout the infusion of the medication [x]   Document IV site checks on the IV assessment form [x]   Document chemotherapy teaching on the Patient Education tab [x]   Document patient verbalizes understanding of medications being administered [x]   If IV infiltration, see ONS Guidelines []   Other:      []

## 2019-10-21 ENCOUNTER — TELEPHONE (OUTPATIENT)
Dept: INTERNAL MEDICINE CLINIC | Age: 74
End: 2019-10-21

## 2019-10-21 DIAGNOSIS — E03.2 HYPOTHYROIDISM DUE TO MEDICATION: Primary | ICD-10-CM

## 2019-10-29 ASSESSMENT — ENCOUNTER SYMPTOMS
SORE THROAT: 0
SINUS PRESSURE: 0
VOMITING: 0
EYE DISCHARGE: 1
RHINORRHEA: 1
TROUBLE SWALLOWING: 0
WHEEZING: 0
COUGH: 1
SHORTNESS OF BREATH: 1
EYE ITCHING: 1
DIARRHEA: 0
NAUSEA: 0
PHOTOPHOBIA: 0
CONSTIPATION: 0
BLOOD IN STOOL: 0
ABDOMINAL PAIN: 0
SINUS PAIN: 0
ABDOMINAL DISTENTION: 0

## 2019-11-04 ENCOUNTER — HOSPITAL ENCOUNTER (OUTPATIENT)
Dept: CT IMAGING | Age: 74
Discharge: HOME | End: 2019-11-04
Payer: MEDICARE

## 2019-11-04 DIAGNOSIS — C34.90 RECURRENT NON-SMALL CELL LUNG CANCER (NSCLC) (HCC): ICD-10-CM

## 2019-11-04 DIAGNOSIS — C34.91 CARCINOMA OF LUNG, RIGHT (HCC): ICD-10-CM

## 2019-11-04 DIAGNOSIS — J43.9 PULMONARY EMPHYSEMA, UNSPECIFIED EMPHYSEMA TYPE (HCC): ICD-10-CM

## 2019-11-04 DIAGNOSIS — C34.31 MALIGNANT NEOPLASM OF LOWER LOBE, RIGHT BRONCHUS OR LUNG (HCC): ICD-10-CM

## 2019-11-04 DIAGNOSIS — Z99.81 OXYGEN DEPENDENT: ICD-10-CM

## 2019-11-04 PROCEDURE — 6360000002 HC RX W HCPCS

## 2019-11-04 PROCEDURE — 6360000004 HC RX CONTRAST MEDICATION: Performed by: INTERNAL MEDICINE

## 2019-11-04 PROCEDURE — 74177 CT ABD & PELVIS W/CONTRAST: CPT

## 2019-11-04 PROCEDURE — 71260 CT THORAX DX C+: CPT

## 2019-11-04 RX ORDER — DIGOXIN 125 MCG
125 TABLET ORAL DAILY
Qty: 30 TABLET | Refills: 3 | Status: SHIPPED | OUTPATIENT
Start: 2019-11-04 | End: 2020-02-28 | Stop reason: SDUPTHER

## 2019-11-04 RX ORDER — HEPARIN SODIUM (PORCINE) LOCK FLUSH IV SOLN 100 UNIT/ML 100 UNIT/ML
500 SOLUTION INTRAVENOUS ONCE
Status: COMPLETED | OUTPATIENT
Start: 2019-11-04 | End: 2019-11-04

## 2019-11-04 RX ADMIN — HEPARIN SODIUM (PORCINE) LOCK FLUSH IV SOLN 100 UNIT/ML 500 UNITS: 100 SOLUTION at 12:39

## 2019-11-04 RX ADMIN — IOPAMIDOL 85 ML: 755 INJECTION, SOLUTION INTRAVENOUS at 12:19

## 2019-11-08 ENCOUNTER — HOSPITAL ENCOUNTER (OUTPATIENT)
Dept: INFUSION THERAPY | Age: 74
Discharge: HOME | End: 2019-11-08
Payer: MEDICARE

## 2019-11-08 ENCOUNTER — OFFICE VISIT (OUTPATIENT)
Dept: ONCOLOGY | Age: 74
End: 2019-11-08
Payer: MEDICARE

## 2019-11-08 VITALS
SYSTOLIC BLOOD PRESSURE: 135 MMHG | TEMPERATURE: 98 F | OXYGEN SATURATION: 95 % | HEART RATE: 88 BPM | RESPIRATION RATE: 18 BRPM | HEIGHT: 59 IN | BODY MASS INDEX: 27.27 KG/M2 | DIASTOLIC BLOOD PRESSURE: 62 MMHG

## 2019-11-08 VITALS
HEART RATE: 79 BPM | TEMPERATURE: 98 F | HEIGHT: 59 IN | SYSTOLIC BLOOD PRESSURE: 147 MMHG | WEIGHT: 139.6 LBS | DIASTOLIC BLOOD PRESSURE: 66 MMHG | BODY MASS INDEX: 28.14 KG/M2 | OXYGEN SATURATION: 98 % | RESPIRATION RATE: 18 BRPM

## 2019-11-08 DIAGNOSIS — C34.31 SQUAMOUS CELL CARCINOMA OF BRONCHUS IN RIGHT LOWER LOBE (HCC): Primary | ICD-10-CM

## 2019-11-08 DIAGNOSIS — C34.91 CARCINOMA OF LUNG, RIGHT (HCC): ICD-10-CM

## 2019-11-08 DIAGNOSIS — C34.31 MALIGNANT NEOPLASM OF LOWER LOBE, RIGHT BRONCHUS OR LUNG (HCC): Primary | ICD-10-CM

## 2019-11-08 DIAGNOSIS — J96.21 ACUTE ON CHRONIC RESPIRATORY FAILURE WITH HYPOXIA (HCC): ICD-10-CM

## 2019-11-08 DIAGNOSIS — I26.99 ACUTE PULMONARY EMBOLISM WITHOUT ACUTE COR PULMONALE, UNSPECIFIED PULMONARY EMBOLISM TYPE (HCC): ICD-10-CM

## 2019-11-08 DIAGNOSIS — C34.91 PRIMARY LUNG CANCER, RIGHT (HCC): ICD-10-CM

## 2019-11-08 DIAGNOSIS — Z85.118 H/O: LUNG CANCER: ICD-10-CM

## 2019-11-08 DIAGNOSIS — J44.9 STAGE 3 SEVERE COPD BY GOLD CLASSIFICATION (HCC): ICD-10-CM

## 2019-11-08 DIAGNOSIS — E03.2 HYPOTHYROIDISM DUE TO MEDICATION: ICD-10-CM

## 2019-11-08 DIAGNOSIS — C78.02 METASTATIC LUNG CARCINOMA, LEFT (HCC): ICD-10-CM

## 2019-11-08 DIAGNOSIS — C34.90 RECURRENT NON-SMALL CELL LUNG CANCER (NSCLC) (HCC): ICD-10-CM

## 2019-11-08 DIAGNOSIS — Z99.81 OXYGEN DEPENDENT: ICD-10-CM

## 2019-11-08 DIAGNOSIS — D64.9 ANEMIA, UNSPECIFIED TYPE: ICD-10-CM

## 2019-11-08 DIAGNOSIS — R91.1 NODULE OF LEFT LUNG: ICD-10-CM

## 2019-11-08 DIAGNOSIS — J43.9 PULMONARY EMPHYSEMA, UNSPECIFIED EMPHYSEMA TYPE (HCC): ICD-10-CM

## 2019-11-08 DIAGNOSIS — J96.21 ACUTE ON CHRONIC RESPIRATORY FAILURE WITH HYPOXEMIA (HCC): ICD-10-CM

## 2019-11-08 LAB
ABO: NORMAL
ALBUMIN SERPL-MCNC: 4 G/DL (ref 3.5–5.1)
ALP BLD-CCNC: 68 U/L (ref 38–126)
ALT SERPL-CCNC: 11 U/L (ref 11–66)
ANTIBODY SCREEN: NORMAL
AST SERPL-CCNC: 17 U/L (ref 5–40)
BILIRUB SERPL-MCNC: 0.2 MG/DL (ref 0.3–1.2)
BILIRUBIN DIRECT: < 0.2 MG/DL (ref 0–0.3)
BUN, WHOLE BLOOD: 20 MG/DL (ref 8–26)
CHLORIDE, WHOLE BLOOD: 97 MEQ/L (ref 98–109)
CHOLESTEROL, TOTAL: 116 MG/DL (ref 100–199)
CREATININE, WHOLE BLOOD: 1 MG/DL (ref 0.5–1.2)
GFR, ESTIMATED: 57 ML/MIN/1.73M2
GLUCOSE, WHOLE BLOOD: 91 MG/DL (ref 70–108)
HCT VFR BLD CALC: 23.1 % (ref 37–47)
HDLC SERPL-MCNC: 56 MG/DL
HEMOGLOBIN: 7.5 GM/DL (ref 12–16)
IONIZED CALCIUM, WHOLE BLOOD: 1.17 MMOL/L (ref 1.12–1.32)
LDL CHOLESTEROL CALCULATED: 50 MG/DL
MCH RBC QN AUTO: 30.6 PG (ref 27–31)
MCHC RBC AUTO-ENTMCNC: 32.6 GM/DL (ref 33–37)
MCV RBC AUTO: 94 FL (ref 81–99)
PDW BLD-RTO: 13.7 % (ref 11.5–14.5)
PLATELET # BLD: 136 THOU/MM3 (ref 130–400)
PMV BLD AUTO: 6.9 FL (ref 7.4–10.4)
POTASSIUM, WHOLE BLOOD: 4.8 MEQ/L (ref 3.5–4.9)
RBC # BLD: 2.46 MILL/MM3 (ref 4.2–5.4)
RH FACTOR: NORMAL
SEG NEUTROPHILS: 70 % (ref 43–75)
SEGMENTED NEUTROPHILS ABSOLUTE COUNT: 2.7 THOU/MM3 (ref 1.8–7.7)
SODIUM, WHOLE BLOOD: 138 MEQ/L (ref 138–146)
TOTAL CO2, WHOLE BLOOD: 35 MEQ/L (ref 23–33)
TOTAL PROTEIN: 6.8 G/DL (ref 6.1–8)
TRIGL SERPL-MCNC: 52 MG/DL (ref 0–199)
WBC # BLD: 3.8 THOU/MM3 (ref 4.8–10.8)

## 2019-11-08 PROCEDURE — G8484 FLU IMMUNIZE NO ADMIN: HCPCS | Performed by: INTERNAL MEDICINE

## 2019-11-08 PROCEDURE — P9016 RBC LEUKOCYTES REDUCED: HCPCS

## 2019-11-08 PROCEDURE — 3023F SPIROM DOC REV: CPT | Performed by: INTERNAL MEDICINE

## 2019-11-08 PROCEDURE — 99215 OFFICE O/P EST HI 40 MIN: CPT | Performed by: INTERNAL MEDICINE

## 2019-11-08 PROCEDURE — 96413 CHEMO IV INFUSION 1 HR: CPT

## 2019-11-08 PROCEDURE — 4040F PNEUMOC VAC/ADMIN/RCVD: CPT | Performed by: INTERNAL MEDICINE

## 2019-11-08 PROCEDURE — 86923 COMPATIBILITY TEST ELECTRIC: CPT

## 2019-11-08 PROCEDURE — 2580000003 HC RX 258: Performed by: INTERNAL MEDICINE

## 2019-11-08 PROCEDURE — G8926 SPIRO NO PERF OR DOC: HCPCS | Performed by: INTERNAL MEDICINE

## 2019-11-08 PROCEDURE — G8400 PT W/DXA NO RESULTS DOC: HCPCS | Performed by: INTERNAL MEDICINE

## 2019-11-08 PROCEDURE — 99211 OFF/OP EST MAY X REQ PHY/QHP: CPT

## 2019-11-08 PROCEDURE — 80047 BASIC METABLC PNL IONIZED CA: CPT

## 2019-11-08 PROCEDURE — 1123F ACP DISCUSS/DSCN MKR DOCD: CPT | Performed by: INTERNAL MEDICINE

## 2019-11-08 PROCEDURE — 80061 LIPID PANEL: CPT

## 2019-11-08 PROCEDURE — 6360000002 HC RX W HCPCS: Performed by: INTERNAL MEDICINE

## 2019-11-08 PROCEDURE — 80076 HEPATIC FUNCTION PANEL: CPT

## 2019-11-08 PROCEDURE — 86900 BLOOD TYPING SEROLOGIC ABO: CPT

## 2019-11-08 PROCEDURE — 1036F TOBACCO NON-USER: CPT | Performed by: INTERNAL MEDICINE

## 2019-11-08 PROCEDURE — 1090F PRES/ABSN URINE INCON ASSESS: CPT | Performed by: INTERNAL MEDICINE

## 2019-11-08 PROCEDURE — 36591 DRAW BLOOD OFF VENOUS DEVICE: CPT

## 2019-11-08 PROCEDURE — 3017F COLORECTAL CA SCREEN DOC REV: CPT | Performed by: INTERNAL MEDICINE

## 2019-11-08 PROCEDURE — 85027 COMPLETE CBC AUTOMATED: CPT

## 2019-11-08 PROCEDURE — 86850 RBC ANTIBODY SCREEN: CPT

## 2019-11-08 PROCEDURE — 86901 BLOOD TYPING SEROLOGIC RH(D): CPT

## 2019-11-08 PROCEDURE — 36430 TRANSFUSION BLD/BLD COMPNT: CPT

## 2019-11-08 PROCEDURE — G8427 DOCREV CUR MEDS BY ELIG CLIN: HCPCS | Performed by: INTERNAL MEDICINE

## 2019-11-08 PROCEDURE — G8417 CALC BMI ABV UP PARAM F/U: HCPCS | Performed by: INTERNAL MEDICINE

## 2019-11-08 RX ORDER — SODIUM CHLORIDE 0.9 % (FLUSH) 0.9 %
20 SYRINGE (ML) INJECTION PRN
Status: DISCONTINUED | OUTPATIENT
Start: 2019-11-08 | End: 2019-11-09 | Stop reason: HOSPADM

## 2019-11-08 RX ORDER — DIPHENHYDRAMINE HYDROCHLORIDE 50 MG/ML
50 INJECTION INTRAMUSCULAR; INTRAVENOUS ONCE
Status: CANCELLED | OUTPATIENT
Start: 2019-11-08

## 2019-11-08 RX ORDER — FUROSEMIDE 10 MG/ML
20 INJECTION INTRAMUSCULAR; INTRAVENOUS ONCE
Status: CANCELLED | OUTPATIENT
Start: 2019-11-11

## 2019-11-08 RX ORDER — SODIUM CHLORIDE 9 MG/ML
INJECTION, SOLUTION INTRAVENOUS CONTINUOUS
Status: CANCELLED | OUTPATIENT
Start: 2019-11-11

## 2019-11-08 RX ORDER — HEPARIN SODIUM (PORCINE) LOCK FLUSH IV SOLN 100 UNIT/ML 100 UNIT/ML
500 SOLUTION INTRAVENOUS PRN
Status: CANCELLED | OUTPATIENT
Start: 2019-11-08

## 2019-11-08 RX ORDER — SODIUM CHLORIDE 9 MG/ML
INJECTION, SOLUTION INTRAVENOUS ONCE
Status: CANCELLED | OUTPATIENT
Start: 2019-11-08

## 2019-11-08 RX ORDER — DIPHENHYDRAMINE HYDROCHLORIDE 50 MG/ML
50 INJECTION INTRAMUSCULAR; INTRAVENOUS ONCE
Status: CANCELLED | OUTPATIENT
Start: 2019-11-11

## 2019-11-08 RX ORDER — EPINEPHRINE 1 MG/ML
0.3 INJECTION, SOLUTION, CONCENTRATE INTRAVENOUS PRN
Status: CANCELLED | OUTPATIENT
Start: 2019-11-08

## 2019-11-08 RX ORDER — SODIUM CHLORIDE 0.9 % (FLUSH) 0.9 %
20 SYRINGE (ML) INJECTION PRN
Status: CANCELLED | OUTPATIENT
Start: 2019-11-08

## 2019-11-08 RX ORDER — METHYLPREDNISOLONE SODIUM SUCCINATE 125 MG/2ML
125 INJECTION, POWDER, LYOPHILIZED, FOR SOLUTION INTRAMUSCULAR; INTRAVENOUS ONCE
Status: CANCELLED | OUTPATIENT
Start: 2019-11-08

## 2019-11-08 RX ORDER — MEPERIDINE HYDROCHLORIDE 50 MG/ML
12.5 INJECTION INTRAMUSCULAR; INTRAVENOUS; SUBCUTANEOUS ONCE
Status: CANCELLED | OUTPATIENT
Start: 2019-11-08

## 2019-11-08 RX ORDER — HYDROCODONE BITARTRATE AND ACETAMINOPHEN 5; 325 MG/1; MG/1
1 TABLET ORAL EVERY 6 HOURS PRN
Status: ON HOLD | COMMUNITY
End: 2020-01-26 | Stop reason: HOSPADM

## 2019-11-08 RX ORDER — SODIUM CHLORIDE 9 MG/ML
INJECTION, SOLUTION INTRAVENOUS ONCE
Status: COMPLETED | OUTPATIENT
Start: 2019-11-08 | End: 2019-11-08

## 2019-11-08 RX ORDER — HEPARIN SODIUM (PORCINE) LOCK FLUSH IV SOLN 100 UNIT/ML 100 UNIT/ML
500 SOLUTION INTRAVENOUS PRN
Status: DISCONTINUED | OUTPATIENT
Start: 2019-11-08 | End: 2019-11-09 | Stop reason: HOSPADM

## 2019-11-08 RX ORDER — SODIUM CHLORIDE 0.9 % (FLUSH) 0.9 %
10 SYRINGE (ML) INJECTION PRN
Status: DISCONTINUED | OUTPATIENT
Start: 2019-11-08 | End: 2019-11-09 | Stop reason: HOSPADM

## 2019-11-08 RX ORDER — SODIUM CHLORIDE 9 MG/ML
INJECTION, SOLUTION INTRAVENOUS CONTINUOUS
Status: CANCELLED | OUTPATIENT
Start: 2019-11-08

## 2019-11-08 RX ORDER — SODIUM CHLORIDE 0.9 % (FLUSH) 0.9 %
20 SYRINGE (ML) INJECTION PRN
Status: CANCELLED | OUTPATIENT
Start: 2019-11-11

## 2019-11-08 RX ORDER — 0.9 % SODIUM CHLORIDE 0.9 %
250 INTRAVENOUS SOLUTION INTRAVENOUS ONCE
Status: CANCELLED | OUTPATIENT
Start: 2019-11-11

## 2019-11-08 RX ORDER — 0.9 % SODIUM CHLORIDE 0.9 %
250 INTRAVENOUS SOLUTION INTRAVENOUS ONCE
Status: CANCELLED | OUTPATIENT
Start: 2019-11-08

## 2019-11-08 RX ORDER — SODIUM CHLORIDE 0.9 % (FLUSH) 0.9 %
10 SYRINGE (ML) INJECTION PRN
Status: CANCELLED | OUTPATIENT
Start: 2019-11-08

## 2019-11-08 RX ORDER — FUROSEMIDE 10 MG/ML
20 INJECTION INTRAMUSCULAR; INTRAVENOUS ONCE
Status: CANCELLED | OUTPATIENT
Start: 2019-11-08

## 2019-11-08 RX ORDER — METHYLPREDNISOLONE SODIUM SUCCINATE 125 MG/2ML
125 INJECTION, POWDER, LYOPHILIZED, FOR SOLUTION INTRAMUSCULAR; INTRAVENOUS ONCE
Status: CANCELLED | OUTPATIENT
Start: 2019-11-11

## 2019-11-08 RX ORDER — SODIUM CHLORIDE 0.9 % (FLUSH) 0.9 %
5 SYRINGE (ML) INJECTION PRN
Status: CANCELLED | OUTPATIENT
Start: 2019-11-08

## 2019-11-08 RX ORDER — 0.9 % SODIUM CHLORIDE 0.9 %
10 VIAL (ML) INJECTION ONCE
Status: CANCELLED | OUTPATIENT
Start: 2019-11-08

## 2019-11-08 RX ADMIN — HEPARIN SODIUM (PORCINE) LOCK FLUSH IV SOLN 100 UNIT/ML 500 UNITS: 100 SOLUTION at 13:16

## 2019-11-08 RX ADMIN — Medication 10 ML: at 13:15

## 2019-11-08 RX ADMIN — SODIUM CHLORIDE: 9 INJECTION, SOLUTION INTRAVENOUS at 12:00

## 2019-11-08 RX ADMIN — Medication 20 ML: at 13:16

## 2019-11-08 RX ADMIN — Medication 10 ML: at 10:15

## 2019-11-08 RX ADMIN — ATEZOLIZUMAB 1200 MG: 1200 INJECTION, SOLUTION INTRAVENOUS at 12:25

## 2019-11-08 RX ADMIN — Medication 20 ML: at 10:16

## 2019-11-08 ASSESSMENT — PAIN SCALES - GENERAL
PAINLEVEL_OUTOF10: 2
PAINLEVEL_OUTOF10: 2

## 2019-11-08 ASSESSMENT — PAIN DESCRIPTION - ONSET: ONSET: ON-GOING

## 2019-11-08 ASSESSMENT — PAIN DESCRIPTION - DESCRIPTORS: DESCRIPTORS: ACHING

## 2019-11-08 ASSESSMENT — PAIN DESCRIPTION - LOCATION: LOCATION: BACK

## 2019-11-08 ASSESSMENT — PAIN DESCRIPTION - PAIN TYPE: TYPE: CHRONIC PAIN

## 2019-11-08 ASSESSMENT — PAIN DESCRIPTION - ORIENTATION: ORIENTATION: LOWER

## 2019-11-08 ASSESSMENT — PAIN DESCRIPTION - FREQUENCY: FREQUENCY: CONTINUOUS

## 2019-11-08 ASSESSMENT — PAIN DESCRIPTION - PROGRESSION: CLINICAL_PROGRESSION: NOT CHANGED

## 2019-11-08 NOTE — PROGRESS NOTES
Patient assessed for the following post chemotherapy:    Dizziness   No  Lightheadedness  No      Acute nausea/vomiting No  Headache   No  Chest pain/pressure  No  Rash/itching   No  Shortness of breath  No    Patient kept for 20 minutes observation post infusion chemotherapy. Patient tolerated chemotherapy treatment Tecentriq without any complications. Last vital signs:   /62   Pulse 88   Temp 98 °F (36.7 °C) (Oral)   Resp 18   Ht 4' 11\" (1.499 m)   SpO2 95%   BMI 27.27 kg/m²         Patient instructed if experience any of the above symptoms following today's infusion,he/she is to notify MD immediately or go to the emergency department. Discharge instructions given to patient. Verbalizes understanding. Transported off unit in wheelchair with belongings accompanied by daughter.

## 2019-11-08 NOTE — ONCOLOGY
Chemotherapy Administration    Pre-assessment Data: Antineoplastic Agents  Other:   See toxicity flow sheet for assessment [x]     Physician Notification of Concerns Related to Chemotherapy Administration:   Physician Notified Manolo Fairly / Time of Notification      Interventions:   Lab work assessed  [x]   Height / Weight verified for dose [x]   Current MAR reviewed [x]   Emergency drugs available as appropriate [x]   Anaphylaxis assessment completed [x]   Pre-medications administered as ordered [x]   Blood return noted upon initiation of chemotherapy [x]   Blood return noted each 1-2ml of a vesicant medication if given IV push []   Blood return noted each 2-3ml of a non-vesicant medication if given IV push []   Monitor for signs / symptoms of hypersensitivity reaction [x]   Chemotherapy orders (drug/dose/rate) verified by 2 Chemo certified RNs [x]   Monitor IV site and blood return throughout the infusion of the medication [x]   Document IV site checks on the IV assessment form [x]   Document chemotherapy teaching on the Patient Education tab [x]   Document patient verbalizes understanding of medications being administered [x]   If IV infiltration, see ONS Guidelines []   Other:      []

## 2019-11-08 NOTE — PLAN OF CARE
Problem: Pain:  Goal: Control of chronic pain  Description  Control of chronic pain  Outcome: Met This Shift  Note:   Pain currently controlled with current pain medication and regime   Intervention: Opioid analgesia side-effects  Note:   Patient well versed on current pain medications, their side effects and when to take them. Patient also verbalized understanding to call with any unrelieved pain       Problem: Musculor/Skeletal Functional Status  Goal: Absence of falls  Outcome: Met This Shift  Note:   No falls this admission   Intervention: Fall precautions  Note:   Patient and family member able to teach back follow up appointments and when to call the doctor. Patient offers no questions at this time      Problem: Intellectual/Education/Knowledge Deficit  Goal: Teaching initiated upon admission  Outcome: Met This Shift  Note:   Chemotherapy Teaching     What is Chemotherapy   Drug action ? Method of Administration ? Handouts given ? Side Effects  Nausea/vomiting ? Diarrhea ? Fatigue ? Signs / Symptoms of infection ? Neutropenia ? Thrombocytopenia ? Alopecia ? neuropathy ? [place] diet &  the importance of fluids ? Micellaneous  Importance of nutrition ? Importance of oral hygiene ? When to call the MD ?   Monitoring labs ? Use of supportive services ? Explanation of Drug Regimen / Frequency  tecentriq     Comments  Verbalized understanding to drug,action,side effects and when to call MD      Goal: Written Disposition Instruction form completed  Outcome: Met This Shift  Note:   Discharge instructions given and reviewed with patient. All questions answered.  Patient verbalized understanding   Intervention: Verbal/written education provided  Note:   Discharge instruction sheets     Problem: Discharge Planning  Goal: Knowledge of discharge instructions  Description  Knowledge of discharge instructions     Outcome: Met This Shift  Note:   Patient and family member able to teach back follow up appointments and when to call the doctor. Patient offers no questions at this time   Intervention: Interaction with patient/family and care team  Note:   Patient and family currently denies any needs or concerns    Intervention: Discharge to appropriate level of care  Note:   Discharge home     Problem: Infection - Central Venous Catheter-Associated Bloodstream Infection:  Goal: Will show no infection signs and symptoms  Description  Will show no infection signs and symptoms  Outcome: Met This Shift  Note:   No signs of infection noted at [facility] site  Intervention: Central line needs assessment  Note:    Patient currently under going chemotherapy with poor venous access   Intervention: Infection risk assessment  Note:   Patient aware that is of increased risk for infection due to receiving chemotherapy and having a central venous catheter. Patient aware of signs and symptoms of infection and when to call the doctor    Care plan reviewed with patient and daughter. Patient and dughter verbalize understanding of the plan of care and contribute to goal setting.

## 2019-11-08 NOTE — PROGRESS NOTES
Lab specimen obtained from [facility] without any problems. Ambulated off unit to examination room for appointment with Dr. Cali Sanchez escorted by Johnathan Wong.

## 2019-11-10 ASSESSMENT — ENCOUNTER SYMPTOMS
CONSTIPATION: 0
WHEEZING: 0
VOMITING: 0
COUGH: 1
ABDOMINAL PAIN: 0
FACIAL SWELLING: 0
SHORTNESS OF BREATH: 1
BACK PAIN: 0
RECTAL PAIN: 0
TROUBLE SWALLOWING: 0
DIARRHEA: 0
NAUSEA: 0
BLOOD IN STOOL: 0
SORE THROAT: 0
CHEST TIGHTNESS: 0
ABDOMINAL DISTENTION: 0
COLOR CHANGE: 0
EYE DISCHARGE: 0

## 2019-11-11 ENCOUNTER — HOSPITAL ENCOUNTER (OUTPATIENT)
Dept: INFUSION THERAPY | Age: 74
Discharge: HOME | End: 2019-11-11
Payer: MEDICARE

## 2019-11-11 VITALS
DIASTOLIC BLOOD PRESSURE: 57 MMHG | OXYGEN SATURATION: 98 % | TEMPERATURE: 98.9 F | HEART RATE: 97 BPM | RESPIRATION RATE: 20 BRPM | SYSTOLIC BLOOD PRESSURE: 100 MMHG

## 2019-11-11 DIAGNOSIS — C34.90 RECURRENT NON-SMALL CELL LUNG CANCER (NSCLC) (HCC): ICD-10-CM

## 2019-11-11 DIAGNOSIS — R91.1 NODULE OF LEFT LUNG: ICD-10-CM

## 2019-11-11 DIAGNOSIS — C34.91 PRIMARY LUNG CANCER, RIGHT (HCC): ICD-10-CM

## 2019-11-11 DIAGNOSIS — J44.9 STAGE 3 SEVERE COPD BY GOLD CLASSIFICATION (HCC): ICD-10-CM

## 2019-11-11 DIAGNOSIS — J96.21 ACUTE ON CHRONIC RESPIRATORY FAILURE WITH HYPOXIA (HCC): ICD-10-CM

## 2019-11-11 DIAGNOSIS — C78.02 METASTATIC LUNG CARCINOMA, LEFT (HCC): ICD-10-CM

## 2019-11-11 DIAGNOSIS — D64.9 ANEMIA, UNSPECIFIED TYPE: ICD-10-CM

## 2019-11-11 DIAGNOSIS — Z85.118 H/O: LUNG CANCER: ICD-10-CM

## 2019-11-11 DIAGNOSIS — C34.31 SQUAMOUS CELL CARCINOMA OF BRONCHUS IN RIGHT LOWER LOBE (HCC): Primary | ICD-10-CM

## 2019-11-11 DIAGNOSIS — J96.21 ACUTE ON CHRONIC RESPIRATORY FAILURE WITH HYPOXEMIA (HCC): ICD-10-CM

## 2019-11-11 PROCEDURE — 96522 REFILL/MAINT PUMP/RESVR SYST: CPT

## 2019-11-11 PROCEDURE — P9016 RBC LEUKOCYTES REDUCED: HCPCS

## 2019-11-11 PROCEDURE — 2580000003 HC RX 258: Performed by: PHYSICIAN ASSISTANT

## 2019-11-11 PROCEDURE — 2580000003 HC RX 258: Performed by: INTERNAL MEDICINE

## 2019-11-11 PROCEDURE — 36430 TRANSFUSION BLD/BLD COMPNT: CPT

## 2019-11-11 PROCEDURE — 6360000002 HC RX W HCPCS: Performed by: INTERNAL MEDICINE

## 2019-11-11 RX ORDER — SODIUM CHLORIDE 0.9 % (FLUSH) 0.9 %
20 SYRINGE (ML) INJECTION PRN
Status: CANCELLED | OUTPATIENT
Start: 2019-11-11

## 2019-11-11 RX ORDER — SODIUM CHLORIDE 9 MG/ML
INJECTION, SOLUTION INTRAVENOUS CONTINUOUS
Status: CANCELLED | OUTPATIENT
Start: 2019-11-11

## 2019-11-11 RX ORDER — 0.9 % SODIUM CHLORIDE 0.9 %
250 INTRAVENOUS SOLUTION INTRAVENOUS ONCE
Status: CANCELLED
Start: 2019-11-11

## 2019-11-11 RX ORDER — SODIUM CHLORIDE 0.9 % (FLUSH) 0.9 %
10 SYRINGE (ML) INJECTION PRN
Status: DISCONTINUED | OUTPATIENT
Start: 2019-11-11 | End: 2019-11-12 | Stop reason: HOSPADM

## 2019-11-11 RX ORDER — SODIUM CHLORIDE 0.9 % (FLUSH) 0.9 %
10 SYRINGE (ML) INJECTION PRN
Status: CANCELLED | OUTPATIENT
Start: 2019-11-11

## 2019-11-11 RX ORDER — HEPARIN SODIUM (PORCINE) LOCK FLUSH IV SOLN 100 UNIT/ML 100 UNIT/ML
500 SOLUTION INTRAVENOUS PRN
Status: CANCELLED | OUTPATIENT
Start: 2019-11-11

## 2019-11-11 RX ORDER — FUROSEMIDE 10 MG/ML
20 INJECTION INTRAMUSCULAR; INTRAVENOUS ONCE
Status: CANCELLED | OUTPATIENT
Start: 2019-11-11

## 2019-11-11 RX ORDER — DIPHENHYDRAMINE HYDROCHLORIDE 50 MG/ML
50 INJECTION INTRAMUSCULAR; INTRAVENOUS ONCE
Status: CANCELLED | OUTPATIENT
Start: 2019-11-11

## 2019-11-11 RX ORDER — 0.9 % SODIUM CHLORIDE 0.9 %
250 INTRAVENOUS SOLUTION INTRAVENOUS ONCE
Status: COMPLETED | OUTPATIENT
Start: 2019-11-11 | End: 2019-11-11

## 2019-11-11 RX ORDER — 0.9 % SODIUM CHLORIDE 0.9 %
250 INTRAVENOUS SOLUTION INTRAVENOUS ONCE
Status: CANCELLED | OUTPATIENT
Start: 2019-11-11

## 2019-11-11 RX ORDER — HEPARIN SODIUM (PORCINE) LOCK FLUSH IV SOLN 100 UNIT/ML 100 UNIT/ML
500 SOLUTION INTRAVENOUS PRN
Status: DISCONTINUED | OUTPATIENT
Start: 2019-11-11 | End: 2019-11-12 | Stop reason: HOSPADM

## 2019-11-11 RX ORDER — METHYLPREDNISOLONE SODIUM SUCCINATE 125 MG/2ML
125 INJECTION, POWDER, LYOPHILIZED, FOR SOLUTION INTRAMUSCULAR; INTRAVENOUS ONCE
Status: CANCELLED | OUTPATIENT
Start: 2019-11-11

## 2019-11-11 RX ADMIN — HEPARIN SODIUM (PORCINE) LOCK FLUSH IV SOLN 100 UNIT/ML 500 UNITS: 100 SOLUTION at 14:20

## 2019-11-11 RX ADMIN — Medication 20 ML: at 14:20

## 2019-11-11 RX ADMIN — Medication 10 ML: at 11:40

## 2019-11-11 RX ADMIN — SODIUM CHLORIDE 250 ML: 9 INJECTION, SOLUTION INTRAVENOUS at 11:40

## 2019-11-11 ASSESSMENT — PAIN DESCRIPTION - DESCRIPTORS: DESCRIPTORS: ACHING

## 2019-11-11 ASSESSMENT — PAIN DESCRIPTION - PROGRESSION: CLINICAL_PROGRESSION: NOT CHANGED

## 2019-11-11 ASSESSMENT — PAIN DESCRIPTION - PAIN TYPE: TYPE: CHRONIC PAIN

## 2019-11-11 ASSESSMENT — PAIN SCALES - GENERAL
PAINLEVEL_OUTOF10: 7
PAINLEVEL_OUTOF10: 7

## 2019-11-11 ASSESSMENT — PAIN DESCRIPTION - LOCATION: LOCATION: BACK

## 2019-11-11 ASSESSMENT — PAIN DESCRIPTION - ORIENTATION: ORIENTATION: LOWER

## 2019-11-11 ASSESSMENT — PAIN DESCRIPTION - FREQUENCY: FREQUENCY: CONTINUOUS

## 2019-11-11 ASSESSMENT — PAIN DESCRIPTION - ONSET: ONSET: ON-GOING

## 2019-11-11 NOTE — PROGRESS NOTES
Patient assessed for the following post blood transfusion:    Dizziness   No  Lightheadedness  No      Acute nausea/vomiting No  Headache   No  Chest pain/pressure  No  Rash/itching   No  Shortness of breath  No    Patient kept for 20 minutes observation post blood transfusion. Patient tolerated blood transfusion without any complications. Last vital signs:   BP (!) 100/57   Pulse 97   Temp 98.9 °F (37.2 °C) (Oral)   Resp 20 Comment: lungs clear  SpO2 98%         Patient instructed if experience any of the above symptoms following today's infusion,he/she is to notify MD immediately or go to the emergency department. Discharge instructions given to patient. Verbalizes understanding. Transported off unit in wheelchair with belongings accompanied by daughter.

## 2019-11-11 NOTE — PLAN OF CARE
Problem: Pain:  Description  Pain management should include both nonpharmacologic and pharmacologic interventions. Goal: Control of chronic pain  Description  Control of chronic pain  Outcome: Met This Shift  Note:   Patient rating pain a 7 out of 10 using SIMI scale, Pain located in  lower back. Intervention: Promote participation in pain management plan  Description  Promote participation in pain management plan - REMINDER(s):  Involve patient/family in pain management plan. Update patient/family of any treatment changes. Note:   Patient encouraged to take prescribed pain medications and call Physician if pain is not controlled. Problem: Infection - Central Venous Catheter-Associated Bloodstream Infection:  Goal: Will show no infection signs and symptoms  Description  Will show no infection signs and symptoms  Outcome: Met This Shift  Note:   Mediport site with no redness or warmth. Skin over port site intact with no signs of breakdown noted. Patient verbalizes signs/symptoms of port infection and when to notify the physician. Intervention: Infection risk assessment  Description  Infection risk assessment  Note:   Instructed to monitor for signs/symptoms of infection at [facility] [place] [address]- At [facility] and call MD if problems develop. Problem: Discharge Planning  Goal: Knowledge of discharge instructions  Description  Knowledge of discharge instructions     Outcome: Met This Shift  Note:   Verbalized understanding of discharge instructions, follow-up appointments, and when to call the physician. Intervention: Interaction with patient/family and care team  Note:   Discuss understanding of discharge instructions,follow-up appointments, and when to call the physician.       Problem: Intellectual/Education/Knowledge Deficit  Goal: Teaching initiated upon admission  Outcome: Met This Shift  Note:   Patient verbalize understanding to  verbal and written information given  on blood transfusion,procedure ,and possible reaction. Instructed to call MD if develop any complications once discharged. Goal: Written Disposition Instruction form completed  Outcome: Met This Shift  Intervention: Verbal/written education provided  Note:   Patient educated blood product transfusion protocol:    Patient receiving 1 unit blood:      - Blood product transfusion information sheet given: questions answered and consent signed  - Take vital signs/ monitor lungs sound prior to transfusion  - Monitor patient for 15 minutes after transfusion started  - Take vital signs / monitor lungs sound in 15 minutes and post transfusion  - Assess IV site   - Monitor patient closely for potential transfusion reaction    Call MD if develop complications once discharged. Care plan reviewed with patient and daughter. Patient and daughter verbalize understanding of the plan of care and contribute to goal setting.

## 2019-12-01 RX ORDER — DIPHENHYDRAMINE HYDROCHLORIDE 50 MG/ML
50 INJECTION INTRAMUSCULAR; INTRAVENOUS ONCE
Status: CANCELLED | OUTPATIENT
Start: 2019-12-02

## 2019-12-01 RX ORDER — SODIUM CHLORIDE 9 MG/ML
INJECTION, SOLUTION INTRAVENOUS CONTINUOUS
Status: CANCELLED | OUTPATIENT
Start: 2019-12-02

## 2019-12-01 RX ORDER — SODIUM CHLORIDE 0.9 % (FLUSH) 0.9 %
10 SYRINGE (ML) INJECTION PRN
Status: CANCELLED | OUTPATIENT
Start: 2019-12-02

## 2019-12-01 RX ORDER — MEPERIDINE HYDROCHLORIDE 25 MG/ML
12.5 INJECTION INTRAMUSCULAR; INTRAVENOUS; SUBCUTANEOUS ONCE
Status: CANCELLED | OUTPATIENT
Start: 2019-12-02

## 2019-12-01 RX ORDER — SODIUM CHLORIDE 0.9 % (FLUSH) 0.9 %
5 SYRINGE (ML) INJECTION PRN
Status: CANCELLED | OUTPATIENT
Start: 2019-12-02

## 2019-12-01 RX ORDER — 0.9 % SODIUM CHLORIDE 0.9 %
10 VIAL (ML) INJECTION ONCE
Status: CANCELLED | OUTPATIENT
Start: 2019-12-02

## 2019-12-01 RX ORDER — METHYLPREDNISOLONE SODIUM SUCCINATE 125 MG/2ML
125 INJECTION, POWDER, LYOPHILIZED, FOR SOLUTION INTRAMUSCULAR; INTRAVENOUS ONCE
Status: CANCELLED | OUTPATIENT
Start: 2019-12-02

## 2019-12-01 RX ORDER — HEPARIN SODIUM (PORCINE) LOCK FLUSH IV SOLN 100 UNIT/ML 100 UNIT/ML
500 SOLUTION INTRAVENOUS PRN
Status: CANCELLED | OUTPATIENT
Start: 2019-12-02

## 2019-12-02 ENCOUNTER — OFFICE VISIT (OUTPATIENT)
Dept: ONCOLOGY | Age: 74
End: 2019-12-02
Payer: MEDICARE

## 2019-12-02 ENCOUNTER — HOSPITAL ENCOUNTER (OUTPATIENT)
Dept: INFUSION THERAPY | Age: 74
Discharge: HOME | End: 2019-12-02
Payer: MEDICARE

## 2019-12-02 VITALS
HEART RATE: 80 BPM | HEIGHT: 59 IN | OXYGEN SATURATION: 94 % | SYSTOLIC BLOOD PRESSURE: 122 MMHG | BODY MASS INDEX: 27.58 KG/M2 | RESPIRATION RATE: 20 BRPM | WEIGHT: 136.8 LBS | DIASTOLIC BLOOD PRESSURE: 58 MMHG | TEMPERATURE: 98.9 F

## 2019-12-02 VITALS
OXYGEN SATURATION: 94 % | SYSTOLIC BLOOD PRESSURE: 118 MMHG | TEMPERATURE: 98.6 F | RESPIRATION RATE: 20 BRPM | DIASTOLIC BLOOD PRESSURE: 57 MMHG | HEART RATE: 85 BPM

## 2019-12-02 DIAGNOSIS — J96.21 ACUTE ON CHRONIC RESPIRATORY FAILURE WITH HYPOXEMIA (HCC): ICD-10-CM

## 2019-12-02 DIAGNOSIS — C34.31 SQUAMOUS CELL CARCINOMA OF BRONCHUS IN RIGHT LOWER LOBE (HCC): ICD-10-CM

## 2019-12-02 DIAGNOSIS — E03.2 HYPOTHYROIDISM DUE TO MEDICATION: Primary | ICD-10-CM

## 2019-12-02 DIAGNOSIS — C34.31 MALIGNANT NEOPLASM OF LOWER LOBE, RIGHT BRONCHUS OR LUNG (HCC): Primary | ICD-10-CM

## 2019-12-02 DIAGNOSIS — J96.21 ACUTE ON CHRONIC RESPIRATORY FAILURE WITH HYPOXIA (HCC): ICD-10-CM

## 2019-12-02 DIAGNOSIS — C34.31 MALIGNANT NEOPLASM OF LOWER LOBE, RIGHT BRONCHUS OR LUNG (HCC): ICD-10-CM

## 2019-12-02 DIAGNOSIS — C78.02 METASTATIC LUNG CARCINOMA, LEFT (HCC): ICD-10-CM

## 2019-12-02 DIAGNOSIS — R91.1 NODULE OF LEFT LUNG: ICD-10-CM

## 2019-12-02 DIAGNOSIS — Z51.12 ENCOUNTER FOR ANTINEOPLASTIC CHEMOTHERAPY AND IMMUNOTHERAPY: ICD-10-CM

## 2019-12-02 DIAGNOSIS — E03.2 HYPOTHYROIDISM DUE TO MEDICATION: ICD-10-CM

## 2019-12-02 DIAGNOSIS — C34.90 RECURRENT NON-SMALL CELL LUNG CANCER (NSCLC) (HCC): ICD-10-CM

## 2019-12-02 DIAGNOSIS — Z51.11 ENCOUNTER FOR ANTINEOPLASTIC CHEMOTHERAPY AND IMMUNOTHERAPY: ICD-10-CM

## 2019-12-02 DIAGNOSIS — J44.9 STAGE 3 SEVERE COPD BY GOLD CLASSIFICATION (HCC): ICD-10-CM

## 2019-12-02 DIAGNOSIS — D64.9 ANEMIA, UNSPECIFIED TYPE: ICD-10-CM

## 2019-12-02 DIAGNOSIS — C34.91 PRIMARY LUNG CANCER, RIGHT (HCC): ICD-10-CM

## 2019-12-02 DIAGNOSIS — Z85.118 H/O: LUNG CANCER: ICD-10-CM

## 2019-12-02 LAB
ALBUMIN SERPL-MCNC: 3.9 G/DL (ref 3.5–5.1)
ALP BLD-CCNC: 63 U/L (ref 38–126)
ALT SERPL-CCNC: 7 U/L (ref 11–66)
AST SERPL-CCNC: 13 U/L (ref 5–40)
BILIRUB SERPL-MCNC: 0.2 MG/DL (ref 0.3–1.2)
BILIRUBIN DIRECT: < 0.2 MG/DL (ref 0–0.3)
BUN, WHOLE BLOOD: 15 MG/DL (ref 8–26)
CHLORIDE, WHOLE BLOOD: 89 MEQ/L (ref 98–109)
CREATININE, WHOLE BLOOD: 1.1 MG/DL (ref 0.5–1.2)
GFR, ESTIMATED: 51 ML/MIN/1.73M2
GLUCOSE, WHOLE BLOOD: 86 MG/DL (ref 70–108)
HCT VFR BLD CALC: 24.5 % (ref 37–47)
HEMOGLOBIN: 8 GM/DL (ref 12–16)
IONIZED CALCIUM, WHOLE BLOOD: 1.17 MMOL/L (ref 1.12–1.32)
MCH RBC QN AUTO: 30.3 PG (ref 27–31)
MCHC RBC AUTO-ENTMCNC: 32.7 GM/DL (ref 33–37)
MCV RBC AUTO: 93 FL (ref 81–99)
PDW BLD-RTO: 12.2 % (ref 11.5–14.5)
PLATELET # BLD: 133 THOU/MM3 (ref 130–400)
PMV BLD AUTO: 7.2 FL (ref 7.4–10.4)
POTASSIUM, WHOLE BLOOD: 4.3 MEQ/L (ref 3.5–4.9)
RBC # BLD: 2.64 MILL/MM3 (ref 4.2–5.4)
SEGMENTED NEUTROPHILS ABSOLUTE COUNT: 3 THOU/MM3 (ref 1.8–7.7)
SODIUM, WHOLE BLOOD: 137 MEQ/L (ref 138–146)
T4 FREE: 0.79 NG/DL (ref 0.93–1.76)
THYROXINE (T4): 5.1 UG/DL (ref 4.5–12)
TOTAL CO2, WHOLE BLOOD: 39 MEQ/L (ref 23–33)
TOTAL PROTEIN: 6.8 G/DL (ref 6.1–8)
TSH SERPL DL<=0.05 MIU/L-ACNC: 53.78 UIU/ML (ref 0.4–4.2)
WBC # BLD: 4.5 THOU/MM3 (ref 4.8–10.8)

## 2019-12-02 PROCEDURE — G8484 FLU IMMUNIZE NO ADMIN: HCPCS | Performed by: INTERNAL MEDICINE

## 2019-12-02 PROCEDURE — 36591 DRAW BLOOD OFF VENOUS DEVICE: CPT

## 2019-12-02 PROCEDURE — G8417 CALC BMI ABV UP PARAM F/U: HCPCS | Performed by: INTERNAL MEDICINE

## 2019-12-02 PROCEDURE — 3017F COLORECTAL CA SCREEN DOC REV: CPT | Performed by: INTERNAL MEDICINE

## 2019-12-02 PROCEDURE — G8400 PT W/DXA NO RESULTS DOC: HCPCS | Performed by: INTERNAL MEDICINE

## 2019-12-02 PROCEDURE — 2580000003 HC RX 258: Performed by: INTERNAL MEDICINE

## 2019-12-02 PROCEDURE — 99214 OFFICE O/P EST MOD 30 MIN: CPT | Performed by: INTERNAL MEDICINE

## 2019-12-02 PROCEDURE — 1036F TOBACCO NON-USER: CPT | Performed by: INTERNAL MEDICINE

## 2019-12-02 PROCEDURE — 84436 ASSAY OF TOTAL THYROXINE: CPT

## 2019-12-02 PROCEDURE — 4040F PNEUMOC VAC/ADMIN/RCVD: CPT | Performed by: INTERNAL MEDICINE

## 2019-12-02 PROCEDURE — 84443 ASSAY THYROID STIM HORMONE: CPT

## 2019-12-02 PROCEDURE — 84439 ASSAY OF FREE THYROXINE: CPT

## 2019-12-02 PROCEDURE — G8428 CUR MEDS NOT DOCUMENT: HCPCS | Performed by: INTERNAL MEDICINE

## 2019-12-02 PROCEDURE — 80076 HEPATIC FUNCTION PANEL: CPT

## 2019-12-02 PROCEDURE — 99211 OFF/OP EST MAY X REQ PHY/QHP: CPT

## 2019-12-02 PROCEDURE — 96413 CHEMO IV INFUSION 1 HR: CPT

## 2019-12-02 PROCEDURE — 85027 COMPLETE CBC AUTOMATED: CPT

## 2019-12-02 PROCEDURE — 6360000002 HC RX W HCPCS: Performed by: INTERNAL MEDICINE

## 2019-12-02 PROCEDURE — 1090F PRES/ABSN URINE INCON ASSESS: CPT | Performed by: INTERNAL MEDICINE

## 2019-12-02 PROCEDURE — 1123F ACP DISCUSS/DSCN MKR DOCD: CPT | Performed by: INTERNAL MEDICINE

## 2019-12-02 PROCEDURE — 80047 BASIC METABLC PNL IONIZED CA: CPT

## 2019-12-02 RX ORDER — HEPARIN SODIUM (PORCINE) LOCK FLUSH IV SOLN 100 UNIT/ML 100 UNIT/ML
500 SOLUTION INTRAVENOUS PRN
Status: DISCONTINUED | OUTPATIENT
Start: 2019-12-02 | End: 2019-12-03 | Stop reason: HOSPADM

## 2019-12-02 RX ORDER — SODIUM CHLORIDE 0.9 % (FLUSH) 0.9 %
10 SYRINGE (ML) INJECTION PRN
Status: CANCELLED | OUTPATIENT
Start: 2019-12-02

## 2019-12-02 RX ORDER — SODIUM CHLORIDE 9 MG/ML
INJECTION, SOLUTION INTRAVENOUS ONCE
Status: COMPLETED | OUTPATIENT
Start: 2019-12-02 | End: 2019-12-02

## 2019-12-02 RX ORDER — 0.9 % SODIUM CHLORIDE 0.9 %
250 INTRAVENOUS SOLUTION INTRAVENOUS ONCE
Status: CANCELLED
Start: 2019-12-02

## 2019-12-02 RX ORDER — SODIUM CHLORIDE 0.9 % (FLUSH) 0.9 %
20 SYRINGE (ML) INJECTION PRN
Status: DISCONTINUED | OUTPATIENT
Start: 2019-12-02 | End: 2019-12-03 | Stop reason: HOSPADM

## 2019-12-02 RX ORDER — PROMETHAZINE HYDROCHLORIDE 25 MG/1
25 TABLET ORAL EVERY 8 HOURS PRN
Qty: 30 TABLET | Refills: 1 | Status: SHIPPED | OUTPATIENT
Start: 2019-12-02 | End: 2019-12-23 | Stop reason: SDUPTHER

## 2019-12-02 RX ORDER — SODIUM CHLORIDE 0.9 % (FLUSH) 0.9 %
20 SYRINGE (ML) INJECTION PRN
Status: CANCELLED | OUTPATIENT
Start: 2019-12-02

## 2019-12-02 RX ORDER — HEPARIN SODIUM (PORCINE) LOCK FLUSH IV SOLN 100 UNIT/ML 100 UNIT/ML
500 SOLUTION INTRAVENOUS PRN
Status: CANCELLED | OUTPATIENT
Start: 2019-12-02

## 2019-12-02 RX ORDER — SODIUM CHLORIDE 0.9 % (FLUSH) 0.9 %
10 SYRINGE (ML) INJECTION PRN
Status: DISCONTINUED | OUTPATIENT
Start: 2019-12-02 | End: 2019-12-03 | Stop reason: HOSPADM

## 2019-12-02 RX ADMIN — ATEZOLIZUMAB 1200 MG: 1200 INJECTION, SOLUTION INTRAVENOUS at 13:42

## 2019-12-02 RX ADMIN — Medication 10 ML: at 11:20

## 2019-12-02 RX ADMIN — Medication 20 ML: at 11:21

## 2019-12-02 RX ADMIN — SODIUM CHLORIDE: 9 INJECTION, SOLUTION INTRAVENOUS at 13:15

## 2019-12-02 RX ADMIN — Medication 10 ML: at 14:19

## 2019-12-02 RX ADMIN — HEPARIN SODIUM (PORCINE) LOCK FLUSH IV SOLN 100 UNIT/ML 500 UNITS: 100 SOLUTION at 14:19

## 2019-12-02 ASSESSMENT — PAIN DESCRIPTION - DESCRIPTORS: DESCRIPTORS: ACHING

## 2019-12-02 ASSESSMENT — PAIN DESCRIPTION - ORIENTATION: ORIENTATION: LOWER

## 2019-12-02 ASSESSMENT — PAIN DESCRIPTION - LOCATION: LOCATION: BACK

## 2019-12-02 ASSESSMENT — PAIN SCALES - GENERAL
PAINLEVEL_OUTOF10: 8
PAINLEVEL_OUTOF10: 8

## 2019-12-02 ASSESSMENT — PAIN DESCRIPTION - FREQUENCY: FREQUENCY: CONTINUOUS

## 2019-12-02 ASSESSMENT — PAIN DESCRIPTION - PAIN TYPE: TYPE: CHRONIC PAIN

## 2019-12-02 ASSESSMENT — PAIN DESCRIPTION - ONSET: ONSET: ON-GOING

## 2019-12-02 NOTE — PLAN OF CARE
Problem: Pain:  Description  Pain management should include both nonpharmacologic and pharmacologic interventions. Goal: Control of chronic pain  Description  Control of chronic pain  Outcome: Met This Shift  Note:   Patient rating pain a 8 out of 10 using SIMI scale, Pain located in  lower back. Intervention: Promote participation in pain management plan  Description  Promote participation in pain management plan - REMINDER(s):  Involve patient/family in pain management plan. Update patient/family of any treatment changes. Note:   Patient encouraged to take prescribed pain medications and call Physician if pain is not controlled. Problem: Musculor/Skeletal Functional Status  Goal: Absence of falls  Outcome: Met This Shift  Note:   No falls occurred with visit today. Intervention: Fall precautions  Note:   Verbalized understanding of fall prevention to ask for assistance with ambulation. Call light within reach. Problem: Intellectual/Education/Knowledge Deficit  Goal: Teaching initiated upon admission  Outcome: Met This Shift  Note:   Patient verbalizes understanding to verbal information given on Tecentriq,action and possible side effects. Aware to call MD if develop complications.     Goal: Written Disposition Instruction form completed  Outcome: Met This Shift  Intervention: Verbal/written education provided  Note:   Chemotherapy Teaching     What is Chemotherapy   Drug action [x]   Method of Administration [x]   Handouts given []     Side Effects  Nausea/vomiting [x]   Diarrhea [x]   Fatigue [x]   Signs / Symptoms of infection [x]   Neutropenia [x]   Thrombocytopenia [x]   Alopecia [x]   neuropathy [x]   [place] diet &  the importance of fluids [x]       Micellaneous  Importance of nutrition [x]   Importance of oral hygiene [x]   When to call the MD [x]   Monitoring labs [x]   Use of supportive services []     Explanation of Drug Regimen / Frequency  Tecentriq- C12D1     Comments  Verbalized understanding to drug,action,side effects and when to call MD         Problem: Discharge Planning  Goal: Knowledge of discharge instructions  Description  Knowledge of discharge instructions     Outcome: Met This Shift  Note:   Verbalized understanding of discharge instructions, follow-up appointments, and when to call the physician. Intervention: Interaction with patient/family and care team  Note:   Discuss understanding of discharge instructions,follow-up appointments, and when to call the physician. Problem: Infection - Central Venous Catheter-Associated Bloodstream Infection:  Goal: Will show no infection signs and symptoms  Description  Will show no infection signs and symptoms  Outcome: Met This Shift  Note:   Mediport site with no redness or warmth. Skin over port site intact with no signs of breakdown noted. Patient verbalizes signs/symptoms of port infection and when to notify the physician. Intervention: Infection risk assessment  Description  Infection risk assessment  Note:   Instructed to monitor for signs/symptoms of infection at [address] [facility] [address] [facility] and call MD if problems develop. Care plan reviewed with patient and daughter. Patient and daughter verbalize understanding of the plan of care and contribute to goal setting.

## 2019-12-02 NOTE — PROGRESS NOTES
Lab specimen obtained from [facility] without any problems. Ambulated off unit to examination room for appointment with Dr. Ankit Porras escorted by [facility].

## 2019-12-04 RX ORDER — LEVOTHYROXINE SODIUM 0.1 MG/1
75 TABLET ORAL DAILY
Qty: 30 TABLET | Refills: 2 | Status: ON HOLD | OUTPATIENT
Start: 2019-12-04 | End: 2020-01-26 | Stop reason: HOSPADM

## 2019-12-04 ASSESSMENT — ENCOUNTER SYMPTOMS
FACIAL SWELLING: 0
CONSTIPATION: 0
ABDOMINAL DISTENTION: 0
BACK PAIN: 0
CHEST TIGHTNESS: 0
COUGH: 1
COLOR CHANGE: 0
RECTAL PAIN: 0
VOMITING: 0
NAUSEA: 0
EYE DISCHARGE: 0
TROUBLE SWALLOWING: 0
ABDOMINAL PAIN: 0
SHORTNESS OF BREATH: 1
SORE THROAT: 0
DIARRHEA: 0
WHEEZING: 0
BLOOD IN STOOL: 0

## 2019-12-05 ENCOUNTER — HOSPITAL ENCOUNTER (OUTPATIENT)
Dept: INFUSION THERAPY | Age: 74
Discharge: HOME | End: 2019-12-05
Payer: MEDICARE

## 2019-12-05 VITALS
TEMPERATURE: 97.6 F | RESPIRATION RATE: 20 BRPM | DIASTOLIC BLOOD PRESSURE: 78 MMHG | OXYGEN SATURATION: 94 % | SYSTOLIC BLOOD PRESSURE: 125 MMHG | HEART RATE: 78 BPM

## 2019-12-05 DIAGNOSIS — J96.21 ACUTE ON CHRONIC RESPIRATORY FAILURE WITH HYPOXEMIA (HCC): ICD-10-CM

## 2019-12-05 DIAGNOSIS — C34.91 PRIMARY LUNG CANCER, RIGHT (HCC): ICD-10-CM

## 2019-12-05 DIAGNOSIS — C34.31 SQUAMOUS CELL CARCINOMA OF BRONCHUS IN RIGHT LOWER LOBE (HCC): Primary | ICD-10-CM

## 2019-12-05 DIAGNOSIS — C78.02 METASTATIC LUNG CARCINOMA, LEFT (HCC): ICD-10-CM

## 2019-12-05 DIAGNOSIS — J44.9 STAGE 3 SEVERE COPD BY GOLD CLASSIFICATION (HCC): ICD-10-CM

## 2019-12-05 DIAGNOSIS — Z85.118 H/O: LUNG CANCER: ICD-10-CM

## 2019-12-05 DIAGNOSIS — C34.90 RECURRENT NON-SMALL CELL LUNG CANCER (NSCLC) (HCC): ICD-10-CM

## 2019-12-05 DIAGNOSIS — J96.21 ACUTE ON CHRONIC RESPIRATORY FAILURE WITH HYPOXIA (HCC): ICD-10-CM

## 2019-12-05 DIAGNOSIS — D64.9 ANEMIA, UNSPECIFIED TYPE: ICD-10-CM

## 2019-12-05 DIAGNOSIS — R91.1 NODULE OF LEFT LUNG: ICD-10-CM

## 2019-12-05 PROCEDURE — G0008 ADMIN INFLUENZA VIRUS VAC: HCPCS | Performed by: INTERNAL MEDICINE

## 2019-12-05 PROCEDURE — 6360000002 HC RX W HCPCS: Performed by: INTERNAL MEDICINE

## 2019-12-05 PROCEDURE — 90686 IIV4 VACC NO PRSV 0.5 ML IM: CPT | Performed by: INTERNAL MEDICINE

## 2019-12-05 RX ORDER — 0.9 % SODIUM CHLORIDE 0.9 %
250 INTRAVENOUS SOLUTION INTRAVENOUS ONCE
Status: CANCELLED
Start: 2019-12-05

## 2019-12-05 RX ORDER — SODIUM CHLORIDE 0.9 % (FLUSH) 0.9 %
10 SYRINGE (ML) INJECTION PRN
Status: CANCELLED | OUTPATIENT
Start: 2019-12-05

## 2019-12-05 RX ORDER — HEPARIN SODIUM (PORCINE) LOCK FLUSH IV SOLN 100 UNIT/ML 100 UNIT/ML
500 SOLUTION INTRAVENOUS PRN
Status: CANCELLED | OUTPATIENT
Start: 2019-12-05

## 2019-12-05 RX ORDER — SODIUM CHLORIDE 0.9 % (FLUSH) 0.9 %
20 SYRINGE (ML) INJECTION PRN
Status: CANCELLED | OUTPATIENT
Start: 2019-12-05

## 2019-12-05 RX ADMIN — INFLUENZA A VIRUS A/BRISBANE/02/2018 IVR-190 (H1N1) ANTIGEN (PROPIOLACTONE INACTIVATED), INFLUENZA A VIRUS A/KANSAS/14/2017 X-327 (H3N2) ANTIGEN (PROPIOLACTONE INACTIVATED), INFLUENZA B VIRUS B/MARYLAND/15/2016 ANTIGEN (PROPIOLACTONE INACTIVATED), INFLUENZA B VIRUS B/PHUKET/3073/2013 BVR-1B ANTIGEN (PROPIOLACTONE INACTIVATED) 0.5 ML: 15; 15; 15; 15 INJECTION, SUSPENSION INTRAMUSCULAR at 13:32

## 2019-12-05 NOTE — PLAN OF CARE
Care plan reviewed with patient. Patient verbalized understanding of the plan of care and contribute to goal setting. Problem: Intellectual/Education/Knowledge Deficit  Goal: Teaching initiated upon admission  Outcome: Met This Shift  Note:   Patient verbalizes understanding to verbal information given on flu shot,action and possible side effects. Aware to call MD if develop complications. Intervention: Verbal/written education provided  Note:   Discuss flu shot, side effects and when to call the doctor. Problem: Discharge Planning  Goal: Knowledge of discharge instructions  Description  Knowledge of discharge instructions     Outcome: Met This Shift  Note:   Verbalized understanding of discharge instructions, follow ups and when to call doctor   Intervention: Discharge to appropriate level of care  Note:   Discuss discharge instructions, follow ups and when to call doctor. Problem: Falls - Risk of:  Goal: Will remain free from falls  Description  Will remain free from falls  Outcome: Met This Shift  Note:   Free from falls while in infusion center.  Verbalized understanding of fall prevention and to ask for assistance with ambulation   Intervention: Assess risk factors for falls  Description  Assess risk factors for falls  Note:   Assess for fall risk, instruct to ask for assistance with ambulation

## 2019-12-06 NOTE — PROGRESS NOTES
Flu shot explained, given as charted. Tolerated well. Discharged in satisfactory condition. Ambulated off unit per self.

## 2019-12-22 RX ORDER — MEPERIDINE HYDROCHLORIDE 25 MG/ML
12.5 INJECTION INTRAMUSCULAR; INTRAVENOUS; SUBCUTANEOUS ONCE
Status: CANCELLED | OUTPATIENT
Start: 2019-12-23

## 2019-12-22 RX ORDER — METHYLPREDNISOLONE SODIUM SUCCINATE 125 MG/2ML
125 INJECTION, POWDER, LYOPHILIZED, FOR SOLUTION INTRAMUSCULAR; INTRAVENOUS ONCE
Status: CANCELLED | OUTPATIENT
Start: 2019-12-23

## 2019-12-22 RX ORDER — SODIUM CHLORIDE 0.9 % (FLUSH) 0.9 %
10 SYRINGE (ML) INJECTION PRN
Status: CANCELLED | OUTPATIENT
Start: 2019-12-23

## 2019-12-22 RX ORDER — HEPARIN SODIUM (PORCINE) LOCK FLUSH IV SOLN 100 UNIT/ML 100 UNIT/ML
500 SOLUTION INTRAVENOUS PRN
Status: CANCELLED | OUTPATIENT
Start: 2019-12-23

## 2019-12-22 RX ORDER — DIPHENHYDRAMINE HYDROCHLORIDE 50 MG/ML
50 INJECTION INTRAMUSCULAR; INTRAVENOUS ONCE
Status: CANCELLED | OUTPATIENT
Start: 2019-12-23

## 2019-12-22 RX ORDER — SODIUM CHLORIDE 0.9 % (FLUSH) 0.9 %
5 SYRINGE (ML) INJECTION PRN
Status: CANCELLED | OUTPATIENT
Start: 2019-12-23

## 2019-12-22 RX ORDER — SODIUM CHLORIDE 9 MG/ML
INJECTION, SOLUTION INTRAVENOUS CONTINUOUS
Status: CANCELLED | OUTPATIENT
Start: 2019-12-23

## 2019-12-22 RX ORDER — 0.9 % SODIUM CHLORIDE 0.9 %
10 VIAL (ML) INJECTION ONCE
Status: CANCELLED | OUTPATIENT
Start: 2019-12-23

## 2019-12-23 ENCOUNTER — HOSPITAL ENCOUNTER (OUTPATIENT)
Dept: INFUSION THERAPY | Age: 74
Discharge: HOME | End: 2019-12-23
Payer: MEDICARE

## 2019-12-23 ENCOUNTER — OFFICE VISIT (OUTPATIENT)
Dept: ONCOLOGY | Age: 74
End: 2019-12-23
Payer: MEDICARE

## 2019-12-23 VITALS
OXYGEN SATURATION: 96 % | HEART RATE: 82 BPM | TEMPERATURE: 98.1 F | SYSTOLIC BLOOD PRESSURE: 143 MMHG | DIASTOLIC BLOOD PRESSURE: 64 MMHG | WEIGHT: 132.8 LBS | RESPIRATION RATE: 20 BRPM | HEIGHT: 59 IN | BODY MASS INDEX: 26.77 KG/M2

## 2019-12-23 VITALS
TEMPERATURE: 97.9 F | RESPIRATION RATE: 20 BRPM | OXYGEN SATURATION: 99 % | DIASTOLIC BLOOD PRESSURE: 62 MMHG | HEART RATE: 86 BPM | SYSTOLIC BLOOD PRESSURE: 137 MMHG

## 2019-12-23 DIAGNOSIS — C34.90 RECURRENT NON-SMALL CELL LUNG CANCER (NSCLC) (HCC): ICD-10-CM

## 2019-12-23 DIAGNOSIS — C34.31 SQUAMOUS CELL CARCINOMA OF BRONCHUS IN RIGHT LOWER LOBE (HCC): Primary | ICD-10-CM

## 2019-12-23 DIAGNOSIS — Z85.118 H/O: LUNG CANCER: ICD-10-CM

## 2019-12-23 DIAGNOSIS — C34.91 PRIMARY LUNG CANCER, RIGHT (HCC): ICD-10-CM

## 2019-12-23 DIAGNOSIS — D64.9 ANEMIA, UNSPECIFIED TYPE: ICD-10-CM

## 2019-12-23 DIAGNOSIS — Z51.12 ENCOUNTER FOR ANTINEOPLASTIC CHEMOTHERAPY AND IMMUNOTHERAPY: ICD-10-CM

## 2019-12-23 DIAGNOSIS — I26.99 ACUTE PULMONARY EMBOLISM WITHOUT ACUTE COR PULMONALE, UNSPECIFIED PULMONARY EMBOLISM TYPE (HCC): ICD-10-CM

## 2019-12-23 DIAGNOSIS — C34.31 MALIGNANT NEOPLASM OF LOWER LOBE, RIGHT BRONCHUS OR LUNG (HCC): Primary | ICD-10-CM

## 2019-12-23 DIAGNOSIS — J44.9 STAGE 3 SEVERE COPD BY GOLD CLASSIFICATION (HCC): ICD-10-CM

## 2019-12-23 DIAGNOSIS — J96.21 ACUTE ON CHRONIC RESPIRATORY FAILURE WITH HYPOXEMIA (HCC): ICD-10-CM

## 2019-12-23 DIAGNOSIS — R91.1 NODULE OF LEFT LUNG: ICD-10-CM

## 2019-12-23 DIAGNOSIS — C78.02 METASTATIC LUNG CARCINOMA, LEFT (HCC): ICD-10-CM

## 2019-12-23 DIAGNOSIS — C34.31 MALIGNANT NEOPLASM OF LOWER LOBE, RIGHT BRONCHUS OR LUNG (HCC): ICD-10-CM

## 2019-12-23 DIAGNOSIS — Z99.81 OXYGEN DEPENDENT: ICD-10-CM

## 2019-12-23 DIAGNOSIS — J43.9 PULMONARY EMPHYSEMA, UNSPECIFIED EMPHYSEMA TYPE (HCC): ICD-10-CM

## 2019-12-23 DIAGNOSIS — Z51.11 ENCOUNTER FOR ANTINEOPLASTIC CHEMOTHERAPY AND IMMUNOTHERAPY: ICD-10-CM

## 2019-12-23 DIAGNOSIS — E03.2 HYPOTHYROIDISM DUE TO MEDICATION: ICD-10-CM

## 2019-12-23 DIAGNOSIS — J96.21 ACUTE ON CHRONIC RESPIRATORY FAILURE WITH HYPOXIA (HCC): ICD-10-CM

## 2019-12-23 LAB
ALBUMIN SERPL-MCNC: 4 G/DL (ref 3.5–5.1)
ALP BLD-CCNC: 67 U/L (ref 38–126)
ALT SERPL-CCNC: 8 U/L (ref 11–66)
AST SERPL-CCNC: 14 U/L (ref 5–40)
BILIRUB SERPL-MCNC: 0.2 MG/DL (ref 0.3–1.2)
BILIRUBIN DIRECT: < 0.2 MG/DL (ref 0–0.3)
BUN, WHOLE BLOOD: 18 MG/DL (ref 8–26)
CHLORIDE, WHOLE BLOOD: 95 MEQ/L (ref 98–109)
CREATININE, WHOLE BLOOD: 0.9 MG/DL (ref 0.5–1.2)
GFR, ESTIMATED: 65 ML/MIN/1.73M2
GLUCOSE, WHOLE BLOOD: 95 MG/DL (ref 70–108)
HCT VFR BLD CALC: 25 % (ref 37–47)
HEMOGLOBIN: 8.2 GM/DL (ref 12–16)
IONIZED CALCIUM, WHOLE BLOOD: 1.22 MMOL/L (ref 1.12–1.32)
MCH RBC QN AUTO: 29.8 PG (ref 27–31)
MCHC RBC AUTO-ENTMCNC: 32.7 GM/DL (ref 33–37)
MCV RBC AUTO: 91 FL (ref 81–99)
PDW BLD-RTO: 12.6 % (ref 11.5–14.5)
PLATELET # BLD: 145 THOU/MM3 (ref 130–400)
PMV BLD AUTO: 7 FL (ref 7.4–10.4)
POTASSIUM, WHOLE BLOOD: 4.2 MEQ/L (ref 3.5–4.9)
RBC # BLD: 2.75 MILL/MM3 (ref 4.2–5.4)
SEG NEUTROPHILS: 68 % (ref 43–75)
SEGMENTED NEUTROPHILS ABSOLUTE COUNT: 3.4 THOU/MM3 (ref 1.8–7.7)
SODIUM, WHOLE BLOOD: 141 MEQ/L (ref 138–146)
TOTAL CO2, WHOLE BLOOD: 36 MEQ/L (ref 23–33)
TOTAL PROTEIN: 7.1 G/DL (ref 6.1–8)
WBC # BLD: 5 THOU/MM3 (ref 4.8–10.8)

## 2019-12-23 PROCEDURE — 80076 HEPATIC FUNCTION PANEL: CPT

## 2019-12-23 PROCEDURE — 80047 BASIC METABLC PNL IONIZED CA: CPT

## 2019-12-23 PROCEDURE — 96413 CHEMO IV INFUSION 1 HR: CPT

## 2019-12-23 PROCEDURE — 1123F ACP DISCUSS/DSCN MKR DOCD: CPT | Performed by: INTERNAL MEDICINE

## 2019-12-23 PROCEDURE — G8427 DOCREV CUR MEDS BY ELIG CLIN: HCPCS | Performed by: INTERNAL MEDICINE

## 2019-12-23 PROCEDURE — G8400 PT W/DXA NO RESULTS DOC: HCPCS | Performed by: INTERNAL MEDICINE

## 2019-12-23 PROCEDURE — 3017F COLORECTAL CA SCREEN DOC REV: CPT | Performed by: INTERNAL MEDICINE

## 2019-12-23 PROCEDURE — 99214 OFFICE O/P EST MOD 30 MIN: CPT | Performed by: INTERNAL MEDICINE

## 2019-12-23 PROCEDURE — 6360000002 HC RX W HCPCS: Performed by: INTERNAL MEDICINE

## 2019-12-23 PROCEDURE — 2580000003 HC RX 258: Performed by: INTERNAL MEDICINE

## 2019-12-23 PROCEDURE — 4040F PNEUMOC VAC/ADMIN/RCVD: CPT | Performed by: INTERNAL MEDICINE

## 2019-12-23 PROCEDURE — G8482 FLU IMMUNIZE ORDER/ADMIN: HCPCS | Performed by: INTERNAL MEDICINE

## 2019-12-23 PROCEDURE — G8926 SPIRO NO PERF OR DOC: HCPCS | Performed by: INTERNAL MEDICINE

## 2019-12-23 PROCEDURE — 99211 OFF/OP EST MAY X REQ PHY/QHP: CPT

## 2019-12-23 PROCEDURE — G8417 CALC BMI ABV UP PARAM F/U: HCPCS | Performed by: INTERNAL MEDICINE

## 2019-12-23 PROCEDURE — 3023F SPIROM DOC REV: CPT | Performed by: INTERNAL MEDICINE

## 2019-12-23 PROCEDURE — 85027 COMPLETE CBC AUTOMATED: CPT

## 2019-12-23 PROCEDURE — 1036F TOBACCO NON-USER: CPT | Performed by: INTERNAL MEDICINE

## 2019-12-23 PROCEDURE — 1090F PRES/ABSN URINE INCON ASSESS: CPT | Performed by: INTERNAL MEDICINE

## 2019-12-23 PROCEDURE — 36591 DRAW BLOOD OFF VENOUS DEVICE: CPT

## 2019-12-23 RX ORDER — PROMETHAZINE HYDROCHLORIDE 25 MG/1
25 TABLET ORAL EVERY 8 HOURS PRN
Qty: 30 TABLET | Refills: 1 | Status: SHIPPED | OUTPATIENT
Start: 2019-12-23 | End: 2020-04-10 | Stop reason: ALTCHOICE

## 2019-12-23 RX ORDER — HEPARIN SODIUM (PORCINE) LOCK FLUSH IV SOLN 100 UNIT/ML 100 UNIT/ML
500 SOLUTION INTRAVENOUS PRN
Status: CANCELLED | OUTPATIENT
Start: 2019-12-23

## 2019-12-23 RX ORDER — ROPINIROLE 0.25 MG/1
0.25 TABLET, FILM COATED ORAL 2 TIMES DAILY
Qty: 60 TABLET | Refills: 1 | Status: SHIPPED | OUTPATIENT
Start: 2019-12-23 | End: 2020-04-10 | Stop reason: SDUPTHER

## 2019-12-23 RX ORDER — SODIUM CHLORIDE 0.9 % (FLUSH) 0.9 %
20 SYRINGE (ML) INJECTION PRN
Status: DISCONTINUED | OUTPATIENT
Start: 2019-12-23 | End: 2019-12-24 | Stop reason: HOSPADM

## 2019-12-23 RX ORDER — SODIUM CHLORIDE 0.9 % (FLUSH) 0.9 %
20 SYRINGE (ML) INJECTION PRN
Status: CANCELLED | OUTPATIENT
Start: 2019-12-23

## 2019-12-23 RX ORDER — SODIUM CHLORIDE 0.9 % (FLUSH) 0.9 %
10 SYRINGE (ML) INJECTION PRN
Status: CANCELLED | OUTPATIENT
Start: 2019-12-23

## 2019-12-23 RX ORDER — 0.9 % SODIUM CHLORIDE 0.9 %
250 INTRAVENOUS SOLUTION INTRAVENOUS ONCE
Status: CANCELLED
Start: 2019-12-23

## 2019-12-23 RX ORDER — SODIUM CHLORIDE 9 MG/ML
INJECTION, SOLUTION INTRAVENOUS ONCE
Status: COMPLETED | OUTPATIENT
Start: 2019-12-23 | End: 2019-12-23

## 2019-12-23 RX ORDER — SODIUM CHLORIDE 0.9 % (FLUSH) 0.9 %
10 SYRINGE (ML) INJECTION PRN
Status: DISCONTINUED | OUTPATIENT
Start: 2019-12-23 | End: 2019-12-24 | Stop reason: HOSPADM

## 2019-12-23 RX ORDER — ONDANSETRON HYDROCHLORIDE 8 MG/1
8 TABLET, FILM COATED ORAL EVERY 8 HOURS PRN
Qty: 20 TABLET | Refills: 1 | Status: SHIPPED | OUTPATIENT
Start: 2019-12-23 | End: 2020-02-28 | Stop reason: SDUPTHER

## 2019-12-23 RX ORDER — HEPARIN SODIUM (PORCINE) LOCK FLUSH IV SOLN 100 UNIT/ML 100 UNIT/ML
500 SOLUTION INTRAVENOUS PRN
Status: DISCONTINUED | OUTPATIENT
Start: 2019-12-23 | End: 2019-12-24 | Stop reason: HOSPADM

## 2019-12-23 RX ADMIN — Medication 10 ML: at 10:10

## 2019-12-23 RX ADMIN — SODIUM CHLORIDE: 9 INJECTION, SOLUTION INTRAVENOUS at 11:56

## 2019-12-23 RX ADMIN — Medication 10 ML: at 12:57

## 2019-12-23 RX ADMIN — ATEZOLIZUMAB 1200 MG: 1200 INJECTION, SOLUTION INTRAVENOUS at 12:07

## 2019-12-23 RX ADMIN — Medication 500 UNITS: at 12:57

## 2019-12-23 RX ADMIN — Medication 10 ML: at 10:11

## 2019-12-23 RX ADMIN — Medication 10 ML: at 11:55

## 2019-12-23 ASSESSMENT — ENCOUNTER SYMPTOMS
EYE DISCHARGE: 0
BLOOD IN STOOL: 0
NAUSEA: 0
SHORTNESS OF BREATH: 1
CONSTIPATION: 0
TROUBLE SWALLOWING: 0
VOMITING: 0
FACIAL SWELLING: 0
CHEST TIGHTNESS: 0
DIARRHEA: 0
COUGH: 1
ABDOMINAL PAIN: 0
WHEEZING: 0
BACK PAIN: 0
COLOR CHANGE: 0
SORE THROAT: 0
ABDOMINAL DISTENTION: 0
RECTAL PAIN: 0

## 2019-12-23 ASSESSMENT — PAIN DESCRIPTION - PAIN TYPE: TYPE: CHRONIC PAIN

## 2019-12-23 ASSESSMENT — PAIN SCALES - GENERAL
PAINLEVEL_OUTOF10: 8
PAINLEVEL_OUTOF10: 8

## 2019-12-23 ASSESSMENT — PAIN DESCRIPTION - ORIENTATION: ORIENTATION: LOWER

## 2019-12-23 ASSESSMENT — PAIN DESCRIPTION - DESCRIPTORS: DESCRIPTORS: ACHING

## 2019-12-23 ASSESSMENT — PAIN DESCRIPTION - LOCATION: LOCATION: BACK

## 2019-12-23 NOTE — PROGRESS NOTES
Patient assessed for the following post chemotherapy:    Dizziness   No  Lightheadedness  No      Acute nausea/vomiting No  Headache   No  Chest pain/pressure  No  Rash/itching   No  Shortness of breath  No    Patient kept for 20 minutes observation post infusion chemotherapy. Patient tolerated chemotherapy treatment Tecentriq without any complications. Last vital signs:   /62   Pulse 86   Temp 97.9 °F (36.6 °C) (Oral)   Resp 20   SpO2 99%         Patient instructed if experience any of the above symptoms following today's infusion,he/she is to notify MD immediately or go to the emergency department. Discharge instructions given to patient. Verbalizes understanding. Ambulated off unit per self with belongings.

## 2019-12-23 NOTE — PROGRESS NOTES
Lab specimen obtained from [facility] without any problems. Ambulated off unit to examination room for appointment with Dr. Perry Bean escorted by [facility].

## 2019-12-30 ENCOUNTER — APPOINTMENT (OUTPATIENT)
Dept: GENERAL RADIOLOGY | Age: 74
DRG: 469 | End: 2019-12-30
Payer: MEDICARE

## 2019-12-30 ENCOUNTER — HOSPITAL ENCOUNTER (INPATIENT)
Age: 74
LOS: 7 days | Discharge: NURSING, REHAB OR LONG-TERM CARE | DRG: 469 | End: 2020-01-06
Attending: EMERGENCY MEDICINE | Admitting: INTERNAL MEDICINE
Payer: MEDICARE
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
## 2019-12-30 PROBLEM — M84.451A PATHOLOGIC FRACTURE OF FEMORAL NECK, RIGHT, INITIAL ENCOUNTER (HCC): Status: ACTIVE | Noted: 2019-12-30

## 2019-12-30 LAB
ALBUMIN SERPL-MCNC: 4.1 G/DL (ref 3.5–5.1)
ALP BLD-CCNC: 63 U/L (ref 38–126)
ALT SERPL-CCNC: 8 U/L (ref 11–66)
ANION GAP SERPL CALCULATED.3IONS-SCNC: 14 MEQ/L (ref 8–16)
AST SERPL-CCNC: 15 U/L (ref 5–40)
BACTERIA: ABNORMAL /HPF
BASOPHILS # BLD: 0.5 %
BASOPHILS ABSOLUTE: 0 THOU/MM3 (ref 0–0.1)
BILIRUB SERPL-MCNC: 0.5 MG/DL (ref 0.3–1.2)
BILIRUBIN DIRECT: < 0.2 MG/DL (ref 0–0.3)
BILIRUBIN URINE: NEGATIVE
BLOOD, URINE: ABNORMAL
BUN BLDV-MCNC: 24 MG/DL (ref 7–22)
CALCIUM SERPL-MCNC: 9.6 MG/DL (ref 8.5–10.5)
CASTS 2: ABNORMAL /LPF
CASTS UA: ABNORMAL /LPF
CHARACTER, URINE: CLEAR
CHLORIDE BLD-SCNC: 92 MEQ/L (ref 98–111)
CO2: 34 MEQ/L (ref 23–33)
COLOR: YELLOW
CREAT SERPL-MCNC: 0.8 MG/DL (ref 0.4–1.2)
CRYSTALS, UA: ABNORMAL
EOSINOPHIL # BLD: 0.4 %
EOSINOPHILS ABSOLUTE: 0 THOU/MM3 (ref 0–0.4)
EPITHELIAL CELLS, UA: ABNORMAL /HPF
ERYTHROCYTE [DISTWIDTH] IN BLOOD BY AUTOMATED COUNT: 13.8 % (ref 11.5–14.5)
ERYTHROCYTE [DISTWIDTH] IN BLOOD BY AUTOMATED COUNT: 49.9 FL (ref 35–45)
GFR SERPL CREATININE-BSD FRML MDRD: 70 ML/MIN/1.73M2
GLUCOSE BLD-MCNC: 94 MG/DL (ref 70–108)
GLUCOSE URINE: NEGATIVE MG/DL
HCT VFR BLD CALC: 29.3 % (ref 37–47)
HEMOGLOBIN: 8.6 GM/DL (ref 12–16)
IMMATURE GRANS (ABS): 0.03 THOU/MM3 (ref 0–0.07)
IMMATURE GRANULOCYTES: 0.4 %
KETONES, URINE: 15
LEUKOCYTE ESTERASE, URINE: NEGATIVE
LYMPHOCYTES # BLD: 7.4 %
LYMPHOCYTES ABSOLUTE: 0.6 THOU/MM3 (ref 1–4.8)
MAGNESIUM: 1.8 MG/DL (ref 1.6–2.4)
MCH RBC QN AUTO: 29.2 PG (ref 26–33)
MCHC RBC AUTO-ENTMCNC: 29.4 GM/DL (ref 32.2–35.5)
MCV RBC AUTO: 99.3 FL (ref 81–99)
MISCELLANEOUS 2: ABNORMAL
MONOCYTES # BLD: 10.3 %
MONOCYTES ABSOLUTE: 0.8 THOU/MM3 (ref 0.4–1.3)
NITRITE, URINE: NEGATIVE
NUCLEATED RED BLOOD CELLS: 0 /100 WBC
OSMOLALITY CALCULATION: 283.2 MOSMOL/KG (ref 275–300)
PH UA: 6 (ref 5–9)
PLATELET # BLD: 124 THOU/MM3 (ref 130–400)
PMV BLD AUTO: 10.6 FL (ref 9.4–12.4)
POTASSIUM SERPL-SCNC: 4.5 MEQ/L (ref 3.5–5.2)
PROTEIN UA: 30
RBC # BLD: 2.95 MILL/MM3 (ref 4.2–5.4)
RBC URINE: ABNORMAL /HPF
RENAL EPITHELIAL, UA: ABNORMAL
SEG NEUTROPHILS: 81 %
SEGMENTED NEUTROPHILS ABSOLUTE COUNT: 6.2 THOU/MM3 (ref 1.8–7.7)
SODIUM BLD-SCNC: 140 MEQ/L (ref 135–145)
SPECIFIC GRAVITY, URINE: 1.02 (ref 1–1.03)
TOTAL PROTEIN: 7.4 G/DL (ref 6.1–8)
TROPONIN T: < 0.01 NG/ML
UROBILINOGEN, URINE: 0.2 EU/DL (ref 0–1)
WBC # BLD: 7.6 THOU/MM3 (ref 4.8–10.8)
WBC UA: ABNORMAL /HPF
YEAST: ABNORMAL

## 2019-12-30 PROCEDURE — 6370000000 HC RX 637 (ALT 250 FOR IP): Performed by: INTERNAL MEDICINE

## 2019-12-30 PROCEDURE — 73502 X-RAY EXAM HIP UNI 2-3 VIEWS: CPT

## 2019-12-30 PROCEDURE — 71045 X-RAY EXAM CHEST 1 VIEW: CPT

## 2019-12-30 PROCEDURE — 6370000000 HC RX 637 (ALT 250 FOR IP): Performed by: EMERGENCY MEDICINE

## 2019-12-30 PROCEDURE — 93005 ELECTROCARDIOGRAM TRACING: CPT | Performed by: EMERGENCY MEDICINE

## 2019-12-30 PROCEDURE — 85025 COMPLETE CBC W/AUTO DIFF WBC: CPT

## 2019-12-30 PROCEDURE — 2580000003 HC RX 258: Performed by: INTERNAL MEDICINE

## 2019-12-30 PROCEDURE — 73564 X-RAY EXAM KNEE 4 OR MORE: CPT

## 2019-12-30 PROCEDURE — 1200000003 HC TELEMETRY R&B

## 2019-12-30 PROCEDURE — 82248 BILIRUBIN DIRECT: CPT

## 2019-12-30 PROCEDURE — 6820000001 HC L2 TRAUMA SURGERY EVALUATION: Performed by: ORTHOPAEDIC SURGERY

## 2019-12-30 PROCEDURE — 81001 URINALYSIS AUTO W/SCOPE: CPT

## 2019-12-30 PROCEDURE — 83735 ASSAY OF MAGNESIUM: CPT

## 2019-12-30 PROCEDURE — 99223 1ST HOSP IP/OBS HIGH 75: CPT | Performed by: INTERNAL MEDICINE

## 2019-12-30 PROCEDURE — 99285 EMERGENCY DEPT VISIT HI MDM: CPT

## 2019-12-30 PROCEDURE — 84484 ASSAY OF TROPONIN QUANT: CPT

## 2019-12-30 PROCEDURE — 2709999900 HC NON-CHARGEABLE SUPPLY

## 2019-12-30 PROCEDURE — 6360000002 HC RX W HCPCS: Performed by: INTERNAL MEDICINE

## 2019-12-30 PROCEDURE — 80053 COMPREHEN METABOLIC PANEL: CPT

## 2019-12-30 PROCEDURE — 36415 COLL VENOUS BLD VENIPUNCTURE: CPT

## 2019-12-30 RX ORDER — SODIUM CHLORIDE 0.9 % (FLUSH) 0.9 %
10 SYRINGE (ML) INJECTION PRN
Status: DISCONTINUED | OUTPATIENT
Start: 2019-12-30 | End: 2020-01-02

## 2019-12-30 RX ORDER — LACTOBACILLUS RHAMNOSUS GG 10B CELL
1 CAPSULE ORAL 3 TIMES DAILY
Status: DISCONTINUED | OUTPATIENT
Start: 2019-12-31 | End: 2020-01-01

## 2019-12-30 RX ORDER — HYDROCODONE BITARTRATE AND ACETAMINOPHEN 5; 325 MG/1; MG/1
1 TABLET ORAL ONCE
Status: COMPLETED | OUTPATIENT
Start: 2019-12-30 | End: 2019-12-30

## 2019-12-30 RX ORDER — HYDROCODONE BITARTRATE AND ACETAMINOPHEN 5; 325 MG/1; MG/1
1 TABLET ORAL EVERY 6 HOURS PRN
Status: DISCONTINUED | OUTPATIENT
Start: 2019-12-30 | End: 2019-12-31

## 2019-12-30 RX ORDER — ACETAMINOPHEN 325 MG/1
650 TABLET ORAL EVERY 4 HOURS PRN
Status: DISCONTINUED | OUTPATIENT
Start: 2019-12-30 | End: 2020-01-02

## 2019-12-30 RX ORDER — FUROSEMIDE 20 MG/1
20 TABLET ORAL DAILY PRN
Status: DISCONTINUED | OUTPATIENT
Start: 2019-12-30 | End: 2020-01-01

## 2019-12-30 RX ORDER — SODIUM CHLORIDE 0.9 % (FLUSH) 0.9 %
10 SYRINGE (ML) INJECTION EVERY 12 HOURS SCHEDULED
Status: DISCONTINUED | OUTPATIENT
Start: 2019-12-30 | End: 2020-01-02

## 2019-12-30 RX ORDER — LIDOCAINE 40 MG/G
CREAM TOPICAL PRN
Status: DISCONTINUED | OUTPATIENT
Start: 2019-12-30 | End: 2020-01-02

## 2019-12-30 RX ORDER — PANTOPRAZOLE SODIUM 40 MG/1
40 TABLET, DELAYED RELEASE ORAL
Status: DISCONTINUED | OUTPATIENT
Start: 2019-12-31 | End: 2020-01-01

## 2019-12-30 RX ORDER — ROPINIROLE 0.25 MG/1
0.25 TABLET, FILM COATED ORAL 2 TIMES DAILY
Status: DISCONTINUED | OUTPATIENT
Start: 2019-12-30 | End: 2020-01-01

## 2019-12-30 RX ORDER — PROMETHAZINE HYDROCHLORIDE 25 MG/1
25 TABLET ORAL EVERY 8 HOURS PRN
Status: DISCONTINUED | OUTPATIENT
Start: 2019-12-30 | End: 2020-01-06 | Stop reason: HOSPADM

## 2019-12-30 RX ORDER — ONDANSETRON 2 MG/ML
4 INJECTION INTRAMUSCULAR; INTRAVENOUS EVERY 6 HOURS PRN
Status: DISCONTINUED | OUTPATIENT
Start: 2019-12-30 | End: 2020-01-06 | Stop reason: HOSPADM

## 2019-12-30 RX ORDER — FERROUS SULFATE 325(65) MG
325 TABLET ORAL
Status: DISCONTINUED | OUTPATIENT
Start: 2019-12-31 | End: 2020-01-01

## 2019-12-30 RX ORDER — BETAMETHASONE DIPROPIONATE 0.05 %
OINTMENT (GRAM) TOPICAL DAILY
Status: DISCONTINUED | OUTPATIENT
Start: 2019-12-31 | End: 2020-01-02

## 2019-12-30 RX ORDER — POTASSIUM CHLORIDE 20 MEQ/1
20 TABLET, EXTENDED RELEASE ORAL DAILY
Status: DISCONTINUED | OUTPATIENT
Start: 2019-12-31 | End: 2020-01-06 | Stop reason: HOSPADM

## 2019-12-30 RX ORDER — LEVOTHYROXINE SODIUM 0.07 MG/1
75 TABLET ORAL DAILY
Status: DISCONTINUED | OUTPATIENT
Start: 2019-12-31 | End: 2020-01-01

## 2019-12-30 RX ORDER — ALBUTEROL SULFATE 2.5 MG/3ML
2.5 SOLUTION RESPIRATORY (INHALATION) 4 TIMES DAILY
Status: DISCONTINUED | OUTPATIENT
Start: 2019-12-30 | End: 2020-01-01

## 2019-12-30 RX ORDER — HYDROCODONE BITARTRATE AND ACETAMINOPHEN 5; 325 MG/1; MG/1
1 TABLET ORAL EVERY 6 HOURS PRN
Status: DISCONTINUED | OUTPATIENT
Start: 2019-12-30 | End: 2019-12-30 | Stop reason: SDUPTHER

## 2019-12-30 RX ORDER — DIGOXIN 125 MCG
125 TABLET ORAL DAILY
Status: DISCONTINUED | OUTPATIENT
Start: 2019-12-31 | End: 2020-01-01

## 2019-12-30 RX ORDER — ALBUTEROL SULFATE 90 UG/1
2 AEROSOL, METERED RESPIRATORY (INHALATION) EVERY 6 HOURS PRN
Status: DISCONTINUED | OUTPATIENT
Start: 2019-12-30 | End: 2020-01-06 | Stop reason: HOSPADM

## 2019-12-30 RX ADMIN — Medication 10 ML: at 22:49

## 2019-12-30 RX ADMIN — ROPINIROLE HYDROCHLORIDE 0.25 MG: 0.25 TABLET, FILM COATED ORAL at 23:48

## 2019-12-30 RX ADMIN — METOPROLOL TARTRATE 25 MG: 25 TABLET ORAL at 23:49

## 2019-12-30 RX ADMIN — HYDROCODONE BITARTRATE AND ACETAMINOPHEN 1 TABLET: 5; 325 TABLET ORAL at 20:56

## 2019-12-30 RX ADMIN — ONDANSETRON 4 MG: 2 INJECTION INTRAMUSCULAR; INTRAVENOUS at 22:48

## 2019-12-30 ASSESSMENT — ENCOUNTER SYMPTOMS
COUGH: 0
DIARRHEA: 0
RHINORRHEA: 0
ABDOMINAL PAIN: 0
BACK PAIN: 0
WHEEZING: 0
VOICE CHANGE: 0
NAUSEA: 1
SINUS PRESSURE: 0
SORE THROAT: 0
VOMITING: 0
SHORTNESS OF BREATH: 1
CONSTIPATION: 0
TROUBLE SWALLOWING: 0
CHEST TIGHTNESS: 0

## 2019-12-30 ASSESSMENT — PAIN DESCRIPTION - PAIN TYPE
TYPE: ACUTE PAIN
TYPE: ACUTE PAIN

## 2019-12-30 ASSESSMENT — PAIN - FUNCTIONAL ASSESSMENT: PAIN_FUNCTIONAL_ASSESSMENT: PREVENTS OR INTERFERES SOME ACTIVE ACTIVITIES AND ADLS

## 2019-12-30 ASSESSMENT — PAIN DESCRIPTION - DESCRIPTORS: DESCRIPTORS: ACHING;STABBING

## 2019-12-30 ASSESSMENT — PAIN SCALES - GENERAL
PAINLEVEL_OUTOF10: 9
PAINLEVEL_OUTOF10: 8
PAINLEVEL_OUTOF10: 9
PAINLEVEL_OUTOF10: 9

## 2019-12-30 ASSESSMENT — PAIN DESCRIPTION - ORIENTATION
ORIENTATION: RIGHT
ORIENTATION: RIGHT

## 2019-12-30 ASSESSMENT — PAIN DESCRIPTION - FREQUENCY: FREQUENCY: CONTINUOUS

## 2019-12-30 ASSESSMENT — PAIN DESCRIPTION - LOCATION
LOCATION: LEG
LOCATION: LEG

## 2019-12-30 ASSESSMENT — PAIN DESCRIPTION - ONSET: ONSET: ON-GOING

## 2019-12-30 ASSESSMENT — PAIN DESCRIPTION - PROGRESSION: CLINICAL_PROGRESSION: NOT CHANGED

## 2019-12-30 NOTE — ED PROVIDER NOTES
right hip. History of COPD, chronic oxygen dependent, chronic CHF      DIAGNOSTIC RESULTS    EKG   Interpreted by Sonali Abbasi MD    Vent. Rate: 90 bpm  SC interval: 204 ms  QRS duration: 90 ms  QTc: 386 ms  P-R-T axes: 64, 37, -11    Rhythm: normal sinus   Rate: normal  Axis: normal  Ectopy: none  Conduction: normal  ST Segments: no acute change  T Waves: Nonspecific T wave abnormality   Q Waves: none    Clinical Impression: Normal sinus rhythm with nonspecific T wave abnormality , Abnormal EKG      Sonali Abbasi MD      RADIOLOGY:  I have reviewed radiologic plain film image(s). The plain films will be read or overread by the radiologist.All other non-plain film images(s) such as CT, Ultrasound and MRI have been read by the radiologist.  XR CHEST PORTABLE   Final Result   1. Stable volume loss of the right hemithorax, correlate with prior lobectomy. 2. Unchanged appearance of diffuse coarse interstitial markings. Underlying changes of COPD are not excluded. **This report has been created using voice recognition software. It may contain minor errors which are inherent in voice recognition technology. **      Final report electronically signed by Dr. Joe Lee on 12/30/2019 9:29 PM      XR HIP 2-3 VW W PELVIS RIGHT   Final Result    Subcapital right femoral neck fracture as discussed above. Mild bilateral hip joint DJD. Lumbar levoscoliosis. **This report has been created using voice recognition software. It may contain minor errors which are inherent in voice recognition technology. **      Final report electronically signed by Dr. Grant Dickey on 12/30/2019 8:14 PM      XR KNEE RIGHT (MIN 4 VIEWS)   Final Result    No acute bone or joint abnormality. **This report has been created using voice recognition software. It may contain minor errors which are inherent in voice recognition technology. **      Final report electronically signed by Dr. Grant Dickey on 12/30/2019 8:15 PM          LABS:   Labs Reviewed   CBC WITH AUTO DIFFERENTIAL - Abnormal; Notable for the following components:       Result Value    RBC 2.95 (*)     Hemoglobin 8.6 (*)     Hematocrit 29.3 (*)     MCV 99.3 (*)     MCHC 29.4 (*)     RDW-SD 49.9 (*)     Platelets 682 (*)     Lymphocytes Absolute 0.6 (*)     All other components within normal limits   BASIC METABOLIC PANEL - Abnormal; Notable for the following components:    Chloride 92 (*)     CO2 34 (*)     BUN 24 (*)     All other components within normal limits   HEPATIC FUNCTION PANEL - Abnormal; Notable for the following components:    ALT 8 (*)     All other components within normal limits   URINE WITH REFLEXED MICRO - Abnormal; Notable for the following components:    Ketones, Urine 15 (*)     Blood, Urine MODERATE (*)     Protein, UA 30 (*)     All other components within normal limits   GLOMERULAR FILTRATION RATE, ESTIMATED - Abnormal; Notable for the following components:    Est, Glom Filt Rate 70 (*)     All other components within normal limits   TROPONIN   MAGNESIUM   ANION GAP   OSMOLALITY     All other unresulted laboratory test above are normal:    Vitals:    Vitals:    12/30/19 1819 12/30/19 1948 12/30/19 2120   BP: (!) 153/67 (!) 149/63 136/65   Pulse: 92 89 100   Resp: 18 18 18   Temp: 98.1 °F (36.7 °C)     TempSrc: Oral     SpO2: 100% 96% 96%   Weight: 133 lb (60.3 kg)     Height: 5' (1.524 m)         EMERGENCY DEPARTMENT COURSE:    Medications   HYDROcodone-acetaminophen (NORCO) 5-325 MG per tablet 1 tablet (1 tablet Oral Given 12/30/19 2056)       The pt was seen and evaluated by me. Within the department, I observed the pt's vitalsigns to be within acceptable range. Laboratory and Radiological studies were performed, results were reviewed with the patient. Within the department, the pt was treated with Norco. I observed the pt's condition to be hemodynamically stable during the duration of their stay.  I explained my proposed course of treatment to the pt, and they were amenable to my decision. The case is referred to orthopedic services, FREDA Pierre however the admission is deferred to the hospitalist services because of multiple comorbidity. The case was then referred to the hospitalist services, Dr. Zhao Garcia graciously admitted the patient    CRITICAL CARE:   None. CONSULTS:  FREDA Pierre for orthopedic  Dr. Zhao Garcia hospitalist services    PROCEDURES:  None. FINAL IMPRESSION       1. Closed fracture of neck of right femur, initial encounter ([facility] [facility]    2. COPD with chronic hypoxia , oxygen dependent ([facility] [facility]    3. History of lobectomy of lung right lower due to lung cancer    4. History of chronic CHF    5. Chronic anemia          DISPOSITION/PLAN  PATIENT REFERRED TO: Patient is referred to the orthopedic surgery services FR[facility] Pierre however the admission is deferred to the hospitalist services, Dr. Magda Montgomery graciously admitted the patient      (Please note that portions of this note were completed with a voice recognition program.  Efforts were made to edit the dictations but occasionallywords are mis-transcribed.)    Scribe:  Shen Romero 12/30/19 6:43 PM Scribing for and in the presence of Meghna Eugene M.D. Signed by: Joshua Dalal, 12/30/19 9:40 PM    Provider:  I personally performed the services described in the documentation, reviewed and edited the documentation which was dictated to the scribe in my presence, and it accurately records my words andactions.      12/30/19 9:40 PM      Krystyna Landeros MD      Emergency room physician              Krystyna Landeros MD  12/30/19 [id number]       Krystyna Landeros MD  12/30/19 3116

## 2019-12-31 LAB
ANION GAP SERPL CALCULATED.3IONS-SCNC: 8 MEQ/L (ref 8–16)
BUN BLDV-MCNC: 22 MG/DL (ref 7–22)
CALCIUM SERPL-MCNC: 9 MG/DL (ref 8.5–10.5)
CHLORIDE BLD-SCNC: 97 MEQ/L (ref 98–111)
CO2: 37 MEQ/L (ref 23–33)
CREAT SERPL-MCNC: 0.7 MG/DL (ref 0.4–1.2)
EKG ATRIAL RATE: 84 BPM
EKG ATRIAL RATE: 90 BPM
EKG P AXIS: 57 DEGREES
EKG P AXIS: 64 DEGREES
EKG P-R INTERVAL: 204 MS
EKG P-R INTERVAL: 208 MS
EKG Q-T INTERVAL: 316 MS
EKG Q-T INTERVAL: 332 MS
EKG QRS DURATION: 90 MS
EKG QRS DURATION: 92 MS
EKG QTC CALCULATION (BAZETT): 386 MS
EKG QTC CALCULATION (BAZETT): 392 MS
EKG R AXIS: 35 DEGREES
EKG R AXIS: 37 DEGREES
EKG T AXIS: -11 DEGREES
EKG T AXIS: 38 DEGREES
EKG VENTRICULAR RATE: 84 BPM
EKG VENTRICULAR RATE: 90 BPM
ERYTHROCYTE [DISTWIDTH] IN BLOOD BY AUTOMATED COUNT: 13.9 % (ref 11.5–14.5)
ERYTHROCYTE [DISTWIDTH] IN BLOOD BY AUTOMATED COUNT: 51.1 FL (ref 35–45)
GFR SERPL CREATININE-BSD FRML MDRD: 82 ML/MIN/1.73M2
GLUCOSE BLD-MCNC: 103 MG/DL (ref 70–108)
HCT VFR BLD CALC: 28.8 % (ref 37–47)
HEMOGLOBIN: 8.5 GM/DL (ref 12–16)
MCH RBC QN AUTO: 29.5 PG (ref 26–33)
MCHC RBC AUTO-ENTMCNC: 29.5 GM/DL (ref 32.2–35.5)
MCV RBC AUTO: 100 FL (ref 81–99)
PLATELET # BLD: 102 THOU/MM3 (ref 130–400)
PMV BLD AUTO: 10.4 FL (ref 9.4–12.4)
POTASSIUM SERPL-SCNC: 4.2 MEQ/L (ref 3.5–5.2)
RBC # BLD: 2.88 MILL/MM3 (ref 4.2–5.4)
REASON FOR REJECTION: NORMAL
REJECTED TEST: NORMAL
SODIUM BLD-SCNC: 142 MEQ/L (ref 135–145)
TROPONIN T: 0.02 NG/ML
TROPONIN T: < 0.01 NG/ML
WBC # BLD: 5.1 THOU/MM3 (ref 4.8–10.8)

## 2019-12-31 PROCEDURE — 6370000000 HC RX 637 (ALT 250 FOR IP): Performed by: INTERNAL MEDICINE

## 2019-12-31 PROCEDURE — 2700000000 HC OXYGEN THERAPY PER DAY

## 2019-12-31 PROCEDURE — 99233 SBSQ HOSP IP/OBS HIGH 50: CPT | Performed by: INTERNAL MEDICINE

## 2019-12-31 PROCEDURE — 93005 ELECTROCARDIOGRAM TRACING: CPT | Performed by: INTERNAL MEDICINE

## 2019-12-31 PROCEDURE — 2580000003 HC RX 258: Performed by: INTERNAL MEDICINE

## 2019-12-31 PROCEDURE — 36415 COLL VENOUS BLD VENIPUNCTURE: CPT

## 2019-12-31 PROCEDURE — 85027 COMPLETE CBC AUTOMATED: CPT

## 2019-12-31 PROCEDURE — 93010 ELECTROCARDIOGRAM REPORT: CPT | Performed by: INTERNAL MEDICINE

## 2019-12-31 PROCEDURE — 6360000002 HC RX W HCPCS: Performed by: INTERNAL MEDICINE

## 2019-12-31 PROCEDURE — 94760 N-INVAS EAR/PLS OXIMETRY 1: CPT

## 2019-12-31 PROCEDURE — 94640 AIRWAY INHALATION TREATMENT: CPT

## 2019-12-31 PROCEDURE — 6370000000 HC RX 637 (ALT 250 FOR IP): Performed by: PHYSICIAN ASSISTANT

## 2019-12-31 PROCEDURE — 2580000003 HC RX 258: Performed by: PHYSICIAN ASSISTANT

## 2019-12-31 PROCEDURE — 1200000003 HC TELEMETRY R&B

## 2019-12-31 PROCEDURE — 80048 BASIC METABOLIC PNL TOTAL CA: CPT

## 2019-12-31 PROCEDURE — 84484 ASSAY OF TROPONIN QUANT: CPT

## 2019-12-31 RX ORDER — HEPARIN SODIUM 1000 [USP'U]/ML
60 INJECTION, SOLUTION INTRAVENOUS; SUBCUTANEOUS PRN
Status: DISCONTINUED | OUTPATIENT
Start: 2019-12-31 | End: 2019-12-31 | Stop reason: CLARIF

## 2019-12-31 RX ORDER — SENNA AND DOCUSATE SODIUM 50; 8.6 MG/1; MG/1
2 TABLET, FILM COATED ORAL DAILY PRN
Status: DISCONTINUED | OUTPATIENT
Start: 2019-12-31 | End: 2019-12-31

## 2019-12-31 RX ORDER — BISACODYL 10 MG
10 SUPPOSITORY, RECTAL RECTAL DAILY PRN
Status: DISCONTINUED | OUTPATIENT
Start: 2019-12-31 | End: 2020-01-06 | Stop reason: HOSPADM

## 2019-12-31 RX ORDER — HYDROCODONE BITARTRATE AND ACETAMINOPHEN 5; 325 MG/1; MG/1
1 TABLET ORAL EVERY 4 HOURS PRN
Status: DISCONTINUED | OUTPATIENT
Start: 2019-12-31 | End: 2020-01-02

## 2019-12-31 RX ORDER — HEPARIN SODIUM 1000 [USP'U]/ML
60 INJECTION, SOLUTION INTRAVENOUS; SUBCUTANEOUS ONCE
Status: COMPLETED | OUTPATIENT
Start: 2019-12-31 | End: 2019-12-31

## 2019-12-31 RX ORDER — SODIUM CHLORIDE 9 MG/ML
INJECTION, SOLUTION INTRAVENOUS CONTINUOUS
Status: DISCONTINUED | OUTPATIENT
Start: 2019-12-31 | End: 2020-01-03

## 2019-12-31 RX ORDER — DOCUSATE SODIUM 100 MG/1
100 CAPSULE, LIQUID FILLED ORAL 2 TIMES DAILY
Status: DISCONTINUED | OUTPATIENT
Start: 2019-12-31 | End: 2020-01-01

## 2019-12-31 RX ORDER — HYDROXYZINE PAMOATE 25 MG/1
25 CAPSULE ORAL 3 TIMES DAILY PRN
Status: DISCONTINUED | OUTPATIENT
Start: 2019-12-31 | End: 2020-01-06 | Stop reason: HOSPADM

## 2019-12-31 RX ORDER — POLYETHYLENE GLYCOL 3350 17 G/17G
17 POWDER, FOR SOLUTION ORAL DAILY
Status: DISCONTINUED | OUTPATIENT
Start: 2019-12-31 | End: 2020-01-01

## 2019-12-31 RX ORDER — HEPARIN SODIUM 10000 [USP'U]/100ML
12 INJECTION, SOLUTION INTRAVENOUS CONTINUOUS
Status: DISCONTINUED | OUTPATIENT
Start: 2019-12-31 | End: 2020-01-01

## 2019-12-31 RX ORDER — HEPARIN SODIUM 1000 [USP'U]/ML
30 INJECTION, SOLUTION INTRAVENOUS; SUBCUTANEOUS PRN
Status: DISCONTINUED | OUTPATIENT
Start: 2019-12-31 | End: 2019-12-31 | Stop reason: CLARIF

## 2019-12-31 RX ORDER — SODIUM CHLORIDE 9 MG/ML
INJECTION, SOLUTION INTRAVENOUS CONTINUOUS
Status: DISCONTINUED | OUTPATIENT
Start: 2019-12-31 | End: 2019-12-31

## 2019-12-31 RX ADMIN — POLYETHYLENE GLYCOL 3350 17 G: 17 POWDER, FOR SOLUTION ORAL at 12:46

## 2019-12-31 RX ADMIN — TIOTROPIUM BROMIDE 18 MCG: 18 CAPSULE ORAL; RESPIRATORY (INHALATION) at 04:57

## 2019-12-31 RX ADMIN — HEPARIN SODIUM 3620 UNITS: 1000 INJECTION INTRAVENOUS; SUBCUTANEOUS at 17:21

## 2019-12-31 RX ADMIN — METOPROLOL TARTRATE 25 MG: 25 TABLET ORAL at 09:17

## 2019-12-31 RX ADMIN — DOCUSATE SODIUM 100 MG: 100 CAPSULE, LIQUID FILLED ORAL at 12:46

## 2019-12-31 RX ADMIN — ALBUTEROL SULFATE 2.5 MG: 2.5 SOLUTION RESPIRATORY (INHALATION) at 12:54

## 2019-12-31 RX ADMIN — Medication 2 PUFF: at 05:00

## 2019-12-31 RX ADMIN — Medication 2 PUFF: at 15:56

## 2019-12-31 RX ADMIN — FERROUS SULFATE TAB 325 MG (65 MG ELEMENTAL FE) 325 MG: 325 (65 FE) TAB at 09:17

## 2019-12-31 RX ADMIN — ALBUTEROL SULFATE 2.5 MG: 2.5 SOLUTION RESPIRATORY (INHALATION) at 04:49

## 2019-12-31 RX ADMIN — Medication 1 CAPSULE: at 15:11

## 2019-12-31 RX ADMIN — HEPARIN SODIUM 12 UNITS/KG/HR: 10000 INJECTION, SOLUTION INTRAVENOUS at 17:21

## 2019-12-31 RX ADMIN — DIGOXIN 125 MCG: 125 TABLET ORAL at 09:16

## 2019-12-31 RX ADMIN — ROPINIROLE HYDROCHLORIDE 0.25 MG: 0.25 TABLET, FILM COATED ORAL at 09:17

## 2019-12-31 RX ADMIN — HYDROCODONE BITARTRATE AND ACETAMINOPHEN 1 TABLET: 5; 325 TABLET ORAL at 04:14

## 2019-12-31 RX ADMIN — POTASSIUM CHLORIDE 20 MEQ: 1500 TABLET, EXTENDED RELEASE ORAL at 09:17

## 2019-12-31 RX ADMIN — Medication 1 CAPSULE: at 21:16

## 2019-12-31 RX ADMIN — ROPINIROLE HYDROCHLORIDE 0.25 MG: 0.25 TABLET, FILM COATED ORAL at 21:16

## 2019-12-31 RX ADMIN — ALBUTEROL SULFATE 2.5 MG: 2.5 SOLUTION RESPIRATORY (INHALATION) at 09:18

## 2019-12-31 RX ADMIN — SODIUM CHLORIDE: 9 INJECTION, SOLUTION INTRAVENOUS at 23:33

## 2019-12-31 RX ADMIN — Medication 1 CAPSULE: at 09:16

## 2019-12-31 RX ADMIN — HYDROXYZINE PAMOATE 25 MG: 25 CAPSULE ORAL at 21:16

## 2019-12-31 RX ADMIN — DOCUSATE SODIUM 100 MG: 100 CAPSULE, LIQUID FILLED ORAL at 21:16

## 2019-12-31 RX ADMIN — HYDROCODONE BITARTRATE AND ACETAMINOPHEN 1 TABLET: 5; 325 TABLET ORAL at 21:16

## 2019-12-31 RX ADMIN — ALBUTEROL SULFATE 2.5 MG: 2.5 SOLUTION RESPIRATORY (INHALATION) at 15:56

## 2019-12-31 RX ADMIN — HYDROCODONE BITARTRATE AND ACETAMINOPHEN 1 TABLET: 5; 325 TABLET ORAL at 12:46

## 2019-12-31 RX ADMIN — Medication 10 ML: at 21:16

## 2019-12-31 RX ADMIN — PANTOPRAZOLE SODIUM 40 MG: 40 TABLET, DELAYED RELEASE ORAL at 06:07

## 2019-12-31 RX ADMIN — LEVOTHYROXINE SODIUM 75 MCG: 75 TABLET ORAL at 06:07

## 2019-12-31 RX ADMIN — METOPROLOL TARTRATE 25 MG: 25 TABLET ORAL at 21:16

## 2019-12-31 RX ADMIN — HYDROXYZINE PAMOATE 25 MG: 25 CAPSULE ORAL at 12:46

## 2019-12-31 ASSESSMENT — PAIN SCALES - GENERAL
PAINLEVEL_OUTOF10: 6
PAINLEVEL_OUTOF10: 9
PAINLEVEL_OUTOF10: 10
PAINLEVEL_OUTOF10: 9

## 2019-12-31 ASSESSMENT — PAIN DESCRIPTION - DESCRIPTORS
DESCRIPTORS: ACHING;STABBING
DESCRIPTORS: ACHING

## 2019-12-31 ASSESSMENT — PAIN DESCRIPTION - PROGRESSION
CLINICAL_PROGRESSION: GRADUALLY WORSENING

## 2019-12-31 ASSESSMENT — PAIN DESCRIPTION - LOCATION
LOCATION: LEG
LOCATION: LEG

## 2019-12-31 ASSESSMENT — PAIN - FUNCTIONAL ASSESSMENT
PAIN_FUNCTIONAL_ASSESSMENT: PREVENTS OR INTERFERES SOME ACTIVE ACTIVITIES AND ADLS
PAIN_FUNCTIONAL_ASSESSMENT: PREVENTS OR INTERFERES SOME ACTIVE ACTIVITIES AND ADLS

## 2019-12-31 ASSESSMENT — PAIN DESCRIPTION - PAIN TYPE
TYPE: ACUTE PAIN
TYPE: ACUTE PAIN

## 2019-12-31 ASSESSMENT — PAIN DESCRIPTION - ORIENTATION
ORIENTATION: RIGHT
ORIENTATION: RIGHT

## 2019-12-31 ASSESSMENT — PAIN DESCRIPTION - ONSET
ONSET: ON-GOING
ONSET: ON-GOING

## 2019-12-31 ASSESSMENT — PAIN DESCRIPTION - FREQUENCY
FREQUENCY: CONTINUOUS
FREQUENCY: CONTINUOUS

## 2019-12-31 NOTE — PROGRESS NOTES
Hospitalist Progress Note    Patient:  Krupa Wong      Unit/Bed:7K-02/002-A    YOB: 1945    MRN: [id number]       Acct: [de-identified]     PCP: JESUS Benavides CNP    Date of Admission: 12/30/2019    Assessment/Plan:    Right subcapital femoral neck fracture with mild displacement: Orthopedics on board, planning for surgery tomorrow      Left lung cancer-2016 adenocarcinoma status post stereotactic ablative radiation therapy on the left lung, 2017  -New nodules noted in 2017 October and chemotherapy stopped due to side effects and restarted again  -    Recurrent metastatic lung cancer, non small cell: Currently on chemotherapy  -Had right lower lobectomy in 2012, status post chemo and radiation-adenocarcinoma  -    Mildly elevated troponin, unclear clinical significance, no chest pain or palpitations, will recheck troponin  EKG normal sinus rhythm no significant ST-T changes    Anemia macrocytic: Monitor    Thrombocytopenia? From chemo monitor    Pulmonary embolism 11/4/2019-on anticoagulation with Xarelto, held, will stop heparin drip low dose for now    Essential hypertension on metoprolol, Lasix as needed with episodes of hypotension:      Paroxysmal atrial fibrillation: Continue metoprolol, anticoagulation held, on digoxin     Chronic respiratory failure, hypoxia: 2 L at rest and 6 L on oxygen     Acquired hypothyroidism: On levothyroxine    COPD without exacerbation bronchodilators:      Chronic low back pain: Chronic narcotic dependent,     Chronic diastolic CHF: No evidence of volume overload, continue metoprolol, Lasix   Overactive bladder not on any medications currently. -     Restless leg syndrome on ropinirole    History of pericardial effusion status post pericardiocentesis-July 2019, 700 mL negative for malignant cells    Expected discharge date:  3-4 days    Disposition:    [] Home       [] TCU       [] Rehab       [] Psych       [x] SNF       [] [facility] Hospital Problems    Diagnosis Date Noted    Pathologic fracture of femoral neck, right, initial encounter [facility]) [U77.945B] 12/30/2019    Fall [A89. XXXA] 12/30/2019    Chronic respiratory failure with hypercapnia (HCC) [J96.12]     Paroxysmal atrial fibrillation ([facility] [I48.0] 10/22/2018    Stage 3 severe COPD by GOLD classification ([facility] [J44.9]     Anemia [D64.9]     Physical deconditioning [R53.81]     Chronic midline low back pain without sciatica [M54.5, G89.29] 08/11/2017    H/O: lung cancer [Z85.118]     GERD (gastroesophageal reflux disease) [K21.9]     HTN (hypertension) [I10]     [facility] [C78.02] 10/22/2012       Electronically signed by Ekta Purvis MD on 12/31/2019 at 2:02 PM

## 2019-12-31 NOTE — CARE COORDINATION
19, 7:18 AM  DISCHARGE PLANNING EVALUATION:    Kaylynn Hilton       Admitted from: ED  2019/ 1215 [facility] day: 1   Location: --A Reason for admit: Pathologic fracture of femoral neck, right, initial encounter [facility]) [W62.788O] Status: IP  Admit order signed?: yes  PMH:  has a past medical history of Arthritis, Cancer ([facility] [facility] CHF (congestive heart failure) ([address] Chronic bronchitis, simple ([address] Chronic respiratory failure with hypoxia ([facility] [address] COPD (chronic obstructive pulmonary disease) ([facility] [address] GERD (gastroesophageal reflux disease), HTN (hypertension), Hx of blood clots, Pneumonia, Postprocedural pneumothorax, Scoliosis, Sleep apnea, Urinary incontinence, and UTI (urinary tract infection). Procedure: na  Pertinent abnormal Imagin19  Right hip xray  Subcapital right femoral neck fracture as discussed above. Mild bilateral hip joint DJD. Lumbar levoscoliosis.   CXR  1. Stable volume loss of the right hemithorax, correlate with prior lobectomy. 2. Unchanged appearance of diffuse coarse interstitial markings. Underlying changes of COPD are not excluded.   Medications:  Scheduled Meds:   sodium chloride flush  10 mL Intravenous 2 times per day    potassium replacement protocol   Other RX Placeholder    albuterol  2.5 mg Nebulization 4x daily    betamethasone dipropionate   Topical Daily    digoxin  125 mcg Oral Daily    ferrous sulfate  325 mg Oral Daily with breakfast    levothyroxine  75 mcg Oral Daily    metoprolol tartrate  25 mg Oral BID    mometasone-formoterol  2 puff Inhalation BID    pantoprazole  40 mg Oral QAM AC    potassium chloride  20 mEq Oral Daily    lactobacillus  1 capsule Oral TID    rOPINIRole  0.25 mg Oral BID    tiotropium  18 mcg Inhalation Daily     Continuous Infusions:   sodium chloride        Pertinent Info/Orders/Treatment Plan:  Telemetry, bedrest, IVF, hold Xeralto preop, troponin 0.019, supplemental oxygen 3L/min, pain control, follows

## 2019-12-31 NOTE — PLAN OF CARE
Problem: Falls - Risk of:  Goal: Will remain free from falls  Description  Will remain free from falls  Outcome: Ongoing  Goal: Absence of physical injury  Description  Absence of physical injury  Outcome: Ongoing     Problem: Pain:  Goal: Pain level will decrease  Description  Pain level will decrease  Outcome: Ongoing  Goal: Control of acute pain  Description  Control of acute pain  Outcome: Ongoing  Goal: Control of chronic pain  Description  Control of chronic pain  Outcome: Ongoing     Problem: Neurological  Goal: Maximum potential motor/sensory/cognitive function  Outcome: Ongoing     Problem: Cardiovascular  Goal: No DVT, peripheral vascular complications  Outcome: Ongoing     Problem: Respiratory  Goal: No pulmonary complications  Outcome: Ongoing     Problem: GI  Goal: No bowel complications  Outcome: Ongoing     Problem:   Goal: Adequate urinary output  Outcome: Ongoing     Problem: Nutrition  Goal: Optimal nutrition therapy  Outcome: Ongoing     Problem: Skin Integrity/Risk  Goal: No skin breakdown during hospitalization  Outcome: Ongoing     Problem: Musculor/Skeletal Functional Status  Goal: Highest potential functional level  Outcome: Ongoing     Problem: Discharge Planning:  Goal: Discharged to appropriate level of care  Description  Discharged to appropriate level of care  Outcome: Ongoing     Problem: Risk for Impaired Skin Integrity  Goal: Tissue integrity - skin and mucous membranes  Description  Structural intactness and normal physiological function of skin and  mucous membranes. Outcome: Ongoing     Problem: Impaired respiratory status  Goal: Lung sounds clear or within normal limits for patient  12/31/2019 1809 by Gonzalo Awan RN  Outcome: Ongoing  12/31/2019 0510 by Ernesto Fonseca RCP  Outcome: Ongoing    Care plan reviewed with patient. Patient verbalizes understanding of the plan of care and contributes to goal setting.

## 2019-12-31 NOTE — PROGRESS NOTES
Pharmacy Heparin Consult    Yemi Rice is a 76 y.o. female. Pharmacy has been consulted to adjust heparin. Height:   Ht Readings from Last 1 Encounters:   12/30/19 5' (1.524 m)     Weight:  Wt Readings from Last 1 Encounters:   12/30/19 133 lb (60.3 kg)       Heparin Indication: R/O MI  Bolus Permitted: yes  Goal aPTT: 60-95    Plan:  Bolus: 60 units/kg  Rate: 12 units/kg/hr  Next aPTT: 2100    Matthew RILEY. Ph.  12/31/2019  2:33 PM

## 2019-12-31 NOTE — PROGRESS NOTES
Orthopedic Progress Note    Virgilio Abdalla  12/31/2019    Subjective:     Patient with right subcapital femoral neck fracture. Pain was elevated earlier today, more well controlled now. Patient is resting comfortably at this point. Systemic or Specific Complaints: No unusual complaints    Objective:     BP (!) 118/58   Pulse 92   Temp 98 °F (36.7 °C) (Oral)   Resp 16   Ht 5' (1.524 m)   Wt 133 lb (60.3 kg)   SpO2 92%   BMI 25.97 kg/m²     Intake/Output Summary (Last 24 hours) at 12/31/2019 1450  Last data filed at 12/31/2019 0346  Gross per 24 hour   Intake 150 ml   Output 0 ml   Net 150 ml     DRAIN/TUBE OUTPUT:       General: alert, appears stated age and cooperative       Extremity: Right hip TTP. Decreased ROM. Able to flex and extend the ankle and toes. Foot sensation intact. Intact dorsalis pedis pulse. DVT Exam: No evidence of DVT by physical exam     Data Review  CBC:   Lab Results   Component Value Date    WBC 5.1 12/31/2019    RBC 2.88 12/31/2019    HGB 8.5 12/31/2019    HCT 28.8 12/31/2019     12/31/2019     BMP:    Lab Results   Component Value Date     12/31/2019    K 4.2 12/31/2019    K 3.8 10/15/2018    CL 97 12/31/2019    CO2 37 12/31/2019    BUN 22 12/31/2019    CREATININE 0.7 12/31/2019    CALCIUM 9.0 12/31/2019    GLUCOSE 103 12/31/2019     PT/INR:    Lab Results   Component Value Date    INR 1.00 07/18/2019       Radiology: See electronic record to view reports. Reports reviewed.     Assessment:     Right hip subcapital femur fracture      Plan:      1: Plan for OR tomorrow morning for Right hip onelia arthroplasty with Dr. Manuel Shetty   2:  Continue pain control  3:  NPO after mid night   4:  Medical management per hospitalist.   5:  NWB   6:  Continue to hold anticoagulation    Electronically signed by Chela Alvarez PA-C on 12/31/2019 at 2:50 PM

## 2019-12-31 NOTE — ED NOTES
Pt informed of orders placed and reports that she refused for lab to obtain blood work requesting that her port be accessed. Port accessed and blood work obtained and sent to lab. Pt refused catheter at this time and pure wick catheter provided for urinary relief and urine sample obtained and sent to lab. Pt reports that pain to the right thigh remains 8/10 and is requesting pain medication and something to eat. Pt and family informed that pt is NPO until results are obtained and informed of time frame to expect results. Will continue to monitor and update on the POC.      Shima Hayward RN  12/30/19 2014

## 2019-12-31 NOTE — PLAN OF CARE
Problem: Falls - Risk of:  Goal: Will remain free from falls  Description  Will remain free from falls  Outcome: Ongoing  Note:   Patient remained free of falls. Call light within reach and used appropriately. Bed alarmed and in lowest position, side rails up x2, personal items within reach and non skid socks worn when ambulating. Patient on bedrest.     Problem: Pain:  Goal: Pain level will decrease  Description  Pain level will decrease  Outcome: Ongoing  Note:   Patient rates pain 9/10 with a pain goal of 6/10. Pain controlled with medication and repositioning. Patient satisfied. Problem: Neurological  Goal: Maximum potential motor/sensory/cognitive function  Outcome: Ongoing  Note:   Patient is alert and oriented x4. All extremities have pulses of +1 or +2. Numbness and tingling to lower extremities feet to knees. All extremities active. Problem: Cardiovascular  Goal: No DVT, peripheral vascular complications  Outcome: Ongoing  Note:   No signs and symptoms of DVT. Calves soft and nontender. Patient compliant with SCDs to help in prevention of DVTs. Problem: Respiratory  Goal: No pulmonary complications  Outcome: Ongoing  Note:   Lung sounds clear and chest expansion symmetrical. O2 sats are 92% or greater on 4L nasal cannula. Problem: GI  Goal: No bowel complications  Outcome: Ongoing  Note:   Patient denies any n/v/d. Bowel sounds active x4 quadrants. No bowel movement yet this shift. Problem:   Goal: Adequate urinary output  Outcome: Ongoing  Note:   Patient has not had urine output yet since been up to floor. External catheter in place. Denies any dysuria or urgency. Problem: Nutrition  Goal: Optimal nutrition therapy  Outcome: Ongoing  Note:   Patient on low sodium diet until midnight then NPO tolerating night snacks well.  Denies any n/v.     Problem: Skin Integrity/Risk  Goal: No skin breakdown during hospitalization  Outcome: Ongoing  Note:   No signs of new skin breakdown with each assessment. Skin remains warm, dry, intact. Mucous membranes pink & moist. Patient understands the importance of frequent repositioning in order to prevent any skin breakdown. Blanchable redness and discoloration to buttocks keeping patient turned every 2 hours. Problem: Musculor/Skeletal Functional Status  Goal: Highest potential functional level  Outcome: Ongoing  Note:   Limited movement to right lower extremity due to femur fx. Pedal push and pull weak but present. Problem: Discharge Planning:  Goal: Discharged to appropriate level of care  Description  Discharged to appropriate level of care  Outcome: Ongoing  Note:   Discharge planning in progress. Patient is planning to go home at discharge.  and  assisting with discharge needs. Care plan reviewed with patient. Patient verbalize understanding of the plan of care and contribute to goal setting.

## 2019-12-31 NOTE — ED NOTES
ED to inpatient nurses report    Chief Complaint   Patient presents with    Fall    Leg Pain     right      Present to ED from home  LOC: alert and orientated to name, place, date  Vital signs   Vitals:    12/30/19 1819 12/30/19 1948 12/30/19 2120   BP: (!) 153/67 (!) 149/63 136/65   Pulse: 92 89 100   Resp: 18 18 18   Temp: 98.1 °F (36.7 °C)     TempSrc: Oral     SpO2: 100% 96% 96%   Weight: 133 lb (60.3 kg)     Height: 5' (1.524 m)        Oxygen Baseline 96% NC 4 liters O2    Current needs required  Cardiac monitoring/oxygenation/fall risk/pain management/pt NPO at this time/pure wick for urinary relief/ for at home needs requested per family   LDAs:  Port accessed in the right upper chest   Mobility: Fully dependent  Pending ED orders: n/a  Present condition: gaurded    Electronically signed by Augustus Bermudez RN on 12/30/2019 at 9:51 PM       Augustus Bermudez RN  12/30/19 4457

## 2019-12-31 NOTE — CONSULTS
mouth daily      promethazine (PHENERGAN) 25 MG tablet Take 1 tablet by mouth every 8 hours as needed for Nausea 30 tablet 1    rOPINIRole (REQUIP) 0.25 MG tablet Take 1 tablet by mouth 2 times daily 60 tablet 1    ondansetron (ZOFRAN) 8 MG tablet Take 1 tablet by mouth every 8 hours as needed for Nausea or Vomiting 20 tablet 1    rivaroxaban (XARELTO) 10 MG TABS tablet Take 1.5 tablets by mouth 2 times daily (with meals) for 21 days 21 tablet 3    levothyroxine (SYNTHROID) 100 MCG tablet Take 1 tablet by mouth daily 30 tablet 2    metoprolol tartrate (LOPRESSOR) 25 MG tablet Take 1 tablet by mouth 2 times daily 60 tablet 3    HYDROcodone-acetaminophen (NORCO) 5-325 MG per tablet Take 1 tablet by mouth every 6 hours as needed for Pain.       digoxin (LANOXIN) 125 MCG tablet Take 1 tablet by mouth daily 30 tablet 3    promethazine (PHENERGAN) 25 MG tablet Take 1 tablet by mouth every 8 hours as needed for Nausea 30 tablet 1    tiotropium (SPIRIVA) 18 MCG inhalation capsule Inhale 18 mcg into the lungs daily      omeprazole (PRILOSEC) 40 MG delayed release capsule Take 40 mg by mouth daily      Probiotic Product (PROBIOTIC DAILY PO) Take 1 capsule by mouth 3 times daily      potassium chloride (KLOR-CON M) 20 MEQ extended release tablet Take 1 tablet by mouth daily (Patient taking differently: Take 20 mEq by mouth three times a week ) 30 tablet 0    albuterol sulfate HFA (PROAIR HFA) 108 (90 Base) MCG/ACT inhaler Inhale 2 puffs into the lungs every 6 hours as needed for Wheezing 1 Inhaler 5    furosemide (LASIX) 20 MG tablet Take 1 tablet by mouth daily as needed (leg swelling) 5 tablet 5    albuterol (PROVENTIL) (2.5 MG/3ML) 0.083% nebulizer solution Take 3 mLs by nebulization every 6 hours as needed for Wheezing or Shortness of Breath 360 vial 3    Handicap Placard MISC by Does not apply route Diagnosis: Malignant neoplasm of lung, unspecified laterality, unspecified part of lung (HCC) [C34.90]  Expiration Date: 4/15/2020 1 each 0    ferrous sulfate 325 (65 Fe) MG tablet One every other day take with food and vitamin C 500 mg (Patient taking differently: One every other day take with food.) 30 tablet 3    lidocaine-prilocaine (EMLA) 2.5-2.5 % cream Apply topically as needed for Pain Apply topically as needed.  mometasone-formoterol (DULERA) 200-5 MCG/ACT inhaler Inhale 2 puffs into the lungs 2 times daily 3 Inhaler 3    fluticasone (FLONASE) 50 MCG/ACT nasal spray 1 spray by Nasal route daily 1 Bottle 5    betamethasone dipropionate (DIPROLENE) 0.05 % ointment Apply topically daily. 50 g 5    nitrofurantoin (MACRODANTIN) 100 MG capsule Take 100 mg by mouth 4 times daily           Allergies:  Advil cold & sinus liqui-gels [pseudoephedrine-ibuprofen]; Food; Percocet [oxycodone-acetaminophen]; and Sulfa antibiotics    Social History:   Negative for tobacco use, alcohol abuse and illicit drug abuse    Family History:  Family History   Problem Relation Age of Onset    Cancer Mother     Heart Disease Father     High Blood Pressure Father     High Cholesterol Father     Arthritis Sister     Heart Disease Sister     Cancer Sister         lung cancer    Heart Disease Sister     Stroke Sister     High Cholesterol Sister     High Blood Pressure Sister     Stroke Paternal Grandfather     High Blood Pressure Brother          REVIEW OF SYSTEMS:  Gen: Negative for nausea, vomiting, diarrhea, fever, chills, night sweats  HEENT: Negative for double vision, blurred vision, sore throat  Heart: Negative for HTN, palpitations, chest pain  Lungs: Negative for wheezes, asthma or SOB  GI: Negative for nausea, vomiting  : Negative for dysuria, hematuria  Endo: Negative for diabetes, thyroid disorders  Heme: Negative for DVT or bleeding disorders  Psych: Negative for Depression or anxiety  Ortho: Negative for pain in the joints, arthritis or gout except where mentioned in the HPI.      PHYSICAL

## 2019-12-31 NOTE — H&P
History & Physical        Patient:  Neisha Alfaro  YOB: 1945    MRN: [id number]     Acct: [de-identified]    PCP: JESUS Dawn CNP    Date of Admission: 12/30/2019    Date of Service: Pt seen/examined on 12/30/2019   and Admitted to Inpatient with expected LOS greater than two midnights due to medical therapy. Chief Complaint:   Chief Complaint   Patient presents with   Jammie Bees    Leg Pain     right         History Of Present Illness:      76 y.o. female who presented to [address] [place] [address] with complaints of fall. Is reported that patient was walking in her home and her kitten was weaving in and out of her legs anticipating feeding. Patient recalls falling, denies LOC. Denies hitting her head. Denies any other injury other than her right lower extremity. Patient denies chest pain or shortness of breath. Denies nausea, vomiting, diarrhea or constipation. Denies abdominal pain. Denies urinary complaints. Patient states that she has history of metastatic left lung cancer and is currently under the care of Dr. Gregor Holter, Oncologist.  Additional past medical history includes palliative immunotherapy with Tecentriq, pericardial effusion, congestive heart failure, COPD, hypertension, pulmonary embolus currently on Xarelto, anemia and chronic pain. Patient hemodynamically stable while in the emergency department. Patient afebrile with temperature 98.1 °F.  Labs and imaging were performed. CBC with mild anemia with a hemoglobin 8.6, hematocrit 29.3. BMP CO2 34. BUN 24 and serum creatinine 0.8. Glucose 94. LFTs essentially unremarkable. Troponin nonelevated. Chest x-ray stable. Report notes underlying changes of COPD are not excluded. X-ray right hip noting subcapital right femoral neck fracture. X-ray right knee nonacute. Patient admitted at this time for right subcapital right femoral neck fracture.     Past Medical History:          Diagnosis Date    Arthritis low back pain and scoliosis in lumbar    Cancer ([facility] 2012    lower right lobe-lung s/p lobectomy in 2012, radiation and chemo nad later had mediatinal mets and had radationa dn chemo again, and in 2016 had reoccurence on the left side upper and was started on radiation this year and has  a follow up CT in oct 2017    CHF (congestive heart failure) (HCC)     Chronic bronchitis, simple (HCC)     Chronic respiratory failure with hypoxia ([facility]     COPD (chronic obstructive pulmonary disease) (HCC)     GERD (gastroesophageal reflux disease)     HTN (hypertension)     Hx of blood clots     in lung     Pneumonia     Postprocedural pneumothorax     Scoliosis     Sleep apnea     Urinary incontinence     UTI (urinary tract infection)        Past Surgical History:          Procedure Laterality Date    LOBECTOMY  10/19/2012    rt lower lobectomy     LUNG BIOPSY  8/2012       Medications Prior to Admission:      Prior to Admission medications    Medication Sig Start Date End Date Taking?  Authorizing Provider   diphenhydrAMINE HCl (BENADRYL PO) Take by mouth daily   Yes Historical Provider, MD   promethazine (PHENERGAN) 25 MG tablet Take 1 tablet by mouth every 8 hours as needed for Nausea 12/23/19  Yes Bello Bray MD   rOPINIRole (REQUIP) 0.25 MG tablet Take 1 tablet by mouth 2 times daily 12/23/19 1/22/20 Yes Bello Bray MD   ondansetron (ZOFRAN) 8 MG tablet Take 1 tablet by mouth every 8 hours as needed for Nausea or Vomiting 12/23/19  Yes Bello Bray MD   rivaroxaban (XARELTO) 10 MG TABS tablet Take 1.5 tablets by mouth 2 times daily (with meals) for 21 days 12/23/19 1/13/20 Yes Bello Bray MD   levothyroxine (SYNTHROID) 100 MCG tablet Take 1 tablet by mouth daily 12/4/19  Yes Bello Bray MD   metoprolol tartrate (LOPRESSOR) 25 MG tablet Take 1 tablet by mouth 2 times daily 12/2/19  Yes Bello Bray MD   HYDROcodone-acetaminophen (NORCO) 5-325 MG per tablet Take 1 tablet by mouth every 6 hours as needed for Pain.   Yes Historical Provider, MD   digoxin (LANOXIN) 125 MCG tablet Take 1 tablet by mouth daily 11/4/19  Yes Ai Russell MD   promethazine (PHENERGAN) 25 MG tablet Take 1 tablet by mouth every 8 hours as needed for Nausea 9/27/19  Yes Ai Russell MD   tiotropium (SPIRIVA) 18 MCG inhalation capsule Inhale 18 mcg into the lungs daily   Yes Historical Provider, MD   omeprazole (PRILOSEC) 40 MG delayed release capsule Take 40 mg by mouth daily   Yes Historical Provider, MD   Probiotic Product (PROBIOTIC DAILY PO) Take 1 capsule by mouth 3 times daily   Yes Historical Provider, MD   potassium chloride (KLOR-CON M) 20 MEQ extended release tablet Take 1 tablet by mouth daily  Patient taking differently: Take 20 mEq by mouth three times a week  6/6/19  Yes Ai Russell MD   albuterol sulfate HFA (PROAIR HFA) 108 (90 Base) MCG/ACT inhaler Inhale 2 puffs into the lungs every 6 hours as needed for Wheezing 5/8/19  Yes JESUS Fisher CNP   furosemide (LASIX) 20 MG tablet Take 1 tablet by mouth daily as needed (leg swelling) 5/8/19  Yes JESUS Fisher CNP   albuterol (PROVENTIL) (2.5 MG/3ML) 0.083% nebulizer solution Take 3 mLs by nebulization every 6 hours as needed for Wheezing or Shortness of Breath 5/6/19 5/5/20 Yes JESUS Maciel CNP   Handicap Placard MISC by Does not apply route Diagnosis: Malignant neoplasm of lung, unspecified laterality, unspecified part of [facility] [C34.90]  Expiration Date: 4/15/2020 4/15/19  Yes Ai Russell MD   ferrous sulfate 325 (65 Fe) MG tablet One every other day take with food and vitamin C 500 mg  Patient taking differently: One every other day take with food. 2/28/19  Yes Marylene Scotland, MD   lidocaine-prilocaine (EMLA) 2.5-2.5 % cream Apply topically as needed for Pain Apply topically as needed.    Yes Historical Provider, MD   mometasone-formoterol [facility]) 200-5 MCG/ACT inhaler Inhale 2 puffs into the lungs 2 times daily 12/11/17 4/15/20 Yes Brian RILEY MD Linda   fluticasone (FLONASE) 50 MCG/ACT nasal spray 1 spray by Nasal route daily 11/8/16  Yes Rico Rosales MD   betamethasone dipropionate (DIPROLENE) 0.05 % ointment Apply topically daily. 9/12/16  Yes Rico Rosales MD   nitrofurantoin (MACRODANTIN) 100 MG capsule Take 100 mg by mouth 4 times daily    Historical Provider, MD       Allergies:  Advil cold & sinus liqui-gels [pseudoephedrine-ibuprofen]; Food; Percocet [oxycodone-acetaminophen]; and Sulfa antibiotics    Social History:      The patient currently lives at home, alone    TOBACCO:   reports that she quit smoking about 13 years ago. Her smoking use included cigarettes. She has a 45.00 pack-year smoking history. She has never used smokeless tobacco.  ETOH:   reports no history of alcohol use. Family History:      Reviewed in detail and negative for DM Positive as follows:        Problem Relation Age of Onset    Cancer Mother     Heart Disease Father     High Blood Pressure Father     High Cholesterol Father     Arthritis Sister     Heart Disease Sister     Cancer Sister         lung cancer    Heart Disease Sister     Stroke Sister     High Cholesterol Sister     High Blood Pressure Sister     Stroke Paternal Grandfather     High Blood Pressure Brother        Diet:  Low sodium    REVIEW OF SYSTEMS:   Pertinent positives as noted in the HPI. All other systems reviewed and negative. PHYSICAL EXAM:    /65   Pulse 100   Temp 98.1 °F (36.7 °C) (Oral)   Resp 18   Ht 5' (1.524 m)   Wt 133 lb (60.3 kg)   SpO2 96%   BMI 25.97 kg/m²     General appearance:  Noted apparent distress, appears stated age and cooperative. HEENT:  Normal cephalic, atraumatic without obvious deformity. Pupils equal, round, and reactive to light. Extra ocular muscles intact. Conjunctivae/corneas clear. Neck: Supple, with full range of motion. No jugular venous distention. Trachea midline.   Respiratory:  Normal respiratory effort. Clear to auscultation, bilaterally without Rales/Wheezes/Rhonchi. Cardiovascular:  Regular rate and rhythm with normal S1/S2 without murmurs, rubs or gallops. Abdomen: Soft, non-tender, non-distended with normal bowel sounds. Musculoskeletal:  No clubbing, cyanosis or edema bilaterally. Full range of motion without deformity, exception right lower extremity due to acute fracture. Right anterior thigh tenderness with palpation  Skin: Skin color, texture, turgor normal.  No rashes or lesions. Neurologic:  Neurovascularly intact without any focal sensory/motor deficits. Cranial nerves: II-XII intact, grossly non-focal.  Psychiatric:  Alert and oriented, thought content appropriate, normal insight  Capillary Refill: Brisk,< 3 seconds   Peripheral Pulses: +2 palpable, equal bilaterally       Labs:     Recent Labs     12/30/19 2000   WBC 7.6   HGB 8.6*   HCT 29.3*   *     Recent Labs     12/30/19 2000      K 4.5   CL 92*   CO2 34*   BUN 24*   CREATININE 0.8   CALCIUM 9.6     Recent Labs     12/30/19 2000   AST 15   ALT 8*   BILIDIR <0.2   BILITOT 0.5   ALKPHOS 63     No results for input(s): INR in the last 72 hours. No results for input(s): Falguni Batista in the last 72 hours. Urinalysis:      Lab Results   Component Value Date    NITRU NEGATIVE 12/30/2019    WBCUA 5-10 12/30/2019    BACTERIA FEW 12/30/2019    RBCUA 10-15 12/30/2019    BLOODU MODERATE 12/30/2019    SPECGRAV 1.013 10/17/2012    GLUCOSEU NEGATIVE 12/30/2019       Radiology:       XR CHEST PORTABLE   Final Result   1. Stable volume loss of the right hemithorax, correlate with prior lobectomy. 2. Unchanged appearance of diffuse coarse interstitial markings. Underlying changes of COPD are not excluded. **This report has been created using voice recognition software. It may contain minor errors which are inherent in voice recognition technology. **      Final report electronically signed by Dr. Michael Greenwood Alhaji on 12/30/2019 9:29 PM      XR HIP 2-3 VW W PELVIS RIGHT   Final Result    Subcapital right femoral neck fracture as discussed above. Mild bilateral hip joint DJD. Lumbar levoscoliosis. **This report has been created using voice recognition software. It may contain minor errors which are inherent in voice recognition technology. **      Final report electronically signed by Dr. Luisa Lassiter on 12/30/2019 8:14 PM      XR KNEE RIGHT (MIN 4 VIEWS)   Final Result    No acute bone or joint abnormality. **This report has been created using voice recognition software. It may contain minor errors which are inherent in voice recognition technology. **      Final report electronically signed by Dr. Luisa Lassiter on 12/30/2019 8:15 PM               DVT prophylaxis: [] Lovenox                                 [x] SCDs                                 [] Heparin                                 [] Encourage ambulation           [] Already on Anticoagulation    Code Status: Full Code      PT/OT Eval Status: Encouraged, once cleared    Disposition:    [] Home       [] TCU       [] Rehab       [] Psych       [x] SNF       [] [place]       [] Other-    ASSESSMENT:    [facility] Problems    Diagnosis Date Noted    Pathologic fracture of femoral neck, right, initial encounter [facility]) [H34.238K] 12/30/2019     Priority: High    Fall [W19. XXXA] 12/30/2019    Chronic respiratory failure with hypercapnia (HCC) [J96.12]     Paroxysmal atrial fibrillation ([facility] [I48.0] 10/22/2018    Stage 3 severe COPD by GOLD classification ([facility] [J44.9]     Anemia [D64.9]     Physical deconditioning [R53.81]     Chronic midline low back pain without sciatica [M54.5, G89.29] 08/11/2017    H/O: lung cancer [Z85.118]     GERD (gastroesophageal reflux disease) [K21.9]     HTN (hypertension) [I10]     Metastatic lung carcinoma, left ([facility] [C78.02] 10/22/2012       PLAN:    1.  Admit to Telemetry

## 2019-12-31 NOTE — ED NOTES
Pt resting in bed upon entering room and informed of orders placed. Pt reports that her pain is 9/10 to the right thigh. Pt reports that Dr. Parrish Jaramillo informed her of admission status and pt and family informed of admission process and time frame to expect transport. Pt informed to remain NPO until further notice. Family reports that pt has not had anything to eat since Saturday when they brought her a meal. Pt appears in pain with movement and respirations are labored during conversation.  Call light remains in reach with sr up x2     Danica Chavarria RN  12/30/19 0814

## 2020-01-01 ENCOUNTER — APPOINTMENT (OUTPATIENT)
Dept: GENERAL RADIOLOGY | Age: 75
DRG: 469 | End: 2020-01-01
Payer: MEDICARE

## 2020-01-01 ENCOUNTER — ANESTHESIA (OUTPATIENT)
Dept: OPERATING ROOM | Age: 75
DRG: 469 | End: 2020-01-01
Payer: MEDICARE

## 2020-01-01 ENCOUNTER — APPOINTMENT (OUTPATIENT)
Dept: CT IMAGING | Age: 75
DRG: 469 | End: 2020-01-01
Payer: MEDICARE

## 2020-01-01 ENCOUNTER — ANESTHESIA EVENT (OUTPATIENT)
Dept: OPERATING ROOM | Age: 75
DRG: 469 | End: 2020-01-01
Payer: MEDICARE

## 2020-01-01 VITALS
SYSTOLIC BLOOD PRESSURE: 127 MMHG | DIASTOLIC BLOOD PRESSURE: 56 MMHG | OXYGEN SATURATION: 100 % | TEMPERATURE: 99 F | RESPIRATION RATE: 1 BRPM

## 2020-01-01 LAB
ABO: NORMAL
ALLEN TEST: POSITIVE
ANION GAP SERPL CALCULATED.3IONS-SCNC: 7 MEQ/L (ref 8–16)
ANION GAP SERPL CALCULATED.3IONS-SCNC: 7 MEQ/L (ref 8–16)
ANTIBODY SCREEN: NORMAL
APTT: 74 SECONDS (ref 22–38)
BASE EXCESS (CALCULATED): 3.3 MMOL/L (ref -2.5–2.5)
BUN BLDV-MCNC: 16 MG/DL (ref 7–22)
BUN BLDV-MCNC: 18 MG/DL (ref 7–22)
CALCIUM SERPL-MCNC: 7.6 MG/DL (ref 8.5–10.5)
CALCIUM SERPL-MCNC: 8.8 MG/DL (ref 8.5–10.5)
CHLORIDE BLD-SCNC: 101 MEQ/L (ref 98–111)
CHLORIDE BLD-SCNC: 94 MEQ/L (ref 98–111)
CO2: 30 MEQ/L (ref 23–33)
CO2: 36 MEQ/L (ref 23–33)
COLLECTED BY:: ABNORMAL
CREAT SERPL-MCNC: 0.7 MG/DL (ref 0.4–1.2)
CREAT SERPL-MCNC: 0.8 MG/DL (ref 0.4–1.2)
DEVICE: ABNORMAL
DIGOXIN LEVEL: 0.8 NG/ML (ref 0.5–2)
DIGOXIN LEVEL: 1 NG/ML (ref 0.5–2)
ERYTHROCYTE [DISTWIDTH] IN BLOOD BY AUTOMATED COUNT: 15.8 % (ref 11.5–14.5)
ERYTHROCYTE [DISTWIDTH] IN BLOOD BY AUTOMATED COUNT: 57.1 FL (ref 35–45)
GFR SERPL CREATININE-BSD FRML MDRD: 70 ML/MIN/1.73M2
GFR SERPL CREATININE-BSD FRML MDRD: 82 ML/MIN/1.73M2
GLUCOSE BLD-MCNC: 151 MG/DL (ref 70–108)
GLUCOSE BLD-MCNC: 181 MG/DL (ref 70–108)
GLUCOSE BLD-MCNC: 94 MG/DL (ref 70–108)
HCO3: 37 MMOL/L (ref 23–28)
HCT VFR BLD CALC: 24.7 % (ref 37–47)
HEMOGLOBIN: 7.5 GM/DL (ref 12–16)
IFIO2: 15
INR BLD: 1.1 (ref 0.85–1.13)
MCH RBC QN AUTO: 29.6 PG (ref 26–33)
MCHC RBC AUTO-ENTMCNC: 30.4 GM/DL (ref 32.2–35.5)
MCV RBC AUTO: 97.6 FL (ref 81–99)
O2 SATURATION: 100 %
PCO2: 140 MMHG (ref 35–45)
PH BLOOD GAS: 7.03 (ref 7.35–7.45)
PLATELET # BLD: 106 THOU/MM3 (ref 130–400)
PMV BLD AUTO: 10.3 FL (ref 9.4–12.4)
PO2: 381 MMHG (ref 71–104)
POTASSIUM SERPL-SCNC: 4.4 MEQ/L (ref 3.5–5.2)
POTASSIUM SERPL-SCNC: 5.2 MEQ/L (ref 3.5–5.2)
RBC # BLD: 2.53 MILL/MM3 (ref 4.2–5.4)
REASON FOR REJECTION: NORMAL
REJECTED TEST: NORMAL
RH FACTOR: NORMAL
SODIUM BLD-SCNC: 137 MEQ/L (ref 135–145)
SODIUM BLD-SCNC: 138 MEQ/L (ref 135–145)
SOURCE, BLOOD GAS: ABNORMAL
WBC # BLD: 8.7 THOU/MM3 (ref 4.8–10.8)

## 2020-01-01 PROCEDURE — 82803 BLOOD GASES ANY COMBINATION: CPT

## 2020-01-01 PROCEDURE — 94640 AIRWAY INHALATION TREATMENT: CPT

## 2020-01-01 PROCEDURE — 3600000005 HC SURGERY LEVEL 5 BASE: Performed by: ORTHOPAEDIC SURGERY

## 2020-01-01 PROCEDURE — 3700000000 HC ANESTHESIA ATTENDED CARE: Performed by: ORTHOPAEDIC SURGERY

## 2020-01-01 PROCEDURE — 86900 BLOOD TYPING SEROLOGIC ABO: CPT

## 2020-01-01 PROCEDURE — 7100000001 HC PACU RECOVERY - ADDTL 15 MIN: Performed by: ORTHOPAEDIC SURGERY

## 2020-01-01 PROCEDURE — 87500 VANOMYCIN DNA AMP PROBE: CPT

## 2020-01-01 PROCEDURE — 5A1935Z RESPIRATORY VENTILATION, LESS THAN 24 CONSECUTIVE HOURS: ICD-10-PCS | Performed by: RADIOLOGY

## 2020-01-01 PROCEDURE — 70450 CT HEAD/BRAIN W/O DYE: CPT

## 2020-01-01 PROCEDURE — 0DH67UZ INSERTION OF FEEDING DEVICE INTO STOMACH, VIA NATURAL OR ARTIFICIAL OPENING: ICD-10-PCS | Performed by: RADIOLOGY

## 2020-01-01 PROCEDURE — 6360000002 HC RX W HCPCS: Performed by: NURSE PRACTITIONER

## 2020-01-01 PROCEDURE — 94660 CPAP INITIATION&MGMT: CPT

## 2020-01-01 PROCEDURE — 71045 X-RAY EXAM CHEST 1 VIEW: CPT

## 2020-01-01 PROCEDURE — 3600000015 HC SURGERY LEVEL 5 ADDTL 15MIN: Performed by: ORTHOPAEDIC SURGERY

## 2020-01-01 PROCEDURE — 6360000002 HC RX W HCPCS: Performed by: NURSE ANESTHETIST, CERTIFIED REGISTERED

## 2020-01-01 PROCEDURE — 80048 BASIC METABOLIC PNL TOTAL CA: CPT

## 2020-01-01 PROCEDURE — 94002 VENT MGMT INPAT INIT DAY: CPT

## 2020-01-01 PROCEDURE — 6360000002 HC RX W HCPCS: Performed by: INTERNAL MEDICINE

## 2020-01-01 PROCEDURE — 80162 ASSAY OF DIGOXIN TOTAL: CPT

## 2020-01-01 PROCEDURE — 86901 BLOOD TYPING SEROLOGIC RH(D): CPT

## 2020-01-01 PROCEDURE — 6370000000 HC RX 637 (ALT 250 FOR IP): Performed by: PHYSICIAN ASSISTANT

## 2020-01-01 PROCEDURE — 0BH17EZ INSERTION OF ENDOTRACHEAL AIRWAY INTO TRACHEA, VIA NATURAL OR ARTIFICIAL OPENING: ICD-10-PCS | Performed by: RADIOLOGY

## 2020-01-01 PROCEDURE — 85610 PROTHROMBIN TIME: CPT

## 2020-01-01 PROCEDURE — 2709999900 HC NON-CHARGEABLE SUPPLY

## 2020-01-01 PROCEDURE — P9016 RBC LEUKOCYTES REDUCED: HCPCS

## 2020-01-01 PROCEDURE — 94761 N-INVAS EAR/PLS OXIMETRY MLT: CPT

## 2020-01-01 PROCEDURE — 86850 RBC ANTIBODY SCREEN: CPT

## 2020-01-01 PROCEDURE — 93005 ELECTROCARDIOGRAM TRACING: CPT | Performed by: NURSE PRACTITIONER

## 2020-01-01 PROCEDURE — 36600 WITHDRAWAL OF ARTERIAL BLOOD: CPT

## 2020-01-01 PROCEDURE — 86923 COMPATIBILITY TEST ELECTRIC: CPT

## 2020-01-01 PROCEDURE — 2580000003 HC RX 258: Performed by: NURSE PRACTITIONER

## 2020-01-01 PROCEDURE — 2500000003 HC RX 250 WO HCPCS: Performed by: NURSE ANESTHETIST, CERTIFIED REGISTERED

## 2020-01-01 PROCEDURE — 99221 1ST HOSP IP/OBS SF/LOW 40: CPT | Performed by: NURSE PRACTITIONER

## 2020-01-01 PROCEDURE — 2720000010 HC SURG SUPPLY STERILE: Performed by: ORTHOPAEDIC SURGERY

## 2020-01-01 PROCEDURE — 82948 REAGENT STRIP/BLOOD GLUCOSE: CPT

## 2020-01-01 PROCEDURE — 85027 COMPLETE CBC AUTOMATED: CPT

## 2020-01-01 PROCEDURE — 73502 X-RAY EXAM HIP UNI 2-3 VIEWS: CPT

## 2020-01-01 PROCEDURE — 2700000000 HC OXYGEN THERAPY PER DAY

## 2020-01-01 PROCEDURE — 36415 COLL VENOUS BLD VENIPUNCTURE: CPT

## 2020-01-01 PROCEDURE — 2000000000 HC ICU R&B

## 2020-01-01 PROCEDURE — C1776 JOINT DEVICE (IMPLANTABLE): HCPCS | Performed by: ORTHOPAEDIC SURGERY

## 2020-01-01 PROCEDURE — 7100000000 HC PACU RECOVERY - FIRST 15 MIN: Performed by: ORTHOPAEDIC SURGERY

## 2020-01-01 PROCEDURE — 99232 SBSQ HOSP IP/OBS MODERATE 35: CPT | Performed by: INTERNAL MEDICINE

## 2020-01-01 PROCEDURE — 2709999900 HC NON-CHARGEABLE SUPPLY: Performed by: ORTHOPAEDIC SURGERY

## 2020-01-01 PROCEDURE — 2580000003 HC RX 258: Performed by: PHYSICIAN ASSISTANT

## 2020-01-01 PROCEDURE — 85730 THROMBOPLASTIN TIME PARTIAL: CPT

## 2020-01-01 PROCEDURE — 0SRR0JZ REPLACEMENT OF RIGHT HIP JOINT, FEMORAL SURFACE WITH SYNTHETIC SUBSTITUTE, OPEN APPROACH: ICD-10-PCS | Performed by: ORTHOPAEDIC SURGERY

## 2020-01-01 PROCEDURE — 87081 CULTURE SCREEN ONLY: CPT

## 2020-01-01 PROCEDURE — 3700000001 HC ADD 15 MINUTES (ANESTHESIA): Performed by: ORTHOPAEDIC SURGERY

## 2020-01-01 PROCEDURE — 87641 MR-STAPH DNA AMP PROBE: CPT

## 2020-01-01 DEVICE — TANDEM UNIPOLAR 12/14 TAPER SLEEVE +4
Type: IMPLANTABLE DEVICE | Status: FUNCTIONAL
Brand: TANDEM

## 2020-01-01 DEVICE — TANDEM UNIPOLAR HEAD 48MM
Type: IMPLANTABLE DEVICE | Status: FUNCTIONAL
Brand: TANDEM

## 2020-01-01 DEVICE — POLARSTEM COLLAR STANDARD                                    NON-CEMENTED WITH TI/HA 3
Type: IMPLANTABLE DEVICE | Status: FUNCTIONAL
Brand: POLARSTEM

## 2020-01-01 RX ORDER — LEVOTHYROXINE SODIUM 0.07 MG/1
75 TABLET ORAL DAILY
Status: DISCONTINUED | OUTPATIENT
Start: 2020-01-02 | End: 2020-01-06 | Stop reason: HOSPADM

## 2020-01-01 RX ORDER — CHLORHEXIDINE GLUCONATE 0.12 MG/ML
15 RINSE ORAL 2 TIMES DAILY
Status: DISCONTINUED | OUTPATIENT
Start: 2020-01-01 | End: 2020-01-02

## 2020-01-01 RX ORDER — LIDOCAINE HCL/PF 100 MG/5ML
SYRINGE (ML) INJECTION PRN
Status: DISCONTINUED | OUTPATIENT
Start: 2020-01-01 | End: 2020-01-01 | Stop reason: SDUPTHER

## 2020-01-01 RX ORDER — MEPERIDINE HYDROCHLORIDE 25 MG/ML
12.5 INJECTION INTRAMUSCULAR; INTRAVENOUS; SUBCUTANEOUS EVERY 5 MIN PRN
Status: DISCONTINUED | OUTPATIENT
Start: 2020-01-01 | End: 2020-01-01 | Stop reason: HOSPADM

## 2020-01-01 RX ORDER — ROPINIROLE 0.25 MG/1
0.25 TABLET, FILM COATED ORAL 2 TIMES DAILY
Status: DISCONTINUED | OUTPATIENT
Start: 2020-01-02 | End: 2020-01-06 | Stop reason: HOSPADM

## 2020-01-01 RX ORDER — POLYETHYLENE GLYCOL 3350 17 G/17G
17 POWDER, FOR SOLUTION ORAL DAILY
Status: DISCONTINUED | OUTPATIENT
Start: 2020-01-02 | End: 2020-01-06 | Stop reason: HOSPADM

## 2020-01-01 RX ORDER — DIGOXIN 125 MCG
125 TABLET ORAL DAILY
Status: DISCONTINUED | OUTPATIENT
Start: 2020-01-02 | End: 2020-01-06 | Stop reason: HOSPADM

## 2020-01-01 RX ORDER — FAMOTIDINE 20 MG/1
20 TABLET, FILM COATED ORAL 2 TIMES DAILY
Status: DISCONTINUED | OUTPATIENT
Start: 2020-01-01 | End: 2020-01-02

## 2020-01-01 RX ORDER — PROPOFOL 10 MG/ML
10 INJECTION, EMULSION INTRAVENOUS CONTINUOUS
Status: DISCONTINUED | OUTPATIENT
Start: 2020-01-01 | End: 2020-01-02

## 2020-01-01 RX ORDER — DEXAMETHASONE SODIUM PHOSPHATE 4 MG/ML
INJECTION, SOLUTION INTRA-ARTICULAR; INTRALESIONAL; INTRAMUSCULAR; INTRAVENOUS; SOFT TISSUE PRN
Status: DISCONTINUED | OUTPATIENT
Start: 2020-01-01 | End: 2020-01-01 | Stop reason: SDUPTHER

## 2020-01-01 RX ORDER — 0.9 % SODIUM CHLORIDE 0.9 %
500 INTRAVENOUS SOLUTION INTRAVENOUS ONCE
Status: COMPLETED | OUTPATIENT
Start: 2020-01-01 | End: 2020-01-02

## 2020-01-01 RX ORDER — LACTOBACILLUS RHAMNOSUS GG 10B CELL
1 CAPSULE ORAL 3 TIMES DAILY
Status: DISCONTINUED | OUTPATIENT
Start: 2020-01-02 | End: 2020-01-06 | Stop reason: HOSPADM

## 2020-01-01 RX ORDER — PROPOFOL 10 MG/ML
INJECTION, EMULSION INTRAVENOUS PRN
Status: DISCONTINUED | OUTPATIENT
Start: 2020-01-01 | End: 2020-01-01 | Stop reason: SDUPTHER

## 2020-01-01 RX ORDER — FENTANYL CITRATE 50 UG/ML
50 INJECTION, SOLUTION INTRAMUSCULAR; INTRAVENOUS EVERY 5 MIN PRN
Status: DISCONTINUED | OUTPATIENT
Start: 2020-01-01 | End: 2020-01-01 | Stop reason: HOSPADM

## 2020-01-01 RX ORDER — HYDRALAZINE HYDROCHLORIDE 20 MG/ML
5 INJECTION INTRAMUSCULAR; INTRAVENOUS EVERY 10 MIN PRN
Status: DISCONTINUED | OUTPATIENT
Start: 2020-01-01 | End: 2020-01-01 | Stop reason: HOSPADM

## 2020-01-01 RX ORDER — ROCURONIUM BROMIDE 10 MG/ML
INJECTION, SOLUTION INTRAVENOUS PRN
Status: DISCONTINUED | OUTPATIENT
Start: 2020-01-01 | End: 2020-01-01 | Stop reason: SDUPTHER

## 2020-01-01 RX ORDER — MORPHINE SULFATE 2 MG/ML
2 INJECTION, SOLUTION INTRAMUSCULAR; INTRAVENOUS EVERY 5 MIN PRN
Status: DISCONTINUED | OUTPATIENT
Start: 2020-01-01 | End: 2020-01-01 | Stop reason: HOSPADM

## 2020-01-01 RX ORDER — ONDANSETRON 2 MG/ML
INJECTION INTRAMUSCULAR; INTRAVENOUS PRN
Status: DISCONTINUED | OUTPATIENT
Start: 2020-01-01 | End: 2020-01-01 | Stop reason: SDUPTHER

## 2020-01-01 RX ORDER — GLYCOPYRROLATE 1 MG/5 ML
SYRINGE (ML) INTRAVENOUS PRN
Status: DISCONTINUED | OUTPATIENT
Start: 2020-01-01 | End: 2020-01-01 | Stop reason: SDUPTHER

## 2020-01-01 RX ORDER — 0.9 % SODIUM CHLORIDE 0.9 %
500 INTRAVENOUS SOLUTION INTRAVENOUS ONCE
Status: COMPLETED | OUTPATIENT
Start: 2020-01-01 | End: 2020-01-01

## 2020-01-01 RX ORDER — FENTANYL CITRATE 50 UG/ML
25 INJECTION, SOLUTION INTRAMUSCULAR; INTRAVENOUS
Status: DISCONTINUED | OUTPATIENT
Start: 2020-01-01 | End: 2020-01-02

## 2020-01-01 RX ORDER — METOPROLOL TARTRATE 5 MG/5ML
INJECTION INTRAVENOUS PRN
Status: DISCONTINUED | OUTPATIENT
Start: 2020-01-01 | End: 2020-01-01 | Stop reason: SDUPTHER

## 2020-01-01 RX ORDER — CEFAZOLIN SODIUM 1 G/3ML
INJECTION, POWDER, FOR SOLUTION INTRAMUSCULAR; INTRAVENOUS PRN
Status: DISCONTINUED | OUTPATIENT
Start: 2020-01-01 | End: 2020-01-01 | Stop reason: SDUPTHER

## 2020-01-01 RX ORDER — FENTANYL CITRATE 50 UG/ML
INJECTION, SOLUTION INTRAMUSCULAR; INTRAVENOUS PRN
Status: DISCONTINUED | OUTPATIENT
Start: 2020-01-01 | End: 2020-01-01 | Stop reason: SDUPTHER

## 2020-01-01 RX ORDER — ONDANSETRON 2 MG/ML
4 INJECTION INTRAMUSCULAR; INTRAVENOUS
Status: DISCONTINUED | OUTPATIENT
Start: 2020-01-01 | End: 2020-01-01 | Stop reason: HOSPADM

## 2020-01-01 RX ORDER — FERROUS SULFATE 325(65) MG
325 TABLET ORAL
Status: DISCONTINUED | OUTPATIENT
Start: 2020-01-02 | End: 2020-01-06 | Stop reason: HOSPADM

## 2020-01-01 RX ORDER — NEOSTIGMINE METHYLSULFATE 5 MG/5 ML
SYRINGE (ML) INTRAVENOUS PRN
Status: DISCONTINUED | OUTPATIENT
Start: 2020-01-01 | End: 2020-01-01 | Stop reason: SDUPTHER

## 2020-01-01 RX ORDER — DOCUSATE SODIUM 100 MG/1
100 CAPSULE, LIQUID FILLED ORAL 2 TIMES DAILY
Status: DISCONTINUED | OUTPATIENT
Start: 2020-01-02 | End: 2020-01-06 | Stop reason: HOSPADM

## 2020-01-01 RX ORDER — DIPHENHYDRAMINE HYDROCHLORIDE 50 MG/ML
12.5 INJECTION INTRAMUSCULAR; INTRAVENOUS
Status: DISCONTINUED | OUTPATIENT
Start: 2020-01-01 | End: 2020-01-01 | Stop reason: HOSPADM

## 2020-01-01 RX ADMIN — ROCURONIUM BROMIDE 30 MG: 10 INJECTION INTRAVENOUS at 16:25

## 2020-01-01 RX ADMIN — CEFAZOLIN 2000 MG: 1 INJECTION, POWDER, FOR SOLUTION INTRAMUSCULAR; INTRAVENOUS; PARENTERAL at 16:39

## 2020-01-01 RX ADMIN — ONDANSETRON HYDROCHLORIDE 4 MG: 4 INJECTION, SOLUTION INTRAMUSCULAR; INTRAVENOUS at 16:38

## 2020-01-01 RX ADMIN — Medication 80 MG: at 16:25

## 2020-01-01 RX ADMIN — ALBUTEROL SULFATE 2.5 MG: 2.5 SOLUTION RESPIRATORY (INHALATION) at 12:15

## 2020-01-01 RX ADMIN — METOPROLOL TARTRATE 2.5 MG: 5 INJECTION, SOLUTION INTRAVENOUS at 17:09

## 2020-01-01 RX ADMIN — ROCURONIUM BROMIDE 20 MG: 10 INJECTION INTRAVENOUS at 16:45

## 2020-01-01 RX ADMIN — Medication 2 PUFF: at 05:09

## 2020-01-01 RX ADMIN — ALBUTEROL SULFATE 2.5 MG: 2.5 SOLUTION RESPIRATORY (INHALATION) at 08:25

## 2020-01-01 RX ADMIN — PROPOFOL 130 MG: 10 INJECTION, EMULSION INTRAVENOUS at 16:25

## 2020-01-01 RX ADMIN — SODIUM CHLORIDE 500 ML: 9 INJECTION, SOLUTION INTRAVENOUS at 23:54

## 2020-01-01 RX ADMIN — SODIUM CHLORIDE: 9 INJECTION, SOLUTION INTRAVENOUS at 03:39

## 2020-01-01 RX ADMIN — SODIUM CHLORIDE: 9 INJECTION, SOLUTION INTRAVENOUS at 13:49

## 2020-01-01 RX ADMIN — ALBUTEROL SULFATE 2.5 MG: 2.5 SOLUTION RESPIRATORY (INHALATION) at 05:09

## 2020-01-01 RX ADMIN — FENTANYL CITRATE 25 MCG: 50 INJECTION INTRAMUSCULAR; INTRAVENOUS at 16:48

## 2020-01-01 RX ADMIN — HYDROCODONE BITARTRATE AND ACETAMINOPHEN 1 TABLET: 5; 325 TABLET ORAL at 03:38

## 2020-01-01 RX ADMIN — PROPOFOL 35 MCG/KG/MIN: 10 INJECTION, EMULSION INTRAVENOUS at 23:55

## 2020-01-01 RX ADMIN — DEXAMETHASONE SODIUM PHOSPHATE 10 MG: 4 INJECTION, SOLUTION INTRAMUSCULAR; INTRAVENOUS at 16:38

## 2020-01-01 RX ADMIN — TIOTROPIUM BROMIDE 18 MCG: 18 CAPSULE ORAL; RESPIRATORY (INHALATION) at 05:09

## 2020-01-01 RX ADMIN — Medication 4 MG: at 18:16

## 2020-01-01 RX ADMIN — METOPROLOL TARTRATE 2.5 MG: 5 INJECTION, SOLUTION INTRAVENOUS at 17:14

## 2020-01-01 RX ADMIN — SODIUM CHLORIDE 500 ML: 9 INJECTION, SOLUTION INTRAVENOUS at 23:39

## 2020-01-01 RX ADMIN — FENTANYL CITRATE 50 MCG: 50 INJECTION INTRAMUSCULAR; INTRAVENOUS at 17:22

## 2020-01-01 RX ADMIN — Medication 0.6 MG: at 18:16

## 2020-01-01 RX ADMIN — FENTANYL CITRATE 25 MCG: 50 INJECTION INTRAMUSCULAR; INTRAVENOUS at 16:37

## 2020-01-01 ASSESSMENT — PULMONARY FUNCTION TESTS
PIF_VALUE: 1
PIF_VALUE: 32
PIF_VALUE: 33
PIF_VALUE: 18
PIF_VALUE: 18
PIF_VALUE: 32
PIF_VALUE: 18
PIF_VALUE: 22
PIF_VALUE: 37
PIF_VALUE: 1
PIF_VALUE: 22
PIF_VALUE: 35
PIF_VALUE: 32
PIF_VALUE: 18
PIF_VALUE: 31
PIF_VALUE: 31
PIF_VALUE: 34
PIF_VALUE: 12
PIF_VALUE: 35
PIF_VALUE: 3
PIF_VALUE: 18
PIF_VALUE: 21
PIF_VALUE: 19
PIF_VALUE: 3
PIF_VALUE: 34
PIF_VALUE: 22
PIF_VALUE: 32
PIF_VALUE: 21
PIF_VALUE: 11
PIF_VALUE: 36
PIF_VALUE: 34
PIF_VALUE: 30
PIF_VALUE: 18
PIF_VALUE: 23
PIF_VALUE: 33
PIF_VALUE: 4
PIF_VALUE: 36
PIF_VALUE: 32
PIF_VALUE: 18
PIF_VALUE: 15
PIF_VALUE: 32
PIF_VALUE: 33
PIF_VALUE: 2
PIF_VALUE: 5
PIF_VALUE: 31
PIF_VALUE: 34
PIF_VALUE: 2
PIF_VALUE: 19
PIF_VALUE: 35
PIF_VALUE: 6
PIF_VALUE: 35
PIF_VALUE: 30
PIF_VALUE: 33
PIF_VALUE: 30
PIF_VALUE: 32
PIF_VALUE: 32
PIF_VALUE: 34
PIF_VALUE: 33
PIF_VALUE: 35
PIF_VALUE: 33
PIF_VALUE: 19
PIF_VALUE: 32
PIF_VALUE: 31
PIF_VALUE: 16
PIF_VALUE: 34
PIF_VALUE: 18
PIF_VALUE: 19
PIF_VALUE: 35
PIF_VALUE: 27
PIF_VALUE: 36
PIF_VALUE: 32
PIF_VALUE: 35
PIF_VALUE: 32
PIF_VALUE: 10
PIF_VALUE: 10
PIF_VALUE: 33
PIF_VALUE: 32
PIF_VALUE: 3
PIF_VALUE: 2
PIF_VALUE: 35
PIF_VALUE: 33
PIF_VALUE: 3
PIF_VALUE: 32
PIF_VALUE: 19
PIF_VALUE: 33
PIF_VALUE: 26
PIF_VALUE: 1
PIF_VALUE: 33
PIF_VALUE: 10
PIF_VALUE: 34
PIF_VALUE: 8
PIF_VALUE: 19
PIF_VALUE: 19
PIF_VALUE: 32
PIF_VALUE: 3
PIF_VALUE: 13
PIF_VALUE: 33
PIF_VALUE: 15
PIF_VALUE: 34
PIF_VALUE: 32
PIF_VALUE: 16
PIF_VALUE: 32
PIF_VALUE: 18
PIF_VALUE: 34
PIF_VALUE: 10
PIF_VALUE: 35
PIF_VALUE: 36
PIF_VALUE: 1
PIF_VALUE: 33
PIF_VALUE: 11
PIF_VALUE: 26
PIF_VALUE: 35
PIF_VALUE: 35
PIF_VALUE: 21
PIF_VALUE: 32
PIF_VALUE: 30
PIF_VALUE: 15
PIF_VALUE: 35
PIF_VALUE: 18
PIF_VALUE: 32
PIF_VALUE: 18
PIF_VALUE: 17
PIF_VALUE: 30
PIF_VALUE: 32
PIF_VALUE: 34
PIF_VALUE: 33
PIF_VALUE: 35
PIF_VALUE: 18
PIF_VALUE: 11
PIF_VALUE: 34
PIF_VALUE: 17
PIF_VALUE: 19
PIF_VALUE: 1
PIF_VALUE: 19
PIF_VALUE: 30
PIF_VALUE: 3
PIF_VALUE: 37
PIF_VALUE: 1
PIF_VALUE: 35
PIF_VALUE: 3
PIF_VALUE: 3
PIF_VALUE: 1
PIF_VALUE: 35
PIF_VALUE: 32
PIF_VALUE: 32
PIF_VALUE: 14
PIF_VALUE: 33
PIF_VALUE: 10
PIF_VALUE: 2
PIF_VALUE: 32

## 2020-01-01 ASSESSMENT — PAIN DESCRIPTION - ORIENTATION: ORIENTATION: RIGHT

## 2020-01-01 ASSESSMENT — ENCOUNTER SYMPTOMS: SHORTNESS OF BREATH: 1

## 2020-01-01 ASSESSMENT — PAIN SCALES - GENERAL
PAINLEVEL_OUTOF10: 9
PAINLEVEL_OUTOF10: 5
PAINLEVEL_OUTOF10: 4
PAINLEVEL_OUTOF10: 3
PAINLEVEL_OUTOF10: 9

## 2020-01-01 ASSESSMENT — PAIN DESCRIPTION - PAIN TYPE: TYPE: ACUTE PAIN

## 2020-01-01 ASSESSMENT — PAIN DESCRIPTION - ONSET: ONSET: ON-GOING

## 2020-01-01 ASSESSMENT — PAIN DESCRIPTION - DESCRIPTORS: DESCRIPTORS: ACHING

## 2020-01-01 ASSESSMENT — PAIN DESCRIPTION - LOCATION: LOCATION: LEG

## 2020-01-01 ASSESSMENT — PAIN DESCRIPTION - PROGRESSION: CLINICAL_PROGRESSION: GRADUALLY WORSENING

## 2020-01-01 ASSESSMENT — PAIN - FUNCTIONAL ASSESSMENT: PAIN_FUNCTIONAL_ASSESSMENT: PREVENTS OR INTERFERES SOME ACTIVE ACTIVITIES AND ADLS

## 2020-01-01 ASSESSMENT — PAIN DESCRIPTION - FREQUENCY: FREQUENCY: CONTINUOUS

## 2020-01-01 NOTE — PROGRESS NOTES
Hospitalist Progress Note    Patient:  Brie Rodríguez      Unit/Bed:7K-02/002-A    YOB: 1945    MRN: [id number]       Acct: [de-identified]     PCP: JESUS Estrella - CNP    Date of Admission: 12/30/2019    Assessment/Plan:    Right subcapital femoral neck fracture with mild displacement: Orthopedics on board, planning for surgery today      Left lung cancer-2016 adenocarcinoma status post stereotactic ablative radiation therapy on the left lung, 2017  -New nodules noted in 2017 October and chemotherapy stopped due to side effects and restarted again  -    Recurrent metastatic lung cancer, non small cell: Currently on chemotherapy  -Had right lower lobectomy in 2012, status post chemo and radiation-adenocarcinoma  -    Mildly elevated troponin, unclear clinical significance, no chest pain or palpitations, - repeat troponin normal  EKG normal sinus rhythm no significant ST-T changes    Anemia macrocytic: Monitor    Thrombocytopenia? From chemo monitor    Pulmonary embolism 11/4/2019-on anticoagulation with Xarelto, held, will stop heparin drip low dose for now    Essential hypertension on metoprolol, Lasix as needed with episodes of hypotension:      Paroxysmal atrial fibrillation: Continue metoprolol, anticoagulation held, on digoxin     Chronic respiratory failure, hypoxia: 2 L at rest and 6 L on oxygen     Acquired hypothyroidism: On levothyroxine    COPD without exacerbation bronchodilators:      Chronic low back pain: Chronic narcotic dependent,     Chronic diastolic CHF: No evidence of volume overload, continue metoprolol, Lasix   Overactive bladder not on any medications currently. -     Restless leg syndrome on ropinirole    History of pericardial effusion status post pericardiocentesis-July 2019, 700 mL negative for malignant cells    Expected discharge date:  3-4 days    Disposition:    [] Home       [] TCU       [] Rehab       [] Psych       [x] SNF       [] [facility] protocol   Other RX Placeholder    albuterol  2.5 mg Nebulization 4x daily    betamethasone dipropionate   Topical Daily    digoxin  125 mcg Oral Daily    ferrous sulfate  325 mg Oral Daily with breakfast    levothyroxine  75 mcg Oral Daily    metoprolol tartrate  25 mg Oral BID    mometasone-formoterol  2 puff Inhalation BID    pantoprazole  40 mg Oral QAM AC    potassium chloride  20 mEq Oral Daily    lactobacillus  1 capsule Oral TID    rOPINIRole  0.25 mg Oral BID    tiotropium  18 mcg Inhalation Daily     PRN Meds: HYDROcodone 5 mg - acetaminophen, hydrOXYzine, bisacodyl, sodium chloride flush, magnesium hydroxide, ondansetron, acetaminophen, albuterol sulfate HFA, furosemide, lidocaine, promethazine      Intake/Output Summary (Last 24 hours) at 1/1/2020 1220  Last data filed at 1/1/2020 0341  Gross per 24 hour   Intake 870.3 ml   Output 495 ml   Net 375.3 ml       Diet:  Diet NPO, After Midnight    Exam:  /61   Pulse 95   Temp 98 °F (36.7 °C) (Oral)   Resp 18   Ht 5' (1.524 m)   Wt 133 lb (60.3 kg)   SpO2 97%   BMI 25.97 kg/m²     Physical Examination:   General appearance - alert, awake  and in mild to moderate distress at rest, on oxygen  HEENT: Normocephalic, Atraumatic, pupils reactive, mucous membranes moist  Chest - moving equally to respiration, absent breath sounds on right base, decreased breath sounds otherwise clear  Heart - normal rate, regular rhythm, normal S1, S2, no murmurs,  Abdomen - soft, nontender, distended, no masses or organomegaly, BS+  Neurological - alert, oriented, normal speech, sensations intact and able to move all extremities , except right lower from pain in the hip  Extremities - peripheral pulses normal, +1 pedal edema,  Capillary refill less than 3 sec  Skin - normal coloration and turgor, no rashes        Labs:   Recent Labs     12/30/19 2000 12/31/19  0450   WBC 7.6 5.1   HGB 8.6* 8.5*   HCT 29.3* 28.8*   * 102*     Recent Labs 12/30/19 2000 12/31/19  0450 01/01/20  0500    142 137   K 4.5 4.2 4.4   CL 92* 97* 94*   CO2 34* 37* 36*   BUN 24* 22 18   CREATININE 0.8 0.7 0.7   CALCIUM 9.6 9.0 8.8     Recent Labs     12/30/19 2000   AST 15   ALT 8*   BILIDIR <0.2   BILITOT 0.5   ALKPHOS 63     No results for input(s): INR in the last 72 hours. No results for input(s): Rocha Deacon in the last 72 hours. Microbiology:      Urinalysis:      Lab Results   Component Value Date    NITRU NEGATIVE 12/30/2019    WBCUA 5-10 12/30/2019    BACTERIA FEW 12/30/2019    RBCUA 10-15 12/30/2019    BLOODU MODERATE 12/30/2019    SPECGRAV 1.013 10/17/2012    GLUCOSEU NEGATIVE 12/30/2019       Radiology:  XR CHEST PORTABLE   Final Result   1. Stable volume loss of the right hemithorax, correlate with prior lobectomy. 2. Unchanged appearance of diffuse coarse interstitial markings. Underlying changes of COPD are not excluded. **This report has been created using voice recognition software. It may contain minor errors which are inherent in voice recognition technology. **      Final report electronically signed by Dr. Armani Garcia on 12/30/2019 9:29 PM      XR HIP 2-3 VW W PELVIS RIGHT   Final Result    Subcapital right femoral neck fracture as discussed above. Mild bilateral hip joint DJD. Lumbar levoscoliosis. **This report has been created using voice recognition software. It may contain minor errors which are inherent in voice recognition technology. **      Final report electronically signed by Dr. Norris Guerrier on 12/30/2019 8:14 PM      XR KNEE RIGHT (MIN 4 VIEWS)   Final Result    No acute bone or joint abnormality. **This report has been created using voice recognition software. It may contain minor errors which are inherent in voice recognition technology. **      Final report electronically signed by Dr. Norris Guerrier on 12/30/2019 8:15 PM          DVT prophylaxis: [] Lovenox [x] SCDs                                 [] SQ Heparin                                 [] Encourage ambulation           [x] Already on Anticoagulation     Code Status: Full Code    Tele:   [x] yes             [] no    Active Hospital Problems    Diagnosis Date Noted    Pathologic fracture of femoral neck, right, initial encounter ([facility] [O84.186U] 12/30/2019    Fall [P30. XXXA] 12/30/2019    Chronic respiratory failure with hypercapnia (HCC) [J96.12]     Paroxysmal atrial fibrillation ([facility] [I48.0] 10/22/2018    Stage 3 severe COPD by GOLD classification ([facility] [J44.9]     Anemia [D64.9]     Physical deconditioning [R53.81]     Chronic midline low back pain without sciatica [M54.5, G89.29] 08/11/2017    H/O: lung cancer [Z85.118]     GERD (gastroesophageal reflux disease) [K21.9]     HTN (hypertension) [I10]     Metastatic lung carcinoma, left ([facility] [C78.02] 10/22/2012       Electronically signed by Irene Ochoa MD on 1/1/2020 at 12:20 PM

## 2020-01-01 NOTE — PLAN OF CARE
Problem: Falls - Risk of:  Goal: Will remain free from falls  Description  Will remain free from falls  1/1/2020 0215 by Yahaira rAaujo RN  Outcome: Ongoing  Note:   No falls this shift. Pt using call light appropriately to call for assistance with ambulation to the bathroom and to chair. Pt is also compliant with use of non-skid slippers. Pt reports understanding of fall prevention when discussed. Goal: Absence of physical injury  Description  Absence of physical injury  1/1/2020 0215 by Yahaira Araujo RN  Outcome: Ongoing  Note:   No physical injury this shift. Will continue to monitor. Problem: Pain:  Goal: Pain level will decrease  Description  Pain level will decrease  1/1/2020 0215 by Yahaira Araujo RN  Outcome: Ongoing  Note:   Pt report pain at 6-9 on scale. Pt states oral medication helping to achieve pain goal of a 6 on scale. Goal: Control of acute pain  Description  Control of acute pain  1/1/2020 0215 by Yahaira Araujo RN  Outcome: Ongoing  Note:   Pt report pain at 6-9 on scale. Pt states oral medication helping to achieve pain goal of a 6 on scale. Goal: Control of chronic pain  Description  Control of chronic pain  1/1/2020 0215 by Yahaira Araujo RN  Outcome: Ongoing  Note:   Pt report pain at 6-9 on scale. Pt states oral medication helping to achieve pain goal of a 6 on scale. Problem: Neurological  Goal: Maximum potential motor/sensory/cognitive function  1/1/2020 0215 by Yahaira Araujo RN  Outcome: Ongoing  Note:   Pt alert and oriented x4. Denies numbing and tingling. Problem: Cardiovascular  Goal: No DVT, peripheral vascular complications  9/6/7329 [id number] by Yahaira Araujo RN  Outcome: Ongoing  Note:   Pt without s/s of DVT. Pt able to take prescribed anticoagulants and SCD,S in place to help prevent development of DVT.       Problem: Respiratory  Goal: No pulmonary complications  4/2/4216 [id number] by Yahaira Araujo ulcers       Problem: Impaired respiratory status  Goal: Lung sounds clear or within normal limits for patient  1/1/2020 0215 by Brayden Harley RN  Outcome: Ongoing  Note:   Pt on 3L of O2 via nasal cannula, which is her baseline, at 91-97%. Patient encouraged to turn, cough, and deep breathe every two hours and as needed. Patient encouraged to use incentive spirometer 10 times every hour while awake. Care plan reviewed with patient. Patient verbalizes understanding of the plan of care and contributes to goal setting.

## 2020-01-01 NOTE — PROGRESS NOTES
Patient in OR at this time, hold on Trauma Services consult at this time, will re-attempt tomorrow.      MANOJ Wyatt

## 2020-01-01 NOTE — PLAN OF CARE
Problem: Falls - Risk of:  Goal: Will remain free from falls  Description  Will remain free from falls  1/1/2020 1404 by Cristian Griffiths RN  Outcome: Ongoing  Note:   Patient has remained free of falls during this shift. Appropriate fall prevention measures in place. Patient is compliant with using call light for assistance when needed. Problem: Falls - Risk of:  Goal: Absence of physical injury  Description  Absence of physical injury  1/1/2020 1404 by Cristian Griffiths RN  Outcome: Ongoing  Note:   Patient has remained free of physical injury during this shift. Safe environment provided, call light within reach, and hourly rounding completed. Problem: Pain:  Goal: Pain level will decrease  Description  Pain level will decrease  1/1/2020 1404 by Cristian Griffiths RN  Outcome: Ongoing  Note:   Patient complains of pain in the right leg and hip with a rating of 5-6 on 0-10 scale. PRN pain medication given along with ice and repositioning. Patient's stated pain goal is 4. Problem: Pain:  Goal: Control of acute pain  Description  Control of acute pain  1/1/2020 1404 by Cristian Griffiths RN  Outcome: Ongoing  Note:   Patient complains of pain in the right leg and hip with a rating of 5-6 on 0-10 scale. PRN pain medication given along with ice and repositioning. Patient's stated pain goal is 4. Problem: Neurological  Goal: Maximum potential motor/sensory/cognitive function  1/1/2020 1404 by Cristian Griffiths RN  Outcome: Ongoing  Note:   Patient is alert and oriented x 4. Neuro assessment within normal limits. Problem: Cardiovascular  Goal: No DVT, peripheral vascular complications  4/6/7759 1750 by Cristian Griffiths RN  Outcome: Ongoing  Note:   No s/sx of DVT during this shift. Heparin drip held until surgery. Problem: Respiratory  Goal: No pulmonary complications  3/6/0228 3655 by Cristian Griffiths RN  Outcome: Ongoing  Note:   Lung sounds clear throughout.  O2 sats remain greater than 90% on 3 L via NC. Patient encouraged to cough and deep breath. Problem: GI  Goal: No bowel complications  9/2/1064 4693 by Robe Torres RN  Outcome: Ongoing  Note:   Bowel sounds active x 4. Patient is passing gas. No BM during this shift. Problem:   Goal: Adequate urinary output  1/1/2020 1404 by Robe Torres RN  Outcome: Ongoing  Note:   Patient has voided adequate amounts of clear, yellow urine during this shift. Problem: Nutrition  Goal: Optimal nutrition therapy  1/1/2020 1404 by Robe Torres RN  Outcome: Ongoing  Note:   Patient is currently NPO for surgery. Problem: Skin Integrity/Risk  Goal: No skin breakdown during hospitalization  1/1/2020 1404 by Robe Torres RN  Outcome: Ongoing  Note:   No new skin issues noted during this shift. Problem: Musculor/Skeletal Functional Status  Goal: Highest potential functional level  1/1/2020 1404 by Robe Torres RN  Outcome: Ongoing  Note:   Patient is currently on bedrest.      Problem: Discharge Planning:  Goal: Discharged to appropriate level of care  Description  Discharged to appropriate level of care  1/1/2020 1404 by Robe Torres RN  Outcome: Ongoing  Note:   Discharge planning in progress. Patient is planning to return home with family at discharge. Case management assisting with discharge needs. Care plan reviewed with patient. Patient verbalize understanding of the plan of care and contribute to goal setting.

## 2020-01-01 NOTE — PLAN OF CARE
Problem: Impaired respiratory status  Goal: Lung sounds clear or within normal limits for patient  1/1/2020 7870 by Una Lockett RCP  Outcome: Ongoing  Note:    Patient lung sounds are considered normal for their current lung condition. No signs of distress noted.  Current treatment regimen appropriate

## 2020-01-01 NOTE — ANESTHESIA PRE PROCEDURE
(PROBIOTIC DAILY PO) Take 1 capsule by mouth 3 times daily   Yes Historical Provider, MD   potassium chloride (KLOR-CON M) 20 MEQ extended release tablet Take 1 tablet by mouth daily  Patient taking differently: Take 20 mEq by mouth three times a week  6/6/19  Yes Tonie Culp MD   albuterol sulfate HFA (PROAIR HFA) 108 (90 Base) MCG/ACT inhaler Inhale 2 puffs into the lungs every 6 hours as needed for Wheezing 5/8/19  Yes JESUS Estrella - CNP   furosemide (LASIX) 20 MG tablet Take 1 tablet by mouth daily as needed (leg swelling) 5/8/19  Yes Mohinder Nicole APRN - CNP   albuterol (PROVENTIL) (2.5 MG/3ML) 0.083% nebulizer solution Take 3 mLs by nebulization every 6 hours as needed for Wheezing or Shortness of Breath 5/6/19 5/5/20 Yes JESUS Maciel CNP   Handicap Placard MISC by Does not apply route Diagnosis: Malignant neoplasm of lung, unspecified laterality, unspecified part of lung ([facility] [address] [C34.90]  Expiration Date: 4/15/2020 4/15/19  Yes Tonie Culp MD   ferrous sulfate 325 (65 Fe) MG tablet One every other day take with food and vitamin C 500 mg  Patient taking differently: One every other day take with food. 2/28/19  Yes Jose Colorado MD   lidocaine-prilocaine (EMLA) 2.5-2.5 % cream Apply topically as needed for Pain Apply topically as needed. Yes Historical Provider, MD   mometasone-formoterol [facility]) 200-5 MCG/ACT inhaler Inhale 2 puffs into the lungs 2 times daily 12/11/17 4/15/20 Yes Amy Barcenas MD   fluticasone (FLONASE) 50 MCG/ACT nasal spray 1 spray by Nasal route daily 11/8/16  Yes Jeff Bell MD   betamethasone dipropionate (DIPROLENE) 0.05 % ointment Apply topically daily.  9/12/16  Yes Jeff Bell MD       Current medications:    Current Facility-Administered Medications   Medication Dose Route Frequency Provider Last Rate Last Dose    0.9 % sodium chloride infusion   Intravenous Continuous Celestino Baltazar PA-C 100 mL/hr at 01/01/20 0143  HYDROcodone-acetaminophen (NORCO) 5-325 MG per tablet 1 tablet  1 tablet Oral Q4H PRN Ju Linn PA-C   1 tablet at 01/01/20 6557    hydrOXYzine (VISTARIL) capsule 25 mg  25 mg Oral TID PRN Ju Linn PA-C   25 mg at 12/31/19 2116    docusate sodium (COLACE) capsule 100 mg  100 mg Oral BID Kari Calvert MD   100 mg at 12/31/19 2116    polyethylene glycol (GLYCOLAX) packet 17 g  17 g Oral Daily Kari Calvert MD   17 g at 12/31/19 1246    bisacodyl (DULCOLAX) suppository 10 mg  10 mg Rectal Daily PRN Kari Calvert MD        heparin 25,000 units in dextrose 5% 250 mL infusion  12 Units/kg/hr Intravenous Continuous Kari Calvert MD   Stopped at 01/01/20 0128    sodium chloride flush 0.9 % injection 10 mL  10 mL Intravenous 2 times per day Meryle Richelle, DO   10 mL at 12/31/19 2116    sodium chloride flush 0.9 % injection 10 mL  10 mL Intravenous PRN Meryle Richelle, DO        magnesium hydroxide (MILK OF MAGNESIA) 400 MG/5ML suspension 30 mL  30 mL Oral Daily PRN Meryle Richelle, DO        [facility] injection 4 mg  4 mg Intravenous Q6H PRN Meryle Richelle, DO   4 mg at 12/30/19 2248    acetaminophen (TYLENOL) tablet 650 mg  650 mg Oral Q4H PRN Meryle Richelle, DO        potassium replacement protocol   Other RX Placeholder Meryle Richelle, DO        albuterol (PROVENTIL) nebulizer solution 2.5 mg  2.5 mg Nebulization 4x daily Meryle Richelle, DO   2.5 mg at 01/01/20 1215    albuterol sulfate  (90 Base) MCG/ACT inhaler 2 puff  2 puff Inhalation Q6H PRN Meryle Richelle, DO        betamethasone dipropionate (DIPROLENE) 0.05 % ointment   Topical Daily Meryle Richelle, DO        digoxin (LANOXIN) tablet 125 mcg  125 mcg Oral Daily Meryle Richelle, DO   125 mcg at 12/31/19 6569    ferrous sulfate tablet 325 mg  325 mg Oral Daily with breakfast Meryle Richelle, DO   325 mg at 12/31/19 9899    furosemide (LASIX) tablet 20 mg  20 mg Oral Daily PRN Meryle Richelle, DO  lidocaine (LMX) 4 % cream   Topical PRN [place] Magic, DO        levothyroxine (SYNTHROID) tablet 75 mcg  75 mcg Oral Daily [place] Magic, DO   75 mcg at 12/31/19 0607    metoprolol tartrate (LOPRESSOR) tablet 25 mg  25 mg Oral BID [place] Magic, DO   25 mg at 12/31/19 2116    mometasone-formoterol (DULERA) 200-5 MCG/ACT inhaler 2 puff  2 puff Inhalation BID [place] Magic, DO   2 puff at 01/01/20 0509    pantoprazole (PROTONIX) tablet 40 mg  40 mg Oral QAM AC [place] Magic, DO   40 mg at 12/31/19 4021    potassium chloride (KLOR-CON M) extended release tablet 20 mEq  20 mEq Oral Daily [place] Magic, DO   20 mEq at 12/31/19 3822    lactobacillus (CULTURELLE) capsule 1 capsule  1 capsule Oral TID [place] Magic, DO   1 capsule at 12/31/19 2116    promethazine (PHENERGAN) tablet 25 mg  25 mg Oral Q8H PRN [place] Magic, DO        rOPINIRole (REQUIP) tablet 0.25 mg  0.25 mg Oral BID [place] Magic, DO   0.25 mg at 12/31/19 2116    tiotropium [facility]) inhalation capsule 18 mcg  18 mcg Inhalation Daily [place] Magic, DO   18 mcg at 01/01/20 3193       Allergies: Allergies   Allergen Reactions    Advil Cold & Sinus Liqui-Gels [Pseudoephedrine-Ibuprofen] Anaphylaxis    Food Anaphylaxis     mushrooms    Percocet [Oxycodone-Acetaminophen] Nausea Only    Sulfa Antibiotics Hives       Problem List:    Patient Active Problem List   Diagnosis Code    Metastatic lung carcinoma, left (HCC) C78.02    HTN (hypertension) I10    GERD (gastroesophageal reflux disease) K21.9    H/O: lung cancer Z85. 80    OAB (overactive bladder) N32.81    Nodule of left lung R91.1    Recurrent non-small cell lung cancer (NSCLC) ([facility]) C34.90    Acute on chronic respiratory failure with hypoxia ([facility]) J96.21    Orthostatic hypotension I95.1    Chronic midline low back pain without sciatica M54.5, G89.29    Pneumonia due to organism J18.9    Sinus tachycardia R00.0    Acute on chronic respiratory failure with hypoxemia (HCC) J96.21    Primary lung cancer, right (HCC) C34.91    Multifocal atrial tachycardia (HCC) I47.1    Anemia D64.9    Bilateral leg edema R60.0    Physical deconditioning R53.81    Stage 3 severe COPD by GOLD classification (HCC) J44.9    Recurrent left pleural effusion J90    Paroxysmal atrial fibrillation (HCC) I48.0    Squamous cell carcinoma of 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) M84.451A    Fall W19. Bayron Alfaro       Past Medical History:        Diagnosis Date    Arthritis     low back pain and scoliosis in lumbar    Cancer ([facility] [address] 2012    lower right lobe-lung s/p lobectomy in 2012, radiation and chemo nad later had mediatinal mets and had radationa dn chemo again, and in 2016 had reoccurence on the left side upper and was started on radiation this year and has  a follow up CT in oct 2017    CHF (congestive heart failure) (HCC)     Chronic bronchitis, simple (HCC)     Chronic respiratory failure with hypoxia ([facility] [address]     COPD (chronic obstructive pulmonary disease) (HCC)     GERD (gastroesophageal reflux disease)     HTN (hypertension)     Hx of blood clots     in lung     Pneumonia     Postprocedural pneumothorax     Scoliosis     Sleep apnea     Urinary incontinence     UTI (urinary tract infection)        Past Surgical History:        Procedure Laterality Date    LOBECTOMY  10/19/2012    rt lower lobectomy     LUNG BIOPSY  8/2012       Social History:    Social History     Tobacco Use    Smoking status: Former Smoker     Packs/day: 1.00     Years: 45.00     Pack years: 45.00     Types: Cigarettes     Last attempt to quit: 2007     Years since quittin.0    Smokeless tobacco: Never Used   Substance Use Topics    Alcohol use: No                                Counseling given: Not Answered      Vital Signs (Current):   Vitals:    20 0509 20 0519 20 0811 20 0825   BP:   129/61    Pulse:   95    Resp:     Temp:   98 °F (36.7 °C)    TempSrc:   Oral    SpO2: 92% 98% 95% 97%   Weight:       Height:                                                  BP Readings from Last 3 Encounters:   20 129/61   19 (!) 143/64   19 137/62       NPO Status:                                                                                 BMI:   Wt Readings from Last 3 Encounters:   19 133 lb (60.3 kg)   19 132 lb 12.8 oz (60.2 kg)   19 136 lb 12.8 oz (62.1 kg)     Body mass index is 25.97 kg/m². CBC:   Lab Results   Component Value Date    WBC 5.1 2019    RBC 2.88 2019    HGB 8.5 2019    HCT 28.8 2019    .0 2019    RDW 12.6 2019     2019       CMP:   Lab Results   Component Value Date     2020    K 4.4 2020    K 3.8 10/15/2018    CL 94 2020    CO2 36 2020    BUN 18 2020    CREATININE 0.7 2020    LABGLOM 82 2020    GLUCOSE 94 2020    PROT 7.4 2019    CALCIUM 8.8 2020    BILITOT 0.5 2019    ALKPHOS 63 2019    AST 15 2019    ALT 8 2019       POC Tests: No results for input(s): POCGLU, POCNA, POCK, POCCL, POCBUN, POCHEMO, POCHCT in the last 72 hours.     Coags:   Lab Results   Component Value Date    INR 1.00 2019    APTT 74.0 2020       HCG (If Applicable): No results found for: PREGTESTUR, PREGSERUM, HCG, HCGQUANT     ABGs: No results found for: PHART, PO2ART, LSB8DXD, XGJ9OKH, BEART, R2ZIZAIZ     Type & Screen (If Applicable):  Lab Results   Component Value Date    [address] POS 2019       Anesthesia Evaluation    Airway: Mallampati: II Mouth opening: > = 3 FB Dental:          Pulmonary:   (+) COPD:  shortness of breath:  sleep apnea:                             Cardiovascular:    (+) hypertension:, CHF:,       ECG reviewed  Rhythm: regular    Echocardiogram reviewed                  Neuro/Psych:               GI/Hepatic/Renal:   (+) GERD:,           Endo/Other:                     Abdominal:           Vascular:                                        Anesthesia Plan      general     ASA 3       Induction: intravenous. Anesthetic plan and risks discussed with patient. Plan discussed with CRNA.                   Armando Gipson MD   1/1/2020

## 2020-01-01 NOTE — PROGRESS NOTES
Heparin Consult  Lab Results   Component Value Date    APTT 74.0 01/01/2020     Lab Results   Component Value Date    HGB 8.5 12/31/2019    HCT 28.8 12/31/2019     12/31/2019    INR 1.00 07/18/2019       Current Rate: 12 units/kg/hr    Plan:  Bolus: none  Rate: Continue at 12 units/kg/hr  Next aPTT: 0600

## 2020-01-02 ENCOUNTER — APPOINTMENT (OUTPATIENT)
Dept: CT IMAGING | Age: 75
DRG: 469 | End: 2020-01-02
Payer: MEDICARE

## 2020-01-02 ENCOUNTER — TELEPHONE (OUTPATIENT)
Dept: ONCOLOGY | Age: 75
End: 2020-01-02

## 2020-01-02 ENCOUNTER — APPOINTMENT (OUTPATIENT)
Dept: GENERAL RADIOLOGY | Age: 75
DRG: 469 | End: 2020-01-02
Payer: MEDICARE

## 2020-01-02 LAB
ALLEN TEST: ABNORMAL
ALLEN TEST: ABNORMAL
ANION GAP SERPL CALCULATED.3IONS-SCNC: 8 MEQ/L (ref 8–16)
APTT: 28.1 SECONDS (ref 22–38)
BASE EXCESS (CALCULATED): 5.9 MMOL/L (ref -2.5–2.5)
BASE EXCESS (CALCULATED): 6.2 MMOL/L (ref -2.5–2.5)
BUN BLDV-MCNC: 19 MG/DL (ref 7–22)
CALCIUM SERPL-MCNC: 7.8 MG/DL (ref 8.5–10.5)
CHLORIDE BLD-SCNC: 103 MEQ/L (ref 98–111)
CO2: 29 MEQ/L (ref 23–33)
COLLECTED BY:: ABNORMAL
COLLECTED BY:: ABNORMAL
CREAT SERPL-MCNC: 0.8 MG/DL (ref 0.4–1.2)
DEVICE: ABNORMAL
DEVICE: ABNORMAL
EKG ATRIAL RATE: 82 BPM
EKG P AXIS: -11 DEGREES
EKG P-R INTERVAL: 168 MS
EKG Q-T INTERVAL: 360 MS
EKG QRS DURATION: 80 MS
EKG QTC CALCULATION (BAZETT): 420 MS
EKG R AXIS: 43 DEGREES
EKG T AXIS: 52 DEGREES
EKG VENTRICULAR RATE: 82 BPM
ERYTHROCYTE [DISTWIDTH] IN BLOOD BY AUTOMATED COUNT: 16.2 % (ref 11.5–14.5)
ERYTHROCYTE [DISTWIDTH] IN BLOOD BY AUTOMATED COUNT: 55.9 FL (ref 35–45)
GFR SERPL CREATININE-BSD FRML MDRD: 70 ML/MIN/1.73M2
GLUCOSE BLD-MCNC: 157 MG/DL (ref 70–108)
HCO3: 32 MMOL/L (ref 23–28)
HCO3: 32 MMOL/L (ref 23–28)
HCT VFR BLD CALC: 28.9 % (ref 37–47)
HEMOGLOBIN: 9 GM/DL (ref 12–16)
IFIO2: 30
IFIO2: 30
MCH RBC QN AUTO: 29.4 PG (ref 26–33)
MCHC RBC AUTO-ENTMCNC: 31.1 GM/DL (ref 32.2–35.5)
MCV RBC AUTO: 94.4 FL (ref 81–99)
MODE: ABNORMAL
MODE: ABNORMAL
MRSA SCREEN RT-PCR: NEGATIVE
O2 SATURATION: 94 %
O2 SATURATION: 95 %
PCO2: 53 MMHG (ref 35–45)
PCO2: 54 MMHG (ref 35–45)
PH BLOOD GAS: 7.38 (ref 7.35–7.45)
PH BLOOD GAS: 7.39 (ref 7.35–7.45)
PLATELET # BLD: 115 THOU/MM3 (ref 130–400)
PMV BLD AUTO: 10.4 FL (ref 9.4–12.4)
PO2: 72 MMHG (ref 71–104)
PO2: 77 MMHG (ref 71–104)
POTASSIUM SERPL-SCNC: 5 MEQ/L (ref 3.5–5.2)
RBC # BLD: 3.06 MILL/MM3 (ref 4.2–5.4)
SET PEEP: 6 MMHG
SET PEEP: 6 MMHG
SET PRESS SUPP: 10 CMH2O
SET PRESS SUPP: 10 CMH2O
SODIUM BLD-SCNC: 140 MEQ/L (ref 135–145)
SOURCE, BLOOD GAS: ABNORMAL
SOURCE, BLOOD GAS: ABNORMAL
VANCOMYCIN RESISTANT ENTEROCOCCUS: NEGATIVE
WBC # BLD: 10.5 THOU/MM3 (ref 4.8–10.8)

## 2020-01-02 PROCEDURE — 94770 HC ETCO2 MONITOR DAILY: CPT

## 2020-01-02 PROCEDURE — 6360000002 HC RX W HCPCS: Performed by: PHYSICIAN ASSISTANT

## 2020-01-02 PROCEDURE — 2580000003 HC RX 258: Performed by: PHYSICIAN ASSISTANT

## 2020-01-02 PROCEDURE — 6370000000 HC RX 637 (ALT 250 FOR IP): Performed by: PHYSICIAN ASSISTANT

## 2020-01-02 PROCEDURE — 2709999900 HC NON-CHARGEABLE SUPPLY

## 2020-01-02 PROCEDURE — 93010 ELECTROCARDIOGRAM REPORT: CPT | Performed by: INTERNAL MEDICINE

## 2020-01-02 PROCEDURE — 2700000000 HC OXYGEN THERAPY PER DAY

## 2020-01-02 PROCEDURE — 94640 AIRWAY INHALATION TREATMENT: CPT

## 2020-01-02 PROCEDURE — 36415 COLL VENOUS BLD VENIPUNCTURE: CPT

## 2020-01-02 PROCEDURE — 87086 URINE CULTURE/COLONY COUNT: CPT

## 2020-01-02 PROCEDURE — 36430 TRANSFUSION BLD/BLD COMPNT: CPT

## 2020-01-02 PROCEDURE — 2500000003 HC RX 250 WO HCPCS: Performed by: NURSE PRACTITIONER

## 2020-01-02 PROCEDURE — 80048 BASIC METABOLIC PNL TOTAL CA: CPT

## 2020-01-02 PROCEDURE — 71045 X-RAY EXAM CHEST 1 VIEW: CPT

## 2020-01-02 PROCEDURE — 94761 N-INVAS EAR/PLS OXIMETRY MLT: CPT

## 2020-01-02 PROCEDURE — 2580000003 HC RX 258: Performed by: NURSE PRACTITIONER

## 2020-01-02 PROCEDURE — 94003 VENT MGMT INPAT SUBQ DAY: CPT

## 2020-01-02 PROCEDURE — 36600 WITHDRAWAL OF ARTERIAL BLOOD: CPT

## 2020-01-02 PROCEDURE — 70450 CT HEAD/BRAIN W/O DYE: CPT

## 2020-01-02 PROCEDURE — 82803 BLOOD GASES ANY COMBINATION: CPT

## 2020-01-02 PROCEDURE — 6370000000 HC RX 637 (ALT 250 FOR IP): Performed by: NURSE PRACTITIONER

## 2020-01-02 PROCEDURE — 85027 COMPLETE CBC AUTOMATED: CPT

## 2020-01-02 PROCEDURE — P9016 RBC LEUKOCYTES REDUCED: HCPCS

## 2020-01-02 PROCEDURE — 2000000000 HC ICU R&B

## 2020-01-02 PROCEDURE — 99291 CRITICAL CARE FIRST HOUR: CPT | Performed by: INTERNAL MEDICINE

## 2020-01-02 RX ORDER — SODIUM CHLORIDE 0.9 % (FLUSH) 0.9 %
10 SYRINGE (ML) INJECTION PRN
Status: DISCONTINUED | OUTPATIENT
Start: 2020-01-02 | End: 2020-01-06 | Stop reason: HOSPADM

## 2020-01-02 RX ORDER — SODIUM CHLORIDE 0.9 % (FLUSH) 0.9 %
10 SYRINGE (ML) INJECTION EVERY 12 HOURS SCHEDULED
Status: DISCONTINUED | OUTPATIENT
Start: 2020-01-02 | End: 2020-01-06 | Stop reason: HOSPADM

## 2020-01-02 RX ORDER — ACETAMINOPHEN 500 MG
500 TABLET ORAL EVERY 6 HOURS
Status: DISCONTINUED | OUTPATIENT
Start: 2020-01-02 | End: 2020-01-06 | Stop reason: HOSPADM

## 2020-01-02 RX ORDER — VITAMIN B COMPLEX
5000 TABLET ORAL DAILY
Status: DISCONTINUED | OUTPATIENT
Start: 2020-01-02 | End: 2020-01-06 | Stop reason: HOSPADM

## 2020-01-02 RX ORDER — BETAMETHASONE DIPROPIONATE 0.5 MG/G
CREAM TOPICAL DAILY
Status: DISCONTINUED | OUTPATIENT
Start: 2020-01-02 | End: 2020-01-06 | Stop reason: HOSPADM

## 2020-01-02 RX ORDER — HYDROCODONE BITARTRATE AND ACETAMINOPHEN 10; 325 MG/1; MG/1
1 TABLET ORAL EVERY 6 HOURS PRN
Status: DISCONTINUED | OUTPATIENT
Start: 2020-01-02 | End: 2020-01-06 | Stop reason: HOSPADM

## 2020-01-02 RX ORDER — HYDROCODONE BITARTRATE AND ACETAMINOPHEN 10; 325 MG/1; MG/1
1 TABLET ORAL EVERY 6 HOURS PRN
Status: DISCONTINUED | OUTPATIENT
Start: 2020-01-02 | End: 2020-01-02

## 2020-01-02 RX ORDER — DOCUSATE SODIUM 100 MG/1
100 CAPSULE, LIQUID FILLED ORAL 2 TIMES DAILY
Status: DISCONTINUED | OUTPATIENT
Start: 2020-01-02 | End: 2020-01-02 | Stop reason: SDUPTHER

## 2020-01-02 RX ORDER — GABAPENTIN 300 MG/1
300 CAPSULE ORAL 3 TIMES DAILY
Status: DISCONTINUED | OUTPATIENT
Start: 2020-01-02 | End: 2020-01-06 | Stop reason: HOSPADM

## 2020-01-02 RX ORDER — 0.9 % SODIUM CHLORIDE 0.9 %
250 INTRAVENOUS SOLUTION INTRAVENOUS ONCE
Status: COMPLETED | OUTPATIENT
Start: 2020-01-02 | End: 2020-01-02

## 2020-01-02 RX ADMIN — DIGOXIN 125 MCG: 125 TABLET ORAL at 08:20

## 2020-01-02 RX ADMIN — GABAPENTIN 300 MG: 300 CAPSULE ORAL at 08:21

## 2020-01-02 RX ADMIN — HYDROCODONE BITARTRATE AND ACETAMINOPHEN 1 TABLET: 10; 325 TABLET ORAL at 17:48

## 2020-01-02 RX ADMIN — ACETAMINOPHEN 500 MG: 500 TABLET ORAL at 20:12

## 2020-01-02 RX ADMIN — Medication 2 G: at 17:24

## 2020-01-02 RX ADMIN — LEVOTHYROXINE SODIUM 75 MCG: 75 TABLET ORAL at 08:20

## 2020-01-02 RX ADMIN — POLYETHYLENE GLYCOL 3350 17 G: 17 POWDER, FOR SOLUTION ORAL at 08:20

## 2020-01-02 RX ADMIN — SODIUM CHLORIDE: 9 INJECTION, SOLUTION INTRAVENOUS at 22:06

## 2020-01-02 RX ADMIN — RIVAROXABAN 15 MG: 15 TABLET, FILM COATED ORAL at 08:20

## 2020-01-02 RX ADMIN — FAMOTIDINE 20 MG: 20 TABLET ORAL at 08:20

## 2020-01-02 RX ADMIN — Medication 10 ML: at 20:15

## 2020-01-02 RX ADMIN — SODIUM CHLORIDE: 9 INJECTION, SOLUTION INTRAVENOUS at 01:20

## 2020-01-02 RX ADMIN — Medication 15 ML: at 01:00

## 2020-01-02 RX ADMIN — Medication 1 CAPSULE: at 17:24

## 2020-01-02 RX ADMIN — FAMOTIDINE 20 MG: 20 TABLET ORAL at 01:00

## 2020-01-02 RX ADMIN — GABAPENTIN 300 MG: 300 CAPSULE ORAL at 20:12

## 2020-01-02 RX ADMIN — ROPINIROLE HYDROCHLORIDE 0.25 MG: 0.25 TABLET, FILM COATED ORAL at 20:12

## 2020-01-02 RX ADMIN — Medication 10 ML: at 09:30

## 2020-01-02 RX ADMIN — SODIUM CHLORIDE: 9 INJECTION, SOLUTION INTRAVENOUS at 10:47

## 2020-01-02 RX ADMIN — Medication 1 CAPSULE: at 08:20

## 2020-01-02 RX ADMIN — Medication 5 MCG/MIN: at 01:50

## 2020-01-02 RX ADMIN — Medication 1 CAPSULE: at 20:12

## 2020-01-02 RX ADMIN — FERROUS SULFATE TAB 325 MG (65 MG ELEMENTAL FE) 325 MG: 325 (65 FE) TAB at 08:20

## 2020-01-02 RX ADMIN — SODIUM CHLORIDE 250 ML: 9 INJECTION, SOLUTION INTRAVENOUS at 01:20

## 2020-01-02 RX ADMIN — Medication 2 G: at 08:16

## 2020-01-02 RX ADMIN — VITAMIN D, TAB 1000IU (100/BT) 5000 UNITS: 25 TAB at 08:21

## 2020-01-02 RX ADMIN — GABAPENTIN 300 MG: 300 CAPSULE ORAL at 17:24

## 2020-01-02 RX ADMIN — HYDROCODONE BITARTRATE AND ACETAMINOPHEN 1 TABLET: 10; 325 TABLET ORAL at 11:44

## 2020-01-02 RX ADMIN — Medication 2 PUFF: at 18:07

## 2020-01-02 RX ADMIN — RIVAROXABAN 15 MG: 15 TABLET, FILM COATED ORAL at 20:12

## 2020-01-02 RX ADMIN — ROPINIROLE HYDROCHLORIDE 0.25 MG: 0.25 TABLET, FILM COATED ORAL at 08:22

## 2020-01-02 RX ADMIN — Medication 15 ML: at 08:20

## 2020-01-02 RX ADMIN — DOCUSATE SODIUM 100 MG: 100 CAPSULE, LIQUID FILLED ORAL at 20:13

## 2020-01-02 RX ADMIN — ACETAMINOPHEN 500 MG: 500 TABLET ORAL at 08:28

## 2020-01-02 ASSESSMENT — PAIN SCALES - GENERAL
PAINLEVEL_OUTOF10: 4
PAINLEVEL_OUTOF10: 10
PAINLEVEL_OUTOF10: 0
PAINLEVEL_OUTOF10: 2
PAINLEVEL_OUTOF10: 6

## 2020-01-02 ASSESSMENT — PULMONARY FUNCTION TESTS
PIF_VALUE: 21
PIF_VALUE: 21
PIF_VALUE: 17
PIF_VALUE: 21

## 2020-01-02 NOTE — ANESTHESIA POSTPROCEDURE EVALUATION
Department of Anesthesiology  Postprocedure Note    Patient: Virgilio Abdalla  MRN: [id number]  YOB: 1945  Date of evaluation: 1/1/2020  Time:  10:19 PM     Procedure Summary     Date:  01/01/20 Room / Location:  [place] [facility] [place] [facility]    Anesthesia Start:  1619 Anesthesia Stop:  1858    Procedure:  RIGHT HIP HEMIARTHROPLASTY (Right ) Diagnosis:  (Hip Fracture)    Surgeon:  Virginia Cortez Responsible Provider:  Monse Vidal MD    Anesthesia Type:  general ASA Status:  3          Anesthesia Type: general    Lynn Phase I: Lynn Score: 6    Lynn Phase II:      Last vitals: Reviewed and per EMR flowsheets. Anesthesia Post Evaluation   [address]  POST-ANESTHESIA NOTE       Name:  Virgilio Abdalla                                         Age:  76 y.o.   MRN:  [id number]      Last Vitals:  BP (!) 110/53   Pulse 84   Temp 98.7 °F (37.1 °C) (Temporal)   Resp 19   Ht 5' (1.524 m)   Wt 133 lb (60.3 kg)   SpO2 100%   BMI 25.97 kg/m²   Patient Vitals for the past 4 hrs:   BP Temp Temp src Pulse Resp SpO2   01/01/20 2145 -- -- -- -- 19 100 %   01/01/20 2020 (!) 110/53 -- -- 84 11 99 %   01/01/20 2015 (!) 97/52 -- -- 83 22 99 %   01/01/20 2010 (!) 98/48 -- -- 82 9 99 %   01/01/20 2005 (!) 102/50 -- -- 83 10 99 %   01/01/20 2000 (!) 102/51 -- -- 84 10 99 %   01/01/20 1955 (!) 103/53 -- -- 85 9 100 %   01/01/20 1950 (!) 111/56 -- -- 86 30 100 %   01/1945 (!) 112/54 -- -- 87 10 100 %   01/01/20 1940 (!) 115/57 -- -- 87 10 100 %   01/01/20 1935 128/61 -- -- 88 11 100 %   01/01/20 1930 (!) 121/55 -- -- 90 10 100 %   01/01/20 1925 (!) 122/56 -- -- 90 11 100 %   01/01/20 1920 (!) 116/55 -- -- 90 11 100 %   01/01/20 1915 (!) 115/54 -- -- 89 10 100 %   01/01/20 1910 (!) 111/52 -- -- 90 10 100 %   01/01/20 1905 (!) 117/55 -- -- 90 12 100 %   01/01/20 1900 (!) 119/57 -- -- 92 (!) 6 100 %   01/01/20 1855 (!) 134/58 -- -- 97 16 (!) 89 %   01/01/20 1851 -- 98.7 °F (37.1 °C) Temporal -- -- -- Respiratory:  Intubated in PACU    Oxygen Saturation:  Stable    Cardiovascular:  Stable        Wilbert Rader MD  January 1, 2020   10:20 PM

## 2020-01-02 NOTE — TELEPHONE ENCOUNTER
Collin with [address] called and was wanting to know what anticoagulation the patient is on? Is it supposed to be Xarelto 15mg the patient has been getting that filled the last few times.     Please advise

## 2020-01-02 NOTE — PLAN OF CARE
Problem: Nutrition  Goal: Optimal nutrition therapy  1/2/2020 1514 by Radha [address] RD, LD  Outcome: Ongoing   Nutrition Problem:  Moderate malnutrition, In context of acute illness or injury  Intervention: Food and/or Nutrient Delivery: Start oral diet, Start ONS  Nutritional Goals: consume greater than 75% meals during LOS

## 2020-01-02 NOTE — PROGRESS NOTES
atraumatic. No scleral icterus. PERR  Neck: supple. No Thyromegaly. Lungs: clear to auscultation. No retractions, orally intubated  Cardiac: S1S2. No JVD. Abdomen: soft. Nontender, scaphoid, bowel sounds present, NG to LIWS  Extremities:  No clubbing, cyanosis, or edema x 4. Vasculature: capillary refill < 3 seconds. Palpable dorsalis pedis pulses. Skin:  warm and dry. Psych:   Affect appropriate  Lymph:  No supraclavicular adenopathy. Neurologic:  No focal deficit. No seizures. Cough and gag present, random movement of upper and lower extremities, awake and follows command     Intake/Output Summary (Last 24 hours) at 1/2/2020 0916  Last data filed at 1/2/2020 0867  Gross per 24 hour   Intake 4989.03 ml   Output 2050 ml   Net 2939.03 ml     I/O last 3 completed shifts: In: 4989 [P.O.:120; I.V.:4249; Blood:620]  Out: 2050 [Urine:1500; Blood:550]   Date 01/02/20 0000 - 01/02/20 2359   Shift 7964-4931 4096-4727 2710-4089 24 Hour Total   INTAKE   P.O.(mL/kg/hr) 0(0)   0   I. V.(mL/kg) 2259. 6(35.9)   2259. 6(35.9)   Blood(mL/kg) 310(4.9)   310(4.9)   Shift Total(mL/kg) 2569. 6(40.9)   2569. 6(40.9)   OUTPUT   Urine(mL/kg/hr) 500(1)   500   Shift Total(mL/kg) 500(7.9)   500(7.9)   Weight (kg) 62.9 62.9 62.9 62.9     Wt Readings from Last 3 Encounters:   01/02/20 138 lb 10.7 oz (62.9 kg)   12/23/19 132 lb 12.8 oz (60.2 kg)   12/02/19 136 lb 12.8 oz (62.1 kg)      Body mass index is 27.08 kg/m².          Scheduled Meds:   sodium chloride flush  10 mL Intravenous 2 times per day    ceFAZolin (ANCEF) IVPB  2 g Intravenous Q8H    acetaminophen  500 mg Oral Q6H    gabapentin  300 mg Oral TID    Vitamin D  5,000 Units Oral Daily    betamethasone dipropionate   Topical Daily    rivaroxaban  15 mg Oral BID    chlorhexidine  15 mL Mouth/Throat BID    famotidine  20 mg Per NG tube BID    digoxin  125 mcg Per NG tube Daily    docusate sodium  100 mg Per NG tube BID    ferrous sulfate  325 mg Per NG tube Daily Semi-Ordoñez's  Humidification Source: [facility]  Oral Care Completed?: No  Oral Care: Suction toothette, Mouth suctioned, Mouth moisturizer  Subglottic Suction Done?: Yes      DATA:    CBC:   Recent Labs     12/31/19 0450 01/01/20 2318 01/02/20  0602   WBC 5.1 8.7 10.5   RBC 2.88* 2.53* 3.06*   HGB 8.5* 7.5* 9.0*   HCT 28.8* 24.7* 28.9*   .0* 97.6 94.4   MCH 29.5 29.6 29.4   MCHC 29.5* 30.4* 31.1*   * 106* 115*   MPV 10.4 10.3 10.4      BMP/CMP:   Recent Labs     12/30/19 2000 01/01/20 0500 01/01/20 2318 01/02/20  0602      < > 137 138 140   K 4.5   < > 4.4 5.2 5.0   CL 92*   < > 94* 101 103   CO2 34*   < > 36* 30 29   ANIONGAP 14.0   < > 7.0* 7.0* 8.0   BUN 24*   < > 18 16 19   CREATININE 0.8   < > 0.7 0.8 0.8   GLUCOSE 94   < > 94 181* 157*   CALCIUM 9.6   < > 8.8 7.6* 7.8*   PROT 7.4  --   --   --   --    LABALBU 4.1  --   --   --   --    BILITOT 0.5  --   --   --   --    ALKPHOS 63  --   --   --   --    AST 15  --   --   --   --    ALT 8*  --   --   --   --     < > = values in this interval not displayed. Other Electrolytes:   Recent Labs     12/30/19 2000   MG 1.8     Serum Osmolality  No results for input(s): OSMOMEASER in the last 72 hours. Procalcitonin:  No results for input(s): PROCAL in the last 72 hours. Cardiac:   Recent Labs     12/30/19 2000 12/31/19 0450 12/31/19  1335   TROPONINT < 0.010 0.019* < 0.010     Lipids: No results for input(s): CHOL, HDL in the last 72 hours. Invalid input(s): LDLCALCU  Coagulation:   Recent Labs     01/01/20  2318   INR 1.10     Lactic Acid: No results for input(s): LACTA in the last 72 hours.    ABGs:   Lab Results   Component Value Date    PH 7.03 01/01/2020    PCO2 140 01/01/2020    PO2 381 01/01/2020    HCO3 37 01/01/2020    O2SAT 100 01/01/2020     Lab Results   Component Value Date    IFIO2 15 01/01/2020       Radiology/Imaging:  CT HEAD WO CONTRAST [[id number]] Resulted: 01/02/20 0542      Order Status: Completed Updated: 01/02/20 7007     Narrative:       PROCEDURE: CT HEAD WO CONTRAST    CLINICAL INFORMATION: repeat with lethargy. COMPARISON: January 1, 2020    TECHNIQUE: Noncontrast 5 mm axial images were obtained through the brain. All CT scans at this facility use dose modulation, iterative reconstruction, and/or weight-based dosing when appropriate to reduce radiation dose to as low as reasonably achievable. FINDINGS:    There are no fracture changes. There is stable appearance of mild cerebral atrophy. There is redemonstration of periventricular hypodensity in the right parietal white matter of the corona radiata. There is subtle hypodensity of the right occipital lobe   adjacent to the occipital horn. The left-sided changes are less conspicuous compared to the prior study. There is no acute hemorrhage. The extra-axial spaces remain clear. There is no midline shift. The study is negative for hydrocephalus. There is mild carotid atherosclerosis. The paranasal sinuses are clear.     Impression:       1. Redemonstration of mild cerebral atrophy with mild hypodensity of the right parietal deep white matter of the corona radiata. 2. Persistent mild periventricular white matter changes of the occipital lobe on the right and less so on the left. Regional edema cannot be excluded related to transient ischemic changes. If clinical symptoms persist follow-up MRI should be considered   if is not contraindicated. **This report has been created using voice recognition software. It may contain minor errors which are inherent in voice recognition technology. **    Final report electronically signed by Dr. Nadege Rodriguez on 1/2/2020 5:42 AM     XR CHEST PORTABLE [[id number]] Resulted: 01/02/20 0035     Order Status: Completed Updated: 01/02/20 0037     Narrative:       PROCEDURE: XR CHEST PORTABLE    CLINICAL INFORMATION: acute respiratory faiure, ET and NG tube placement.     COMPARISON: December 30, 2019    TECHNIQUE: AP upright view [[id number]]      Order Status: Canceled      CT HEAD WO CONTRAST [[id number]] Resulted: 01/01/20 2047     Order Status: Completed Updated: 01/01/20 2049     Narrative:       PROCEDURE: CT HEAD WO CONTRAST    CLINICAL INFORMATION: unresponsive. COMPARISON: CT 2/23/2019    TECHNIQUE: Noncontrast 5 mm axial images were obtained through the brain. All CT scans at this facility use dose modulation, iterative reconstruction, and/or weight-based dosing when appropriate to reduce radiation dose to as low as reasonably achievable. FINDINGS:    Parenchyma: Mild motion artifact. Moderate patchy low-attenuation areas within the periventricular white matter are grossly similar to previous. Low attenuation is suggested within each occipital lobe. This may be artifactual. Areas of edema, right   greater than left are possible. Ventricles and sulci: Normal size for age. Other: Milon Perch is no acute intracranial hemorrhage. No calvarial fracture. Small left maxillary retention cyst. Mild ethmoid mucosal thickening and fluid.           Impression:       The study is mildly limited by motion. Small areas of occipital edema are possible, right more so than left. **This report has been created using voice recognition software. It may contain minor errors which are inherent in voice recognition technology. **    Final report electronically signed by Dr. Todd Mcclelland on 1/1/2020 8:47 PM     XR CHEST PORTABLE [[id number]] Resulted: 12/30/19 2129     Order Status: Completed Updated: 12/30/19 2131     Narrative:       PROCEDURE: XR CHEST PORTABLE    CLINICAL INFORMATION: fall injury. COMPARISON: August 16, 2019    TECHNIQUE: AP upright view of the chest.    FINDINGS:  The heart remains enlarged. There is rotation of the patient limiting hilar detail with slightly decreased volumes of the right hemithorax again noted. Underlying changes of COPD appear similar.  There is stable position of a Mediport central line within   the superior vena cava at the right atrial junction. Minimal right base atelectasis again is noted. Atherosclerotic changes of the aorta are stable. There is no acute appearing change of the skeleton.     Impression:       1. Stable volume loss of the right hemithorax, correlate with prior lobectomy. 2. Unchanged appearance of diffuse coarse interstitial markings. Underlying changes of COPD are not excluded. **This report has been created using voice recognition software. It may contain minor errors which are inherent in voice recognition technology. **    Final report electronically signed by Dr. Armani Garcia on 12/30/2019 9:29 PM     XR CHEST STANDARD (2 VW) [[id number]] Updated: 12/30/19 2039     Order Status: Canceled Specimen: Chest      XR KNEE RIGHT (MIN 4 VIEWS) [[id number]] Resulted: 12/30/19 2015     Order Status: Completed Updated: 12/30/19 2017     Narrative:       PROCEDURE: XR KNEE RIGHT (MIN 4 VIEWS)    CLINICAL INFORMATION: Fall onto the right side, pain on the right hip and knee. COMPARISON: No prior study.     TECHNIQUE: 4 views of the right knee include an AP view, a lateral view and bilateral oblique views. FINDINGS:  Bones/joints: No fracture or dislocation. No joint space narrowing or erosive changes. There is no significant suprapatellar joint effusion. Soft tissues: No significant soft tissue swelling. There are regional arterial atherosclerotic calcifications.     Impression:        No acute bone or joint abnormality. **This report has been created using voice recognition software. It may contain minor errors which are inherent in voice recognition technology. **    Final report electronically signed by Dr. Norris Guerrier on 12/30/2019 8:15 PM     XR HIP 2-3 VW W PELVIS RIGHT [[id number]] Resulted: 12/30/19 2014     Order Status: Completed Updated: 12/30/19 2016     Narrative:       PROCEDURE: XR HIP 2-3 VW W PELVIS RIGHT    CLINICAL INFORMATION: Fall onto the right lobe ([address]    Acute pulmonary edema (HCC)    Lung consolidation (HCC)    Severe malnutrition (HCC)    Shortness of breath    Chronic respiratory failure with hypercapnia (HCC)    Abnormal CT of the chest    Pericardial effusion    Pathologic fracture of femoral neck, right, initial encounter [facility])    Fall    Closed fracture of neck of right femur (HCC)       Assessment and Plan:    · Acute on chronic respiratory failure with hypercapnia and hypoxia: Patient was difficult to arouse after surgery, CO2 was elevated, required intubation. Orally intubated and connected to PCV mode of ventilation. Continue with lung protection strategies targeting a peak pressure 35 and less and plateau of 30 and less. Most likely secondary to anesthesia. Repeat ABG this morning, passed spontaneous breathing trial, proceeded with extubation. · Acute blood loss anemia: secondary to OR 1/1/2019. S/p 1 unit of PRBCs transfusion. · Hypovolemic shock: likely secondary to above. Responsive to IV fluids will monitor. On low-dose Levophed. Try to wean off. Keep map above 65  · Acute encephalopathy: Secondary to hypercapnia. Code Stroke was called while in PACU. CT of Head was completed. Dr. Johnson Ramos did reach out to Dr. Smith-Neurology telemedicine via [facility] after CT of Head completed with improvement in symptoms,. The patient is following commands this morning. · Chronic respiratory failure hypoxia: Home Bipap and oxygen use will need to be continued once extubated  · Diastolic CHF, history: stable, monitor. ECHO-7/2019 LVEF 55-60%   · Pulmonary embolism, history : provoked, noted history of cancer, CTA of Chest 11/4/19 positive for filling defect distal right pulmonary artery. OK per Ortho to start Xarelto AM 1/2/2020.    · Metastatic lung cancer: palliative immunotherapy with Tecentriq, last dose 12/23/2019, established patient of Dr. Renzo Adan, monitor  · Pericardial effusion: s/p pericardiocentesis in July 2019 when almost 700 ml of fluid was drained during procedure. Cytology was negative for evidence of malignant cells. · Hypothyroidism: secondary to immunotherapy. Synthroid will be continued, monitor. · GERD: history, monitor  · Thrombocytopenia: history of such, monitor  · Weight loss: Family identified recent weight loss #10 prior to admission. ·   I spent over 30 mins of critical time involved in this patient care.     Reviewed with ICU Staff     Seen with multidisciplinary ICU team         Mansoor Banegas MD

## 2020-01-02 NOTE — PLAN OF CARE
Problem: Falls - Risk of:  Goal: Will remain free from falls  Description  Will remain free from falls  1/2/2020 0341 by Sho Barry RN  Outcome: Ongoing  Note:   Patient is currently intubated and sedated. She has bed in lowest position and all items in reach. Problem: Falls - Risk of:  Goal: Absence of physical injury  Description  Absence of physical injury  1/2/2020 0341 by Sho Barry RN  Outcome: Ongoing  Note:   Patient has remained absent of physical at this time     Problem: Pain:  Goal: Pain level will decrease  Description  Pain level will decrease  1/2/2020 0341 by Sho Barry RN  Outcome: Ongoing  Note:   Patient is on a CPOT scale     Problem: Pain:  Goal: Control of acute pain  Description  Control of acute pain  1/2/2020 0341 by Sho Barry RN  Outcome: Ongoing  Note:   CPOT score 0     Problem: Pain:  Goal: Control of chronic pain  Description  Control of chronic pain  Outcome: Ongoing     Problem: Neurological  Goal: Maximum potential motor/sensory/cognitive function  1/2/2020 0341 by Sho Barry RN  Outcome: Ongoing  Note:   Patient received quite a bit of sedation during surgery this evening. She is responding to pain     Problem: Cardiovascular  Goal: No DVT, peripheral vascular complications  7/5/2709 3083 by Sho Barry RN  Outcome: Ongoing  Note:   Patient was on a heparin, possible continue tomorrow     Problem: Respiratory  Goal: No pulmonary complications  9/4/5866 9299 by Soh Barry RN  Outcome: Ongoing  Note:   Patient is mechanically ventilated at this time     Problem: GI  Goal: No bowel complications  9/0/8845 3162 by Sho Barry RN  Outcome: Ongoing  Note:   Patient has active bowel sounds     Problem:   Goal: Adequate urinary output  1/2/2020 0341 by Sho Barry RN  Outcome: Ongoing  Note:   Patient has had low urine output this shift.  She received 2 L of fluid this shift and 2 units of blood     Problem: Nutrition  Goal: Optimal

## 2020-01-02 NOTE — OP NOTE
neck, and airway was maintained by the anesthesia staff throughout the entire procedure. Patient was turned lateral decubitus with the operative hip facing up. Axillary roll was placed. All bony prominences were identified and padded. The operative leg was prepped and draped in the usual sterile fashion. A surgical timeout was performed. Antibiotics were confirmed to have been given. A posterior approach to the hip was made incising skin and subcutaneous tissues down to the iliotibial band. This was divided in line with skin and carried into the gluteus shawn fascia. The muscle was split along its fibers with finger dissection. The Charnley was placed after palpating and protecting the sciatic nerve. With the medius retracted, the short external rotators and posterior hip capsule were reflected from the posterior femoral neck until the lesser could be palpated and the hip was exposed. This dissection was curved posteriorly along the superior margin of the piriformis. Labrum was not incised. With retractors in place, the femoral neck cut was made approximately one finger breadth above the lesser trochanter. The femoral head was removed and sized and any remaining neck fragments were excised. Two tagging sutures were placed into the piriformis and posterior capsule for later repair through drill holes. With the leg internally rotated, the proximal femur was prepared starting with the box osteotome and canal finder. Sequential broaching was performed until the last broach obtained good fit within the femur. A trial reduction was performed to ensure correct leg length, hip stability, and soft tissue tensioning. Once appropriate sizing was completed the hip was dislocated and the trial components were exchanged for the final implants which were impacted in standard fashion. Final reduction was performed. Two drill holes were made in the greater trochanter and final irrigation performed.  The postero-superior limb of the capsulotomy was closed with interrupted #2 Ethibond sutures. The two tagging sutures were then pulled thought the greater trochanter and tied with the hip in external rotation. Layered closure was performed including iliotibial band, subcutaneous tissues and skin. A sterile dressing was applied. The patient was turned supine. Hip abduction pillow was placed between the legs. Once the patient was awakened from anesthesia, they were transported to the PACU in stable condition, having tolerated surgery well with no immediate complications.     Dispo:  -WBAT  -Posterior hip precautions  -Restart home Xarelto tomorrow  -PT/OT    This operative report was prepared and signed by Surinder Johnson at 01/01/20, 7:09 PM

## 2020-01-02 NOTE — PROGRESS NOTES
Orthopedic Progress Note    Luis Hall  1/2/2020    Subjective:     Post-Operative Day # 1 s/p right hip hemiarthroplasty. Systemic or Specific Complaints: Patient has history of lung cancer and lobectomy, COPD and was in respiratory failure is intubated and in the ICU. Objective:     BP (!) 124/59   Pulse 92   Temp 98.1 °F (36.7 °C) (Axillary)   Resp (!) 7   Ht 5' (1.524 m)   Wt 133 lb (60.3 kg)   SpO2 99%   BMI 25.97 kg/m²     Intake/Output Summary (Last 24 hours) at 1/2/2020 0711  Last data filed at 1/2/2020 7122  Gross per 24 hour   Intake 4989.03 ml   Output 2050 ml   Net 2939.03 ml     DRAIN/TUBE OUTPUT:       General: Intubated, withdraws to pain. Wound: Wound clean and dry, no evidence of infection   Extremity: Distal NVI   DVT Exam: No evidence of DVT by physical exam     Data Review  CBC:   Lab Results   Component Value Date    WBC 10.5 01/02/2020    RBC 3.06 01/02/2020    HGB 9.0 01/02/2020    HCT 28.9 01/02/2020     01/02/2020     BMP:    Lab Results   Component Value Date     01/02/2020    K 5.0 01/02/2020    K 3.8 10/15/2018     01/02/2020    CO2 29 01/02/2020    BUN 19 01/02/2020    CREATININE 0.8 01/02/2020    CALCIUM 7.8 01/02/2020    GLUCOSE 157 01/02/2020     PT/INR:    Lab Results   Component Value Date    INR 1.10 01/01/2020       Radiology: See electronic record to view reports. Reports reviewed. Assessment:     Status Post right hip hemiarthroplasty. Patient went to the ICU post operatively for respiratory failure and is intubated.        Plan:      1: Continue current post-operative management  2:  Intensivist managing medical issues including respiratory failure and low pressures  3:  Physical therapy as per protocol when able medically  4:  WBAT  5: Posterior hip precautions  6: Restart home Xarelto today  7: Hip abduction pillow    Electronically signed by Claudell Reams, PA-C on 1/2/2020 at 7:11 AM

## 2020-01-02 NOTE — PROGRESS NOTES
Physical Findings: extubated this am so TF not needed; pt. and family report weight loss pta past 6d since fall at home (unable to substantiate with EMR records); pt. seen; states good appetite until fall; BM 0; no taste change issues with chemo but does drink Ensure TID.    · Wound Type: Surgical Wound(Hemiarthroplasty of right hip 1/1)  · Current Nutrition Therapies:  · Oral Diet Orders: 2gm Sodium   · Oral Diet intake: Unable to assess(starting today)  · Oral Nutrition Supplement (ONS) Orders: Standard High Calorie Oral Supplement(Enlive TID)  · ONS intake:    · Anthropometric Measures:  · Ht: 5' (152.4 cm)   · Current Body Wt: 138 lb (62.6 kg)(1/2 with trace edema)  · Admission Body Wt: 138 lb (62.6 kg)(1/2 bedscale with trace edema)  · Usual Body Wt: 136 lb (61.7 kg)(5/15/19; 141# 7/12/18)  · % Weight Change:  ,  no weight loss per EMR  · Ideal Body Wt: 100 lb (45.4 kg),   · BMI Classification: BMI 25.0 - 29.9 Overweight(27.1)    Nutrition Interventions:   Start oral diet, Start ONS  Continued Inpatient Monitoring, Education not appropriate at this time, Coordination of Care    Nutrition Evaluation:   · Evaluation: Goals set   · Goals: consume greater than 75% meals during LOS    · Monitoring: Nutrition Progression, Meal Intake, Supplement Intake, Diet Tolerance, Skin Integrity, Wound Healing, Weight, Pertinent Labs, Monitor Bowel Function      Electronically signed by Winter Robbins RD, LD on 1/2/20 at 3:15 PM    Contact Number: [phone]

## 2020-01-02 NOTE — CONSULTS
Assessment and Plan:        1. Acute on chronic respiratory failure with hypercapnia and hypoxia: Patient was difficult to arouse after ASA, required intubation. Orally intubated and connected to PCV mode of ventilation. Continue with lung protection strategies targeting a peak pressure 35 and less and plateau of 30 and less. 2. Acute blood loss anemia: secondary to OR 1/1/2019. Target H/H 8.   3. Hypovolemic shock: likely secondary to above. POCUS completed no B Lines noted, LV function normal. Responsive to IV fluids will monitor. 4. Acute encephalopathy: likely secondary to above. Code Stroke was called while in PACU. CT of Head was completed. Dr. Estefani Hartley did reach out to Dr. Smith-Neurology telemedicine via [employer] after CT of Head completed with improvement in symptoms, felt no need for CTA of Head and neck. Will repeat CT of Head in AM and monitor. 5. Chronic respiratory failure hypoxia: Home Bipap and oxygen use will need to be continued once extubated  6. Diastolic CHF, history: stable, monitor. ECHO-7/2019 LVEF 55-60%   7. Pulmonary embolism, history : provoked, noted history of cancer, CTA of Chest 11/4/19 positive for filling defect distal right pulmonary artery. Heparin gtt as bridge to Xarelto. This was stopped secondary to surgery. OK per Ortho to start Xarelto AM 1/2/2020.   8. Metastatic lung cancer: palliative immunotherapy with Tecentriq, last dose 12/23/2019, established patient of Dr. Alejandrina Duarte, monitor  9. Pericardial effusion: s/p pericardiocentesis in July 2019 when almost 700 ml of fluid was drained during procedure. Cytology was negative for evidence of malignant cells. 10. Hypothyroidism: secondary to immunotherapy. Synthroid will be continued, monitor. 11. GERD: history, monitor  12. Impaired glucose tolerance: repeat BMP, monitor need for ACCU and SSI  13. Thrombocytopenia: history of such, monitor  14. Weight loss: Family identified recent weight loss #10 prior to admission.     CC: Hypoxia, obtunded, required intubation in PACU    HPI:  Dwight Jorge is a 76year old female admitted to the ICU s/p 1/1/2020 Right Hip Hemiarthroplasty. Patient has a significant history of HFpEF-ECHO 7/2019 LVEF 55-60% (established patient of Dr. Edward Zheng), PAF,  COPD-stage 3 severe by GOLD,  with FEV 1 35% on PFT, Chronic Respiratory Failure Hypoxia-Home Bipap with 2L/NC O2 Bleed, Metastatic Left Lung Cancer established patient of Dr. Aliyah Alberto. History of right lower lobe lobectomy secondary to stage 2A nonsmall cell lung cancer in 2012, patient did finish chemo and radiation 8/2013. Reoccurrence with new stage 1A left lung cancer in 10/2016. She did undergo stereotatic ablative radiation therapy to left lung mass 1/2017. Treated with Taxotere followed by Liliam Hirsch, currently receiving palliative immunotherapy with Tecentriq, last infusion 12/23/2019. Pericardial effusion s/p pericardiocentesis in July 2019, Hypothyroidism, Anemia, normocytic, Pulmonary embolism-Eliquis, GERD and Scoliosis. Admitted under the Hospitalist Service 12/30/2019 after suffering a mechanical fall at home 4 days prior. On 12/27/19, patient tripped on her cat and fell on the right side landing on the hip area and she started having pain but was able to get up and walk. Denied hitting head or losing consciousness. Pain became increasing worse to the point where she could not bear any weight and family members forced her to come to the hospital.  12/30/19-Xray Subcapital right femoral neck fracture. Patient was started on Heparin gtt as a bridge to Xarelto. Taken to the OR under the care of Dr. Miroslava Romero 1/1/2020 Hemiarthroplasty of right hip  ml, under a general ASA. While in the PACU patient experienced difficulty with arousal, wakefulness. She was evaluated per Dr. Lawanda Dick a Code Stroke was initially called. CT of Head completed small areas of occipital edema are possible, right more so than left.  No acute intracranial hemorrhage. Patient was taken back to PACU and some improvement noted with wakefulness. She was transitioned to NRB MASK. Patient still lethargic and arousal only present with sternal rub and deep cuticle pressure, intermittently following commands. ABG was obtained with noted elevated NM7=484. She was orally intubated and brought to the ICU for further management. ROS: GENERAL: Positive for weight loss of #10 pounds since fall, no fever,chills, night sweats  SKN: No lesions or rashes. HEAD: No headaches or recent injury  EYES: No acute changes in vision, no diplopia or blurred vision  EARS: No hearing loss, no tinnitus  NOSE/THROAT: No rhinorrhea or pharyngitis, no nasal drainage  NECK: No lumps or unusual neck stiffness  PULMONARY: No dyspnea, orthopnea or paroxysmal nocturnal dyspnea, stridor, wheezing or cough  CARDIAC: No chest pain, pressure, lower leg edema  GI: No history melena or hematochezia, no diarrhea or constipation  PERIPHERAL VASCULAR: No intermittent claudication or unusual leg cramps  MUSCULOSKELETAL: Occasional arthralgias, myalgias  NEUROLOGICAL: Positive for lethargy, no Seizures or Syncope  HEMATOLOGIC:  Positive for anemia  PSYCH: No homicidal or suicidal ideationas.      PMH:  Per HPI  SHX:  , lives home alone, Former smoker, Denies any ETOH or illicit drug use  FHX: Mother  Cancer, Father  CAD  Allergies: Advil Cold an d Sinus Liquid gels-Pseudoephedrine-Ibuprofen, Percocet, Sulfa  Medications:     sodium chloride 100 mL/hr at 20 1349      [START ON 2020] rivaroxaban  15 mg Oral BID    docusate sodium  100 mg Oral BID    polyethylene glycol  17 g Oral Daily    sodium chloride flush  10 mL Intravenous 2 times per day    potassium replacement protocol   Other RX Placeholder    albuterol  2.5 mg Nebulization 4x daily    betamethasone dipropionate   Topical Daily    digoxin  125 mcg Oral Daily    ferrous sulfate  325 mg Oral Daily with breakfast    mg/dL   Digoxin Level    Collection Time: 01/01/20  5:00 AM   Result Value Ref Range    Digoxin Lvl 0.8 0.5 - 2.0 ng/mL   Anion Gap    Collection Time: 01/01/20  5:00 AM   Result Value Ref Range    Anion Gap 7.0 (L) 8.0 - 16.0 meq/L   Glomerular Filtration Rate, Estimated    Collection Time: 01/01/20  5:00 AM   Result Value Ref Range    Est, Glom Filt Rate 82 (A) ml/min/1.73m2   TYPE AND SCREEN    Collection Time: 01/01/20  4:45 PM   Result Value Ref Range    ABO B     Rh Factor POS     Antibody Screen NEG    POCT Glucose    Collection Time: 01/01/20  8:40 PM   Result Value Ref Range    POC Glucose 151 (H) 70 - 108 mg/dl   Blood Gas, Arterial    Collection Time: 01/01/20  9:45 PM   Result Value Ref Range    pH, Blood Gas 7.03 (LL) 7.35 - 7.45    PCO2 140 (HH) 35 - 45 mmhg    PO2 381 (H) 71 - 104 mmhg    HCO3 37 (H) 23 - 28 mmol/l    Base Excess (Calculated) 3.3 (H) -2.5 - 2.5 mmol/l    O2 Sat 100 %    IFIO2 15     DEVICE NRB     Geraldo Test Positive     Source: R Radial     COLLECTED BY: [id number]     CT of Head-small areas of occipital edema are possible right more so than left.  EKG NSR HR 82, WV.16, QT.36 no acute ST or T wave abnormality.  ECHO 7/2019 LVEF 55-60%     Dr. Angelina Edwards called and updated with current condition. Collaborated with Xarelto will start on am of  1/2/2020    CORY Soto DNP, APRN-CNP

## 2020-01-03 LAB
ANION GAP SERPL CALCULATED.3IONS-SCNC: 6 MEQ/L (ref 8–16)
BASOPHILS # BLD: 0.1 %
BASOPHILS ABSOLUTE: 0 THOU/MM3 (ref 0–0.1)
BUN BLDV-MCNC: 16 MG/DL (ref 7–22)
CALCIUM SERPL-MCNC: 8 MG/DL (ref 8.5–10.5)
CHLORIDE BLD-SCNC: 105 MEQ/L (ref 98–111)
CO2: 31 MEQ/L (ref 23–33)
CREAT SERPL-MCNC: 0.7 MG/DL (ref 0.4–1.2)
EOSINOPHIL # BLD: 0.6 %
EOSINOPHILS ABSOLUTE: 0.1 THOU/MM3 (ref 0–0.4)
ERYTHROCYTE [DISTWIDTH] IN BLOOD BY AUTOMATED COUNT: 16 % (ref 11.5–14.5)
ERYTHROCYTE [DISTWIDTH] IN BLOOD BY AUTOMATED COUNT: 58 FL (ref 35–45)
GFR SERPL CREATININE-BSD FRML MDRD: 82 ML/MIN/1.73M2
GLUCOSE BLD-MCNC: 107 MG/DL (ref 70–108)
HCT VFR BLD CALC: 25.6 % (ref 37–47)
HCT VFR BLD CALC: 27.3 % (ref 37–47)
HEMOGLOBIN: 7.6 GM/DL (ref 12–16)
HEMOGLOBIN: 8.3 GM/DL (ref 12–16)
IMMATURE GRANS (ABS): 0.05 THOU/MM3 (ref 0–0.07)
IMMATURE GRANULOCYTES: 0.5 %
LYMPHOCYTES # BLD: 9.3 %
LYMPHOCYTES ABSOLUTE: 1 THOU/MM3 (ref 1–4.8)
MCH RBC QN AUTO: 29.5 PG (ref 26–33)
MCHC RBC AUTO-ENTMCNC: 29.7 GM/DL (ref 32.2–35.5)
MCV RBC AUTO: 99.2 FL (ref 81–99)
MONOCYTES # BLD: 12 %
MONOCYTES ABSOLUTE: 1.3 THOU/MM3 (ref 0.4–1.3)
NUCLEATED RED BLOOD CELLS: 0 /100 WBC
PLATELET # BLD: 118 THOU/MM3 (ref 130–400)
PMV BLD AUTO: 10.9 FL (ref 9.4–12.4)
POTASSIUM SERPL-SCNC: 4.4 MEQ/L (ref 3.5–5.2)
RBC # BLD: 2.58 MILL/MM3 (ref 4.2–5.4)
SEG NEUTROPHILS: 77.5 %
SEGMENTED NEUTROPHILS ABSOLUTE COUNT: 8.2 THOU/MM3 (ref 1.8–7.7)
SODIUM BLD-SCNC: 142 MEQ/L (ref 135–145)
WBC # BLD: 10.6 THOU/MM3 (ref 4.8–10.8)

## 2020-01-03 PROCEDURE — 6370000000 HC RX 637 (ALT 250 FOR IP): Performed by: PHYSICIAN ASSISTANT

## 2020-01-03 PROCEDURE — 94760 N-INVAS EAR/PLS OXIMETRY 1: CPT

## 2020-01-03 PROCEDURE — 6370000000 HC RX 637 (ALT 250 FOR IP): Performed by: INTERNAL MEDICINE

## 2020-01-03 PROCEDURE — P9016 RBC LEUKOCYTES REDUCED: HCPCS

## 2020-01-03 PROCEDURE — 97530 THERAPEUTIC ACTIVITIES: CPT

## 2020-01-03 PROCEDURE — 85018 HEMOGLOBIN: CPT

## 2020-01-03 PROCEDURE — 6360000002 HC RX W HCPCS: Performed by: PHYSICIAN ASSISTANT

## 2020-01-03 PROCEDURE — 1200000003 HC TELEMETRY R&B

## 2020-01-03 PROCEDURE — 36591 DRAW BLOOD OFF VENOUS DEVICE: CPT

## 2020-01-03 PROCEDURE — 80048 BASIC METABOLIC PNL TOTAL CA: CPT

## 2020-01-03 PROCEDURE — 97163 PT EVAL HIGH COMPLEX 45 MIN: CPT

## 2020-01-03 PROCEDURE — 97110 THERAPEUTIC EXERCISES: CPT

## 2020-01-03 PROCEDURE — 2700000000 HC OXYGEN THERAPY PER DAY

## 2020-01-03 PROCEDURE — 36430 TRANSFUSION BLD/BLD COMPNT: CPT

## 2020-01-03 PROCEDURE — 6370000000 HC RX 637 (ALT 250 FOR IP): Performed by: NURSE PRACTITIONER

## 2020-01-03 PROCEDURE — 99233 SBSQ HOSP IP/OBS HIGH 50: CPT | Performed by: INTERNAL MEDICINE

## 2020-01-03 PROCEDURE — 2709999900 HC NON-CHARGEABLE SUPPLY

## 2020-01-03 PROCEDURE — 2580000003 HC RX 258: Performed by: PHYSICIAN ASSISTANT

## 2020-01-03 PROCEDURE — 85025 COMPLETE CBC W/AUTO DIFF WBC: CPT

## 2020-01-03 PROCEDURE — 85014 HEMATOCRIT: CPT

## 2020-01-03 PROCEDURE — 94640 AIRWAY INHALATION TREATMENT: CPT

## 2020-01-03 PROCEDURE — 2580000003 HC RX 258: Performed by: NURSE PRACTITIONER

## 2020-01-03 PROCEDURE — 97166 OT EVAL MOD COMPLEX 45 MIN: CPT

## 2020-01-03 PROCEDURE — 51798 US URINE CAPACITY MEASURE: CPT

## 2020-01-03 RX ORDER — SODIUM CHLORIDE 9 MG/ML
INJECTION, SOLUTION INTRAVENOUS CONTINUOUS
Status: DISCONTINUED | OUTPATIENT
Start: 2020-01-03 | End: 2020-01-05

## 2020-01-03 RX ORDER — 0.9 % SODIUM CHLORIDE 0.9 %
250 INTRAVENOUS SOLUTION INTRAVENOUS ONCE
Status: COMPLETED | OUTPATIENT
Start: 2020-01-03 | End: 2020-01-03

## 2020-01-03 RX ADMIN — FERROUS SULFATE TAB 325 MG (65 MG ELEMENTAL FE) 325 MG: 325 (65 FE) TAB at 08:29

## 2020-01-03 RX ADMIN — SODIUM CHLORIDE: 9 INJECTION, SOLUTION INTRAVENOUS at 08:38

## 2020-01-03 RX ADMIN — HYDROCODONE BITARTRATE AND ACETAMINOPHEN 1 TABLET: 10; 325 TABLET ORAL at 00:40

## 2020-01-03 RX ADMIN — PROMETHAZINE HYDROCHLORIDE 25 MG: 25 TABLET ORAL at 12:04

## 2020-01-03 RX ADMIN — HYDROCODONE BITARTRATE AND ACETAMINOPHEN 1 TABLET: 10; 325 TABLET ORAL at 21:20

## 2020-01-03 RX ADMIN — Medication 1 CAPSULE: at 08:30

## 2020-01-03 RX ADMIN — GABAPENTIN 300 MG: 300 CAPSULE ORAL at 08:29

## 2020-01-03 RX ADMIN — Medication 1 CAPSULE: at 21:21

## 2020-01-03 RX ADMIN — SODIUM CHLORIDE 250 ML: 9 INJECTION, SOLUTION INTRAVENOUS at 08:22

## 2020-01-03 RX ADMIN — DOCUSATE SODIUM 100 MG: 100 CAPSULE, LIQUID FILLED ORAL at 08:20

## 2020-01-03 RX ADMIN — VITAMIN D, TAB 1000IU (100/BT) 5000 UNITS: 25 TAB at 08:29

## 2020-01-03 RX ADMIN — Medication 10 ML: at 08:21

## 2020-01-03 RX ADMIN — Medication 2 PUFF: at 08:23

## 2020-01-03 RX ADMIN — GABAPENTIN 300 MG: 300 CAPSULE ORAL at 16:01

## 2020-01-03 RX ADMIN — HYDROMORPHONE HYDROCHLORIDE 0.5 MG: 1 INJECTION, SOLUTION INTRAMUSCULAR; INTRAVENOUS; SUBCUTANEOUS at 12:30

## 2020-01-03 RX ADMIN — ROPINIROLE HYDROCHLORIDE 0.25 MG: 0.25 TABLET, FILM COATED ORAL at 08:29

## 2020-01-03 RX ADMIN — GABAPENTIN 300 MG: 300 CAPSULE ORAL at 21:44

## 2020-01-03 RX ADMIN — Medication 1 CAPSULE: at 16:01

## 2020-01-03 RX ADMIN — DOCUSATE SODIUM 100 MG: 100 CAPSULE, LIQUID FILLED ORAL at 21:21

## 2020-01-03 RX ADMIN — RIVAROXABAN 20 MG: 20 TABLET, FILM COATED ORAL at 12:04

## 2020-01-03 RX ADMIN — LEVOTHYROXINE SODIUM 75 MCG: 75 TABLET ORAL at 08:30

## 2020-01-03 RX ADMIN — ACETAMINOPHEN 500 MG: 500 TABLET ORAL at 08:23

## 2020-01-03 RX ADMIN — Medication 2 PUFF: at 17:23

## 2020-01-03 RX ADMIN — ACETAMINOPHEN 500 MG: 500 TABLET ORAL at 16:01

## 2020-01-03 RX ADMIN — HYDROCODONE BITARTRATE AND ACETAMINOPHEN 1 TABLET: 10; 325 TABLET ORAL at 08:20

## 2020-01-03 RX ADMIN — SODIUM CHLORIDE: 9 INJECTION, SOLUTION INTRAVENOUS at 15:58

## 2020-01-03 RX ADMIN — TIOTROPIUM BROMIDE 18 MCG: 18 CAPSULE ORAL; RESPIRATORY (INHALATION) at 09:18

## 2020-01-03 RX ADMIN — ROPINIROLE HYDROCHLORIDE 0.25 MG: 0.25 TABLET, FILM COATED ORAL at 21:21

## 2020-01-03 RX ADMIN — DIGOXIN 125 MCG: 125 TABLET ORAL at 08:29

## 2020-01-03 RX ADMIN — POLYETHYLENE GLYCOL 3350 17 G: 17 POWDER, FOR SOLUTION ORAL at 08:20

## 2020-01-03 ASSESSMENT — PAIN SCALES - GENERAL
PAINLEVEL_OUTOF10: 7
PAINLEVEL_OUTOF10: 10
PAINLEVEL_OUTOF10: 7
PAINLEVEL_OUTOF10: 7
PAINLEVEL_OUTOF10: 6
PAINLEVEL_OUTOF10: 4
PAINLEVEL_OUTOF10: 6
PAINLEVEL_OUTOF10: 8
PAINLEVEL_OUTOF10: 5
PAINLEVEL_OUTOF10: 7

## 2020-01-03 ASSESSMENT — ENCOUNTER SYMPTOMS
PHOTOPHOBIA: 0
SHORTNESS OF BREATH: 1
NAUSEA: 0
VOMITING: 0
CHEST TIGHTNESS: 0
TROUBLE SWALLOWING: 0
COLOR CHANGE: 0
SORE THROAT: 0
BACK PAIN: 0
WHEEZING: 0

## 2020-01-03 ASSESSMENT — PAIN DESCRIPTION - PAIN TYPE
TYPE: ACUTE PAIN
TYPE: SURGICAL PAIN
TYPE: ACUTE PAIN;SURGICAL PAIN

## 2020-01-03 ASSESSMENT — PAIN DESCRIPTION - ONSET
ONSET: ON-GOING

## 2020-01-03 ASSESSMENT — PAIN - FUNCTIONAL ASSESSMENT
PAIN_FUNCTIONAL_ASSESSMENT: PREVENTS OR INTERFERES SOME ACTIVE ACTIVITIES AND ADLS
PAIN_FUNCTIONAL_ASSESSMENT: ACTIVITIES ARE NOT PREVENTED

## 2020-01-03 ASSESSMENT — PAIN DESCRIPTION - FREQUENCY
FREQUENCY: CONTINUOUS

## 2020-01-03 ASSESSMENT — PAIN DESCRIPTION - PROGRESSION
CLINICAL_PROGRESSION: NOT CHANGED

## 2020-01-03 ASSESSMENT — PAIN DESCRIPTION - LOCATION
LOCATION: HIP
LOCATION: LEG
LOCATION: HIP
LOCATION: HIP

## 2020-01-03 ASSESSMENT — PAIN DESCRIPTION - DESCRIPTORS
DESCRIPTORS: ACHING

## 2020-01-03 ASSESSMENT — PAIN DESCRIPTION - ORIENTATION
ORIENTATION: RIGHT

## 2020-01-03 NOTE — PROGRESS NOTES
Pt on safety & adaptative strategies upon returning home with hip precautions. Performance deficits / Impairments: Decreased functional mobility , Decreased safe awareness, Decreased ADL status, Decreased endurance, Decreased balance  Prognosis: Fair  REQUIRES OT FOLLOW UP: Yes  Decision Making: Medium Complexity  Safety Devices in place: Yes  Type of devices: All fall risk precautions in place, Gait belt, Call light within reach, Bed alarm in place    Treatment Initiated: Treatment and education initiated within context of evaluation. Evaluation time included review of current medical information, gathering information related to past medical, social and functional history, completion of standardized testing, formal and informal observation of tasks, assessment of data and development of plan of care and goals. Treatment time included skilled education and facilitation of tasks to increase safety and independence with ADL's for improved functional independence and quality of life. Discharge Recommendations:  (TCU)    Patient Education:  OT Education: OT Role, Plan of Care, ADL Adaptive Strategies, Precautions, Transfer Training  Barriers to Learning: none    Equipment Recommendations:   Other: Monitor for RTS & LH AE needs    Plan:  Times per week: 6x  Current Treatment Recommendations: Functional Mobility Training, Balance Training, Self-Care / ADL, Endurance Training, Safety Education & Training    Goals:  Patient goals : go home  Short term goals  Time Frame for Short term goals: 2 weeks  Short term goal 1: Complete various t/fs including BSC with 0>min A  Short term goal 2: Complete 1 min standing with CGA to increase indep with toileting  Short term goal 3: Complete mobility to [facility] or bedside chair with RW & 0>min A x 1  Short term goal 4: Complete LE dressing with mod A, LH AE, & 0-2 vcs for hip precautions  Long term goals  Time Frame for Long term goals : No LTG set d/t short ELOS         Following

## 2020-01-03 NOTE — PROGRESS NOTES
[address] [address]  INPATIENT PHYSICAL THERAPY  DAILY NOTE  [facility] ICU 4D - 4D-15/015-A    Time In: 9663  Time Out: 1420  Timed Code Treatment Minutes: 26 Minutes  Minutes: 26          Date: 1/3/2020  Patient Name: Yon Curtis,  Gender:  female        MRN: [id number]  : 1945  (76 y.o.)     Referring Practitioner: FREDA Frye  Diagnosis: pathologic fracture of femoral neck, right  Additional Pertinent Hx: Per EMR: \"Fannie Payne is a 76 y.o. female who presents for evaluation of pain to the right leg and hip that onset following a fall that occurred 3 days ago. Patient reports that she tripped over her cat and fell 3 days ago, experiencing immediate pain to the right leg and hip. Patient notes worsening of pain since initial fall and reports exacerbation of pain when she attempts to bear weight onto her right leg. Patient describes additional symptom of nausea. Patient denies hitting her head during the incident and denies headache and chest pain. Patient reports using oxygen treatments at home due to condition of chronic obstructive pulmonary disease. Patient notes a history of smoking but denies current nicotine use. Patient additionally has a history of congestive heart failure and notes that edema to her lower extremities is normal at baseline. \"     Prior Level of Function:  Lives With: Alone  Type of Home: House  Home Layout: One level  Home Access: Stairs to enter with rails  Entrance Stairs - Number of Steps: 2  Entrance Stairs - Rails: Right(grab bar)  Home Equipment: Standard walker, Cane, Oxygen, 4 wheeled walker   Bathroom Shower/Tub: Tub/Shower unit, Shower chair with back  Bathroom Toilet: Standard  Bathroom Equipment: Shower chair, Grab bars in shower    Receives Help From: (niece/dtr assists with groceries)  ADL Assistance: Independent  Homemaking Assistance: Needs assistance  Ambulation Assistance: Independent  Transfer Assistance: Independent  Active :  Yes  Additional Comments: level of care  Discharge Recommendations:  Continue to assess pending progress, Subacute/Skilled Nursing Facility    Plan: Times per week: 6xBID,1xqd  Current Treatment Recommendations: Strengthening, Gait Training, Patient/Caregiver Education & Training, Balance Training, Functional Mobility Training, Endurance Training, Home Exercise Program, Transfer Training, Safety Education & Training, Equipment Evaluation, Education, & procurement, Neuromuscular Re-education    Patient Education  Patient Education: Plan of Care, Precautions/Restrictions, [employer] Mobility, Transfers, Verbal Exercise Instruction, importance of mobility throughout the day, wrote therex on care oard and encouraged pt to get up with staff to sit in chair this evening    Goals:  Patient goals : return home  Short term goals  Time Frame for Short term goals: by discharge  Short term goal 1: bed mobility with supervision A for ease of getting in/out of bed  Short term goal 2: sit <> stand with supervision A for ease of transfers  Short term goal 3: ambulate > 75ft with use of LRAD and stand-by assist to prepare for safe in home mobility   Short term goal 4: 2 steps with 1 Ue support while managing LRAD and stand-by assist for safe entry/exit of her home  Long term goals  Time Frame for Long term goals : N/A secondary to short ELOS    Following session, patient left in safe position with all fall risk precautions in place.

## 2020-01-03 NOTE — PLAN OF CARE
Problem: Impaired respiratory status  Goal: Lung sounds clear or within normal limits for patient  Outcome: Ongoing  Dulera bid  Spiriva q day

## 2020-01-03 NOTE — PROGRESS NOTES
Orthopedic Progress Note    Franchot Bar  1/3/2020    Subjective:     Post-Operative Day # 2 s/p right hip hemiarthroplasty for femoral neck fracture. Systemic or Specific Complaints: Patient residing in the ICU, was post operatively intubated for respiratory failure. Now extubated on BiPap. Weaning levophed. No unusual complaints    Objective:     BP (!) 102/55   Pulse 102   Temp 98.5 °F (36.9 °C) (Oral)   Resp 12   Ht 5' (1.524 m)   Wt 138 lb 10.7 oz (62.9 kg)   SpO2 100%   BMI 27.08 kg/m²     Intake/Output Summary (Last 24 hours) at 1/3/2020 0726  Last data filed at 1/3/2020 0453  Gross per 24 hour   Intake 3195.72 ml   Output 1275 ml   Net 1920.72 ml     DRAIN/TUBE OUTPUT:       General: Sleeping, arouses, answers questions   Wound: Dressing clean dry and intact   Extremity: Foot sensation intact, flexing/extending ankles and toes. Tolerates hip motion. DVT Exam: No evidence of DVT by physical exam     Data Review  CBC:   Lab Results   Component Value Date    WBC 10.6 01/03/2020    RBC 2.58 01/03/2020    HGB 7.6 01/03/2020    HCT 25.6 01/03/2020     01/03/2020     BMP:    Lab Results   Component Value Date     01/03/2020    K 4.4 01/03/2020    K 3.8 10/15/2018     01/03/2020    CO2 31 01/03/2020    BUN 16 01/03/2020    CREATININE 0.7 01/03/2020    CALCIUM 8.0 01/03/2020    GLUCOSE 107 01/03/2020     PT/INR:    Lab Results   Component Value Date    INR 1.10 01/01/2020       Radiology: See electronic record to view reports. Reports reviewed. Assessment:     Status Post right hip hemiarthroplasty for femoral neck fracture, extubated on BiPAP.       Plan:      1: Continue medical management per intensivist  team.   2:  Continue pain control  3:  Physical therapy as per protocol when able   4: Posterior hip precautions  5: Hip abduction pillow  6: WBAT  7:  On home xarelto for DVT ppx  8:  Vitamin D  9: Follow up 3 weeks with Dr. Monserrat Abel, Dressing clean dry and intact until follow

## 2020-01-03 NOTE — PROGRESS NOTES
Assessment and Plan:          1. Acute on chronic hypoxic and hypercapnic  respiratory failure: s/p extubation 1/2/2020, now on 2L at rest and 6 L with activity which is baseline   2. Acute blood loss macrocytic anemia: H/H 7.6/25.6, noted to have received 1 unit blood intraoperatively   3. Thrombocytopenia:  Was on heparin drip, now off and back on Xarelto improving, monitor   4. Hypertension: lopressor, digoxin   5. Pulmonary embolism, history: home Xarelto restarted was on heparin drip prior   6. Metastatic non small cell lung cancer: last treatment 12/23, next treatment scheduled for February 7. COPD: Spiriva, Dulera   8. Diabetic neuropathy: Neurontin   9. Hypothyroidism: Synthroid       CC:  Hypoxia   HPI: Adrian Lawson is a 76year old white female who presented to [facility] after complaints of a fall. She has a past medical history of  UTI, Sleep apnea, pneumonia, HTN, GERD, COPD, chronic respiratory failure, CHF, and cancer. Per report she was walking into her home and her cat was weaving in and out of her legs causing her to fall. She denies hitting her head and stated at the time she only injury was her right lower extremity. She does have a significant history of non small cell metastatic left lung cancer and is under the care of Dr. Armen Preciado. She also does have significant hx of pulmonary embolus and is on Xarelto. She was started on heparin to bridge. Per xray she has noted right subcapital right femoral neck fracture which she went to the OR for  repaired 1/1/2020, with Dr. Taurus Dhaliwal. In PACU patient was noted to be unresponsive, Dr. Marianna Bailey anesthesiologist was called and code stroke was called in the PACU. CT did not show stoke, patient was placed on CPAP and continued to be lethargic, ABG was obtained and C02 was 140, she was intubated and moved to ICU. 1/2/2020 she was extubated 1/3/2020 noted up to the chair, denies SOB outside of baseline. On NC, plans for transfer once bed is available.      Review of

## 2020-01-03 NOTE — PROGRESS NOTES
[address] THERAPY  EVALUATION  [facility] ICU 4D - 4D-15/015-A    Time In: 6344  Time Out: 1017  Timed Code Treatment Minutes: 25 Minutes  Minutes: 34          Date: 1/3/2020  Patient Name: Guillaume Blackmon,  Gender:  female        MRN: [id number]  : 1945  (76 y.o.)      Referring Practitioner: FREDA Calix  Diagnosis: pathologic fracture of femoral neck, right  Additional Pertinent Hx: Per EMR: \"Fannie Portillo Si is a 76 y.o. female who presents for evaluation of pain to the right leg and hip that onset following a fall that occurred 3 days ago. Patient reports that she tripped over her cat and fell 3 days ago, experiencing immediate pain to the right leg and hip. Patient notes worsening of pain since initial fall and reports exacerbation of pain when she attempts to bear weight onto her right leg. Patient describes additional symptom of nausea. Patient denies hitting her head during the incident and denies headache and chest pain. Patient reports using oxygen treatments at home due to condition of chronic obstructive pulmonary disease. Patient notes a history of smoking but denies current nicotine use. Patient additionally has a history of congestive heart failure and notes that edema to her lower extremities is normal at baseline. \"     Restrictions/Precautions:  Restrictions/Precautions: Weight Bearing, Fall Risk  Right Lower Extremity Weight Bearing: Weight Bearing As Tolerated  Position Activity Restriction  Hip Precautions: No hip flexion > 90 degrees, No ADduction, No hip internal rotation  Other position/activity restrictions: Home O2: baseline is 2L at rest/6L with activity    Subjective:  Chart Reviewed: Yes  Patient assessed for rehabilitation services?: Yes  Family / Caregiver Present: No  Subjective: RN approved PT evaluation. Pt in supine upon entry and was agreeable to PT interventions. Post session, pt in bedside chair with all needs in reach. RN aware. General:  Overall Orientation Status: Within Functional Limits  Follows Commands: Within Functional Limits    Vision: Impaired  Vision Exceptions: Wears glasses at all times    Hearing: Within functional limits      Pain:  Yes. Pain Assessment  Pain Assessment: 0-10  Pain Level: 8  Pain Location: Hip  Pain Orientation: Right  Non-Pharmaceutical Pain Intervention(s): Ambulation/Increased Activity  Response to Pain Intervention: Patient Satisfied       Social/Functional History:    Lives With: Alone  Type of Home: House  Home Layout: One level  Home Access: Stairs to enter with rails  Entrance Stairs - Number of Steps: 2  Entrance Stairs - Rails: Right(grab bar)  Home Equipment: Standard walker, Cane, Oxygen, 4 wheeled walker       Receives Help From: (niece/dtr assists with groceries)  Ambulation Assistance: Independent  Transfer Assistance: Independent    Active : Yes     Additional Comments: Pt reports I prior to admission.      OBJECTIVE:  Range of Motion:  Right Lower Extremity: WFL  Left Lower Extremity: WFL    Strength:  Right Lower Extremity: Impaired - pain limited, hip/knee grossly: 2+ to 3/5, ankle: 5/5  Left Lower Extremity: WFL    Balance:  Static Standing Balance: Minimal Assistance, X 1, X 2, with cues for safety, with verbal cues , with increased time for completion  Dynamic Standing Balance: Minimal Assistance, X 2    Bed Mobility:  Supine to Sit: Moderate Assistance, with head of bed raised, with verbal cues , with increased time for completion    Transfers:  Sit to Stand: Minimal Assistance, X 2, with increased time for completion, with verbal cues, to/from chair with arms  Stand to Sit:Contact Guard Assistance, X 2, with increased time for completion, with verbal cues, to/from chair with arms  Cueing for sequencing, hand placement, safety    Ambulation:  Minimal Assistance, Moderate Assistance, X 2, with cues for safety, with verbal cues , with increased time for completion, posterior Pt admitted secondary to fall. Pt is s/p right hemiarthroplasty secondary to femural fracture. Pt requires 1-2 person assist at this time for limited mobility. Pt will benefit from skilled PT services during admission and post d/c for improved functional I and safety with mobility. Prognosis: Good    REQUIRES PT FOLLOW UP: Yes    Discharge Recommendations:  Discharge Recommendations: Continue to assess pending progress, Subacute/Skilled Nursing Facility    Patient Education:  PT Education: Goals, PT Role, Plan of Care, Home Exercise Program    Equipment Recommendations:  Equipment Needed: No  Other: defer to next level of care    Plan:  Times per week: 6xBID,1xqd  Current Treatment Recommendations: Strengthening, Gait Training, Patient/Caregiver Education & Training, Balance Training, Functional Mobility Training, Endurance Training, Home Exercise Program, Transfer Training, Safety Education & Training, Equipment Evaluation, Education, & procurement, Neuromuscular Re-education    Goals:  Patient goals : return home  Short term goals  Time Frame for Short term goals: by discharge  Short term goal 1: bed mobility with supervision A for ease of getting in/out of bed  Short term goal 2: sit <> stand with supervision A for ease of transfers  Short term goal 3: ambulate > 75ft with use of LRAD and stand-by assist to prepare for safe in home mobility   Short term goal 4: 2 steps with 1 Ue support while managing LRAD and stand-by assist for safe entry/exit of her home  Long term goals  Time Frame for Long term goals : N/A secondary to short ELOS    Following session, patient left in safe position with all fall risk precautions in place.

## 2020-01-03 NOTE — PLAN OF CARE
Problem: Neurological  Goal: Maximum potential motor/sensory/cognitive function  Outcome: Met This Shift  Note:   Alert and oriented x4     Problem: Nutrition  Goal: Optimal nutrition therapy  Outcome: Met This Shift  Note:   Appetite is good     Problem: Falls - Risk of:  Goal: Will remain free from falls  Description  Will remain free from falls  Outcome: Ongoing  Note:   Call light in reach, bed in lowest position, and bed alarm activated. Education given on use of call light before ambulation and when in need of assistance. Patient expressed understanding. Hourly visual checks performed and charted. Toileting offered to patient. No falls this shift, at any time. Arm band and falling star in place. Will continue to monitor.       Problem: Falls - Risk of:  Goal: Absence of physical injury  Description  Absence of physical injury  Outcome: Ongoing  Note:   No sign of physical injury     Problem: Falls - Risk of:  Goal: Absence of physical injury  Description  Absence of physical injury  Outcome: Ongoing  Note:   No sign of physical injury     Problem: Pain:  Goal: Pain level will decrease  Description  Pain level will decrease  Outcome: Ongoing  Note:   reposition     Problem: Pain:  Goal: Control of acute pain  Description  Control of acute pain  Outcome: Ongoing  Note:   Dilaudid and tylenol     Problem: Pain:  Goal: Control of chronic pain  Description  Control of chronic pain  Outcome: Ongoing  Note:   tylenol     Problem: Cardiovascular  Goal: No DVT, peripheral vascular complications  Outcome: Ongoing  Note:   Palpable pulses patient on     Problem: Respiratory  Goal: No pulmonary complications  Outcome: Ongoing  Note:   Patient remains on 2 liters nasal cannula but needs increased to 4 with activity     Problem:   Goal: Adequate urinary output  Outcome: Ongoing  Note:   Strict I and o     Problem: Skin Integrity/Risk  Goal: No skin breakdown during hospitalization  Outcome: Ongoing  Note:   Turn

## 2020-01-04 LAB
ANION GAP SERPL CALCULATED.3IONS-SCNC: 8 MEQ/L (ref 8–16)
BUN BLDV-MCNC: 19 MG/DL (ref 7–22)
CALCIUM SERPL-MCNC: 8.2 MG/DL (ref 8.5–10.5)
CHLORIDE BLD-SCNC: 103 MEQ/L (ref 98–111)
CO2: 30 MEQ/L (ref 23–33)
CREAT SERPL-MCNC: 0.7 MG/DL (ref 0.4–1.2)
ERYTHROCYTE [DISTWIDTH] IN BLOOD BY AUTOMATED COUNT: 17.6 % (ref 11.5–14.5)
ERYTHROCYTE [DISTWIDTH] IN BLOOD BY AUTOMATED COUNT: 61.1 FL (ref 35–45)
GFR SERPL CREATININE-BSD FRML MDRD: 82 ML/MIN/1.73M2
GLUCOSE BLD-MCNC: 83 MG/DL (ref 70–108)
HCT VFR BLD CALC: 25.8 % (ref 37–47)
HEMOGLOBIN: 8 GM/DL (ref 12–16)
MCH RBC QN AUTO: 29.5 PG (ref 26–33)
MCHC RBC AUTO-ENTMCNC: 31 GM/DL (ref 32.2–35.5)
MCV RBC AUTO: 95.2 FL (ref 81–99)
MRSA SCREEN: NORMAL
PLATELET # BLD: 128 THOU/MM3 (ref 130–400)
PMV BLD AUTO: 10.1 FL (ref 9.4–12.4)
POTASSIUM SERPL-SCNC: 4.8 MEQ/L (ref 3.5–5.2)
RBC # BLD: 2.71 MILL/MM3 (ref 4.2–5.4)
SODIUM BLD-SCNC: 141 MEQ/L (ref 135–145)
URINE CULTURE, ROUTINE: NORMAL
WBC # BLD: 8.2 THOU/MM3 (ref 4.8–10.8)

## 2020-01-04 PROCEDURE — 80048 BASIC METABOLIC PNL TOTAL CA: CPT

## 2020-01-04 PROCEDURE — 94761 N-INVAS EAR/PLS OXIMETRY MLT: CPT

## 2020-01-04 PROCEDURE — 51701 INSERT BLADDER CATHETER: CPT

## 2020-01-04 PROCEDURE — 6370000000 HC RX 637 (ALT 250 FOR IP): Performed by: INTERNAL MEDICINE

## 2020-01-04 PROCEDURE — 85027 COMPLETE CBC AUTOMATED: CPT

## 2020-01-04 PROCEDURE — 94640 AIRWAY INHALATION TREATMENT: CPT

## 2020-01-04 PROCEDURE — 2580000003 HC RX 258: Performed by: PHYSICIAN ASSISTANT

## 2020-01-04 PROCEDURE — 97110 THERAPEUTIC EXERCISES: CPT

## 2020-01-04 PROCEDURE — 94660 CPAP INITIATION&MGMT: CPT

## 2020-01-04 PROCEDURE — 2700000000 HC OXYGEN THERAPY PER DAY

## 2020-01-04 PROCEDURE — 6370000000 HC RX 637 (ALT 250 FOR IP): Performed by: FAMILY MEDICINE

## 2020-01-04 PROCEDURE — 6370000000 HC RX 637 (ALT 250 FOR IP): Performed by: PHYSICIAN ASSISTANT

## 2020-01-04 PROCEDURE — 99232 SBSQ HOSP IP/OBS MODERATE 35: CPT | Performed by: FAMILY MEDICINE

## 2020-01-04 PROCEDURE — 6370000000 HC RX 637 (ALT 250 FOR IP): Performed by: NURSE PRACTITIONER

## 2020-01-04 PROCEDURE — 2580000003 HC RX 258: Performed by: NURSE PRACTITIONER

## 2020-01-04 PROCEDURE — 97116 GAIT TRAINING THERAPY: CPT

## 2020-01-04 PROCEDURE — 1200000003 HC TELEMETRY R&B

## 2020-01-04 RX ORDER — SENNA PLUS 8.6 MG/1
1 TABLET ORAL NIGHTLY
Status: DISCONTINUED | OUTPATIENT
Start: 2020-01-04 | End: 2020-01-06 | Stop reason: HOSPADM

## 2020-01-04 RX ADMIN — GABAPENTIN 300 MG: 300 CAPSULE ORAL at 14:43

## 2020-01-04 RX ADMIN — Medication 1 CAPSULE: at 08:55

## 2020-01-04 RX ADMIN — HYDROCODONE BITARTRATE AND ACETAMINOPHEN 1 TABLET: 10; 325 TABLET ORAL at 06:25

## 2020-01-04 RX ADMIN — SODIUM CHLORIDE: 9 INJECTION, SOLUTION INTRAVENOUS at 04:12

## 2020-01-04 RX ADMIN — SENNA PLUS 8.6 MG: 8.6 TABLET ORAL at 21:23

## 2020-01-04 RX ADMIN — ACETAMINOPHEN 500 MG: 500 TABLET ORAL at 00:19

## 2020-01-04 RX ADMIN — METOPROLOL TARTRATE 12.5 MG: 25 TABLET ORAL at 21:24

## 2020-01-04 RX ADMIN — Medication 2 PUFF: at 18:46

## 2020-01-04 RX ADMIN — Medication 2 PUFF: at 05:14

## 2020-01-04 RX ADMIN — ROPINIROLE HYDROCHLORIDE 0.25 MG: 0.25 TABLET, FILM COATED ORAL at 08:55

## 2020-01-04 RX ADMIN — DOCUSATE SODIUM 100 MG: 100 CAPSULE, LIQUID FILLED ORAL at 08:55

## 2020-01-04 RX ADMIN — HYDROCODONE BITARTRATE AND ACETAMINOPHEN 1 TABLET: 10; 325 TABLET ORAL at 21:23

## 2020-01-04 RX ADMIN — RIVAROXABAN 20 MG: 20 TABLET, FILM COATED ORAL at 08:55

## 2020-01-04 RX ADMIN — FERROUS SULFATE TAB 325 MG (65 MG ELEMENTAL FE) 325 MG: 325 (65 FE) TAB at 08:55

## 2020-01-04 RX ADMIN — HYDROXYZINE PAMOATE 25 MG: 25 CAPSULE ORAL at 09:10

## 2020-01-04 RX ADMIN — Medication 1 CAPSULE: at 21:24

## 2020-01-04 RX ADMIN — TIOTROPIUM BROMIDE 18 MCG: 18 CAPSULE ORAL; RESPIRATORY (INHALATION) at 05:14

## 2020-01-04 RX ADMIN — HYDROCODONE BITARTRATE AND ACETAMINOPHEN 1 TABLET: 10; 325 TABLET ORAL at 14:43

## 2020-01-04 RX ADMIN — VITAMIN D, TAB 1000IU (100/BT) 5000 UNITS: 25 TAB at 08:54

## 2020-01-04 RX ADMIN — Medication 1 CAPSULE: at 14:43

## 2020-01-04 RX ADMIN — ROPINIROLE HYDROCHLORIDE 0.25 MG: 0.25 TABLET, FILM COATED ORAL at 21:24

## 2020-01-04 RX ADMIN — METOPROLOL TARTRATE 25 MG: 25 TABLET ORAL at 08:55

## 2020-01-04 RX ADMIN — DIGOXIN 125 MCG: 125 TABLET ORAL at 08:55

## 2020-01-04 RX ADMIN — HYDROXYZINE PAMOATE 25 MG: 25 CAPSULE ORAL at 23:19

## 2020-01-04 RX ADMIN — DOCUSATE SODIUM 100 MG: 100 CAPSULE, LIQUID FILLED ORAL at 21:28

## 2020-01-04 RX ADMIN — Medication 10 ML: at 21:23

## 2020-01-04 RX ADMIN — POLYETHYLENE GLYCOL 3350 17 G: 17 POWDER, FOR SOLUTION ORAL at 09:10

## 2020-01-04 RX ADMIN — GABAPENTIN 300 MG: 300 CAPSULE ORAL at 08:55

## 2020-01-04 RX ADMIN — HYDROXYZINE PAMOATE 25 MG: 25 CAPSULE ORAL at 00:19

## 2020-01-04 RX ADMIN — GABAPENTIN 300 MG: 300 CAPSULE ORAL at 21:24

## 2020-01-04 RX ADMIN — BETAMETHASONE DIPROPIONATE: 0.5 CREAM TOPICAL at 08:55

## 2020-01-04 RX ADMIN — LEVOTHYROXINE SODIUM 75 MCG: 75 TABLET ORAL at 06:25

## 2020-01-04 RX ADMIN — ACETAMINOPHEN 500 MG: 500 TABLET ORAL at 21:23

## 2020-01-04 ASSESSMENT — PAIN DESCRIPTION - FREQUENCY
FREQUENCY: CONTINUOUS
FREQUENCY: CONTINUOUS

## 2020-01-04 ASSESSMENT — PAIN SCALES - GENERAL
PAINLEVEL_OUTOF10: 8
PAINLEVEL_OUTOF10: 5
PAINLEVEL_OUTOF10: 7
PAINLEVEL_OUTOF10: 8
PAINLEVEL_OUTOF10: 8
PAINLEVEL_OUTOF10: 5
PAINLEVEL_OUTOF10: 7
PAINLEVEL_OUTOF10: 5
PAINLEVEL_OUTOF10: 8
PAINLEVEL_OUTOF10: 7

## 2020-01-04 ASSESSMENT — PAIN DESCRIPTION - PROGRESSION
CLINICAL_PROGRESSION: NOT CHANGED

## 2020-01-04 ASSESSMENT — PAIN DESCRIPTION - ORIENTATION
ORIENTATION: RIGHT
ORIENTATION: RIGHT;LOWER
ORIENTATION: LOWER;RIGHT

## 2020-01-04 ASSESSMENT — PAIN DESCRIPTION - ONSET
ONSET: ON-GOING
ONSET: ON-GOING

## 2020-01-04 ASSESSMENT — PAIN DESCRIPTION - LOCATION
LOCATION: HIP;BACK
LOCATION: HIP
LOCATION: HIP;BACK

## 2020-01-04 ASSESSMENT — PAIN DESCRIPTION - PAIN TYPE
TYPE: SURGICAL PAIN
TYPE: SURGICAL PAIN;ACUTE PAIN
TYPE: SURGICAL PAIN;ACUTE PAIN

## 2020-01-04 ASSESSMENT — PAIN DESCRIPTION - DESCRIPTORS
DESCRIPTORS: ACHING
DESCRIPTORS: ACHING

## 2020-01-04 NOTE — PROGRESS NOTES
[facility]  INPATIENT PHYSICAL THERAPY  DAILY NOTE  [facility] ORTHOPEDICS 7K - 7K-22/022-A      Time In: 0172  Time Out: 7986  Timed Code Treatment Minutes: 25 Minutes  Minutes: 25          Date: 2020  Patient Name: Luis Hall,  Gender:  female        MRN: [id number]  : 1945  (76 y.o.)     Referring Practitioner: Claudell Reams, PA  Diagnosis: pathologic fracture of femoral neck, right  Additional Pertinent Hx: Per EMR: \"Fannie Keen is a 76 y.o. female who presents for evaluation of pain to the right leg and hip that onset following a fall that occurred 3 days ago. Patient reports that she tripped over her cat and fell 3 days ago, experiencing immediate pain to the right leg and hip. Patient notes worsening of pain since initial fall and reports exacerbation of pain when she attempts to bear weight onto her right leg. Patient describes additional symptom of nausea. Patient denies hitting her head during the incident and denies headache and chest pain. Patient reports using oxygen treatments at home due to condition of chronic obstructive pulmonary disease. Patient notes a history of smoking but denies current nicotine use. Patient additionally has a history of congestive heart failure and notes that edema to her lower extremities is normal at baseline. \"     Prior Level of Function:  Lives With: Alone  Type of Home: House  Home Layout: One level  Home Access: Stairs to enter with rails  Entrance Stairs - Number of Steps: 2  Entrance Stairs - Rails: Right(grab bar)  Home Equipment: Standard walker, Cane, Oxygen, 4 wheeled walker   Bathroom Shower/Tub: Tub/Shower unit, Shower chair with back  Bathroom Toilet: Standard  Bathroom Equipment: Shower chair, Grab bars in shower    Receives Help From: (niece/dtr assists with groceries)  ADL Assistance: Independent  Homemaking Assistance: Needs assistance  Ambulation Assistance: Independent  Transfer Assistance: Independent  Active :  Yes  Additional treatment: good. Pt up in bedside chair at end of session. Pt moving much better this date. Equipment Recommendations:Equipment Needed: No  Other: defer to next level of care  Discharge Recommendations:    Continue to assess pending progress, Subacute/Skilled Nursing Facility    Plan: Times per week: 6xBID,1xqd  Current Treatment Recommendations: Strengthening, Gait Training, Patient/Caregiver Education & Training, Balance Training, Functional Mobility Training, Endurance Training, Home Exercise Program, Transfer Training, Safety Education & Training, Equipment Evaluation, Education, & procurement, Neuromuscular Re-education    Patient Education  Patient Education: Plan of Care, [employer] Mobility, Transfers, Gait    Goals:  Patient goals : return home  Short term goals  Time Frame for Short term goals: by discharge  Short term goal 1: bed mobility with supervision A for ease of getting in/out of bed  Short term goal 2: sit <> stand with supervision A for ease of transfers  Short term goal 3: ambulate > 75ft with use of LRAD and stand-by assist to prepare for safe in home mobility   Short term goal 4: 2 steps with 1 Ue support while managing LRAD and stand-by assist for safe entry/exit of her home  Long term goals  Time Frame for Long term goals : N/A secondary to short ELOS    Following session, patient left in safe position with all fall risk precautions in place.

## 2020-01-04 NOTE — PROGRESS NOTES
SNF/ECF. Patient wishes to go home.    4: Posterior hip precautions  5: Hip abduction pillows  6:  WBAT  7: Home Xarelto for DVT ppx  8:  Vitamin D  9:  Keep dressing clean dry and intact until follow up    Electronically signed by Markell Dupree PA-C on 1/4/2020 at 2:56 PM

## 2020-01-04 NOTE — PROCEDURES
A Bladder scan was performed at 2332 . The patient's last void was at patient has not voided . The residual amount was measured to be 645 ML. Report of results was given to [facility] AND [facility].

## 2020-01-04 NOTE — PROGRESS NOTES
Straight cath performed per order using sterile technique with 450 mls of clear yellow urine returned. Patient tolerated well.

## 2020-01-04 NOTE — PLAN OF CARE
Problem: Falls - Risk of:  Goal: Will remain free from falls  Description  Will remain free from falls  1/4/2020 0313 by Trent Cano RN  Outcome: Ongoing  Note:   No falls noted this shift. Bed kept in low position. Safe environment maintained. Bedside table & call light in reach. Uses call light appropriately when needing assistance. Problem: Falls - Risk of:  Goal: Absence of physical injury  Description  Absence of physical injury  1/4/2020 0313 by Trent Cano RN  Outcome: Ongoing  Note:   No falls noted this shift. Bed kept in low position. Safe environment maintained. Bedside table & call light in reach. Uses call light appropriately when needing assistance. Problem: Pain:  Goal: Pain level will decrease  Description  Pain level will decrease  1/4/2020 0313 by Trent Cano RN  Outcome: Ongoing  Note:   Pt report pain at 8 on scale. Pt states oral medication helping to achieve pain goal of a 0 on scale. Problem: Pain:  Goal: Control of acute pain  Description  Control of acute pain  1/4/2020 0313 by Trent Cano RN  Outcome: Ongoing  Note:   Pt report pain at 8 on scale. Pt states oral medication helping to achieve pain goal of a 0 on scale. Problem: Neurological  Goal: Maximum potential motor/sensory/cognitive function  1/4/2020 0313 by Trent Cano RN  Outcome: Ongoing  Note:   Pt active all extremities. Pt states numbness/tingling to BLE. Pt is alert and oriented x4. Problem: Cardiovascular  Goal: No DVT, peripheral vascular complications  3/8/7483 6158 by Trent Cano RN  Outcome: Ongoing  Note:   Pt without s/s of DVT. Pt able to use SCD,S in place to help prevent development of DVT. Problem: Respiratory  Goal: No pulmonary complications  1/6/4914 4406 by Trent Cano RN  Outcome: Ongoing  Note:   Pt sats 96 on 2 LO2 per nasal cannula. No shortness of breath noted. Lungs clear/diminished, uses IS, and able to C&DB  as ordered.         Problem: GI  Goal: No bowel complications  9/0/2352 6954 by Minna Rubi RN  Outcome: Ongoing  Note:   Pt with bowel sounds, passing flatus, and without nausea. Taking prescribed medications to assist with BM       Problem:   Goal: Adequate urinary output  1/4/2020 0313 by Minna Rubi RN  Outcome: Ongoing  Note:   Pt voiding adequate amounts with difficulty, pt voided via straight cath. Problem: Nutrition  Goal: Optimal nutrition therapy  1/4/2020 0313 by Minna Rubi RN  Outcome: Ongoing  Note:   Pt on a low sodium diet and tolerating well. Problem: Skin Integrity/Risk  Goal: No skin breakdown during hospitalization  1/4/2020 0313 by Minna Rubi RN  Outcome: Ongoing  Note:   Pt able to make position changes per staff assistance every 2 hours and as needed. Encouraged to make frequent changes in position to prevent skin breakdown. Problem: Musculor/Skeletal Functional Status  Goal: Highest potential functional level  1/4/2020 0313 by Minna Rubi RN  Outcome: Ongoing  Note:   Pt is active all extremities. Problem: Discharge Planning:  Goal: Discharged to appropriate level of care  Description  Discharged to appropriate level of care  1/4/2020 0313 by Minna Rubi RN  Outcome: Ongoing  Note:   Pt plans TCU at discharge. Care manager and social working helping with discharge needs. Problem: Risk for Impaired Skin Integrity  Goal: Tissue integrity - skin and mucous membranes  Description  Structural intactness and normal physiological function of skin and  mucous membranes. 1/4/2020 0313 by Minna Rubi RN  Outcome: Ongoing  Note:   Pt able to make position changes per staff assistance every 2 hours and as needed. Encouraged to make frequent changes in position to prevent skin breakdown.       Problem: Impaired respiratory status  Goal: Lung sounds clear or within normal limits for patient  1/4/2020 6600 by Minna Rubi RN  Outcome: Ongoing  Note:   Pt lung sounds are

## 2020-01-04 NOTE — PROGRESS NOTES
01/02/20  0602 01/03/20  0512 01/03/20  1800 01/04/20  0635   WBC 10.5 10.6  --  8.2   HGB 9.0* 7.6* 8.3* 8.0*   HCT 28.9* 25.6* 27.3* 25.8*   * 118*  --  128*     Recent Labs     01/02/20  0602 01/03/20  0512 01/04/20  0635    142 141   K 5.0 4.4 4.8    105 103   CO2 29 31 30   BUN 19 16 19   CREATININE 0.8 0.7 0.7   CALCIUM 7.8* 8.0* 8.2*     No results for input(s): AST, ALT, BILIDIR, BILITOT, ALKPHOS in the last 72 hours. Recent Labs     01/01/20  2318   INR 1.10     No results for input(s): Eugune Ran in the last 72 hours. Urinalysis:      Lab Results   Component Value Date    NITRU NEGATIVE 12/30/2019    WBCUA 5-10 12/30/2019    BACTERIA FEW 12/30/2019    RBCUA 10-15 12/30/2019    BLOODU MODERATE 12/30/2019    SPECGRAV 1.013 10/17/2012    GLUCOSEU NEGATIVE 12/30/2019       Radiology:  XR CHEST PORTABLE   Final Result   Improved aeration left base. Otherwise stable. **This report has been created using voice recognition software. It may contain minor errors which are inherent in voice recognition technology. **      Final report electronically signed by Dr. Renetta Hernandes on 1/2/2020 10:21 AM      CT HEAD WO CONTRAST   Final Result   1. Redemonstration of mild cerebral atrophy with mild hypodensity of the right parietal deep white matter of the corona radiata. 2. Persistent mild periventricular white matter changes of the occipital lobe on the right and less so on the left. Regional edema cannot be excluded related to transient ischemic changes. If clinical symptoms persist follow-up MRI should be considered    if is not contraindicated. **This report has been created using voice recognition software. It may contain minor errors which are inherent in voice recognition technology. **      Final report electronically signed by Dr. Alisa Karimi on 1/2/2020 5:42 AM      XR CHEST PORTABLE   Final Result   1.  Interval placement of endotracheal and nasogastric tubes in respective locations discussed above. 2. Postsurgical changes of the right hemithorax. Coarse interstitial markings are present. 3. Bibasilar atelectasis and/or infiltrate present. **This report has been created using voice recognition software. It may contain minor errors which are inherent in voice recognition technology. **      Final report electronically signed by Dr. Neo Harris on 1/2/2020 12:35 AM      CT HEAD WO CONTRAST   Final Result   The study is mildly limited by motion. Small areas of occipital edema are possible, right more so than left. **This report has been created using voice recognition software. It may contain minor errors which are inherent in voice recognition technology. **      Final report electronically signed by Dr. Kit Ibarra on 1/1/2020 8:47 PM      XR HIP RIGHT (2-3 VIEWS)   Final Result   1. Postsurgical changes of right hip arthroplasty. **This report has been created using voice recognition software. It may contain minor errors which are inherent in voice recognition technology. **      Final report electronically signed by Dr. Neo Harris on 1/1/2020 9:29 PM      XR CHEST PORTABLE   Final Result   1. Stable volume loss of the right hemithorax, correlate with prior lobectomy. 2. Unchanged appearance of diffuse coarse interstitial markings. Underlying changes of COPD are not excluded. **This report has been created using voice recognition software. It may contain minor errors which are inherent in voice recognition technology. **      Final report electronically signed by Dr. Neo Harris on 12/30/2019 9:29 PM      XR HIP 2-3 VW W PELVIS RIGHT   Final Result    Subcapital right femoral neck fracture as discussed above. Mild bilateral hip joint DJD. Lumbar levoscoliosis. **This report has been created using voice recognition software. It may contain minor errors which are inherent in voice recognition technology. **

## 2020-01-05 PROCEDURE — 6370000000 HC RX 637 (ALT 250 FOR IP): Performed by: FAMILY MEDICINE

## 2020-01-05 PROCEDURE — 6370000000 HC RX 637 (ALT 250 FOR IP): Performed by: INTERNAL MEDICINE

## 2020-01-05 PROCEDURE — 97535 SELF CARE MNGMENT TRAINING: CPT

## 2020-01-05 PROCEDURE — 94640 AIRWAY INHALATION TREATMENT: CPT

## 2020-01-05 PROCEDURE — 97530 THERAPEUTIC ACTIVITIES: CPT

## 2020-01-05 PROCEDURE — 99232 SBSQ HOSP IP/OBS MODERATE 35: CPT | Performed by: FAMILY MEDICINE

## 2020-01-05 PROCEDURE — 6370000000 HC RX 637 (ALT 250 FOR IP): Performed by: PHYSICIAN ASSISTANT

## 2020-01-05 PROCEDURE — 97116 GAIT TRAINING THERAPY: CPT

## 2020-01-05 PROCEDURE — 2580000003 HC RX 258: Performed by: PHYSICIAN ASSISTANT

## 2020-01-05 PROCEDURE — 94761 N-INVAS EAR/PLS OXIMETRY MLT: CPT

## 2020-01-05 PROCEDURE — 2700000000 HC OXYGEN THERAPY PER DAY

## 2020-01-05 PROCEDURE — 94660 CPAP INITIATION&MGMT: CPT

## 2020-01-05 PROCEDURE — 6370000000 HC RX 637 (ALT 250 FOR IP): Performed by: NURSE PRACTITIONER

## 2020-01-05 PROCEDURE — 1200000003 HC TELEMETRY R&B

## 2020-01-05 PROCEDURE — 97110 THERAPEUTIC EXERCISES: CPT

## 2020-01-05 RX ADMIN — GABAPENTIN 300 MG: 300 CAPSULE ORAL at 17:47

## 2020-01-05 RX ADMIN — HYDROCODONE BITARTRATE AND ACETAMINOPHEN 1 TABLET: 10; 325 TABLET ORAL at 12:34

## 2020-01-05 RX ADMIN — BISACODYL 10 MG: 10 SUPPOSITORY RECTAL at 17:47

## 2020-01-05 RX ADMIN — METOPROLOL TARTRATE 12.5 MG: 25 TABLET ORAL at 10:38

## 2020-01-05 RX ADMIN — LEVOTHYROXINE SODIUM 75 MCG: 75 TABLET ORAL at 04:38

## 2020-01-05 RX ADMIN — Medication 1 CAPSULE: at 17:47

## 2020-01-05 RX ADMIN — ROPINIROLE HYDROCHLORIDE 0.25 MG: 0.25 TABLET, FILM COATED ORAL at 21:56

## 2020-01-05 RX ADMIN — Medication 1 CAPSULE: at 21:56

## 2020-01-05 RX ADMIN — Medication 2 PUFF: at 06:10

## 2020-01-05 RX ADMIN — BETAMETHASONE DIPROPIONATE: 0.5 CREAM TOPICAL at 10:38

## 2020-01-05 RX ADMIN — DIGOXIN 125 MCG: 125 TABLET ORAL at 10:39

## 2020-01-05 RX ADMIN — ACETAMINOPHEN 500 MG: 500 TABLET ORAL at 04:36

## 2020-01-05 RX ADMIN — FERROUS SULFATE TAB 325 MG (65 MG ELEMENTAL FE) 325 MG: 325 (65 FE) TAB at 10:39

## 2020-01-05 RX ADMIN — SENNA PLUS 8.6 MG: 8.6 TABLET ORAL at 21:56

## 2020-01-05 RX ADMIN — POLYETHYLENE GLYCOL 3350 17 G: 17 POWDER, FOR SOLUTION ORAL at 10:38

## 2020-01-05 RX ADMIN — Medication 2 PUFF: at 17:43

## 2020-01-05 RX ADMIN — HYDROXYZINE PAMOATE 25 MG: 25 CAPSULE ORAL at 10:38

## 2020-01-05 RX ADMIN — DOCUSATE SODIUM 100 MG: 100 CAPSULE, LIQUID FILLED ORAL at 10:39

## 2020-01-05 RX ADMIN — ACETAMINOPHEN 500 MG: 500 TABLET ORAL at 21:59

## 2020-01-05 RX ADMIN — HYDROCODONE BITARTRATE AND ACETAMINOPHEN 1 TABLET: 10; 325 TABLET ORAL at 21:56

## 2020-01-05 RX ADMIN — DOCUSATE SODIUM 100 MG: 100 CAPSULE, LIQUID FILLED ORAL at 21:56

## 2020-01-05 RX ADMIN — RIVAROXABAN 20 MG: 20 TABLET, FILM COATED ORAL at 10:38

## 2020-01-05 RX ADMIN — Medication 10 ML: at 21:56

## 2020-01-05 RX ADMIN — TIOTROPIUM BROMIDE 18 MCG: 18 CAPSULE ORAL; RESPIRATORY (INHALATION) at 06:10

## 2020-01-05 RX ADMIN — GABAPENTIN 300 MG: 300 CAPSULE ORAL at 10:39

## 2020-01-05 RX ADMIN — ROPINIROLE HYDROCHLORIDE 0.25 MG: 0.25 TABLET, FILM COATED ORAL at 10:38

## 2020-01-05 RX ADMIN — GABAPENTIN 300 MG: 300 CAPSULE ORAL at 21:56

## 2020-01-05 RX ADMIN — HYDROCODONE BITARTRATE AND ACETAMINOPHEN 1 TABLET: 10; 325 TABLET ORAL at 04:36

## 2020-01-05 RX ADMIN — VITAMIN D, TAB 1000IU (100/BT) 5000 UNITS: 25 TAB at 10:38

## 2020-01-05 RX ADMIN — Medication 1 CAPSULE: at 10:39

## 2020-01-05 RX ADMIN — HYDROXYZINE PAMOATE 25 MG: 25 CAPSULE ORAL at 21:56

## 2020-01-05 ASSESSMENT — PAIN - FUNCTIONAL ASSESSMENT: PAIN_FUNCTIONAL_ASSESSMENT: PREVENTS OR INTERFERES SOME ACTIVE ACTIVITIES AND ADLS

## 2020-01-05 ASSESSMENT — PAIN DESCRIPTION - DESCRIPTORS
DESCRIPTORS: ACHING
DESCRIPTORS: ACHING

## 2020-01-05 ASSESSMENT — PAIN DESCRIPTION - PROGRESSION
CLINICAL_PROGRESSION: GRADUALLY IMPROVING
CLINICAL_PROGRESSION: NOT CHANGED

## 2020-01-05 ASSESSMENT — PAIN SCALES - GENERAL
PAINLEVEL_OUTOF10: 6
PAINLEVEL_OUTOF10: 8
PAINLEVEL_OUTOF10: 4
PAINLEVEL_OUTOF10: 10
PAINLEVEL_OUTOF10: 6
PAINLEVEL_OUTOF10: 10

## 2020-01-05 ASSESSMENT — PAIN DESCRIPTION - PAIN TYPE
TYPE: SURGICAL PAIN
TYPE: SURGICAL PAIN

## 2020-01-05 ASSESSMENT — PAIN DESCRIPTION - ORIENTATION
ORIENTATION: RIGHT
ORIENTATION: RIGHT

## 2020-01-05 ASSESSMENT — PAIN DESCRIPTION - LOCATION
LOCATION: HIP
LOCATION: HIP

## 2020-01-05 ASSESSMENT — PAIN DESCRIPTION - FREQUENCY: FREQUENCY: CONTINUOUS

## 2020-01-05 ASSESSMENT — PAIN DESCRIPTION - ONSET: ONSET: ON-GOING

## 2020-01-05 NOTE — PROGRESS NOTES
[facility]  INPATIENT PHYSICAL THERAPY  DAILY NOTE  [facility] ORTHOPEDICS 7K - 7K-22/022-A    Time In: 0802  Time Out: 1099  Timed Code Treatment Minutes: 26 Minutes  Minutes: 26          Date: 2020  Patient Name: Steffi Mirza,  Gender:  female        MRN: [id number]  : 1945  (76 y.o.)     Referring Practitioner: FREDA Hooper  Diagnosis: pathologic fracture of femoral neck, right  Additional Pertinent Hx: Per EMR: \"Fannie Patrickter is a 76 y.o. female who presents for evaluation of pain to the right leg and hip that onset following a fall that occurred 3 days ago. Patient reports that she tripped over her cat and fell 3 days ago, experiencing immediate pain to the right leg and hip. Patient notes worsening of pain since initial fall and reports exacerbation of pain when she attempts to bear weight onto her right leg. Patient describes additional symptom of nausea. Patient denies hitting her head during the incident and denies headache and chest pain. Patient reports using oxygen treatments at home due to condition of chronic obstructive pulmonary disease. Patient notes a history of smoking but denies current nicotine use. Patient additionally has a history of congestive heart failure and notes that edema to her lower extremities is normal at baseline. \"     Prior Level of Function:  Lives With: Alone  Type of Home: House  Home Layout: One level  Home Access: Stairs to enter with rails  Entrance Stairs - Number of Steps: 2  Entrance Stairs - Rails: Right(grab bar)  Home Equipment: Standard walker, Cane, Oxygen, 4 wheeled walker   Bathroom Shower/Tub: Tub/Shower unit, Shower chair with back  Bathroom Toilet: Standard  Bathroom Equipment: Shower chair, Grab bars in shower    Receives Help From: (niece/dtr assists with groceries)  ADL Assistance: Independent  Homemaking Assistance: Needs assistance  Ambulation Assistance: Independent  Transfer Assistance: Independent  Active :  Yes  Additional Comments: Pt reports I prior to admission without AD. Pt reports daughter has RTS. Current with meals on wheels. Restrictions/Precautions:  Restrictions/Precautions: Weight Bearing, Fall Risk  Right Lower Extremity Weight Bearing: Weight Bearing As Tolerated  Position Activity Restriction  Hip Precautions: No hip flexion > 90 degrees, No ADduction, No hip internal rotation  Other position/activity restrictions: Home O2: baseline is 2L at rest/6L with activity    SUBJECTIVE: RN approved session, pt resting in bed, finishing breakfast, agreeable to therapy. Pt reports feeling better today however cont with pain. Wanting to go home to take care of her cats. O2 on 3L at rest and 6L with activity. PAIN: 7/10: R hip    OBJECTIVE:  Bed Mobility:  Supine to Sit: Moderate Assistance  Sit to Supine: Minimal Assistance     Transfers:  Sit to Stand: Contact Guard Assistance  Stand to Sit:Contact Guard Assistance    Ambulation:  Contact Guard Assistance  Distance: 10ft fwd, 10ft bwd, x2 bouts  Surface: Level Tile  Device:Rolling Walker  Gait Deviations: Forward Flexed Posture, Slow Ligia, Decreased Step Length Bilaterally, Decreased Weight Shift Right, Decreased Gait Speed, Decreased Heel Strike Bilaterally and Decreased Terminal Knee Extension    Balance:  Dynamic Sitting Balance: Supervision  Static Standing Balance: Stand By Assistance    Exercise:  Patient was guided in 1 set(s) 15 reps of exercise to both lower extremities, followed R hip precautions. Ankle pumps, Glut sets, Quad sets, Heelslides, Long arc quads, Hip abduction/adduction and Seated heel/toe raises. Exercises were completed for increased independence with functional mobility. Functional Outcome Measures: Completed  AM-PAC Inpatient Mobility without Stair Climbing Raw Score : 15  AM-PAC Inpatient without Stair Climbing T-Scale Score : 43.03    ASSESSMENT:  Assessment: Patient progressing toward established goals.   Activity Tolerance:  Patient

## 2020-01-05 NOTE — PROGRESS NOTES
Contact Guard Assistance. standing at sink to wash hands   Lower Extremity Dressing: Moderate Assistance, with verbal cues  and with increased time for completion. to don and doff slipper socks sitting EOB wtih sock aide and reacher   Toileting: Contact Guard Assistance. able to wipe self in standing   Toilet Transfer: [employer] and with verbal cues . to STS . BALANCE:  Sitting Balance:  Stand By Assistance  Standing Balance: Contact Guard Assistance, with cues for safety    BED MOBILITY:  Supine to Sit: Minimal Assistance, with increased time for completion for RLE to EOB     TRANSFERS:  Sit to Stand:  [employer], with increased time for completion. from EOB and STS   Stand to Sit: [employer], with verbal cues. to STS and recliner     FUNCTIONAL MOBILITY:  Assistive Device: Rolling Walker  Assist Level:  Contact Guard Assistance. Distance: To and from bathroom and around in room   Pt had no LOB and demo good posture throughout. Cues for line mangement of O2 and IV      ADDITIONAL ACTIVITIES:  Did talk of hip precautions and LHAE of needs and she stated she will see how she does and does have help at home     ASSESSMENT:     Activity Tolerance:  Patient tolerance of  treatment: good. Discharge Recommendations: (TCU)  Equipment Recommendations:  Other: Monitor for RTS & LH AE needs  Plan: Times per week: 6x  Current Treatment Recommendations: Functional Mobility Training, Balance Training, Self-Care / ADL, Endurance Training, Safety Education & Training    Patient Education  Patient Education: ADL's, Precautions and Importance of Increasing Activity    Goals  Short term goals  Time Frame for Short term goals: 2 weeks  Short term goal 1: Complete various t/fs including BSC with 0>min A  Short term goal 2: Complete 1 min standing with CGA to increase indep with toileting  Short term goal 3: Complete mobility to [facility] or bedside chair with RW & 0>min A x 1  Short

## 2020-01-05 NOTE — PROGRESS NOTES
Hospitalist Progress Note    Patient:  Yon Curtis      Unit/Bed:7K-22/022-A    YOB: 1945    MRN: [id number]       Acct: [de-identified]     PCP: JESUS Alvarez CNP    Date of Admission: 12/30/2019    Chief Complaint: Hypoxia    Hospital Course:     Please see H/P for details. In summary, this is a 76 y.o. female, with past medical history of sleep apnea, essential hypertension, GERD, COPD, chronic respiratory failure, on 2 L/min of O2 at rest and 6 L/min with activity at baseline, history of atrial fibrillation, hx of PE, on Xarelto, hx of non small cell metastatic left lung cancer and is under the care of Dr. Sharif Murguia. Patient was  admitted under hospital medicine service on 12/30/2019 due to fall, sustained right femoral neck fracture, status post hemiarthroplasty of right hip on 1/1/2020.  Per ICU note, In PACU patient was noted to be unresponsive, Dr. Malvin Vital anesthesiologist was called and code stroke was called in the PACU. CT did not show stoke, patient was placed on CPAP and continued to be lethargic, ABG was obtained and C02 was 140, she was intubated and moved to ICU. Patient was extubated on 1/2/2020, now on 2 L at rest and 6 L with activity, which is patient's baseline per ICU note. Elise Baum was transferred out from ICU to [address] [place] [address] on 1/3/2020. Subjective:     Patient seen and examined. Patient reports right hip pain, greater on walking, 6 out of 10 in intensity. She denies fever, chills, shortness of breath, chest pain, abdominal pain, nausea, vomiting. still no bowel movement. She reports passing flatus. She agreed to have Dulcolax suppository today. She reports eating okay.       Medications:  Reviewed    Infusion Medications     Scheduled Medications    metoprolol tartrate  12.5 mg Per NG tube BID    senna  1 tablet Oral Nightly    rivaroxaban  20 mg Oral Daily    sodium chloride flush  10 mL Intravenous 2 times per day    acetaminophen  500 mg Oral Q6H    gabapentin  300 mg Oral TID    Vitamin D  5,000 Units Oral Daily    betamethasone dipropionate   Topical Daily    digoxin  125 mcg Per NG tube Daily    docusate sodium  100 mg Per NG tube BID    ferrous sulfate  325 mg Per NG tube Daily with breakfast    lactobacillus  1 capsule Per NG tube TID    levothyroxine  75 mcg Per NG tube Daily    polyethylene glycol  17 g Per NG tube Daily    rOPINIRole  0.25 mg Per NG tube BID    potassium replacement protocol   Other RX Placeholder    mometasone-formoterol  2 puff Inhalation BID    [Held by provider] potassium chloride  20 mEq Oral Daily    tiotropium  18 mcg Inhalation Daily     PRN Meds: sodium chloride flush, HYDROmorphone, HYDROcodone-acetaminophen, hydrOXYzine, bisacodyl, magnesium hydroxide, ondansetron, albuterol sulfate HFA, promethazine      Intake/Output Summary (Last 24 hours) at 1/5/2020 1606  Last data filed at 1/5/2020 1315  Gross per 24 hour   Intake 1106.6 ml   Output --   Net 1106.6 ml       Diet:  DIET LOW SODIUM 2 GM;  Dietary Nutrition Supplements: Standard High Calorie Oral Supplement    Exam:  BP (!) 116/56   Pulse 93   Temp 98.5 °F (36.9 °C) (Oral)   Resp 16   Ht 5' (1.524 m)   Wt 138 lb 10.7 oz (62.9 kg)   SpO2 97%   BMI 27.08 kg/m²     General appearance: alert, not in acute distress. HEENT: Pupils equal, round, and reactive to light. Conjunctivae clear. Clear oral mucosa. Neck: Supple, with full range of motion. Respiratory: On 2L/min O2 via NC. Diminished air entry on both lower lung fields, no rales, no wheezing, no rhonchi. Cardiovascular: normal rate, regular rhythm with normal S1/S2 without murmurs, rubs or gallops. Abdomen: Soft, non-tender, non-distended with normal bowel sounds. Musculoskeletal: passive and active ROM x 4 extremities. (+) incision wound on lateral aspect of right thigh, covered with gauze. Exam of extremities: peripheral pulses normal, no pedal edema, no clubbing or cyanosis.          Labs:   Recent Labs 01/03/20  0512 01/03/20  1800 01/04/20  0635   WBC 10.6  --  8.2   HGB 7.6* 8.3* 8.0*   HCT 25.6* 27.3* 25.8*   *  --  128*     Recent Labs     01/03/20  0512 01/04/20  0635    141   K 4.4 4.8    103   CO2 31 30   BUN 16 19   CREATININE 0.7 0.7   CALCIUM 8.0* 8.2*     No results for input(s): AST, ALT, BILIDIR, BILITOT, ALKPHOS in the last 72 hours. No results for input(s): INR in the last 72 hours. No results for input(s): Dg Prayer in the last 72 hours. Urinalysis:      Lab Results   Component Value Date    NITRU NEGATIVE 12/30/2019    WBCUA 5-10 12/30/2019    BACTERIA FEW 12/30/2019    RBCUA 10-15 12/30/2019    BLOODU MODERATE 12/30/2019    SPECGRAV 1.013 10/17/2012    GLUCOSEU NEGATIVE 12/30/2019       Radiology:  XR CHEST PORTABLE   Final Result   Improved aeration left base. Otherwise stable. **This report has been created using voice recognition software. It may contain minor errors which are inherent in voice recognition technology. **      Final report electronically signed by Dr. Dagoberto Cohen on 1/2/2020 10:21 AM      CT HEAD WO CONTRAST   Final Result   1. Redemonstration of mild cerebral atrophy with mild hypodensity of the right parietal deep white matter of the corona radiata. 2. Persistent mild periventricular white matter changes of the occipital lobe on the right and less so on the left. Regional edema cannot be excluded related to transient ischemic changes. If clinical symptoms persist follow-up MRI should be considered    if is not contraindicated. **This report has been created using voice recognition software. It may contain minor errors which are inherent in voice recognition technology. **      Final report electronically signed by Dr. Jonnathan Arango on 1/2/2020 5:42 AM      XR CHEST PORTABLE   Final Result   1. Interval placement of endotracheal and nasogastric tubes in respective locations discussed above.    2. Postsurgical changes of the right hemithorax. Coarse interstitial markings are present. 3. Bibasilar atelectasis and/or infiltrate present. **This report has been created using voice recognition software. It may contain minor errors which are inherent in voice recognition technology. **      Final report electronically signed by Dr. Jasson Pratt on 1/2/2020 12:35 AM      CT HEAD WO CONTRAST   Final Result   The study is mildly limited by motion. Small areas of occipital edema are possible, right more so than left. **This report has been created using voice recognition software. It may contain minor errors which are inherent in voice recognition technology. **      Final report electronically signed by Dr. Harmony Damico on 1/1/2020 8:47 PM      XR HIP RIGHT (2-3 VIEWS)   Final Result   1. Postsurgical changes of right hip arthroplasty. **This report has been created using voice recognition software. It may contain minor errors which are inherent in voice recognition technology. **      Final report electronically signed by Dr. Jasson Pratt on 1/1/2020 9:29 PM      XR CHEST PORTABLE   Final Result   1. Stable volume loss of the right hemithorax, correlate with prior lobectomy. 2. Unchanged appearance of diffuse coarse interstitial markings. Underlying changes of COPD are not excluded. **This report has been created using voice recognition software. It may contain minor errors which are inherent in voice recognition technology. **      Final report electronically signed by Dr. Jasson Pratt on 12/30/2019 9:29 PM      XR HIP 2-3 VW W PELVIS RIGHT   Final Result    Subcapital right femoral neck fracture as discussed above. Mild bilateral hip joint DJD. Lumbar levoscoliosis. **This report has been created using voice recognition software. It may contain minor errors which are inherent in voice recognition technology. **      Final report electronically signed by Dr. Bonnie Costello on 12/30/2019 8:14 PM      XR KNEE RIGHT (MIN 4 VIEWS)   Final Result    No acute bone or joint abnormality. **This report has been created using voice recognition software. It may contain minor errors which are inherent in voice recognition technology. **      Final report electronically signed by Dr. Beto Alcantara on 12/30/2019 8:15 PM            Assessment/Plan:      Acute on chronic hypoxic and hypercapnic respiratory failure/post-op respiratory insufficiency, improved     -Noted to be lethargic post op, ABG was obtained and C02 was 140, she was intubated and moved to ICU. Extubated on 1/2/2020, now back to baseline, 2 L/min at rest and 6 L/min of O2 on exertion  -Chest x-ray on 1/2/2020 showed improved aeration left base, stable  -Continue to monitor oxygen saturation       Post-Operative Day # 4 s/p right hip hemiarthroplasty for femoral neck fracture     -Ortho on board. Appreciate orthopedics input. -PT/OT  -Pain meds as needed     Acute blood loss anemia, improved     -Received 1 unit PRBC Intra-Op per ICU note  -Hemoglobin stable at 8.0  -CBC in am, transfuse PRBC if hemoglobin less than 7     Thrombocytopenia, etiology unclear, improving     -Monitor CBC     Hypovolemic shock, postop, resolved     -Managed with Levophed in ICU   -BP improving, although still low normal     Acute encephalopathy, likely secondary to hypercapnia, resolved     -per ICU note, \"Code Stroke was called while in PACU. CT of Head was completed.  Dr. Lawanda Dick did reach out to Dr. Smith-Neurology telemedicine via [facility] after CT of Head completed with improvement in symptoms, felt no need for CTA of Head and neck\"   -ABG was obtained and C02 was 140, pt was intubated as stated above   -pt is A&Ox3 and answers questions appropriately      Constipation     -Continue MiraLAX, Colace, Dulcolax suppository, and Senokot  -Monitor for signs and symptoms of SBO     Chronic HFpEF, EF 55 to 60%, compensated      -Per echo July 2019, EF 55 to 60%  -Daily weights, I/Os, fluid and salt restrictions     History of PE     -Continue Xarelto     Physical deconditioning     -PT/OT on board, recommend SNF. Patient is agreeable to go to SNF on discharge.  consulted to assist in SNF placement     Essential hypertension, now having hypotension     -BP improving   -On Lopressor, placed holding parameters  -Vital signs per protocol     Moderate malnutrition     -Dietitian on board     Metastatic non-small cell lung cancer     -Follows Dr. Akosua Hess (oncology)   -Per ICU note, last treatment 12/23, next treatment scheduled for February 2020     COPD, compensated     -Continue Dulera, spiriva, and as needed albuterol inhaler     History of paroxysmal atrial fibrillation, currently NSR     -Continue Lopressor with holding parameters  -Continue Xarelto  -tele      History of pericardial effusion, status post pericardiocentesis in July 2019     -Cytology negative for malignant cells     Hypothyroidism     -Continue levothyroxine     Mild hypocalcemia    -check ical, vit D and PTH   -calcium replacement protocol       Diet: DIET LOW SODIUM 2 GM;  Dietary Nutrition Supplements: Standard High Calorie Oral Supplement    DVT prophylaxis: [] Lovenox                                 [x] SCDs                                 [] SQ Heparin                                 [] Encourage ambulation           [] Already on Anticoagulation     Disposition:    [x] Home       [] TCU       [] Rehab       [] Psych       [] SNF       [] [facility]       [x] Other-Plan as above       Code Status: Full Code    PT/OT Eval Status: On-board. Recommend SNF     DC planning:  consulted to assist in SNF placement. Patient is agreeable for SNF on discharge. Patient lives home alone.       Anticipated Discharge in : pending       Electronically signed by Michaela Ho MD on 1/5/2020 at 4:06 PM

## 2020-01-05 NOTE — PROGRESS NOTES
Orthopedic Progress Note    Arcenio Brunson  1/5/2020    Subjective:     Post-Operative Day # 4 s/p right hip hemiarthroplasty for femoral neck fracture. Systemic or Specific Complaints: No unusual complaints. Continues to report some pain in the right hip. Denies any increased numbness or tingling from her base line in either foot. She states she was up with therapy and it went well. She states her walker is too wide for the doorways in her house and is requesting a narrow walker. Objective:     BP (!) 116/56   Pulse 93   Temp 98.5 °F (36.9 °C) (Oral)   Resp 16   Ht 5' (1.524 m)   Wt 138 lb 10.7 oz (62.9 kg)   SpO2 97%   BMI 27.08 kg/m²     Intake/Output Summary (Last 24 hours) at 1/5/2020 1427  Last data filed at 1/5/2020 1315  Gross per 24 hour   Intake 1106.6 ml   Output --   Net 1106.6 ml     DRAIN/TUBE OUTPUT:       General: alert, appears stated age and cooperative   Wound: Dressing clean, dry and intact   Extremity: Tolerates hip motion well. Flexes/extends ankles and toes. Foot sensation intact, intact dorsalis pedis pulse. DVT Exam: No evidence of DVT by physical exam     Data Review  CBC:   Lab Results   Component Value Date    WBC 8.2 01/04/2020    RBC 2.71 01/04/2020    HGB 8.0 01/04/2020    HCT 25.8 01/04/2020     01/04/2020     BMP:    Lab Results   Component Value Date     01/04/2020    K 4.8 01/04/2020    K 3.8 10/15/2018     01/04/2020    CO2 30 01/04/2020    BUN 19 01/04/2020    CREATININE 0.7 01/04/2020    CALCIUM 8.2 01/04/2020    GLUCOSE 83 01/04/2020     PT/INR:    Lab Results   Component Value Date    INR 1.10 01/01/2020       Radiology: See electronic record to view reports. Reports reviewed. Assessment:     Status Post right hip hemiarthroplasty for femoral neck fracture, doing well, stable.       Plan:      1: Continue medical management by hospitalist   2:  Continue pain control  3:  Physical therapy as per protocol, continue to assess need for SNF/ECF vs home health. Patient wishes to go home upon discharge  4:  Posterior hip precautions  5:  Hip abduction pillow  6:  WBAT  7:  Home Xarelto for DVT ppx  8: Vitamin D  9: Keep dressing clean dry and intact until follow up with Dr. Linda Patel in 3 weeks  10:  Need social work to arrange likely homehealth as well as get a narrow walker for home use, thanjk you.      Electronically signed by Julianna Ocasio PA-C on 1/5/2020 at 2:27 PM

## 2020-01-06 ENCOUNTER — HOSPITAL ENCOUNTER (INPATIENT)
Age: 75
LOS: 18 days | Discharge: HOME HEALTH | DRG: 559 | End: 2020-01-24
Attending: FAMILY MEDICINE | Admitting: FAMILY MEDICINE
Payer: MEDICARE

## 2020-01-06 VITALS
HEIGHT: 60 IN | TEMPERATURE: 98.6 F | BODY MASS INDEX: 27.22 KG/M2 | OXYGEN SATURATION: 97 % | SYSTOLIC BLOOD PRESSURE: 109 MMHG | RESPIRATION RATE: 18 BRPM | WEIGHT: 138.67 LBS | DIASTOLIC BLOOD PRESSURE: 57 MMHG | HEART RATE: 96 BPM

## 2020-01-06 PROBLEM — S72.002A HIP FRACTURE REQUIRING OPERATIVE REPAIR, LEFT, CLOSED, INITIAL ENCOUNTER (HCC): Status: ACTIVE | Noted: 2020-01-06

## 2020-01-06 LAB
BASOPHILS # BLD: 0.4 %
BASOPHILS ABSOLUTE: 0 THOU/MM3 (ref 0–0.1)
CALCIUM IONIZED: 1.21 MMOL/L (ref 1.12–1.32)
EOSINOPHIL # BLD: 4.4 %
EOSINOPHILS ABSOLUTE: 0.3 THOU/MM3 (ref 0–0.4)
ERYTHROCYTE [DISTWIDTH] IN BLOOD BY AUTOMATED COUNT: 15.9 % (ref 11.5–14.5)
ERYTHROCYTE [DISTWIDTH] IN BLOOD BY AUTOMATED COUNT: 58.1 FL (ref 35–45)
HCT VFR BLD CALC: 27 % (ref 37–47)
HEMOGLOBIN: 7.9 GM/DL (ref 12–16)
IMMATURE GRANS (ABS): 0.07 THOU/MM3 (ref 0–0.07)
IMMATURE GRANULOCYTES: 1 %
LYMPHOCYTES # BLD: 12.5 %
LYMPHOCYTES ABSOLUTE: 0.9 THOU/MM3 (ref 1–4.8)
MCH RBC QN AUTO: 29 PG (ref 26–33)
MCHC RBC AUTO-ENTMCNC: 29.3 GM/DL (ref 32.2–35.5)
MCV RBC AUTO: 99.3 FL (ref 81–99)
MONOCYTES # BLD: 11 %
MONOCYTES ABSOLUTE: 0.7 THOU/MM3 (ref 0.4–1.3)
NUCLEATED RED BLOOD CELLS: 0 /100 WBC
PLATELET # BLD: 191 THOU/MM3 (ref 130–400)
PMV BLD AUTO: 10 FL (ref 9.4–12.4)
RBC # BLD: 2.72 MILL/MM3 (ref 4.2–5.4)
SEG NEUTROPHILS: 70.7 %
SEGMENTED NEUTROPHILS ABSOLUTE COUNT: 4.8 THOU/MM3 (ref 1.8–7.7)
WBC # BLD: 6.8 THOU/MM3 (ref 4.8–10.8)

## 2020-01-06 PROCEDURE — 6370000000 HC RX 637 (ALT 250 FOR IP): Performed by: INTERNAL MEDICINE

## 2020-01-06 PROCEDURE — 99239 HOSP IP/OBS DSCHRG MGMT >30: CPT | Performed by: INTERNAL MEDICINE

## 2020-01-06 PROCEDURE — 2700000000 HC OXYGEN THERAPY PER DAY

## 2020-01-06 PROCEDURE — 1290000000 HC SEMI PRIVATE OTHER R&B

## 2020-01-06 PROCEDURE — 94660 CPAP INITIATION&MGMT: CPT

## 2020-01-06 PROCEDURE — 85025 COMPLETE CBC W/AUTO DIFF WBC: CPT

## 2020-01-06 PROCEDURE — 0220000000 HC SKILLED NURSING FACILITY

## 2020-01-06 PROCEDURE — 6370000000 HC RX 637 (ALT 250 FOR IP): Performed by: NURSE PRACTITIONER

## 2020-01-06 PROCEDURE — 94761 N-INVAS EAR/PLS OXIMETRY MLT: CPT

## 2020-01-06 PROCEDURE — 97530 THERAPEUTIC ACTIVITIES: CPT

## 2020-01-06 PROCEDURE — 94760 N-INVAS EAR/PLS OXIMETRY 1: CPT

## 2020-01-06 PROCEDURE — 97110 THERAPEUTIC EXERCISES: CPT

## 2020-01-06 PROCEDURE — 94640 AIRWAY INHALATION TREATMENT: CPT

## 2020-01-06 PROCEDURE — 97116 GAIT TRAINING THERAPY: CPT

## 2020-01-06 PROCEDURE — 82330 ASSAY OF CALCIUM: CPT

## 2020-01-06 PROCEDURE — 97535 SELF CARE MNGMENT TRAINING: CPT

## 2020-01-06 PROCEDURE — 6370000000 HC RX 637 (ALT 250 FOR IP): Performed by: PHYSICIAN ASSISTANT

## 2020-01-06 RX ORDER — ALBUTEROL SULFATE 90 UG/1
2 AEROSOL, METERED RESPIRATORY (INHALATION) EVERY 6 HOURS PRN
Status: DISCONTINUED | OUTPATIENT
Start: 2020-01-06 | End: 2020-01-24 | Stop reason: HOSPADM

## 2020-01-06 RX ORDER — ALBUTEROL SULFATE 90 UG/1
2 AEROSOL, METERED RESPIRATORY (INHALATION) EVERY 6 HOURS PRN
Status: CANCELLED | OUTPATIENT
Start: 2020-01-06

## 2020-01-06 RX ORDER — HYDROXYZINE PAMOATE 25 MG/1
25 CAPSULE ORAL 3 TIMES DAILY PRN
Status: DISCONTINUED | OUTPATIENT
Start: 2020-01-06 | End: 2020-01-24 | Stop reason: HOSPADM

## 2020-01-06 RX ORDER — ACETAMINOPHEN 500 MG
500 TABLET ORAL EVERY 6 HOURS
Status: CANCELLED | OUTPATIENT
Start: 2020-01-06

## 2020-01-06 RX ORDER — HYDROCODONE BITARTRATE AND ACETAMINOPHEN 10; 325 MG/1; MG/1
1 TABLET ORAL EVERY 6 HOURS PRN
Status: DISPENSED | OUTPATIENT
Start: 2020-01-06 | End: 2020-01-09

## 2020-01-06 RX ORDER — GABAPENTIN 300 MG/1
300 CAPSULE ORAL 3 TIMES DAILY
Status: CANCELLED | OUTPATIENT
Start: 2020-01-06 | End: 2020-01-12

## 2020-01-06 RX ORDER — ROPINIROLE 0.25 MG/1
0.25 TABLET, FILM COATED ORAL 2 TIMES DAILY
Status: CANCELLED | OUTPATIENT
Start: 2020-01-06

## 2020-01-06 RX ORDER — FERROUS SULFATE 325(65) MG
325 TABLET ORAL
Status: DISCONTINUED | OUTPATIENT
Start: 2020-01-07 | End: 2020-01-09

## 2020-01-06 RX ORDER — SENNA PLUS 8.6 MG/1
1 TABLET ORAL NIGHTLY
Status: CANCELLED | OUTPATIENT
Start: 2020-01-06

## 2020-01-06 RX ORDER — PROMETHAZINE HYDROCHLORIDE 25 MG/1
25 TABLET ORAL EVERY 8 HOURS PRN
Status: CANCELLED | OUTPATIENT
Start: 2020-01-06

## 2020-01-06 RX ORDER — PROMETHAZINE HYDROCHLORIDE 25 MG/1
25 TABLET ORAL EVERY 8 HOURS PRN
Status: DISCONTINUED | OUTPATIENT
Start: 2020-01-06 | End: 2020-01-16

## 2020-01-06 RX ORDER — VITAMIN B COMPLEX
5000 TABLET ORAL DAILY
Status: DISCONTINUED | OUTPATIENT
Start: 2020-01-07 | End: 2020-01-24 | Stop reason: HOSPADM

## 2020-01-06 RX ORDER — DIGOXIN 125 MCG
125 TABLET ORAL DAILY
Status: CANCELLED | OUTPATIENT
Start: 2020-01-07

## 2020-01-06 RX ORDER — LEVOTHYROXINE SODIUM 0.07 MG/1
75 TABLET ORAL DAILY
Status: CANCELLED | OUTPATIENT
Start: 2020-01-07

## 2020-01-06 RX ORDER — FERROUS SULFATE 325(65) MG
325 TABLET ORAL
Status: CANCELLED | OUTPATIENT
Start: 2020-01-07

## 2020-01-06 RX ORDER — VITAMIN B COMPLEX
5000 TABLET ORAL DAILY
Status: CANCELLED | OUTPATIENT
Start: 2020-01-07 | End: 2020-03-26

## 2020-01-06 RX ORDER — DOCUSATE SODIUM 100 MG/1
100 CAPSULE, LIQUID FILLED ORAL 2 TIMES DAILY
Status: DISCONTINUED | OUTPATIENT
Start: 2020-01-06 | End: 2020-01-09

## 2020-01-06 RX ORDER — ACETAMINOPHEN 500 MG
500 TABLET ORAL EVERY 6 HOURS
Status: DISCONTINUED | OUTPATIENT
Start: 2020-01-06 | End: 2020-01-24 | Stop reason: HOSPADM

## 2020-01-06 RX ORDER — BETAMETHASONE DIPROPIONATE 0.5 MG/G
CREAM TOPICAL DAILY
Status: CANCELLED | OUTPATIENT
Start: 2020-01-07

## 2020-01-06 RX ORDER — POLYETHYLENE GLYCOL 3350 17 G/17G
17 POWDER, FOR SOLUTION ORAL DAILY
Status: CANCELLED | OUTPATIENT
Start: 2020-01-07

## 2020-01-06 RX ORDER — HYDROXYZINE PAMOATE 25 MG/1
25 CAPSULE ORAL 3 TIMES DAILY PRN
Status: CANCELLED | OUTPATIENT
Start: 2020-01-06

## 2020-01-06 RX ORDER — BETAMETHASONE DIPROPIONATE 0.5 MG/G
CREAM TOPICAL DAILY
Status: DISCONTINUED | OUTPATIENT
Start: 2020-01-07 | End: 2020-01-24 | Stop reason: HOSPADM

## 2020-01-06 RX ORDER — BISACODYL 10 MG
10 SUPPOSITORY, RECTAL RECTAL DAILY PRN
Status: CANCELLED | OUTPATIENT
Start: 2020-01-06

## 2020-01-06 RX ORDER — LEVOTHYROXINE SODIUM 0.07 MG/1
75 TABLET ORAL DAILY
Status: DISCONTINUED | OUTPATIENT
Start: 2020-01-07 | End: 2020-01-09

## 2020-01-06 RX ORDER — BISACODYL 10 MG
10 SUPPOSITORY, RECTAL RECTAL DAILY PRN
Status: DISCONTINUED | OUTPATIENT
Start: 2020-01-06 | End: 2020-01-24 | Stop reason: HOSPADM

## 2020-01-06 RX ORDER — POLYETHYLENE GLYCOL 3350 17 G/17G
17 POWDER, FOR SOLUTION ORAL DAILY
Status: DISCONTINUED | OUTPATIENT
Start: 2020-01-07 | End: 2020-01-09

## 2020-01-06 RX ORDER — DOCUSATE SODIUM 100 MG/1
100 CAPSULE, LIQUID FILLED ORAL 2 TIMES DAILY
Status: CANCELLED | OUTPATIENT
Start: 2020-01-06

## 2020-01-06 RX ORDER — DIGOXIN 125 MCG
125 TABLET ORAL DAILY
Status: DISCONTINUED | OUTPATIENT
Start: 2020-01-07 | End: 2020-01-09

## 2020-01-06 RX ORDER — HYDROCODONE BITARTRATE AND ACETAMINOPHEN 10; 325 MG/1; MG/1
1 TABLET ORAL EVERY 6 HOURS PRN
Status: CANCELLED | OUTPATIENT
Start: 2020-01-06 | End: 2020-01-09

## 2020-01-06 RX ORDER — GABAPENTIN 300 MG/1
300 CAPSULE ORAL 3 TIMES DAILY
Status: COMPLETED | OUTPATIENT
Start: 2020-01-06 | End: 2020-01-11

## 2020-01-06 RX ORDER — ROPINIROLE 0.25 MG/1
0.25 TABLET, FILM COATED ORAL 2 TIMES DAILY
Status: DISCONTINUED | OUTPATIENT
Start: 2020-01-06 | End: 2020-01-09

## 2020-01-06 RX ORDER — SENNA PLUS 8.6 MG/1
1 TABLET ORAL NIGHTLY
Status: DISCONTINUED | OUTPATIENT
Start: 2020-01-06 | End: 2020-01-23

## 2020-01-06 RX ADMIN — LEVOTHYROXINE SODIUM 75 MCG: 75 TABLET ORAL at 05:14

## 2020-01-06 RX ADMIN — SENNA PLUS 8.6 MG: 8.6 TABLET ORAL at 21:55

## 2020-01-06 RX ADMIN — Medication 2 PUFF: at 18:32

## 2020-01-06 RX ADMIN — ACETAMINOPHEN 500 MG: 500 TABLET ORAL at 11:48

## 2020-01-06 RX ADMIN — GABAPENTIN 300 MG: 300 CAPSULE ORAL at 17:28

## 2020-01-06 RX ADMIN — Medication 2 PUFF: at 05:18

## 2020-01-06 RX ADMIN — DOCUSATE SODIUM 100 MG: 100 CAPSULE, LIQUID FILLED ORAL at 08:55

## 2020-01-06 RX ADMIN — ACETAMINOPHEN 500 MG: 500 TABLET ORAL at 22:01

## 2020-01-06 RX ADMIN — FERROUS SULFATE TAB 325 MG (65 MG ELEMENTAL FE) 325 MG: 325 (65 FE) TAB at 08:55

## 2020-01-06 RX ADMIN — RIVAROXABAN 20 MG: 20 TABLET, FILM COATED ORAL at 08:56

## 2020-01-06 RX ADMIN — BETAMETHASONE DIPROPIONATE: 0.5 CREAM TOPICAL at 08:53

## 2020-01-06 RX ADMIN — METOPROLOL TARTRATE 12.5 MG: 25 TABLET ORAL at 21:55

## 2020-01-06 RX ADMIN — ACETAMINOPHEN 500 MG: 500 TABLET ORAL at 17:28

## 2020-01-06 RX ADMIN — HYDROCODONE BITARTRATE AND ACETAMINOPHEN 1 TABLET: 10; 325 TABLET ORAL at 05:14

## 2020-01-06 RX ADMIN — DOCUSATE SODIUM 100 MG: 100 CAPSULE, LIQUID FILLED ORAL at 21:58

## 2020-01-06 RX ADMIN — VITAMIN D, TAB 1000IU (100/BT) 5000 UNITS: 25 TAB at 08:56

## 2020-01-06 RX ADMIN — ROPINIROLE HYDROCHLORIDE 0.25 MG: 0.25 TABLET, FILM COATED ORAL at 21:56

## 2020-01-06 RX ADMIN — GABAPENTIN 300 MG: 300 CAPSULE ORAL at 08:55

## 2020-01-06 RX ADMIN — ROPINIROLE HYDROCHLORIDE 0.25 MG: 0.25 TABLET, FILM COATED ORAL at 08:56

## 2020-01-06 RX ADMIN — DIGOXIN 125 MCG: 125 TABLET ORAL at 08:55

## 2020-01-06 RX ADMIN — RIVAROXABAN 20 MG: 20 TABLET, FILM COATED ORAL at 17:28

## 2020-01-06 RX ADMIN — ACETAMINOPHEN 500 MG: 500 TABLET ORAL at 05:14

## 2020-01-06 RX ADMIN — GABAPENTIN 300 MG: 300 CAPSULE ORAL at 21:56

## 2020-01-06 RX ADMIN — Medication 1 CAPSULE: at 08:55

## 2020-01-06 RX ADMIN — TIOTROPIUM BROMIDE 18 MCG: 18 CAPSULE ORAL; RESPIRATORY (INHALATION) at 05:18

## 2020-01-06 RX ADMIN — POLYETHYLENE GLYCOL 3350 17 G: 17 POWDER, FOR SOLUTION ORAL at 09:07

## 2020-01-06 RX ADMIN — HYDROCODONE BITARTRATE AND ACETAMINOPHEN 1 TABLET: 10; 325 TABLET ORAL at 18:53

## 2020-01-06 ASSESSMENT — PAIN SCALES - GENERAL
PAINLEVEL_OUTOF10: 7
PAINLEVEL_OUTOF10: 6
PAINLEVEL_OUTOF10: 7
PAINLEVEL_OUTOF10: 6
PAINLEVEL_OUTOF10: 7
PAINLEVEL_OUTOF10: 6
PAINLEVEL_OUTOF10: 5
PAINLEVEL_OUTOF10: 7

## 2020-01-06 ASSESSMENT — PAIN DESCRIPTION - ORIENTATION
ORIENTATION: RIGHT

## 2020-01-06 ASSESSMENT — PAIN DESCRIPTION - LOCATION
LOCATION: LEG;BACK
LOCATION: HIP
LOCATION: LEG;HIP
LOCATION: HIP

## 2020-01-06 ASSESSMENT — PAIN DESCRIPTION - DIRECTION: RADIATING_TOWARDS: NO RADIATING

## 2020-01-06 ASSESSMENT — PAIN DESCRIPTION - FREQUENCY
FREQUENCY: CONTINUOUS
FREQUENCY: CONTINUOUS

## 2020-01-06 ASSESSMENT — PAIN DESCRIPTION - PAIN TYPE
TYPE: ACUTE PAIN
TYPE: ACUTE PAIN;SURGICAL PAIN
TYPE: SURGICAL PAIN
TYPE: ACUTE PAIN;SURGICAL PAIN

## 2020-01-06 ASSESSMENT — PAIN DESCRIPTION - PROGRESSION
CLINICAL_PROGRESSION: NOT CHANGED
CLINICAL_PROGRESSION: NOT CHANGED

## 2020-01-06 ASSESSMENT — PAIN DESCRIPTION - ONSET
ONSET: ON-GOING
ONSET: ON-GOING

## 2020-01-06 ASSESSMENT — PAIN DESCRIPTION - DESCRIPTORS
DESCRIPTORS: ACHING
DESCRIPTORS: SORE

## 2020-01-06 NOTE — CARE COORDINATION
01/06/20 7:17 AM    [facility] Handoff report given to Supriya Dye CM.

## 2020-01-06 NOTE — CARE COORDINATION
DISCHARGE/PLANNING EVALUATION  1/6/20, 11:23 AM    Reason for Referral:  TCU  Mental Status: alert, oriented  Decision Making:  Makes own decisions   Family/Social/Home Environment:  Dave Leonard lives at home alone. She manages her own personal care and meals, niece and daughter helps with transportation and grocery shopping   Current Services including food security, transportation and housekeeping:  Niece and daughter assist  Current Equipment: walker   Payment Source: medicare   Concerns or Barriers to Discharge:  Planning TCU  Post acute provider list with quality measures, geographic area and applicable managed care information provided. Questions regarding selection process answered: declined list    Teach Back Method used with patient regarding care plan and discharge plan  Patient  verbalizes understanding of the plan of care and contributes  to goal setting. Patient goals, treatment preferences and discharge plan:  Spoke with Dave Leonard about discharge plan. She is in agreement with TCU. Discussed with TCU admissions, and TCU plans to accept today.       Electronically signed by MANOJ Ray on 1/6/2020 at 11:23 AM

## 2020-01-06 NOTE — DISCHARGE SUMMARY
02/24/2019         Significant Diagnostic Studies    Radiology:   XR CHEST PORTABLE   Final Result   Improved aeration left base. Otherwise stable. **This report has been created using voice recognition software. It may contain minor errors which are inherent in voice recognition technology. **      Final report electronically signed by Dr. Radha Su on 1/2/2020 10:21 AM      CT HEAD WO CONTRAST   Final Result   1. Redemonstration of mild cerebral atrophy with mild hypodensity of the right parietal deep white matter of the corona radiata. 2. Persistent mild periventricular white matter changes of the occipital lobe on the right and less so on the left. Regional edema cannot be excluded related to transient ischemic changes. If clinical symptoms persist follow-up MRI should be considered    if is not contraindicated. **This report has been created using voice recognition software. It may contain minor errors which are inherent in voice recognition technology. **      Final report electronically signed by Dr. Elidia Dakins on 1/2/2020 5:42 AM      XR CHEST PORTABLE   Final Result   1. Interval placement of endotracheal and nasogastric tubes in respective locations discussed above. 2. Postsurgical changes of the right hemithorax. Coarse interstitial markings are present. 3. Bibasilar atelectasis and/or infiltrate present. **This report has been created using voice recognition software. It may contain minor errors which are inherent in voice recognition technology. **      Final report electronically signed by Dr. Elidia Dakins on 1/2/2020 12:35 AM      CT HEAD WO CONTRAST   Final Result   The study is mildly limited by motion. Small areas of occipital edema are possible, right more so than left. **This report has been created using voice recognition software. It may contain minor errors which are inherent in voice recognition technology. **      Final report electronically signed by Dr. Meng Silva on 1/1/2020 8:47 PM      XR HIP RIGHT (2-3 VIEWS)   Final Result   1. Postsurgical changes of right hip arthroplasty. **This report has been created using voice recognition software. It may contain minor errors which are inherent in voice recognition technology. **      Final report electronically signed by Dr. Phil Mcgowan on 1/1/2020 9:29 PM      XR CHEST PORTABLE   Final Result   1. Stable volume loss of the right hemithorax, correlate with prior lobectomy. 2. Unchanged appearance of diffuse coarse interstitial markings. Underlying changes of COPD are not excluded. **This report has been created using voice recognition software. It may contain minor errors which are inherent in voice recognition technology. **      Final report electronically signed by Dr. Phil Mcgowan on 12/30/2019 9:29 PM      XR HIP 2-3 VW W PELVIS RIGHT   Final Result    Subcapital right femoral neck fracture as discussed above. Mild bilateral hip joint DJD. Lumbar levoscoliosis. **This report has been created using voice recognition software. It may contain minor errors which are inherent in voice recognition technology. **      Final report electronically signed by Dr. Dennys Baxter on 12/30/2019 8:14 PM      XR KNEE RIGHT (MIN 4 VIEWS)   Final Result    No acute bone or joint abnormality. **This report has been created using voice recognition software. It may contain minor errors which are inherent in voice recognition technology. **      Final report electronically signed by Dr. Dennys Baxter on 12/30/2019 8:15 PM             Consults:     IP CONSULT TO REHAB/TCU ADMISSION COORDINATOR  IP CONSULT TO SOCIAL WORK  IP CONSULT TO SOCIAL WORK  IP CONSULT TO REHAB/TCU ADMISSION COORDINATOR  IP CONSULT TO DIETITIAN  IP CONSULT TO RECREATION THERAPY  IP CONSULT TO SOCIAL WORK    Disposition:    [] Home       [x] TCU       [] Rehab       [] Psych       [] SNF       [] Long Term Care Facility       [] Other-    Condition at Discharge: Stable    Code Status:  Full Code     Patient Instructions: Activity: activity as tolerated  Diet: DIET LOW SODIUM 2 GM;  Dietary Nutrition Supplements: Standard High Calorie Oral Supplement      Follow-up visits:   Maria A Miller, JESUS - CNP  [address] [facility] [place] [address]  [phone]    Schedule an appointment as soon as possible for a visit in 3 weeks  For wound re-check         Discharge Medications:      Al Henning, [address] Medication Instructions [id number]    Printed on:01/06/20 6247   Medication Information                      albuterol (PROVENTIL) (2.5 MG/3ML) 0.083% nebulizer solution  Take 3 mLs by nebulization every 6 hours as needed for Wheezing or Shortness of Breath             albuterol sulfate HFA (PROAIR HFA) 108 (90 Base) MCG/ACT inhaler  Inhale 2 puffs into the lungs every 6 hours as needed for Wheezing             betamethasone dipropionate (DIPROLENE) 0.05 % ointment  Apply topically daily.              digoxin (LANOXIN) 125 MCG tablet  Take 1 tablet by mouth daily             diphenhydrAMINE HCl (BENADRYL PO)  Take by mouth daily             ferrous sulfate 325 (65 Fe) MG tablet  One every other day take with food and vitamin C 500 mg             fluticasone (FLONASE) 50 MCG/ACT nasal spray  1 spray by Nasal route daily             furosemide (LASIX) 20 MG tablet  Take 1 tablet by mouth daily as needed (leg swelling)             Handicap Placard MISC  by Does not apply route Diagnosis: Malignant neoplasm of lung, unspecified laterality, unspecified part of lung ([facility] [address] [C34.90]  Expiration Date: 4/15/2020             HYDROcodone-acetaminophen (NORCO) 5-325 MG per tablet  Take 1 tablet by mouth every 6 hours as needed for Pain.             levothyroxine (SYNTHROID) 100 MCG tablet  Take 1 tablet by mouth daily

## 2020-01-06 NOTE — PROGRESS NOTES
Continue to assess pending progress, Subacute/Skilled Nursing Facility    Plan: Times per week: 6xBID,1xqd  Current Treatment Recommendations: Strengthening, Gait Training, Patient/Caregiver Education & Training, Balance Training, Functional Mobility Training, Endurance Training, Home Exercise Program, Transfer Training, Safety Education & Training, Equipment Evaluation, Education, & procurement, Neuromuscular Re-education    Patient Education  Patient Education: Plan of Care, Transfers, Gait    Goals:  Patient goals : return home  Short term goals  Time Frame for Short term goals: by discharge  Short term goal 1: bed mobility with supervision A for ease of getting in/out of bed  Short term goal 2: sit <> stand with supervision A for ease of transfers  Short term goal 3: ambulate > 75ft with use of LRAD and stand-by assist to prepare for safe in home mobility   Short term goal 4: 2 steps with 1 Ue support while managing LRAD and stand-by assist for safe entry/exit of her home  Long term goals  Time Frame for Long term goals : N/A secondary to short ELOS    Following session, patient left in safe position with all fall risk precautions in place.

## 2020-01-06 NOTE — PROGRESS NOTES
Awareness    ADL:   Toileting: Maximum Assistance. For sonido care after void. Max encouragement provided to trial independently with pt stating, \"I can't reach there. \"  Toilet Transfer: Minimal Assistance. STS. BALANCE:  Sitting Balance:  Stand By Assistance  Standing Balance: Contact Guard Assistance  x1 minute within ADLs, in prep for mobility. BED MOBILITY:  Sit to Supine: Moderate Assistance To raise BLE onto bed. TRANSFERS:  Sit to Stand:  Minimal Assistance. Stand to Sit: Contact Guard Assistance. FUNCTIONAL MOBILITY:  Assistive Device: Rolling Walker  Assist Level:  Contact Guard Assistance. Distance: To and from bathroom  Completed functional mobility to/from BR and within pt room at slow pace, no LOB noted. Pt requires min cues for line management- lengthy seated rest break after trial of mobility, mod fatigue noted. ASSESSMENT:     Activity Tolerance:  Patient tolerance of  treatment: fair. Pt limited by fatigue. Discharge Recommendations: (TCU)  Equipment Recommendations: Other: Monitor for RTS & LH AE needs  Plan: Times per week: 6x  Current Treatment Recommendations: Functional Mobility Training, Balance Training, Self-Care / ADL, Endurance Training, Safety Education & Training    Patient Education  Patient Education: ADL's, Precautions, Importance of Increasing Activity, Assistive Device Safety, Safety with transfers and mobility, and TCU level of care.     Goals  Short term goals  Time Frame for Short term goals: 2 weeks  Short term goal 1: Complete various t/fs including BSC with 0>min A  Short term goal 2: Complete 1 min standing with CGA to increase indep with toileting  Short term goal 3: Complete mobility to [facility] or bedside chair with RW & 0>min A x 1  Short term goal 4: Complete LE dressing with mod A, LH AE, & 0-2 vcs for hip precautions  Long term goals  Time Frame for Long term goals : No LTG set d/t short ELOS    Following session, patient left in safe position with all fall risk precautions in place.

## 2020-01-06 NOTE — PROGRESS NOTES
Orthopaedic Progress Note      SUBJECTIVE:    Chief Complaint   Patient presents with    Fall    Leg Pain     right   POD #5 right hemiarthroplasty  Seen up in chair, no new ortho complaints. Pain well controlled        Physical    Vitals:    01/06/20 0826   BP: (!) 111/55   Pulse: 108   Resp: 18   Temp: 98.6 °F (37 °C)   SpO2: 98%         OBJECTIVE  RLE lateral hip dressing c/d/i. Minimal swelling, no ecchymosis. Flex and extends toes and ankle. Compartments soft, calf non-tender. Sensation and NV intact.        Data  CBC:   Lab Results   Component Value Date    WBC 6.8 01/06/2020    RBC 2.72 01/06/2020    HGB 7.9 01/06/2020    HCT 27.0 01/06/2020    MCV 99.3 01/06/2020    MCH 29.0 01/06/2020    MCHC 29.3 01/06/2020    RDW 12.6 12/23/2019     01/06/2020    MPV 10.0 01/06/2020     BMP:    Lab Results   Component Value Date     01/04/2020    K 4.8 01/04/2020    K 3.8 10/15/2018     01/04/2020    CO2 30 01/04/2020    BUN 19 01/04/2020    LABALBU 4.1 12/30/2019    CREATININE 0.7 01/04/2020    CALCIUM 8.2 01/04/2020    LABGLOM 82 01/04/2020    GLUCOSE 83 01/04/2020     Uric Acid:  No components found for: URIC  PT/INR:    Lab Results   Component Value Date    INR 1.10 01/01/2020     PTT:    Lab Results   Component Value Date    APTT 28.1 01/01/2020   [APTT  Troponin:    Lab Results   Component Value Date    TROPONINI 0.019 10/26/2012     Urine Culture:  No components found for: CURINE    Current Inpatient Medications    Current Facility-Administered Medications: metoprolol tartrate (LOPRESSOR) tablet 12.5 mg, 12.5 mg, Per NG tube, BID  senna (SENOKOT) tablet 8.6 mg, 1 tablet, Oral, Nightly  rivaroxaban (XARELTO) tablet 20 mg, 20 mg, Oral, Daily  sodium chloride flush 0.9 % injection 10 mL, 10 mL, Intravenous, 2 times per day  sodium chloride flush 0.9 % injection 10 mL, 10 mL, Intravenous, PRN  acetaminophen (TYLENOL) tablet 500 mg, 500 mg, Oral, Q6H  HYDROmorphone (DILAUDID) injection 0.5 mg, 0.5

## 2020-01-06 NOTE — PROGRESS NOTES
Spoke with patient this am about TCU. Patient has changed her mind about going home. Patient would like to go to TCU. Plan 6I28 today if patient is medically cleared. Shani RAMACHANDRAN made aware.

## 2020-01-06 NOTE — PROGRESS NOTES
Patient admitted to 8E66 from 7k22,  No family  at bedside. Oriented to call light, room, and staff. Bed functions explained and demonstrated for patient. Patient requesting to have 2 siderails at the head of bed in the upright position to facilitate use of bed functions. Admission packet and patient rights provided, questions answered. Explained patients right to have family, representative or physician notified of their admission. Patient has N/A for physician to be notified. Patient has N/A for family/representative to be notified. No needs expressed at this time. Admitting medication orders compared with acute stay medications; home medication list reviewed with patient/family. Medication issues identified No  Medication issue: none  If yes, physician notified Dr. Kaleb Chappell  Not notified      2 person skin assessment by [facility] and [facility].

## 2020-01-06 NOTE — FLOWSHEET NOTE
01/05/20 2039   Encounter Summary   Services provided to: Patient   Referral/Consult From: [facility] Family members   Place of [facility] none    Continue Visiting Yes  (1/5/2020 continue support )   Complexity of Encounter Moderate   Length of Encounter 15 minutes   Spiritual Assessment Completed Yes   Routine   Type Initial   Assessment Approachable;Calm   Intervention Active listening   Outcome Engaged in conversation;Expressed gratitude     Pt is a 76year old female. Patient engaged in conversation. Pt told me she has neck injury, she loss her  few years ago. Patient also told me she is hopeful that she will be going home in few days.  offered words of encouragement and nurtured hope.  will follow up for continue support.

## 2020-01-07 ENCOUNTER — TELEPHONE (OUTPATIENT)
Dept: ONCOLOGY | Age: 75
End: 2020-01-07

## 2020-01-07 PROCEDURE — 97530 THERAPEUTIC ACTIVITIES: CPT

## 2020-01-07 PROCEDURE — 97166 OT EVAL MOD COMPLEX 45 MIN: CPT

## 2020-01-07 PROCEDURE — 97110 THERAPEUTIC EXERCISES: CPT

## 2020-01-07 PROCEDURE — 97116 GAIT TRAINING THERAPY: CPT

## 2020-01-07 PROCEDURE — 97162 PT EVAL MOD COMPLEX 30 MIN: CPT

## 2020-01-07 PROCEDURE — 94761 N-INVAS EAR/PLS OXIMETRY MLT: CPT

## 2020-01-07 PROCEDURE — 6370000000 HC RX 637 (ALT 250 FOR IP): Performed by: FAMILY MEDICINE

## 2020-01-07 PROCEDURE — 2709999900 HC NON-CHARGEABLE SUPPLY

## 2020-01-07 PROCEDURE — 6370000000 HC RX 637 (ALT 250 FOR IP): Performed by: INTERNAL MEDICINE

## 2020-01-07 PROCEDURE — 94660 CPAP INITIATION&MGMT: CPT

## 2020-01-07 PROCEDURE — 1290000000 HC SEMI PRIVATE OTHER R&B

## 2020-01-07 PROCEDURE — 94640 AIRWAY INHALATION TREATMENT: CPT

## 2020-01-07 PROCEDURE — 97535 SELF CARE MNGMENT TRAINING: CPT

## 2020-01-07 PROCEDURE — 94669 MECHANICAL CHEST WALL OSCILL: CPT

## 2020-01-07 PROCEDURE — 2700000000 HC OXYGEN THERAPY PER DAY

## 2020-01-07 RX ORDER — FAMOTIDINE 20 MG/1
20 TABLET, FILM COATED ORAL DAILY
Status: DISCONTINUED | OUTPATIENT
Start: 2020-01-07 | End: 2020-01-24 | Stop reason: HOSPADM

## 2020-01-07 RX ADMIN — HYDROCODONE BITARTRATE AND ACETAMINOPHEN 1 TABLET: 10; 325 TABLET ORAL at 21:22

## 2020-01-07 RX ADMIN — HYDROCODONE BITARTRATE AND ACETAMINOPHEN 1 TABLET: 10; 325 TABLET ORAL at 08:28

## 2020-01-07 RX ADMIN — HYDROCODONE BITARTRATE AND ACETAMINOPHEN 1 TABLET: 10; 325 TABLET ORAL at 14:30

## 2020-01-07 RX ADMIN — FAMOTIDINE 20 MG: 20 TABLET ORAL at 13:54

## 2020-01-07 RX ADMIN — DIGOXIN 125 MCG: 125 TABLET ORAL at 08:27

## 2020-01-07 RX ADMIN — POLYETHYLENE GLYCOL 3350 17 G: 17 POWDER, FOR SOLUTION ORAL at 08:29

## 2020-01-07 RX ADMIN — GABAPENTIN 300 MG: 300 CAPSULE ORAL at 08:26

## 2020-01-07 RX ADMIN — GABAPENTIN 300 MG: 300 CAPSULE ORAL at 13:52

## 2020-01-07 RX ADMIN — RIVAROXABAN 20 MG: 20 TABLET, FILM COATED ORAL at 17:58

## 2020-01-07 RX ADMIN — DOCUSATE SODIUM 100 MG: 100 CAPSULE, LIQUID FILLED ORAL at 08:28

## 2020-01-07 RX ADMIN — GABAPENTIN 300 MG: 300 CAPSULE ORAL at 21:22

## 2020-01-07 RX ADMIN — VITAMIN D, TAB 1000IU (100/BT) 5000 UNITS: 25 TAB at 08:27

## 2020-01-07 RX ADMIN — ACETAMINOPHEN 500 MG: 500 TABLET ORAL at 17:58

## 2020-01-07 RX ADMIN — ROPINIROLE HYDROCHLORIDE 0.25 MG: 0.25 TABLET, FILM COATED ORAL at 08:26

## 2020-01-07 RX ADMIN — LEVOTHYROXINE SODIUM 75 MCG: 75 TABLET ORAL at 05:46

## 2020-01-07 RX ADMIN — ROPINIROLE HYDROCHLORIDE 0.25 MG: 0.25 TABLET, FILM COATED ORAL at 21:22

## 2020-01-07 RX ADMIN — HYDROCODONE BITARTRATE AND ACETAMINOPHEN 1 TABLET: 10; 325 TABLET ORAL at 02:25

## 2020-01-07 RX ADMIN — Medication 2 PUFF: at 05:38

## 2020-01-07 RX ADMIN — BETAMETHASONE DIPROPIONATE: 0.5 CREAM TOPICAL at 08:33

## 2020-01-07 RX ADMIN — FERROUS SULFATE TAB 325 MG (65 MG ELEMENTAL FE) 325 MG: 325 (65 FE) TAB at 08:26

## 2020-01-07 RX ADMIN — METOPROLOL TARTRATE 12.5 MG: 25 TABLET ORAL at 08:28

## 2020-01-07 RX ADMIN — SENNA PLUS 8.6 MG: 8.6 TABLET ORAL at 21:22

## 2020-01-07 RX ADMIN — Medication 2 PUFF: at 19:49

## 2020-01-07 RX ADMIN — ACETAMINOPHEN 500 MG: 500 TABLET ORAL at 13:52

## 2020-01-07 RX ADMIN — ACETAMINOPHEN 500 MG: 500 TABLET ORAL at 05:47

## 2020-01-07 RX ADMIN — DOCUSATE SODIUM 100 MG: 100 CAPSULE, LIQUID FILLED ORAL at 21:22

## 2020-01-07 RX ADMIN — TIOTROPIUM BROMIDE 18 MCG: 18 CAPSULE ORAL; RESPIRATORY (INHALATION) at 05:37

## 2020-01-07 ASSESSMENT — PAIN SCALES - GENERAL
PAINLEVEL_OUTOF10: 8
PAINLEVEL_OUTOF10: 10
PAINLEVEL_OUTOF10: 7
PAINLEVEL_OUTOF10: 10
PAINLEVEL_OUTOF10: 5
PAINLEVEL_OUTOF10: 6
PAINLEVEL_OUTOF10: 6
PAINLEVEL_OUTOF10: 7

## 2020-01-07 ASSESSMENT — PAIN DESCRIPTION - PAIN TYPE: TYPE: ACUTE PAIN

## 2020-01-07 ASSESSMENT — PAIN DESCRIPTION - ORIENTATION: ORIENTATION: RIGHT

## 2020-01-07 ASSESSMENT — PAIN DESCRIPTION - LOCATION: LOCATION: HIP

## 2020-01-07 NOTE — TELEPHONE ENCOUNTER
Patient is scheduled for chemotherapy on 1/13/20. Patient was admitted to hospital on 12/30/2019 due to fall. Patient sustained right femoral neck fracture, status post hemiarthroplasty of right hip on 1/1/2020. Patient was discharged to [facility]. Would you like patient to be rescheduled, if so, when? Please advise, thank you!

## 2020-01-07 NOTE — PROGRESS NOTES
[facility]  INPATIENT PHYSICAL THERAPY  EVALUATION  [facility] SKILLED NURSING UNIT - 8E-66/066-A    Time In: 1109  Time Out: 1210  Timed Code Treatment Minutes: 43 Minutes  Minutes: 61          Date: 2020  Patient Name: Angie Melvin,  Gender:  female        MRN: [id number]  : 1945  (76 y.o.)  Referral Date : 20   Referring Practitioner: Don Moffett MD  Diagnosis: hemiarthroplasty of right hip  Treatment Diagnosis: difficulty in walking  Additional Pertinent Hx: Per EMR: \"Fannie Quiroz is a 76 y.o. female who presents for evaluation of pain to the right leg and hip that onset following a fall that occurred 3 days ago. Patient reports that she tripped over her cat and fell 3 days ago, experiencing immediate pain to the right leg and hip. Patient notes worsening of pain since initial fall and reports exacerbation of pain when she attempts to bear weight onto her right leg. Patient describes additional symptom of nausea. Patient denies hitting her head during the incident and denies headache and chest pain. Patient reports using oxygen treatments at home due to condition of chronic obstructive pulmonary disease. Patient notes a history of smoking but denies current nicotine use. Patient additionally has a history of congestive heart failure and notes that edema to her lower extremities is normal at baseline. \" Pt underwent right hip hemiarthroplasty 20. Restrictions/Precautions:  Restrictions/Precautions: Weight Bearing, Fall Risk  Right Lower Extremity Weight Bearing: Weight Bearing As Tolerated  Position Activity Restriction  Hip Precautions: No hip flexion > 90 degrees, No ADduction, No hip internal rotation  Other position/activity restrictions: Home O2: baseline is 2L at rest/6L with activity.  S/p right hip hemiarthroplasty for femoral neck fracture on 20    Subjective:  Chart Reviewed: Yes  Patient assessed for rehabilitation services?: Yes  Family / Caregiver environment. Therapeutic exercise completed to improve patient's LE strength and reduce her difficulty with functional mobility. Therapeutic activity completed to improve patient's ability to complete functional transfers. Assessment: Body structures, Functions, Activity limitations: Decreased functional mobility , Decreased strength, Decreased endurance, Decreased balance, Increased pain  Assessment: The evaluation and examination of Ms. Bettie Merrill indicates a decline in functional mobility compared to baseline. Pt was independent at PLOF with no AD and at this time requires assistance with all mobility. Pt limited by pain and lower extremity weakness. Pt would benefit from skilled PT services to improve her ability to complete functional mobility, reduce her risk for falls and allow patient to return to PLOF. Prognosis: Good   Co-morbidities: COPD. Cancer, CHF, arthritis  Pt on 2-6L O2, increased pain levels          Discharge Recommendations:  Discharge Recommendations: Continue to assess pending progress    Patient Education:  PT Education: PT Role, Plan of Care, Goals, Precautions, Transfer Training, Gait Training   Reviewed hip precautions  Education on benefits of ice to reduce pain and edema. Equipment Recommendations:  Equipment Needed: Yes(may need new 2WW)    Plan:  Times per week: 5x/wk BID, 1x/wk QD  Times per day: Twice a day  Plan weeks: 3 weeks  Current Treatment Recommendations: Strengthening, Functional Mobility Training, Neuromuscular Re-education, Home Exercise Program, Transfer Training, Gait Training, Safety Education & Training, Balance Training, Endurance Training, Stair training, Patient/Caregiver Education & Training    Goals:  Patient goals : go home  Short term goals  Time Frame for Short term goals: 10 days  Short term goal 1: Patient will complete sit  <> stand with SBA to stand to ambulate with decreased difficulty.   Short term goal 2: Patient will complete supine < > sit with SBA to transfer in/out of bed with decreased difficulty. Short term goal 3: Patient will ambulate Stevan  1560' with a RW and SBA to progress towards independent home mobility. Short term goal 4: Patient will ascend/descend 2 steps with 2 hand rails with CGA to progress towards independent home entry. Long term goals  Time Frame for Long term goals : 3 weeks  Long term goal 1: Patient will complete sit < > stand and stand pivot transfers with modified independence to allow patient to transfer surface to surface safely. Long term goal 2: Patient will complete supine < > sit with modified independence to allow patient to transfer in/out of bed safely. Long term goal 3: Patient will ambulate 80' with a RW and modified independence to navigate home safely. Long term goal 4: Patient will ascend/descend 2 steps with 1 handrail and SBA for safe home entry. Long term goal 5: Patient will complete car transfer with SBA for safe transport to home and appointments. Following session, patient left in safe position with all fall risk precautions in place.

## 2020-01-07 NOTE — PROGRESS NOTES
Certification for TCU Admission    Name:  Rosalba Salmon    MR#:    [id number]  [place]: [de-identified]      :   1945    Long Term Care Facility Type: Transitional Care Unit     Dx:    Hemiarthroplasty of right hip       Patient Active Problem List   Diagnosis    Metastatic lung carcinoma, left ([address]    HTN (hypertension)    GERD (gastroesophageal reflux disease)    H/O: lung cancer    OAB (overactive bladder)    Nodule of left lung    Recurrent non-small cell lung cancer (NSCLC) (HCC)    Acute on chronic respiratory failure with hypoxia (HCC)    Orthostatic hypotension    Chronic midline low back pain without sciatica    Pneumonia due to organism    Sinus tachycardia    Acute on chronic respiratory failure with hypoxemia (HCC)    Primary lung cancer, right (HCC)    Moderate malnutrition (HCC)    Multifocal atrial tachycardia (HCC)    Anemia    Bilateral leg edema    Physical deconditioning    Chronic obstructive pulmonary disease (HCC)    Recurrent left pleural effusion    Paroxysmal atrial fibrillation (HCC)    Squamous cell carcinoma of bronchus in right lower lobe (HCC)    Acute on chronic respiratory failure with hypoxia and hypercapnia (HCC)    Acute pulmonary edema (HCC)    Lung consolidation (HCC)    Severe malnutrition (HCC)    Shortness of breath    Chronic respiratory failure with hypercapnia (HCC)    Abnormal CT of the chest    Pericardial effusion    Pathologic fracture of femoral neck, right, initial encounter ([address]    Fall    Closed fracture of neck of right femur (HCC)    Acute postoperative respiratory insufficiency    Acute blood loss anemia    Thrombocytopenia (HCC)    Hypovolemic shock (HCC)    Constipation    Chronic diastolic heart failure (HCC)    Hx pulmonary embolism    Metastatic non-small cell lung cancer ([address]    Hypothyroidism    Hip fracture requiring operative repair, left, closed, initial encounter ([facility]       Code Status: Full Code Rehabilitation Potential: Good     Prognosis: Good     Stability: Stable     History & Physical current: Yes   If No, note changes in H&P update     Level of Care Recommendation/Request: Skilled     Physician Certification: I certify that I have reviewed the information contained in the discharge summary and that the information is a true and accurate reflection of the individual's condition. I certify this resident is appropriate for skilled services provided in the TCU/ECF for a condition which the individual received inpatient care. Certification: I certify the orders, information, and transfer of said patient is necessary for the continuing treatment of the diagnosis listed in a skilled care setting providing skilled nursing and/or skilled rehabilitative services. The patient will require on a daily basis SNF covered care.   Electronically signed by Miroslava Fabian MD on 1/7/2020 at 4:56 PM

## 2020-01-07 NOTE — PROGRESS NOTES
chair, Grab bars in shower    Receives Help From: Family  ADL Assistance: Independent  Homemaking Assistance: Needs assistance  Ambulation Assistance: Independent  Transfer Assistance: Independent  Active : Yes  IADL Comments: Daughter able to assist at times but works 2 jobs, Has a niece who is able to assist at times  Additional Comments: Pt reports I prior to admission without AD. Pt reports daughter has RTS. Current with meals on wheels. Restrictions/Precautions:  Restrictions/Precautions: Weight Bearing, Fall Risk  Right Lower Extremity Weight Bearing: Weight Bearing As Tolerated  Position Activity Restriction  Hip Precautions: No hip flexion > 90 degrees, No ADduction, No hip internal rotation  Other position/activity restrictions: Home O2: baseline is 2L at rest/6L with activity. S/p right hip hemiarthroplasty for femoral neck fracture on 1/1/20    SUBJECTIVE: Patient in room, agreeable to PT. Pt reports feeling \"lousy\" this afternoon. Pt with significant amount of non-productive cough at beginning of session, nurse notified. PAIN: right hip, not rated, reports not as high as AM since receiving pain medication. Ice applied to hip at end of session. OBJECTIVE:  Bed Mobility:  Sit to Supine: Moderate Assistance, to assist lower extremities, verbal cues for positioning on bed to improve positioning, utilized bed rail     Transfers:  Sit to Stand: Stand By Assistance, Contact Guard Assistance  Stand to [facility] Assistance    Ambulation:  Stand By Assistance, Contact Guard Assistance  Distance: 72' x2  Surface: Level Tile  Device:Rolling Walker  Gait Deviations:   Forward Flexed Posture, Slow Ligia, Decreased Step Length Bilaterally, Decreased Weight Shift Right and Decreased Heel Strike Bilaterally  Verbal cues for improved posture    Balance:  Static Standing Balance: Stand By Assistance, Contact Guard Assistance  Dynamic Standing Balance: Stand By Assistance, [employer]

## 2020-01-07 NOTE — PROGRESS NOTES
[address] [address]  Transitional Care Unit Preadmission Assessment    Patient Name: Alee Flores        MRN: [id number]    [place]: [de-identified]    : 1945  (76 y.o.)  Gender: female     Admitted From:  [facility] []Outside Admission - Location:  Date of Admission to the hospital:2020  Date patient eligible for admission:20  Primary Diagnosis onelia arthroplasty right hip  Did patient have surgery?   [x] Yes- Explain: Hemiarthroplasty of right hip   [] No    Physicians:Dr. Nadja Gracia  Risk for clinical complications/co-morbidities:   Past Medical History:   Diagnosis Date    Arthritis     low back pain and scoliosis in lumbar    Cancer ([facility]     lower right lobe-lung s/p lobectomy in , radiation and chemo nad later had mediatinal mets and had radationa dn chemo again, and in  had reoccurence on the left side upper and was started on radiation this year and has  a follow up CT in oct 2017    CHF (congestive heart failure) (HCC)     Chronic bronchitis, simple (HCC)     Chronic respiratory failure with hypoxia ([address]     COPD (chronic obstructive pulmonary disease) ([address]     GERD (gastroesophageal reflux disease)     HTN (hypertension)     Hx of blood clots     in lung     Pneumonia     Postprocedural pneumothorax     Scoliosis     Sleep apnea     Urinary incontinence     UTI (urinary tract infection)        Financial Information  Primary insurance:  [x]Medicare [] Medicare HMO  []Commercial insurance    []Medicaid   []Workers Compensation      Secondary Insurance:  []Medicare [] Medicare HMO  [x]Commercial insurance    []Medicaid   []Workers Compensation []None    Precautions:   []Cardiac Precautions  []Total hip precautions    []Weight Bearing status:  [x]Safety Precautions/Concerns fall precautions  []Visually impaired   []Hard of Hearing     Isolation Precautions:         []Yes  [x]No  If Yes:  [] Droplet  []Contact []Airborne                   []VRE          []MRSA []C-diff         [] TB  [] Other:       Skills:   [x]Physical therapy     [x]Occupational Therapy   []IV Antibiotics   [] IV meds   [] TPN     []PEG tube Feedings      []New Colostomy   [] Ileostomy care/teaching    [] Speech Therapy   []Wound Vac   []Complex Dressing Changes    []Terminal care    []Other       Has patient had Prior Skilled care in the Last 60 days:  []Yes  [x]NO   If Yes:   Skilled Facility:    How many skilled days were used?

## 2020-01-07 NOTE — PROGRESS NOTES
[address] [address]  Recreational Therapy  Evaluation  Transitional Care Unit      Time Spent with Patient: 40 minutes    Date:  1/7/2020       Patient Name: Garfield Marquez      MRN: [id number]       YOB: 1945 (76 y.o.)       Gender: female  Diagnosis: Hemiarthroplast of R Hip  Referring Practitioner: Roly Newman MD; Attending: Dr. Marry Anderson    RESTRICTIONS/PRECAUTIONS:  Restrictions/Precautions: Weight Bearing, Fall Risk          PAIN: 7-hip and back     SUBJECTIVE:  Pt lives alone-she has 2 daughters with 1 close by and 1 in [place] has a brother close and 2 nieces-    VISION:  Glasses-to drive, read and sew     HEARING: Hard of Hearing    LEISURE INTERESTS:   Pt gets meals on wheels, her niece gets her groceries, gave pt some word search puzzles, gave her a few books to read in her free time,  gave her some Sikhism coloring sheets and colored pencils- she has 6 cats at home-she has a good sense of humor and very pleasant-today is her 75th birthday    Joel Starling:    Decreased endurance           Patient Education  New Education Provided: Importance of Leisure, RT Plan of Care    Plan:  Continue to follow patient through this admission  Include patient in appropriate groups  See patient individually    Electronically signed by: PRICE Greer  Date: 1/7/2020

## 2020-01-07 NOTE — PROGRESS NOTES
[facility]  Recreational Therapy  Daily Note  - [place] SKILLED NURSING UNIT    Time Spent with Patient: 10 minutes    Date:  1/7/2020       Patient Name: Alejandra Cha      MRN: [id number]      YOB: 1945 (76 y.o.)       Gender: female  Diagnosis: Hemiarthroplast of R Hip  Referring Practitioner: Ashley Sellers MD; Attending: Dr. Asuncion Garcia    RESTRICTIONS/PRECAUTIONS:  Restrictions/Precautions: Weight Bearing, Fall Risk  Vision: Impaired  Hearing: Within functional limits    PAIN: 0-no c/o pain     SUBJECTIVE:  YES    OBJECTIVE:  Pt would like to get her hair done tomorrow by our beautician-states her hair has not come in very well since chemo and is tired of wearing a wig-is in hopes that she will give her a cut that she likes and easy to manage-appreciative          Patient Education  New Education Provided: Importance of Leisure,     Electronically signed by: PRICE Jerez  Date: 1/7/2020

## 2020-01-07 NOTE — PROGRESS NOTES
Informed pt that she will get a tb shot today. Pt states she is a postive reactor. notified charge nurse Miguel Morrissey rn and states to hold med.

## 2020-01-07 NOTE — PROGRESS NOTES
RECREATIONAL THERAPY  MISSED TREATMENT    [x]Transitional Care Unit  []Inpatient Rehabilitation    Date:  1/7/2020            Patient Name: Brie Rodríguez           MRN: [id number]  Acct: [de-identified]          YOB: 1945 (69 y.o.)       Gender: female   Diagnosis: Hemiarthroplast of R Hip  Physician: Referring Practitioner: Kalin Doston MD; Attending: Dr. Tani Castaneda:    []Hold treatment per nursing request  []Patient refusal  []Family declined treatment  []Patient at testing and/or off the floor  []Patient unavailable, with PT/OT/nursing, etc.  [x]Other declined coming to the dining room for lunch today with peers-staff sang Happy Birthday to her as she was walking to her room with P.T.-affect bright-appreciative   Sita Head, [address] 1/7/2020

## 2020-01-07 NOTE — PROGRESS NOTES
History:  Lives With: Alone  Type of Home: House  Home Layout: One level  Home Access: Stairs to enter with rails  Entrance Stairs - Number of Steps: 2  Entrance Stairs - Rails: Right(grab bar)  Home Equipment: Standard walker, Cane, Oxygen, 4 wheeled walker(pt reports SW and 4WW are wider, pt notes walkers she has too wide to go through the doorway)   Bathroom Shower/Tub: Tub/Shower unit, Shower chair with back  [employer]: Standard(pt reports daughter installing toilet riser)  Bathroom Equipment: Shower chair, Grab bars in shower  IADL Comments: Daughter able to assist at times but works 2 jobs, Has a niece who is able to assist at times    [employer] Help From: Family  ADL Assistance: Independent  Homemaking Assistance: Needs assistance  Ambulation Assistance: Independent  Transfer Assistance: Independent    Active : Yes  Leisure & Hobbies: quilting, reading, watch TV  Additional Comments: Pt reports I prior to admission without AD. Pt reports daughter has RTS. Current with meals on wheels. Cognition/Orientation:  Overall Orientation Status: Within Normal Limits  Overall Cognitive Status: WFL    ADL's:  Grooming: Contact guard assistance(standing sinkside for oral care)  UE Bathing: Supervision, Setup  LE Bathing: Minimal assistance, Moderate assistance(assist with B lower legs and feet, CGA while standing for bottom/sonido)  UE Dressing: Setup, Supervision  LE Dressing:  Moderate assistance(assist with socks, threading/pulling up pants to knees)       Functional Mobility:  Bed mobility  Supine to Sit: Stand by assistance  Scooting: Stand by assistance    Functional Mobility  Functional - Mobility Device: Rolling Walker  Activity: To/from bathroom, Other  Assist Level: Contact guard assistance  Functional Mobility Comments: within unit with multiple standing RBs--O2 increased to 6L with activity  Apparatus Needs: O2     Balance:  Balance  Sitting Balance: Supervision  Standing Balance: Contact guard

## 2020-01-08 PROCEDURE — 97535 SELF CARE MNGMENT TRAINING: CPT

## 2020-01-08 PROCEDURE — 2700000000 HC OXYGEN THERAPY PER DAY

## 2020-01-08 PROCEDURE — 94761 N-INVAS EAR/PLS OXIMETRY MLT: CPT

## 2020-01-08 PROCEDURE — 94669 MECHANICAL CHEST WALL OSCILL: CPT

## 2020-01-08 PROCEDURE — 97110 THERAPEUTIC EXERCISES: CPT

## 2020-01-08 PROCEDURE — 6370000000 HC RX 637 (ALT 250 FOR IP): Performed by: INTERNAL MEDICINE

## 2020-01-08 PROCEDURE — 97116 GAIT TRAINING THERAPY: CPT

## 2020-01-08 PROCEDURE — 6370000000 HC RX 637 (ALT 250 FOR IP): Performed by: FAMILY MEDICINE

## 2020-01-08 PROCEDURE — 97530 THERAPEUTIC ACTIVITIES: CPT

## 2020-01-08 PROCEDURE — 1290000000 HC SEMI PRIVATE OTHER R&B

## 2020-01-08 PROCEDURE — 94640 AIRWAY INHALATION TREATMENT: CPT

## 2020-01-08 RX ADMIN — ACETAMINOPHEN 500 MG: 500 TABLET ORAL at 05:25

## 2020-01-08 RX ADMIN — LEVOTHYROXINE SODIUM 75 MCG: 75 TABLET ORAL at 05:25

## 2020-01-08 RX ADMIN — FAMOTIDINE 20 MG: 20 TABLET ORAL at 09:59

## 2020-01-08 RX ADMIN — ACETAMINOPHEN 500 MG: 500 TABLET ORAL at 18:16

## 2020-01-08 RX ADMIN — ROPINIROLE HYDROCHLORIDE 0.25 MG: 0.25 TABLET, FILM COATED ORAL at 09:59

## 2020-01-08 RX ADMIN — HYDROCODONE BITARTRATE AND ACETAMINOPHEN 1 TABLET: 10; 325 TABLET ORAL at 18:16

## 2020-01-08 RX ADMIN — PROMETHAZINE HYDROCHLORIDE 25 MG: 25 TABLET ORAL at 13:43

## 2020-01-08 RX ADMIN — RIVAROXABAN 20 MG: 20 TABLET, FILM COATED ORAL at 18:16

## 2020-01-08 RX ADMIN — TIOTROPIUM BROMIDE 18 MCG: 18 CAPSULE ORAL; RESPIRATORY (INHALATION) at 04:37

## 2020-01-08 RX ADMIN — GABAPENTIN 300 MG: 300 CAPSULE ORAL at 10:00

## 2020-01-08 RX ADMIN — HYDROCODONE BITARTRATE AND ACETAMINOPHEN 1 TABLET: 10; 325 TABLET ORAL at 11:26

## 2020-01-08 RX ADMIN — Medication 2 PUFF: at 04:36

## 2020-01-08 RX ADMIN — HYDROCODONE BITARTRATE AND ACETAMINOPHEN 1 TABLET: 10; 325 TABLET ORAL at 05:25

## 2020-01-08 RX ADMIN — DOCUSATE SODIUM 100 MG: 100 CAPSULE, LIQUID FILLED ORAL at 10:01

## 2020-01-08 RX ADMIN — GABAPENTIN 300 MG: 300 CAPSULE ORAL at 21:01

## 2020-01-08 RX ADMIN — Medication 2 PUFF: at 18:36

## 2020-01-08 RX ADMIN — GABAPENTIN 300 MG: 300 CAPSULE ORAL at 13:45

## 2020-01-08 RX ADMIN — POLYETHYLENE GLYCOL 3350 17 G: 17 POWDER, FOR SOLUTION ORAL at 10:00

## 2020-01-08 RX ADMIN — DIGOXIN 125 MCG: 125 TABLET ORAL at 10:00

## 2020-01-08 RX ADMIN — VITAMIN D, TAB 1000IU (100/BT) 5000 UNITS: 25 TAB at 10:01

## 2020-01-08 RX ADMIN — FERROUS SULFATE TAB 325 MG (65 MG ELEMENTAL FE) 325 MG: 325 (65 FE) TAB at 10:00

## 2020-01-08 RX ADMIN — ROPINIROLE HYDROCHLORIDE 0.25 MG: 0.25 TABLET, FILM COATED ORAL at 21:01

## 2020-01-08 ASSESSMENT — PAIN SCALES - GENERAL
PAINLEVEL_OUTOF10: 7
PAINLEVEL_OUTOF10: 9
PAINLEVEL_OUTOF10: 8
PAINLEVEL_OUTOF10: 9
PAINLEVEL_OUTOF10: 9
PAINLEVEL_OUTOF10: 5
PAINLEVEL_OUTOF10: 6

## 2020-01-08 ASSESSMENT — PAIN DESCRIPTION - ORIENTATION
ORIENTATION: RIGHT

## 2020-01-08 ASSESSMENT — PAIN DESCRIPTION - LOCATION
LOCATION: BACK;HIP
LOCATION: HIP
LOCATION: BACK;HIP

## 2020-01-08 ASSESSMENT — PAIN DESCRIPTION - FREQUENCY
FREQUENCY: CONTINUOUS

## 2020-01-08 ASSESSMENT — PAIN DESCRIPTION - PROGRESSION
CLINICAL_PROGRESSION: NOT CHANGED
CLINICAL_PROGRESSION: NOT CHANGED

## 2020-01-08 ASSESSMENT — PAIN DESCRIPTION - DESCRIPTORS
DESCRIPTORS: ACHING

## 2020-01-08 ASSESSMENT — PAIN - FUNCTIONAL ASSESSMENT: PAIN_FUNCTIONAL_ASSESSMENT: PREVENTS OR INTERFERES SOME ACTIVE ACTIVITIES AND ADLS

## 2020-01-08 ASSESSMENT — PAIN DESCRIPTION - ONSET: ONSET: ON-GOING

## 2020-01-08 ASSESSMENT — PAIN DESCRIPTION - PAIN TYPE: TYPE: ACUTE PAIN

## 2020-01-08 NOTE — PLAN OF CARE
[facility]  Physical Medicine Case Management Assessment    [] Inpatient Rehabilitation Unit  [x] Transitional Care Unit    Patient Name: Judah Florez        MRN: [id number]    : 1945  (76 y.o.)  Gender: female   Date of Admission: 2020  1:37 PM    Family/Social/Home Environment: Social/Functional History: Patient is a 76year old  female who was admitted to [facility] following a stay on acute for a Yoni Arthroplasty (right). Patient lives alone in her private residence, she has been  since 2018. Patient has 2 adult daughters, one lives locally and the other lives in [place]. Patient explains she has a good relationship with ester but has not seen her daughter who lives in [place] for several years. Patient explains she also has a niece who she is close to. Patient states her niece will assist with grocery shopping and other errands as needs arise. Patient keeps in touch with extended family members. Patient  feels she has a strong support system. Lives With: Alone  Type of Home: House  Home Layout: One level  Home Access: Ramped entrance   [facility] [address] - Number of Steps: 2  Entrance Stairs - Rails: Right(grab bar)  Bathroom Shower/Tub: Tub/Shower unit, Shower chair with back  [employer]: Standard(pt reports daughter installing toilet riser)  Bathroom Equipment: Shower chair, Grab bars in [address]: Standard walker  Receives Help From: Family  ADL Assistance: Independent  Homemaking Assistance: Needs assistance  Homemaking Responsibilities: Yes  Ambulation Assistance: Independent  Transfer Assistance: Independent  Active : Yes  Mode of Transportation: Car  Education: [employer]  Occupation: Retired  Leisure & Hobbies: quilting, reading, watch TV  IADL Comments: Daughter able to assist at times but works 2 jobs, Has a niece who is able to assist at times  Additional Comments: Pt reports I prior to admission without AD.  Pt reports daughter has RTS. Current with meals on wheels. Contact/Guardian Information:  Enedina Dhaliwal (brother) [phone], Shelley Espinosa (daughter) [phone]    Community Resources Utilized: none prior to hospitalization        Anticipated Needs/Discharge Plans: Patient plans to return home alone once she is discharged from [place]. Patient reports she was managing her own care independently prior to her fall. Patient states she is an active  and was completing most IADLs without any assist.SW anticipates patient will benefit from either out patient ongoing skilled therapies or skilled home health services with skilled therapies. Therapy staff to evaluate and assess need for any medical equipment. Discharge Planning:SW met with patient gave an overview of TCU,Team Conference/Team Conference schedule and this SW's role in discharge planning. SW will monitor progress and maintain contact with patient and patient's family regarding length of stay and recommendations. SW to follow and maintain involvement in discharge planning. Care plan reviewed with patient. Patient verbalized understanding of the plan of care and contributed to goal setting.     Living Arrangements: Alone  Support Systems: Children, Family Members  Potential Assistance Needed: Outpatient PT/OT  Potential Assistance Purchasing Medications: No  Meds-to-Beds: Does the patient want to have any new prescriptions delivered to bedside prior to discharge?: No  Type of Home Care Services: PT, OT, Nursing Services  Patient expects to be discharged to[de-identified] Home  Expected Discharge Date: (undetermined)  Follow Up Appointment: Best Day/Time : Monday AM      Leora Dhaliwal 1/8/2020 11:56 AM

## 2020-01-08 NOTE — PROGRESS NOTES
Shower chair, Grab bars in shower    Receives Help From: Family  ADL Assistance: Independent  Homemaking Assistance: Needs assistance  Ambulation Assistance: Independent  Transfer Assistance: Independent  Active : Yes  IADL Comments: Daughter able to assist at times but works 2 jobs, Has a niece who is able to assist at times  Additional Comments: Pt reports I prior to admission without AD. Pt reports daughter has RTS. Current with meals on wheels. Restrictions/Precautions:  Restrictions/Precautions: Weight Bearing, Fall Risk  Right Lower Extremity Weight Bearing: Weight Bearing As Tolerated  Position Activity Restriction  Hip Precautions: No hip flexion > 90 degrees, No ADduction, No hip internal rotation  Other position/activity restrictions: Home O2: baseline is 2L at rest/6L with activity. S/p right hip hemiarthroplasty for femoral neck fracture on 1/1/20    SUBJECTIVE: pleasant, co-operative    PAIN: no # given for R LE/HIP pain    OBJECTIVE:  Bed Mobility:  Supine to Sit: Minimal Assistance  Sit to Supine: Minimal Assistance     Transfers:  Sit to Stand: Stand By Assistance, [employer], to/from chair with arms  Stand to Sit:Stand By Assistance, [employer], to/from chair with arms    Ambulation:  Stand By Assistance, sba-> lt. cga  Distance: 150' x 1,180' x 1  Surface: Level Tile  Device:Rolling Walker  Gait Deviations:  Slow Ligia, Decreased Step Length Bilaterally, Decreased Weight Shift Right, Decreased Gait Speed and Decreased Heel Strike Bilaterally    Balance:  Dynamic Standing Balance: Stand By Assistance, Contact Guard Assistance,  standing to mart robe without ue support    Exercise:  Patient was guided in 1 set(s) 15 reps of exercise to both lower extremities. Ankle pumps, Glut sets, Quad sets, Heelslides, Short arc quads, Long arc quads,  L LE Hip abduction/adduction seated and supine, Seated hip flexion, Seated hamstring curls and Seated heel/toe raises. in/out of bed safely. Long term goal 3: Patient will ambulate 80' with a RW and modified independence to navigate home safely. Long term goal 4: Patient will ascend/descend 2 steps with 1 handrail and SBA for safe home entry. Long term goal 5: Patient will complete car transfer with SBA for safe transport to home and appointments. Following session, patient left in safe position with all fall risk precautions in place.

## 2020-01-08 NOTE — PLAN OF CARE
Marlene Colunga  [id number]    This document includes baseline careplan information as well as current active orders for this admission to Transitional Care Unit (8E). A copy was given to the patient and/or patient representative today, 01/08/2020.     Current Active Orders:  Orders Placed This Encounter   Procedures    DIET LOW SODIUM 2 GM;    Place PPD    Vital signs    Waffle cushion in chair when up    Full Weight Bearing as tolerated    Ice therapy    Encourage deep breathing and coughing    Weigh patient daily    Change dressing    Full code    Consult to Recreation Therapy    Consult to Social Work    Inpatient consult to Dietitian    Dietary Nutrition Supplements: Standard High Calorie Oral Supplement    OT eval and treat    PT eval and treat    BIPAP    Nasal Cannula Oxygen    MDI Treatment    Acapella    Hip precautions, nsg to post anterior / posterior at bedside    DME Order for Walker as OP       Current Medications:  Current Facility-Administered Medications   Medication Dose Route Frequency Provider Last Rate Last Dose    famotidine (PEPCID) tablet 20 mg  20 mg Oral Daily Beatriz Schofield MD   20 mg at 01/08/20 0959    acetaminophen (TYLENOL) tablet 500 mg  500 mg Oral Q6H Claudean Lurie, MD   500 mg at 01/08/20 0525    gabapentin (NEURONTIN) capsule 300 mg  300 mg Oral TID Claudean Lurie, MD   300 mg at 01/08/20 1000    HYDROcodone-acetaminophen (NORCO)  MG per tablet 1 tablet  1 tablet Oral Q6H PRN Claudean Lurie, MD   1 tablet at 01/08/20 1126    Vitamin D (CHOLECALCIFEROL) tablet 5,000 Units  5,000 Units Oral Daily Claudean Lurie, MD   5,000 Units at 01/08/20 1001    magnesium hydroxide (MILK OF MAGNESIA) 400 MG/5ML suspension 30 mL  30 mL Oral Daily PRN Claudean Lurie, MD        albuterol sulfate  (90 Base) MCG/ACT inhaler 2 puff  2 puff Inhalation Q6H PRN Claudean Lurie, MD        betamethasone dipropionate (DIPROLENE) 0.05 % cream   Topical Daily Arlet Hand MD        bisacodyl (DULCOLAX) suppository 10 mg  10 mg Rectal Daily PRN Arlet Hand MD        digoxin (LANOXIN) tablet 125 mcg  125 mcg Per NG tube Daily Arlet Hand MD   125 mcg at 01/08/20 1000    docusate sodium (COLACE) capsule 100 mg  100 mg Per NG tube BID Arlet Hand MD   100 mg at 01/08/20 1001    ferrous sulfate tablet 325 mg  325 mg Per NG tube Daily with breakfast Arlet Hand MD   325 mg at 01/08/20 1000    hydrOXYzine (VISTARIL) capsule 25 mg  25 mg Oral TID PRN Arlet Hand MD        levothyroxine (SYNTHROID) tablet 75 mcg  75 mcg Per NG tube Daily Arlet Hand MD   75 mcg at 01/08/20 0525    metoprolol tartrate (LOPRESSOR) tablet 12.5 mg  12.5 mg Per NG tube BID Arlet Hand MD   12.5 mg at 01/07/20 0828    mometasone-formoterol (DULERA) 200-5 MCG/ACT inhaler 2 puff  2 puff Inhalation BID Arlet Hand MD   2 puff at 01/08/20 0436    polyethylene glycol (GLYCOLAX) packet 17 g  17 g Per NG tube Daily Arlet Hand MD   17 g at 01/08/20 1000    promethazine (PHENERGAN) tablet 25 mg  25 mg Oral Q8H PRN Arlet Hand MD        rivaroxaban (XARELTO) tablet 20 mg  20 mg Oral Daily Arlet Hand MD   20 mg at 01/07/20 1758    rOPINIRole (REQUIP) tablet 0.25 mg  0.25 mg Per NG tube BID Arlet Hand MD   0.25 mg at 01/08/20 0068    senna (SENOKOT) tablet 8.6 mg  1 tablet Oral Nightly Arlet Hand MD   8.6 mg at 01/07/20 2122    tiotropium (SPIRIVA) inhalation capsule 18 mcg  18 mcg Inhalation Daily Arlet Hand MD   18 mcg at 01/08/20 0437       Plan of Care Problems and Goals      Problem: Pain    Goal: Pain Level will decrease   Problem: Falls- Risk of    Goal: Absence of falls    Goal: Absence of physical injury   Problem: Risk of Impaired Skin Integrity    Goal: Absence of new skin breakdown   Problem: Risk of Bleeding    Goal: Will show no signs and symptoms of excessive bleeding     Problem: Infection- Risk of Surgical Site Infection    Goal: Will show no infection signs and symptoms   Problem:  Bowel Function-Altered    Goal: Ability to achieve regular elimination pattern will improve   Problem: Nutrition    Goal: Optimal nutrition therapy   Problem: Impaired Mobility    Goal: Mobility will improve   Problem: Discharge Planning    Goal: Continuum of care needs are met   Problem: Respiratory     Goal: Clear lung sounds    Goal: Adequate oxygenation

## 2020-01-08 NOTE — H&P
TCU History and Physical      Chief Complaint and Reason for TCU Admission:   The need to continue time with therapies following the surgical repair of the right hip. History of Present Illness:  Pastora De Leon  is a 76 y.o. female admitted to the transitional care unit on 1/6/2020. She had a pahologic fracture of the right femoral neck and required surgery to repair it. Post op she had some exacerbation of her respiratory failure but is more stable now. She is anxious to resume her chemotherapy. She is currently too deconditioned to return home so she comes to the TCU for the continued therapy time. Past Medical History:      Diagnosis Date    Arthritis     low back pain and scoliosis in lumbar    Cancer ([address] 2012    lower right lobe-lung s/p lobectomy in 2012, radiation and chemo nad later had mediatinal mets and had radationa dn chemo again, and in 2016 had reoccurence on the left side upper and was started on radiation this year and has  a follow up CT in oct 2017    CHF (congestive heart failure) (HCC)     Chronic bronchitis, simple (HCC)     Chronic respiratory failure with hypoxia ([address]     COPD (chronic obstructive pulmonary disease) (HCC)     GERD (gastroesophageal reflux disease)     History of positive PPD     HTN (hypertension)     Hx of blood clots     in lung     Pneumonia     Postprocedural pneumothorax     Scoliosis     Sleep apnea     Urinary incontinence     UTI (urinary tract infection)        Primary care provider: JESUS Clayton - CNP     Past Surgical History:      Procedure Laterality Date    HIP SURGERY Right 1/1/2020    RIGHT [facility] [address] [place] [address] [id number]  10/19/2012    rt lower lobectomy     LUNG BIOPSY  8/2012       Allergies:    Advil cold & sinus liqui-gels [pseudoephedrine-ibuprofen];  Food; Percocet [oxycodone-acetaminophen]; and Sulfa antibiotics    Current Medications:    Current Facility-Administered History     Socioeconomic History    Marital status:      Spouse name: Quinton Williamson Number of children: 2    Years of education: Not on file    Highest education level: Not on file   Occupational History    Not on file   Social Needs    Financial resource strain: Not on file    Food insecurity:     Worry: Not on file     Inability: Not on file    Transportation needs:     Medical: Not on file     Non-medical: Not on file   Tobacco Use    Smoking status: Former Smoker     Packs/day: 1.00     Years: 45.00     Pack years: 45.00     Types: Cigarettes     Last attempt to quit:      Years since quittin.0    Smokeless tobacco: Never Used   Substance and Sexual Activity    Alcohol use: No    Drug use: No    Sexual activity: Not on file   Lifestyle    Physical activity:     Days per week: Not on file     Minutes per session: Not on file    Stress: Not on file   Relationships    Social connections:     Talks on phone: Not on file     Gets together: Not on file     Attends [employer] service: Not on file     Active member of club or organization: Not on file     Attends meetings of clubs or organizations: Not on file     Relationship status: Not on file    Intimate partner violence:     Fear of current or ex partner: Not on file     Emotionally abused: Not on file     Physically abused: Not on file     Forced sexual activity: Not on file   Other Topics Concern    Not on file   Social History Narrative    Not on file           Family History:       Problem Relation Age of Onset    Cancer Mother     Heart Disease Father     High Blood Pressure Father     High Cholesterol Father     Arthritis Sister     Heart Disease Sister     Cancer Sister         lung cancer    Heart Disease Sister     Stroke Sister     High Cholesterol Sister     High Blood Pressure Sister     Stroke Paternal Grandfather     High Blood Pressure Brother        Review of Systems:  CONSTITUTIONAL:  positive for fatigue  EYES:  positive for  glasses  HEENT:  negative for  nasal congestion  RESPIRATORY:  positive for  dyspnea  CARDIOVASCULAR:  negative for  chest pain  GASTROINTESTINAL:  negative for constipation  GENITOURINARY:  positive for nocturia  negative for dysuria  SKIN:  negative  HEMATOLOGIC/LYMPHATIC:  positive for easy bruising  MUSCULOSKELETAL:  positive for  muscle weakness  NEUROLOGICAL:  positive for gait problems  BEHAVIOR/PSYCH:  negative for agitated  10 point system review otherwise negative    Physical Exam:  BP (!) 112/55   Pulse 101   Temp 97.8 °F (36.6 °C) (Oral)   Resp 16   Wt 147 lb 1.6 oz (66.7 kg)   SpO2 93%   BMI 28.73 kg/m²   Well developed well nourished white female who is awake alert and cooperative laying in her bed  Skin atrophic  Membranes moist  Head normocephalic  Neck without mass  Chest symmetrical expansion  Heart S1S2 without murmur  Lungs CTA  Abd soft, non tender, normoactive BS and no mass  Ext without edema on the left and 1+ on the right  Neuro weak  Psy pleasant        Diagnostics:  No results found for this or any previous visit (from the past 24 hour(s)). 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 (HCC) [C34.31] 12/20/2018     Priority: High     Class: Acute    Hip fracture requiring operative repair, left, closed, initial encounter ([facility] Colin Lees 01/06/2020    Acute blood loss anemia [D62]     Thrombocytopenia ([facility]) [D69.6]     Constipation [K59.00]     Chronic diastolic heart failure ([facility]) [I50.32]     Hx pulmonary embolism [Z86.711]     Hypothyroidism [E03.9]     Pathologic fracture of femoral neck, right, initial encounter ([facility] [C12.945P] 12/30/2019    Fall [X40. XXXA] 12/30/2019    Acute on chronic respiratory failure with hypoxia and hypercapnia ([facility]) [J96.21, J96.22] 02/23/2019    [facility]ox[facility] fibrillation [facility] [I48.0] 10/22/2018    Chronic obstructive pulmonary disease ([facility] [J44.9]     Moderate malnutrition ([facility] [E44.0] 10/15/2018    Acute on chronic respiratory failure with hypoxemia ([facility] [J96.21] 10/14/2018    Recurrent non-small cell lung cancer (NSCLC) ([facility] [C34.90] 08/11/2017    HTN (hypertension) [I10]     GERD (gastroesophageal reflux disease) [K21.9]    ·      Plan:   · The patient demonstrates good potential to participate in a skilled rehabilitation program on the transitional care unit. Rehabilitation services will include PT and OT/RT in order to improve functional status prior to discharge. Family education and training will be completed. Equipment evaluations and recommendations will be completed as appropriate. · Nursing will be involved for bowel, bladder, skin, and pain management. Nursing will also provide education and training to patient and family. · Prophylaxis:  DVT: xarelto. GI: pepcid. · Pain: tylenol, neurontin, norco  · Nutrition:  Consultation to dietician for nutritional counseling and recommendations.    · Bladder: continent  · Bowel: glycolax, colace, senokot, mom, dulcolax  ·  and case management consultations for coordination of care and discharge planning    Can Woods MD

## 2020-01-08 NOTE — PROGRESS NOTES
Received report from Abmrose Shirley LPN-----------------------------------------------------WINSOME ANTONIO

## 2020-01-08 NOTE — PROGRESS NOTES
[facility]  [address]  Occupational Therapy  Daily Note  Time:   Time In: 1130  Time Out: 1225  Timed Code Treatment Minutes: 54 Minutes  Minutes: 55          Date: 2020  Patient Name: Kayce Davey,   Gender: female      Room: [facility]/066-A  MRN: [id number]  : 1945  (76 y.o.)  Referring Practitioner: Isael Seaman MD; Attending: Dr. Priya Shaw  Diagnosis: Hemiarthroplast of R Hip  Additional Pertinent Hx: Per EMR: \"Fannie Selby is a 76 y.o. female who presents 19 for evaluation of pain to the right leg and hip that onset following a fall that occurred 3 days ago. Patient reports that she tripped over her cat and fell 3 days ago, experiencing immediate pain to the right leg and hip. Patient notes worsening of pain since initial fall and reports exacerbation of pain when she attempts to bear weight onto her right leg. Patient describes additional symptom of nausea. Patient denies hitting her head during the incident and denies headache and chest pain. Patient reports using oxygen treatments at home due to condition of chronic obstructive pulmonary disease. Patient notes a history of smoking but denies current nicotine use. Patient additionally has a history of congestive heart failure and notes that edema to her lower extremities is normal at baseline. \"  s/p s/p right hip hemiarthroplasty for femoral neck fracture on 20. Transfer to [facility] [place], operated by [facility] 20. Restrictions/Precautions:  Restrictions/Precautions: Weight Bearing, Fall Risk  Right Lower Extremity Weight Bearing: Weight Bearing As Tolerated  Position Activity Restriction  Hip Precautions: No hip flexion > 90 degrees, No ADduction, No hip internal rotation  Other position/activity restrictions: Home O2: baseline is 2L at rest/6L with activity.  S/p right hip hemiarthroplasty for femoral neck fracture on 20    SUBJECTIVE: Patient lying in bed upon arrival. Agreeable to OT session with minimal encouragement provided    PAIN: Complains of pain in R hip, did not rate- reported that it improved towards end of session    COGNITION: WFL    ADL:   Grooming: Stand By Assistance. Standing sinkside to wash hands after toileting tasks  Lower Extremity Dressing: Stand By Assistance. Pulling up shorts over hips. Able to doff socks using reacher and don socks using sock aid after demonstration/education provided. Patient able to thread BLE into shorts with use of reacher and min vcs for technique. Toileting: Minimal Assistance. For thoroughness of hygiene after BM. Able to complete clothing management with SBA  Toilet Transfer: Stand By Assistance. To/from RTS. BALANCE:  Sitting Balance:  Modified Independent  Standing Balance: Stand By Assistance    BED MOBILITY:  Supine to Sit: Minimal Assistance      TRANSFERS:  Sit to Stand:  Stand By Assistance. From various surfaces  Stand to Sit: Stand By Assistance. To various surfaces    FUNCTIONAL MOBILITY:  Assistive Device: Rolling Walker  Assist Level:  Contact Guard Assistance. Distance: To and from bathroom  Slow pace, no LOB noted. Management required for O2 tubing      ADDITIONAL ACTIVITIES:  Pt completed BUE strengthening exercises x15 reps x1 set this date with a medium resistance band in all joints and all planes in order to increase strength and improve activity tolerance required for functional toilet & shower transfers and ADL routine. Pt tolerated fair, requiring rest breaks throughout task. ASSESSMENT:     Activity Tolerance:  Patient tolerance of  treatment: fair.        Discharge Recommendations: Home with Home health OT  Equipment Recommendations: Equipment Needed: Yes  Other: Recommend BSC and LHAE at discharge  Plan: Times per week: 6x  Plan weeks: 3 weeks  Current Treatment Recommendations: Functional Mobility Training, Balance Training, Self-Care / ADL, Endurance Training, Safety Education & Training, Patient/Caregiver Education & Training, Equipment

## 2020-01-08 NOTE — PROGRESS NOTES
[facility]  Recreational Therapy  Daily Note  - [place] SKILLED NURSING UNIT    Time Spent with Patient: 10 minutes    Date:  1/8/2020       Patient Name: Krupa Wong      MRN: [id number]      YOB: 1945 (76 y.o.)       Gender: female  Diagnosis: Hemiarthroplast of R Hip  Referring Practitioner: Deven Montez MD; Attending: Dr. Fred Snow    RESTRICTIONS/PRECAUTIONS:  Restrictions/Precautions: Weight Bearing, Fall Risk  Vision: Impaired  Hearing: Within functional limits    PAIN: 0-no c/o pain     SUBJECTIVE:  I love it     OBJECTIVE:  Brought pt back to her room after getting her hair done by our beautician-sit to stand from w/c with CGA-ambulated 3 ft to recliner with RW and CGA-comfort measures given-appreciative of activity         Patient Education  New Education Provided: Importance of Leisure,     Electronically signed by: Woodrow Oneill, CTRS  Date: 1/8/2020

## 2020-01-08 NOTE — PROGRESS NOTES
Responsibilities: Yes  Ambulation Assistance: Independent  Transfer Assistance: Independent  Active : Yes  IADL Comments: Daughter able to assist at times but works 2 jobs, Has a niece who is able to assist at times  Additional Comments: Pt reports I prior to admission without AD. Pt reports daughter has RTS. Current with meals on wheels. Restrictions/Precautions:  Restrictions/Precautions: Weight Bearing, Fall Risk  Right Lower Extremity Weight Bearing: Weight Bearing As Tolerated  Position Activity Restriction  Hip Precautions: No hip flexion > 90 degrees, No ADduction, No hip internal rotation  Other position/activity restrictions: Home O2: baseline is 2L at rest/6L with activity. S/p right hip hemiarthroplasty for femoral neck fracture on 1/1/20    SUBJECTIVE: Patient sitting in bedside chair upon arrival. Patient agreeable to therapy. Patient noted she just got a pain med earlier. Patient on 2L at rest and 6L with activity. Provided extensive verbal/visual education on hip precautions. PAIN: 7/10: R hip    OBJECTIVE:  Bed Mobility:  Sit to Supine: Minimal Assistance, X 1, assist to elevate BLEs onto bed     Transfers:  Sit to Stand: Stand By Assistance  Stand to Sit:Stand By Assistance    Ambulation:  Stand By Assistance  Distance: ~180ft x1  Surface: Level Tile  Device:Rolling Walker  Gait Deviations: Forward Flexed Posture, Slow Ligia, Decreased Step Length Bilaterally, Decreased Weight Shift Right, Decreased Gait Speed, Decreased Heel Strike Bilaterally and fairly steady, no LOB    Exercise:  None this session. Functional Outcome Measures: Not completed       ASSESSMENT:  Assessment: Patient progressing toward established goals. Activity Tolerance:  Patient tolerance of  treatment: good.       Equipment Recommendations:Equipment Needed: Yes(may need new 2WW)  Discharge Recommendations:  Continue to assess pending progress    Plan: Times per week: 5x/wk BID, 1x/wk QD  Times per day: Twice

## 2020-01-08 NOTE — PROGRESS NOTES
Reported off to Sean Pedro LPN------------------------------------------------------S Bunny Care PNS

## 2020-01-08 NOTE — PROGRESS NOTES
Oriented to person, place and time. Calm, cheerful, appropriate interaction with staff. Apical pulse 95 strong, regular. Temperature 97.8, oral.  Respirations deep, easy, 16. Blood pressure 118/58 right upper arm. Pulse Ox 97% on 2 liters via nasal cannula. JOSE ARMANDO 3mm to 2mm brisk. Sclera white. Mucous membranes pink, moist, intact. Hair shiny. Skin warm, dry, intact. Speech clear. Tongue midline. Smile symmetrical.  Lung sounds clear posterior, lateral, anterior lower. Diminished anterior upper. No cough noted. Bowel sounds active in all four quadrants. States not passing gas, no pain or frequency with urination, last bowel movement was 1-7-2020. Implanted port right upper chest clean, dry intact. Radial pulse strong equal bilaterally. Hand grasp strong, equal bilaterally. Skin turgor sluggish. Capillary refill less than 3 seconds. Arm drift negative bilaterally. Right hip incision bandage clean, dry, intact. Pedal push and pull weak bilaterally. Leg lift weak bilaterally. Pedal pulses strong bilaterally. Tonny's sign negative bilaterally. Non-pitting edema on left and right ankles and feet. States no numbness or tingling at this time. States pain in back and hip 8 out of 10. Bed in low position, wheels locked, side rails up times 2. In chair, alarm on, wheels locked. Call light and over the bed table within reach. States no further needs at this time. ---------------------------------------------------------Ronald ANTONIO

## 2020-01-09 PROCEDURE — 97530 THERAPEUTIC ACTIVITIES: CPT

## 2020-01-09 PROCEDURE — 97535 SELF CARE MNGMENT TRAINING: CPT

## 2020-01-09 PROCEDURE — 94669 MECHANICAL CHEST WALL OSCILL: CPT

## 2020-01-09 PROCEDURE — 2700000000 HC OXYGEN THERAPY PER DAY

## 2020-01-09 PROCEDURE — 6370000000 HC RX 637 (ALT 250 FOR IP): Performed by: INTERNAL MEDICINE

## 2020-01-09 PROCEDURE — 97110 THERAPEUTIC EXERCISES: CPT

## 2020-01-09 PROCEDURE — 94640 AIRWAY INHALATION TREATMENT: CPT

## 2020-01-09 PROCEDURE — 6370000000 HC RX 637 (ALT 250 FOR IP): Performed by: FAMILY MEDICINE

## 2020-01-09 PROCEDURE — 97116 GAIT TRAINING THERAPY: CPT

## 2020-01-09 PROCEDURE — 1290000000 HC SEMI PRIVATE OTHER R&B

## 2020-01-09 PROCEDURE — 94760 N-INVAS EAR/PLS OXIMETRY 1: CPT

## 2020-01-09 PROCEDURE — 6360000002 HC RX W HCPCS: Performed by: FAMILY MEDICINE

## 2020-01-09 RX ORDER — FLUTICASONE PROPIONATE 50 MCG
1 SPRAY, SUSPENSION (ML) NASAL DAILY
Status: DISCONTINUED | OUTPATIENT
Start: 2020-01-09 | End: 2020-01-24 | Stop reason: HOSPADM

## 2020-01-09 RX ORDER — GUAIFENESIN 600 MG/1
600 TABLET, EXTENDED RELEASE ORAL DAILY
Status: DISCONTINUED | OUTPATIENT
Start: 2020-01-09 | End: 2020-01-24 | Stop reason: HOSPADM

## 2020-01-09 RX ORDER — SODIUM CHLORIDE 0.9 % (FLUSH) 0.9 %
10 SYRINGE (ML) INJECTION PRN
Status: DISCONTINUED | OUTPATIENT
Start: 2020-01-09 | End: 2020-01-24 | Stop reason: HOSPADM

## 2020-01-09 RX ORDER — DOCUSATE SODIUM 100 MG/1
100 CAPSULE, LIQUID FILLED ORAL 2 TIMES DAILY
Status: DISCONTINUED | OUTPATIENT
Start: 2020-01-09 | End: 2020-01-23

## 2020-01-09 RX ORDER — HYDROCODONE BITARTRATE AND ACETAMINOPHEN 10; 325 MG/1; MG/1
1 TABLET ORAL EVERY 6 HOURS PRN
Status: DISCONTINUED | OUTPATIENT
Start: 2020-01-09 | End: 2020-01-24 | Stop reason: HOSPADM

## 2020-01-09 RX ORDER — SODIUM CHLORIDE 0.9 % (FLUSH) 0.9 %
10 SYRINGE (ML) INJECTION EVERY 12 HOURS SCHEDULED
Status: DISCONTINUED | OUTPATIENT
Start: 2020-01-09 | End: 2020-01-16

## 2020-01-09 RX ORDER — ROPINIROLE 0.25 MG/1
0.25 TABLET, FILM COATED ORAL 2 TIMES DAILY
Status: DISCONTINUED | OUTPATIENT
Start: 2020-01-09 | End: 2020-01-24 | Stop reason: HOSPADM

## 2020-01-09 RX ORDER — FERROUS SULFATE 325(65) MG
325 TABLET ORAL
Status: DISCONTINUED | OUTPATIENT
Start: 2020-01-10 | End: 2020-01-11

## 2020-01-09 RX ORDER — POLYETHYLENE GLYCOL 3350 17 G/17G
17 POWDER, FOR SOLUTION ORAL DAILY
Status: DISCONTINUED | OUTPATIENT
Start: 2020-01-10 | End: 2020-01-23

## 2020-01-09 RX ORDER — DIPHENHYDRAMINE HCL 25 MG
25 TABLET ORAL 2 TIMES DAILY
Status: DISCONTINUED | OUTPATIENT
Start: 2020-01-09 | End: 2020-01-24 | Stop reason: HOSPADM

## 2020-01-09 RX ORDER — LEVOTHYROXINE SODIUM 0.07 MG/1
75 TABLET ORAL DAILY
Status: DISCONTINUED | OUTPATIENT
Start: 2020-01-10 | End: 2020-01-24 | Stop reason: HOSPADM

## 2020-01-09 RX ORDER — DIGOXIN 125 MCG
125 TABLET ORAL DAILY
Status: DISCONTINUED | OUTPATIENT
Start: 2020-01-10 | End: 2020-01-24 | Stop reason: HOSPADM

## 2020-01-09 RX ADMIN — ROPINIROLE HYDROCHLORIDE 0.25 MG: 0.25 TABLET, FILM COATED ORAL at 21:21

## 2020-01-09 RX ADMIN — Medication 2 PUFF: at 18:02

## 2020-01-09 RX ADMIN — HYDROCODONE BITARTRATE AND ACETAMINOPHEN 1 TABLET: 10; 325 TABLET ORAL at 21:40

## 2020-01-09 RX ADMIN — HYDROXYZINE PAMOATE 25 MG: 25 CAPSULE ORAL at 17:06

## 2020-01-09 RX ADMIN — RIVAROXABAN 20 MG: 20 TABLET, FILM COATED ORAL at 17:05

## 2020-01-09 RX ADMIN — DOCUSATE SODIUM 100 MG: 100 CAPSULE, LIQUID FILLED ORAL at 21:21

## 2020-01-09 RX ADMIN — GUAIFENESIN 600 MG: 600 TABLET, EXTENDED RELEASE ORAL at 10:04

## 2020-01-09 RX ADMIN — HYDROCODONE BITARTRATE AND ACETAMINOPHEN 1 TABLET: 10; 325 TABLET ORAL at 07:19

## 2020-01-09 RX ADMIN — POLYETHYLENE GLYCOL 3350 17 G: 17 POWDER, FOR SOLUTION ORAL at 10:11

## 2020-01-09 RX ADMIN — BETAMETHASONE DIPROPIONATE: 0.5 CREAM TOPICAL at 10:07

## 2020-01-09 RX ADMIN — ACETAMINOPHEN 500 MG: 500 TABLET ORAL at 02:21

## 2020-01-09 RX ADMIN — Medication 2 PUFF: at 14:19

## 2020-01-09 RX ADMIN — LEVOTHYROXINE SODIUM 75 MCG: 75 TABLET ORAL at 05:31

## 2020-01-09 RX ADMIN — ACETAMINOPHEN 500 MG: 500 TABLET ORAL at 17:06

## 2020-01-09 RX ADMIN — VITAMIN D, TAB 1000IU (100/BT) 5000 UNITS: 25 TAB at 10:09

## 2020-01-09 RX ADMIN — HYDROCODONE BITARTRATE AND ACETAMINOPHEN 1 TABLET: 10; 325 TABLET ORAL at 13:13

## 2020-01-09 RX ADMIN — ACETAMINOPHEN 500 MG: 500 TABLET ORAL at 10:08

## 2020-01-09 RX ADMIN — ROPINIROLE HYDROCHLORIDE 0.25 MG: 0.25 TABLET, FILM COATED ORAL at 10:09

## 2020-01-09 RX ADMIN — DIPHENHYDRAMINE HCL 25 MG: 25 TABLET ORAL at 10:04

## 2020-01-09 RX ADMIN — HEPARIN 500 UNITS: 100 SYRINGE at 18:02

## 2020-01-09 RX ADMIN — METOPROLOL TARTRATE 12.5 MG: 25 TABLET ORAL at 10:09

## 2020-01-09 RX ADMIN — DIGOXIN 125 MCG: 125 TABLET ORAL at 10:10

## 2020-01-09 RX ADMIN — DOCUSATE SODIUM 100 MG: 100 CAPSULE, LIQUID FILLED ORAL at 10:07

## 2020-01-09 RX ADMIN — HYDROXYZINE PAMOATE 25 MG: 25 CAPSULE ORAL at 10:04

## 2020-01-09 RX ADMIN — FLUTICASONE PROPIONATE 1 SPRAY: 50 SPRAY, METERED NASAL at 13:14

## 2020-01-09 RX ADMIN — FERROUS SULFATE TAB 325 MG (65 MG ELEMENTAL FE) 325 MG: 325 (65 FE) TAB at 10:10

## 2020-01-09 RX ADMIN — DIPHENHYDRAMINE HCL 25 MG: 25 TABLET ORAL at 21:21

## 2020-01-09 RX ADMIN — GABAPENTIN 300 MG: 300 CAPSULE ORAL at 13:13

## 2020-01-09 RX ADMIN — GABAPENTIN 300 MG: 300 CAPSULE ORAL at 21:21

## 2020-01-09 RX ADMIN — FAMOTIDINE 20 MG: 20 TABLET ORAL at 10:08

## 2020-01-09 RX ADMIN — GABAPENTIN 300 MG: 300 CAPSULE ORAL at 10:10

## 2020-01-09 ASSESSMENT — PAIN SCALES - GENERAL
PAINLEVEL_OUTOF10: 9
PAINLEVEL_OUTOF10: 9
PAINLEVEL_OUTOF10: 2
PAINLEVEL_OUTOF10: 10
PAINLEVEL_OUTOF10: 7
PAINLEVEL_OUTOF10: 7
PAINLEVEL_OUTOF10: 8
PAINLEVEL_OUTOF10: 7
PAINLEVEL_OUTOF10: 7

## 2020-01-09 ASSESSMENT — PAIN DESCRIPTION - PAIN TYPE: TYPE: ACUTE PAIN

## 2020-01-09 ASSESSMENT — PAIN DESCRIPTION - DESCRIPTORS: DESCRIPTORS: ACHING

## 2020-01-09 ASSESSMENT — PAIN DESCRIPTION - ORIENTATION: ORIENTATION: RIGHT

## 2020-01-09 ASSESSMENT — PAIN DESCRIPTION - LOCATION: LOCATION: HIP

## 2020-01-09 NOTE — PROGRESS NOTES
[facility]  Recreational Therapy  Daily Note  - [place] [facility]UR[facility] UNIT    Time Spent with Patient: 120 minutes    Date:  1/9/2020       Patient Name: Kaylynn Hilton      MRN: [id number]      YOB: 1945 (76 y.o.)       Gender: female  Diagnosis: Hemiarthroplast of R Hip  Referring Practitioner: Gonzalo Bran MD; Attending: Dr. Nemesio Sheffield    RESTRICTIONS/PRECAUTIONS:  Restrictions/Precautions: Weight Bearing, Fall Risk  Vision: Impaired  Hearing: Within functional limits    PAIN: 0-no c/o pain     SUBJECTIVE:  I had fun     OBJECTIVE:  Pt came to the dining room to play binGreendizer -affect bright and social-after activity she stayed in the dining room to eat her lunch with peers-engaged well in conversation with peers-student nurse walked her back to her room-appreciative of activity          Patient Education  New Education Provided: Importance of Leisure,     Electronically signed by: PRICE Melgoza  Date: 1/9/2020

## 2020-01-09 NOTE — PROGRESS NOTES
Received report from [facility] [place] LPN----------------------------------------------------------------WINSOME ANTONIO

## 2020-01-09 NOTE — PROGRESS NOTES
[facility]  INPATIENT PHYSICAL THERAPY  DAILY NOTE  - [place] SKILLED NURSING UNIT - 8E-66/066-A    Time In:   Time Out: 1453  Timed Code Treatment Minutes: 29 Minutes  Minutes: 29          Date: 2020  Patient Name: Roman Kincaid,  Gender:  female        MRN: [id number]  : 1945  (76 y.o.)  Referral Date : 20  Referring Practitioner: Michelle Moran MD  Diagnosis: hemiarthroplasty of right hip  Treatment Diagnosis: difficulty in walking  Additional Pertinent Hx: Per EMR: \"Fannie Talley is a 76 y.o. female who presents for evaluation of pain to the right leg and hip that onset following a fall that occurred 3 days ago. Patient reports that she tripped over her cat and fell 3 days ago, experiencing immediate pain to the right leg and hip. Patient notes worsening of pain since initial fall and reports exacerbation of pain when she attempts to bear weight onto her right leg. Patient describes additional symptom of nausea. Patient denies hitting her head during the incident and denies headache and chest pain. Patient reports using oxygen treatments at home due to condition of chronic obstructive pulmonary disease. Patient notes a history of smoking but denies current nicotine use. Patient additionally has a history of congestive heart failure and notes that edema to her lower extremities is normal at baseline. \" Pt underwent right hip hemiarthroplasty 20.      Prior Level of Function:  Lives With: Alone  Type of Home: House  Home Layout: One level  Home Access: Ramped entrance  [address] - Number of Steps: 2  Entrance Stairs - Rails: Right(grab bar)  Home Equipment: Standard walker   Bathroom Shower/Tub: Tub/Shower unit, Shower chair with back  Bathroom Toilet: Standard(pt reports daughter installing toilet riser)  Bathroom Equipment: Shower chair, Grab bars in shower    Receives Help From: Family  ADL Assistance: [address]: Needs

## 2020-01-09 NOTE — PROGRESS NOTES
rivaroxaban  20 mg Oral Daily    rOPINIRole  0.25 mg Per NG tube BID    senna  1 tablet Oral Nightly    tiotropium  18 mcg Inhalation Daily     Continuous Infusions:  PRN Meds:heparin flush (100 units/mL) **OR** heparin flush (100 units/mL), sodium chloride flush, HYDROcodone-acetaminophen, magnesium hydroxide, albuterol sulfate HFA, bisacodyl, hydrOXYzine, promethazine    Review of Systems  Pertinent items are noted in HPI. Objective:     Patient Vitals for the past 8 hrs:   BP Temp Temp src Pulse Resp SpO2 Weight   01/09/20 0745 128/66 98.1 °F (36.7 °C) Oral 87 18 99 % --   01/09/20 0639 -- -- -- -- -- -- 141 lb 8 oz (64.2 kg)     I/O last 3 completed shifts:   In: 56 [P.O.:740]  Out: -   I/O this shift:  In: 360 [P.O.:360]  Out: -     /66   Pulse 87   Temp 98.1 °F (36.7 °C) (Oral)   Resp 18   Wt 141 lb 8 oz (64.2 kg)   SpO2 99%   BMI 27.63 kg/m²     /66   Pulse 87   Temp 98.1 °F (36.7 °C) (Oral)   Resp 18   Wt 141 lb 8 oz (64.2 kg)   SpO2 99%   BMI 27.63 kg/m²   General appearance: alert, appears stated age and cooperative  Head: Normocephalic, without obvious abnormality, atraumatic  Lungs: clear to auscultation bilaterally  Heart: regular rate and rhythm, S1, S2 normal, no murmur, click, rub or gallop  Abdomen: soft, non-tender; bowel sounds normal; no masses,  no organomegaly  Extremities: extremities normal, atraumatic, no cyanosis or edema  Skin: Skin color, texture, turgor normal. No rashes or lesions  Neurologic: weak      Electronically signed by Shira Flores MD on 1/9/2020 at 10:26 AM

## 2020-01-09 NOTE — FLOWSHEET NOTE
01/09/20 1047   Provider Notification   Reason for Communication Review case   Provider Name Lady Valentine   Provider Notification Nurse Practitioner   Method of Communication Call   Response Waiting for response   Notification Time [phone]     Call out regarding when Operative dressing is to be removed, as patient may be here at a time that patient would have had a f/u, if discharged to home. Waiting for response.

## 2020-01-09 NOTE — PROGRESS NOTES
Reported off to [facility] RN------------------------------------------------WINSOME ANTONIO

## 2020-01-09 NOTE — FLOWSHEET NOTE
01/09/20 4924   Provider Notification   Reason for Communication Patient request   Provider Name Dr. Sol Cutler   Provider Notification Physician   Method of Communication Secure Message   Response Waiting for response   Notification Time 7731   Patient requesting Benadryl 25mg BID, Mucinex 600 daily, Flonase one spray daily each nostril as she takes at home?

## 2020-01-09 NOTE — PLAN OF CARE
Problem: Nutrition  Goal: Optimal nutrition therapy  1/9/2020 1401 by Kelley Abarca RN  Outcome: Ongoing  Note:   Patient requests Dr. Fe Enriquez change diet from low sodium to general to open her options. This nurse spoke with Dr. Fe Enriquez and this was done and weights and labs to be monitored. Problem: Discharge Planning:  Goal: Patients continuum of care needs are met  Description  Patients continuum of care needs are met  Outcome: Ongoing  Note:   Chemotherapy treatment appointment rescheduled to February 3, 2020 per Dr. Malva Felty PA, Ivan Khan. Original appointment for 01/13/2020 cancelled per physician. Patient made aware. Problem: OXYGENATION/RESPIRATORY FUNCTION  Goal: Adequate oxygenation  Outcome: Ongoing  Note:   Patient continues to receive oxygen therapy at 2 L/min via nasal cannula at rest and increased with exertion. Respiratory therapy continues to administer treatments as per physician order with goal of clear lung sounds. Patients lung sounds remain diminished throughout. Uses acapella and incentive per self. Saturations WNL.

## 2020-01-09 NOTE — PROGRESS NOTES
[facility]  INPATIENT PHYSICAL THERAPY  DAILY NOTE  [facility] SKILLED NURSING UNIT - 8E-66/066-A    Time In: 8048  Time Out: 3978  Timed Code Treatment Minutes: 60 Minutes  Minutes: 60          Date: 2020  Patient Name: Rosalba Salmon,  Gender:  female        MRN: [id number]  : 1945  (76 y.o.)  Referral Date : 20  Referring Practitioner: Jose R Wick MD  Diagnosis: hemiarthroplasty of right hip  Treatment Diagnosis: difficulty in walking  Additional Pertinent Hx: Per EMR: \"Fannie Grigsby is a 76 y.o. female who presents for evaluation of pain to the right leg and hip that onset following a fall that occurred 3 days ago. Patient reports that she tripped over her cat and fell 3 days ago, experiencing immediate pain to the right leg and hip. Patient notes worsening of pain since initial fall and reports exacerbation of pain when she attempts to bear weight onto her right leg. Patient describes additional symptom of nausea. Patient denies hitting her head during the incident and denies headache and chest pain. Patient reports using oxygen treatments at home due to condition of chronic obstructive pulmonary disease. Patient notes a history of smoking but denies current nicotine use. Patient additionally has a history of congestive heart failure and notes that edema to her lower extremities is normal at baseline. \" Pt underwent right hip hemiarthroplasty 20.      Prior Level of Function:  Lives With: Alone  Type of Home: House  Home Layout: One level  Home Access: Ramped entrance  [address] - Number of Steps: 2  Entrance Stairs - Rails: Right(grab bar)  Home Equipment: Standard walker   Bathroom Shower/Tub: Tub/Shower unit, Shower chair with back  Bathroom Toilet: Standard(pt reports daughter installing toilet riser)  Bathroom Equipment: Shower chair, Grab bars in shower    Receives Help From: Family  ADL Assistance: [address]: Needs Patient will ascend/descend 2 steps with 1 handrail and SBA for safe home entry. Long term goal 5: Patient will complete car transfer with SBA for safe transport to home and appointments. Following session, patient left in safe position with all fall risk precautions in place.

## 2020-01-09 NOTE — PROGRESS NOTES
[facility]  [address] [place] SKILLED NURSING UNIT  Occupational Therapy  Daily Note  Time:   Time In: 7225  Time Out: 3849  Timed Code Treatment Minutes: 54 Minutes  Minutes: 55          Date: 2020  Patient Name: Krupa Wong,   Gender: female      Room: -66/066-A  MRN: [id number]  : 1945  (76 y.o.)  Referring Practitioner: Deven Montez MD; Attending: Dr. Fred Snow  Diagnosis: Hemiarthroplast of R Hip  Additional Pertinent Hx: Per EMR: \"Fannie Patel is a 76 y.o. female who presents 19 for evaluation of pain to the right leg and hip that onset following a fall that occurred 3 days ago. Patient reports that she tripped over her cat and fell 3 days ago, experiencing immediate pain to the right leg and hip. Patient notes worsening of pain since initial fall and reports exacerbation of pain when she attempts to bear weight onto her right leg. Patient describes additional symptom of nausea. Patient denies hitting her head during the incident and denies headache and chest pain. Patient reports using oxygen treatments at home due to condition of chronic obstructive pulmonary disease. Patient notes a history of smoking but denies current nicotine use. Patient additionally has a history of congestive heart failure and notes that edema to her lower extremities is normal at baseline. \"  s/p s/p right hip hemiarthroplasty for femoral neck fracture on 20. Transfer to [facility] 20. Restrictions/Precautions:  Restrictions/Precautions: Weight Bearing, Fall Risk  Right Lower Extremity Weight Bearing: Weight Bearing As Tolerated  Position Activity Restriction  Hip Precautions: No hip flexion > 90 degrees, No ADduction, No hip internal rotation  Other position/activity restrictions: Home O2: baseline is 2L at rest/6L with activity.  S/p right hip hemiarthroplasty for femoral neck fracture on 20    SUBJECTIVE: Patient lying in bed upon arrival. Agreeable to OT session    PAIN: Minimal pain in R hip    COGNITION: WFL    ADL:   Feeding: with set-up. For breakfast tray  Grooming: with set-up. For hair care after washed in shower  Bathing: Minimal Assistance. For BLE below knees including B feet d/t hip precautions, dicussed use of long handled sponge to complete at home. Able to wash sonido area/bottom while standing in shower with close SBA holding onto grab bar as needed. Completed remaining areas with supervision seated on tub bench  Upper Extremity Dressing: with set-up. To doff/don tshirt in seated position  Lower Extremity Dressing: [employer]. To close SBA to doff/don shorts/undergarment using reacher as needed. Able to doff socks using reacher and don socks using sock aid. Tub Transfer: Contact Guard Assistance. Completed getting into tub by sitting on tub bench and bringing BLE over ledge. Able to use grab bar and step over ledge while standing to get out of shower with CGA. CGA for sit to stand and stand to sit from tub bench    BALANCE:  Sitting Balance:  Modified Independent  Standing Balance: Contact Guard Assistance    BED MOBILITY:  Supine to Sit: Minimal Assistance    TRANSFERS:  Sit to Stand:  Contact Guard Assistance. From tub bench; SBA from other various surfaces  Stand to Sit: Contact Guard Assistance. To tub bench; SBA to other various surfaces    FUNCTIONAL MOBILITY:  Assistive Device: Rolling Walker  Assist Level:  Stand By Assistance. Distance: To and from shower room  Fair pace, no LOB noted     ASSESSMENT:     Activity Tolerance:  Patient tolerance of  treatment: fair.  6L with activity       Discharge Recommendations: Home with Home health OT  Equipment Recommendations: Equipment Needed: Yes  Other: Recommend BSC and LHAE at discharge  Plan: Times per week: 6x  Plan weeks: 3 weeks  Current Treatment Recommendations: Functional Mobility Training, Balance Training, Self-Care / ADL, Endurance Training, Safety Education & Training, Patient/Caregiver Education & Training, Equipment Evaluation, Education, & procurement    Patient Education  Patient Education: ADL's with use of LHAE    Goals  Short term goals  Time Frame for Short term goals: 2 weeks  Short term goal 1: Pt will complete LB ADL with LHAE and Brandi to increase indep with self care. Short term goal 2: Pt will complete dynamic standing task with SBA x 5 min with B hand release to increase balance required for grooming. Short term goal 3: Pt will complete mobility to/from BR with SBA and 0 cues safety to increase indep with ADL routine. Short term goal 4: Pt will complete various t/fs with SBA and 0 cues safety to increase indep with toileting t/fs. Long term goals  Time Frame for Long term goals : 3 weeks  Long term goal 1: Pt will complete ADL routine including shower t/f with Emilia to increase indep with self care. Long term goal 2: Pt will complete light homemkaing task with Emilia to increase indep with light cooking and cleaning for return to PLOF at home alone. Following session, patient left in safe position with all fall risk precautions in place.

## 2020-01-09 NOTE — PROGRESS NOTES
Nutrition Assessment    Type and Reason for Visit: Initial(TCU panel)    Nutrition Recommendations:   Continue current diet  Continue current ONS  Recommend consider multivitamin to aid with healing    Nutrition Assessment: Pt. nutritionally compromised AEB wounds. At risk for further nutrition compromise r/t increased nutrient needs for wound healing, underlying medical condition (lung cancer current with chemo, CHF, COPD). Nutrition recommendations/interventions as per above. Malnutrition Assessment:  · Malnutrition Status: Insufficient data  · Findings of the 6 clinical characteristics of malnutrition (Minimum of 2 out of 6 clinical characteristics is required to make the diagnosis of moderate or severe Protein Calorie Malnutrition based on AND/ASPEN Guidelines):  1. Energy Intake-Greater than 75% of estimated energy requirement(most meals), (couple days)    2. Weight Loss-5% loss or greater, in 1 week(questionable fluid shifts, +edema noted)  3. Fat Loss-Unable to assess,    4. Muscle Loss-Unable to assess,    5. Fluid Accumulation-Mild fluid accumulation, Extremities  6.  Strength-Not measured    Nutrition Risk Level: High    Nutrient Needs:  · Estimated Daily Total Kcal: ~6357-4169 (~25-28 kcals/kg current weight of 64 kg on 1/9/20)  · Estimated Daily Protein (g): ~68 (~1.5 grams/kg IBW of 45 kg on 1/9/20)  · Estimated Daily Total Fluid (ml/day): per physician    Nutrition Diagnosis:   · Problem: Increased nutrient needs  · Etiology: related to Acute injury/trauma     Signs and symptoms:  as evidenced by Presence of wounds    Objective Information:  · Nutrition-Focused Physical Findings: Admitted with hemiarthroplasty of right hip (1/1/20) following a fall. Patient out of room x3 attempts to see. Note patient had good intake until fall per RN noted from 1/2/20. Note mostly % intake.   Meds: Glycolax, Colace, Senokot, Vitamin D.    · Wound Type: Surgical Wound(1/1/20 - hmiarthroplasty

## 2020-01-10 PROCEDURE — 6370000000 HC RX 637 (ALT 250 FOR IP): Performed by: INTERNAL MEDICINE

## 2020-01-10 PROCEDURE — 6370000000 HC RX 637 (ALT 250 FOR IP): Performed by: FAMILY MEDICINE

## 2020-01-10 PROCEDURE — 94761 N-INVAS EAR/PLS OXIMETRY MLT: CPT

## 2020-01-10 PROCEDURE — 2700000000 HC OXYGEN THERAPY PER DAY

## 2020-01-10 PROCEDURE — 97530 THERAPEUTIC ACTIVITIES: CPT

## 2020-01-10 PROCEDURE — 6360000002 HC RX W HCPCS: Performed by: FAMILY MEDICINE

## 2020-01-10 PROCEDURE — 2580000003 HC RX 258: Performed by: FAMILY MEDICINE

## 2020-01-10 PROCEDURE — 94640 AIRWAY INHALATION TREATMENT: CPT

## 2020-01-10 PROCEDURE — 97116 GAIT TRAINING THERAPY: CPT

## 2020-01-10 PROCEDURE — 97110 THERAPEUTIC EXERCISES: CPT

## 2020-01-10 PROCEDURE — 1290000000 HC SEMI PRIVATE OTHER R&B

## 2020-01-10 PROCEDURE — 97535 SELF CARE MNGMENT TRAINING: CPT

## 2020-01-10 RX ADMIN — HYDROXYZINE PAMOATE 25 MG: 25 CAPSULE ORAL at 05:02

## 2020-01-10 RX ADMIN — SODIUM CHLORIDE, PRESERVATIVE FREE 10 ML: 5 INJECTION INTRAVENOUS at 21:16

## 2020-01-10 RX ADMIN — METOPROLOL TARTRATE 12.5 MG: 25 TABLET ORAL at 09:32

## 2020-01-10 RX ADMIN — BETAMETHASONE DIPROPIONATE: 0.5 CREAM TOPICAL at 09:41

## 2020-01-10 RX ADMIN — DIGOXIN 125 MCG: 125 TABLET ORAL at 09:34

## 2020-01-10 RX ADMIN — FERROUS SULFATE TAB 325 MG (65 MG ELEMENTAL FE) 325 MG: 325 (65 FE) TAB at 09:32

## 2020-01-10 RX ADMIN — VITAMIN D, TAB 1000IU (100/BT) 5000 UNITS: 25 TAB at 09:33

## 2020-01-10 RX ADMIN — ROPINIROLE HYDROCHLORIDE 0.25 MG: 0.25 TABLET, FILM COATED ORAL at 09:33

## 2020-01-10 RX ADMIN — HYDROXYZINE PAMOATE 25 MG: 25 CAPSULE ORAL at 11:23

## 2020-01-10 RX ADMIN — HEPARIN 500 UNITS: 100 SYRINGE at 15:29

## 2020-01-10 RX ADMIN — GABAPENTIN 300 MG: 300 CAPSULE ORAL at 09:32

## 2020-01-10 RX ADMIN — ROPINIROLE HYDROCHLORIDE 0.25 MG: 0.25 TABLET, FILM COATED ORAL at 21:11

## 2020-01-10 RX ADMIN — GABAPENTIN 300 MG: 300 CAPSULE ORAL at 21:11

## 2020-01-10 RX ADMIN — GABAPENTIN 300 MG: 300 CAPSULE ORAL at 13:15

## 2020-01-10 RX ADMIN — ACETAMINOPHEN 500 MG: 500 TABLET ORAL at 10:17

## 2020-01-10 RX ADMIN — Medication 2 PUFF: at 18:01

## 2020-01-10 RX ADMIN — Medication 2 PUFF: at 04:24

## 2020-01-10 RX ADMIN — GUAIFENESIN 600 MG: 600 TABLET, EXTENDED RELEASE ORAL at 09:32

## 2020-01-10 RX ADMIN — DIPHENHYDRAMINE HCL 25 MG: 25 TABLET ORAL at 09:34

## 2020-01-10 RX ADMIN — DIPHENHYDRAMINE HCL 25 MG: 25 TABLET ORAL at 21:11

## 2020-01-10 RX ADMIN — HYDROCODONE BITARTRATE AND ACETAMINOPHEN 1 TABLET: 10; 325 TABLET ORAL at 13:17

## 2020-01-10 RX ADMIN — DOCUSATE SODIUM 100 MG: 100 CAPSULE, LIQUID FILLED ORAL at 21:11

## 2020-01-10 RX ADMIN — FAMOTIDINE 20 MG: 20 TABLET ORAL at 09:34

## 2020-01-10 RX ADMIN — HYDROCODONE BITARTRATE AND ACETAMINOPHEN 1 TABLET: 10; 325 TABLET ORAL at 21:11

## 2020-01-10 RX ADMIN — POLYETHYLENE GLYCOL 3350 17 G: 17 POWDER, FOR SOLUTION ORAL at 09:35

## 2020-01-10 RX ADMIN — LEVOTHYROXINE SODIUM 75 MCG: 75 TABLET ORAL at 05:02

## 2020-01-10 RX ADMIN — RIVAROXABAN 20 MG: 20 TABLET, FILM COATED ORAL at 18:38

## 2020-01-10 RX ADMIN — DOCUSATE SODIUM 100 MG: 100 CAPSULE, LIQUID FILLED ORAL at 09:34

## 2020-01-10 RX ADMIN — ACETAMINOPHEN 500 MG: 500 TABLET ORAL at 18:08

## 2020-01-10 RX ADMIN — HYDROCODONE BITARTRATE AND ACETAMINOPHEN 1 TABLET: 10; 325 TABLET ORAL at 06:48

## 2020-01-10 RX ADMIN — SODIUM CHLORIDE, PRESERVATIVE FREE 10 ML: 5 INJECTION INTRAVENOUS at 15:29

## 2020-01-10 RX ADMIN — FLUTICASONE PROPIONATE 1 SPRAY: 50 SPRAY, METERED NASAL at 09:32

## 2020-01-10 ASSESSMENT — PAIN SCALES - GENERAL
PAINLEVEL_OUTOF10: 7
PAINLEVEL_OUTOF10: 8
PAINLEVEL_OUTOF10: 9
PAINLEVEL_OUTOF10: 6
PAINLEVEL_OUTOF10: 7
PAINLEVEL_OUTOF10: 7
PAINLEVEL_OUTOF10: 8
PAINLEVEL_OUTOF10: 8
PAINLEVEL_OUTOF10: 7
PAINLEVEL_OUTOF10: 7

## 2020-01-10 NOTE — PROGRESS NOTES
RECREATIONAL THERAPY  MISSED TREATMENT    [x]Transitional Care Unit  []Inpatient Rehabilitation    Date:  1/10/2020            Patient Name: Angie Melvin           MRN: [id number]  Acct: [de-identified]          YOB: 1945 (69 y.o.)       Gender: female   Diagnosis: Hemiarthroplast of R Hip  Physician: Referring Practitioner: Don Moffett MD; Attending: Dr. Queta Hinson:    []Hold treatment per nursing request  []Patient refusal  []Family declined treatment  []Patient at testing and/or off the floor  []Patient unavailable, with PT/OT/nursing, etc.  [x]Other declined coming to the lobby to listen to our keyboard player due to wanting to get a bath at that time   Stefanie Ann    1/10/2020

## 2020-01-10 NOTE — PLAN OF CARE
Problem: Infection - Surgical Site:  Goal: Will show no infection signs and symptoms  Description  Will show no infection signs and symptoms  Outcome: Met This Shift  Note:   No signs or symptoms of infection, vital signs stable     Problem: Mobility - Impaired:  Goal: Mobility will improve  Description  Mobility will improve  Outcome: Met This Shift  Note:   Up with 1 assist, walker, and gait belt, and with steady gait. Tolerates working with PT and OT well. Problem: Skin Integrity:  Goal: Absence of new skin breakdown  Description  Absence of new skin breakdown  Outcome: Met This Shift  Note:   No new skin breakdown this shift     Problem: Pain:  Goal: Pain level will decrease  Description  Pain level will decrease  Outcome: Met This Shift  Note:   Pt report pain at 8 on scale. Pt states oral medication helping to achieve pain goal of a 0 on scale. Problem: Bowel/Gastric:  Goal: Ability to achieve a regular elimination pattern will improve  Description  Ability to achieve a regular elimination pattern will improve  Outcome: Met This Shift  Note:   Pt with bowel sounds, passing flatus, and without nausea. Taking prescribed medications to assist with BM      Problem: Bleeding:  Goal: Will show no signs and symptoms of excessive bleeding  Description  Will show no signs and symptoms of excessive bleeding  Outcome: Met This Shift  Note:   No signs of bleeding to report     Problem: Falls - Risk of:  Goal: Will remain free from falls  Description  Will remain free from falls  Outcome: Met This Shift  Note:   No falls noted this shift. Patient ambulates with x1 staff assistance without difficulty. Bed kept in low position. Safe environment maintained. Bedside table & call light in reach. Uses call light appropriately when needing assistance.        Problem: Discharge Planning:  Goal: Patients continuum of care needs are met  Description  Patients continuum of care needs are met  Outcome: Met This Shift  Note: Planning home at discharge     Problem: Nutrition  Goal: Optimal nutrition therapy  Outcome: Met This Shift  Note:   Pt eating % of all meals   Care plan reviewed with patient. Patient verbalize understanding of the plan of care and contribute to goal setting.

## 2020-01-10 NOTE — PROGRESS NOTES
[address] [address]  INPATIENT PHYSICAL THERAPY  DAILY NOTE  [address] [id number]    Time In: 8233  Time Out: 0848  Timed Code Treatment Minutes: 62 Minutes  Minutes: 58          Date: 1/10/2020  Patient Name: Mima Collins,  Gender:  female        MRN: [id number]  : 1945  (76 y.o.)  Referral Date : 20  Referring Practitioner: Fariba Adamson MD  Diagnosis: hemiarthroplasty of right hip  Treatment Diagnosis: difficulty in walking  Additional Pertinent Hx: Per EMR: \"Fannie Lentz is a 76 y.o. female who presents for evaluation of pain to the right leg and hip that onset following a fall that occurred 3 days ago. Patient reports that she tripped over her cat and fell 3 days ago, experiencing immediate pain to the right leg and hip. Patient notes worsening of pain since initial fall and reports exacerbation of pain when she attempts to bear weight onto her right leg. Patient describes additional symptom of nausea. Patient denies hitting her head during the incident and denies headache and chest pain. Patient reports using oxygen treatments at home due to condition of chronic obstructive pulmonary disease. Patient notes a history of smoking but denies current nicotine use. Patient additionally has a history of congestive heart failure and notes that edema to her lower extremities is normal at baseline. \" Pt underwent right hip hemiarthroplasty 20.      Prior Level of Function:  Lives With: Alone  Type of Home: House  Home Layout: One level  Home Access: Ramped entrance   [facility] [address] - Number of Steps: 2  Entrance Stairs - Rails: Right(grab bar)  Home Equipment: Standard walker   Bathroom Shower/Tub: Tub/Shower unit, Shower chair with back  Bathroom Toilet: Standard(pt reports daughter installing toilet riser)  Bathroom Equipment: Shower chair, Grab bars in shower    Receives Help From: Family  ADL Assistance: [facility] [address]: Needs sets, Heelslides, Short arc quads, Long arc quads, L LE Hip abduction/adduction, Seated hip flexion, Seated hamstring curls, Seated heel/toe raises . Exercises were completed for increased independence with functional mobility. Functional Outcome Measures: Not completed       ASSESSMENT:  Assessment: Patient progressing toward established goals. Activity Tolerance:  Patient tolerance of  treatment: good. Equipment Recommendations:Equipment Needed: Yes(may need new 2WW)  Discharge Recommendations:  Continue to assess pending progress    Plan: Times per week: 5x/wk BID, 1x/wk QD  Times per day: Twice a day  Plan weeks: 3 weeks  Current Treatment Recommendations: Strengthening, Functional Mobility Training, Neuromuscular Re-education, Home Exercise Program, Transfer Training, Gait Training, Safety Education & Training, Balance Training, Endurance Training, Stair training, Patient/Caregiver Education & Training    Patient Education  Patient Education: Home Exercise Program, Precautions/Restrictions, [employer] Mobility, Equipment Education, Transfers, Reviewed Prior Education, Gait, Verbal Exercise Instruction, - Patient Requires Continued Education    Goals:  Patient goals : go home  Short term goals  Time Frame for Short term goals: 10 days  Short term goal 1: Patient will complete sit  <> stand with SBA to stand to ambulate with decreased difficulty. Short term goal 2: Patient will complete supine < > sit with SBA to transfer in/out of bed with decreased difficulty. Short term goal 3: Patient will ambulate 80' with a RW and SBA to progress towards independent home mobility. Short term goal 4: Patient will ascend/descend 2 steps with 2 hand rails with CGA to progress towards independent home entry.   Long term goals  Time Frame for Long term goals : 3 weeks  Long term goal 1: Patient will complete sit < > stand and stand pivot transfers with modified independence to allow patient to transfer surface to surface safely. Long term goal 2: Patient will complete supine < > sit with modified independence to allow patient to transfer in/out of bed safely. Long term goal 3: Patient will ambulate 80' with a RW and modified independence to navigate home safely. Long term goal 4: Patient will ascend/descend 2 steps with 1 handrail and SBA for safe home entry. Long term goal 5: Patient will complete car transfer with SBA for safe transport to home and appointments. Following session, patient left in safe position with all fall risk precautions in place.

## 2020-01-10 NOTE — PROGRESS NOTES
[address] [address]  [address]- [facility] SKILLED NURSING UNIT  Occupational Therapy  Daily Note  Time:   Time In: 2712  Time Out: 1210  Timed Code Treatment Minutes: 48 Minutes  Minutes: 53          Date: 1/10/2020  Patient Name: Alee Flores,   Gender: female      [facility]: [facility]/066-A  MRN: [id number]  : 1945  (76 y.o.)  Referring Practitioner: Madelin Zapata MD; Attending: Dr. Fly Sandy  Diagnosis: Hemiarthroplast of R Hip  Additional Pertinent Hx: Per EMR: \"Fannie Blanchard is a 76 y.o. female who presents 19 for evaluation of pain to the right leg and hip that onset following a fall that occurred 3 days ago. Patient reports that she tripped over her cat and fell 3 days ago, experiencing immediate pain to the right leg and hip. Patient notes worsening of pain since initial fall and reports exacerbation of pain when she attempts to bear weight onto her right leg. Patient describes additional symptom of nausea. Patient denies hitting her head during the incident and denies headache and chest pain. Patient reports using oxygen treatments at home due to condition of chronic obstructive pulmonary disease. Patient notes a history of smoking but denies current nicotine use. Patient additionally has a history of congestive heart failure and notes that edema to her lower extremities is normal at baseline. \"  s/p s/p right hip hemiarthroplasty for femoral neck fracture on 20. Transfer to [facility] 20. Restrictions/Precautions:  Restrictions/Precautions: Weight Bearing, Fall Risk  Right Lower Extremity Weight Bearing: Weight Bearing As Tolerated  Position Activity Restriction  Hip Precautions: No hip flexion > 90 degrees, No ADduction, No hip internal rotation  Other position/activity restrictions: Home O2: baseline is 2L at rest/6L with activity.  S/p right hip hemiarthroplasty for femoral neck fracture on 20    SUBJECTIVE: Up in chair upon arrival, agreeable to OT session    PAIN: 8/10: R hip    COGNITION: WFL    ADL:   Bathing: Minimal Assistance. A for washing below B Knees, completed sitting in chair  Upper Extremity Dressing: with set-up.  donned sitting in bedside chair  Lower Extremity Dressing: Minimal Assistance. donned sitting in chair  [address] for clothing management  Toilet Transfer: [address] RTS. BALANCE:  Standing Balance: Contact Guard Assistance  less than 1 minute with 0 UE support    TRANSFERS:  Sit to Stand:  Contact Guard Assistance. bedside chair  Stand to Sit: [address] FUNCTIONAL MOBILITY:  Assistive Device: Rolling Walker  Assist Level:  Contact Guard Assistance. Distance: To and from bathroom  No LOB     ASSESSMENT:     Activity Tolerance:  Patient tolerance of  treatment: good. Discharge Recommendations: Home with Home health OT  Equipment Recommendations: Equipment Needed: Yes  Other: Recommend BSC and LHAE at discharge  Plan: Times per week: 6x  Plan weeks: 3 weeks  Current Treatment Recommendations: Functional Mobility Training, Balance Training, Self-Care / ADL, Endurance Training, Safety Education & Training, Patient/Caregiver Education & Training, Equipment Evaluation, Education, & procurement    Patient Education  Patient Education: ADL's and Precautions    Goals  Short term goals  Time Frame for Short term goals: 2 weeks  Short term goal 1: Pt will complete LB ADL with LHAE and Brandi to increase indep with self care. Short term goal 2: Pt will complete dynamic standing task with SBA x 5 min with B hand release to increase balance required for grooming. Short term goal 3: Pt will complete mobility to/from BR with SBA and 0 cues safety to increase indep with ADL routine. Short term goal 4: Pt will complete various t/fs with SBA and 0 cues safety to increase indep with toileting t/fs.   Long term goals  Time Frame for Long term goals : 3 weeks  Long term goal 1: Pt will complete ADL routine including shower t/f with Emilia to increase indep with self care. Long term goal 2: Pt will complete light homemkaing task with Emilia to increase indep with light cooking and cleaning for return to PLOF at home alone. Following session, patient left in safe position with all fall risk precautions in place.

## 2020-01-11 LAB
BACTERIA: ABNORMAL /HPF
BILIRUBIN URINE: NEGATIVE
BLOOD, URINE: NEGATIVE
CASTS 2: ABNORMAL /LPF
CASTS UA: ABNORMAL /LPF
CHARACTER, URINE: CLEAR
COLOR: YELLOW
CRYSTALS, UA: ABNORMAL
EPITHELIAL CELLS, UA: ABNORMAL /HPF
GLUCOSE URINE: NEGATIVE MG/DL
KETONES, URINE: NEGATIVE
LEUKOCYTE ESTERASE, URINE: ABNORMAL
MISCELLANEOUS 2: ABNORMAL
NITRITE, URINE: NEGATIVE
PH UA: 8 (ref 5–9)
PROTEIN UA: NEGATIVE
RBC URINE: ABNORMAL /HPF
RENAL EPITHELIAL, UA: ABNORMAL
SPECIFIC GRAVITY, URINE: 1.01 (ref 1–1.03)
UROBILINOGEN, URINE: 0.2 EU/DL (ref 0–1)
WBC UA: ABNORMAL /HPF
YEAST: ABNORMAL

## 2020-01-11 PROCEDURE — 6370000000 HC RX 637 (ALT 250 FOR IP): Performed by: INTERNAL MEDICINE

## 2020-01-11 PROCEDURE — 2709999900 HC NON-CHARGEABLE SUPPLY

## 2020-01-11 PROCEDURE — 6360000002 HC RX W HCPCS: Performed by: FAMILY MEDICINE

## 2020-01-11 PROCEDURE — 81001 URINALYSIS AUTO W/SCOPE: CPT

## 2020-01-11 PROCEDURE — 2580000003 HC RX 258: Performed by: FAMILY MEDICINE

## 2020-01-11 PROCEDURE — 2700000000 HC OXYGEN THERAPY PER DAY

## 2020-01-11 PROCEDURE — 1290000000 HC SEMI PRIVATE OTHER R&B

## 2020-01-11 PROCEDURE — 94640 AIRWAY INHALATION TREATMENT: CPT

## 2020-01-11 PROCEDURE — 94760 N-INVAS EAR/PLS OXIMETRY 1: CPT

## 2020-01-11 PROCEDURE — 6370000000 HC RX 637 (ALT 250 FOR IP): Performed by: FAMILY MEDICINE

## 2020-01-11 RX ORDER — FERROUS SULFATE 325(65) MG
325 TABLET ORAL
Status: DISCONTINUED | OUTPATIENT
Start: 2020-01-13 | End: 2020-01-12

## 2020-01-11 RX ADMIN — HYDROXYZINE PAMOATE 25 MG: 25 CAPSULE ORAL at 22:45

## 2020-01-11 RX ADMIN — RIVAROXABAN 20 MG: 20 TABLET, FILM COATED ORAL at 18:03

## 2020-01-11 RX ADMIN — BETAMETHASONE DIPROPIONATE: 0.5 CREAM TOPICAL at 08:28

## 2020-01-11 RX ADMIN — Medication 2 PUFF: at 04:54

## 2020-01-11 RX ADMIN — LEVOTHYROXINE SODIUM 75 MCG: 75 TABLET ORAL at 08:27

## 2020-01-11 RX ADMIN — GABAPENTIN 300 MG: 300 CAPSULE ORAL at 13:12

## 2020-01-11 RX ADMIN — POLYETHYLENE GLYCOL 3350 17 G: 17 POWDER, FOR SOLUTION ORAL at 08:27

## 2020-01-11 RX ADMIN — TIOTROPIUM BROMIDE 18 MCG: 18 CAPSULE ORAL; RESPIRATORY (INHALATION) at 04:54

## 2020-01-11 RX ADMIN — VITAMIN D, TAB 1000IU (100/BT) 5000 UNITS: 25 TAB at 09:10

## 2020-01-11 RX ADMIN — ACETAMINOPHEN 500 MG: 500 TABLET ORAL at 13:15

## 2020-01-11 RX ADMIN — HYDROCODONE BITARTRATE AND ACETAMINOPHEN 1 TABLET: 10; 325 TABLET ORAL at 18:05

## 2020-01-11 RX ADMIN — PROMETHAZINE HYDROCHLORIDE 25 MG: 25 TABLET ORAL at 18:05

## 2020-01-11 RX ADMIN — SODIUM CHLORIDE, PRESERVATIVE FREE 10 ML: 5 INJECTION INTRAVENOUS at 08:33

## 2020-01-11 RX ADMIN — ROPINIROLE HYDROCHLORIDE 0.25 MG: 0.25 TABLET, FILM COATED ORAL at 22:45

## 2020-01-11 RX ADMIN — DOCUSATE SODIUM 100 MG: 100 CAPSULE, LIQUID FILLED ORAL at 22:46

## 2020-01-11 RX ADMIN — ROPINIROLE HYDROCHLORIDE 0.25 MG: 0.25 TABLET, FILM COATED ORAL at 08:27

## 2020-01-11 RX ADMIN — FAMOTIDINE 20 MG: 20 TABLET ORAL at 08:13

## 2020-01-11 RX ADMIN — SODIUM CHLORIDE, PRESERVATIVE FREE 10 ML: 5 INJECTION INTRAVENOUS at 22:47

## 2020-01-11 RX ADMIN — GABAPENTIN 300 MG: 300 CAPSULE ORAL at 08:26

## 2020-01-11 RX ADMIN — HYDROXYZINE PAMOATE 25 MG: 25 CAPSULE ORAL at 00:46

## 2020-01-11 RX ADMIN — DOCUSATE SODIUM 100 MG: 100 CAPSULE, LIQUID FILLED ORAL at 08:13

## 2020-01-11 RX ADMIN — HYDROXYZINE PAMOATE 25 MG: 25 CAPSULE ORAL at 09:12

## 2020-01-11 RX ADMIN — Medication 2 PUFF: at 16:04

## 2020-01-11 RX ADMIN — DIPHENHYDRAMINE HCL 25 MG: 25 TABLET ORAL at 22:45

## 2020-01-11 RX ADMIN — FERROUS SULFATE TAB 325 MG (65 MG ELEMENTAL FE) 325 MG: 325 (65 FE) TAB at 08:26

## 2020-01-11 RX ADMIN — DIGOXIN 125 MCG: 125 TABLET ORAL at 08:26

## 2020-01-11 RX ADMIN — FLUTICASONE PROPIONATE 1 SPRAY: 50 SPRAY, METERED NASAL at 08:26

## 2020-01-11 RX ADMIN — GABAPENTIN 300 MG: 300 CAPSULE ORAL at 22:45

## 2020-01-11 RX ADMIN — HYDROCODONE BITARTRATE AND ACETAMINOPHEN 1 TABLET: 10; 325 TABLET ORAL at 08:13

## 2020-01-11 RX ADMIN — DIPHENHYDRAMINE HCL 25 MG: 25 TABLET ORAL at 08:13

## 2020-01-11 RX ADMIN — GUAIFENESIN 600 MG: 600 TABLET, EXTENDED RELEASE ORAL at 08:29

## 2020-01-11 RX ADMIN — HEPARIN 500 UNITS: 100 SYRINGE at 05:00

## 2020-01-11 RX ADMIN — Medication 2 PUFF: at 10:58

## 2020-01-11 ASSESSMENT — PAIN DESCRIPTION - PAIN TYPE: TYPE: SURGICAL PAIN

## 2020-01-11 ASSESSMENT — PAIN SCALES - GENERAL
PAINLEVEL_OUTOF10: 8
PAINLEVEL_OUTOF10: 7
PAINLEVEL_OUTOF10: 3

## 2020-01-11 ASSESSMENT — PAIN DESCRIPTION - PROGRESSION: CLINICAL_PROGRESSION: NOT CHANGED

## 2020-01-11 ASSESSMENT — PAIN - FUNCTIONAL ASSESSMENT: PAIN_FUNCTIONAL_ASSESSMENT: PREVENTS OR INTERFERES SOME ACTIVE ACTIVITIES AND ADLS

## 2020-01-11 ASSESSMENT — PAIN DESCRIPTION - DESCRIPTORS: DESCRIPTORS: ACHING

## 2020-01-11 ASSESSMENT — PAIN DESCRIPTION - LOCATION: LOCATION: HIP

## 2020-01-11 ASSESSMENT — PAIN DESCRIPTION - ORIENTATION: ORIENTATION: RIGHT

## 2020-01-11 ASSESSMENT — PAIN DESCRIPTION - ONSET: ONSET: ON-GOING

## 2020-01-11 ASSESSMENT — PAIN DESCRIPTION - FREQUENCY: FREQUENCY: INTERMITTENT

## 2020-01-11 NOTE — PLAN OF CARE
Problem: Infection - Surgical Site:  Goal: Will show no infection signs and symptoms  Description  Will show no infection signs and symptoms  Outcome: Ongoing  Note:   Incision clean dry and intact     Problem: Mobility - Impaired:  Goal: Mobility will improve  Description  Mobility will improve  Outcome: Ongoing  Note:   Up with assist and walker      Problem: Skin Integrity:  Goal: Absence of new skin breakdown  Description  Absence of new skin breakdown  Outcome: Ongoing  Note:   Skin clean dry and intact      Problem: Pain:  Goal: Pain level will decrease  Description  Pain level will decrease  Outcome: Ongoing  Note:   Prn ice pack      Problem:  Bowel/Gastric:  Goal: Ability to achieve a regular elimination pattern will improve  Description  Ability to achieve a regular elimination pattern will improve  Outcome: Ongoing     Problem: Bleeding:  Goal: Will show no signs and symptoms of excessive bleeding  Description  Will show no signs and symptoms of excessive bleeding  Outcome: Ongoing  Note:   No active bleeding ,incision clean dry and intact      Problem: Falls - Risk of:  Goal: Will remain free from falls  Description  Will remain free from falls  Outcome: Ongoing  Note:   Fall precautions in place      Problem: Nutrition  Goal: Optimal nutrition therapy  Outcome: Ongoing  Note:   Monitor intake and output

## 2020-01-12 LAB
ANION GAP SERPL CALCULATED.3IONS-SCNC: 6 MEQ/L (ref 8–16)
BASOPHILS # BLD: 0.6 %
BASOPHILS ABSOLUTE: 0 THOU/MM3 (ref 0–0.1)
BUN BLDV-MCNC: 29 MG/DL (ref 7–22)
CALCIUM SERPL-MCNC: 9.1 MG/DL (ref 8.5–10.5)
CHLORIDE BLD-SCNC: 95 MEQ/L (ref 98–111)
CO2: 38 MEQ/L (ref 23–33)
CREAT SERPL-MCNC: 0.7 MG/DL (ref 0.4–1.2)
EOSINOPHIL # BLD: 3.7 %
EOSINOPHILS ABSOLUTE: 0.2 THOU/MM3 (ref 0–0.4)
ERYTHROCYTE [DISTWIDTH] IN BLOOD BY AUTOMATED COUNT: 15.4 % (ref 11.5–14.5)
ERYTHROCYTE [DISTWIDTH] IN BLOOD BY AUTOMATED COUNT: 55.8 FL (ref 35–45)
GFR SERPL CREATININE-BSD FRML MDRD: 82 ML/MIN/1.73M2
GLUCOSE BLD-MCNC: 86 MG/DL (ref 70–108)
HCT VFR BLD CALC: 24.5 % (ref 37–47)
HEMOGLOBIN: 7.2 GM/DL (ref 12–16)
IMMATURE GRANS (ABS): 0.06 THOU/MM3 (ref 0–0.07)
IMMATURE GRANULOCYTES: 1.2 %
LYMPHOCYTES # BLD: 17.6 %
LYMPHOCYTES ABSOLUTE: 0.9 THOU/MM3 (ref 1–4.8)
MCH RBC QN AUTO: 29.4 PG (ref 26–33)
MCHC RBC AUTO-ENTMCNC: 29.4 GM/DL (ref 32.2–35.5)
MCV RBC AUTO: 100 FL (ref 81–99)
MONOCYTES # BLD: 9.5 %
MONOCYTES ABSOLUTE: 0.5 THOU/MM3 (ref 0.4–1.3)
NUCLEATED RED BLOOD CELLS: 0 /100 WBC
PLATELET # BLD: 278 THOU/MM3 (ref 130–400)
PMV BLD AUTO: 9.2 FL (ref 9.4–12.4)
POTASSIUM SERPL-SCNC: 5.1 MEQ/L (ref 3.5–5.2)
RBC # BLD: 2.45 MILL/MM3 (ref 4.2–5.4)
SEG NEUTROPHILS: 67.4 %
SEGMENTED NEUTROPHILS ABSOLUTE COUNT: 3.3 THOU/MM3 (ref 1.8–7.7)
SODIUM BLD-SCNC: 139 MEQ/L (ref 135–145)
WBC # BLD: 4.9 THOU/MM3 (ref 4.8–10.8)

## 2020-01-12 PROCEDURE — 80048 BASIC METABOLIC PNL TOTAL CA: CPT

## 2020-01-12 PROCEDURE — 6360000002 HC RX W HCPCS: Performed by: FAMILY MEDICINE

## 2020-01-12 PROCEDURE — 6370000000 HC RX 637 (ALT 250 FOR IP): Performed by: INTERNAL MEDICINE

## 2020-01-12 PROCEDURE — 6370000000 HC RX 637 (ALT 250 FOR IP): Performed by: FAMILY MEDICINE

## 2020-01-12 PROCEDURE — 97110 THERAPEUTIC EXERCISES: CPT

## 2020-01-12 PROCEDURE — 2700000000 HC OXYGEN THERAPY PER DAY

## 2020-01-12 PROCEDURE — 2580000003 HC RX 258: Performed by: FAMILY MEDICINE

## 2020-01-12 PROCEDURE — 97116 GAIT TRAINING THERAPY: CPT

## 2020-01-12 PROCEDURE — 97530 THERAPEUTIC ACTIVITIES: CPT

## 2020-01-12 PROCEDURE — 94761 N-INVAS EAR/PLS OXIMETRY MLT: CPT

## 2020-01-12 PROCEDURE — 1290000000 HC SEMI PRIVATE OTHER R&B

## 2020-01-12 PROCEDURE — 94640 AIRWAY INHALATION TREATMENT: CPT

## 2020-01-12 PROCEDURE — 85025 COMPLETE CBC W/AUTO DIFF WBC: CPT

## 2020-01-12 PROCEDURE — 97535 SELF CARE MNGMENT TRAINING: CPT

## 2020-01-12 PROCEDURE — 36415 COLL VENOUS BLD VENIPUNCTURE: CPT

## 2020-01-12 RX ORDER — FERROUS SULFATE 325(65) MG
325 TABLET ORAL 2 TIMES DAILY WITH MEALS
Status: DISCONTINUED | OUTPATIENT
Start: 2020-01-12 | End: 2020-01-24 | Stop reason: HOSPADM

## 2020-01-12 RX ADMIN — DIGOXIN 125 MCG: 125 TABLET ORAL at 08:25

## 2020-01-12 RX ADMIN — HYDROCODONE BITARTRATE AND ACETAMINOPHEN 1 TABLET: 10; 325 TABLET ORAL at 18:00

## 2020-01-12 RX ADMIN — SODIUM CHLORIDE, PRESERVATIVE FREE 10 ML: 5 INJECTION INTRAVENOUS at 19:40

## 2020-01-12 RX ADMIN — DIPHENHYDRAMINE HCL 25 MG: 25 TABLET ORAL at 19:40

## 2020-01-12 RX ADMIN — TIOTROPIUM BROMIDE 18 MCG: 18 CAPSULE ORAL; RESPIRATORY (INHALATION) at 05:22

## 2020-01-12 RX ADMIN — Medication 2 PUFF: at 05:22

## 2020-01-12 RX ADMIN — FAMOTIDINE 20 MG: 20 TABLET ORAL at 08:25

## 2020-01-12 RX ADMIN — DOCUSATE SODIUM 100 MG: 100 CAPSULE, LIQUID FILLED ORAL at 08:25

## 2020-01-12 RX ADMIN — ROPINIROLE HYDROCHLORIDE 0.25 MG: 0.25 TABLET, FILM COATED ORAL at 08:25

## 2020-01-12 RX ADMIN — FLUTICASONE PROPIONATE 1 SPRAY: 50 SPRAY, METERED NASAL at 08:25

## 2020-01-12 RX ADMIN — VITAMIN D, TAB 1000IU (100/BT) 5000 UNITS: 25 TAB at 08:25

## 2020-01-12 RX ADMIN — FERROUS SULFATE TAB 325 MG (65 MG ELEMENTAL FE) 325 MG: 325 (65 FE) TAB at 18:00

## 2020-01-12 RX ADMIN — ROPINIROLE HYDROCHLORIDE 0.25 MG: 0.25 TABLET, FILM COATED ORAL at 19:39

## 2020-01-12 RX ADMIN — DIPHENHYDRAMINE HCL 25 MG: 25 TABLET ORAL at 08:25

## 2020-01-12 RX ADMIN — HYDROXYZINE PAMOATE 25 MG: 25 CAPSULE ORAL at 19:39

## 2020-01-12 RX ADMIN — LEVOTHYROXINE SODIUM 75 MCG: 75 TABLET ORAL at 05:00

## 2020-01-12 RX ADMIN — BETAMETHASONE DIPROPIONATE: 0.5 CREAM TOPICAL at 08:26

## 2020-01-12 RX ADMIN — Medication 2 PUFF: at 18:23

## 2020-01-12 RX ADMIN — HYDROCODONE BITARTRATE AND ACETAMINOPHEN 1 TABLET: 10; 325 TABLET ORAL at 08:31

## 2020-01-12 RX ADMIN — RIVAROXABAN 20 MG: 20 TABLET, FILM COATED ORAL at 18:00

## 2020-01-12 RX ADMIN — GUAIFENESIN 600 MG: 600 TABLET, EXTENDED RELEASE ORAL at 08:25

## 2020-01-12 RX ADMIN — Medication 2 PUFF: at 15:06

## 2020-01-12 RX ADMIN — ACETAMINOPHEN 500 MG: 500 TABLET ORAL at 04:59

## 2020-01-12 RX ADMIN — HYDROCODONE BITARTRATE AND ACETAMINOPHEN 1 TABLET: 10; 325 TABLET ORAL at 00:52

## 2020-01-12 RX ADMIN — ACETAMINOPHEN 500 MG: 500 TABLET ORAL at 11:03

## 2020-01-12 RX ADMIN — HEPARIN 500 UNITS: 100 SYRINGE at 04:44

## 2020-01-12 RX ADMIN — FERROUS SULFATE TAB 325 MG (65 MG ELEMENTAL FE) 325 MG: 325 (65 FE) TAB at 13:27

## 2020-01-12 RX ADMIN — POLYETHYLENE GLYCOL 3350 17 G: 17 POWDER, FOR SOLUTION ORAL at 08:25

## 2020-01-12 RX ADMIN — DOCUSATE SODIUM 100 MG: 100 CAPSULE, LIQUID FILLED ORAL at 19:39

## 2020-01-12 ASSESSMENT — PAIN SCALES - GENERAL
PAINLEVEL_OUTOF10: 8
PAINLEVEL_OUTOF10: 5
PAINLEVEL_OUTOF10: 6
PAINLEVEL_OUTOF10: 6
PAINLEVEL_OUTOF10: 8
PAINLEVEL_OUTOF10: 8

## 2020-01-12 ASSESSMENT — PAIN DESCRIPTION - PROGRESSION: CLINICAL_PROGRESSION: NOT CHANGED

## 2020-01-12 ASSESSMENT — PAIN DESCRIPTION - FREQUENCY: FREQUENCY: INTERMITTENT

## 2020-01-12 ASSESSMENT — PAIN DESCRIPTION - LOCATION: LOCATION: ANKLE

## 2020-01-12 ASSESSMENT — PAIN - FUNCTIONAL ASSESSMENT: PAIN_FUNCTIONAL_ASSESSMENT: PREVENTS OR INTERFERES SOME ACTIVE ACTIVITIES AND ADLS

## 2020-01-12 ASSESSMENT — PAIN DESCRIPTION - ORIENTATION: ORIENTATION: RIGHT

## 2020-01-12 ASSESSMENT — PAIN DESCRIPTION - DESCRIPTORS: DESCRIPTORS: ACHING

## 2020-01-12 ASSESSMENT — PAIN DESCRIPTION - ONSET: ONSET: ON-GOING

## 2020-01-12 ASSESSMENT — PAIN DESCRIPTION - PAIN TYPE: TYPE: SURGICAL PAIN

## 2020-01-12 NOTE — PROGRESS NOTES
Education: ADL's and Home Exercise Program    Goals  Short term goals  Time Frame for Short term goals: 2 weeks  Short term goal 1: Pt will complete LB ADL with LHAE and Brandi to increase indep with self care. Short term goal 2: Pt will complete dynamic standing task with SBA x 5 min with B hand release to increase balance required for grooming. Short term goal 3: Pt will complete mobility to/from BR with SBA and 0 cues safety to increase indep with ADL routine. Short term goal 4: Pt will complete various t/fs with SBA and 0 cues safety to increase indep with toileting t/fs. Long term goals  Time Frame for Long term goals : 3 weeks  Long term goal 1: Pt will complete ADL routine including shower t/f with Emilia to increase indep with self care. Long term goal 2: Pt will complete light homemkaing task with Emilia to increase indep with light cooking and cleaning for return to PLOF at home alone. Following session, patient left in safe position with all fall risk precautions in place.

## 2020-01-12 NOTE — PLAN OF CARE
Problem: Impaired respiratory status  Goal: Clear lung sounds  Description  Clear lung sounds     Outcome: Ongoing  Note:   Treatment will be continued as ordered to improve aeration.

## 2020-01-12 NOTE — PROGRESS NOTES
[facility]  INPATIENT PHYSICAL THERAPY  DAILY NOTE  [address]- [facility] SKILLED NURSING UNIT - [id number]    Time In: 3517  Time Out: 9350  Minutes: 30          Date: 2020  Patient Name: Steffi Mirza,  Gender:  female        MRN: [id number]  : 1945  (76 y.o.)  Referral Date : 20  Referring Practitioner: Marcelina Browning MD  Diagnosis: hemiarthroplasty of right hip  Treatment Diagnosis: difficulty in walking  Additional Pertinent Hx: Per EMR: \"Fannie Dubose is a 76 y.o. female who presents for evaluation of pain to the right leg and hip that onset following a fall that occurred 3 days ago. Patient reports that she tripped over her cat and fell 3 days ago, experiencing immediate pain to the right leg and hip. Patient notes worsening of pain since initial fall and reports exacerbation of pain when she attempts to bear weight onto her right leg. Patient describes additional symptom of nausea. Patient denies hitting her head during the incident and denies headache and chest pain. Patient reports using oxygen treatments at home due to condition of chronic obstructive pulmonary disease. Patient notes a history of smoking but denies current nicotine use. Patient additionally has a history of congestive heart failure and notes that edema to her lower extremities is normal at baseline. \" Pt underwent right hip hemiarthroplasty 20.      Prior Level of Function:  Lives With: Alone  Type of Home: House  Home Layout: One level  Home Access: Ramped entrance   [facility] - Number of Steps: 2  Entrance Stairs - Rails: Right(grab bar)  Home Equipment: Standard walker   Bathroom Shower/Tub: Tub/Shower unit, Shower chair with back  Bathroom Toilet: Standard(pt reports daughter installing toilet riser)  Bathroom Equipment: Shower chair, Grab bars in shower    Receives Help From: Family  ADL Assistance: [facility]: Needs assistance  Homemaking Responsibilities: Yes  Ambulation toward established goals. Activity Tolerance:  Patient tolerance of  treatment: good. Equipment Recommendations:Equipment Needed: Yes(may need new 2WW)  Discharge Recommendations:  Continue to assess pending progress    Plan: Times per week: 5x/wk BID, 1x/wk QD  Times per day: Twice a day  Plan weeks: 3 weeks  Current Treatment Recommendations: Strengthening, Functional Mobility Training, Neuromuscular Re-education, Home Exercise Program, Transfer Training, Gait Training, Safety Education & Training, Balance Training, Endurance Training, Stair training, Patient/Caregiver Education & Training    Patient Education  Patient Education: Plan of Care, [employer] Mobility, Transfers, Stairs    Goals:  Patient goals : go home  Short term goals  Time Frame for Short term goals: 10 days  Short term goal 1: Patient will complete sit  <> stand with SBA to stand to ambulate with decreased difficulty. Short term goal 2: Patient will complete supine < > sit with SBA to transfer in/out of bed with decreased difficulty. Short term goal 3: Patient will ambulate 80' with a RW and SBA to progress towards independent home mobility. Short term goal 4: Patient will ascend/descend 2 steps with 2 hand rails with CGA to progress towards independent home entry. Long term goals  Time Frame for Long term goals : 3 weeks  Long term goal 1: Patient will complete sit < > stand and stand pivot transfers with modified independence to allow patient to transfer surface to surface safely. Long term goal 2: Patient will complete supine < > sit with modified independence to allow patient to transfer in/out of bed safely. Long term goal 3: Patient will ambulate 80' with a RW and modified independence to navigate home safely. Long term goal 4: Patient will ascend/descend 2 steps with 1 handrail and SBA for safe home entry. Long term goal 5: Patient will complete car transfer with SBA for safe transport to home and appointments.     Following session, patient left in safe position with all fall risk precautions in place.

## 2020-01-13 ENCOUNTER — HOSPITAL ENCOUNTER (OUTPATIENT)
Dept: INFUSION THERAPY | Age: 75
End: 2020-01-13

## 2020-01-13 PROCEDURE — 97116 GAIT TRAINING THERAPY: CPT

## 2020-01-13 PROCEDURE — 97110 THERAPEUTIC EXERCISES: CPT

## 2020-01-13 PROCEDURE — 6370000000 HC RX 637 (ALT 250 FOR IP): Performed by: FAMILY MEDICINE

## 2020-01-13 PROCEDURE — 94760 N-INVAS EAR/PLS OXIMETRY 1: CPT

## 2020-01-13 PROCEDURE — 97535 SELF CARE MNGMENT TRAINING: CPT

## 2020-01-13 PROCEDURE — 1290000000 HC SEMI PRIVATE OTHER R&B

## 2020-01-13 PROCEDURE — 94640 AIRWAY INHALATION TREATMENT: CPT

## 2020-01-13 PROCEDURE — 6370000000 HC RX 637 (ALT 250 FOR IP): Performed by: INTERNAL MEDICINE

## 2020-01-13 PROCEDURE — 97530 THERAPEUTIC ACTIVITIES: CPT

## 2020-01-13 PROCEDURE — 6360000002 HC RX W HCPCS: Performed by: FAMILY MEDICINE

## 2020-01-13 PROCEDURE — 0220000000 HC SKILLED NURSING FACILITY

## 2020-01-13 PROCEDURE — 2580000003 HC RX 258: Performed by: FAMILY MEDICINE

## 2020-01-13 PROCEDURE — 2700000000 HC OXYGEN THERAPY PER DAY

## 2020-01-13 RX ADMIN — DOCUSATE SODIUM 100 MG: 100 CAPSULE, LIQUID FILLED ORAL at 20:48

## 2020-01-13 RX ADMIN — ROPINIROLE HYDROCHLORIDE 0.25 MG: 0.25 TABLET, FILM COATED ORAL at 09:42

## 2020-01-13 RX ADMIN — BETAMETHASONE DIPROPIONATE: 0.5 CREAM TOPICAL at 09:41

## 2020-01-13 RX ADMIN — Medication 2 PUFF: at 16:29

## 2020-01-13 RX ADMIN — FAMOTIDINE 20 MG: 20 TABLET ORAL at 09:45

## 2020-01-13 RX ADMIN — PROMETHAZINE HYDROCHLORIDE 25 MG: 25 TABLET ORAL at 07:28

## 2020-01-13 RX ADMIN — SODIUM CHLORIDE, PRESERVATIVE FREE 10 ML: 5 INJECTION INTRAVENOUS at 16:23

## 2020-01-13 RX ADMIN — POLYETHYLENE GLYCOL 3350 17 G: 17 POWDER, FOR SOLUTION ORAL at 09:41

## 2020-01-13 RX ADMIN — RIVAROXABAN 20 MG: 20 TABLET, FILM COATED ORAL at 16:20

## 2020-01-13 RX ADMIN — FERROUS SULFATE TAB 325 MG (65 MG ELEMENTAL FE) 325 MG: 325 (65 FE) TAB at 16:22

## 2020-01-13 RX ADMIN — LEVOTHYROXINE SODIUM 75 MCG: 75 TABLET ORAL at 05:53

## 2020-01-13 RX ADMIN — METOPROLOL TARTRATE 12.5 MG: 25 TABLET ORAL at 09:42

## 2020-01-13 RX ADMIN — Medication 2 PUFF: at 06:07

## 2020-01-13 RX ADMIN — ACETAMINOPHEN 500 MG: 500 TABLET ORAL at 12:15

## 2020-01-13 RX ADMIN — DIGOXIN 125 MCG: 125 TABLET ORAL at 09:42

## 2020-01-13 RX ADMIN — HYDROCODONE BITARTRATE AND ACETAMINOPHEN 1 TABLET: 10; 325 TABLET ORAL at 02:16

## 2020-01-13 RX ADMIN — DIPHENHYDRAMINE HCL 25 MG: 25 TABLET ORAL at 09:41

## 2020-01-13 RX ADMIN — DIPHENHYDRAMINE HCL 25 MG: 25 TABLET ORAL at 20:47

## 2020-01-13 RX ADMIN — PROMETHAZINE HYDROCHLORIDE 25 MG: 25 TABLET ORAL at 16:20

## 2020-01-13 RX ADMIN — HYDROCODONE BITARTRATE AND ACETAMINOPHEN 1 TABLET: 10; 325 TABLET ORAL at 09:41

## 2020-01-13 RX ADMIN — DOCUSATE SODIUM 100 MG: 100 CAPSULE, LIQUID FILLED ORAL at 09:43

## 2020-01-13 RX ADMIN — SODIUM CHLORIDE, PRESERVATIVE FREE 10 ML: 5 INJECTION INTRAVENOUS at 21:57

## 2020-01-13 RX ADMIN — HYDROCODONE BITARTRATE AND ACETAMINOPHEN 1 TABLET: 10; 325 TABLET ORAL at 22:38

## 2020-01-13 RX ADMIN — VITAMIN D, TAB 1000IU (100/BT) 5000 UNITS: 25 TAB at 09:42

## 2020-01-13 RX ADMIN — METOPROLOL TARTRATE 12.5 MG: 25 TABLET ORAL at 20:48

## 2020-01-13 RX ADMIN — HYDROXYZINE PAMOATE 25 MG: 25 CAPSULE ORAL at 09:41

## 2020-01-13 RX ADMIN — ACETAMINOPHEN 500 MG: 500 TABLET ORAL at 05:53

## 2020-01-13 RX ADMIN — FLUTICASONE PROPIONATE 1 SPRAY: 50 SPRAY, METERED NASAL at 09:41

## 2020-01-13 RX ADMIN — HYDROXYZINE PAMOATE 25 MG: 25 CAPSULE ORAL at 16:20

## 2020-01-13 RX ADMIN — FERROUS SULFATE TAB 325 MG (65 MG ELEMENTAL FE) 325 MG: 325 (65 FE) TAB at 09:43

## 2020-01-13 RX ADMIN — HEPARIN 500 UNITS: 100 SYRINGE at 16:21

## 2020-01-13 RX ADMIN — GUAIFENESIN 600 MG: 600 TABLET, EXTENDED RELEASE ORAL at 09:42

## 2020-01-13 RX ADMIN — TIOTROPIUM BROMIDE 18 MCG: 18 CAPSULE ORAL; RESPIRATORY (INHALATION) at 06:07

## 2020-01-13 RX ADMIN — ROPINIROLE HYDROCHLORIDE 0.25 MG: 0.25 TABLET, FILM COATED ORAL at 20:47

## 2020-01-13 RX ADMIN — HYDROCODONE BITARTRATE AND ACETAMINOPHEN 1 TABLET: 10; 325 TABLET ORAL at 16:20

## 2020-01-13 ASSESSMENT — PAIN DESCRIPTION - FREQUENCY
FREQUENCY: CONTINUOUS
FREQUENCY: CONTINUOUS

## 2020-01-13 ASSESSMENT — PAIN SCALES - GENERAL
PAINLEVEL_OUTOF10: 7
PAINLEVEL_OUTOF10: 8
PAINLEVEL_OUTOF10: 0
PAINLEVEL_OUTOF10: 7
PAINLEVEL_OUTOF10: 8
PAINLEVEL_OUTOF10: 7

## 2020-01-13 ASSESSMENT — PAIN DESCRIPTION - LOCATION
LOCATION: HIP
LOCATION: HIP

## 2020-01-13 ASSESSMENT — PAIN DESCRIPTION - ORIENTATION
ORIENTATION: RIGHT
ORIENTATION: RIGHT

## 2020-01-13 ASSESSMENT — PAIN DESCRIPTION - PROGRESSION
CLINICAL_PROGRESSION: GRADUALLY WORSENING
CLINICAL_PROGRESSION: NOT CHANGED

## 2020-01-13 ASSESSMENT — PAIN DESCRIPTION - ONSET
ONSET: ON-GOING
ONSET: ON-GOING

## 2020-01-13 ASSESSMENT — PAIN DESCRIPTION - DESCRIPTORS
DESCRIPTORS: ACHING
DESCRIPTORS: ACHING

## 2020-01-13 ASSESSMENT — PAIN DESCRIPTION - PAIN TYPE
TYPE: SURGICAL PAIN
TYPE: SURGICAL PAIN

## 2020-01-13 NOTE — PROGRESS NOTES
but cant do or no choice  6 - No response or non-responsive Enter Codes in Boxes    1 A. How important is it to you to have books, newspapers, and magazines to read?    4 B. How important is it to you to listen to music you like?    4 C. How important is it to you to be around animals such as pets?     2 D. How important is it to you to keep up with the news?    1 E. How important is it to you to do things with groups of people?     1 F. How important is it to you to do your favorite activities?     1 G. How important is it to you to go outside to get fresh air when the weather is good?     1 H. How important is it to you to participate in [employer] services or practices? .  Daily and Activity Preferences Primary Respondent   Enter Code    1 Indicate Primary respondent for Daily and Activity Preferences ( and )  1 - Resident  2 - Family or Significant other (close friend or other representative)  5 - Interview could not be completed by resident or family/significant other (no response to 3 or more items)

## 2020-01-13 NOTE — PROGRESS NOTES
[facility]  [address]  Occupational Therapy  Daily Note  Time:   Time In: 935  Time Out: 1030  Timed Code Treatment Minutes: 54 Minutes  Minutes: 55          Date: 2020  Patient Name: Judah Florez,   Gender: female      [facility]/066-A  MRN: [id number]  : 1945  (76 y.o.)  Referring Practitioner: Zaynab Brumfield MD; Attending: Dr. Colette Browning  Diagnosis: Hemiarthroplast of R Hip  Additional Pertinent Hx: Per EMR: \"Fannie Greenwood is a 76 y.o. female who presents 19 for evaluation of pain to the right leg and hip that onset following a fall that occurred 3 days ago. Patient reports that she tripped over her cat and fell 3 days ago, experiencing immediate pain to the right leg and hip. Patient notes worsening of pain since initial fall and reports exacerbation of pain when she attempts to bear weight onto her right leg. Patient describes additional symptom of nausea. Patient denies hitting her head during the incident and denies headache and chest pain. Patient reports using oxygen treatments at home due to condition of chronic obstructive pulmonary disease. Patient notes a history of smoking but denies current nicotine use. Patient additionally has a history of congestive heart failure and notes that edema to her lower extremities is normal at baseline. \"  s/p s/p right hip hemiarthroplasty for femoral neck fracture on 20. Transfer to [facility] 20. Restrictions/Precautions:  Restrictions/Precautions: Weight Bearing, Fall Risk  Right Lower Extremity Weight Bearing: Weight Bearing As Tolerated  Position Activity Restriction  Hip Precautions: No hip flexion > 90 degrees, No ADduction, No hip internal rotation  Other position/activity restrictions: Home O2: baseline is 2L at rest/6L with activity.  S/p right hip hemiarthroplasty for femoral neck fracture on 20    SUBJECTIVE: Patient seated in bedside chair, requesting to complete shower this AM. Agreeable to OT needed, maintaining hip precautions during functional tasks, increasing safety awareness, and activity tolerance. Without skilled OT intervention pt is at risk for functional decline, increased falls, and readmission to hospital.  Activity Tolerance:  Patient tolerance of  treatment: fair. Discharge Recommendations: Home with Home health OT  Equipment Recommendations: Equipment Needed: Yes  Other: Recommend BSC and LHAE at discharge  Plan: Times per week: 6x  Plan weeks: 3 weeks  Current Treatment Recommendations: Functional Mobility Training, Balance Training, Self-Care / ADL, Endurance Training, Safety Education & Training, Patient/Caregiver Education & Training, Equipment Evaluation, Education, & procurement    Patient Education  Patient Education: ADL's    Goals  Short term goals  Time Frame for Short term goals: 2 weeks  Short term goal 1: Pt will complete LB ADL with LHAE and Brandi to increase indep with self care. Short term goal 2: Pt will complete dynamic standing task with SBA x 5 min with B hand release to increase balance required for grooming. Short term goal 3: Pt will complete mobility to/from BR with SBA and 0 cues safety to increase indep with ADL routine. Short term goal 4: Pt will complete various t/fs with SBA and 0 cues safety to increase indep with toileting t/fs. Long term goals  Time Frame for Long term goals : 3 weeks  Long term goal 1: Pt will complete ADL routine including shower t/f with Emilia to increase indep with self care. Long term goal 2: Pt will complete light homemkaing task with Emilia to increase indep with light cooking and cleaning for return to PLOF at home alone. Following session, patient left in safe position with all fall risk precautions in place.

## 2020-01-13 NOTE — PROGRESS NOTES
Nutrition Assessment    Type and Reason for Visit: Reassess    Nutrition Recommendations:   Continue current diet. Send ensure enlive twice/day per pt. request.  Encouraged extra foods on trays for between meal snacks. Encouraged bland foods as tolerated to aid with nausea issues. Nutrition Assessment:    Pt. with no improvement from a nutritional standpoint AEB . Remains at risk for further nutritional compromise r/t increased needs for wound healing and underlying medical condition (lung cancer with current chemo, CHF, COPD). Nutrition recommendations/interventions as per above. Malnutrition Assessment:  · Malnutrition Status: At risk for malnutrition    Nutrition Risk Level: Moderate    Nutrient Needs:  · Estimated Daily Total Kcal: ~9061-4540 (~25-28kcals/kgm wt. of 64kgm 1/9/20)  · Estimated Daily Protein (g): ~68 (~1.5 grams/kg IBW of 45 kg on 1/9/20)  · Estimated Daily Total Fluid (ml/day): per physician    Nutrition Diagnosis:   · Problem: Increased nutrient needs  · Etiology: related to Acute injury/trauma     Signs and symptoms:  as evidenced by Presence of wounds    Objective Information:  · Nutrition-Focused Physical Findings: Pt. admitted with hemiartroplasty of rt hip (1/1/20) following a fall. Pt. seen, states poor appetite, was nasueated earlier, not now. Denies c/s difficulty. BM x 1 (1/12). Denies constipation/diarrhea. Meds: colace, Ferrous Sulfate, Glycolax, Senokot, Vitamin D. Labs: (1/12) BUN 29, hemoglobin 7.2.     · Wound Type: Surgical Wound(1/1/20 - hmiarthroplasty right hip)  · Current Nutrition Therapies:  · Oral Diet Orders: General   · Oral Diet intake: (1-100% varied)  · Oral Nutrition Supplement (ONS) Orders: Standard High Calorie Oral Supplement(Ensure Enlive TID)  · ONS intake: (Reports she  drinks 1-2/day, doesn't want 3. )  · Anthropometric Measures:  · Ht: 5' (152.4 cm)   · Current Body Wt: 148 lb 9.4 oz (67.4 kg)(edema not documented)  · Admission Body Wt: 149 lb (67.6 kg)(1/6/20, standing scale)  · Usual Body Wt: (137-142# (EMR, Sept-July 2019))  · % Weight Change:  ,  1# weight loss since admit, wt. is 6# more than wt July 2019  · Ideal Body Wt: 100 lb (45.4 kg),   · BMI Classification: BMI 25.0 - 29.9 Overweight    Nutrition Interventions:   Continue current diet, Modify current ONS, Vitamin Supplement  Continued Inpatient Monitoring, Education Initiated, Coordination of Care(Encouraged lean protein choice with meals to aid with wound healing. )    Nutrition Evaluation:   · Evaluation: No progress toward goals   · Goals: Patient will consume 75% or greater of meals and ONS during LOS    · Monitoring: Nutrition Progression, Meal Intake, Supplement Intake, Diet Tolerance, Skin Integrity, Wound Healing, I&O, Weight, Pertinent Labs, Monitor Bowel Function      Electronically signed by Jose Her RD, LD on 1/13/20 at 2:30 PM    Contact Number: [phone]

## 2020-01-13 NOTE — PLAN OF CARE
Problem: Impaired respiratory status  Goal: Clear lung sounds  Description  Clear lung sounds     Outcome: Met This Shift  Note:   Dulera BID to maintain clear breath sounds.

## 2020-01-13 NOTE — PATIENT CARE CONFERENCE
for anticoagulation, which is being managed by Primary Care Physician    Antibiotic Use:  Patient not on antibiotics    Psychotropic Medication Use:  Patient not on psychotropic medications. Oxygen Use: Yes as at home    Home Medications Reconciled: N/A    Physical Therapy:   Ms. Luis Leavitt is making good progress towards goals set for her. Patient progressing to SBA with sit  <> stand transfers, SBA with ambulation 170', SBA on steps with bilateral hand rail, however requiring min assist for supine to sit transfers. Patient lives alone on O2 at [facility] and would benefit from continued skilled PT services to improve her ability to complete functional mobility, reduce her risk for falls and allow patient to return to OF. PT Equipment Recommendations  Equipment Needed: Yes(may need new 2WW)    Occupational  Therapy:  Pt is making good progress towards goals. Pt has met 3/4 STGs and 0/2 LTGs. Factors facilitating goal achievement include: pleasant, cooperative, motivated, good family support. Barriers to goal achievement include: hip precautions, decreased endurance with patient on 6L of O2 with activity and 2L of O2 at rest. Family education has been addressed on the following topics: n/a. Pt continues to require skilled Occupational Therapy to address the following performance deficits balance with 1-2 UE release from walker, increasing independence with ADL routine with use of LHAE as needed, maintaining hip precautions during functional tasks, increasing safety awareness, and activity tolerance. Without skilled OT intervention pt is at risk for functional decline, increased falls, and readmission to hospital.   Equipment: Recommend BSC and LHAE at discharge         Nutrition:    Weight: 148 lb 9.6 oz (67.4 kg) / Body mass index is 29.02 kg/m². Please see nutrition note for details.     Family Education: No family available for education  Fall Risk:  Falling star program initiated    Goal Achievement:   Patient on 1/14/2020 at 9:37 AM

## 2020-01-13 NOTE — PROGRESS NOTES
assistance  Homemaking Responsibilities: Yes  Ambulation Assistance: Independent  Transfer Assistance: Independent  Active : Yes  IADL Comments: Daughter able to assist at times but works 2 jobs, Has a niece who is able to assist at times  Additional Comments: Pt reports I prior to admission without AD. Pt reports daughter has RTS. Current with meals on wheels. Restrictions/Precautions:  Restrictions/Precautions: Weight Bearing, Fall Risk  Right Lower Extremity Weight Bearing: Weight Bearing As Tolerated  Position Activity Restriction  Hip Precautions: No hip flexion > 90 degrees, No ADduction, No hip internal rotation  Other position/activity restrictions: Home O2: baseline is 2L at rest/6L with activity. S/p right hip hemiarthroplasty for femoral neck fracture on 1/1/20    SUBJECTIVE: pleasant, reports feeling very nauseous this am, nurse informed, med given. PT. ON 6L 02 WITH ACTIVITY, 2L  02 at rest    PAIN: no # given for R LE , Foot and HIP PAIN  OBJECTIVE:  Bed Mobility:  Supine to Sit: Modified Independent, with head of bed raised, with rail  Scooting: Modified Independent    Transfers:  Sit to Stand: Supervision -> MOD I  from eob, car seat, str. back arm chair and sofa ( in rehab apt)  Stand to Sit:Modified Independent, same as above  Car:Stand By Assistance,  Drivers side and passengers side   TOILET TRANSFER:  Mod I ABLE WITH rolling walker, ETS    Ambulation:  Stand By Assistance  Distance: 15' x 1,180' x 1,40' x 1,30' x 1,150' x 2  Surface: Level Tile, Uneven Surface and Carpet  Device:Rolling Walker  Gait Deviations:  Decreased Gait Speed and  vc to look forward vs. Down.  Step-thru pattern maintained    Balance:  Dynamic Standing Balance: Modified Independent,  standing ~4 min at sink reaching within tatiana slightly overhead to knee levels with and without ue support, no unsteadiness    Stairs:   LT. Contact Guard Assistance-> CLOSE SBA, with verbal cues   Number of Steps: 1        Porch

## 2020-01-13 NOTE — PROGRESS NOTES
[address] [address]  INPATIENT PHYSICAL THERAPY  DAILY NOTE  - [facility] - [id number]-66/066-A    Time In: 6366  Time Out: 1435  Timed Code Treatment Minutes: 45 Minutes  Minutes: 38          Date: 2020  Patient Name: Garfield Marquez,  Gender:  female        MRN: [id number]  : 1945  (76 y.o.)  Referral Date : 20  Referring Practitioner: Roly Newman MD  Diagnosis: hemiarthroplasty of right hip  Treatment Diagnosis: difficulty in walking  Additional Pertinent Hx: Per EMR: \"Fannie Garg is a 76 y.o. female who presents for evaluation of pain to the right leg and hip that onset following a fall that occurred 3 days ago. Patient reports that she tripped over her cat and fell 3 days ago, experiencing immediate pain to the right leg and hip. Patient notes worsening of pain since initial fall and reports exacerbation of pain when she attempts to bear weight onto her right leg. Patient describes additional symptom of nausea. Patient denies hitting her head during the incident and denies headache and chest pain. Patient reports using oxygen treatments at home due to condition of chronic obstructive pulmonary disease. Patient notes a history of smoking but denies current nicotine use. Patient additionally has a history of congestive heart failure and notes that edema to her lower extremities is normal at baseline. \" Pt underwent right hip hemiarthroplasty 20.      Prior Level of Function:  Lives With: Alone  Type of Home: House  Home Layout: One level  Home Access: Ramped entrance  [address] - Number of Steps: 2  Entrance Stairs - Rails: Right(grab bar)  Home Equipment: Standard walker   Bathroom Shower/Tub: Tub/Shower unit, Shower chair with back  Bathroom Toilet: Standard(pt reports daughter installing toilet riser)  Bathroom Equipment: Shower chair, Grab bars in shower    Receives Help From: Family  ADL Assistance: [address]: Needs tolerance of  treatment: good. Equipment Recommendations:Equipment Needed: Yes(may need new 2WW)  Discharge Recommendations:  Continue to assess pending progress    Plan: Times per week: 5x/wk BID, 1x/wk QD  Times per day: Twice a day  Plan weeks: 3 weeks  Current Treatment Recommendations: Strengthening, Functional Mobility Training, Neuromuscular Re-education, Home Exercise Program, Transfer Training, Gait Training, Safety Education & Training, Balance Training, Endurance Training, Stair training, Patient/Caregiver Education & Training    Patient Education  Patient Education: Home Exercise Program, Precautions/Restrictions, [employer] Mobility, Equipment Education, Transfers, Reviewed Prior Education, Gait, Stairs, Verbal Exercise Instruction, - Patient Requires Continued Education    Goals:  Patient goals : go home  Short term goals  Time Frame for Short term goals: 10 days  Short term goal 1: Patient will complete sit  <> stand with SBA to stand to ambulate with decreased difficulty. Short term goal 2: Patient will complete supine < > sit with SBA to transfer in/out of bed with decreased difficulty. Short term goal 3: Patient will ambulate 80' with a RW and SBA to progress towards independent home mobility. Short term goal 4: Patient will ascend/descend 2 steps with 2 hand rails with CGA to progress towards independent home entry. Long term goals  Time Frame for Long term goals : 3 weeks  Long term goal 1: Patient will complete sit < > stand and stand pivot transfers with modified independence to allow patient to transfer surface to surface safely. Long term goal 2: Patient will complete supine < > sit with modified independence to allow patient to transfer in/out of bed safely. Long term goal 3: Patient will ambulate 80' with a RW and modified independence to navigate home safely. Long term goal 4: Patient will ascend/descend 2 steps with 1 handrail and SBA for safe home entry.   Long term goal 5: Patient

## 2020-01-13 NOTE — PLAN OF CARE
Problem: Infection - Surgical Site:  Goal: Will show no infection signs and symptoms  Description  Will show no infection signs and symptoms  1/13/2020 0128 by Benedicto Cooper RN  Outcome: Ongoing  Note:   Incision covered with surgical dressing, clean dry and intact. Problem: Mobility - Impaired:  Goal: Mobility will improve  Description  Mobility will improve  1/13/2020 0128 by Benedicto Cooper RN  Outcome: Ongoing  Note:   Patient up with walker and one assist, gait belt and non slip slippers. Problem: Skin Integrity:  Goal: Absence of new skin breakdown  Description  Absence of new skin breakdown  1/13/2020 0128 by Benedicto Cooepr RN  Outcome: Ongoing  Note:   No new skin breakdown noted. Patient able to reposition self to prevent skin breakdown. Problem: Pain:  Goal: Pain level will decrease  Description  Pain level will decrease  1/13/2020 0128 by Benedicto Cooper RN  Outcome: Ongoing  Note:   Patient rated pain 8/10 in right ankle. Ice applied, which patient stated helped bring pain level down. Scheduled requip administered. Patient was able to fall asleep     Problem: Bowel/Gastric:  Goal: Ability to achieve a regular elimination pattern will improve  Description  Ability to achieve a regular elimination pattern will improve  Outcome: Ongoing  Note:   Last BM 1/12     Problem: Bleeding:  Goal: Will show no signs and symptoms of excessive bleeding  Description  Will show no signs and symptoms of excessive bleeding  1/13/2020 0128 by Benedicto Cooper RN  Outcome: Ongoing  Note:   No active bleeding, continues on xaralto. HGB 7.2      Problem: Falls - Risk of:  Goal: Will remain free from falls  Description  Will remain free from falls  1/13/2020 0128 by Benedicto Cooper RN  Outcome: Ongoing  Note:   No falls noted. Patient uses call light appropriately to alert staff to needs. Patient wears non slip slippers and gait belt.        Problem: Nutrition  Goal: Optimal nutrition therapy  1/13/2020 0128 by Jennifer Rosen RN  Outcome: Ongoing     Problem: IP MOBILITY  Goal: STG - patient will ambulate up and down a curb/step  Outcome: Ongoing     Problem: SAFETY  Goal: LTG - patient will utilize safety techniques  Outcome: Ongoing     Problem: IP DRESSINGS LOWER EXTREMITIES  Goal: LTG - Patient will safely utilize adaptive techniques/equipment to dress lower body  Outcome: Ongoing     Problem: Discharge Planning:  Goal: Patients continuum of care needs are met  Description  Patients continuum of care needs are met  Outcome: Ongoing  Note:   No plans for discharge at this time. Patient conference on Tuesday 1/14. Problem: Sensory:  Goal: Ability to demonstrate ways to enhance comfort will improve  Description  Ability to demonstrate ways to enhance comfort will improve  Outcome: Ongoing     Problem: OXYGENATION/RESPIRATORY FUNCTION  Goal: Adequate oxygenation  1/13/2020 0128 by Jennifer Rosen RN  Outcome: Ongoing     Problem: OXYGENATION/RESPIRATORY FUNCTION  Goal: Clear lung sounds  Outcome: Ongoing  Reviewed care plan with patient. Patient verbalized understanding of treatment goals and care plan.

## 2020-01-14 PROCEDURE — 97110 THERAPEUTIC EXERCISES: CPT

## 2020-01-14 PROCEDURE — 97535 SELF CARE MNGMENT TRAINING: CPT

## 2020-01-14 PROCEDURE — 2580000003 HC RX 258: Performed by: FAMILY MEDICINE

## 2020-01-14 PROCEDURE — 1290000000 HC SEMI PRIVATE OTHER R&B

## 2020-01-14 PROCEDURE — 6360000002 HC RX W HCPCS: Performed by: FAMILY MEDICINE

## 2020-01-14 PROCEDURE — 2700000000 HC OXYGEN THERAPY PER DAY

## 2020-01-14 PROCEDURE — 94761 N-INVAS EAR/PLS OXIMETRY MLT: CPT

## 2020-01-14 PROCEDURE — 2709999900 HC NON-CHARGEABLE SUPPLY

## 2020-01-14 PROCEDURE — 94640 AIRWAY INHALATION TREATMENT: CPT

## 2020-01-14 PROCEDURE — 6370000000 HC RX 637 (ALT 250 FOR IP): Performed by: INTERNAL MEDICINE

## 2020-01-14 PROCEDURE — 6370000000 HC RX 637 (ALT 250 FOR IP): Performed by: FAMILY MEDICINE

## 2020-01-14 PROCEDURE — 97116 GAIT TRAINING THERAPY: CPT

## 2020-01-14 PROCEDURE — 97530 THERAPEUTIC ACTIVITIES: CPT

## 2020-01-14 RX ADMIN — HYDROCODONE BITARTRATE AND ACETAMINOPHEN 1 TABLET: 10; 325 TABLET ORAL at 05:58

## 2020-01-14 RX ADMIN — HYDROXYZINE PAMOATE 25 MG: 25 CAPSULE ORAL at 00:20

## 2020-01-14 RX ADMIN — ROPINIROLE HYDROCHLORIDE 0.25 MG: 0.25 TABLET, FILM COATED ORAL at 08:33

## 2020-01-14 RX ADMIN — DIPHENHYDRAMINE HCL 25 MG: 25 TABLET ORAL at 20:16

## 2020-01-14 RX ADMIN — DIGOXIN 125 MCG: 125 TABLET ORAL at 08:34

## 2020-01-14 RX ADMIN — ACETAMINOPHEN 500 MG: 500 TABLET ORAL at 23:56

## 2020-01-14 RX ADMIN — Medication 2 PUFF: at 18:32

## 2020-01-14 RX ADMIN — HYDROXYZINE PAMOATE 25 MG: 25 CAPSULE ORAL at 23:56

## 2020-01-14 RX ADMIN — POLYETHYLENE GLYCOL 3350 17 G: 17 POWDER, FOR SOLUTION ORAL at 08:33

## 2020-01-14 RX ADMIN — VITAMIN D, TAB 1000IU (100/BT) 5000 UNITS: 25 TAB at 08:33

## 2020-01-14 RX ADMIN — ROPINIROLE HYDROCHLORIDE 0.25 MG: 0.25 TABLET, FILM COATED ORAL at 20:18

## 2020-01-14 RX ADMIN — FERROUS SULFATE TAB 325 MG (65 MG ELEMENTAL FE) 325 MG: 325 (65 FE) TAB at 17:40

## 2020-01-14 RX ADMIN — DIPHENHYDRAMINE HCL 25 MG: 25 TABLET ORAL at 08:34

## 2020-01-14 RX ADMIN — HYDROCODONE BITARTRATE AND ACETAMINOPHEN 1 TABLET: 10; 325 TABLET ORAL at 18:27

## 2020-01-14 RX ADMIN — BETAMETHASONE DIPROPIONATE: 0.5 CREAM TOPICAL at 08:33

## 2020-01-14 RX ADMIN — HYDROXYZINE PAMOATE 25 MG: 25 CAPSULE ORAL at 08:24

## 2020-01-14 RX ADMIN — PROMETHAZINE HYDROCHLORIDE 25 MG: 25 TABLET ORAL at 08:24

## 2020-01-14 RX ADMIN — LEVOTHYROXINE SODIUM 75 MCG: 75 TABLET ORAL at 05:58

## 2020-01-14 RX ADMIN — FLUTICASONE PROPIONATE 1 SPRAY: 50 SPRAY, METERED NASAL at 08:33

## 2020-01-14 RX ADMIN — GUAIFENESIN 600 MG: 600 TABLET, EXTENDED RELEASE ORAL at 08:34

## 2020-01-14 RX ADMIN — DOCUSATE SODIUM 100 MG: 100 CAPSULE, LIQUID FILLED ORAL at 08:34

## 2020-01-14 RX ADMIN — METOPROLOL TARTRATE 12.5 MG: 25 TABLET ORAL at 08:33

## 2020-01-14 RX ADMIN — HYDROCODONE BITARTRATE AND ACETAMINOPHEN 1 TABLET: 10; 325 TABLET ORAL at 12:12

## 2020-01-14 RX ADMIN — DOCUSATE SODIUM 100 MG: 100 CAPSULE, LIQUID FILLED ORAL at 20:18

## 2020-01-14 RX ADMIN — RIVAROXABAN 20 MG: 20 TABLET, FILM COATED ORAL at 17:40

## 2020-01-14 RX ADMIN — TIOTROPIUM BROMIDE 18 MCG: 18 CAPSULE ORAL; RESPIRATORY (INHALATION) at 05:36

## 2020-01-14 RX ADMIN — FAMOTIDINE 20 MG: 20 TABLET ORAL at 08:34

## 2020-01-14 RX ADMIN — ACETAMINOPHEN 500 MG: 500 TABLET ORAL at 00:18

## 2020-01-14 RX ADMIN — METOPROLOL TARTRATE 12.5 MG: 25 TABLET ORAL at 20:16

## 2020-01-14 RX ADMIN — HEPARIN 500 UNITS: 100 SYRINGE at 11:39

## 2020-01-14 RX ADMIN — Medication 2 PUFF: at 05:36

## 2020-01-14 RX ADMIN — FERROUS SULFATE TAB 325 MG (65 MG ELEMENTAL FE) 325 MG: 325 (65 FE) TAB at 08:34

## 2020-01-14 RX ADMIN — SODIUM CHLORIDE, PRESERVATIVE FREE 10 ML: 5 INJECTION INTRAVENOUS at 11:40

## 2020-01-14 ASSESSMENT — PAIN DESCRIPTION - LOCATION
LOCATION: HIP

## 2020-01-14 ASSESSMENT — PAIN SCALES - GENERAL
PAINLEVEL_OUTOF10: 6
PAINLEVEL_OUTOF10: 7
PAINLEVEL_OUTOF10: 8
PAINLEVEL_OUTOF10: 0
PAINLEVEL_OUTOF10: 7
PAINLEVEL_OUTOF10: 8
PAINLEVEL_OUTOF10: 4
PAINLEVEL_OUTOF10: 6
PAINLEVEL_OUTOF10: 8
PAINLEVEL_OUTOF10: 6

## 2020-01-14 ASSESSMENT — PAIN DESCRIPTION - PAIN TYPE
TYPE: SURGICAL PAIN
TYPE: SURGICAL PAIN;ACUTE PAIN
TYPE: SURGICAL PAIN;ACUTE PAIN

## 2020-01-14 ASSESSMENT — PAIN DESCRIPTION - ORIENTATION: ORIENTATION: RIGHT

## 2020-01-14 NOTE — PROGRESS NOTES
[address] [address]  INPATIENT PHYSICAL THERAPY  DAILY NOTE  - [facility] - [id number]-66/066-A    Time In: 8959  Time Out: 1505  Timed Code Treatment Minutes: 32 Minutes  Minutes: 32          Date: 2020  Patient Name: Judah Florez,  Gender:  female        MRN: [id number]  : 1945  (76 y.o.)  Referral Date : 20  Referring Practitioner: Zaynab Brumfield MD  Diagnosis: hemiarthroplasty of right hip  Treatment Diagnosis: difficulty in walking  Additional Pertinent Hx: Per EMR: \"Fannie Greenwood is a 76 y.o. female who presents for evaluation of pain to the right leg and hip that onset following a fall that occurred 3 days ago. Patient reports that she tripped over her cat and fell 3 days ago, experiencing immediate pain to the right leg and hip. Patient notes worsening of pain since initial fall and reports exacerbation of pain when she attempts to bear weight onto her right leg. Patient describes additional symptom of nausea. Patient denies hitting her head during the incident and denies headache and chest pain. Patient reports using oxygen treatments at home due to condition of chronic obstructive pulmonary disease. Patient notes a history of smoking but denies current nicotine use. Patient additionally has a history of congestive heart failure and notes that edema to her lower extremities is normal at baseline. \" Pt underwent right hip hemiarthroplasty 20.      Prior Level of Function:  Lives With: Alone  Type of Home: House  Home Layout: One level  Home Access: Ramped entrance   [address] - Number of Steps: 2  Entrance Stairs - Rails: Right(grab bar)  Home Equipment: Standard walker, 4 wheeled walker, Cane, Oxygen   Bathroom Shower/Tub: Tub/Shower unit, Shower chair with back  [employer]: Standard(pt reports daughter installing toilet riser)  Bathroom Equipment: Shower chair, Grab bars in  [address] Help From: Family  ADL Assistance: Independent  Homemaking both lower extremities. Ankle pumps, Glut sets, Quad sets, Heelslides, Short arc quads and  L LE Hip abduction/adduction. Exercises were completed for increased independence with functional mobility. Functional Outcome Measures: Not completed       ASSESSMENT:  Assessment: Patient progressing toward established goals. Activity Tolerance:  Patient tolerance of  treatment: good. Equipment Recommendations:Equipment Needed: Yes(may need new 2WW)  Discharge Recommendations:  Continue to assess pending progress    Plan: Times per week: 5x/wk BID, 1x/wk QD  Times per day: Twice a day  Plan weeks: 3 weeks  Current Treatment Recommendations: Strengthening, Functional Mobility Training, Neuromuscular Re-education, Home Exercise Program, Transfer Training, Gait Training, Safety Education & Training, Balance Training, Endurance Training, Stair training, Patient/Caregiver Education & Training    Patient Education  Patient Education: Home Exercise Program, Precautions/Restrictions, [employer] Mobility, Equipment Education, Transfers, Reviewed Prior Education, Gait, Verbal Exercise Instruction, - Patient Requires Continued Education    Goals:  Patient goals : go home  Short term goals  Time Frame for Short term goals: 10 days  Short term goal 1: Patient will complete sit  <> stand with SBA to stand to ambulate with decreased difficulty. GOAL MET, DISCONTINUE, SEE LTG  Short term goal 2: Patient will complete supine < > sit with SBA to transfer in/out of bed with decreased difficulty. GOAL MET, DISCONTINUE, SEE LTG  Short term goal 3: Patient will ambulate 100' with a RW and SBA to progress towards independent home mobility. GOAL MET, DISCONTINUE, SEE LTG  Short term goal 4: Patient will ascend/descend 2 steps with 1 hand rail and straight cane with CGA to progress towards independent home entry.   Long term goals  Time Frame for Long term goals : 3 weeks  Long term goal 1: Patient will complete sit < > stand and stand pivot transfers with modified independence to allow patient to transfer surface to surface safely. Long term goal 2: Patient will complete supine < > sit with modified independence to allow patient to transfer in/out of bed safely. Long term goal 3: Patient will ambulate 80' with a RW and modified independence to navigate home safely. Long term goal 4: Patient will ascend/descend 2 steps with 1 handrail and straight cane and SBA for safe home entry. Long term goal 5: Patient will complete car transfer with SBA for safe transport to home and appointments. Following session, patient left in safe position with all fall risk precautions in place.

## 2020-01-14 NOTE — PROGRESS NOTES
Patient Name: Angie Melvin        MRN: [id number]    : 1945  (76 y.o.)  Gender: female   Principal Problem: <principal problem not specified>    Section D - Mood     Should Resident Mood Interview be Conducted? - Attempt to conduct interview with all residents   0. No (resident is rarely/never understood) à Skip to and complete -, Staff Assessment of Mood (PHQ 9-OV)  1. Yes à Continue to , Resident Mood Interview (PHQ-9) Enter Code    1   . Resident Mood Interview (PHQ-9)   Say to resident: Mihir Hurtado the last 2 weeks, have you been bothered by any of the following problems?    If symptom is present, enter 1(yes) in column 1, Symptom Presence. Then move to column 2, Symptom Frequency, and indicate symptom frequency. 1. Symptom Presence                   2. Symptom                                                                  Frequency  0. No (enter 0 in column 2)          0. Never or 1 day          1. Yes (enter 0-3 in column 2)      1. 2-6 days           9. No Response                               2.  7-11 days                                                 3.  12-14 days   1. Symptom Presence 2. Symptom  Frequency        Enter Scores in boxes below   A. Little interest or pleasure in doing things 0 0   B. Feeling down, depressed, or hopeless 1 1   C. Trouble falling or staying asleep, or sleeping too much 1 2   D. Feeling tired or having little energy 1 2   E. Poor appetite or overeating 1 2   F. Feeling bad about yourself - or that you are a failure, or have let your family down 1 1   G. Trouble concentrating on things, such as reading the newspaper or watching television 0 0   H. Moving or speaking so slowly that other people have noticed. Or the opposite-being so fidgety or restless that s/he has been moving around a lot more than usual   0   0   I. Thoughts that you would be better off dead, or of hurting yourself in some way.  0 0              Patient Name: Linda Dileepriaz

## 2020-01-14 NOTE — PROGRESS NOTES
[facility]  Recreational Therapy  Daily Note  - [facility]    Time Spent with Patient: 120 minutes    Date:  1/14/2020       Patient Name: Neel Varela      MRN: [id number]      YOB: 1945 (76 y.o.)       Gender: female  Diagnosis: Hemiarthroplast of R Hip  Referring Practitioner: Aylin Jonhson MD; Attending: Dr. Deon Sparks    RESTRICTIONS/PRECAUTIONS:  Restrictions/Precautions: Weight Bearing, Fall Risk  Vision: Impaired  Hearing: Within functional limits    PAIN: 0    SUBJECTIVE:  I have looked forward to this all morning    OBJECTIVE:  Pt came to the dining room and ate her lunch with peers and engaged well in conversation enjoyed a [employer] with her lunch paid for by [employer]. [employer] -after lunch stayed to play a few games of bingo with peer and then needed to go to bathroom-sit to stand from standard chair with S-ambulated to room with RW and SBA and 4 liters of 02-stopped into the bathroom and S to sit on toilet-call light within reach -pt would also like to get her hair done by our beautician tomorrow          Patient Education  New Education Provided: Importance of Leisure, [address] Safety    Electronically signed by: Lisa Ellis, CTRS  Date: 1/14/2020

## 2020-01-14 NOTE — PROGRESS NOTES
[facility]  [address] [place] SKILLED NURSING UNIT  Occupational Therapy  Progress Note  Time:   Time In: 730  Time Out: 825  Timed Code Treatment Minutes: 54 Minutes  Minutes: 55          Date: 2020  Patient Name: Jyoti Davidson,   Gender: female      Room: [facility]/066-A  MRN: [id number]  : 1945  (76 y.o.)  Referring Practitioner: Loni Awan MD; Attending: Dr. Pauline Jarquin  Diagnosis: Hemiarthroplast of R Hip  Additional Pertinent Hx: Per EMR: \"Fannie Weber is a 76 y.o. female who presents 19 for evaluation of pain to the right leg and hip that onset following a fall that occurred 3 days ago. Patient reports that she tripped over her cat and fell 3 days ago, experiencing immediate pain to the right leg and hip. Patient notes worsening of pain since initial fall and reports exacerbation of pain when she attempts to bear weight onto her right leg. Patient describes additional symptom of nausea. Patient denies hitting her head during the incident and denies headache and chest pain. Patient reports using oxygen treatments at home due to condition of chronic obstructive pulmonary disease. Patient notes a history of smoking but denies current nicotine use. Patient additionally has a history of congestive heart failure and notes that edema to her lower extremities is normal at baseline. \"  s/p s/p right hip hemiarthroplasty for femoral neck fracture on 20. Transfer to [facility] 20. Restrictions/Precautions:  Restrictions/Precautions: Weight Bearing, Fall Risk  Right Lower Extremity Weight Bearing: Weight Bearing As Tolerated  Position Activity Restriction  Hip Precautions: No hip flexion > 90 degrees, No ADduction, No hip internal rotation  Other position/activity restrictions: Home O2: baseline is 2L at rest/6L with activity.  S/p right hip hemiarthroplasty for femoral neck fracture on 20    SUBJECTIVE: Received in chair, agreeable to OT session, worried about shower transfer without tub bench and pet care for homegoing    PAIN: 6/10: R hip    COGNITION: WFL    ADL:   Upper Extremity Dressing: with set-up. doffing gown and donning shirt seated in chair  Lower Extremity Dressing: Contact Guard Assistance, with set-up, with verbal cues  and with increased time for completion. using reacher to mart shorts and undergarments, able to doff with CGA and cues, using reacher to retrieve from floor. Reviewed use of LHAE for ADL tasks with verbalizd and demonstrated good understanding. Pt reports planning on purchasing kit for homegoing. BALANCE:  Sitting Balance:  Modified Independent  Standing Balance: Stand By Assistance, Contact Guard Assistance, primarily SBA, min episodes of CGA with LBD, pt stood with SBA for >6 mins for dynamic reaching task with use of reacher to gather dirty laundry and completed laundry task with SBA, cues for technique and use of reacher to abide by hip precautions during task. TRANSFERS:  Sit to Stand:  Stand By Assistance. recliner and chair with arms  Stand to Sit: Stand By Assistance. recliner and chair with arms    FUNCTIONAL MOBILITY:  Assistive Device: Rolling Walker  Assist Level:  Stand By Assistance with one episode of CGA d/t LOB with opening door in room and turning to exit  Distance: To and from therapy apartment on [address]E  Pt requires 6L O2 with activity, good awareness to O2 tubing     ADDITIONAL ACTIVITIES:  Pt completed IADL task this date of gathering dirty laundry and initiating laundry task. Pt educated on ECT and use of reacher to increase safety and independence with task, while abiding by hip precautions. Pt completed task with SBA, demonstrating BUE release and standing endurance >6 mins during task. Pt tolerated well     ASSESSMENT:  Activity Tolerance:  Patient tolerance of  treatment: fair. Assessment:   Pt has made good progress on TCU meeting 4/4 STG and both LTGs are currently in progress.   Pt continues to be limited by hip precautions, dynamic standing task with Mod I x 6 min with B hand release to increase balance required for grooming. Short term goal 3: Pt will complete mobility to/from BR with Mod I and 0 cues safety to increase indep with ADL routine. Short term goal 4: Pt will complete various t/fs with Mod I and 0 cues safety to increase indep with toileting t/fs. Long term goals  Time Frame for Long term goals : 3 weeks  Long term goal 1: Pt will complete ADL routine including shower t/f with standard shower chair with Emilia to increase indep with self care. Long term goal 2: Pt will demonstrate basic IADL tasks with emphasis on pet care using any adaptive strategies for increased safety and independence with changing litter box while abiding by hip precautions    Following session, patient left in safe position with all fall risk precautions in place.

## 2020-01-14 NOTE — PROGRESS NOTES
rotation  Other position/activity restrictions: Home O2: baseline is 2L at rest/6L with activity. S/p right hip hemiarthroplasty for femoral neck fracture on 1/1/20    SUBJECTIVE: Patient in room, agreeable to PT. PAIN: 6/10: notified nurse end of session as pt requesting pain medication    OBJECTIVE:  Bed Mobility:  Supine to Sit: Stand By Assistance  Sit to Supine: Modified Independent     Transfers:  Sit to Stand: Stand By Assistance  Stand to Sit:Stand By Assistance   Car Transfer: Stand By Assistance    Ambulation:  Stand By Assistance  Distance: 75', 75', 20', 65', 45', 65'  Surface: Level Tile  Device:Rolling Walker  Gait Deviations: Forward Flexed Posture, Slow Ligia, Decreased Step Length Bilaterally and Decreased Heel Strike Bilaterally  Verbal cues for improved posture    Stairs:  Contact Guard Assistance  Number of Steps: 4  Height: 6\" step with Bilateral Handrails   Verbal cues for lower extremity sequencing, however patient insistent on ascending with right lower extremity. Education on benefit of ascending with good lower extremity. Pt with reciprocal pattern ascending despite verbal cues for non reciprocal pattern. Mild unsteadiness noted on steps. Exercise:  Patient was guided in 1 set(s) 10 reps of exercise to both lower extremities. Supine: glut sets 5\" x 10, ankle pumps x 20, heel slide, quad sets 5\" x 10, short arc quad. Exercises were completed for increased independence with functional mobility. Functional Outcome Measures: Not completed       ASSESSMENT:  Assessment:   . Ms. Jaswant Irizarry met 4/4 STG's and 0/5 LTG's. Pt is making good progression with mobility, ambulating with SBA with a RW, cues for posture, progressed sit  <> stand to SBA, and supine < > sit to modified independent/SBA. Pt ascend/descends 4 steps with CGA, however requiring 2 hand rails and demonstrating mild lower extremity instability.   Pt would benefit from continued skilled PT services to increase

## 2020-01-14 NOTE — PLAN OF CARE
Problem: Infection - Surgical Site:  Goal: Will show no infection signs and symptoms  Description  Will show no infection signs and symptoms  Outcome: Ongoing  Note:   Patient remained free from signs and symptoms of infection at surgical sight. Patient is afebrile. Problem: Bleeding:  Goal: Will show no signs and symptoms of excessive bleeding  Description  Will show no signs and symptoms of excessive bleeding  Outcome: Ongoing  Note:   Patient remained free from signs and symptoms of bleeding. Problem: Falls - Risk of:  Goal: Will remain free from falls  Description  Will remain free from falls  Outcome: Ongoing  Note:   Patient remained free from falls. Patient ambulated with front wheel walker and gait belt. Patient able to use call light appropriately in advance of needs.

## 2020-01-15 PROCEDURE — 97116 GAIT TRAINING THERAPY: CPT

## 2020-01-15 PROCEDURE — 97110 THERAPEUTIC EXERCISES: CPT

## 2020-01-15 PROCEDURE — 97535 SELF CARE MNGMENT TRAINING: CPT

## 2020-01-15 PROCEDURE — 6370000000 HC RX 637 (ALT 250 FOR IP): Performed by: INTERNAL MEDICINE

## 2020-01-15 PROCEDURE — 97530 THERAPEUTIC ACTIVITIES: CPT

## 2020-01-15 PROCEDURE — 94760 N-INVAS EAR/PLS OXIMETRY 1: CPT

## 2020-01-15 PROCEDURE — 2700000000 HC OXYGEN THERAPY PER DAY

## 2020-01-15 PROCEDURE — 2580000003 HC RX 258: Performed by: FAMILY MEDICINE

## 2020-01-15 PROCEDURE — 1290000000 HC SEMI PRIVATE OTHER R&B

## 2020-01-15 PROCEDURE — 6370000000 HC RX 637 (ALT 250 FOR IP): Performed by: FAMILY MEDICINE

## 2020-01-15 PROCEDURE — 94640 AIRWAY INHALATION TREATMENT: CPT

## 2020-01-15 PROCEDURE — 6360000002 HC RX W HCPCS: Performed by: FAMILY MEDICINE

## 2020-01-15 RX ADMIN — DIGOXIN 125 MCG: 125 TABLET ORAL at 10:12

## 2020-01-15 RX ADMIN — FAMOTIDINE 20 MG: 20 TABLET ORAL at 10:12

## 2020-01-15 RX ADMIN — VITAMIN D, TAB 1000IU (100/BT) 5000 UNITS: 25 TAB at 10:11

## 2020-01-15 RX ADMIN — ACETAMINOPHEN 500 MG: 500 TABLET ORAL at 13:32

## 2020-01-15 RX ADMIN — PROMETHAZINE HYDROCHLORIDE 25 MG: 25 TABLET ORAL at 20:23

## 2020-01-15 RX ADMIN — DIPHENHYDRAMINE HCL 25 MG: 25 TABLET ORAL at 10:12

## 2020-01-15 RX ADMIN — DOCUSATE SODIUM 100 MG: 100 CAPSULE, LIQUID FILLED ORAL at 10:12

## 2020-01-15 RX ADMIN — METOPROLOL TARTRATE 12.5 MG: 25 TABLET ORAL at 10:11

## 2020-01-15 RX ADMIN — POLYETHYLENE GLYCOL 3350 17 G: 17 POWDER, FOR SOLUTION ORAL at 10:10

## 2020-01-15 RX ADMIN — ACETAMINOPHEN 500 MG: 500 TABLET ORAL at 05:53

## 2020-01-15 RX ADMIN — HYDROCODONE BITARTRATE AND ACETAMINOPHEN 1 TABLET: 10; 325 TABLET ORAL at 08:38

## 2020-01-15 RX ADMIN — HYDROCODONE BITARTRATE AND ACETAMINOPHEN 1 TABLET: 10; 325 TABLET ORAL at 23:28

## 2020-01-15 RX ADMIN — ROPINIROLE HYDROCHLORIDE 0.25 MG: 0.25 TABLET, FILM COATED ORAL at 20:23

## 2020-01-15 RX ADMIN — RIVAROXABAN 20 MG: 20 TABLET, FILM COATED ORAL at 17:20

## 2020-01-15 RX ADMIN — PROMETHAZINE HYDROCHLORIDE 25 MG: 25 TABLET ORAL at 09:02

## 2020-01-15 RX ADMIN — BETAMETHASONE DIPROPIONATE: 0.5 CREAM TOPICAL at 10:10

## 2020-01-15 RX ADMIN — GUAIFENESIN 600 MG: 600 TABLET, EXTENDED RELEASE ORAL at 10:12

## 2020-01-15 RX ADMIN — TIOTROPIUM BROMIDE 18 MCG: 18 CAPSULE ORAL; RESPIRATORY (INHALATION) at 05:36

## 2020-01-15 RX ADMIN — HYDROCODONE BITARTRATE AND ACETAMINOPHEN 1 TABLET: 10; 325 TABLET ORAL at 14:17

## 2020-01-15 RX ADMIN — DIPHENHYDRAMINE HCL 25 MG: 25 TABLET ORAL at 20:23

## 2020-01-15 RX ADMIN — FERROUS SULFATE TAB 325 MG (65 MG ELEMENTAL FE) 325 MG: 325 (65 FE) TAB at 10:12

## 2020-01-15 RX ADMIN — SODIUM CHLORIDE, PRESERVATIVE FREE 10 ML: 5 INJECTION INTRAVENOUS at 10:13

## 2020-01-15 RX ADMIN — Medication 2 PUFF: at 16:49

## 2020-01-15 RX ADMIN — ACETAMINOPHEN 500 MG: 500 TABLET ORAL at 17:20

## 2020-01-15 RX ADMIN — Medication 2 PUFF: at 05:36

## 2020-01-15 RX ADMIN — FLUTICASONE PROPIONATE 1 SPRAY: 50 SPRAY, METERED NASAL at 10:11

## 2020-01-15 RX ADMIN — FERROUS SULFATE TAB 325 MG (65 MG ELEMENTAL FE) 325 MG: 325 (65 FE) TAB at 17:20

## 2020-01-15 RX ADMIN — HYDROXYZINE PAMOATE 25 MG: 25 CAPSULE ORAL at 20:23

## 2020-01-15 RX ADMIN — DOCUSATE SODIUM 100 MG: 100 CAPSULE, LIQUID FILLED ORAL at 20:23

## 2020-01-15 RX ADMIN — HEPARIN 500 UNITS: 100 SYRINGE at 10:10

## 2020-01-15 RX ADMIN — ROPINIROLE HYDROCHLORIDE 0.25 MG: 0.25 TABLET, FILM COATED ORAL at 10:11

## 2020-01-15 RX ADMIN — LEVOTHYROXINE SODIUM 75 MCG: 75 TABLET ORAL at 05:52

## 2020-01-15 ASSESSMENT — PAIN DESCRIPTION - PROGRESSION
CLINICAL_PROGRESSION: NOT CHANGED

## 2020-01-15 ASSESSMENT — PAIN SCALES - GENERAL
PAINLEVEL_OUTOF10: 8
PAINLEVEL_OUTOF10: 3
PAINLEVEL_OUTOF10: 8
PAINLEVEL_OUTOF10: 7
PAINLEVEL_OUTOF10: 8
PAINLEVEL_OUTOF10: 7
PAINLEVEL_OUTOF10: 6
PAINLEVEL_OUTOF10: 7
PAINLEVEL_OUTOF10: 6
PAINLEVEL_OUTOF10: 7

## 2020-01-15 ASSESSMENT — PAIN DESCRIPTION - LOCATION
LOCATION: HIP
LOCATION: HIP

## 2020-01-15 ASSESSMENT — PAIN DESCRIPTION - PAIN TYPE
TYPE: SURGICAL PAIN
TYPE: SURGICAL PAIN

## 2020-01-15 ASSESSMENT — PAIN DESCRIPTION - DIRECTION: RADIATING_TOWARDS: NOT RADIATING

## 2020-01-15 ASSESSMENT — PAIN DESCRIPTION - ORIENTATION
ORIENTATION: RIGHT
ORIENTATION: RIGHT

## 2020-01-15 ASSESSMENT — PAIN DESCRIPTION - FREQUENCY: FREQUENCY: CONTINUOUS

## 2020-01-15 ASSESSMENT — PAIN DESCRIPTION - DESCRIPTORS: DESCRIPTORS: ACHING

## 2020-01-15 ASSESSMENT — PAIN - FUNCTIONAL ASSESSMENT: PAIN_FUNCTIONAL_ASSESSMENT: PREVENTS OR INTERFERES SOME ACTIVE ACTIVITIES AND ADLS

## 2020-01-15 ASSESSMENT — PAIN DESCRIPTION - ONSET: ONSET: ON-GOING

## 2020-01-15 NOTE — PROGRESS NOTES
Hip.    COGNITION: WFL and Decreased Problem Solving    ADL:   Tub Transfer: Contact Guard Assistance. (Dry transfer) Tub shower to/from shower chair with 1 cue for sequencing- use of grab bars as assist.    BALANCE:  Sitting Balance:  Modified Independent. Standing Balance: Stand By Assistance. x1 minute in prep for mobility. TRANSFERS:  Sit to Stand:  Stand By Assistance. Stand to Sit: Stand By Assistance. FUNCTIONAL MOBILITY:  Assistive Device: Rolling Walker and O2 (6L)  Assist Level:  Stand By Assistance. Distance: Completed functional mobility to/from tub shower room at good pace, no LOB noted. Pt requires no cues for walker safety- provided seated rest break after trial of mobility, min fatigue noted. ADDITIONAL ACTIVITIES:  Completed BUE exercises x10 reps x1 sets using mod resistance band in all joints/planes to increase strength and endurance required for ADLs. Pt required rest break between each exercise and min v/c for proper technique. Patient participated in lengthy IADL education regarding pet care specifically cleaning cat litter box at home to maintain hip precautions. Pt verbally problem solved scenario describing performing golfers lift to bring litter box up from floor level. Therapist providing education of appropriately graded strategies to increase safety as pt's suggestion requires high level balance and strength to complete. Therapist educated pt to complete task while seated with use of long handled scoop to discard waste and purchasing litter box with handle to raise to higher levels while maintaining precautions. Patient shown multiple online resources and examples of equipment. Pt demo'ed fair understanding of education, appreciation and asked appropriate questions- continue to assess in prep for d/c.      ASSESSMENT:     Activity Tolerance:  Patient tolerance of  treatment: good.        Discharge Recommendations: Home with Home health OT  Equipment Recommendations: Equipment Needed: Yes  Other: Recommend BSC and LHAE at discharge  Plan: Times per week: 6x  Plan weeks: 3 weeks  Current Treatment Recommendations: Functional Mobility Training, Balance Training, Self-Care / ADL, Endurance Training, Safety Education & Training, Patient/Caregiver Education & Training, Equipment Evaluation, Education, & procurement    Patient Education  Patient Education: ADL's, IADL's, Home Exercise Program, Equipment Education, Home Safety, Importance of Increasing Activity, Assistive Device Safety and Safety with transfers and mobility. Goals  Short term goals  Time Frame for Short term goals: 2 weeks  Short term goal 1: Pt will complete LB ADL with LHAE and SUP to increase indep with self care. Short term goal 2: Pt will complete dynamic standing task with Mod I x 6 min with B hand release to increase balance required for grooming. Short term goal 3: Pt will complete mobility to/from BR with Mod I and 0 cues safety to increase indep with ADL routine. Short term goal 4: Pt will complete various t/fs with Mod I and 0 cues safety to increase indep with toileting t/fs. Long term goals  Time Frame for Long term goals : 3 weeks  Long term goal 1: Pt will complete ADL routine including shower t/f with standard shower chair with Emilia to increase indep with self care. Long term goal 2: Pt will demonstrate basic IADL tasks with emphasis on pet care using any adaptive strategies for increased safety and independence with changing litter box while abiding by hip precautions    Following session, patient left in safe position with all fall risk precautions in place.

## 2020-01-15 NOTE — PROGRESS NOTES
[address] [address]  INPATIENT PHYSICAL THERAPY  DAILY NOTE  - [facility]ILLED NURSING UNIT - 8E-66/066-A    Time In: 0804  Time Out: 8118  Timed Code Treatment Minutes: 54 Minutes  Minutes: 55          Date: 1/15/2020  Patient Name: Guillaume Blackmon,  Gender:  female        MRN: [id number]  : 1945  (76 y.o.)  Referral Date : 20  Referring Practitioner: Fracisco Hatfield MD  Diagnosis: hemiarthroplasty of right hip  Treatment Diagnosis: difficulty in walking  Additional Pertinent Hx: Per EMR: \"Fannie Portillo Si is a 76 y.o. female who presents for evaluation of pain to the right leg and hip that onset following a fall that occurred 3 days ago. Patient reports that she tripped over her cat and fell 3 days ago, experiencing immediate pain to the right leg and hip. Patient notes worsening of pain since initial fall and reports exacerbation of pain when she attempts to bear weight onto her right leg. Patient describes additional symptom of nausea. Patient denies hitting her head during the incident and denies headache and chest pain. Patient reports using oxygen treatments at home due to condition of chronic obstructive pulmonary disease. Patient notes a history of smoking but denies current nicotine use. Patient additionally has a history of congestive heart failure and notes that edema to her lower extremities is normal at baseline. \" Pt underwent right hip hemiarthroplasty 20.      Prior Level of Function:  Lives With: Alone  Type of Home: House  Home Layout: One level  Home Access: Ramped entrance   [facility] [address] - Number of Steps: 2  Entrance Stairs - Rails: Right(grab bar)  Home Equipment: Standard walker, 4 wheeled walker, Cane, Oxygen   Bathroom Shower/Tub: Tub/Shower unit, Shower chair with back  [employer]: Standard(pt reports daughter installing toilet riser)  Bathroom Equipment: Shower chair, Grab bars in  [address] Help From: Family  ADL Assistance: Independent  Homemaking

## 2020-01-16 ENCOUNTER — APPOINTMENT (OUTPATIENT)
Dept: GENERAL RADIOLOGY | Age: 75
DRG: 559 | End: 2020-01-16
Attending: FAMILY MEDICINE
Payer: MEDICARE

## 2020-01-16 LAB
ALBUMIN SERPL-MCNC: 3.4 G/DL (ref 3.5–5.1)
ALP BLD-CCNC: 75 U/L (ref 38–126)
ALT SERPL-CCNC: 16 U/L (ref 11–66)
ANION GAP SERPL CALCULATED.3IONS-SCNC: 12 MEQ/L (ref 8–16)
ANISOCYTOSIS: PRESENT
AST SERPL-CCNC: 22 U/L (ref 5–40)
BACTERIA: ABNORMAL /HPF
BASOPHILS # BLD: 0.2 %
BASOPHILS ABSOLUTE: 0 THOU/MM3 (ref 0–0.1)
BILIRUB SERPL-MCNC: 0.4 MG/DL (ref 0.3–1.2)
BILIRUBIN URINE: NEGATIVE
BLOOD, URINE: NEGATIVE
BUN BLDV-MCNC: 21 MG/DL (ref 7–22)
CALCIUM SERPL-MCNC: 9.1 MG/DL (ref 8.5–10.5)
CASTS 2: ABNORMAL /LPF
CASTS UA: ABNORMAL /LPF
CHARACTER, URINE: CLEAR
CHLORIDE BLD-SCNC: 93 MEQ/L (ref 98–111)
CO2: 31 MEQ/L (ref 23–33)
COLOR: YELLOW
CREAT SERPL-MCNC: 0.8 MG/DL (ref 0.4–1.2)
CRYSTALS, UA: ABNORMAL
DIFFERENTIAL TYPE: ABNORMAL
EOSINOPHIL # BLD: 0.2 %
EOSINOPHILS ABSOLUTE: 0 THOU/MM3 (ref 0–0.4)
EPITHELIAL CELLS, UA: ABNORMAL /HPF
ERYTHROCYTE [DISTWIDTH] IN BLOOD BY AUTOMATED COUNT: 16 % (ref 11.5–14.5)
ERYTHROCYTE [DISTWIDTH] IN BLOOD BY AUTOMATED COUNT: 54.4 FL (ref 35–45)
FLU A ANTIGEN: NEGATIVE
FLU B ANTIGEN: NEGATIVE
GFR SERPL CREATININE-BSD FRML MDRD: 70 ML/MIN/1.73M2
GLUCOSE BLD-MCNC: 92 MG/DL (ref 70–108)
GLUCOSE URINE: NEGATIVE MG/DL
HCT VFR BLD CALC: 27.4 % (ref 37–47)
HEMOGLOBIN: 8.5 GM/DL (ref 12–16)
IMMATURE GRANS (ABS): 0.2 THOU/MM3 (ref 0–0.07)
IMMATURE GRANULOCYTES: 1.1 %
KETONES, URINE: NEGATIVE
LEUKOCYTE ESTERASE, URINE: ABNORMAL
LIPASE: 16.9 U/L (ref 5.6–51.3)
LYMPHOCYTES # BLD: 1.6 %
LYMPHOCYTES ABSOLUTE: 0.3 THOU/MM3 (ref 1–4.8)
MCH RBC QN AUTO: 29 PG (ref 26–33)
MCHC RBC AUTO-ENTMCNC: 31 GM/DL (ref 32.2–35.5)
MCV RBC AUTO: 93.5 FL (ref 81–99)
MISCELLANEOUS 2: ABNORMAL
MONOCYTES # BLD: 5.2 %
MONOCYTES ABSOLUTE: 0.9 THOU/MM3 (ref 0.4–1.3)
NITRITE, URINE: NEGATIVE
NUCLEATED RED BLOOD CELLS: 0 /100 WBC
PATHOLOGIST REVIEW: ABNORMAL
PH UA: >= 9 (ref 5–9)
PLATELET # BLD: 283 THOU/MM3 (ref 130–400)
PLATELET ESTIMATE: ADEQUATE
PMV BLD AUTO: 8.9 FL (ref 9.4–12.4)
POTASSIUM SERPL-SCNC: 4 MEQ/L (ref 3.5–5.2)
PROTEIN UA: 30
RBC # BLD: 2.93 MILL/MM3 (ref 4.2–5.4)
RBC URINE: ABNORMAL /HPF
RENAL EPITHELIAL, UA: ABNORMAL
SCAN OF BLOOD SMEAR: NORMAL
SEG NEUTROPHILS: 91.7 %
SEGMENTED NEUTROPHILS ABSOLUTE COUNT: 16.3 THOU/MM3 (ref 1.8–7.7)
SODIUM BLD-SCNC: 136 MEQ/L (ref 135–145)
SPECIFIC GRAVITY, URINE: 1.02 (ref 1–1.03)
STOMATOCYTES: ABNORMAL
TOTAL PROTEIN: 6.8 G/DL (ref 6.1–8)
TOXIC GRANULATION: PRESENT
UROBILINOGEN, URINE: 1 EU/DL (ref 0–1)
WBC # BLD: 17.8 THOU/MM3 (ref 4.8–10.8)
WBC UA: ABNORMAL /HPF
YEAST: ABNORMAL

## 2020-01-16 PROCEDURE — 6360000002 HC RX W HCPCS: Performed by: FAMILY MEDICINE

## 2020-01-16 PROCEDURE — 81001 URINALYSIS AUTO W/SCOPE: CPT

## 2020-01-16 PROCEDURE — 6370000000 HC RX 637 (ALT 250 FOR IP): Performed by: FAMILY MEDICINE

## 2020-01-16 PROCEDURE — 94640 AIRWAY INHALATION TREATMENT: CPT

## 2020-01-16 PROCEDURE — 2700000000 HC OXYGEN THERAPY PER DAY

## 2020-01-16 PROCEDURE — 71046 X-RAY EXAM CHEST 2 VIEWS: CPT

## 2020-01-16 PROCEDURE — 36591 DRAW BLOOD OFF VENOUS DEVICE: CPT

## 2020-01-16 PROCEDURE — 2580000003 HC RX 258: Performed by: FAMILY MEDICINE

## 2020-01-16 PROCEDURE — 1290000000 HC SEMI PRIVATE OTHER R&B

## 2020-01-16 PROCEDURE — 87804 INFLUENZA ASSAY W/OPTIC: CPT

## 2020-01-16 PROCEDURE — 94760 N-INVAS EAR/PLS OXIMETRY 1: CPT

## 2020-01-16 PROCEDURE — 80053 COMPREHEN METABOLIC PANEL: CPT

## 2020-01-16 PROCEDURE — 83690 ASSAY OF LIPASE: CPT

## 2020-01-16 PROCEDURE — 97110 THERAPEUTIC EXERCISES: CPT

## 2020-01-16 PROCEDURE — 74019 RADEX ABDOMEN 2 VIEWS: CPT

## 2020-01-16 PROCEDURE — 85025 COMPLETE CBC W/AUTO DIFF WBC: CPT

## 2020-01-16 PROCEDURE — 6370000000 HC RX 637 (ALT 250 FOR IP): Performed by: INTERNAL MEDICINE

## 2020-01-16 RX ORDER — SODIUM CHLORIDE 9 MG/ML
INJECTION, SOLUTION INTRAVENOUS CONTINUOUS
Status: DISCONTINUED | OUTPATIENT
Start: 2020-01-16 | End: 2020-01-20

## 2020-01-16 RX ORDER — SODIUM CHLORIDE 0.9 % (FLUSH) 0.9 %
10 SYRINGE (ML) INJECTION
Status: DISCONTINUED | OUTPATIENT
Start: 2020-01-17 | End: 2020-01-24 | Stop reason: HOSPADM

## 2020-01-16 RX ORDER — ACETAMINOPHEN 325 MG/1
650 TABLET ORAL EVERY 4 HOURS PRN
Status: DISCONTINUED | OUTPATIENT
Start: 2020-01-16 | End: 2020-01-24 | Stop reason: HOSPADM

## 2020-01-16 RX ORDER — ONDANSETRON 4 MG/1
4 TABLET, FILM COATED ORAL EVERY 8 HOURS PRN
Status: DISCONTINUED | OUTPATIENT
Start: 2020-01-16 | End: 2020-01-19

## 2020-01-16 RX ORDER — MUSCLE RUB CREAM 100; 150 MG/G; MG/G
CREAM TOPICAL PRN
Status: DISCONTINUED | OUTPATIENT
Start: 2020-01-16 | End: 2020-01-24 | Stop reason: HOSPADM

## 2020-01-16 RX ORDER — AMOXICILLIN AND CLAVULANATE POTASSIUM 875; 125 MG/1; MG/1
1 TABLET, FILM COATED ORAL EVERY 12 HOURS SCHEDULED
Status: DISCONTINUED | OUTPATIENT
Start: 2020-01-16 | End: 2020-01-16

## 2020-01-16 RX ORDER — 0.9 % SODIUM CHLORIDE 0.9 %
250 INTRAVENOUS SOLUTION INTRAVENOUS ONCE
Status: COMPLETED | OUTPATIENT
Start: 2020-01-16 | End: 2020-01-16

## 2020-01-16 RX ORDER — SODIUM CHLORIDE 9 MG/ML
INJECTION, SOLUTION INTRAVENOUS CONTINUOUS
Status: DISCONTINUED | OUTPATIENT
Start: 2020-01-16 | End: 2020-01-16 | Stop reason: SDUPTHER

## 2020-01-16 RX ADMIN — ONDANSETRON HYDROCHLORIDE 4 MG: 4 TABLET, FILM COATED ORAL at 18:00

## 2020-01-16 RX ADMIN — Medication 2 PUFF: at 05:07

## 2020-01-16 RX ADMIN — BETAMETHASONE DIPROPIONATE: 0.5 CREAM TOPICAL at 09:03

## 2020-01-16 RX ADMIN — TIOTROPIUM BROMIDE 18 MCG: 18 CAPSULE ORAL; RESPIRATORY (INHALATION) at 05:07

## 2020-01-16 RX ADMIN — MENTHOL, METHYL SALICYLATE: 10; 15 CREAM TOPICAL at 17:17

## 2020-01-16 RX ADMIN — Medication 10 ML: at 12:30

## 2020-01-16 RX ADMIN — AZITHROMYCIN MONOHYDRATE 500 MG: 500 INJECTION, POWDER, LYOPHILIZED, FOR SOLUTION INTRAVENOUS at 20:22

## 2020-01-16 RX ADMIN — ONDANSETRON HYDROCHLORIDE 4 MG: 4 TABLET, FILM COATED ORAL at 09:50

## 2020-01-16 RX ADMIN — SODIUM CHLORIDE 250 ML: 9 INJECTION, SOLUTION INTRAVENOUS at 18:32

## 2020-01-16 RX ADMIN — ROPINIROLE HYDROCHLORIDE 0.25 MG: 0.25 TABLET, FILM COATED ORAL at 20:58

## 2020-01-16 RX ADMIN — HEPARIN 500 UNITS: 100 SYRINGE at 12:30

## 2020-01-16 RX ADMIN — RIVAROXABAN 20 MG: 20 TABLET, FILM COATED ORAL at 17:17

## 2020-01-16 RX ADMIN — DIGOXIN 125 MCG: 125 TABLET ORAL at 16:29

## 2020-01-16 RX ADMIN — CEFTRIAXONE SODIUM 2 G: 2 INJECTION, POWDER, FOR SOLUTION INTRAMUSCULAR; INTRAVENOUS at 19:40

## 2020-01-16 RX ADMIN — METOPROLOL TARTRATE 12.5 MG: 25 TABLET ORAL at 16:30

## 2020-01-16 RX ADMIN — Medication 2 PUFF: at 17:05

## 2020-01-16 RX ADMIN — ACETAMINOPHEN 500 MG: 500 TABLET ORAL at 17:04

## 2020-01-16 RX ADMIN — LEVOTHYROXINE SODIUM 75 MCG: 75 TABLET ORAL at 09:03

## 2020-01-16 RX ADMIN — MAGNESIUM HYDROXIDE 30 ML: 400 SUSPENSION ORAL at 20:58

## 2020-01-16 RX ADMIN — SODIUM CHLORIDE: 9 INJECTION, SOLUTION INTRAVENOUS at 19:38

## 2020-01-16 RX ADMIN — SODIUM CHLORIDE: 9 INJECTION, SOLUTION INTRAVENOUS at 22:00

## 2020-01-16 RX ADMIN — HEPARIN 500 UNITS: 100 SYRINGE at 05:42

## 2020-01-16 ASSESSMENT — PAIN DESCRIPTION - PROGRESSION
CLINICAL_PROGRESSION: NOT CHANGED

## 2020-01-16 ASSESSMENT — PAIN SCALES - GENERAL
PAINLEVEL_OUTOF10: 8
PAINLEVEL_OUTOF10: 8
PAINLEVEL_OUTOF10: 0

## 2020-01-16 ASSESSMENT — PAIN DESCRIPTION - PAIN TYPE: TYPE: ACUTE PAIN

## 2020-01-16 ASSESSMENT — PAIN DESCRIPTION - LOCATION: LOCATION: NECK

## 2020-01-16 NOTE — FLOWSHEET NOTE
[facility] THERAPY MISSED TREATMENT NOTE  [address]  8E-66/066-A      Date: 2020  Patient Name: Roman Kincaid        CSN: [id number]   : 1945  (76 y.o.)  Gender: female   Referring Practitioner: Michelle Moran MD; Attending: Dr. Vahid Gray  Diagnosis: Hemiarthroplast of R Hip       REASON FOR MISSED TREATMENT: Patient Refused. Upon arrival of therapist, patient reported not feeling well and wants to hold therapy at this time. She stated \"I had a terrible night. I've vomited a few different times and my neck is killing me. I need to talk with the doctor and figure out what's going on. \" Nurse Ori notified.

## 2020-01-16 NOTE — PROGRESS NOTES
Pt had refused all of her morning medications due to being nauseous and not feeling good. Her HR has been increasing. I was finally able to talk her into taking some of her medications that she missed this morning.  Nurse will reassess in an hr elementary

## 2020-01-16 NOTE — PROGRESS NOTES
Assistance: Needs assistance  Homemaking Responsibilities: Yes  Ambulation Assistance: Independent  Transfer Assistance: Independent  Active : Yes  IADL Comments: Daughter able to assist at times but works 2 jobs, Has a niece who is able to assist at times  Additional Comments: Pt reports I prior to admission without AD. Pt reports daughter has RTS. Current with meals on wheels. Restrictions/Precautions:  Restrictions/Precautions: Weight Bearing, Fall Risk  Right Lower Extremity Weight Bearing: Weight Bearing As Tolerated  Position Activity Restriction  Hip Precautions: No hip flexion > 90 degrees, No ADduction, No hip internal rotation  Other position/activity restrictions: Home O2: baseline is 2L at rest/6L with activity. S/p right hip hemiarthroplasty for femoral neck fracture on 1/1/20    SUBJECTIVE: Patient laying in bed upon arrival. Patient agreed to bed exercises with encouragement. Patient irritable during session. During exercises, when asked to perform glute sets patient stated\" No I am not doing it! \" Patient requested to end session at that time. PAIN: not rated, c/o of neck pain \"a lot\", R hip pain \"a little\"    OBJECTIVE:  Bed Mobility:  Not Tested    Transfers:  Not Tested    Ambulation:  Not Tested      Exercise:  Patient was guided in 1 set(s) 10 reps of exercise to both lower extremities. Ankle pumps, Heelslides and L only hip abd/add. Exercises were completed for increased independence with functional mobility. Functional Outcome Measures: Not completed       ASSESSMENT:  Assessment: Patient progressing toward established goals. and Limited by pain and irritability. Activity Tolerance:  Patient tolerance of  treatment: fair.       Equipment Recommendations:Equipment Needed: Yes(may need new 2WW)  Discharge Recommendations:  Continue to assess pending progress    Plan: Times per week: 5x/wk BID, 1x/wk QD  Times per day: Twice a day  Plan weeks: 3 weeks  Current Treatment Recommendations: Strengthening, Functional Mobility Training, Neuromuscular Re-education, Home Exercise Program, Transfer Training, Gait Training, Safety Education & Training, Balance Training, Endurance Training, Stair training, Patient/Caregiver Education & Training    Patient Education  Patient Education: Plan of Care, Precautions/Restrictions, Verbal Exercise Instruction    Goals:  Patient goals : go home  Short term goals  Time Frame for Short term goals: 10 days  Short term goal 1: Patient will complete sit  <> stand with SBA to stand to ambulate with decreased difficulty. GOAL MET, DISCONTINUE, SEE LTG  Short term goal 2: Patient will complete supine < > sit with SBA to transfer in/out of bed with decreased difficulty. GOAL MET, DISCONTINUE, SEE LTG  Short term goal 3: Patient will ambulate 100' with a RW and SBA to progress towards independent home mobility. GOAL MET, DISCONTINUE, SEE LTG  Short term goal 4: Patient will ascend/descend 2 steps with 1 hand rail and straight cane with CGA to progress towards independent home entry. Long term goals  Time Frame for Long term goals : 3 weeks  Long term goal 1: Patient will complete sit < > stand and stand pivot transfers with modified independence to allow patient to transfer surface to surface safely. Long term goal 2: Patient will complete supine < > sit with modified independence to allow patient to transfer in/out of bed safely. Long term goal 3: Patient will ambulate 80' with a RW and modified independence to navigate home safely. Long term goal 4: Patient will ascend/descend 2 steps with 1 handrail and straight cane and SBA for safe home entry. Long term goal 5: Patient will complete car transfer with SBA for safe transport to home and appointments. Following session, patient left in safe position with all fall risk precautions in place.

## 2020-01-16 NOTE — PLAN OF CARE
Problem: Nutrition  Goal: Optimal nutrition therapy  Outcome: Ongoing   Nutrition Problem: Increased nutrient needs  Intervention: Food and/or Nutrient Delivery: Continue current diet, Continue current ONS  Nutritional Goals: Patient will consume 75% or greater of meals and ONS during LOS

## 2020-01-16 NOTE — PROGRESS NOTES
[facility]  PHYSICAL THERAPY MISSED TREATMENT NOTE  TRANSITIONAL CARE UNIT    Date: 2020  Patient Name: Pastora De Leon        MRN: [id number]   : 1945  (76 y.o.)  Gender: female   Referring Practitioner: Arlet Hand MD; Attending: Dr. Kiesha Godinez  Referring Practitioner: Arlet Hand MD  Diagnosis: Hemiarthroplast of R Hip  Diagnosis: hemiarthroplasty of right hip         REASON FOR MISSED TREATMENT:  Missed Treat. Patient laying in bed upon arrival, reports having a fever and has been vomiting. Patient declined therapy this AM despite encouragement for bed exercises. Patient stated \" I just want to rest until I feel better. \" Will try back later as time allows.

## 2020-01-17 PROCEDURE — 6370000000 HC RX 637 (ALT 250 FOR IP): Performed by: FAMILY MEDICINE

## 2020-01-17 PROCEDURE — 1290000000 HC SEMI PRIVATE OTHER R&B

## 2020-01-17 PROCEDURE — 6370000000 HC RX 637 (ALT 250 FOR IP): Performed by: INTERNAL MEDICINE

## 2020-01-17 PROCEDURE — 6360000002 HC RX W HCPCS: Performed by: FAMILY MEDICINE

## 2020-01-17 PROCEDURE — 97535 SELF CARE MNGMENT TRAINING: CPT

## 2020-01-17 PROCEDURE — 97110 THERAPEUTIC EXERCISES: CPT

## 2020-01-17 PROCEDURE — 94640 AIRWAY INHALATION TREATMENT: CPT

## 2020-01-17 PROCEDURE — 97530 THERAPEUTIC ACTIVITIES: CPT

## 2020-01-17 PROCEDURE — 94760 N-INVAS EAR/PLS OXIMETRY 1: CPT

## 2020-01-17 PROCEDURE — 97116 GAIT TRAINING THERAPY: CPT

## 2020-01-17 PROCEDURE — 2700000000 HC OXYGEN THERAPY PER DAY

## 2020-01-17 PROCEDURE — 2580000003 HC RX 258: Performed by: FAMILY MEDICINE

## 2020-01-17 RX ADMIN — Medication 2 PUFF: at 16:47

## 2020-01-17 RX ADMIN — LEVOTHYROXINE SODIUM 75 MCG: 75 TABLET ORAL at 05:06

## 2020-01-17 RX ADMIN — HYDROCODONE BITARTRATE AND ACETAMINOPHEN 1 TABLET: 10; 325 TABLET ORAL at 13:55

## 2020-01-17 RX ADMIN — POLYETHYLENE GLYCOL 3350 17 G: 17 POWDER, FOR SOLUTION ORAL at 09:54

## 2020-01-17 RX ADMIN — ROPINIROLE HYDROCHLORIDE 0.25 MG: 0.25 TABLET, FILM COATED ORAL at 09:55

## 2020-01-17 RX ADMIN — FERROUS SULFATE TAB 325 MG (65 MG ELEMENTAL FE) 325 MG: 325 (65 FE) TAB at 16:35

## 2020-01-17 RX ADMIN — SODIUM CHLORIDE: 9 INJECTION, SOLUTION INTRAVENOUS at 04:47

## 2020-01-17 RX ADMIN — MENTHOL, METHYL SALICYLATE: 10; 15 CREAM TOPICAL at 21:58

## 2020-01-17 RX ADMIN — ONDANSETRON HYDROCHLORIDE 4 MG: 4 TABLET, FILM COATED ORAL at 05:06

## 2020-01-17 RX ADMIN — DIPHENHYDRAMINE HCL 25 MG: 25 TABLET ORAL at 21:55

## 2020-01-17 RX ADMIN — DOCUSATE SODIUM 100 MG: 100 CAPSULE, LIQUID FILLED ORAL at 21:55

## 2020-01-17 RX ADMIN — METOPROLOL TARTRATE 12.5 MG: 25 TABLET ORAL at 13:57

## 2020-01-17 RX ADMIN — DIGOXIN 125 MCG: 125 TABLET ORAL at 09:55

## 2020-01-17 RX ADMIN — FLUTICASONE PROPIONATE 1 SPRAY: 50 SPRAY, METERED NASAL at 09:56

## 2020-01-17 RX ADMIN — SODIUM CHLORIDE: 9 INJECTION, SOLUTION INTRAVENOUS at 18:14

## 2020-01-17 RX ADMIN — VITAMIN D, TAB 1000IU (100/BT) 5000 UNITS: 25 TAB at 09:54

## 2020-01-17 RX ADMIN — ROPINIROLE HYDROCHLORIDE 0.25 MG: 0.25 TABLET, FILM COATED ORAL at 21:56

## 2020-01-17 RX ADMIN — CEFTRIAXONE SODIUM 2 G: 2 INJECTION, POWDER, FOR SOLUTION INTRAMUSCULAR; INTRAVENOUS at 21:48

## 2020-01-17 RX ADMIN — AZITHROMYCIN MONOHYDRATE 500 MG: 500 INJECTION, POWDER, LYOPHILIZED, FOR SOLUTION INTRAVENOUS at 22:05

## 2020-01-17 RX ADMIN — ACETAMINOPHEN 500 MG: 500 TABLET ORAL at 01:49

## 2020-01-17 RX ADMIN — ACETAMINOPHEN 500 MG: 500 TABLET ORAL at 13:53

## 2020-01-17 RX ADMIN — TIOTROPIUM BROMIDE 18 MCG: 18 CAPSULE ORAL; RESPIRATORY (INHALATION) at 05:52

## 2020-01-17 RX ADMIN — RIVAROXABAN 20 MG: 20 TABLET, FILM COATED ORAL at 17:57

## 2020-01-17 RX ADMIN — ACETAMINOPHEN 500 MG: 500 TABLET ORAL at 09:53

## 2020-01-17 RX ADMIN — HYDROCODONE BITARTRATE AND ACETAMINOPHEN 1 TABLET: 10; 325 TABLET ORAL at 05:06

## 2020-01-17 RX ADMIN — MENTHOL, METHYL SALICYLATE: 10; 15 CREAM TOPICAL at 10:05

## 2020-01-17 RX ADMIN — GUAIFENESIN 600 MG: 600 TABLET, EXTENDED RELEASE ORAL at 09:55

## 2020-01-17 RX ADMIN — DIPHENHYDRAMINE HCL 25 MG: 25 TABLET ORAL at 10:02

## 2020-01-17 RX ADMIN — ONDANSETRON HYDROCHLORIDE 4 MG: 4 TABLET, FILM COATED ORAL at 15:45

## 2020-01-17 RX ADMIN — FERROUS SULFATE TAB 325 MG (65 MG ELEMENTAL FE) 325 MG: 325 (65 FE) TAB at 09:54

## 2020-01-17 RX ADMIN — HYDROCODONE BITARTRATE AND ACETAMINOPHEN 1 TABLET: 10; 325 TABLET ORAL at 21:52

## 2020-01-17 RX ADMIN — BETAMETHASONE DIPROPIONATE: 0.5 CREAM TOPICAL at 09:59

## 2020-01-17 RX ADMIN — FAMOTIDINE 20 MG: 20 TABLET ORAL at 09:54

## 2020-01-17 RX ADMIN — Medication 2 PUFF: at 05:52

## 2020-01-17 ASSESSMENT — PAIN SCALES - GENERAL
PAINLEVEL_OUTOF10: 6
PAINLEVEL_OUTOF10: 7
PAINLEVEL_OUTOF10: 7
PAINLEVEL_OUTOF10: 6
PAINLEVEL_OUTOF10: 5
PAINLEVEL_OUTOF10: 6
PAINLEVEL_OUTOF10: 0

## 2020-01-17 NOTE — PLAN OF CARE
Problem: Infection - Surgical Site:  Goal: Will show no infection signs and symptoms  Description  Will show no infection signs and symptoms  Outcome: Ongoing  Note:   No symptoms of infection     Problem: Mobility - Impaired:  Goal: Mobility will improve  Description  Mobility will improve  Outcome: Ongoing  Note:   Mobility improving     Problem: Skin Integrity:  Goal: Absence of new skin breakdown  Description  Absence of new skin breakdown  Outcome: Ongoing  Note:   Monitoring skin     Problem: Pain:  Goal: Pain level will decrease  Description  Pain level will decrease  Outcome: Ongoing  Note:   Pain has improved     Problem:  Bowel/Gastric:  Goal: Ability to achieve a regular elimination pattern will improve  Description  Ability to achieve a regular elimination pattern will improve  Outcome: Ongoing  Note:   Working toward having a regular elimination     Problem: Bleeding:  Goal: Will show no signs and symptoms of excessive bleeding  Description  Will show no signs and symptoms of excessive bleeding  Outcome: Ongoing  Note:   No symptoms of excessive bleeding     Problem: Falls - Risk of:  Goal: Will remain free from falls  Description  Will remain free from falls  Outcome: Ongoing  Note:   No falls this shift using her call light appropriately     Problem: Discharge Planning:  Goal: Patients continuum of care needs are met  Description  Patients continuum of care needs are met  1/17/2020 1647 by Estella Nye RN  Outcome: Ongoing  Note:   Patient's continuum of care needs being met  1/17/2020 1309 by MANOJ Biswas  Outcome: Ongoing     Problem: OXYGENATION/RESPIRATORY FUNCTION  Goal: Adequate oxygenation  Outcome: Ongoing  Note:   Her 02 was increased to 2l request of patient  Goal: Clear lung sounds  Outcome: Ongoing  Note:   Clear lung sound     Problem: Nutrition  Goal: Optimal nutrition therapy  Outcome: Ongoing  Note:   Working toward optimal nutrition     Problem: Sensory:  Goal: Ability to demonstrate ways to enhance comfort will improve  Description  Ability to demonstrate ways to enhance comfort will improve  Outcome: Ongoing  Note:   Helping patient to be comfortable

## 2020-01-17 NOTE — PROGRESS NOTES
Character, Urine Latest Ref Range: CLEAR-SL C    CLEAR   Specific Gravity, Urine Latest Ref Range: 1.002 - 1.03    1.018   MISCELLANEOUS 2 Unknown   NONE SEEN       Electronically signed by Pawan Gaviria MD on 1/16/2020 at 7:43 PM

## 2020-01-17 NOTE — FLOWSHEET NOTE
01/16/20 1925   Provider Notification   Reason for Communication Evaluate   Provider Name Dr Donna Ernandez   Provider Notification Physician   Method of Communication Secure Message   Notification Time 1928     Dr Donna Ernandez informed of pt positive sepsis screening per daughter request.     1930- response: Thanks for the update. The dressing can be removed to check the incision. No dressing needs replaced if the incision remains dry. I am out of town currently. She should already have a follow up with me.

## 2020-01-17 NOTE — PROGRESS NOTES
pt is going to stay on unit until Monday to monitor medical status prior to discharge. PAIN: did not rate    COGNITION: Decreased Problem Solving, impulsive    ADL:   Grooming: Stand By Assistance. sinkside for oral care  Bathing: Minimal Assistance. with lower legs and feet  Upper Extremity Dressing: Minimal Assistance. d/t managing multiple lines  Lower Extremity Dressing: Stand By Assistance, with set-up and with verbal cues . cues to maintain precautions. *Provided education on use of LHAE, pt voiced understanding and when OTR handed equipment to pt, pt able to use correctly, however, when pt was in a hurry, she reached down to mart pants without reacher--breaking precautions. BALANCE:  Sitting Balance:  Supervision. Standing Balance: Stand By Assistance. TRANSFERS:  Sit to Stand:  Stand By Assistance. Stand to Sit: Stand By Assistance. FUNCTIONAL MOBILITY:  Assistive Device: Rolling Walker  Assist Level:  Stand By Assistance. Distance: To and from bathroom  Assist managing lines     ADDITIONAL ACTIVITIES:  Educated pt on t/f tech to/from couch and educated on need for LHAE/maintaining precautions. Pt reports that she will have assist for taking care of cats and trash as she does not feel safe completing this task. OTR agrees with this pt. Educated on HEP for home, pt reports she has completed these exs and is familiar, did not demo for Chinm Anibal Medina.. ASSESSMENT:     Activity Tolerance:  Patient tolerance of  treatment: good.        Discharge Recommendations: Home with Home health OT  Equipment Recommendations: Equipment Needed: Yes  Other: Recommend BSC and LHAE at discharge  Plan: Times per week: 6x  Plan weeks: 3 weeks  Current Treatment Recommendations: Functional Mobility Training, Balance Training, Self-Care / ADL, Endurance Training, Safety Education & Training, Patient/Caregiver Education & Training, Equipment Evaluation, Education, & procurement    Patient Education  Patient Education: ADL's, IADL's, Energy Conservation and Home Exercise Program    Goals  Short term goals  Time Frame for Short term goals: 2 weeks  Short term goal 1: Pt will complete LB ADL with LHAE and SUP to increase indep with self care. Short term goal 2: Pt will complete dynamic standing task with Mod I x 6 min with B hand release to increase balance required for grooming. Short term goal 3: Pt will complete mobility to/from BR with Mod I and 0 cues safety to increase indep with ADL routine. Short term goal 4: Pt will complete various t/fs with Mod I and 0 cues safety to increase indep with toileting t/fs. Long term goals  Time Frame for Long term goals : 3 weeks  Long term goal 1: Pt will complete ADL routine including shower t/f with standard shower chair with Emilia to increase indep with self care. Long term goal 2: Pt will demonstrate basic IADL tasks with emphasis on pet care using any adaptive strategies for increased safety and independence with changing litter box while abiding by hip precautions    Following session, patient left in safe position with all fall risk precautions in place.

## 2020-01-18 ENCOUNTER — APPOINTMENT (OUTPATIENT)
Dept: GENERAL RADIOLOGY | Age: 75
DRG: 559 | End: 2020-01-18
Attending: FAMILY MEDICINE
Payer: MEDICARE

## 2020-01-18 LAB
ANION GAP SERPL CALCULATED.3IONS-SCNC: 10 MEQ/L (ref 8–16)
BASOPHILS # BLD: 0.2 %
BASOPHILS ABSOLUTE: 0 THOU/MM3 (ref 0–0.1)
BUN BLDV-MCNC: 28 MG/DL (ref 7–22)
CALCIUM SERPL-MCNC: 8.2 MG/DL (ref 8.5–10.5)
CHLORIDE BLD-SCNC: 97 MEQ/L (ref 98–111)
CO2: 28 MEQ/L (ref 23–33)
CREAT SERPL-MCNC: 0.8 MG/DL (ref 0.4–1.2)
EOSINOPHIL # BLD: 0.3 %
EOSINOPHILS ABSOLUTE: 0 THOU/MM3 (ref 0–0.4)
ERYTHROCYTE [DISTWIDTH] IN BLOOD BY AUTOMATED COUNT: 16.3 % (ref 11.5–14.5)
ERYTHROCYTE [DISTWIDTH] IN BLOOD BY AUTOMATED COUNT: 58.4 FL (ref 35–45)
GFR SERPL CREATININE-BSD FRML MDRD: 70 ML/MIN/1.73M2
GLUCOSE BLD-MCNC: 120 MG/DL (ref 70–108)
HCT VFR BLD CALC: 33.4 % (ref 37–47)
HEMOGLOBIN: 10 GM/DL (ref 12–16)
IMMATURE GRANS (ABS): 0.06 THOU/MM3 (ref 0–0.07)
IMMATURE GRANULOCYTES: 0.6 %
LYMPHOCYTES # BLD: 3.9 %
LYMPHOCYTES ABSOLUTE: 0.4 THOU/MM3 (ref 1–4.8)
MCH RBC QN AUTO: 29.2 PG (ref 26–33)
MCHC RBC AUTO-ENTMCNC: 29.9 GM/DL (ref 32.2–35.5)
MCV RBC AUTO: 97.4 FL (ref 81–99)
MONOCYTES # BLD: 7.6 %
MONOCYTES ABSOLUTE: 0.7 THOU/MM3 (ref 0.4–1.3)
NUCLEATED RED BLOOD CELLS: 0 /100 WBC
PLATELET # BLD: 195 THOU/MM3 (ref 130–400)
PMV BLD AUTO: 9.4 FL (ref 9.4–12.4)
POTASSIUM SERPL-SCNC: 4.5 MEQ/L (ref 3.5–5.2)
RBC # BLD: 3.43 MILL/MM3 (ref 4.2–5.4)
SEG NEUTROPHILS: 87.4 %
SEGMENTED NEUTROPHILS ABSOLUTE COUNT: 8.1 THOU/MM3 (ref 1.8–7.7)
SODIUM BLD-SCNC: 135 MEQ/L (ref 135–145)
WBC # BLD: 9.3 THOU/MM3 (ref 4.8–10.8)

## 2020-01-18 PROCEDURE — 6360000002 HC RX W HCPCS: Performed by: FAMILY MEDICINE

## 2020-01-18 PROCEDURE — 94640 AIRWAY INHALATION TREATMENT: CPT

## 2020-01-18 PROCEDURE — 6370000000 HC RX 637 (ALT 250 FOR IP): Performed by: FAMILY MEDICINE

## 2020-01-18 PROCEDURE — 71046 X-RAY EXAM CHEST 2 VIEWS: CPT

## 2020-01-18 PROCEDURE — 36591 DRAW BLOOD OFF VENOUS DEVICE: CPT

## 2020-01-18 PROCEDURE — 80048 BASIC METABOLIC PNL TOTAL CA: CPT

## 2020-01-18 PROCEDURE — 6370000000 HC RX 637 (ALT 250 FOR IP): Performed by: INTERNAL MEDICINE

## 2020-01-18 PROCEDURE — 94760 N-INVAS EAR/PLS OXIMETRY 1: CPT

## 2020-01-18 PROCEDURE — 2580000003 HC RX 258: Performed by: FAMILY MEDICINE

## 2020-01-18 PROCEDURE — 85025 COMPLETE CBC W/AUTO DIFF WBC: CPT

## 2020-01-18 PROCEDURE — 1290000000 HC SEMI PRIVATE OTHER R&B

## 2020-01-18 RX ORDER — IPRATROPIUM BROMIDE AND ALBUTEROL SULFATE 2.5; .5 MG/3ML; MG/3ML
1 SOLUTION RESPIRATORY (INHALATION) EVERY 4 HOURS PRN
Status: DISCONTINUED | OUTPATIENT
Start: 2020-01-18 | End: 2020-01-19

## 2020-01-18 RX ADMIN — RIVAROXABAN 20 MG: 20 TABLET, FILM COATED ORAL at 17:11

## 2020-01-18 RX ADMIN — TIOTROPIUM BROMIDE 18 MCG: 18 CAPSULE ORAL; RESPIRATORY (INHALATION) at 08:46

## 2020-01-18 RX ADMIN — LEVOTHYROXINE SODIUM 75 MCG: 75 TABLET ORAL at 06:13

## 2020-01-18 RX ADMIN — ACETAMINOPHEN 500 MG: 500 TABLET ORAL at 20:22

## 2020-01-18 RX ADMIN — AZITHROMYCIN MONOHYDRATE 500 MG: 500 INJECTION, POWDER, LYOPHILIZED, FOR SOLUTION INTRAVENOUS at 20:22

## 2020-01-18 RX ADMIN — ONDANSETRON HYDROCHLORIDE 4 MG: 4 TABLET, FILM COATED ORAL at 17:11

## 2020-01-18 RX ADMIN — DIPHENHYDRAMINE HCL 25 MG: 25 TABLET ORAL at 20:22

## 2020-01-18 RX ADMIN — FERROUS SULFATE TAB 325 MG (65 MG ELEMENTAL FE) 325 MG: 325 (65 FE) TAB at 17:11

## 2020-01-18 RX ADMIN — SODIUM CHLORIDE: 9 INJECTION, SOLUTION INTRAVENOUS at 09:16

## 2020-01-18 RX ADMIN — VITAMIN D, TAB 1000IU (100/BT) 5000 UNITS: 25 TAB at 09:24

## 2020-01-18 RX ADMIN — ONDANSETRON HYDROCHLORIDE 4 MG: 4 TABLET, FILM COATED ORAL at 09:24

## 2020-01-18 RX ADMIN — GUAIFENESIN 600 MG: 600 TABLET, EXTENDED RELEASE ORAL at 09:23

## 2020-01-18 RX ADMIN — ROPINIROLE HYDROCHLORIDE 0.25 MG: 0.25 TABLET, FILM COATED ORAL at 20:25

## 2020-01-18 RX ADMIN — CEFTRIAXONE SODIUM 2 G: 2 INJECTION, POWDER, FOR SOLUTION INTRAMUSCULAR; INTRAVENOUS at 20:21

## 2020-01-18 RX ADMIN — DOCUSATE SODIUM 100 MG: 100 CAPSULE, LIQUID FILLED ORAL at 20:22

## 2020-01-18 RX ADMIN — DIGOXIN 125 MCG: 125 TABLET ORAL at 09:23

## 2020-01-18 RX ADMIN — HYDROCODONE BITARTRATE AND ACETAMINOPHEN 1 TABLET: 10; 325 TABLET ORAL at 17:11

## 2020-01-18 RX ADMIN — METOPROLOL TARTRATE 12.5 MG: 25 TABLET ORAL at 09:24

## 2020-01-18 RX ADMIN — ONDANSETRON HYDROCHLORIDE 4 MG: 4 TABLET, FILM COATED ORAL at 00:03

## 2020-01-18 RX ADMIN — Medication 2 PUFF: at 17:52

## 2020-01-18 RX ADMIN — DOCUSATE SODIUM 100 MG: 100 CAPSULE, LIQUID FILLED ORAL at 09:24

## 2020-01-18 RX ADMIN — Medication 2 PUFF: at 08:47

## 2020-01-18 RX ADMIN — METOPROLOL TARTRATE 12.5 MG: 25 TABLET ORAL at 20:25

## 2020-01-18 RX ADMIN — HYDROCODONE BITARTRATE AND ACETAMINOPHEN 1 TABLET: 10; 325 TABLET ORAL at 09:24

## 2020-01-18 RX ADMIN — DIPHENHYDRAMINE HCL 25 MG: 25 TABLET ORAL at 09:23

## 2020-01-18 RX ADMIN — FAMOTIDINE 20 MG: 20 TABLET ORAL at 09:24

## 2020-01-18 RX ADMIN — ACETAMINOPHEN 500 MG: 500 TABLET ORAL at 09:21

## 2020-01-18 RX ADMIN — ROPINIROLE HYDROCHLORIDE 0.25 MG: 0.25 TABLET, FILM COATED ORAL at 09:24

## 2020-01-18 RX ADMIN — FERROUS SULFATE TAB 325 MG (65 MG ELEMENTAL FE) 325 MG: 325 (65 FE) TAB at 09:23

## 2020-01-18 RX ADMIN — Medication 2 PUFF: at 16:32

## 2020-01-18 RX ADMIN — FLUTICASONE PROPIONATE 1 SPRAY: 50 SPRAY, METERED NASAL at 09:27

## 2020-01-18 RX ADMIN — SODIUM CHLORIDE: 9 INJECTION, SOLUTION INTRAVENOUS at 20:22

## 2020-01-18 ASSESSMENT — PAIN DESCRIPTION - LOCATION
LOCATION: BACK;HIP
LOCATION: BACK;HIP

## 2020-01-18 ASSESSMENT — PAIN DESCRIPTION - DESCRIPTORS
DESCRIPTORS: ACHING
DESCRIPTORS: ACHING

## 2020-01-18 ASSESSMENT — PAIN DESCRIPTION - FREQUENCY
FREQUENCY: CONTINUOUS
FREQUENCY: CONTINUOUS

## 2020-01-18 ASSESSMENT — PAIN - FUNCTIONAL ASSESSMENT: PAIN_FUNCTIONAL_ASSESSMENT: PREVENTS OR INTERFERES SOME ACTIVE ACTIVITIES AND ADLS

## 2020-01-18 ASSESSMENT — PAIN DESCRIPTION - ONSET
ONSET: ON-GOING
ONSET: ON-GOING

## 2020-01-18 ASSESSMENT — PAIN DESCRIPTION - PROGRESSION
CLINICAL_PROGRESSION: NOT CHANGED
CLINICAL_PROGRESSION: NOT CHANGED

## 2020-01-18 ASSESSMENT — PAIN SCALES - GENERAL
PAINLEVEL_OUTOF10: 4
PAINLEVEL_OUTOF10: 0
PAINLEVEL_OUTOF10: 5
PAINLEVEL_OUTOF10: 0

## 2020-01-18 ASSESSMENT — PAIN DESCRIPTION - ORIENTATION
ORIENTATION: RIGHT
ORIENTATION: RIGHT

## 2020-01-18 ASSESSMENT — PAIN DESCRIPTION - PAIN TYPE
TYPE: CHRONIC PAIN
TYPE: CHRONIC PAIN

## 2020-01-18 NOTE — PROGRESS NOTES
Patient: Virgilio Abdalla  Unit/Bed: 8E-66/066-A  YOB: 1945  MRN: [id number] Acct: [de-identified]   Admitting Diagnosis: Hemiarthroplasty of right hip   Admit Date:  1/6/2020  Hospital Day: 12    Assessment:     Active Problems:    Squamous cell carcinoma of bronchus in right lower lobe (HCC)    HTN (hypertension)    GERD (gastroesophageal reflux disease)    Recurrent non-small cell lung cancer (NSCLC) ([facility] [facility]    Acute on chronic respiratory failure with hypoxemia (HCC)    Moderate malnutrition (HCC)    Chronic obstructive pulmonary disease (HCC)    Paroxysmal atrial fibrillation (HCC)    Acute on chronic respiratory failure with hypoxia and hypercapnia (HCC)    Pathologic fracture of femoral neck, right, initial encounter ([facility] [address]    Fall    Acute blood loss anemia    Thrombocytopenia (HCC)    Constipation    Chronic diastolic heart failure (HCC)    Hx pulmonary embolism    Hypothyroidism    Hip fracture requiring operative repair, left, closed, [facility]  Resolved Problems:    * No resolved hospital problems. *      Plan:     Repeat CXR and labs  Make temps oral as she has been doing better  Continue therapies  Use the spirometer and acapella. Use some aerosols to help with mucus        Subjective:     Patient has no complaint of CP or GI upset. There is a frequent cough of tan mucus that she feels is more pink. The SOB is still over baseline. .   Medication side effects: none    Scheduled Meds:   sodium chloride flush  10 mL Intravenous QAM AC    azithromycin  500 mg Intravenous Q24H    cefTRIAXone (ROCEPHIN) IV  2 g Intravenous Q24H    ferrous sulfate  325 mg Oral BID WC    heparin flush (100 units/mL)  500 Units Intravenous Daily    diphenhydrAMINE  25 mg Oral BID    fluticasone  1 spray Each Nostril Daily    guaiFENesin  600 mg Oral Daily    digoxin  125 mcg Oral Daily    docusate sodium  100 mg Oral BID    levothyroxine  75 mcg Oral Daily    metoprolol tartrate  12.5 mg Oral BID

## 2020-01-18 NOTE — FLOWSHEET NOTE
01/18/20 1840   Provider Notification   Reason for Communication Evaluate   Provider Name Dr Tom Alvarado   Provider Notification Physician   Method of Communication Secure Message   Response Waiting for response   Notification Time [phone]       \"8E-66. Patient in for Rt hip hemiarthroplasty. Hx of CHF, COPD, chronic respiratory failure with hypoxia, & Cancer lower right lobe-lung s/p lobectomy in 2012, radiation and chemo and later had mediatinal mets and had radation and chemo again, and in 2016 had reoccurence on the left side upper and was started on radiation this year. Patient has been having episodes of increased SOB since Thursday. Had repeat labs and CXR today that shows worsening. Dr Kev Zhu would like pulmonology to be consulted. \" Waiting for response.

## 2020-01-19 ENCOUNTER — APPOINTMENT (OUTPATIENT)
Dept: CT IMAGING | Age: 75
End: 2020-01-19
Attending: FAMILY MEDICINE
Payer: MEDICARE

## 2020-01-19 PROCEDURE — 1290000000 HC SEMI PRIVATE OTHER R&B

## 2020-01-19 PROCEDURE — 87040 BLOOD CULTURE FOR BACTERIA: CPT

## 2020-01-19 PROCEDURE — 71250 CT THORAX DX C-: CPT

## 2020-01-19 PROCEDURE — 6370000000 HC RX 637 (ALT 250 FOR IP): Performed by: INTERNAL MEDICINE

## 2020-01-19 PROCEDURE — 94761 N-INVAS EAR/PLS OXIMETRY MLT: CPT

## 2020-01-19 PROCEDURE — 97116 GAIT TRAINING THERAPY: CPT

## 2020-01-19 PROCEDURE — 2580000003 HC RX 258: Performed by: FAMILY MEDICINE

## 2020-01-19 PROCEDURE — 87081 CULTURE SCREEN ONLY: CPT

## 2020-01-19 PROCEDURE — 36415 COLL VENOUS BLD VENIPUNCTURE: CPT

## 2020-01-19 PROCEDURE — 6360000002 HC RX W HCPCS: Performed by: INTERNAL MEDICINE

## 2020-01-19 PROCEDURE — 94640 AIRWAY INHALATION TREATMENT: CPT

## 2020-01-19 PROCEDURE — 87205 SMEAR GRAM STAIN: CPT

## 2020-01-19 PROCEDURE — 97110 THERAPEUTIC EXERCISES: CPT

## 2020-01-19 PROCEDURE — 99223 1ST HOSP IP/OBS HIGH 75: CPT | Performed by: INTERNAL MEDICINE

## 2020-01-19 PROCEDURE — 87186 SC STD MICRODIL/AGAR DIL: CPT

## 2020-01-19 PROCEDURE — 87070 CULTURE OTHR SPECIMN AEROBIC: CPT

## 2020-01-19 PROCEDURE — 87077 CULTURE AEROBIC IDENTIFY: CPT

## 2020-01-19 PROCEDURE — 6370000000 HC RX 637 (ALT 250 FOR IP): Performed by: FAMILY MEDICINE

## 2020-01-19 PROCEDURE — 2580000003 HC RX 258: Performed by: INTERNAL MEDICINE

## 2020-01-19 RX ORDER — IPRATROPIUM BROMIDE AND ALBUTEROL SULFATE 2.5; .5 MG/3ML; MG/3ML
1 SOLUTION RESPIRATORY (INHALATION)
Status: DISCONTINUED | OUTPATIENT
Start: 2020-01-19 | End: 2020-01-24 | Stop reason: HOSPADM

## 2020-01-19 RX ORDER — PREDNISONE 20 MG/1
40 TABLET ORAL DAILY
Status: COMPLETED | OUTPATIENT
Start: 2020-01-19 | End: 2020-01-23

## 2020-01-19 RX ORDER — ONDANSETRON 4 MG/1
4 TABLET, FILM COATED ORAL EVERY 6 HOURS PRN
Status: DISCONTINUED | OUTPATIENT
Start: 2020-01-19 | End: 2020-01-24 | Stop reason: HOSPADM

## 2020-01-19 RX ADMIN — ONDANSETRON HYDROCHLORIDE 4 MG: 4 TABLET, FILM COATED ORAL at 06:05

## 2020-01-19 RX ADMIN — METOPROLOL TARTRATE 12.5 MG: 25 TABLET ORAL at 22:16

## 2020-01-19 RX ADMIN — TIOTROPIUM BROMIDE 18 MCG: 18 CAPSULE ORAL; RESPIRATORY (INHALATION) at 05:48

## 2020-01-19 RX ADMIN — SENNA PLUS 8.6 MG: 8.6 TABLET ORAL at 22:15

## 2020-01-19 RX ADMIN — RIVAROXABAN 20 MG: 20 TABLET, FILM COATED ORAL at 17:45

## 2020-01-19 RX ADMIN — POLYETHYLENE GLYCOL 3350 17 G: 17 POWDER, FOR SOLUTION ORAL at 09:58

## 2020-01-19 RX ADMIN — Medication 2 PUFF: at 05:48

## 2020-01-19 RX ADMIN — DIPHENHYDRAMINE HCL 25 MG: 25 TABLET ORAL at 22:15

## 2020-01-19 RX ADMIN — IPRATROPIUM BROMIDE AND ALBUTEROL SULFATE 1 AMPULE: .5; 3 SOLUTION RESPIRATORY (INHALATION) at 11:11

## 2020-01-19 RX ADMIN — ACETAMINOPHEN 500 MG: 500 TABLET ORAL at 22:29

## 2020-01-19 RX ADMIN — PIPERACILLIN AND TAZOBACTAM 3.38 G: 3; .375 INJECTION, POWDER, FOR SOLUTION INTRAVENOUS at 17:46

## 2020-01-19 RX ADMIN — ROPINIROLE HYDROCHLORIDE 0.25 MG: 0.25 TABLET, FILM COATED ORAL at 22:15

## 2020-01-19 RX ADMIN — PREDNISONE 40 MG: 20 TABLET ORAL at 17:45

## 2020-01-19 RX ADMIN — VANCOMYCIN HYDROCHLORIDE 1000 MG: 1 INJECTION, POWDER, LYOPHILIZED, FOR SOLUTION INTRAVENOUS at 15:50

## 2020-01-19 RX ADMIN — LEVOTHYROXINE SODIUM 75 MCG: 75 TABLET ORAL at 05:57

## 2020-01-19 RX ADMIN — ROPINIROLE HYDROCHLORIDE 0.25 MG: 0.25 TABLET, FILM COATED ORAL at 11:32

## 2020-01-19 RX ADMIN — ONDANSETRON HYDROCHLORIDE 4 MG: 4 TABLET, FILM COATED ORAL at 13:43

## 2020-01-19 RX ADMIN — ACETAMINOPHEN 500 MG: 500 TABLET ORAL at 13:43

## 2020-01-19 RX ADMIN — DIGOXIN 125 MCG: 125 TABLET ORAL at 13:43

## 2020-01-19 RX ADMIN — SODIUM CHLORIDE: 9 INJECTION, SOLUTION INTRAVENOUS at 14:42

## 2020-01-19 RX ADMIN — DOCUSATE SODIUM 100 MG: 100 CAPSULE, LIQUID FILLED ORAL at 22:15

## 2020-01-19 RX ADMIN — IPRATROPIUM BROMIDE AND ALBUTEROL SULFATE 1 AMPULE: .5; 3 SOLUTION RESPIRATORY (INHALATION) at 17:54

## 2020-01-19 RX ADMIN — Medication 2 PUFF: at 17:54

## 2020-01-19 ASSESSMENT — PAIN SCALES - GENERAL
PAINLEVEL_OUTOF10: 4
PAINLEVEL_OUTOF10: 0

## 2020-01-19 NOTE — CONSULTS
apnea     Urinary incontinence     UTI (urinary tract infection)       Past Surgical History           Procedure Laterality Date    HIP SURGERY Right 2020    RIGHT HIP HEMIARTHROPLASTY performed by Keara Holguin at [address] [place] [address] [id number]  10/19/2012    rt lower lobectomy     LUNG BIOPSY  2012     Diet    Dietary Nutrition Supplements: Standard High Calorie Oral Supplement  DIET GENERAL;  Allergies    Advil cold & sinus liqui-gels [pseudoephedrine-ibuprofen];  Food; Percocet [oxycodone-acetaminophen]; and Sulfa antibiotics  Social History     Social History     Socioeconomic History    Marital status:      Spouse name: Bobby Rao Number of children: 2    Years of education: Not on file    Highest education level: Not on file   Occupational History    Not on file   Social Needs    Financial resource strain: Not on file    Food insecurity:     Worry: Not on file     Inability: Not on file   Language Systems needs:     Medical: Not on file     Non-medical: Not on file   Tobacco Use    Smoking status: Former Smoker     Packs/day: 1.00     Years: 45.00     Pack years: 45.00     Types: Cigarettes     Last attempt to quit:      Years since quittin.0    Smokeless tobacco: Never Used   Substance and Sexual Activity    Alcohol use: No    Drug use: No    Sexual activity: Not on file   Lifestyle    Physical activity:     Days per week: Not on file     Minutes per session: Not on file    Stress: Not on file   Relationships    Social connections:     Talks on phone: Not on file     Gets together: Not on file     Attends Yazdanism service: Not on file     Active member of club or organization: Not on file     Attends meetings of clubs or organizations: Not on file     Relationship status: Not on file    Intimate partner violence:     Fear of current or ex partner: Not on file     Emotionally abused: Not on file     Physically abused: Not on file     Forced sexual activity: Not on file   Other Topics Concern    Not on file   Social History Narrative    Not on file     Family History          Problem Relation Age of Onset    Cancer Mother     Heart Disease Father     High Blood Pressure Father     High Cholesterol Father     Arthritis Sister     Heart Disease Sister     Cancer Sister         lung cancer    Heart Disease Sister     Stroke Sister     High Cholesterol Sister     High Blood Pressure Sister     Stroke Paternal Grandfather     High Blood Pressure Brother        ROS    General/Constitutional:  loss of weight and appetite changes. Recent fever or chills. HENT: Negative. Eyes: Negative. Upper respiratory tract: No nasal stuffiness or post nasal drip. Lower respiratory tract/ lungs:  Cough, SOB . No hemoptysis. Cardiovascular: No palpitations, chest pain or edema. Gastrointestinal: No nausea or vomiting. Neurological: No focal neurological weakness. Extremities: No tenderness. Musculoskeletal: no complaints  Genitourinary: No complaints. Hematological: Negative. Denies easy buising  Skin: No itching.   Meds    Current Medications    sodium chloride flush  10 mL Intravenous QAM AC    azithromycin  500 mg Intravenous Q24H    cefTRIAXone (ROCEPHIN) IV  2 g Intravenous Q24H    ferrous sulfate  325 mg Oral BID WC    heparin flush (100 units/mL)  500 Units Intravenous Daily    diphenhydrAMINE  25 mg Oral BID    fluticasone  1 spray Each Nostril Daily    guaiFENesin  600 mg Oral Daily    digoxin  125 mcg Oral Daily    docusate sodium  100 mg Oral BID    levothyroxine  75 mcg Oral Daily    metoprolol tartrate  12.5 mg Oral BID    polyethylene glycol  17 g Oral Daily    rOPINIRole  0.25 mg Oral BID    famotidine  20 mg Oral Daily    acetaminophen  500 mg Oral Q6H    Vitamin D  5,000 Units Oral Daily    betamethasone dipropionate   Topical Daily    mometasone-formoterol  2 puff Inhalation BID    rivaroxaban  20 mg Oral Daily    senna  1 tablet Oral Nightly    the prior examination. However there is a small stable area of lucent cavitary change at the posterior medial right lung base on axial image 34.       3. Pleural thickening with focal opacity abutting the posterior lateral right pleura is noted at the posterior lateral right lung base which appears similar in configuration to the prior examination.       4. Focal masslike opacity anteriorly within the left upper lobe abutting the pleura is slightly increased in size from the prior examination dated July 2019. This appears slightly increased in size from the prior examination. This is concerning for    progression of disease.       5. Focal irregular opacity with irregular margins demonstrated within the left upper lobe appears increased in size measuring 1.9 x 0.8 centimeters. This appears increased in size from the prior examination. This is concerning for progression of disease.       6. Further evaluation of the enlarging lesions may be obtained with PET/CT          Assessment   Acute on chronic respiratory failure with hypoxia  Right sided pneumonia. Postobstructive? COPD exacerbation secondary to pneumonia. History of provoked pulmonary embolism: on CTA of Chest 11/4/19   Metastatic lung cancer: Adenocarcinoma, S/P rll lobectomy and radiation, now on palliative immunotherapy with Tecentriq, last dose 12/23/2019, established patient of Dr. Odalis Ram. S/P RLL lobectomy with chronic post surgical changes  Diastolic CHF  Severe COPD  Recommendations   The patient did have fever, SOB and productive cough. Symptoms of pneumonia. Start vancomycingan and zosyn. Nasal MRSA swab, if negative, vanco can be stopped. Procalcitonin in AM.  It is most likely pneumonia, pneumonitis secondary to immunotherapy is less likely,  last dose of Tecentriq was 12/23/2019  The CT scan of chest in November showed narrowing of BI with postobstructive infiltrates. Will repeat CT scan of chest to evaluate. Continue Dulera. Stop spiriva. Start duoneb Neb    Prednisone 40 mg po daily for 5 days. Sputum culture. NIPPV PRN. Recommend palliative consult. Thank you for the consult and allowing us to participate in the care of your patient. Case discussed with nurse and patient/family. Questions and concerns addressed. Meds and Orders reviewed.     Electronically signed by Maranda Tom MD on 1/19/2020 at 12:44 PM

## 2020-01-19 NOTE — PROGRESS NOTES
[facility]  INPATIENT PHYSICAL THERAPY  DAILY NOTE  [facility] SKILLED NURSING UNIT - [id number]  Time In: 0740  Time Out: 0990  Timed Code Treatment Minutes: 27 Minutes  Minutes: 27          Date: 2020  Patient Name: Rhett Barone,  Gender:  female        MRN: [id number]  : 1945  (76 y.o.)  Referral Date : 20  Referring Practitioner: Leslie Magallanes MD  Diagnosis: hemiarthroplasty of right hip  Treatment Diagnosis: difficulty in walking  Additional Pertinent Hx: Per EMR: \"Fannie Ortega is a 76 y.o. female who presents for evaluation of pain to the right leg and hip that onset following a fall that occurred 3 days ago. Patient reports that she tripped over her cat and fell 3 days ago, experiencing immediate pain to the right leg and hip. Patient notes worsening of pain since initial fall and reports exacerbation of pain when she attempts to bear weight onto her right leg. Patient describes additional symptom of nausea. Patient denies hitting her head during the incident and denies headache and chest pain. Patient reports using oxygen treatments at home due to condition of chronic obstructive pulmonary disease. Patient notes a history of smoking but denies current nicotine use. Patient additionally has a history of congestive heart failure and notes that edema to her lower extremities is normal at baseline. \" Pt underwent right hip hemiarthroplasty 20.      Prior Level of Function:  Lives With: Alone  Type of Home: House  Home Layout: One level  Home Access: Ramped entrance   [facility] - Number of Steps: 2  Entrance Stairs - Rails: Right(grab bar)  Home Equipment: Standard walker, 4 wheeled walker, Cane, Oxygen   Bathroom Shower/Tub: Tub/Shower unit, Shower chair with back  [employer]: Standard(pt reports daughter installing toilet riser)  Bathroom Equipment: Shower chair, Grab bars in  [address] Help From: Family  ADL Assistance: Independent  Homemaking Assistance: Needs assistance  Homemaking Responsibilities: Yes  Ambulation Assistance: Independent  Transfer Assistance: Independent  Active : Yes  IADL Comments: Daughter able to assist at times but works 2 jobs, Has a niece who is able to assist at times  Additional Comments: Pt reports I prior to admission without AD. Pt reports daughter has RTS. Current with meals on wheels. Restrictions/Precautions:  Restrictions/Precautions: Weight Bearing, Fall Risk  Right Lower Extremity Weight Bearing: Weight Bearing As Tolerated  Position Activity Restriction  Hip Precautions: No hip flexion > 90 degrees, No ADduction, No hip internal rotation  Other position/activity restrictions: Home O2: baseline is 2L at rest/6L with activity. S/p right hip hemiarthroplasty for femoral neck fracture on 1/1/20    SUBJECTIVE: Patient in room, agreeable to PT. Pt reports upset stomach, but feeling better than she did end of last week. Pt on 4L O2 upon arrival due to increased difficulty breathing last night. PAIN: stomach, not rated    OBJECTIVE:  Bed Mobility:  Not completed    Transfers:  Sit to Stand: Stand By Assistance  Stand to Sit:Stand By Assistance  Stand Pivot:Stand By Assistance   PT managing O2 line    Ambulation:  Stand By Assistance  Distance: 50'  Surface: Level Tile  Device:Rolling Walker  Gait Deviations: Forward Flexed Posture, Slow Ligia, Decreased Weight Shift Right and Decreased Heel Strike Bilaterally  Monitoring breathing. Recommending starting with RW with gait training this date as patient requesting to use cane. Pt reported nausea and needing to sit down. Chair brought behind patient. Pt with shortness of breath on 6L O2, pt completing pursed lip breathing. Several minutes seated rest break, nurse arrived to assist.  Pt assisted to wheelchair and brought back to room. Pt sounding congested. Breathing improved and pt on 4L O2 in room at rest, nurse aware.     Balance:  Static Sitting Balance:  Independent  Static Standing Balance: Stand By Assistance  Dynamic Standing Balance: Stand By Assistance    Exercise:  Patient was guided in 1 set(s) 10 reps of exercise to both lower extremities. Supine: glut sets 5\" x 12, quad sets 5\" x 10, heel slide x 10, ankle pumps x20, hip abduction x 10. Adan Founds Exercises were completed for increased independence with functional mobility. Functional Outcome Measures: Not completed       ASSESSMENT:  Assessment: Patient progressing toward established goals. Activity Tolerance:  Patient tolerance of  treatment: fair. limited by shortness of breath and upset stomach. Equipment Recommendations:Equipment Needed: Yes(may need new 2WW)  Discharge Recommendations:  Continue to assess pending progress    Plan: Times per week: 5x/wk BID, 1x/wk QD  Times per day: Twice a day  Plan weeks: 3 weeks  Current Treatment Recommendations: Strengthening, Functional Mobility Training, Neuromuscular Re-education, Home Exercise Program, Transfer Training, Gait Training, Safety Education & Training, Balance Training, Endurance Training, Stair training, Patient/Caregiver Education & Training    Patient Education  Patient Education: Gait, Verbal Exercise Instruction,  - Patient Verbalized Understanding, - Patient Requires Continued Education    Goals:  Patient goals : go home  Short term goals  Time Frame for Short term goals: 10 days  Short term goal 1: Patient will complete sit  <> stand with SBA to stand to ambulate with decreased difficulty. GOAL MET, DISCONTINUE, SEE LTG  Short term goal 2: Patient will complete supine < > sit with SBA to transfer in/out of bed with decreased difficulty. GOAL MET, DISCONTINUE, SEE LTG  Short term goal 3: Patient will ambulate 100' with a RW and SBA to progress towards independent home mobility. GOAL MET, DISCONTINUE, SEE LTG  Short term goal 4: Patient will ascend/descend 2 steps with 1 hand rail and straight cane with CGA to progress towards independent home entry. Long term goals  Time Frame for Long term goals : 3 weeks  Long term goal 1: Patient will complete sit < > stand and stand pivot transfers with modified independence to allow patient to transfer surface to surface safely. Long term goal 2: Patient will complete supine < > sit with modified independence to allow patient to transfer in/out of bed safely. Long term goal 3: Patient will ambulate 80' with a RW and modified independence to navigate home safely. Long term goal 4: Patient will ascend/descend 2 steps with 1 handrail and straight cane and SBA for safe home entry. Long term goal 5: Patient will complete car transfer with SBA for safe transport to home and appointments. Following session, patient left in safe position with all fall risk precautions in place.

## 2020-01-20 LAB — PROCALCITONIN: 2.48 NG/ML (ref 0.01–0.09)

## 2020-01-20 PROCEDURE — 97530 THERAPEUTIC ACTIVITIES: CPT

## 2020-01-20 PROCEDURE — 94669 MECHANICAL CHEST WALL OSCILL: CPT

## 2020-01-20 PROCEDURE — 99232 SBSQ HOSP IP/OBS MODERATE 35: CPT | Performed by: INTERNAL MEDICINE

## 2020-01-20 PROCEDURE — 2580000003 HC RX 258: Performed by: FAMILY MEDICINE

## 2020-01-20 PROCEDURE — 6370000000 HC RX 637 (ALT 250 FOR IP): Performed by: INTERNAL MEDICINE

## 2020-01-20 PROCEDURE — 94640 AIRWAY INHALATION TREATMENT: CPT

## 2020-01-20 PROCEDURE — 2709999900 HC NON-CHARGEABLE SUPPLY

## 2020-01-20 PROCEDURE — 97535 SELF CARE MNGMENT TRAINING: CPT

## 2020-01-20 PROCEDURE — 6360000002 HC RX W HCPCS: Performed by: FAMILY MEDICINE

## 2020-01-20 PROCEDURE — 94761 N-INVAS EAR/PLS OXIMETRY MLT: CPT

## 2020-01-20 PROCEDURE — 6370000000 HC RX 637 (ALT 250 FOR IP): Performed by: FAMILY MEDICINE

## 2020-01-20 PROCEDURE — 97110 THERAPEUTIC EXERCISES: CPT

## 2020-01-20 PROCEDURE — 84145 PROCALCITONIN (PCT): CPT

## 2020-01-20 PROCEDURE — 6360000002 HC RX W HCPCS: Performed by: INTERNAL MEDICINE

## 2020-01-20 PROCEDURE — APPSS30 APP SPLIT SHARED TIME 16-30 MINUTES: Performed by: NURSE PRACTITIONER

## 2020-01-20 PROCEDURE — 1290000000 HC SEMI PRIVATE OTHER R&B

## 2020-01-20 PROCEDURE — 2580000003 HC RX 258: Performed by: INTERNAL MEDICINE

## 2020-01-20 RX ADMIN — ONDANSETRON HYDROCHLORIDE 4 MG: 4 TABLET, FILM COATED ORAL at 00:54

## 2020-01-20 RX ADMIN — METOPROLOL TARTRATE 12.5 MG: 25 TABLET ORAL at 09:44

## 2020-01-20 RX ADMIN — VITAMIN D, TAB 1000IU (100/BT) 5000 UNITS: 25 TAB at 09:44

## 2020-01-20 RX ADMIN — Medication 2 PUFF: at 21:27

## 2020-01-20 RX ADMIN — PIPERACILLIN AND TAZOBACTAM 3.38 G: 3; .375 INJECTION, POWDER, FOR SOLUTION INTRAVENOUS at 16:41

## 2020-01-20 RX ADMIN — SENNA PLUS 8.6 MG: 8.6 TABLET ORAL at 20:53

## 2020-01-20 RX ADMIN — SODIUM CHLORIDE: 9 INJECTION, SOLUTION INTRAVENOUS at 03:51

## 2020-01-20 RX ADMIN — GUAIFENESIN 600 MG: 600 TABLET, EXTENDED RELEASE ORAL at 09:44

## 2020-01-20 RX ADMIN — HYDROCODONE BITARTRATE AND ACETAMINOPHEN 1 TABLET: 10; 325 TABLET ORAL at 07:10

## 2020-01-20 RX ADMIN — ONDANSETRON HYDROCHLORIDE 4 MG: 4 TABLET, FILM COATED ORAL at 13:18

## 2020-01-20 RX ADMIN — IPRATROPIUM BROMIDE AND ALBUTEROL SULFATE 1 AMPULE: .5; 3 SOLUTION RESPIRATORY (INHALATION) at 17:14

## 2020-01-20 RX ADMIN — DOCUSATE SODIUM 100 MG: 100 CAPSULE, LIQUID FILLED ORAL at 09:44

## 2020-01-20 RX ADMIN — HYDROCODONE BITARTRATE AND ACETAMINOPHEN 1 TABLET: 10; 325 TABLET ORAL at 13:18

## 2020-01-20 RX ADMIN — HYDROCODONE BITARTRATE AND ACETAMINOPHEN 1 TABLET: 10; 325 TABLET ORAL at 20:57

## 2020-01-20 RX ADMIN — FERROUS SULFATE TAB 325 MG (65 MG ELEMENTAL FE) 325 MG: 325 (65 FE) TAB at 09:44

## 2020-01-20 RX ADMIN — FLUTICASONE PROPIONATE 1 SPRAY: 50 SPRAY, METERED NASAL at 09:45

## 2020-01-20 RX ADMIN — ACETAMINOPHEN 500 MG: 500 TABLET ORAL at 13:21

## 2020-01-20 RX ADMIN — DIGOXIN 125 MCG: 125 TABLET ORAL at 09:44

## 2020-01-20 RX ADMIN — ONDANSETRON HYDROCHLORIDE 4 MG: 4 TABLET, FILM COATED ORAL at 06:58

## 2020-01-20 RX ADMIN — IPRATROPIUM BROMIDE AND ALBUTEROL SULFATE 1 AMPULE: .5; 3 SOLUTION RESPIRATORY (INHALATION) at 09:03

## 2020-01-20 RX ADMIN — Medication 2 PUFF: at 09:03

## 2020-01-20 RX ADMIN — BETAMETHASONE DIPROPIONATE: 0.5 CREAM TOPICAL at 09:44

## 2020-01-20 RX ADMIN — RIVAROXABAN 20 MG: 20 TABLET, FILM COATED ORAL at 16:41

## 2020-01-20 RX ADMIN — DIPHENHYDRAMINE HCL 25 MG: 25 TABLET ORAL at 09:44

## 2020-01-20 RX ADMIN — PREDNISONE 40 MG: 20 TABLET ORAL at 09:44

## 2020-01-20 RX ADMIN — BISACODYL 10 MG: 10 SUPPOSITORY RECTAL at 22:27

## 2020-01-20 RX ADMIN — FAMOTIDINE 20 MG: 20 TABLET ORAL at 09:44

## 2020-01-20 RX ADMIN — ACETAMINOPHEN 500 MG: 500 TABLET ORAL at 00:54

## 2020-01-20 RX ADMIN — POLYETHYLENE GLYCOL 3350 17 G: 17 POWDER, FOR SOLUTION ORAL at 09:43

## 2020-01-20 RX ADMIN — ROPINIROLE HYDROCHLORIDE 0.25 MG: 0.25 TABLET, FILM COATED ORAL at 20:53

## 2020-01-20 RX ADMIN — IPRATROPIUM BROMIDE AND ALBUTEROL SULFATE 1 AMPULE: .5; 3 SOLUTION RESPIRATORY (INHALATION) at 13:06

## 2020-01-20 RX ADMIN — METOPROLOL TARTRATE 12.5 MG: 25 TABLET ORAL at 20:53

## 2020-01-20 RX ADMIN — PIPERACILLIN AND TAZOBACTAM 3.38 G: 3; .375 INJECTION, POWDER, FOR SOLUTION INTRAVENOUS at 00:54

## 2020-01-20 RX ADMIN — VANCOMYCIN HYDROCHLORIDE 1000 MG: 1 INJECTION, POWDER, LYOPHILIZED, FOR SOLUTION INTRAVENOUS at 15:18

## 2020-01-20 RX ADMIN — ROPINIROLE HYDROCHLORIDE 0.25 MG: 0.25 TABLET, FILM COATED ORAL at 09:44

## 2020-01-20 RX ADMIN — Medication 10 ML: at 05:05

## 2020-01-20 RX ADMIN — IPRATROPIUM BROMIDE AND ALBUTEROL SULFATE 1 AMPULE: .5; 3 SOLUTION RESPIRATORY (INHALATION) at 21:27

## 2020-01-20 RX ADMIN — DOCUSATE SODIUM 100 MG: 100 CAPSULE, LIQUID FILLED ORAL at 20:53

## 2020-01-20 RX ADMIN — HEPARIN 500 UNITS: 100 SYRINGE at 05:04

## 2020-01-20 RX ADMIN — LEVOTHYROXINE SODIUM 75 MCG: 75 TABLET ORAL at 05:04

## 2020-01-20 RX ADMIN — ACETAMINOPHEN 500 MG: 500 TABLET ORAL at 09:44

## 2020-01-20 RX ADMIN — ONDANSETRON HYDROCHLORIDE 4 MG: 4 TABLET, FILM COATED ORAL at 20:57

## 2020-01-20 RX ADMIN — DIPHENHYDRAMINE HCL 25 MG: 25 TABLET ORAL at 20:53

## 2020-01-20 RX ADMIN — PIPERACILLIN AND TAZOBACTAM 3.38 G: 3; .375 INJECTION, POWDER, FOR SOLUTION INTRAVENOUS at 10:01

## 2020-01-20 RX ADMIN — MAGNESIUM HYDROXIDE 30 ML: 400 SUSPENSION ORAL at 09:55

## 2020-01-20 ASSESSMENT — PAIN SCALES - GENERAL
PAINLEVEL_OUTOF10: 6
PAINLEVEL_OUTOF10: 7
PAINLEVEL_OUTOF10: 6
PAINLEVEL_OUTOF10: 7
PAINLEVEL_OUTOF10: 6

## 2020-01-20 ASSESSMENT — PAIN DESCRIPTION - PAIN TYPE: TYPE: CHRONIC PAIN

## 2020-01-20 NOTE — PROGRESS NOTES
6/10: Back lower and Right Hip pain      COGNITION: Slow Processing, Decreased Safety Awareness and Impulsive    ADL:   Grooming: Stand By Assistance. To complete oral hygiene and hand hygiene while standing at sink. Toileting: Stand By Assistance. Toilet Transfer: Stand By Assistance. From toilet with min vcs for hand placement. .  Increased time to complete     BALANCE:  Sitting Balance:  Modified Independent. Sitting on toilet and forward in recliner. Standing Balance: Stand By Assistance. ~4 minutes self cares at sink with 2 UE release     TRANSFERS:  Sit to Stand:  Stand By Assistance. From recliner and toilet with min vcs for hand placement. Stand to Sit: Stand By Assistance. Onto toilet and recliner. FUNCTIONAL MOBILITY:  Assistive Device: Quad Cane  Assist Level:  Contact Guard Assistance. Distance: To and from bathroom and to/from window  Slow pace, poor safety with O2 tubing, no LOB. ADDITIONAL ACTIVITIES:  Pt completed B UE exs with orange resistance band x 15 reps, x 1 set, while sitting in recliner. fair tolerance of exs, with 1 RBs throughout, and min cues for tech with resistance band. ASSESSMENT: Pt is making fair progress towards goals. Factors facilitating goal achievement include: good family support, cooperative, and pleasant. Barriers to goal achievement include: SOB, hip precautions. Family education has been addressed on the following topics: N/A. Pt continues to require skilled Occupational Therapy to address the following performance deficits dynamic standing balance with 2 UE release, ADL routine with LHAE to maintain hip precautions, endurance, safety awareness, and activity tolerance. Without skilled OT intervention pt is at risk for functional decline, increased falls, and readmission to hospital.     Activity Tolerance:  Patient tolerance of  treatment: good.        Discharge Recommendations: Home with Home health OT  Equipment Recommendations: Equipment

## 2020-01-20 NOTE — PROGRESS NOTES
Assistance: Needs assistance  Homemaking Responsibilities: Yes  Ambulation Assistance: Independent  Transfer Assistance: Independent  Active : Yes  IADL Comments: Daughter able to assist at times but works 2 jobs, Has a niece who is able to assist at times  Additional Comments: Pt reports I prior to admission without AD. Pt reports daughter has RTS. Current with meals on wheels. Restrictions/Precautions:  Restrictions/Precautions: Weight Bearing, Fall Risk  Right Lower Extremity Weight Bearing: Weight Bearing As Tolerated  Position Activity Restriction  Hip Precautions: No hip flexion > 90 degrees, No ADduction, No hip internal rotation  Other position/activity restrictions: Home O2: baseline is 2L at rest/6L with activity. S/p right hip hemiarthroplasty for femoral neck fracture on 1/1/20    SUBJECTIVE: Pt is supine in bed, now on 5 L O2 at rest vs 2 L. Per chart review, pt started on 2 antibiotics for pneumonia, DC held. Pt agreeable to room mobility only. PAIN: 3/10: L hip    OBJECTIVE:  Bed Mobility:  Supine to Sit: Stand By Assistance    Transfers:  Sit to Stand: Stand By Assistance  Stand to Sit:Stand By Assistance    Ambulation:  Stand By Assistance  Distance: 20 feet x 2  Surface: Level Tile  Device:Rolling Walker  Gait Deviations:  Slow Ligia, Decreased Step Length Bilaterally and Decreased Gait Speed  **O2 sats mid 80's with mobility, recovers quickly with seated rest break    Balance:  Dynamic Standing Balance: Stand By Assistance; pt performs toileting tasks and hand washing with SBA for balance    Exercise:  Pt refuses therex    Functional Outcome Measures: Not completed       ASSESSMENT:  Assessment: Patient progressing toward established goals. Limited by new diagnosis of pneumonia, limited activity tolerance. Activity Tolerance:  Patient tolerance of  treatment: fair.  Limited by fatigue, c/o nausea at times     Equipment Recommendations:Equipment Needed: Yes(may need new

## 2020-01-20 NOTE — PROGRESS NOTES
floor to notify PRN for further needs. Pt's nurse,Amber updated. Secure text sent to Dr Tosha Cruz to update him on above.

## 2020-01-20 NOTE — PLAN OF CARE
Tressa Franks  [id number]    This document includes comprehensive careplan information as well as current active orders for this admission to Transitional Care Unit (8E). A copy was given to the patient and/or patient representative today, 1/20/2020.     Current Active Orders:  Orders Placed This Encounter   Procedures    Rapid influenza A/B antigens    MRSA Screening Culture Only    Respiratory Culture    Culture blood    Culture blood    XR CHEST STANDARD (2 VW)    XR ABDOMEN (2 VIEWS)    XR CHEST STANDARD (2 VW)    CT CHEST WO CONTRAST    Basic Metabolic Panel    CBC Auto Differential    Urine with Reflexed Micro    Anion Gap    Glomerular Filtration Rate, Estimated    Comprehensive Metabolic Panel    CBC Auto Differential    Lipase    Anion Gap    Glomerular Filtration Rate, Estimated    Scan of Blood Smear    Urine with Reflexed Micro    Basic Metabolic Panel    CBC Auto Differential    Anion Gap    Glomerular Filtration Rate, Estimated    Procalcitonin    DIET GENERAL;    Place PPD    Vital signs    Waffle cushion in chair when up    Full Weight Bearing as tolerated    Ice therapy    Encourage deep breathing and coughing    Weigh patient daily    Encourage fluids    Change dressing    Remove dressing    Full code    Consult to Recreation Therapy    Consult to Social Work    Inpatient consult to Dietitian    Inpatient consult to Pulmonology    Pharmacy to Dose: Vancomycin    Palliative Care Eval    Inpatient consult to Spiritual Services    Dietary Nutrition Supplements: Standard High Calorie Oral Supplement    OT eval and treat    PT eval and treat    BIPAP    Nasal Cannula Oxygen    MDI Treatment    Acapella    HHN Treatment    Hip precautions, nsg to post anterior / posterior at bedside    DME Order for Paco Wiley as OP    DME Order for HIP Kit as OP       Current Medications:  Current Facility-Administered Medications   Medication Dose Route Frequency Provider Last Rate Last Dose    ondansetron (ZOFRAN) tablet 4 mg  4 mg Oral Q6H PRN Patsy Haney MD   4 mg at 01/20/20 0658    piperacillin-tazobactam (ZOSYN) 3.375 g in dextrose 5 % 50 mL IVPB extended infusion (mini-bag)  3.375 g Intravenous Q8H Darell Branch MD 12.5 mL/hr at 01/20/20 1001 3.375 g at 01/20/20 1001    ipratropium-albuterol (DUONEB) nebulizer solution 1 ampule  1 ampule Inhalation Q4H WA Darell Branch MD   1 ampule at 01/20/20 0213    vancomycin (VANCOCIN) intermittent dosing (gertrude)   Other RX Anjaliholder Darell Branch MD        vancomycin 1000 mg IVPB in 250 mL D5W addavial  1,000 mg Intravenous Q24H Darell Branch MD   Stopped at 01/19/20 1741    predniSONE (DELTASONE) tablet 40 mg  40 mg Oral Daily Darell Branch MD   40 mg at 01/20/20 0944    muscle rub cream   Topical PRN Patsy Haney MD        sodium chloride flush 0.9 % injection 10 mL  10 mL Intravenous QAM AC Patsy Haney MD   10 mL at 01/20/20 0505    acetaminophen (TYLENOL) tablet 650 mg  650 mg Oral Q4H PRN Patsy Haney MD        ferrous sulfate tablet 325 mg  325 mg Oral BID WC Patsy Haney MD   325 mg at 01/20/20 0944    heparin flush (100 units/mL) injection 500 Units  500 Units Intravenous Daily Patsy Haney MD   500 Units at 01/20/20 0504    zinc oxide (PINXAV) 30 % ointment   Topical 4x Daily PRN Patsy Haney MD        heparin flush (100 units/mL) injection 500 Units  500 Units Intravenous PRN Patsy Haney MD   500 Units at 01/16/20 1230    sodium chloride flush 0.9 % injection 10 mL  10 mL Intravenous PRN Patsy Haney MD   10 mL at 01/16/20 1230    diphenhydrAMINE (BENADRYL) tablet 25 mg  25 mg Oral BID Patsy Haney MD   25 mg at 01/20/20 0944    fluticasone (FLONASE) 50 MCG/ACT nasal spray 1 spray  1 spray Each Nostril Daily Patsy Haney MD   1 spray at 01/20/20 0945    guaiFENesin [facility]) extended release tablet 600 mg 20 mg Oral Daily Zaynab Brumfield MD   20 mg at 01/19/20 1745    senna (SENOKOT) tablet 8.6 mg  1 tablet Oral Nightly Zaynab Brumfield MD   8.6 mg at 01/19/20 2217       Plan of Care Problems and Goals      Problem: Pain    Goal: Pain Level will decrease   Problem: Falls- Risk of    Goal: Absence of falls   Problem: Risk of Pressure Ulcer    Goal: Absence of pressure ulcer   Problem: Risk of Bleeding    Goal: Will show no signs and symptoms of excessive bleeding     Problem: Infection- Risk of Surgical Site Infection    Goal: Will show no infection signs and symptoms   Problem:  Bowel Function-Altered    Goal: Ability to achieve a regular elimination pattern will improve   Problem: Nutrition    Goal: Optimal nutrition therapy   Problem: Impaired Mobility    Goal: Mobility will improve   Problem: Discharge Planning    Goal: Continuum of care needs are met   Problem: Respiratory     Goal: Clear lung sounds    Goal: Adequate oxygenation   Problem: Mobility/Safety (PT)    Goal: will complete sit < > stand and stand pivot transfers with modified independence to for transfer surface to surface safely    Goal: will complete supine < > sit with modified independence to allow for transfer in/out of bed safely    Goal: will ambulate 150 ft with a rolling walker and modified independence to navigate home safely    Goal: will ascend/descend 2 steps with 1 handrail and straight cane with stand by assist for safe home entry    Goal: will complete car transfer with stand by assist for safe transport to home and appointments   Problem: Balance (OT)    Goal: will complete dynamic standing task with modified independence x 6 minutes with bilateral hand release to increase balance required for grooming    Goal: will complete mobility to/from bathroom with modified independence and 0 cues for safety to increase independence with activity of daily living (ADL) routine    Goal: will complete various transfers with modified independence and 0 cues for safety to increase independence with toileting transfers    Goal: will demonstrate basic Instrumental ADL tasks with emphasis on pet care using any adaptive strategies for increased safety and independence with changing litter box while abiding by hip precautions    Goal: will complete ADL routine including shower transfer with standard shower chair with modified independence to increase independence with self care   Problem: Dressings Lower Extremities (OT)     Goal:  will complete lower body ADL with long handled adaptive equipment and set up to increase independence with self care

## 2020-01-20 NOTE — FLOWSHEET NOTE
01/20/20 4220   Encounter Summary   Services provided to: Patient   Referral/Consult From: Aruna   Continue Visiting Yes  (1/20/20 N/R continue support)   Spiritual/Scientology   Type Spiritual support     Pt is busy with therapy at the time of entry. There was no encounter.  will follow up for continue support.

## 2020-01-20 NOTE — PROGRESS NOTES
Transitional Care Unit                   RECERTIFICATION    Patient Name: Arnold Springer        MRN: [id number]    [place]: [de-identified]    : 1945  (76 y.o.)  Gender: female     RECERTIFICATION of continued SNF inpatient care. On or before the 14 day:  Date:  7876    I certify that continued SNF inpatient care is necessary for the following reason(s):  PT/OT     I estimate that the additional period of SNF inpatient care will be 3-5 days.       Plans for post-SNF care are:  [x]Home Health Agency  [x]Office Care    []Other :    Continued SNF care is same condition for which patient received inpatient hospital services:  [x]Yes   []No    Electronically signed by Bobette Holter, MD on 2020 at 6:02 AM

## 2020-01-20 NOTE — FLOWSHEET NOTE
01/20/20 1825   Provider Notification   Provider Name [facility]   Provider Notification Physician   Method of Communication Secure Message   Response Waiting for response   Notification Time [phone]     Consult placed by pulmonology for an Established patient with history of metastatic lung cancer, increasing lung nodules on recent CT of chest possible disease progression with questionable lymphangitic spread    1826: will see pt tomorrow

## 2020-01-20 NOTE — PROGRESS NOTES
[facility]    Patient - Arcenio Brunson   MRN -  [id number]   [facility] # - [de-identified]   - 1945      Date of Admission -  2020  1:37 PM  Date of evaluation -  2020  Room - -/[facility]   Hospital Day - 15  Consulting - Sophie Pruitt MD Primary Care Physician - Timoteo Meckel, APRN - KRISTIAN   Chief Complaint   Shortness of breath  Active Hospital Problem List      Active Hospital Problems    Diagnosis Date Noted    Squamous cell carcinoma of bronchus in right lower lobe [facility]) [C34.31] 2018     Priority: High     Class: Acute    Hip fracture requiring operative repair, left, closed, initial encounter ([facility] Jodie Haywood 2020    Acute blood loss anemia [D62]     Thrombocytopenia ([address] [D69.6]     Constipation [K59.00]     Chronic diastolic heart failure ([facility] [facility] [I50.32]     Hx pulmonary embolism [Z86.711]     Hypothyroidism [E03.9]     Pathologic fracture of femoral neck, right, initial encounter ([facility] [facility] [D48.381K] 2019    Fall [M83. XXXA] 2019    Acute on chronic respiratory failure with hypoxia and hypercapnia (HCC) [J96.21, J96.22] 2019    Paroxysmal atrial fibrillation ([address] [I48.0] 10/22/2018    Chronic obstructive pulmonary disease ([facility] [J44.9]     Moderate malnutrition ([facility] [E44.0] 10/15/2018    Acute on chronic respiratory failure with hypoxemia (HCC) [J96.21] 10/14/2018    Recurrent non-small cell lung cancer (NSCLC) ([facility] [C34.90] 2017    HTN (hypertension) [I10]     GERD (gastroesophageal reflux disease) [K21.9]      CLIF Brunson is a 76 y.o. female       Patient has a significant history of HFpEF-ECHO 2019 LVEF 55-60%, PAF,  COPD  with FEV 1 35% on PFT, Chronic Respiratory Failure Hypoxia-Home Bipap with 2L/NC O2 Bleed, Metastatic Left Lung Cancer established patient of Dr. Armen Preciado.  History of right lower lobe lobectomy secondary to stage 2A nonsmall cell lung cancer in , patient did finish chemo and radiation 8/2013. Reoccurrence with new stage 1A left lung cancer in 10/2016. She did undergo stereotatic ablative radiation therapy to left lung mass 1/2017. Treated with Taxotere followed by [facility], currently receiving palliative immunotherapy with Tecentriq, last infusion 12/23/2019. Pericardial effusion s/p pericardiocentesis in July 2019, Hypothyroidism, Anemia, normocytic, Pulmonary embolism-Eliquis, GERD and Scoliosis. On 12/27/19, patient tripped on her cat and fell on the right side landing on the hip area and she started having pain but was able to get up and walk. Xray showed Subcapital right femoral neck fracture. Patient was taken to the OR then she had postop respiratory failure and reintubated on 1/1. Then she was extubated the second day and moved out of ICU on 1/3. The patient then discharged to TCU. She had CT chest on 11/4 that showed PE and narrowing of the BI with postopstructive infiltrates. She had fever on 1/16 up to 102.5 with hypotension. CXR today showed worsened appearance with further loss of volume right lung. She is still SOB, has productive cough to brown sputum. She was started on ceftriaxone and azithromycin.       Past 24 hrs   -On 2 LPM via NC SpO2 97%  -Shortness of breath improved today per patient   -Reports pink frothy sputum  -Procal 2.48  All other systems reviewed  Past Medical History         Diagnosis Date    Arthritis     low back pain and scoliosis in lumbar    Cancer ([facility] 2012    lower right lobe-lung s/p lobectomy in 2012, radiation and chemo nad later had mediatinal mets and had radationa dn chemo again, and in 2016 had reoccurence on the left side upper and was started on radiation this year and has  a follow up CT in oct 2017    CHF (congestive heart failure) (HCC)     Chronic bronchitis, simple (HCC)     Chronic respiratory failure with hypoxia (HCC)     COPD (chronic obstructive pulmonary disease) (HCC)     GERD (gastroesophageal reflux disease)     History of positive PPD     HTN (hypertension)     Hx of blood clots     in lung     Pneumonia     Postprocedural pneumothorax     Scoliosis     Sleep apnea     Urinary incontinence     UTI (urinary tract infection)       Past Surgical History           Procedure Laterality Date    HIP SURGERY Right 2020    RIGHT HIP HEMIARTHROPLASTY performed by Juan Johnson at [address] [place] [address] [id number]  10/19/2012    rt lower lobectomy     LUNG BIOPSY  2012     Diet    Dietary Nutrition Supplements: Standard High Calorie Oral Supplement  DIET GENERAL;  Allergies    Advil cold & sinus liqui-gels [pseudoephedrine-ibuprofen];  Food; Percocet [oxycodone-acetaminophen]; and Sulfa antibiotics  Social History     Social History     Socioeconomic History    Marital status:      Spouse name: Candi Wei Number of children: 2    Years of education: Not on file    Highest education level: Not on file   Occupational History    Not on file   Social Needs    Financial resource strain: Not on file    Food insecurity:     Worry: Not on file     Inability: Not on file    Transportation needs:     Medical: Not on file     Non-medical: Not on file   Tobacco Use    Smoking status: Former Smoker     Packs/day: 1.00     Years: 45.00     Pack years: 45.00     Types: Cigarettes     Last attempt to quit:      Years since quittin.0    Smokeless tobacco: Never Used   Substance and Sexual Activity    Alcohol use: No    Drug use: No    Sexual activity: Not on file   Lifestyle    Physical activity:     Days per week: Not on file     Minutes per session: Not on file    Stress: Not on file   Relationships    Social connections:     Talks on phone: Not on file     Gets together: Not on file     Attends Buddhism service: Not on file     Active member of club or organization: Not on file     Attends meetings of clubs or organizations: Not on file     Relationship status: Not on file    Intimate partner violence:     Fear of current or ex partner: Not on file     Emotionally abused: Not on file     Physically abused: Not on file     Forced sexual activity: Not on file   Other Topics Concern    Not on file   Social History Narrative    Not on file     Family History          Problem Relation Age of Onset    Cancer Mother     Heart Disease Father     High Blood Pressure Father     High Cholesterol Father     Arthritis Sister     Heart Disease Sister     Cancer Sister         lung cancer    Heart Disease Sister     Stroke Sister     High Cholesterol Sister     High Blood Pressure Sister     Stroke Paternal Grandfather     High Blood Pressure Brother      Meds    Current Medications    piperacillin-tazobactam  3.375 g Intravenous Q8H    ipratropium-albuterol  1 ampule Inhalation Q4H WA    vancomycin (VANCOCIN) intermittent dosing (placeholder)   Other RX Placeholder    vancomycin  1,000 mg Intravenous Q24H    predniSONE  40 mg Oral Daily    sodium chloride flush  10 mL Intravenous QAM AC    ferrous sulfate  325 mg Oral BID WC    heparin flush (100 units/mL)  500 Units Intravenous Daily    diphenhydrAMINE  25 mg Oral BID    fluticasone  1 spray Each Nostril Daily    guaiFENesin  600 mg Oral Daily    digoxin  125 mcg Oral Daily    docusate sodium  100 mg Oral BID    levothyroxine  75 mcg Oral Daily    metoprolol tartrate  12.5 mg Oral BID    polyethylene glycol  17 g Oral Daily    rOPINIRole  0.25 mg Oral BID    famotidine  20 mg Oral Daily    acetaminophen  500 mg Oral Q6H    Vitamin D  5,000 Units Oral Daily    betamethasone dipropionate   Topical Daily    mometasone-formoterol  2 puff Inhalation BID    rivaroxaban  20 mg Oral Daily    senna  1 tablet Oral Nightly     ondansetron, muscle rub, acetaminophen, zinc oxide, [DISCONTINUED] heparin flush (100 units/mL) **OR** heparin flush (100 units/mL), sodium chloride flush, HYDROcodone-acetaminophen, magnesium hydroxide, albuterol sulfate HFA, bisacodyl, hydrOXYzine  IV Drips/Infusions    Vitals    Vitals    height is 5' (1.524 m) and weight is 147 lb (66.7 kg). Her oral temperature is 98.2 °F (36.8 °C). Her blood pressure is 119/50 (abnormal) and her pulse is 94. Her respiration is 20 and oxygen saturation is 97%. O2 Flow Rate (L/min): 2 L/min  I/O    Intake/Output Summary (Last 24 hours) at 1/20/2020 1535  Last data filed at 1/20/2020 1200  Gross per 24 hour   Intake 3222.36 ml   Output --   Net 3222.36 ml     Patient Vitals for the past 96 hrs (Last 3 readings):   Weight   01/20/20 0541 147 lb (66.7 kg)   01/19/20 0532 146 lb 3.2 oz (66.3 kg)   01/18/20 0556 147 lb 11.2 oz (67 kg)     Exam   Physical Exam   Constitutional: No distress on O2 per NC. Patient appears moderately built and  moderately nourished. Head: Normocephalic and atraumatic. Mouth/Throat: Oropharynx is clear and moist.  No oral thrush. Eyes: Conjunctivae are normal. Pupils are equal, round. No scleral icterus. Neck: Neck supple. No tracheal deviation present. Cardiovascular: S1 and S2 with no murmur. No peripheral edema  Pulmonary/Chest: Normal effort with bilateral air entry, diminished breath sounds bilaterally R>L faint rhonchi noted. No stridor. No respiratory distress. Patient exhibits no tenderness. Abdominal: Soft. Bowel sounds audible. No distension or tenderness to palp. Musculoskeletal: Moves all extremities  Neurological: Patient is alert and oriented to person, place, and time.      Labs   ABG  Lab Results   Component Value Date    PH 7.38 01/02/2020    PO2 77 01/02/2020    PCO2 54 01/02/2020    HCO3 32 01/02/2020    O2SAT 95 01/02/2020     Lab Results   Component Value Date    IFIO2 30 01/02/2020    MODE CPAP/PS 01/02/2020    SETPEEP 6.0 01/02/2020     CBC  Recent Labs     01/18/20  1600   WBC 9.3   RBC 3.43*   HGB 10.0*   HCT 33.4*   MCV 97.4   MCH 29.2   MCHC 29.9*      MPV 9.4      BMP  Recent Labs     01/18/20  1600      K 4.5   CL 97*   CO2 28   BUN 28*   CREATININE 0.8   GLUCOSE 120*   CALCIUM 8.2*     LFT  No results for input(s): AST, ALT, ALB, BILITOT, ALKPHOS, LIPASE in the last 72 hours. Invalid input(s): AMYLASE  TROP  Lab Results   Component Value Date    TROPONINT < 0.010 12/31/2019    TROPONINT 0.019 12/31/2019    TROPONINT < 0.010 12/30/2019     BNP  Lab Results   Component Value Date    PROBNP 625.1 07/18/2019    PROBNP 590.5 07/18/2019    PROBNP 2109.0 02/23/2019     D-Dimer  No results found for: DDIMER  Lactic Acid  No results for input(s): LACTA in the last 72 hours. INR  No results for input(s): INR, PROTIME in the last 72 hours. PTT  No results for input(s): APTT in the last 72 hours. Glucose  No results for input(s): POCGLU in the last 72 hours. UA   No results for input(s): SPECGRAV, PHUR, COLORU, CLARITYU, MUCUS, PROTEINU, BLOODU, RBCUA, WBCUA, BACTERIA, NITRU, GLUCOSEU, BILIRUBINUR, UROBILINOGEN, KETUA, LABCAST, LABCASTTY, AMORPHOS in the last 72 hours. Invalid input(s): CRYSTALS. PFTs               Cultures    Procalcitonin  Lab Results   Component Value Date    PROCAL 2.48 01/20/2020    PROCAL 0.09 02/27/2019    PROCAL 0.08 02/24/2019     Rapid Flu A/B-Negative  Blood Culture x2-No prelim growth  MRSA-Pending  Respiratory culture-Normal julius    Right lower lobectomy   RPTD: 10/22/2012   FINAL DIAGNOSIS:  A - Right lower lobe lung, lobectomy:      Moderately differentiated adenocarcinoma, mixed subtype      (predominantly papillary), pT2b      Emphysematous changes in non-neoplastic lung parenchyma. Margins negative for malignancy. B - Peribronchial lymph node, excision:   One lymph node with caseous necrosis/calcification, pN0. Negative for malignancy. Left lung nodule biopsy  RPTD: 11/01/2016   FINAL DIAGNOSIS:  Left upper lobe of lung, core needle biopsy: Moderately to poorly differentiated pulmonary adenocarcinoma.       Radiology    CXR     XR CHEST (2 VW)   1/18/2020 FINDINGS:   The right heart border is entirely obscured. The mediastinum is not widened. Loss of volume right lung should mediastinum the right. Slightly worse in appearance since the prior exam with air bronchograms right lower lobe. Interstitial edema and a minimal aerated lung. Herniation of left lung and to the right due to the shift the mediastinum and loss of volume right lung. The pulmonary vascularity is normal. No suspicious osseous lesions are present. Impression:  Minimally worsened appearance with further loss of volume right lung       XR CHEST (2 VW)   1/16/2020   FINDINGS: There is a right chest wall Port-A-Cath. Postsurgical changes and volume loss in the right lung. There is patchy opacity in the right lung which could reflect infiltrate or edema. Hyperinflation of the left lung with minimal atelectasis or scarring in the left lung base. Preeti Siskin No change from prior study. Osteopenia. Thoracic scoliosis. No cardiomegaly. Postsurgical changes and volume loss in the right lung. There is airspace and interstitial opacity in the right lung which could reflect infiltrate or edema. Correlation with symptoms is advised. CT Scans    CT CHEST WO CONTRAST   1/19/2020   Impression:  1. Further loss of volume right lung with extensive right lung airspace consolidation likely acute pneumonic infiltrates. 2. Diffuse interstitial edema left lung versus lymphangitic spread of metastases. Very Small left pleural effusion. 3. Minimal increase in sizes of the previously identified left lung masses. CT CHEST W CONTRAST   11/4/2019   FINDINGS:  Postsurgical changes from prior right lower lobectomy. There is consolidative opacity again demonstrated at the medial right lung base. Overall, the degree of atelectasis appears slightly progressed when compared to the prior examination.  There is also small lucent cavitary change demonstrated within the medial right lung base on axial image 33 measuring approximately 1.1 x 0.9 cm which is similar when compared to the prior examination. There is thickening along the pleura posterolaterally on the right  demonstrated on axial image 34 which appears similar in configuration from the prior examination. There is also similar appearing volume loss within the right hemithorax. There is a filling defect inserted within the distal right pulmonary artery which is most consistent with pulmonary embolism. This is of indeterminate chronicity. There is focal opacity demonstrated anteriorly within the left upper lobe measuring approximately 1.8 x 4.1 cm. This abuts the pleura and appears slightly increased in size when compared to the prior examination. Previously this measured approximately 3.6 x 1.5 cm. There is moderate diffuse centrilobular emphysema. There is a focal irregular opacity demonstrated with ill-defined margins within the medial left upper lobe on axial image 14. This appears more confluent increased in size from the prior examination. This measures approximately 1.9 x 0.8 centimeters. Previously this measured approximately 1.1 x 0.9 cm. No pneumothorax is seen. Evaluation for hilar adenopathy of the right is limited due to right perihilar opacity from prior postsurgical change and partial collapse of the residual lung at the medial right lung base. There is indeterminate mild pericardial fluid present. This appears improved from the prior examination. CT of the abdomen and pelvis are reported separately. Impression:  1. There is a filling defect inserted within the distal right pulmonary artery which is most consistent with pulmonary embolism. This is of indeterminate chronicity. 2. Postsurgical changes from prior right lower lobectomy with associated volume loss is again seen. There is consolidative opacity at the posterior medial right lung base possibly representing postobstructive collapse.  This appears slightly progressed from the prior

## 2020-01-20 NOTE — PATIENT CARE CONFERENCE
[address] [address]  Transitional Care Unit  Team Conference Note    Patient Name: Yonatan Woodall   MRN: [id number]    : 1945  (76 y.o.)  Gender: female   Referring Practitioner: Fredrick Mcneil MD  Diagnosis: hemiarthroplasty of right hip    Team Conference Date: 2020   Admission Date:     0:74 PM  Re-Certification Date: 7299    :  Tentative Discharge Plan and current psychosocial issues:she is discharged from [address] [place] [address]. Patient reports she was managing her own care independently prior to her fall. Patient states she is an active  and was completing most IADLs without any assist.SW anticipates patient will benefit from either out patient ongoing skilled therapies or skilled home health services with skilled therapies. Therapy staff to evaluate and assess need for any medical equipment. SW will continue to monitor progress and maintain contact with patient and patient's family regarding length of stay and recommendations. SW will continue to assist with ongoing discharge planning. Nursing:     Weight:  Weight: has been stable     Wounds/Incisions/Ulcers:  Incision healing well  Bowel: continent  Bladder:continent     Pain: Patient's pain is currently controlled with tylenol and norco    Education Provided:  Diabetic:  No  Peg Tube:NA    Beach Care:  Education:NA  Removal Necessary:  NA  Supplies Needed: NA     Anticoagulant Use:  Patient on xarelto for anticoagulation, which is being managed by Primary Care Physician    Antibiotic Use:  Patient on antibiotics with a stop date of zosyn and vancomycin    Psychotropic Medication Use:  Patient not on psychotropic medications. Oxygen Use: Yes     Home Medications Reconciled: N/A    Physical Therapy:   Ms. Baljit Velazquez was making good gains towards goals set for her, however limited recently by new diagnosis of pneumonia. Patient with decreased O2 sats, increased shortness of breath.   Patient able to complete mobility with SBA with a RW, however mobility limited by fatigue. Patient would benefit from continued skilled PT services to improve her ability to complete functional mobility, reduce her risk for falls and allow patient to return to [facility]. PT Equipment Recommendations  Equipment Needed: Yes(may need new 2WW)    Occupational  Therapy:  Pt is making fair progress towards goals. Factors facilitating goal achievement include: good family support, cooperative, and pleasant. Barriers to goal achievement include: SOB, hip precautions. Family education has been addressed on the following topics: N/A. Pt continues to require skilled Occupational Therapy to address the following performance deficits dynamic standing balance with 2 UE release, ADL routine with LHAE to maintain hip precautions, endurance, safety awareness, and activity tolerance. Without skilled OT intervention pt is at risk for functional decline, increased falls, and readmission to hospital.       Equipment: Oleg Toledo was delivered. Pt declines need for BSC. Nutrition:    Weight: 150 lb 11.2 oz (68.4 kg) / Body mass index is 29.43 kg/m². Please see nutrition note for details. Family Education: No family available for education  Fall Risk:  Falling star program initiated    Goal Achievement:   Patient Continues to progress toward goal achievement    Discharge Plan   Estimated Length of Stay: re eval  Destination: home health  Services at Discharge: [address] [place] [address], Occupational Therapy, Nursing and aide 3x week  Equipment at Discharge: Needs: none  Factors facilitating achievement of predicted outcomes:  Motivated, Cooperative and Pleasant  Barriers to the achievement of predicted outcomes: Decreased endurance, Long standing deficits and Medical complications    Readmission Risk              Risk of Unplanned Readmission:        46         High (20-31)     Moderate (10-19)     Low (0-9)    Team Members Present at Conference:  Physician: Dr. Niesha Zuleta. Adilson Angulo MD  Nurse: Valeriy Oquendo RN, Raheel Argueta RN  :  Brody Ramirez W  Occupational Therapist:  ALBINO Parks  Physical Therapist:   Jeanna Goodwin PT  Speech Therapist: Speech Therapist: N/A. All team members have reviewed and updated as necessary and appropriate the established interdisciplinary plan of care within the medical record of Malvin Echavarria. Pt's team conference attended (see above). Pt seen on rounds with LSW, to review the results with patient and family. Discussed length of stay as above. Pt's team conference attended (see above.)  Pt seen on rounds with LSW, to review the results with patient and family. Discussed length of stay as above.     Physical Exam:  General:  Alert and oriented  Vitals:   Vitals:    01/21/20 0810   BP: 137/67   Pulse: 98   Resp: 16   Temp: 96 °F (35.6 °C)   SpO2: 96%     Heart:  Regular rate and rhythm  Lungs:  Clear to auscultation but dull on the left  Abdomen:  soft with positive bowel sounds     Assessment:  Progressing in full rehabilitation program (see above)    Plan:  Continue Rehab            Continue current medications            Spent 39 minutes today in patient management and care    Electronically signed by Patsy Haney MD on 1/21/2020 at 9:45 AM

## 2020-01-21 LAB
APTT: 59.2 SECONDS (ref 22–38)
MRSA SCREEN: NORMAL

## 2020-01-21 PROCEDURE — 6370000000 HC RX 637 (ALT 250 FOR IP): Performed by: INTERNAL MEDICINE

## 2020-01-21 PROCEDURE — 97116 GAIT TRAINING THERAPY: CPT

## 2020-01-21 PROCEDURE — 6370000000 HC RX 637 (ALT 250 FOR IP): Performed by: FAMILY MEDICINE

## 2020-01-21 PROCEDURE — 6360000002 HC RX W HCPCS: Performed by: NURSE PRACTITIONER

## 2020-01-21 PROCEDURE — 94669 MECHANICAL CHEST WALL OSCILL: CPT

## 2020-01-21 PROCEDURE — 2580000003 HC RX 258: Performed by: FAMILY MEDICINE

## 2020-01-21 PROCEDURE — 85730 THROMBOPLASTIN TIME PARTIAL: CPT

## 2020-01-21 PROCEDURE — 97535 SELF CARE MNGMENT TRAINING: CPT

## 2020-01-21 PROCEDURE — 94640 AIRWAY INHALATION TREATMENT: CPT

## 2020-01-21 PROCEDURE — 6360000002 HC RX W HCPCS: Performed by: FAMILY MEDICINE

## 2020-01-21 PROCEDURE — 99233 SBSQ HOSP IP/OBS HIGH 50: CPT | Performed by: INTERNAL MEDICINE

## 2020-01-21 PROCEDURE — 2580000003 HC RX 258: Performed by: INTERNAL MEDICINE

## 2020-01-21 PROCEDURE — 36415 COLL VENOUS BLD VENIPUNCTURE: CPT

## 2020-01-21 PROCEDURE — 6360000002 HC RX W HCPCS: Performed by: INTERNAL MEDICINE

## 2020-01-21 PROCEDURE — 94761 N-INVAS EAR/PLS OXIMETRY MLT: CPT

## 2020-01-21 PROCEDURE — 2580000003 HC RX 258: Performed by: NURSE PRACTITIONER

## 2020-01-21 PROCEDURE — 2700000000 HC OXYGEN THERAPY PER DAY

## 2020-01-21 PROCEDURE — 1290000000 HC SEMI PRIVATE OTHER R&B

## 2020-01-21 PROCEDURE — 2709999900 HC NON-CHARGEABLE SUPPLY

## 2020-01-21 PROCEDURE — APPSS30 APP SPLIT SHARED TIME 16-30 MINUTES: Performed by: NURSE PRACTITIONER

## 2020-01-21 RX ORDER — TOBRAMYCIN INHALATION SOLUTION 300 MG/5ML
300 INHALANT RESPIRATORY (INHALATION) 2 TIMES DAILY
Status: DISCONTINUED | OUTPATIENT
Start: 2020-01-21 | End: 2020-01-24 | Stop reason: HOSPADM

## 2020-01-21 RX ORDER — HEPARIN SODIUM 10000 [USP'U]/100ML
15 INJECTION, SOLUTION INTRAVENOUS CONTINUOUS
Status: DISCONTINUED | OUTPATIENT
Start: 2020-01-21 | End: 2020-01-24

## 2020-01-21 RX ORDER — HEPARIN SODIUM 5000 [USP'U]/ML
5000 INJECTION, SOLUTION INTRAVENOUS; SUBCUTANEOUS CONTINUOUS
Status: DISCONTINUED | OUTPATIENT
Start: 2020-01-21 | End: 2020-01-21 | Stop reason: SDUPTHER

## 2020-01-21 RX ADMIN — ONDANSETRON HYDROCHLORIDE 4 MG: 4 TABLET, FILM COATED ORAL at 14:05

## 2020-01-21 RX ADMIN — GUAIFENESIN 600 MG: 600 TABLET, EXTENDED RELEASE ORAL at 08:59

## 2020-01-21 RX ADMIN — ROPINIROLE HYDROCHLORIDE 0.25 MG: 0.25 TABLET, FILM COATED ORAL at 08:59

## 2020-01-21 RX ADMIN — METOPROLOL TARTRATE 12.5 MG: 25 TABLET ORAL at 08:59

## 2020-01-21 RX ADMIN — DOCUSATE SODIUM 100 MG: 100 CAPSULE, LIQUID FILLED ORAL at 09:01

## 2020-01-21 RX ADMIN — ONDANSETRON HYDROCHLORIDE 4 MG: 4 TABLET, FILM COATED ORAL at 05:14

## 2020-01-21 RX ADMIN — DIPHENHYDRAMINE HCL 25 MG: 25 TABLET ORAL at 20:47

## 2020-01-21 RX ADMIN — VITAMIN D, TAB 1000IU (100/BT) 5000 UNITS: 25 TAB at 08:59

## 2020-01-21 RX ADMIN — Medication 2 PUFF: at 20:05

## 2020-01-21 RX ADMIN — HYDROCODONE BITARTRATE AND ACETAMINOPHEN 1 TABLET: 10; 325 TABLET ORAL at 05:13

## 2020-01-21 RX ADMIN — IPRATROPIUM BROMIDE AND ALBUTEROL SULFATE 1 AMPULE: .5; 3 SOLUTION RESPIRATORY (INHALATION) at 11:53

## 2020-01-21 RX ADMIN — FERROUS SULFATE TAB 325 MG (65 MG ELEMENTAL FE) 325 MG: 325 (65 FE) TAB at 09:02

## 2020-01-21 RX ADMIN — LEVOTHYROXINE SODIUM 75 MCG: 75 TABLET ORAL at 05:13

## 2020-01-21 RX ADMIN — Medication 10 ML: at 05:52

## 2020-01-21 RX ADMIN — BETAMETHASONE DIPROPIONATE: 0.5 CREAM TOPICAL at 16:46

## 2020-01-21 RX ADMIN — PIPERACILLIN AND TAZOBACTAM 3.38 G: 3; .375 INJECTION, POWDER, FOR SOLUTION INTRAVENOUS at 09:00

## 2020-01-21 RX ADMIN — PIPERACILLIN AND TAZOBACTAM 3.38 G: 3; .375 INJECTION, POWDER, FOR SOLUTION INTRAVENOUS at 16:47

## 2020-01-21 RX ADMIN — DIPHENHYDRAMINE HCL 25 MG: 25 TABLET ORAL at 09:00

## 2020-01-21 RX ADMIN — IPRATROPIUM BROMIDE AND ALBUTEROL SULFATE 1 AMPULE: .5; 3 SOLUTION RESPIRATORY (INHALATION) at 15:29

## 2020-01-21 RX ADMIN — PIPERACILLIN AND TAZOBACTAM 3.38 G: 3; .375 INJECTION, POWDER, FOR SOLUTION INTRAVENOUS at 00:28

## 2020-01-21 RX ADMIN — FERROUS SULFATE TAB 325 MG (65 MG ELEMENTAL FE) 325 MG: 325 (65 FE) TAB at 16:47

## 2020-01-21 RX ADMIN — FLUTICASONE PROPIONATE 1 SPRAY: 50 SPRAY, METERED NASAL at 09:03

## 2020-01-21 RX ADMIN — Medication 2 PUFF: at 07:43

## 2020-01-21 RX ADMIN — ROPINIROLE HYDROCHLORIDE 0.25 MG: 0.25 TABLET, FILM COATED ORAL at 20:47

## 2020-01-21 RX ADMIN — ACETAMINOPHEN 500 MG: 500 TABLET ORAL at 09:00

## 2020-01-21 RX ADMIN — FAMOTIDINE 20 MG: 20 TABLET ORAL at 09:02

## 2020-01-21 RX ADMIN — HYDROCODONE BITARTRATE AND ACETAMINOPHEN 1 TABLET: 10; 325 TABLET ORAL at 14:05

## 2020-01-21 RX ADMIN — PREDNISONE 40 MG: 20 TABLET ORAL at 09:03

## 2020-01-21 RX ADMIN — POLYETHYLENE GLYCOL 3350 17 G: 17 POWDER, FOR SOLUTION ORAL at 09:02

## 2020-01-21 RX ADMIN — HEPARIN SODIUM 18 UNITS/KG/HR: 10000 INJECTION, SOLUTION INTRAVENOUS at 11:56

## 2020-01-21 RX ADMIN — HEPARIN 500 UNITS: 100 SYRINGE at 05:52

## 2020-01-21 RX ADMIN — DIGOXIN 125 MCG: 125 TABLET ORAL at 08:59

## 2020-01-21 RX ADMIN — IPRATROPIUM BROMIDE AND ALBUTEROL SULFATE 1 AMPULE: .5; 3 SOLUTION RESPIRATORY (INHALATION) at 07:43

## 2020-01-21 RX ADMIN — IPRATROPIUM BROMIDE AND ALBUTEROL SULFATE 1 AMPULE: .5; 3 SOLUTION RESPIRATORY (INHALATION) at 20:05

## 2020-01-21 ASSESSMENT — PAIN SCALES - GENERAL
PAINLEVEL_OUTOF10: 0
PAINLEVEL_OUTOF10: 7
PAINLEVEL_OUTOF10: 6
PAINLEVEL_OUTOF10: 7

## 2020-01-21 NOTE — PROGRESS NOTES
[address] [address]  INPATIENT PHYSICAL THERAPY  DAILY NOTE  - [place] [facility]UR[facility] UNIT - [id number]    Time In: 0810  Time Out: 0826  Timed Code Treatment Minutes: 16 Minutes  Minutes: 16          Date: 2020  Patient Name: Yon Curtis,  Gender:  female        MRN: [id number]  : 1945  (76 y.o.)  Referral Date : 20  Referring Practitioner: Brook Owen MD  Diagnosis: hemiarthroplasty of right hip  Treatment Diagnosis: difficulty in walking  Additional Pertinent Hx: Per EMR: \"Fannie Payne is a 76 y.o. female who presents for evaluation of pain to the right leg and hip that onset following a fall that occurred 3 days ago. Patient reports that she tripped over her cat and fell 3 days ago, experiencing immediate pain to the right leg and hip. Patient notes worsening of pain since initial fall and reports exacerbation of pain when she attempts to bear weight onto her right leg. Patient describes additional symptom of nausea. Patient denies hitting her head during the incident and denies headache and chest pain. Patient reports using oxygen treatments at home due to condition of chronic obstructive pulmonary disease. Patient notes a history of smoking but denies current nicotine use. Patient additionally has a history of congestive heart failure and notes that edema to her lower extremities is normal at baseline. \" Pt underwent right hip hemiarthroplasty 20.      Prior Level of Function:  Lives With: Alone  Type of Home: House  Home Layout: One level  Home Access: Ramped entrance   [facility] [address] - Number of Steps: 2  Entrance Stairs - Rails: Right(grab bar)  Home Equipment: Standard walker, 4 wheeled walker, Cane, Oxygen   Bathroom Shower/Tub: Tub/Shower unit, Shower chair with back  [employer]: Standard(pt reports daughter installing toilet riser)  Bathroom Equipment: Shower chair, Grab bars in  [address] Help From: Family  ADL Assistance: Independent  Homemaking all fall risk precautions in place.

## 2020-01-21 NOTE — PROGRESS NOTES
[facility]  [address] [place] [facility]  Occupational Therapy  Daily Note  Time:   Time In: 4418  Time Out: 3344  Timed Code Treatment Minutes: 37 Minutes  Minutes: 43          Date: 2020  Patient Name: Tino Victor,   Gender: female      Room: [facility]66/066-A  MRN: [id number]  : 1945  (76 y.o.)  Referring Practitioner: Lizzeth Buck MD; Attending: Dr. Veronica Shukla  Diagnosis: Hemiarthroplast of R Hip  Additional Pertinent Hx: Per EMR: \"Fannie Perea is a 76 y.o. female who presents 19 for evaluation of pain to the right leg and hip that onset following a fall that occurred 3 days ago. Patient reports that she tripped over her cat and fell 3 days ago, experiencing immediate pain to the right leg and hip. Patient notes worsening of pain since initial fall and reports exacerbation of pain when she attempts to bear weight onto her right leg. Patient describes additional symptom of nausea. Patient denies hitting her head during the incident and denies headache and chest pain. Patient reports using oxygen treatments at home due to condition of chronic obstructive pulmonary disease. Patient notes a history of smoking but denies current nicotine use. Patient additionally has a history of congestive heart failure and notes that edema to her lower extremities is normal at baseline. \"  s/p s/p right hip hemiarthroplasty for femoral neck fracture on 20. Transfer to [facility] 20. Restrictions/Precautions:  Restrictions/Precautions: Weight Bearing, Fall Risk  Right Lower Extremity Weight Bearing: Weight Bearing As Tolerated  Position Activity Restriction  Hip Precautions: No hip flexion > 90 degrees, No ADduction, No hip internal rotation  Other position/activity restrictions: Home O2: baseline is 2L at rest/6L with activity.  S/p right hip hemiarthroplasty for femoral neck fracture on 20    SUBJECTIVE: Pt up in chair upon arrival. She stated \"if you are from therapy, then I want a shower hand release to increase balance required for grooming. Short term goal 3: Pt will complete mobility to/from BR with Mod I and 0 cues safety to increase indep with ADL routine. Short term goal 4: Pt will complete various t/fs with Mod I and 0 cues safety to increase indep with toileting t/fs. Long term goals  Time Frame for Long term goals : 3 weeks  Long term goal 1: Pt will complete ADL routine including shower t/f with standard shower chair with Emilia to increase indep with self care. Long term goal 2: Pt will demonstrate basic IADL tasks with emphasis on pet care using any adaptive strategies for increased safety and independence with changing litter box while abiding by hip precautions    Following session, patient left in safe position with all fall risk precautions in place.

## 2020-01-21 NOTE — PLAN OF CARE
Problem: Impaired respiratory status  Goal: Clear lung sounds  Description  Clear lung sounds     Outcome: Ongoing  Note:   Cont aerosol to improve aeration and vest to improve cough

## 2020-01-21 NOTE — PROGRESS NOTES
(chronic obstructive pulmonary disease) (HCC)     GERD (gastroesophageal reflux disease)     History of positive PPD     HTN (hypertension)     Hx of blood clots     in lung     Pneumonia     Postprocedural pneumothorax     Scoliosis     Sleep apnea     Urinary incontinence     UTI (urinary tract infection)       Past Surgical History           Procedure Laterality Date    HIP SURGERY Right 2020    RIGHT HIP HEMIARTHROPLASTY performed by Marli Rose at [address] [place] [address]  10/19/2012    rt lower lobectomy     LUNG BIOPSY  2012     Diet    Dietary Nutrition Supplements: Standard High Calorie Oral Supplement  DIET GENERAL;  Allergies    Advil cold & sinus liqui-gels [pseudoephedrine-ibuprofen];  Food; Percocet [oxycodone-acetaminophen]; and Sulfa antibiotics  Social History     Social History     Socioeconomic History    Marital status:      Spouse name: Alyssa Laurent Number of children: 2    Years of education: Not on file    Highest education level: Not on file   Occupational History    Not on file   Social Needs    Financial resource strain: Not on file    Food insecurity:     Worry: Not on file     Inability: Not on file    Transportation needs:     Medical: Not on file     Non-medical: Not on file   Tobacco Use    Smoking status: Former Smoker     Packs/day: 1.00     Years: 45.00     Pack years: 45.00     Types: Cigarettes     Last attempt to quit:      Years since quittin.0    Smokeless tobacco: Never Used   Substance and Sexual Activity    Alcohol use: No    Drug use: No    Sexual activity: Not on file   Lifestyle    Physical activity:     Days per week: Not on file     Minutes per session: Not on file    Stress: Not on file   Relationships    Social connections:     Talks on phone: Not on file     Gets together: Not on file     Attends [employer] service: Not on file     Active member of club or organization: Not on file     Attends meetings of clubs or organizations: Not on file     Relationship status: Not on file    Intimate partner violence:     Fear of current or ex partner: Not on file     Emotionally abused: Not on file     Physically abused: Not on file     Forced sexual activity: Not on file   Other Topics Concern    Not on file   Social History Narrative    Not on file     Family History          Problem Relation Age of Onset    Cancer Mother     Heart Disease Father     High Blood Pressure Father     High Cholesterol Father     Arthritis Sister     Heart Disease Sister     Cancer Sister         lung cancer    Heart Disease Sister     Stroke Sister     High Cholesterol Sister     High Blood Pressure Sister     Stroke Paternal Grandfather     High Blood Pressure Brother      Meds    Current Medications    piperacillin-tazobactam  3.375 g Intravenous Q8H    ipratropium-albuterol  1 ampule Inhalation Q4H WA    vancomycin (VANCOCIN) intermittent dosing (placeholder)   Other RX Placeholder    vancomycin  1,000 mg Intravenous Q24H    predniSONE  40 mg Oral Daily    sodium chloride flush  10 mL Intravenous QAM AC    ferrous sulfate  325 mg Oral BID WC    heparin flush (100 units/mL)  500 Units Intravenous Daily    diphenhydrAMINE  25 mg Oral BID    fluticasone  1 spray Each Nostril Daily    guaiFENesin  600 mg Oral Daily    digoxin  125 mcg Oral Daily    docusate sodium  100 mg Oral BID    levothyroxine  75 mcg Oral Daily    metoprolol tartrate  12.5 mg Oral BID    polyethylene glycol  17 g Oral Daily    rOPINIRole  0.25 mg Oral BID    famotidine  20 mg Oral Daily    acetaminophen  500 mg Oral Q6H    Vitamin D  5,000 Units Oral Daily    betamethasone dipropionate   Topical Daily    mometasone-formoterol  2 puff Inhalation BID    rivaroxaban  20 mg Oral Daily    senna  1 tablet Oral Nightly     ondansetron, muscle rub, acetaminophen, zinc oxide, [DISCONTINUED] heparin flush (100 units/mL) **OR** heparin flush (100 demonstrated within the medial right lung base on axial image 33 measuring approximately 1.1 x 0.9 cm which is similar when compared to the prior examination. There is thickening along the pleura posterolaterally on the right  demonstrated on axial image 34 which appears similar in configuration from the prior examination. There is also similar appearing volume loss within the right hemithorax. There is a filling defect inserted within the distal right pulmonary artery which is most consistent with pulmonary embolism. This is of indeterminate chronicity. There is focal opacity demonstrated anteriorly within the left upper lobe measuring approximately 1.8 x 4.1 cm. This abuts the pleura and appears slightly increased in size when compared to the prior examination. Previously this measured approximately 3.6 x 1.5 cm. There is moderate diffuse centrilobular emphysema. There is a focal irregular opacity demonstrated with ill-defined margins within the medial left upper lobe on axial image 14. This appears more confluent increased in size from the prior examination. This measures approximately 1.9 x 0.8 centimeters. Previously this measured approximately 1.1 x 0.9 cm. No pneumothorax is seen. Evaluation for hilar adenopathy of the right is limited due to right perihilar opacity from prior postsurgical change and partial collapse of the residual lung at the medial right lung base. There is indeterminate mild pericardial fluid present. This appears improved from the prior examination. CT of the abdomen and pelvis are reported separately. Impression:  1. There is a filling defect inserted within the distal right pulmonary artery which is most consistent with pulmonary embolism. This is of indeterminate chronicity. 2. Postsurgical changes from prior right lower lobectomy with associated volume loss is again seen.  There is consolidative opacity at the posterior medial right lung base possibly representing regarding CT chest results patient agreeable to bronchoscopy explained to patient risks of procedure including but not limited to bleeding, infection, pneumothorax, and prolonged mechanical ventilation. Patient verbalized understanding that she is at increased risk due to her COPD and verbalizes understanding of risks and agrees to proceed. -Will stop xarelto and start heparin gtt will need wash out period prior to bronchoscopy, discussed with MADIE Carrion to clarify with Primary Dr. Christopher Keen if patient can remain on 8E  -Oncology consult pending  -Continue [facility]. -Duoneb Neb Q4 hrs WA  -Prednisone 40 mg po daily for 5 days.  -Cultures negative to date  -NIPPV PRN. Case discussed with nurse and patient/family. Questions and concerns addressed. Meds and Orders reviewed. Electronically signed by JESUS Lara - CNP on 1/21/2020 at 9:36 AM     Addendum by Dr. Harmeet Reagan MD:  I have seen and examined the patient independently. Face to face evaluation and examination was performed. The above evaluation and note has been reviewed. Labs and radiographs were reviewed. I Have discussed with Mr. Joe Hull CNP about this patient in detail. The above assessment and plan has been reviewed. Please see my modifications mentioned below. My modifications:  She is on 2LPM via nasal cannula. Still having shortness of breath. She agreed to go for bronchoscopy under general Anesthesia. Will stop Xarelto. Start on Heparin drip.     Gonzalo Wilson MD 1/21/2020 1:00 PM

## 2020-01-22 LAB
APTT: 113.9 SECONDS (ref 22–38)
APTT: 85.2 SECONDS (ref 22–38)
APTT: 95.9 SECONDS (ref 22–38)
GRAM STAIN RESULT: ABNORMAL
ORGANISM: ABNORMAL
RESPIRATORY CULTURE: ABNORMAL
RESPIRATORY CULTURE: ABNORMAL

## 2020-01-22 PROCEDURE — 6370000000 HC RX 637 (ALT 250 FOR IP): Performed by: FAMILY MEDICINE

## 2020-01-22 PROCEDURE — 2580000003 HC RX 258: Performed by: NURSE PRACTITIONER

## 2020-01-22 PROCEDURE — 97116 GAIT TRAINING THERAPY: CPT

## 2020-01-22 PROCEDURE — 6360000002 HC RX W HCPCS: Performed by: NURSE PRACTITIONER

## 2020-01-22 PROCEDURE — 1290000000 HC SEMI PRIVATE OTHER R&B

## 2020-01-22 PROCEDURE — 2580000003 HC RX 258: Performed by: FAMILY MEDICINE

## 2020-01-22 PROCEDURE — 36415 COLL VENOUS BLD VENIPUNCTURE: CPT

## 2020-01-22 PROCEDURE — APPSS30 APP SPLIT SHARED TIME 16-30 MINUTES: Performed by: NURSE PRACTITIONER

## 2020-01-22 PROCEDURE — 94760 N-INVAS EAR/PLS OXIMETRY 1: CPT

## 2020-01-22 PROCEDURE — 97530 THERAPEUTIC ACTIVITIES: CPT

## 2020-01-22 PROCEDURE — 6370000000 HC RX 637 (ALT 250 FOR IP): Performed by: INTERNAL MEDICINE

## 2020-01-22 PROCEDURE — 6360000002 HC RX W HCPCS: Performed by: INTERNAL MEDICINE

## 2020-01-22 PROCEDURE — 94669 MECHANICAL CHEST WALL OSCILL: CPT

## 2020-01-22 PROCEDURE — 85730 THROMBOPLASTIN TIME PARTIAL: CPT

## 2020-01-22 PROCEDURE — 99232 SBSQ HOSP IP/OBS MODERATE 35: CPT | Performed by: INTERNAL MEDICINE

## 2020-01-22 PROCEDURE — 94640 AIRWAY INHALATION TREATMENT: CPT

## 2020-01-22 PROCEDURE — 97110 THERAPEUTIC EXERCISES: CPT

## 2020-01-22 RX ADMIN — SENNA PLUS 8.6 MG: 8.6 TABLET ORAL at 12:51

## 2020-01-22 RX ADMIN — PIPERACILLIN AND TAZOBACTAM 3.38 G: 3; .375 INJECTION, POWDER, FOR SOLUTION INTRAVENOUS at 01:04

## 2020-01-22 RX ADMIN — ROPINIROLE HYDROCHLORIDE 0.25 MG: 0.25 TABLET, FILM COATED ORAL at 20:51

## 2020-01-22 RX ADMIN — BETAMETHASONE DIPROPIONATE: 0.5 CREAM TOPICAL at 08:28

## 2020-01-22 RX ADMIN — FAMOTIDINE 20 MG: 20 TABLET ORAL at 08:24

## 2020-01-22 RX ADMIN — HEPARIN SODIUM 13.7 ML/HR: 10000 INJECTION, SOLUTION INTRAVENOUS at 06:04

## 2020-01-22 RX ADMIN — Medication 2 PUFF: at 07:35

## 2020-01-22 RX ADMIN — PIPERACILLIN AND TAZOBACTAM 3.38 G: 3; .375 INJECTION, POWDER, FOR SOLUTION INTRAVENOUS at 16:55

## 2020-01-22 RX ADMIN — ROPINIROLE HYDROCHLORIDE 0.25 MG: 0.25 TABLET, FILM COATED ORAL at 08:28

## 2020-01-22 RX ADMIN — Medication 300 MG: at 07:35

## 2020-01-22 RX ADMIN — ONDANSETRON HYDROCHLORIDE 4 MG: 4 TABLET, FILM COATED ORAL at 20:52

## 2020-01-22 RX ADMIN — HYDROCODONE BITARTRATE AND ACETAMINOPHEN 1 TABLET: 10; 325 TABLET ORAL at 08:24

## 2020-01-22 RX ADMIN — METOPROLOL TARTRATE 12.5 MG: 25 TABLET ORAL at 08:28

## 2020-01-22 RX ADMIN — FLUTICASONE PROPIONATE 1 SPRAY: 50 SPRAY, METERED NASAL at 09:58

## 2020-01-22 RX ADMIN — DIPHENHYDRAMINE HCL 25 MG: 25 TABLET ORAL at 20:51

## 2020-01-22 RX ADMIN — PIPERACILLIN AND TAZOBACTAM 3.38 G: 3; .375 INJECTION, POWDER, FOR SOLUTION INTRAVENOUS at 08:31

## 2020-01-22 RX ADMIN — HYDROCODONE BITARTRATE AND ACETAMINOPHEN 1 TABLET: 10; 325 TABLET ORAL at 20:52

## 2020-01-22 RX ADMIN — ACETAMINOPHEN 500 MG: 500 TABLET ORAL at 15:12

## 2020-01-22 RX ADMIN — DIGOXIN 125 MCG: 125 TABLET ORAL at 08:28

## 2020-01-22 RX ADMIN — Medication 300 MG: at 18:06

## 2020-01-22 RX ADMIN — PREDNISONE 40 MG: 20 TABLET ORAL at 08:29

## 2020-01-22 RX ADMIN — IPRATROPIUM BROMIDE AND ALBUTEROL SULFATE 1 AMPULE: .5; 3 SOLUTION RESPIRATORY (INHALATION) at 12:01

## 2020-01-22 RX ADMIN — IPRATROPIUM BROMIDE AND ALBUTEROL SULFATE 1 AMPULE: .5; 3 SOLUTION RESPIRATORY (INHALATION) at 18:06

## 2020-01-22 RX ADMIN — DOCUSATE SODIUM 100 MG: 100 CAPSULE, LIQUID FILLED ORAL at 20:51

## 2020-01-22 RX ADMIN — ONDANSETRON HYDROCHLORIDE 4 MG: 4 TABLET, FILM COATED ORAL at 08:24

## 2020-01-22 RX ADMIN — IPRATROPIUM BROMIDE AND ALBUTEROL SULFATE 1 AMPULE: .5; 3 SOLUTION RESPIRATORY (INHALATION) at 07:35

## 2020-01-22 RX ADMIN — Medication 10 ML: at 08:30

## 2020-01-22 RX ADMIN — FERROUS SULFATE TAB 325 MG (65 MG ELEMENTAL FE) 325 MG: 325 (65 FE) TAB at 16:55

## 2020-01-22 RX ADMIN — LEVOTHYROXINE SODIUM 75 MCG: 75 TABLET ORAL at 05:08

## 2020-01-22 RX ADMIN — FERROUS SULFATE TAB 325 MG (65 MG ELEMENTAL FE) 325 MG: 325 (65 FE) TAB at 09:58

## 2020-01-22 RX ADMIN — Medication 10 ML: at 05:08

## 2020-01-22 RX ADMIN — DOCUSATE SODIUM 100 MG: 100 CAPSULE, LIQUID FILLED ORAL at 08:24

## 2020-01-22 RX ADMIN — DIPHENHYDRAMINE HCL 25 MG: 25 TABLET ORAL at 08:24

## 2020-01-22 ASSESSMENT — PAIN SCALES - GENERAL
PAINLEVEL_OUTOF10: 8
PAINLEVEL_OUTOF10: 7
PAINLEVEL_OUTOF10: 6
PAINLEVEL_OUTOF10: 6

## 2020-01-22 ASSESSMENT — PAIN DESCRIPTION - ORIENTATION
ORIENTATION: RIGHT
ORIENTATION: RIGHT

## 2020-01-22 ASSESSMENT — PAIN DESCRIPTION - LOCATION
LOCATION: BACK;HIP
LOCATION: BACK;HIP

## 2020-01-22 ASSESSMENT — PAIN DESCRIPTION - FREQUENCY: FREQUENCY: INTERMITTENT

## 2020-01-22 ASSESSMENT — PAIN DESCRIPTION - DESCRIPTORS
DESCRIPTORS: ACHING;DISCOMFORT
DESCRIPTORS: ACHING

## 2020-01-22 ASSESSMENT — PAIN DESCRIPTION - ONSET
ONSET: ON-GOING
ONSET: PROGRESSIVE

## 2020-01-22 ASSESSMENT — PAIN DESCRIPTION - PAIN TYPE
TYPE: CHRONIC PAIN
TYPE: CHRONIC PAIN

## 2020-01-22 ASSESSMENT — PAIN DESCRIPTION - PROGRESSION: CLINICAL_PROGRESSION: GRADUALLY WORSENING

## 2020-01-22 NOTE — PLAN OF CARE
Team Conference held on 01/21. Recommendations of the team were explained to the patient by all team members. Team is recommending that patient continue on TCU for several more days, with discharge date undetermined at this time. Patient was supposed to go home last week but she spiked a tempeture and her medical status continues to be unstable. Patient will be a RE team conference on 01/28. Following discharge team is recommending home with home health, PT,OT,RN and aide services. SW will continue to monitor progress and maintain contact with patient and patient's family regarding length of stay and recommendations.  SW will continue to assist with ongoing discharge planning   Problem: Discharge Planning:  Goal: Patients continuum of care needs are met  Description  Patients continuum of care needs are met  Outcome: Ongoing

## 2020-01-22 NOTE — PROGRESS NOTES
Heparin Consult  Lab Results   Component Value Date    APTT 59.2 01/21/2020     Lab Results   Component Value Date    HGB 10.0 01/18/2020    HCT 33.4 01/18/2020     01/18/2020    INR 1.10 01/01/2020       Current Rate: 18 units/kg/hr    Plan:  Rate: Increase to 20 units/kg/hr  Next aPTT: 0300 1/22/20    Beata Vaughn RPh  1/21/2020  8:33 PM

## 2020-01-22 NOTE — PLAN OF CARE
Problem: Infection - Surgical Site:  Goal: Will show no infection signs and symptoms  Description  Will show no infection signs and symptoms  Outcome: Ongoing  Surgical site will be assessed every shift for warmth to site, redness, pain or drainage. No warmth, redness or drainage. Problem: Mobility - Impaired:  Goal: Mobility will improve  Description  Mobility will improve  Outcome: Ongoing  Pt will participate in PT daily as directed to increase mobility. Problem: Pain:  Goal: Pain level will decrease  Description  Pain level will decrease  Outcome: Ongoing  Reposition frequently to alleviate pain. Pt uses routine tylenol for pain 5/10     Problem: Bowel/Gastric:  Goal: Ability to achieve a regular elimination pattern will improve  Description  Ability to achieve a regular elimination pattern will improve  Outcome: Ongoing     Problem: Falls - Risk of:  Goal: Will remain free from falls  Description  Will remain free from falls  Outcome: Ongoing  Pt will remain free of falls. Pt is 1 assist with gait belt and walker.      Problem: Discharge Planning:  Goal: Patients continuum of care needs are met  Description  Patients continuum of care needs are met  1/22/2020 1153 by Artelia Burkitt, LSW  Outcome: Ongoing     Problem: OXYGENATION/RESPIRATORY FUNCTION  Goal: Adequate oxygenation  Outcome: Ongoing  Pt is on 2L o2 at rest, 6L with ambulation     Problem: Impaired respiratory status  Goal: Clear lung sounds  Description  Clear lung sounds     1/22/2020 0843 by Shawn Cobb RCP  Outcome: Ongoing

## 2020-01-22 NOTE — PLAN OF CARE
Problem: Impaired respiratory status  Goal: Clear lung sounds  Description  Clear lung sounds     Outcome: Ongoing   Continue treatments to improve breathsounds

## 2020-01-22 NOTE — PROGRESS NOTES
Heparin Consult  Lab Results   Component Value Date    APTT 85.2 01/22/2020     Lab Results   Component Value Date    HGB 10.0 01/18/2020    HCT 33.4 01/18/2020     01/18/2020    INR 1.10 01/01/2020       Current Rate: 20 units/kg/hr    Plan:  Bolus: None  Rate: Continue drip at 20 units/kg/hr  Next aPTT: 0900  Carissa Wilson RPh 1/22/2020 5:11 AM

## 2020-01-22 NOTE — PROGRESS NOTES
[address] [address]  [address]  Occupational Therapy  Progress Note  Time:   Time In: 0845  Time Out: 4440  Timed Code Treatment Minutes: 44 Minutes  Minutes: 39          Date: 2020  Patient Name: Rosalba Salmon,   Gender: female      Room: [facility]66/066-A  MRN: [id number]  : 1945  (76 y.o.)  Referring Practitioner: Jose R Wick MD; Attending: Dr. Garett Abrams  Diagnosis: Hemiarthroplast of R Hip  Additional Pertinent Hx: Per EMR: \"Fannie Grigsby is a 76 y.o. female who presents 19 for evaluation of pain to the right leg and hip that onset following a fall that occurred 3 days ago. Patient reports that she tripped over her cat and fell 3 days ago, experiencing immediate pain to the right leg and hip. Patient notes worsening of pain since initial fall and reports exacerbation of pain when she attempts to bear weight onto her right leg. Patient describes additional symptom of nausea. Patient denies hitting her head during the incident and denies headache and chest pain. Patient reports using oxygen treatments at home due to condition of chronic obstructive pulmonary disease. Patient notes a history of smoking but denies current nicotine use. Patient additionally has a history of congestive heart failure and notes that edema to her lower extremities is normal at baseline. \"  s/p s/p right hip hemiarthroplasty for femoral neck fracture on 20. Transfer to [facility] 20. Restrictions/Precautions:  Restrictions/Precautions: Weight Bearing, Fall Risk  Right Lower Extremity Weight Bearing: Weight Bearing As Tolerated  Position Activity Restriction  Hip Precautions: No hip flexion > 90 degrees, No ADduction, No hip internal rotation  Other position/activity restrictions: Home O2: baseline is 2L at rest/6L with activity.  S/p right hip hemiarthroplasty for femoral neck fracture on 20    SUBJECTIVE: Upon entering room, pt reports that she can't get up right now because she just ate and does not want to feel nauseous. Majority of session spent educating pt on home safety and answering various questions for pt (see below). Pt refused out of chair activity. PAIN: 6/10:     COGNITION: Decreased Insight    ADL:   No ADL's completed this session. Zay Jose BALANCE:  Sitting Balance:  Modified Independent. ADDITIONAL ACTIVITIES:  Pt completed B UE exs with orange resistance band x 15 reps, x 1 set, while following a handout. Fair tolerance of exs, with min RBs throughout, and min cues for tech with resistance band. Discussed home safety options. Pt reports that her daughter will complete all tasks related to her cat, so she can maintain hip precautions. Discussed importance of taking her time during ADLs/IADL at home. Pt reports she \"gets in a hurry\" at times. Discussed importance of slowing down and utilizing LHAE to safely complete ADL/IADL tasks. Pt discussed financial insecurities with extending her stay for her procedure on Friday. OTR spoke with SW. Per discussion with SW, will contact financial services to come speak to pt. ASSESSMENT:  Activity Tolerance:  Patient tolerance of  treatment: poor. This date, pt refusing out of chair exs, educated on importance of getting up and maintaining activity level  Assessment:   Pt is making slow progress towards goals. Pt has met 0/4 STGs and 0/2 LTGs. Pt can be self-limiting at times, causing slow progress, also with medical concerns limiting progression of therapy. Pt continues to exhibit decreased balance, endurance, safety awareness, and activity tolerance causing barriers to meeting pt's goals. Pt continues to require skilled OT intervention to improve ADL/IADL performance required for pt to return home at [employer]. Discharge Recommendations: Home with Home health OT  Equipment Recommendations: Equipment Needed: Yes  Other: Americo Gregory was delivered. Pt declines need for BSC.    Plan: Times per week: 6x  Plan weeks: 3 weeks  Current Treatment Recommendations: Functional Mobility Training, Balance Training, Self-Care / ADL, Endurance Training, Safety Education & Training, Patient/Caregiver Education & Training, Equipment Evaluation, Education, & procurement    Patient Education  Patient Education: ADL's, IADL's, Energy Conservation, Home Exercise Program, Precautions, Equipment Education, Home Safety and Importance of Increasing Activity    Goals  Short term goals  Time Frame for Short term goals: 2 weeks  Short term goal 1: Pt will complete LB ADL with LHAE and SUP to increase indep with self care. NOT MET, CONTINUE    Short term goal 2: Pt will complete dynamic standing task with Mod I x 6 min with B hand release to increase balance required for grooming. NOT MET, CONTINUE    Short term goal 3: Pt will complete mobility to/from BR with Mod I and 0 cues safety to increase indep with ADL routine. NOT MET, CONTINUE    Short term goal 4: Pt will complete various t/fs with Mod I and 0 cues safety to increase indep with toileting t/fs. NOT MET, CONTINUE  Long term goals  Time Frame for Long term goals : 3 weeks  Long term goal 1: Pt will complete ADL routine including shower t/f with standard shower chair with Emilia to increase indep with self care. NOT MET, CONTINUE    Long term goal 2: Pt will demonstrate basic IADL tasks with emphasis on pet care using any adaptive strategies for increased safety and independence with changing litter box while abiding by hip precautions NOT MET, CONTINUE      Following session, patient left in safe position with all fall risk precautions in place.

## 2020-01-22 NOTE — PROGRESS NOTES
[address] [address]  INPATIENT PHYSICAL THERAPY  Progress Note  [address] [id number]/[id number]    Time In: 1409  Time Out: 1502  Timed Code Treatment Minutes: 48 Minutes  Minutes: 53          Date: 2020  Patient Name: Krupa Wong,  Gender:  female        MRN: [id number]  : 1945  (76 y.o.)  Referral Date : 20  Referring Practitioner: Deven Montez MD  Diagnosis: hemiarthroplasty of right hip  Treatment Diagnosis: difficulty in walking  Additional Pertinent Hx: Per EMR: \"Fannie Patel is a 76 y.o. female who presents for evaluation of pain to the right leg and hip that onset following a fall that occurred 3 days ago. Patient reports that she tripped over her cat and fell 3 days ago, experiencing immediate pain to the right leg and hip. Patient notes worsening of pain since initial fall and reports exacerbation of pain when she attempts to bear weight onto her right leg. Patient describes additional symptom of nausea. Patient denies hitting her head during the incident and denies headache and chest pain. Patient reports using oxygen treatments at home due to condition of chronic obstructive pulmonary disease. Patient notes a history of smoking but denies current nicotine use. Patient additionally has a history of congestive heart failure and notes that edema to her lower extremities is normal at baseline. \" Pt underwent right hip hemiarthroplasty 20.      Prior Level of Function:  Lives With: Alone  Type of Home: House  Home Layout: One level  Home Access: Ramped entrance   [facility] [address] - Number of Steps: 2  Entrance Stairs - Rails: Right(grab bar)  Home Equipment: Standard walker, 4 wheeled walker, Cane, Oxygen    Restrictions/Precautions:  Restrictions/Precautions: Weight Bearing, Fall Risk  Right Lower Extremity Weight Bearing: Weight Bearing As Tolerated  Position Activity Restriction  Hip Precautions: No hip flexion > 90 degrees, No ADduction, No hip internal rotation  Other position/activity restrictions: Home O2: baseline is 2L at rest/6L with activity. S/p right hip hemiarthroplasty for femoral neck fracture on 1/1/20    SUBJECTIVE: Pt refused AM attempt. In afternoon, pt is seated in recliner, max encouragement for participation, then pt pleasant throughout. Pt on 6 L O2 with activity, sats remain >92% during mobility and after. Extensive time spent on education as pt voices multiple concerns re: quality of life and leaving her home with the fear of running out of oxygen or not having anywhere to sit down to rest.  Recommended pt take rollator walker out with her and bringing an extra O2 tank as pt will be with her daughter or niece when leaving the house. PAIN: denie    OBJECTIVE:  Bed Mobility:  Not Tested    Transfers:  Sit to Stand: Stand By Assistance  Stand to Sit:Stand By Assistance    Ambulation:  Stand By Assistance  Distance: 145 feet, 75 feet  Surface: Level Tile  Device:Rolling Walker  Gait Deviations: Forward Flexed Posture, Slow Ligia, Decreased Step Length Bilaterally and Decreased Gait Speed  **Pt reports fatigue after each bout of gait, requires ~3 minutes seated rest break to fully recover    Stairs:  Supervision  Number of Steps: 4  Height: 6\" step with Bilateral Handrails    Functional Outcome Measures: Not completed       ASSESSMENT:  Assessment:   Pt with fair progress towards goals since last PN. Pt has been limited this week due to new pneumonia. Pt continues to transfer and amb with RW with SBA, is ambulating increased distance this week, up to 145 feet, amb up to 75 feet last week. Pt to have bronchoscopy Friday to further determine plan. PT to continue to progress strength and functional mobility to ensure safe DC home. Activity Tolerance:  Patient tolerance of  treatment: good. Limited by impaired activity tolerance.      Equipment Recommendations:Equipment Needed: Yes(may need new 2WW)  Discharge Recommendations:  Discharge Recommendations: Continue to assess pending progress    Plan: Times per week: 5x/wk BID, 1x/wk QD  Times per day: Twice a day  Plan weeks: 3 weeks  Current Treatment Recommendations: Strengthening, Functional Mobility Training, Neuromuscular Re-education, Home Exercise Program, Transfer Training, Gait Training, Safety Education & Training, Balance Training, Endurance Training, Stair training, Patient/Caregiver Education & Training    Patient Education  Patient Education: Gait, Stairs    Goals:  Patient goals : go home  Short term goals  Time Frame for Short term goals: 10 days  Short term goal 1: Patient will complete sit  <> stand with SBA to stand to ambulate with decreased difficulty. GOAL MET, DISCONTINUE, SEE LTG  Short term goal 2: Patient will complete supine < > sit with SBA to transfer in/out of bed with decreased difficulty. GOAL MET, DISCONTINUE, SEE LTG  Short term goal 3: Patient will ambulate 100' with a RW and SBA to progress towards independent home mobility. GOAL MET, DISCONTINUE, SEE LTG  Short term goal 4: Patient will ascend/descend 2 steps with 1 hand rail and straight cane with CGA to progress towards independent home entry. GOAL MET, DISCONTINUE  Long term goals  Time Frame for Long term goals : 3 weeks  Long term goal 1: Patient will complete sit < > stand and stand pivot transfers with modified independence to allow patient to transfer surface to surface safely. GOAL NOT MET, CONTINUE  Long term goal 2: Patient will complete supine < > sit with modified independence to allow patient to transfer in/out of bed safely. GOAL NOT MET, CONTINUE  Long term goal 3: Patient will ambulate 150' with a RW and modified independence to navigate home safely. GOAL NOT MET, CONTINUE  Long term goal 4: Patient will ascend/descend 2 steps with 1 handrail and straight cane and SBA for safe home entry.  GOAL NOT MET, CONTINUE  Long term goal 5: Patient will complete car transfer with SBA for safe transport to home

## 2020-01-22 NOTE — PROGRESS NOTES
lower lobe lobectomy secondary to stage 2A nonsmall cell lung cancer in 2012, patient did finish chemo and radiation 8/2013. Reoccurrence with new stage 1A left lung cancer in 10/2016. She did undergo stereotatic ablative radiation therapy to left lung mass 1/2017. Treated with Taxotere followed by Priya Baez, currently receiving palliative immunotherapy with Tecentriq, last infusion 12/23/2019. Pericardial effusion s/p pericardiocentesis in July 2019, Hypothyroidism, Anemia, normocytic, Pulmonary embolism-Eliquis, GERD and Scoliosis. On 12/27/19, patient tripped on her cat and fell on the right side landing on the hip area and she started having pain but was able to get up and walk. Xray showed Subcapital right femoral neck fracture. Patient was taken to the OR then she had postop respiratory failure and reintubated on 1/1. Then she was extubated the second day and moved out of ICU on 1/3. The patient then discharged to TCU. She had CT chest on 11/4 that showed PE and narrowing of the BI with postopstructive infiltrates. She had fever on 1/16 up to 102.5 with hypotension. CXR today showed worsened appearance with further loss of volume right lung. She is still SOB, has productive cough to brown sputum. She was started on ceftriaxone and azithromycin.       Past 24 hrs   -On 3 LPM via NC  -Patient reports minimal cough  -some bilateral MSK pain at lower rib margin 2/2 coughing  All other systems reviewed  Past Medical History         Diagnosis Date    Arthritis     low back pain and scoliosis in lumbar    Cancer ([address] 2012    lower right lobe-lung s/p lobectomy in 2012, radiation and chemo nad later had mediatinal mets and had radationa dn chemo again, and in 2016 had reoccurence on the left side upper and was started on radiation this year and has  a follow up CT in oct 2017    CHF (congestive heart failure) (HCC)     Chronic bronchitis, simple (HCC)     Chronic respiratory failure with hypoxia ([address]     COPD (chronic obstructive pulmonary disease) (HCC)     GERD (gastroesophageal reflux disease)     History of positive PPD     HTN (hypertension)     Hx of blood clots     in lung     Pneumonia     Postprocedural pneumothorax     Scoliosis     Sleep apnea     Urinary incontinence     UTI (urinary tract infection)       Past Surgical History           Procedure Laterality Date    HIP SURGERY Right 2020    RIGHT HIP HEMIARTHROPLASTY performed by Alonso Castrejon at [address] [place] [address] [id number]  10/19/2012    rt lower lobectomy     LUNG BIOPSY  2012     Diet    Dietary Nutrition Supplements: Standard High Calorie Oral Supplement  DIET GENERAL;  Allergies    Advil cold & sinus liqui-gels [pseudoephedrine-ibuprofen];  Food; Percocet [oxycodone-acetaminophen]; and Sulfa antibiotics  Social History     Social History     Socioeconomic History    Marital status:      Spouse name: Radha Edmondson Number of children: 2    Years of education: Not on file    Highest education level: Not on file   Occupational History    Not on file   Social Needs    Financial resource strain: Not on file    Food insecurity:     Worry: Not on file     Inability: Not on file    Transportation needs:     Medical: Not on file     Non-medical: Not on file   Tobacco Use    Smoking status: Former Smoker     Packs/day: 1.00     Years: 45.00     Pack years: 45.00     Types: Cigarettes     Last attempt to quit:      Years since quittin.0    Smokeless tobacco: Never Used   Substance and Sexual Activity    Alcohol use: No    Drug use: No    Sexual activity: Not on file   Lifestyle    Physical activity:     Days per week: Not on file     Minutes per session: Not on file    Stress: Not on file   Relationships    Social connections:     Talks on phone: Not on file     Gets together: Not on file     Attends Mu-ism service: Not on file     Active member of club or organization: Not on file     Attends meetings of clubs or organizations: Not on file     Relationship status: Not on file    Intimate partner violence:     Fear of current or ex partner: Not on file     Emotionally abused: Not on file     Physically abused: Not on file     Forced sexual activity: Not on file   Other Topics Concern    Not on file   Social History Narrative    Not on file     Family History          Problem Relation Age of Onset    Cancer Mother     Heart Disease Father     High Blood Pressure Father     High Cholesterol Father     Arthritis Sister     Heart Disease Sister     Cancer Sister         lung cancer    Heart Disease Sister     Stroke Sister     High Cholesterol Sister     High Blood Pressure Sister     Stroke Paternal Grandfather     High Blood Pressure Brother      Meds    Current Medications    tobramycin (PF)  300 mg Nebulization BID    piperacillin-tazobactam  3.375 g Intravenous Q8H    ipratropium-albuterol  1 ampule Inhalation Q4H WA    predniSONE  40 mg Oral Daily    sodium chloride flush  10 mL Intravenous QAM AC    ferrous sulfate  325 mg Oral BID WC    heparin flush (100 units/mL)  500 Units Intravenous Daily    diphenhydrAMINE  25 mg Oral BID    fluticasone  1 spray Each Nostril Daily    guaiFENesin  600 mg Oral Daily    digoxin  125 mcg Oral Daily    docusate sodium  100 mg Oral BID    levothyroxine  75 mcg Oral Daily    metoprolol tartrate  12.5 mg Oral BID    polyethylene glycol  17 g Oral Daily    rOPINIRole  0.25 mg Oral BID    famotidine  20 mg Oral Daily    acetaminophen  500 mg Oral Q6H    Vitamin D  5,000 Units Oral Daily    betamethasone dipropionate   Topical Daily    mometasone-formoterol  2 puff Inhalation BID    senna  1 tablet Oral Nightly     ondansetron, muscle rub, acetaminophen, zinc oxide, [DISCONTINUED] heparin flush (100 units/mL) **OR** heparin flush (100 units/mL), sodium chloride flush, HYDROcodone-acetaminophen, magnesium hydroxide, albuterol sulfate HFA, bisacodyl, when compared to the prior examination. There is thickening along the pleura posterolaterally on the right  demonstrated on axial image 34 which appears similar in configuration from the prior examination. There is also similar appearing volume loss within the right hemithorax. There is a filling defect inserted within the distal right pulmonary artery which is most consistent with pulmonary embolism. This is of indeterminate chronicity. There is focal opacity demonstrated anteriorly within the left upper lobe measuring approximately 1.8 x 4.1 cm. This abuts the pleura and appears slightly increased in size when compared to the prior examination. Previously this measured approximately 3.6 x 1.5 cm. There is moderate diffuse centrilobular emphysema. There is a focal irregular opacity demonstrated with ill-defined margins within the medial left upper lobe on axial image 14. This appears more confluent increased in size from the prior examination. This measures approximately 1.9 x 0.8 centimeters. Previously this measured approximately 1.1 x 0.9 cm. No pneumothorax is seen. Evaluation for hilar adenopathy of the right is limited due to right perihilar opacity from prior postsurgical change and partial collapse of the residual lung at the medial right lung base. There is indeterminate mild pericardial fluid present. This appears improved from the prior examination. CT of the abdomen and pelvis are reported separately. Impression:  1. There is a filling defect inserted within the distal right pulmonary artery which is most consistent with pulmonary embolism. This is of indeterminate chronicity. 2. Postsurgical changes from prior right lower lobectomy with associated volume loss is again seen. There is consolidative opacity at the posterior medial right lung base possibly representing postobstructive collapse. This appears slightly progressed from the prior examination.  However there is a small stable area of lucent cavitary change at the posterior medial right lung base on axial image 34.    3. Pleural thickening with focal opacity abutting the posterior lateral right pleura is noted at the posterior lateral right lung base which appears similar in configuration to the prior examination. 4. Focal masslike opacity anteriorly within the left upper lobe abutting the pleura is slightly increased in size from the prior examination dated July 2019. This appears slightly increased in size from the prior examination. This is concerning for progression of disease. 5. Focal irregular opacity with irregular margins demonstrated within the left upper lobe appears increased in size measuring 1.9 x 0.8 centimeters. This appears increased in size from the prior examination. This is concerning for progression of disease. 6. Further evaluation of the enlarging lesions may be obtained with PET/CT if clinically indicated. The finding regarding the pulmonary embolus at the distal right pulmonary artery was discussed with the referring clinician at 5:00 PM on 11/4/2019       Assessment   -Acute on chronic respiratory failure with hypoxia  -Right sided pneumonia. Postobstructive?  -COPD exacerbation secondary to pneumonia.  -History of provoked pulmonary embolism: on CTA of Chest 11/4/19   -Metastatic lung cancer: Adenocarcinoma, S/P RLL lobectomy and radiation, now on palliative immunotherapy with Tecentriq due to positive left lung nodule biopsy in 2016, last dose 12/23/2019, established patient of Dr. Anitra Griffin.   -S/P RLL lobectomy with chronic post surgical changes  -Diastolic CHF  -Severe COPD-FEV1 0.66 post BD   -Full Code  Recommendations   -Continue zosyn.  -Add tobramycin inhalations for pseudomonal coverage  -Discussed with patient regarding CT chest results patient agreeable to bronchoscopy explained to patient risks of procedure including but not limited to bleeding, infection, pneumothorax, and prolonged mechanical ventilation. Patient verbalized understanding that she is at increased risk due to her COPD and verbalizes understanding of risks and agrees to proceed. -Will stop xarelto and start heparin gtt will need wash out period prior to bronchoscopy, discussed with RN Dasia Bethea to clarify with Primary Dr. Tosha Cruz if patient can remain on 8E  -Discussed with Endoscopy RN Diomedes Snow to schedule bronchoscopy for Friday  -Oncology consult pending  -Continue Nikita Mac. -Duoneb Neb Q4 hrs WA  -Prednisone 40 mg po daily for 5 days.  -Cultures negative to date  -NIPPV PRN. Case discussed with nurse and patient/family. Questions and concerns addressed. Meds and Orders reviewed. Electronically signed by JESUS Hinds - CNP on 1/22/2020 at 11:53 AM     Addendum by Dr. Blayne Hampton MD:  I have seen and examined the patient independently. Face to face evaluation and examination was performed. The above evaluation and note has been reviewed. Labs and radiographs were reviewed. I Have discussed with Mr. Molly Monteiro CNP about this patient in detail. The above assessment and plan has been reviewed. Please see my modifications mentioned below. My modifications: On 2LPM via nasal cannula. Will continue Zosyn and [place] for now   ID consult for antibiotic management.     Tena Goldstein MD 1/22/2020 3:28 PM

## 2020-01-22 NOTE — PROGRESS NOTES
Heparin Consult  Lab Results   Component Value Date    APTT 95.9 01/22/2020     Lab Results   Component Value Date    HGB 10.0 01/18/2020    HCT 33.4 01/18/2020     01/18/2020    INR 1.10 01/01/2020       Current Rate: 20 units/kg/hr    Plan:  Bolus: none  Rate: decrease to 19 units/kg/hr  Next aPTT: 1800    Heather Alexander, PharmD 1/22/2020 11:28 AM

## 2020-01-23 LAB
APTT: 103.4 SECONDS (ref 22–38)
APTT: 71.1 SECONDS (ref 22–38)
APTT: 85.7 SECONDS (ref 22–38)

## 2020-01-23 PROCEDURE — 97110 THERAPEUTIC EXERCISES: CPT

## 2020-01-23 PROCEDURE — 94669 MECHANICAL CHEST WALL OSCILL: CPT

## 2020-01-23 PROCEDURE — 6360000002 HC RX W HCPCS: Performed by: INTERNAL MEDICINE

## 2020-01-23 PROCEDURE — 2700000000 HC OXYGEN THERAPY PER DAY

## 2020-01-23 PROCEDURE — 1290000000 HC SEMI PRIVATE OTHER R&B

## 2020-01-23 PROCEDURE — 36415 COLL VENOUS BLD VENIPUNCTURE: CPT

## 2020-01-23 PROCEDURE — 6370000000 HC RX 637 (ALT 250 FOR IP): Performed by: INTERNAL MEDICINE

## 2020-01-23 PROCEDURE — 2580000003 HC RX 258: Performed by: NURSE PRACTITIONER

## 2020-01-23 PROCEDURE — 97116 GAIT TRAINING THERAPY: CPT

## 2020-01-23 PROCEDURE — 6360000002 HC RX W HCPCS: Performed by: NURSE PRACTITIONER

## 2020-01-23 PROCEDURE — 6370000000 HC RX 637 (ALT 250 FOR IP): Performed by: FAMILY MEDICINE

## 2020-01-23 PROCEDURE — 99223 1ST HOSP IP/OBS HIGH 75: CPT | Performed by: INTERNAL MEDICINE

## 2020-01-23 PROCEDURE — 94640 AIRWAY INHALATION TREATMENT: CPT

## 2020-01-23 PROCEDURE — 6360000002 HC RX W HCPCS

## 2020-01-23 PROCEDURE — 85730 THROMBOPLASTIN TIME PARTIAL: CPT

## 2020-01-23 PROCEDURE — 97530 THERAPEUTIC ACTIVITIES: CPT

## 2020-01-23 PROCEDURE — 94761 N-INVAS EAR/PLS OXIMETRY MLT: CPT

## 2020-01-23 RX ORDER — POLYETHYLENE GLYCOL 3350 17 G/17G
17 POWDER, FOR SOLUTION ORAL DAILY PRN
Status: DISCONTINUED | OUTPATIENT
Start: 2020-01-23 | End: 2020-01-24 | Stop reason: HOSPADM

## 2020-01-23 RX ORDER — LOPERAMIDE HYDROCHLORIDE 2 MG/1
2 CAPSULE ORAL 4 TIMES DAILY PRN
Status: DISCONTINUED | OUTPATIENT
Start: 2020-01-23 | End: 2020-01-24 | Stop reason: HOSPADM

## 2020-01-23 RX ORDER — DOCUSATE SODIUM 100 MG/1
100 CAPSULE, LIQUID FILLED ORAL 2 TIMES DAILY PRN
Status: DISCONTINUED | OUTPATIENT
Start: 2020-01-23 | End: 2020-01-24 | Stop reason: HOSPADM

## 2020-01-23 RX ORDER — SENNA PLUS 8.6 MG/1
1 TABLET ORAL NIGHTLY PRN
Status: DISCONTINUED | OUTPATIENT
Start: 2020-01-23 | End: 2020-01-24 | Stop reason: HOSPADM

## 2020-01-23 RX ADMIN — ACETAMINOPHEN 500 MG: 500 TABLET ORAL at 09:35

## 2020-01-23 RX ADMIN — Medication 2 PUFF: at 17:00

## 2020-01-23 RX ADMIN — DIPHENHYDRAMINE HCL 25 MG: 25 TABLET ORAL at 09:35

## 2020-01-23 RX ADMIN — Medication 300 MG: at 07:20

## 2020-01-23 RX ADMIN — PIPERACILLIN AND TAZOBACTAM 3.38 G: 3; .375 INJECTION, POWDER, FOR SOLUTION INTRAVENOUS at 01:00

## 2020-01-23 RX ADMIN — PIPERACILLIN AND TAZOBACTAM 3.38 G: 3; .375 INJECTION, POWDER, FOR SOLUTION INTRAVENOUS at 10:00

## 2020-01-23 RX ADMIN — Medication 2 PUFF: at 07:27

## 2020-01-23 RX ADMIN — Medication 300 MG: at 21:07

## 2020-01-23 RX ADMIN — HEPARIN SODIUM 11.6 ML/HR: 10000 INJECTION, SOLUTION INTRAVENOUS at 01:00

## 2020-01-23 RX ADMIN — FERROUS SULFATE TAB 325 MG (65 MG ELEMENTAL FE) 325 MG: 325 (65 FE) TAB at 09:35

## 2020-01-23 RX ADMIN — METOPROLOL TARTRATE 12.5 MG: 25 TABLET ORAL at 09:37

## 2020-01-23 RX ADMIN — DIGOXIN 125 MCG: 125 TABLET ORAL at 09:35

## 2020-01-23 RX ADMIN — FAMOTIDINE 20 MG: 20 TABLET ORAL at 09:35

## 2020-01-23 RX ADMIN — ACETAMINOPHEN 500 MG: 500 TABLET ORAL at 14:42

## 2020-01-23 RX ADMIN — FLUTICASONE PROPIONATE 1 SPRAY: 50 SPRAY, METERED NASAL at 09:34

## 2020-01-23 RX ADMIN — METOPROLOL TARTRATE 12.5 MG: 25 TABLET ORAL at 21:28

## 2020-01-23 RX ADMIN — IPRATROPIUM BROMIDE AND ALBUTEROL SULFATE 1 AMPULE: .5; 3 SOLUTION RESPIRATORY (INHALATION) at 13:20

## 2020-01-23 RX ADMIN — IPRATROPIUM BROMIDE AND ALBUTEROL SULFATE 1 AMPULE: .5; 3 SOLUTION RESPIRATORY (INHALATION) at 16:55

## 2020-01-23 RX ADMIN — ONDANSETRON HYDROCHLORIDE 4 MG: 4 TABLET, FILM COATED ORAL at 20:41

## 2020-01-23 RX ADMIN — ROPINIROLE HYDROCHLORIDE 0.25 MG: 0.25 TABLET, FILM COATED ORAL at 20:42

## 2020-01-23 RX ADMIN — PREDNISONE 40 MG: 20 TABLET ORAL at 09:35

## 2020-01-23 RX ADMIN — LEVOTHYROXINE SODIUM 75 MCG: 75 TABLET ORAL at 03:57

## 2020-01-23 RX ADMIN — FERROUS SULFATE TAB 325 MG (65 MG ELEMENTAL FE) 325 MG: 325 (65 FE) TAB at 17:36

## 2020-01-23 RX ADMIN — ONDANSETRON HYDROCHLORIDE 4 MG: 4 TABLET, FILM COATED ORAL at 09:37

## 2020-01-23 RX ADMIN — PIPERACILLIN AND TAZOBACTAM 3.38 G: 3; .375 INJECTION, POWDER, FOR SOLUTION INTRAVENOUS at 17:36

## 2020-01-23 RX ADMIN — ROPINIROLE HYDROCHLORIDE 0.25 MG: 0.25 TABLET, FILM COATED ORAL at 09:35

## 2020-01-23 RX ADMIN — LOPERAMIDE HYDROCHLORIDE 2 MG: 2 CAPSULE ORAL at 13:06

## 2020-01-23 RX ADMIN — ACETAMINOPHEN 500 MG: 500 TABLET ORAL at 03:57

## 2020-01-23 RX ADMIN — DIPHENHYDRAMINE HCL 25 MG: 25 TABLET ORAL at 20:41

## 2020-01-23 RX ADMIN — IPRATROPIUM BROMIDE AND ALBUTEROL SULFATE 1 AMPULE: .5; 3 SOLUTION RESPIRATORY (INHALATION) at 07:14

## 2020-01-23 RX ADMIN — HYDROCODONE BITARTRATE AND ACETAMINOPHEN 1 TABLET: 10; 325 TABLET ORAL at 20:46

## 2020-01-23 RX ADMIN — IPRATROPIUM BROMIDE AND ALBUTEROL SULFATE 1 AMPULE: .5; 3 SOLUTION RESPIRATORY (INHALATION) at 21:07

## 2020-01-23 ASSESSMENT — PAIN DESCRIPTION - ONSET
ONSET: PROGRESSIVE
ONSET: GRADUAL

## 2020-01-23 ASSESSMENT — PAIN DESCRIPTION - PROGRESSION
CLINICAL_PROGRESSION: GRADUALLY WORSENING
CLINICAL_PROGRESSION: GRADUALLY WORSENING

## 2020-01-23 ASSESSMENT — PAIN DESCRIPTION - DESCRIPTORS
DESCRIPTORS: ACHING;DISCOMFORT
DESCRIPTORS: ACHING;HEADACHE

## 2020-01-23 ASSESSMENT — PAIN DESCRIPTION - PAIN TYPE
TYPE: CHRONIC PAIN
TYPE: ACUTE PAIN

## 2020-01-23 ASSESSMENT — PAIN SCALES - GENERAL
PAINLEVEL_OUTOF10: 6
PAINLEVEL_OUTOF10: 7
PAINLEVEL_OUTOF10: 6
PAINLEVEL_OUTOF10: 6

## 2020-01-23 ASSESSMENT — PAIN DESCRIPTION - LOCATION
LOCATION: HEAD
LOCATION: BACK

## 2020-01-23 ASSESSMENT — PAIN DESCRIPTION - FREQUENCY: FREQUENCY: INTERMITTENT

## 2020-01-23 NOTE — PROGRESS NOTES
Heparin Consult  Lab Results   Component Value Date    APTT 113.9 01/22/2020     Lab Results   Component Value Date    HGB 10.0 01/18/2020    HCT 33.4 01/18/2020     01/18/2020    INR 1.10 01/01/2020       Current Rate: 19 units/kg/hr    Plan:  Rate: Decrease to 17 units/kg/hr  Next aPTT: 0300 1/23/20    Maninder Orta [facility]  1/22/2020  8:16 PM

## 2020-01-23 NOTE — PROGRESS NOTES
[facility]  INPATIENT PHYSICAL THERAPY  DAILY NOTE  [facility] SKILLED NURSING UNIT - [id number]-A    Time In: 4630  Time Out: 1448  Timed Code Treatment Minutes: 35 Minutes  Minutes: 33          Date: 2020  Patient Name: Yemi Rice,  Gender:  female        MRN: [id number]  : 1945  (76 y.o.)  Referral Date : 20  Referring Practitioner: Ophelia Ramachandran MD  Diagnosis: hemiarthroplasty of right hip  Treatment Diagnosis: difficulty in walking  Additional Pertinent Hx: Per EMR: \"Fannie Blackman is a 76 y.o. female who presents for evaluation of pain to the right leg and hip that onset following a fall that occurred 3 days ago. Patient reports that she tripped over her cat and fell 3 days ago, experiencing immediate pain to the right leg and hip. Patient notes worsening of pain since initial fall and reports exacerbation of pain when she attempts to bear weight onto her right leg. Patient describes additional symptom of nausea. Patient denies hitting her head during the incident and denies headache and chest pain. Patient reports using oxygen treatments at home due to condition of chronic obstructive pulmonary disease. Patient notes a history of smoking but denies current nicotine use. Patient additionally has a history of congestive heart failure and notes that edema to her lower extremities is normal at baseline. \" Pt underwent right hip hemiarthroplasty 20.      Prior Level of Function:  Lives With: Alone  Type of Home: House  Home Layout: One level  Home Access: Ramped entrance   [facility] [address] - Number of Steps: 2  Entrance Stairs - Rails: Right(grab bar)  Home Equipment: Standard walker, 4 wheeled walker, Cane, Oxygen   Bathroom Shower/Tub: Tub/Shower unit, Shower chair with back  [employer]: Standard(pt reports daughter installing toilet riser)  Bathroom Equipment: Shower chair, Grab bars in  [address] Help From: Family  ADL Assistance: Independent  Homemaking Assistance: Needs assistance  Homemaking Responsibilities: Yes  Ambulation Assistance: Independent  Transfer Assistance: Independent  Active : Yes  IADL Comments: Daughter able to assist at times but works 2 jobs, Has a niece who is able to assist at times  Additional Comments: Pt reports I prior to admission without AD. Pt reports daughter has RTS. Current with meals on wheels. Restrictions/Precautions:  Restrictions/Precautions: Weight Bearing, Fall Risk  Right Lower Extremity Weight Bearing: Weight Bearing As Tolerated  Position Activity Restriction  Hip Precautions: No hip flexion > 90 degrees, No ADduction, No hip internal rotation  Other position/activity restrictions: Home O2: baseline is 2L at rest/6L with activity. S/p right hip hemiarthroplasty for femoral neck fracture on 1/1/20    SUBJECTIVE: Patient in recliner upon arrival and wanting to walk in hallway. Patient agreeable to therapy. Patient on 6L during ambulation. PAIN: denies    OBJECTIVE:  Bed Mobility:  Not Tested    Transfers:  Sit to Stand: Stand By Assistance  Stand to Sit:Stand By Assistance    Ambulation:  Stand By Assistance  Distance: 120ft x1, 150ft x1  Surface: Level Tile  Device:Rolling Walker  Gait Deviations: Forward Flexed Posture, Slow Ligia, Decreased Step Length Bilaterally, Decreased Gait Speed and steady, no LOB    Exercise:  Patient was guided in 1 set(s) 15 reps of exercise to both lower extremities. Ankle pumps, Glut sets, Quad sets, Heelslides, Long arc quads and Straight leg raises. Exercises were completed for increased independence with functional mobility. Functional Outcome Measures: Not completed       ASSESSMENT:  Assessment: Patient progressing toward established goals. Activity Tolerance:  Patient tolerance of  treatment: good.       Equipment Recommendations:Equipment Needed: Yes(may need new 2WW)  Discharge Recommendations:  Continue to assess pending progress    Plan: Times per week: 5x/wk BID, 1x/wk QD  Times per day: Twice a day  Plan weeks: 3 weeks  Current Treatment Recommendations: Strengthening, Functional Mobility Training, Neuromuscular Re-education, Home Exercise Program, Transfer Training, Gait Training, Safety Education & Training, Balance Training, Endurance Training, Stair training, Patient/Caregiver Education & Training    Patient Education  Patient Education: Plan of Care, Transfers, Gait, Verbal Exercise Instruction    Goals:  Patient goals : go home  Short term goals  Time Frame for Short term goals: 10 days  Short term goal 1: Patient will complete sit  <> stand with SBA to stand to ambulate with decreased difficulty. GOAL MET, DISCONTINUE, SEE LTG  Short term goal 2: Patient will complete supine < > sit with SBA to transfer in/out of bed with decreased difficulty. GOAL MET, DISCONTINUE, SEE LTG  Short term goal 3: Patient will ambulate 100' with a RW and SBA to progress towards independent home mobility. GOAL MET, DISCONTINUE, SEE LTG  Short term goal 4:       Long term goals  Time Frame for Long term goals : 3 weeks  Long term goal 1: Patient will complete sit < > stand and stand pivot transfers with modified independence to allow patient to transfer surface to surface safely. Long term goal 2: Patient will complete supine < > sit with modified independence to allow patient to transfer in/out of bed safely. Long term goal 3: Patient will ambulate 80' with a RW and modified independence to navigate home safely. Long term goal 4: Patient will ascend/descend 2 steps with 1 handrail and straight cane and SBA for safe home entry. Long term goal 5: Patient will complete car transfer with SBA for safe transport to home and appointments. Following session, patient left in safe position with all fall risk precautions in place.

## 2020-01-23 NOTE — PROGRESS NOTES
[facility]  [address] [facility]  Occupational Therapy  Daily Note  Time:   Time In:   Time Out:   Timed Code Treatment Minutes: 44 Minutes  Minutes: 39        Date: 2020  Patient Name: Mima Collins,   Gender: female      Room: [facility]66/066-A  MRN: [id number]  : 1945  (76 y.o.)  Referring Practitioner: Fariba Adamson MD; Attending: Dr. Richard Arzate  Diagnosis: Hemiarthroplast of R Hip  Additional Pertinent Hx: Per EMR: \"Fannie Lentz is a 76 y.o. female who presents 19 for evaluation of pain to the right leg and hip that onset following a fall that occurred 3 days ago. Patient reports that she tripped over her cat and fell 3 days ago, experiencing immediate pain to the right leg and hip. Patient notes worsening of pain since initial fall and reports exacerbation of pain when she attempts to bear weight onto her right leg. Patient describes additional symptom of nausea. Patient denies hitting her head during the incident and denies headache and chest pain. Patient reports using oxygen treatments at home due to condition of chronic obstructive pulmonary disease. Patient notes a history of smoking but denies current nicotine use. Patient additionally has a history of congestive heart failure and notes that edema to her lower extremities is normal at baseline. \"  s/p s/p right hip hemiarthroplasty for femoral neck fracture on 20. Transfer to [facility] 20. Restrictions/Precautions:  Restrictions/Precautions: Weight Bearing, Fall Risk  Right Lower Extremity Weight Bearing: Weight Bearing As Tolerated  Position Activity Restriction  Hip Precautions: No hip flexion > 90 degrees, No ADduction, No hip internal rotation  Other position/activity restrictions: Home O2: baseline is 2L at rest/6L with activity.  S/p right hip hemiarthroplasty for femoral neck fracture on 20    SUBJECTIVE: Patient seated in bedside chair upon arrival. Patient not feeling well this AM- states procurement    Patient Education  Patient Education: Home Exercise Program    Goals  Short term goals  Time Frame for Short term goals: 2 weeks  Short term goal 1: Pt will complete LB ADL with LHAE and SUP to increase indep with self care. Short term goal 2: Pt will complete dynamic standing task with Mod I x 6 min with B hand release to increase balance required for grooming. Short term goal 3: Pt will complete mobility to/from BR with Mod I and 0 cues safety to increase indep with ADL routine. Short term goal 4: Pt will complete various t/fs with Mod I and 0 cues safety to increase indep with toileting t/fs. Long term goals  Time Frame for Long term goals : 3 weeks  Long term goal 1: Pt will complete ADL routine including shower t/f with standard shower chair with Emilia to increase indep with self care. Long term goal 2: Pt will demonstrate basic IADL tasks with emphasis on pet care using any adaptive strategies for increased safety and independence with changing litter box while abiding by hip precautions    Following session, patient left in safe position with all fall risk precautions in place.

## 2020-01-23 NOTE — CONSULTS
CONSULTATION NOTE :ID       Patient - Tressa Franks,  Age - 76 y.o.    - 1945      Room Number - 8E-66/066-A   N -  [id number]   [facility]  Date of Admission -  2020  1:37 PM  Patient's PCP: Brianna Koenig MD     Requesting Physician: Elba Freedman MD    REASON FOR CONSULTATION   Pneumonia due to Pseudomonas. HISTORY OF PRESENT ILLNESS       This is a very pleasant 76 y.o. female who was admitted to the hospital with a chief complaints of shortness of breath. This patient had history of metastatic lung cancer   has been on chemotherapy. Previously treated with chemoradiation unfortunately it recurred previously treated with right lower lobe lobectomy however she has cancer on her left lung as well. She was brought to the hospital for shortness of breath. Patient was treated and transferred to rehabilitation floor. I was asked to see this patient because she has worsening pneumonia on her right lung. She has been having more cough sputum was positive for Pseudomonas she is currently on IV Zosyn and tobramycin aerosol. She will be scheduled for bronchoscopy tomorrow. She has smoked in the past.  She had also been on treatment for pulmonary embolism. Her follow-up chest x-ray was showed worsening infiltrate on her right lung was concern for postobstructive pneumonia she has significant volume loss on her right lung still has shortness of breath she is on oxygen. Has been followed by pulmonary. I was asked to see this patient to make recommendation regarding antibiotics. Her medical record was reviewed.     PAST MEDICAL  HISTORY       Past Medical History:   Diagnosis Date    Arthritis     low back pain and scoliosis in lumbar    Cancer ([facility]     lower right lobe-lung s/p lobectomy in , radiation and chemo nad later had mediatinal mets and had radationa dn chemo again, and in 2016 had reoccurence on the left side upper and was started on radiation this year and has  a follow up CT in oct 2017    CHF (congestive heart failure) (HCC)     Chronic bronchitis, simple (HCC)     Chronic respiratory failure with hypoxia ([address]     COPD (chronic obstructive pulmonary disease) (HCC)     GERD (gastroesophageal reflux disease)     History of positive PPD     HTN (hypertension)     Hx of blood clots     in lung     Pneumonia     Postprocedural pneumothorax     Scoliosis     Sleep apnea     Urinary incontinence     UTI (urinary tract infection)        PAST SURGICAL HISTORY     Past Surgical History:   Procedure Laterality Date    HIP SURGERY Right 1/1/2020    RIGHT HIP HEMIARTHROPLASTY performed by Alonso Castrejon at [address] [place] [address] [id number]  10/19/2012    rt lower lobectomy     LUNG BIOPSY  8/2012         MEDICATIONS:       Scheduled Meds:   tobramycin (PF)  300 mg Nebulization BID    piperacillin-tazobactam  3.375 g Intravenous Q8H    ipratropium-albuterol  1 ampule Inhalation Q4H WA    sodium chloride flush  10 mL Intravenous QAM AC    ferrous sulfate  325 mg Oral BID WC    heparin flush (100 units/mL)  500 Units Intravenous Daily    diphenhydrAMINE  25 mg Oral BID    fluticasone  1 spray Each Nostril Daily    guaiFENesin  600 mg Oral Daily    digoxin  125 mcg Oral Daily    levothyroxine  75 mcg Oral Daily    metoprolol tartrate  12.5 mg Oral BID    rOPINIRole  0.25 mg Oral BID    famotidine  20 mg Oral Daily    acetaminophen  500 mg Oral Q6H    Vitamin D  5,000 Units Oral Daily    betamethasone dipropionate   Topical Daily    mometasone-formoterol  2 puff Inhalation BID     Continuous Infusions:   heparin (porcine) 15 Units/kg/hr (01/23/20 0530)     PRN Meds:docusate sodium, polyethylene glycol, senna, loperamide, ondansetron, muscle rub, acetaminophen, zinc oxide, [DISCONTINUED] heparin flush (100 units/mL) **OR** heparin flush (100 units/mL), sodium chloride flush, HYDROcodone-acetaminophen, magnesium hydroxide,

## 2020-01-23 NOTE — PROGRESS NOTES
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 (HCC)    HTN (hypertension)    GERD (gastroesophageal reflux disease)    Recurrent non-small cell lung cancer (NSCLC) [facility]    Pneumonia of right lower lobe due to [facility]    Acute on chronic respiratory failure with hypoxemia (HCC)    Moderate malnutrition (HCC)    Chronic obstructive pulmonary disease (HCC)    Paroxysmal atrial fibrillation ([facility]    Acute on chronic respiratory failure with hypoxia and hypercapnia (HCC)    Pathologic fracture of femoral neck, right, initial encounter ([facility]    Fall    Acute blood loss anemia    Thrombocytopenia (HCC)    Constipation    Chronic diastolic heart failure (HCC)    Hx pulmonary embolism    Hypothyroidism    Hip fracture requiring operative repair, left, closed, initial encounter ([facility]  Resolved Problems:    * No resolved hospital problems. *      Plan:     Change the stool meds to PRN  Add some imodium PRN  Offered a change in diet but she would rather not now  Discussed the stools possibly being related to the ATB  Continue therapies as tolerated        Subjective:     Patient has no complaint of CP, but does have SARABIA, raised some thick brown sputum today, several loose stools. .   Medication side effects: diarrhea?     Scheduled Meds:   tobramycin (PF)  300 mg Nebulization BID    piperacillin-tazobactam  3.375 g Intravenous Q8H    ipratropium-albuterol  1 ampule Inhalation Q4H WA    sodium chloride flush  10 mL Intravenous QAM AC    ferrous sulfate  325 mg Oral BID WC    heparin flush (100 units/mL)  500 Units Intravenous Daily    diphenhydrAMINE  25 mg Oral BID    fluticasone  1 spray Each Nostril Daily    guaiFENesin  600 mg Oral Daily    digoxin  125

## 2020-01-23 NOTE — CONSULTS
postop respiratory failure and reintubated on 1/1. Then she was extubated the second day and moved out of ICU on 1/3. The patient then discharged to TCU. She had fever on 1/16 up to 102.5 with hypotension. CXR today showed worsened appearance with further loss of volume right lung. She was started on ceftriaxone and azithromycin. The plan is to proceed with bronchoscopy in 2 days. Oncology history:  She was diagnosed with  stage 2A non-small-cell lung cancer in 2012, s/p right lower lobe lung, lobectomy. Final pathology report showed:  A - Right lower lobe lung, lobectomy:      Moderately differentiated adenocarcinoma, mixed subtype      (predominantly papillary), pT2b      Emphysematous changes in non-neoplastic lung parenchyma.      Margins negative for malignancy. B - Peribronchial lymph node, excision:   One lymph node with caseous necrosis/calcification  negative for malignancy, pN0. It doesn't look like the patient had any mediastinal lymph node evaluation.    She received adjuvant chemotherapy with carboplatin and gemcitabine, completed on 02/04/2013. PET on 02/28/2013  showed avid mediastinal right infrahilar adenopathy, highly suspicious for metastatic disease. Due to disease progression she underwent chemotherapy with carboplatin and Taxol and concurrent radiation therapy that she  completed radiation on 05/14/2013 and chemotherapy in 08/2013.  The PET scan done on 09/09/2013 showed postradiation changes only. She then was diagnosed with a new stage 1A left lung cancer in 10/2016.  CT on 09/19/2016 showed a left apical lung nodule that measured 10 mm, evaluated by PET that showed an uptake of 5.2, but no other areas were seen. CT-guided biopsy 10/31/2016 showed poorly differentiated adenocarcinoma. She was referred back to Cardiovascular surgery for excision of a lung nodule for curative intent.  She was deemed nonsurgical by Dr. Jessie Johnston, therefore she received stereotactic ablative radiation pneumothorax     Scoliosis     Sleep apnea     Urinary incontinence     UTI (urinary tract infection)       Past Surgical History           Procedure Laterality Date    HIP SURGERY Right 2020    RIGHT HIP HEMIARTHROPLASTY performed by Clemencia Jose at [address] [place] [address] [id number]  10/19/2012    rt lower lobectomy     LUNG BIOPSY  2012     Diet    Dietary Nutrition Supplements: Standard High Calorie Oral Supplement  DIET GENERAL;  Allergies   Advil cold & sinus liqui-gels [pseudoephedrine-ibuprofen];  Food; Percocet [oxycodone-acetaminophen]; and Sulfa antibiotics  Social History     Social History     Socioeconomic History    Marital status:      Spouse name: Mary Yates Number of children: 2    Years of education: Not on file    Highest education level: Not on file   Occupational History    Not on file   Social Needs    Financial resource strain: Not on file    Food insecurity:     Worry: Not on file     Inability: Not on file   The [email or web address] needs:     Medical: Not on file     Non-medical: Not on file   Tobacco Use    Smoking status: Former Smoker     Packs/day: 1.00     Years: 45.00     Pack years: 45.00     Types: Cigarettes     Last attempt to quit:      Years since quittin.0    Smokeless tobacco: Never Used   Substance and Sexual Activity    Alcohol use: No    Drug use: No    Sexual activity: Not on file   Lifestyle    Physical activity:     Days per week: Not on file     Minutes per session: Not on file    Stress: Not on file   Relationships    Social connections:     Talks on phone: Not on file     Gets together: Not on file     Attends Protestant service: Not on file     Active member of club or organization: Not on file     Attends meetings of clubs or organizations: Not on file     Relationship status: Not on file    Intimate partner violence:     Fear of current or ex partner: Not on file     Emotionally abused: Not on file     Physically abused: Not on file     Forced oz (68.3 kg). Her oral temperature is 97.8 °F (36.6 °C). Her blood pressure is 118/58 (abnormal) and her pulse is 92. Her respiration is 20 and oxygen saturation is 98%. O2 Flow Rate (L/min): 2 L/min    Exam   Physical Exam    Physical Exam  Vitals signs reviewed. Constitutional:       General: She is not in acute distress. Appearance: She is well-developed. HENT:      Head: Normocephalic. Mouth/Throat:      Pharynx: No oropharyngeal exudate. Eyes:      General: No scleral icterus. Right eye: No discharge. Left eye: No discharge. Pupils: Pupils are equal, round, and reactive to light. Neck:      Musculoskeletal: Normal range of motion and neck supple. Thyroid: No thyromegaly. Vascular: No JVD. Trachea: No tracheal deviation. Cardiovascular:      Rate and Rhythm: Normal rate. Heart sounds: Normal heart sounds. No murmur. No friction rub. No gallop. Pulmonary:      Effort: Tachypnea and respiratory distress present. Breath sounds: No stridor. Examination of the right-middle field reveals decreased breath sounds. Examination of the left-middle field reveals decreased breath sounds. Examination of the right-lower field reveals decreased breath sounds. Examination of the left-lower field reveals decreased breath sounds. Decreased breath sounds present. No wheezing or rales. Chest:      Chest wall: No tenderness. Abdominal:      General: Bowel sounds are normal. There is no distension. Palpations: Abdomen is soft. There is no mass. Tenderness: There is no tenderness. There is no rebound. Musculoskeletal: Normal range of motion. Comments: Good range of motion in all four extremities. Lymphadenopathy:      Cervical: No cervical adenopathy. Skin:     General: Skin is warm. Findings: No erythema or rash. Neurological:      Mental Status: She is alert and oriented to person, place, and time.       Cranial Nerves: No cranial nerve deficit. Motor: No abnormal muscle tone. Deep Tendon Reflexes: Reflexes are normal and symmetric. Psychiatric:         Behavior: Behavior normal.         Thought Content: Thought content normal.         Judgment: Judgment normal.        Labs   CBC  No results for input(s): WBC, RBC, HGB, HCT, MCV, MCH, MCHC, RDW, PLT, MPV in the last 72 hours. BMP  No results for input(s): NA, K, CL, CO2, BUN, CREATININE, GLUCOSE, MG, PHOS, CALCIUM, IONCA, MG in the last 72 hours. LFT  No results for input(s): AST, ALT, ALB, BILITOT, ALKPHOS, LIPASE in the last 72 hours. Invalid input(s): AMYLASE  INR  No results for input(s): INR, PROTIME in the last 72 hours. PTT  Recent Labs     01/22/20  0909 01/22/20  1843 01/23/20  0346   APTT 95.9* 113.9* 103.4*       Radiology        Xr Chest Standard (2 Vw)    Result Date: 1/18/2020  PROCEDURE: XR CHEST (2 VW) CLINICAL INFORMATION: follow up infiltrate. COMPARISON: 1/16/2020 TECHNIQUE: PA and lateral views the chest. FINDINGS: The right heart border is entirely obscured. The mediastinum is not widened. Loss of volume right lung should mediastinum the right. Slightly worse in appearance since the prior exam with air bronchograms right lower lobe. Interstitial edema and a minimal aerated lung. Herniation of left lung and to the right due to the shift the mediastinum and loss of volume right lung. The pulmonary vascularity is normal. No suspicious osseous lesions are present. Minimally worsened appearance with further loss of volume right lung **This report has been created using voice recognition software. It may contain minor errors which are inherent in voice recognition technology. ** Final report electronically signed by Dr. Renetta Hernandes on 1/18/2020 4:05 PM    Xr Chest Standard (2 Vw)    Result Date: 1/16/2020  PROCEDURE: XR CHEST (2 VW) CLINICAL INFORMATION: fever.  COMPARISON: January 2, 2020 TECHNIQUE: PA and lateral views the chest. FINDINGS: There is a signed by Dr. Chilo Florez on 1/2/2020 5:42 AM    Ct Head Wo Contrast    Result Date: 1/1/2020  PROCEDURE: CT HEAD WO CONTRAST CLINICAL INFORMATION: unresponsive. COMPARISON: CT 2/23/2019 TECHNIQUE: Noncontrast 5 mm axial images were obtained through the brain. All CT scans at this facility use dose modulation, iterative reconstruction, and/or weight-based dosing when appropriate to reduce radiation dose to as low as reasonably achievable. FINDINGS: Parenchyma: Mild motion artifact. Moderate patchy low-attenuation areas within the periventricular white matter are grossly similar to previous. Low attenuation is suggested within each occipital lobe. This may be artifactual. Areas of edema, right greater than left are possible. Ventricles and sulci: Normal size for age. Other: There is no acute intracranial hemorrhage. No calvarial fracture. Small left maxillary retention cyst. Mild ethmoid mucosal thickening and fluid. The study is mildly limited by motion. Small areas of occipital edema are possible, right more so than left. **This report has been created using voice recognition software. It may contain minor errors which are inherent in voice recognition technology. ** Final report electronically signed by Dr. Jose Fraser on 1/1/2020 8:47 PM    Ct Chest Wo Contrast    Result Date: 1/19/2020  PROCEDURE: CT CHEST WO CONTRAST CLINICAL INFORMATION: RLL NSLC s/p RLL resection. then recurrence of NSCLC in the right. Fever/chills. R/o pneumonia/ obstruction . COMPARISON: 11/4/2019 TECHNIQUE: 2-D multiplanar noncontrast images of the chest All CT scans at this facility use dose modulation, iterative reconstruction, and/or weight-based dosing when appropriate to reduce radiation dose to as low as reasonably achievable. FINDINGS: Progressive loss of volume in the right lung with extensive airspace consolidation involving most of the right lung with minimal amount of residual aerated lung at the apex.  There is aorta are stable. There is no acute appearing change of the skeleton. 1. Stable volume loss of the right hemithorax, correlate with prior lobectomy. 2. Unchanged appearance of diffuse coarse interstitial markings. Underlying changes of COPD are not excluded. **This report has been created using voice recognition software. It may contain minor errors which are inherent in voice recognition technology. ** Final report electronically signed by Dr. Glorya Habermann on 12/30/2019 9:29 PM    Xr Hip 2-3 Vw W Pelvis Right    Result Date: 12/30/2019  PROCEDURE: XR HIP 2-3 VW W PELVIS RIGHT CLINICAL INFORMATION: Fall onto the right side, pain on the right hip and knee. COMPARISON: No prior study. TECHNIQUE: AP and crosstable lateral views were obtained  of the right hip. AP pelvis xray was also obtained. FINDINGS: Bones/joints: There is a subcapital right femoral neck fracture with mild cephalad displacement of the distal fracture fragment and impaction of the fracture fragments. There is no dislocation. There is mild bilateral hip joint space narrowing consistent  with DJD. The SI joints are unremarkable. There is lumbar levoscoliosis. There is lower lumbar facet joint DJD. Soft tissues: No significant soft tissue swelling. Subcapital right femoral neck fracture as discussed above. Mild bilateral hip joint DJD. Lumbar levoscoliosis. **This report has been created using voice recognition software. It may contain minor errors which are inherent in voice recognition technology. ** Final report electronically signed by Dr. Tyler Winkler on 12/30/2019 8:14 PM    Xr Abdomen (2 Views)    Result Date: 1/16/2020  PROCEDURE: XR ABDOMEN (2 VIEWS) CLINICAL INFORMATION: nausea and vomiting . COMPARISON: No prior study. TECHNIQUE: Upright and supine views of the abdomen were obtained. FINDINGS: S-shaped lumbar scoliosis and osteopenia. Large amount of stool in the left colon and rectum. Correlate for constipation.  Otherwise, gas-filled mid abdominal bowel loops. Right hip prosthesis. SI joints are symmetric. Phleboliths in the pelvis. Postsurgical changes right lung base with possible infiltrate. 1. S-shaped lumbar scoliosis and osteopenia. Large amount of stool in the left colon and rectum. Correlate for constipation. Final report electronically signed by Dr. Jimmy Cole on 1/16/2020 12:22 PM      Assessment/ Recommendations     1. Respiratory failure. Multifactorial.  Secondary to recent infection fluid overload and metastatic left lung cancer. Further loss of volume in the right lung with extensive right lung airspace consolidation likely acute pneumonic infiltrate treated with IV course of antibiotic. The patient is scheduled to have bronchoscopy for possible endobronchial plaque removal bronchial washing and biopsy  2. Pericardial effusion. Status post  pericardiocentesis in July 2019 when almost 700 mL of fluid was drained during procedure. No evidence on recent CT of the chest  4. Chronic normocytic anemia. The patient hemoglobin 7.6 today. Will recommend transfusion when hemoglobin drops below 7.   5.  Pulmonary embolism. The patient is on Eliquis. 6.  Hypothyroidism. Secondary to immunotherapy. TSH has improved to 53.78 tfrom 96.23 in October, beginning of December the dose of Synthroid was increased to 100 mcg daily    \"Thank you for asking us to see this interestingpatient\"    Case discussed with nurse and patient/family. Questions and concerns addressed.     Electronically signed by     Shavonne Thompson MD on 1/23/2020 at 8:42 AM

## 2020-01-23 NOTE — PROGRESS NOTES
Heparin Consult  Lab Results   Component Value Date    APTT 103.4 01/23/2020     Lab Results   Component Value Date    HGB 10.0 01/18/2020    HCT 33.4 01/18/2020     01/18/2020    INR 1.10 01/01/2020       Current Rate: 17 units/kg/hr    Plan:  Rate: Decrease heparin drip rate to 15 units/kg/hr  Next aPTT: 1- @ [address] [place] [address]. Marii, BCSCP 1/23/2020 5:17 AM

## 2020-01-24 ENCOUNTER — HOSPITAL ENCOUNTER (INPATIENT)
Age: 75
LOS: 2 days | Discharge: HOME HEALTH | DRG: 559 | End: 2020-01-26
Attending: FAMILY MEDICINE | Admitting: FAMILY MEDICINE
Payer: MEDICARE

## 2020-01-24 ENCOUNTER — ANESTHESIA EVENT (OUTPATIENT)
Dept: OPERATING ROOM | Age: 75
End: 2020-01-24
Payer: MEDICARE

## 2020-01-24 ENCOUNTER — APPOINTMENT (OUTPATIENT)
Dept: GENERAL RADIOLOGY | Age: 75
End: 2020-01-24
Attending: INTERNAL MEDICINE
Payer: MEDICARE

## 2020-01-24 ENCOUNTER — HOSPITAL ENCOUNTER (OUTPATIENT)
Age: 75
Setting detail: OUTPATIENT SURGERY
Discharge: NURSING, REHAB OR LONG-TERM CARE | End: 2020-01-24
Attending: INTERNAL MEDICINE | Admitting: INTERNAL MEDICINE
Payer: MEDICARE

## 2020-01-24 ENCOUNTER — ANESTHESIA (OUTPATIENT)
Dept: OPERATING ROOM | Age: 75
End: 2020-01-24
Payer: MEDICARE

## 2020-01-24 VITALS
DIASTOLIC BLOOD PRESSURE: 57 MMHG | SYSTOLIC BLOOD PRESSURE: 126 MMHG | RESPIRATION RATE: 18 BRPM | TEMPERATURE: 98.4 F | HEART RATE: 82 BPM | BODY MASS INDEX: 29.51 KG/M2 | OXYGEN SATURATION: 99 % | HEIGHT: 60 IN | WEIGHT: 150.3 LBS

## 2020-01-24 VITALS
OXYGEN SATURATION: 100 % | RESPIRATION RATE: 1 BRPM | DIASTOLIC BLOOD PRESSURE: 76 MMHG | SYSTOLIC BLOOD PRESSURE: 165 MMHG

## 2020-01-24 VITALS
SYSTOLIC BLOOD PRESSURE: 150 MMHG | DIASTOLIC BLOOD PRESSURE: 71 MMHG | HEART RATE: 90 BPM | TEMPERATURE: 98.8 F | OXYGEN SATURATION: 97 % | RESPIRATION RATE: 15 BRPM

## 2020-01-24 LAB
APTT: 28.5 SECONDS (ref 22–38)
APTT: 89.8 SECONDS (ref 22–38)
CYTOLOGY-NON GYN: NORMAL
ERYTHROCYTE [DISTWIDTH] IN BLOOD BY AUTOMATED COUNT: 16.4 % (ref 11.5–14.5)
ERYTHROCYTE [DISTWIDTH] IN BLOOD BY AUTOMATED COUNT: 58.7 FL (ref 35–45)
HCT VFR BLD CALC: 25.6 % (ref 37–47)
HCT VFR BLD CALC: 25.9 % (ref 37–47)
HEMOGLOBIN: 7.4 GM/DL (ref 12–16)
HEMOGLOBIN: 7.7 GM/DL (ref 12–16)
MCH RBC QN AUTO: 29.4 PG (ref 26–33)
MCHC RBC AUTO-ENTMCNC: 28.9 GM/DL (ref 32.2–35.5)
MCV RBC AUTO: 101.6 FL (ref 81–99)
PLATELET # BLD: 233 THOU/MM3 (ref 130–400)
PMV BLD AUTO: 9.6 FL (ref 9.4–12.4)
RBC # BLD: 2.52 MILL/MM3 (ref 4.2–5.4)
WBC # BLD: 5.8 THOU/MM3 (ref 4.8–10.8)

## 2020-01-24 PROCEDURE — 85014 HEMATOCRIT: CPT

## 2020-01-24 PROCEDURE — 94760 N-INVAS EAR/PLS OXIMETRY 1: CPT

## 2020-01-24 PROCEDURE — 31645 BRNCHSC W/THER ASPIR 1ST: CPT | Performed by: INTERNAL MEDICINE

## 2020-01-24 PROCEDURE — 88305 TISSUE EXAM BY PATHOLOGIST: CPT

## 2020-01-24 PROCEDURE — 94669 MECHANICAL CHEST WALL OSCILL: CPT

## 2020-01-24 PROCEDURE — 6360000002 HC RX W HCPCS: Performed by: FAMILY MEDICINE

## 2020-01-24 PROCEDURE — 2500000003 HC RX 250 WO HCPCS: Performed by: NURSE ANESTHETIST, CERTIFIED REGISTERED

## 2020-01-24 PROCEDURE — 2700000000 HC OXYGEN THERAPY PER DAY

## 2020-01-24 PROCEDURE — 2580000003 HC RX 258: Performed by: FAMILY MEDICINE

## 2020-01-24 PROCEDURE — 6370000000 HC RX 637 (ALT 250 FOR IP): Performed by: FAMILY MEDICINE

## 2020-01-24 PROCEDURE — 97530 THERAPEUTIC ACTIVITIES: CPT

## 2020-01-24 PROCEDURE — 94640 AIRWAY INHALATION TREATMENT: CPT

## 2020-01-24 PROCEDURE — 87116 MYCOBACTERIA CULTURE: CPT

## 2020-01-24 PROCEDURE — 87075 CULTR BACTERIA EXCEPT BLOOD: CPT

## 2020-01-24 PROCEDURE — 87206 SMEAR FLUORESCENT/ACID STAI: CPT

## 2020-01-24 PROCEDURE — 88312 SPECIAL STAINS GROUP 1: CPT

## 2020-01-24 PROCEDURE — 6360000002 HC RX W HCPCS: Performed by: INTERNAL MEDICINE

## 2020-01-24 PROCEDURE — 6360000002 HC RX W HCPCS: Performed by: NURSE PRACTITIONER

## 2020-01-24 PROCEDURE — 6370000000 HC RX 637 (ALT 250 FOR IP): Performed by: INTERNAL MEDICINE

## 2020-01-24 PROCEDURE — APPSS30 APP SPLIT SHARED TIME 16-30 MINUTES: Performed by: NURSE PRACTITIONER

## 2020-01-24 PROCEDURE — 3700000000 HC ANESTHESIA ATTENDED CARE: Performed by: INTERNAL MEDICINE

## 2020-01-24 PROCEDURE — 3609011100 HC BRONCHOSCOPY BRUSHINGS: Performed by: INTERNAL MEDICINE

## 2020-01-24 PROCEDURE — 85730 THROMBOPLASTIN TIME PARTIAL: CPT

## 2020-01-24 PROCEDURE — 87081 CULTURE SCREEN ONLY: CPT

## 2020-01-24 PROCEDURE — 88104 CYTOPATH FL NONGYN SMEARS: CPT

## 2020-01-24 PROCEDURE — 6370000000 HC RX 637 (ALT 250 FOR IP)

## 2020-01-24 PROCEDURE — 3209999900 FLUORO FOR SURGICAL PROCEDURES

## 2020-01-24 PROCEDURE — 1290000000 HC SEMI PRIVATE OTHER R&B

## 2020-01-24 PROCEDURE — 3700000001 HC ADD 15 MINUTES (ANESTHESIA): Performed by: INTERNAL MEDICINE

## 2020-01-24 PROCEDURE — 31623 DX BRONCHOSCOPE/BRUSH: CPT | Performed by: INTERNAL MEDICINE

## 2020-01-24 PROCEDURE — 2580000003 HC RX 258: Performed by: NURSE ANESTHETIST, CERTIFIED REGISTERED

## 2020-01-24 PROCEDURE — 97116 GAIT TRAINING THERAPY: CPT

## 2020-01-24 PROCEDURE — 87106 FUNGI IDENTIFICATION YEAST: CPT

## 2020-01-24 PROCEDURE — 87205 SMEAR GRAM STAIN: CPT

## 2020-01-24 PROCEDURE — 3609027000 HC BRONCHOSCOPY: Performed by: INTERNAL MEDICINE

## 2020-01-24 PROCEDURE — 6360000002 HC RX W HCPCS: Performed by: NURSE ANESTHETIST, CERTIFIED REGISTERED

## 2020-01-24 PROCEDURE — 7100000000 HC PACU RECOVERY - FIRST 15 MIN: Performed by: INTERNAL MEDICINE

## 2020-01-24 PROCEDURE — 7100000001 HC PACU RECOVERY - ADDTL 15 MIN: Performed by: INTERNAL MEDICINE

## 2020-01-24 PROCEDURE — 6360000002 HC RX W HCPCS: Performed by: PHARMACIST

## 2020-01-24 PROCEDURE — 85018 HEMOGLOBIN: CPT

## 2020-01-24 PROCEDURE — 88108 CYTOPATH CONCENTRATE TECH: CPT

## 2020-01-24 PROCEDURE — 88112 CYTOPATH CELL ENHANCE TECH: CPT

## 2020-01-24 PROCEDURE — 2580000003 HC RX 258: Performed by: NURSE PRACTITIONER

## 2020-01-24 PROCEDURE — 87255 GENET VIRUS ISOLATE HSV: CPT

## 2020-01-24 PROCEDURE — 36415 COLL VENOUS BLD VENIPUNCTURE: CPT

## 2020-01-24 PROCEDURE — 85027 COMPLETE CBC AUTOMATED: CPT

## 2020-01-24 PROCEDURE — 87070 CULTURE OTHR SPECIMN AEROBIC: CPT

## 2020-01-24 PROCEDURE — 0220000000 HC SKILLED NURSING FACILITY

## 2020-01-24 PROCEDURE — 87102 FUNGUS ISOLATION CULTURE: CPT

## 2020-01-24 PROCEDURE — 97535 SELF CARE MNGMENT TRAINING: CPT

## 2020-01-24 RX ORDER — TOBRAMYCIN INHALATION SOLUTION 300 MG/5ML
300 INHALANT RESPIRATORY (INHALATION) 2 TIMES DAILY
Status: DISCONTINUED | OUTPATIENT
Start: 2020-01-24 | End: 2020-01-24

## 2020-01-24 RX ORDER — HYDROCODONE BITARTRATE AND ACETAMINOPHEN 10; 325 MG/1; MG/1
1 TABLET ORAL EVERY 6 HOURS PRN
Status: CANCELLED | OUTPATIENT
Start: 2020-01-24

## 2020-01-24 RX ORDER — MUSCLE RUB CREAM 100; 150 MG/G; MG/G
CREAM TOPICAL PRN
Status: DISCONTINUED | OUTPATIENT
Start: 2020-01-24 | End: 2020-01-26 | Stop reason: HOSPADM

## 2020-01-24 RX ORDER — IPRATROPIUM BROMIDE AND ALBUTEROL SULFATE 2.5; .5 MG/3ML; MG/3ML
1 SOLUTION RESPIRATORY (INHALATION)
Status: DISCONTINUED | OUTPATIENT
Start: 2020-01-24 | End: 2020-01-26 | Stop reason: HOSPADM

## 2020-01-24 RX ORDER — SENNA PLUS 8.6 MG/1
1 TABLET ORAL NIGHTLY PRN
Status: DISCONTINUED | OUTPATIENT
Start: 2020-01-24 | End: 2020-01-26 | Stop reason: HOSPADM

## 2020-01-24 RX ORDER — LOPERAMIDE HYDROCHLORIDE 2 MG/1
2 CAPSULE ORAL 4 TIMES DAILY PRN
Status: CANCELLED | OUTPATIENT
Start: 2020-01-24

## 2020-01-24 RX ORDER — POLYETHYLENE GLYCOL 3350 17 G/17G
17 POWDER, FOR SOLUTION ORAL DAILY PRN
Status: CANCELLED | OUTPATIENT
Start: 2020-01-24

## 2020-01-24 RX ORDER — HEPARIN SODIUM (PORCINE) LOCK FLUSH IV SOLN 100 UNIT/ML 100 UNIT/ML
500 SOLUTION INTRAVENOUS PRN
Status: DISCONTINUED | OUTPATIENT
Start: 2020-01-24 | End: 2020-01-26 | Stop reason: HOSPADM

## 2020-01-24 RX ORDER — SODIUM CHLORIDE 9 MG/ML
INJECTION, SOLUTION INTRAVENOUS CONTINUOUS PRN
Status: DISCONTINUED | OUTPATIENT
Start: 2020-01-24 | End: 2020-01-24 | Stop reason: SDUPTHER

## 2020-01-24 RX ORDER — ACETAMINOPHEN 325 MG/1
650 TABLET ORAL EVERY 4 HOURS PRN
Status: DISCONTINUED | OUTPATIENT
Start: 2020-01-24 | End: 2020-01-26 | Stop reason: HOSPADM

## 2020-01-24 RX ORDER — DOCUSATE SODIUM 100 MG/1
100 CAPSULE, LIQUID FILLED ORAL 2 TIMES DAILY PRN
Status: DISCONTINUED | OUTPATIENT
Start: 2020-01-24 | End: 2020-01-26 | Stop reason: HOSPADM

## 2020-01-24 RX ORDER — IPRATROPIUM BROMIDE AND ALBUTEROL SULFATE 2.5; .5 MG/3ML; MG/3ML
1 SOLUTION RESPIRATORY (INHALATION)
Status: CANCELLED | OUTPATIENT
Start: 2020-01-24

## 2020-01-24 RX ORDER — ACETAMINOPHEN 325 MG/1
650 TABLET ORAL EVERY 4 HOURS PRN
Status: CANCELLED | OUTPATIENT
Start: 2020-01-24

## 2020-01-24 RX ORDER — LEVOTHYROXINE SODIUM 0.07 MG/1
75 TABLET ORAL DAILY
Status: CANCELLED | OUTPATIENT
Start: 2020-01-25

## 2020-01-24 RX ORDER — LEVOTHYROXINE SODIUM 0.07 MG/1
75 TABLET ORAL DAILY
Status: DISCONTINUED | OUTPATIENT
Start: 2020-01-25 | End: 2020-01-26 | Stop reason: HOSPADM

## 2020-01-24 RX ORDER — SODIUM CHLORIDE 0.9 % (FLUSH) 0.9 %
10 SYRINGE (ML) INJECTION PRN
Status: CANCELLED | OUTPATIENT
Start: 2020-01-24

## 2020-01-24 RX ORDER — BISACODYL 10 MG
10 SUPPOSITORY, RECTAL RECTAL DAILY PRN
Status: CANCELLED | OUTPATIENT
Start: 2020-01-24

## 2020-01-24 RX ORDER — HEPARIN SODIUM (PORCINE) LOCK FLUSH IV SOLN 100 UNIT/ML 100 UNIT/ML
500 SOLUTION INTRAVENOUS DAILY
Status: DISCONTINUED | OUTPATIENT
Start: 2020-01-25 | End: 2020-01-26 | Stop reason: HOSPADM

## 2020-01-24 RX ORDER — ROPINIROLE 0.25 MG/1
0.25 TABLET, FILM COATED ORAL 2 TIMES DAILY
Status: DISCONTINUED | OUTPATIENT
Start: 2020-01-24 | End: 2020-01-26 | Stop reason: HOSPADM

## 2020-01-24 RX ORDER — SODIUM CHLORIDE 0.9 % (FLUSH) 0.9 %
10 SYRINGE (ML) INJECTION
Status: CANCELLED | OUTPATIENT
Start: 2020-01-25

## 2020-01-24 RX ORDER — BETAMETHASONE DIPROPIONATE 0.5 MG/G
CREAM TOPICAL DAILY
Status: DISCONTINUED | OUTPATIENT
Start: 2020-01-25 | End: 2020-01-26 | Stop reason: HOSPADM

## 2020-01-24 RX ORDER — MUSCLE RUB CREAM 100; 150 MG/G; MG/G
CREAM TOPICAL PRN
Status: CANCELLED | OUTPATIENT
Start: 2020-01-24

## 2020-01-24 RX ORDER — BETAMETHASONE DIPROPIONATE 0.5 MG/G
CREAM TOPICAL DAILY
Status: CANCELLED | OUTPATIENT
Start: 2020-01-24

## 2020-01-24 RX ORDER — FAMOTIDINE 20 MG/1
20 TABLET, FILM COATED ORAL DAILY
Status: CANCELLED | OUTPATIENT
Start: 2020-01-24

## 2020-01-24 RX ORDER — LIDOCAINE HYDROCHLORIDE 20 MG/ML
INJECTION, SOLUTION EPIDURAL; INFILTRATION; INTRACAUDAL; PERINEURAL PRN
Status: DISCONTINUED | OUTPATIENT
Start: 2020-01-24 | End: 2020-01-24 | Stop reason: SDUPTHER

## 2020-01-24 RX ORDER — SENNA PLUS 8.6 MG/1
1 TABLET ORAL NIGHTLY PRN
Status: CANCELLED | OUTPATIENT
Start: 2020-01-24

## 2020-01-24 RX ORDER — GUAIFENESIN 600 MG/1
600 TABLET, EXTENDED RELEASE ORAL DAILY
Status: DISCONTINUED | OUTPATIENT
Start: 2020-01-25 | End: 2020-01-26 | Stop reason: HOSPADM

## 2020-01-24 RX ORDER — ONDANSETRON 4 MG/1
4 TABLET, FILM COATED ORAL EVERY 6 HOURS PRN
Status: CANCELLED | OUTPATIENT
Start: 2020-01-24

## 2020-01-24 RX ORDER — TOBRAMYCIN INHALATION SOLUTION 300 MG/5ML
300 INHALANT RESPIRATORY (INHALATION) 2 TIMES DAILY
Status: CANCELLED | OUTPATIENT
Start: 2020-01-24 | End: 2020-01-28

## 2020-01-24 RX ORDER — SODIUM CHLORIDE 0.9 % (FLUSH) 0.9 %
10 SYRINGE (ML) INJECTION PRN
Status: DISCONTINUED | OUTPATIENT
Start: 2020-01-24 | End: 2020-01-26 | Stop reason: HOSPADM

## 2020-01-24 RX ORDER — ALBUTEROL SULFATE 90 UG/1
2 AEROSOL, METERED RESPIRATORY (INHALATION) EVERY 6 HOURS PRN
Status: DISCONTINUED | OUTPATIENT
Start: 2020-01-24 | End: 2020-01-26 | Stop reason: HOSPADM

## 2020-01-24 RX ORDER — FAMOTIDINE 20 MG/1
20 TABLET, FILM COATED ORAL DAILY
Status: DISCONTINUED | OUTPATIENT
Start: 2020-01-25 | End: 2020-01-26 | Stop reason: HOSPADM

## 2020-01-24 RX ORDER — ROPINIROLE 0.25 MG/1
0.25 TABLET, FILM COATED ORAL 2 TIMES DAILY
Status: CANCELLED | OUTPATIENT
Start: 2020-01-24

## 2020-01-24 RX ORDER — VITAMIN B COMPLEX
5000 TABLET ORAL DAILY
Status: CANCELLED | OUTPATIENT
Start: 2020-01-24 | End: 2020-03-26

## 2020-01-24 RX ORDER — HYDROXYZINE PAMOATE 25 MG/1
25 CAPSULE ORAL 3 TIMES DAILY PRN
Status: CANCELLED | OUTPATIENT
Start: 2020-01-24

## 2020-01-24 RX ORDER — HYDROXYZINE PAMOATE 25 MG/1
25 CAPSULE ORAL 3 TIMES DAILY PRN
Status: DISCONTINUED | OUTPATIENT
Start: 2020-01-24 | End: 2020-01-26 | Stop reason: HOSPADM

## 2020-01-24 RX ORDER — FLUTICASONE PROPIONATE 50 MCG
1 SPRAY, SUSPENSION (ML) NASAL DAILY
Status: DISCONTINUED | OUTPATIENT
Start: 2020-01-25 | End: 2020-01-26 | Stop reason: HOSPADM

## 2020-01-24 RX ORDER — FERROUS SULFATE 325(65) MG
325 TABLET ORAL 2 TIMES DAILY WITH MEALS
Status: DISCONTINUED | OUTPATIENT
Start: 2020-01-24 | End: 2020-01-26 | Stop reason: HOSPADM

## 2020-01-24 RX ORDER — BISACODYL 10 MG
10 SUPPOSITORY, RECTAL RECTAL DAILY PRN
Status: DISCONTINUED | OUTPATIENT
Start: 2020-01-24 | End: 2020-01-26 | Stop reason: HOSPADM

## 2020-01-24 RX ORDER — DIGOXIN 125 MCG
125 TABLET ORAL DAILY
Status: CANCELLED | OUTPATIENT
Start: 2020-01-24

## 2020-01-24 RX ORDER — IPRATROPIUM BROMIDE AND ALBUTEROL SULFATE 2.5; .5 MG/3ML; MG/3ML
1 SOLUTION RESPIRATORY (INHALATION) ONCE
Status: COMPLETED | OUTPATIENT
Start: 2020-01-24 | End: 2020-01-24

## 2020-01-24 RX ORDER — DOCUSATE SODIUM 100 MG/1
100 CAPSULE, LIQUID FILLED ORAL 2 TIMES DAILY PRN
Status: CANCELLED | OUTPATIENT
Start: 2020-01-24

## 2020-01-24 RX ORDER — POLYETHYLENE GLYCOL 3350 17 G/17G
17 POWDER, FOR SOLUTION ORAL DAILY PRN
Status: DISCONTINUED | OUTPATIENT
Start: 2020-01-24 | End: 2020-01-26 | Stop reason: HOSPADM

## 2020-01-24 RX ORDER — HYDROCODONE BITARTRATE AND ACETAMINOPHEN 10; 325 MG/1; MG/1
1 TABLET ORAL EVERY 6 HOURS PRN
Status: DISCONTINUED | OUTPATIENT
Start: 2020-01-24 | End: 2020-01-26 | Stop reason: HOSPADM

## 2020-01-24 RX ORDER — FLUTICASONE PROPIONATE 50 MCG
1 SPRAY, SUSPENSION (ML) NASAL DAILY
Status: CANCELLED | OUTPATIENT
Start: 2020-01-24

## 2020-01-24 RX ORDER — PROPOFOL 10 MG/ML
INJECTION, EMULSION INTRAVENOUS PRN
Status: DISCONTINUED | OUTPATIENT
Start: 2020-01-24 | End: 2020-01-24 | Stop reason: SDUPTHER

## 2020-01-24 RX ORDER — FERROUS SULFATE 325(65) MG
325 TABLET ORAL 2 TIMES DAILY WITH MEALS
Status: CANCELLED | OUTPATIENT
Start: 2020-01-24

## 2020-01-24 RX ORDER — LOPERAMIDE HYDROCHLORIDE 2 MG/1
2 CAPSULE ORAL 4 TIMES DAILY PRN
Status: DISCONTINUED | OUTPATIENT
Start: 2020-01-24 | End: 2020-01-25

## 2020-01-24 RX ORDER — DIPHENHYDRAMINE HCL 25 MG
25 TABLET ORAL 2 TIMES DAILY
Status: CANCELLED | OUTPATIENT
Start: 2020-01-24

## 2020-01-24 RX ORDER — VITAMIN B COMPLEX
5000 TABLET ORAL DAILY
Status: DISCONTINUED | OUTPATIENT
Start: 2020-01-25 | End: 2020-01-26 | Stop reason: HOSPADM

## 2020-01-24 RX ORDER — DEXAMETHASONE SODIUM PHOSPHATE 4 MG/ML
INJECTION, SOLUTION INTRA-ARTICULAR; INTRALESIONAL; INTRAMUSCULAR; INTRAVENOUS; SOFT TISSUE PRN
Status: DISCONTINUED | OUTPATIENT
Start: 2020-01-24 | End: 2020-01-24 | Stop reason: SDUPTHER

## 2020-01-24 RX ORDER — ACETAMINOPHEN 500 MG
500 TABLET ORAL EVERY 6 HOURS
Status: CANCELLED | OUTPATIENT
Start: 2020-01-24

## 2020-01-24 RX ORDER — HEPARIN SODIUM 10000 [USP'U]/100ML
15 INJECTION, SOLUTION INTRAVENOUS CONTINUOUS
Status: DISCONTINUED | OUTPATIENT
Start: 2020-01-24 | End: 2020-01-24

## 2020-01-24 RX ORDER — SUCCINYLCHOLINE CHLORIDE 20 MG/ML
INJECTION INTRAMUSCULAR; INTRAVENOUS PRN
Status: DISCONTINUED | OUTPATIENT
Start: 2020-01-24 | End: 2020-01-24 | Stop reason: SDUPTHER

## 2020-01-24 RX ORDER — ACETAMINOPHEN 500 MG
500 TABLET ORAL EVERY 6 HOURS
Status: DISCONTINUED | OUTPATIENT
Start: 2020-01-24 | End: 2020-01-26 | Stop reason: HOSPADM

## 2020-01-24 RX ORDER — IPRATROPIUM BROMIDE AND ALBUTEROL SULFATE 2.5; .5 MG/3ML; MG/3ML
SOLUTION RESPIRATORY (INHALATION)
Status: COMPLETED
Start: 2020-01-24 | End: 2020-01-24

## 2020-01-24 RX ORDER — GUAIFENESIN 600 MG/1
600 TABLET, EXTENDED RELEASE ORAL DAILY
Status: CANCELLED | OUTPATIENT
Start: 2020-01-24

## 2020-01-24 RX ORDER — ONDANSETRON 4 MG/1
4 TABLET, FILM COATED ORAL EVERY 6 HOURS PRN
Status: DISCONTINUED | OUTPATIENT
Start: 2020-01-24 | End: 2020-01-26 | Stop reason: HOSPADM

## 2020-01-24 RX ORDER — DIGOXIN 125 MCG
125 TABLET ORAL DAILY
Status: DISCONTINUED | OUTPATIENT
Start: 2020-01-25 | End: 2020-01-26 | Stop reason: HOSPADM

## 2020-01-24 RX ORDER — ALBUTEROL SULFATE 90 UG/1
2 AEROSOL, METERED RESPIRATORY (INHALATION) EVERY 6 HOURS PRN
Status: CANCELLED | OUTPATIENT
Start: 2020-01-24

## 2020-01-24 RX ORDER — SODIUM CHLORIDE 0.9 % (FLUSH) 0.9 %
10 SYRINGE (ML) INJECTION
Status: DISCONTINUED | OUTPATIENT
Start: 2020-01-25 | End: 2020-01-26 | Stop reason: HOSPADM

## 2020-01-24 RX ORDER — ONDANSETRON 2 MG/ML
INJECTION INTRAMUSCULAR; INTRAVENOUS PRN
Status: DISCONTINUED | OUTPATIENT
Start: 2020-01-24 | End: 2020-01-24 | Stop reason: SDUPTHER

## 2020-01-24 RX ORDER — DIPHENHYDRAMINE HCL 25 MG
25 TABLET ORAL 2 TIMES DAILY
Status: DISCONTINUED | OUTPATIENT
Start: 2020-01-24 | End: 2020-01-26 | Stop reason: HOSPADM

## 2020-01-24 RX ORDER — HEPARIN SODIUM 10000 [USP'U]/100ML
15 INJECTION, SOLUTION INTRAVENOUS CONTINUOUS
Status: CANCELLED | OUTPATIENT
Start: 2020-01-24 | End: 2020-01-24

## 2020-01-24 RX ADMIN — IPRATROPIUM BROMIDE AND ALBUTEROL SULFATE 1 AMPULE: .5; 3 SOLUTION RESPIRATORY (INHALATION) at 18:24

## 2020-01-24 RX ADMIN — FERROUS SULFATE TAB 325 MG (65 MG ELEMENTAL FE) 325 MG: 325 (65 FE) TAB at 08:00

## 2020-01-24 RX ADMIN — ONDANSETRON HYDROCHLORIDE 4 MG: 4 INJECTION, SOLUTION INTRAMUSCULAR; INTRAVENOUS at 14:51

## 2020-01-24 RX ADMIN — SODIUM CHLORIDE: 9 INJECTION, SOLUTION INTRAVENOUS at 14:00

## 2020-01-24 RX ADMIN — IPRATROPIUM BROMIDE AND ALBUTEROL SULFATE 1 AMPULE: 2.5; .5 SOLUTION RESPIRATORY (INHALATION) at 15:20

## 2020-01-24 RX ADMIN — LEVOTHYROXINE SODIUM 75 MCG: 75 TABLET ORAL at 05:46

## 2020-01-24 RX ADMIN — ROPINIROLE HYDROCHLORIDE 0.25 MG: 0.25 TABLET, FILM COATED ORAL at 09:10

## 2020-01-24 RX ADMIN — HEPARIN SODIUM 10.3 ML/HR: 10000 INJECTION, SOLUTION INTRAVENOUS at 01:11

## 2020-01-24 RX ADMIN — PIPERACILLIN AND TAZOBACTAM 3.38 G: 3; .375 INJECTION, POWDER, FOR SOLUTION INTRAVENOUS at 01:11

## 2020-01-24 RX ADMIN — ACETAMINOPHEN 500 MG: 500 TABLET ORAL at 09:10

## 2020-01-24 RX ADMIN — ROPINIROLE HYDROCHLORIDE 0.25 MG: 0.25 TABLET, FILM COATED ORAL at 22:02

## 2020-01-24 RX ADMIN — PIPERACILLIN AND TAZOBACTAM 3.38 G: 3; .375 INJECTION, POWDER, FOR SOLUTION INTRAVENOUS at 09:13

## 2020-01-24 RX ADMIN — DIPHENHYDRAMINE HCL 25 MG: 25 TABLET ORAL at 09:10

## 2020-01-24 RX ADMIN — FAMOTIDINE 20 MG: 20 TABLET ORAL at 09:10

## 2020-01-24 RX ADMIN — Medication 2 PUFF: at 08:06

## 2020-01-24 RX ADMIN — HYDROCODONE BITARTRATE AND ACETAMINOPHEN 1 TABLET: 10; 325 TABLET ORAL at 05:48

## 2020-01-24 RX ADMIN — IPRATROPIUM BROMIDE AND ALBUTEROL SULFATE 1 AMPULE: .5; 3 SOLUTION RESPIRATORY (INHALATION) at 11:37

## 2020-01-24 RX ADMIN — BETAMETHASONE DIPROPIONATE: 0.5 CREAM TOPICAL at 09:21

## 2020-01-24 RX ADMIN — DEXAMETHASONE SODIUM PHOSPHATE 8 MG: 4 INJECTION, SOLUTION INTRAMUSCULAR; INTRAVENOUS at 14:51

## 2020-01-24 RX ADMIN — Medication 10 ML: at 01:12

## 2020-01-24 RX ADMIN — HYDROCODONE BITARTRATE AND ACETAMINOPHEN 1 TABLET: 10; 325 TABLET ORAL at 22:44

## 2020-01-24 RX ADMIN — LOPERAMIDE HYDROCHLORIDE 2 MG: 2 CAPSULE ORAL at 06:29

## 2020-01-24 RX ADMIN — DIGOXIN 125 MCG: 125 TABLET ORAL at 09:10

## 2020-01-24 RX ADMIN — IPRATROPIUM BROMIDE AND ALBUTEROL SULFATE 1 AMPULE: .5; 3 SOLUTION RESPIRATORY (INHALATION) at 08:06

## 2020-01-24 RX ADMIN — Medication 2 PUFF: at 18:25

## 2020-01-24 RX ADMIN — FERROUS SULFATE TAB 325 MG (65 MG ELEMENTAL FE) 325 MG: 325 (65 FE) TAB at 18:21

## 2020-01-24 RX ADMIN — PHENYLEPHRINE HYDROCHLORIDE 100 MCG: 10 INJECTION INTRAVENOUS at 14:54

## 2020-01-24 RX ADMIN — METOPROLOL TARTRATE 12.5 MG: 25 TABLET ORAL at 22:02

## 2020-01-24 RX ADMIN — Medication 300 MG: at 08:16

## 2020-01-24 RX ADMIN — SUCCINYLCHOLINE CHLORIDE 120 MG: 20 INJECTION, SOLUTION INTRAMUSCULAR; INTRAVENOUS at 14:21

## 2020-01-24 RX ADMIN — PROPOFOL 30 MG: 10 INJECTION, EMULSION INTRAVENOUS at 14:44

## 2020-01-24 RX ADMIN — PIPERACILLIN AND TAZOBACTAM 3.38 G: 3; .375 INJECTION, POWDER, LYOPHILIZED, FOR SOLUTION INTRAVENOUS at 18:20

## 2020-01-24 RX ADMIN — Medication 300 MG: at 18:24

## 2020-01-24 RX ADMIN — DIPHENHYDRAMINE HCL 25 MG: 25 TABLET ORAL at 22:03

## 2020-01-24 RX ADMIN — PROPOFOL 100 MG: 10 INJECTION, EMULSION INTRAVENOUS at 14:21

## 2020-01-24 RX ADMIN — METOPROLOL TARTRATE 12.5 MG: 25 TABLET ORAL at 09:10

## 2020-01-24 RX ADMIN — PHENYLEPHRINE HYDROCHLORIDE 200 MCG: 10 INJECTION INTRAVENOUS at 14:35

## 2020-01-24 RX ADMIN — IPRATROPIUM BROMIDE AND ALBUTEROL SULFATE 1 AMPULE: .5; 3 SOLUTION RESPIRATORY (INHALATION) at 15:20

## 2020-01-24 RX ADMIN — LOPERAMIDE HYDROCHLORIDE 2 MG: 2 CAPSULE ORAL at 19:13

## 2020-01-24 RX ADMIN — LIDOCAINE HYDROCHLORIDE 100 MG: 20 INJECTION, SOLUTION EPIDURAL; INFILTRATION; INTRACAUDAL; PERINEURAL at 14:21

## 2020-01-24 RX ADMIN — FLUTICASONE PROPIONATE 1 SPRAY: 50 SPRAY, METERED NASAL at 09:20

## 2020-01-24 RX ADMIN — RIVAROXABAN 20 MG: 20 TABLET, FILM COATED ORAL at 22:02

## 2020-01-24 ASSESSMENT — PULMONARY FUNCTION TESTS
PIF_VALUE: 1
PIF_VALUE: 35
PIF_VALUE: 35
PIF_VALUE: 1
PIF_VALUE: 25
PIF_VALUE: 5
PIF_VALUE: 1
PIF_VALUE: 26
PIF_VALUE: 0
PIF_VALUE: 23
PIF_VALUE: -3
PIF_VALUE: 0
PIF_VALUE: 23
PIF_VALUE: 35
PIF_VALUE: 1
PIF_VALUE: 0
PIF_VALUE: 1
PIF_VALUE: 1
PIF_VALUE: 0
PIF_VALUE: 35
PIF_VALUE: 26
PIF_VALUE: 0
PIF_VALUE: 9
PIF_VALUE: 0
PIF_VALUE: 9
PIF_VALUE: 1
PIF_VALUE: 1
PIF_VALUE: 13
PIF_VALUE: 17
PIF_VALUE: 0
PIF_VALUE: 35
PIF_VALUE: 35
PIF_VALUE: 1
PIF_VALUE: 9
PIF_VALUE: 0
PIF_VALUE: 0
PIF_VALUE: 25
PIF_VALUE: 18
PIF_VALUE: 1
PIF_VALUE: 35
PIF_VALUE: 36
PIF_VALUE: 35
PIF_VALUE: 35
PIF_VALUE: 28
PIF_VALUE: 0
PIF_VALUE: 28
PIF_VALUE: 0
PIF_VALUE: 2
PIF_VALUE: 35
PIF_VALUE: 35
PIF_VALUE: 37
PIF_VALUE: 26
PIF_VALUE: 29
PIF_VALUE: 0
PIF_VALUE: 0
PIF_VALUE: 38
PIF_VALUE: 3
PIF_VALUE: 26
PIF_VALUE: 10
PIF_VALUE: 35
PIF_VALUE: 28
PIF_VALUE: 26
PIF_VALUE: 24
PIF_VALUE: 0
PIF_VALUE: 23
PIF_VALUE: 0
PIF_VALUE: 11

## 2020-01-24 ASSESSMENT — PAIN - FUNCTIONAL ASSESSMENT: PAIN_FUNCTIONAL_ASSESSMENT: PREVENTS OR INTERFERES SOME ACTIVE ACTIVITIES AND ADLS

## 2020-01-24 ASSESSMENT — PAIN DESCRIPTION - ORIENTATION
ORIENTATION: LOWER
ORIENTATION: LOWER

## 2020-01-24 ASSESSMENT — PAIN DESCRIPTION - DESCRIPTORS: DESCRIPTORS: ACHING;PRESSURE

## 2020-01-24 ASSESSMENT — PAIN DESCRIPTION - FREQUENCY: FREQUENCY: INTERMITTENT

## 2020-01-24 ASSESSMENT — PAIN DESCRIPTION - LOCATION
LOCATION: BACK
LOCATION: BACK

## 2020-01-24 ASSESSMENT — PAIN DESCRIPTION - PROGRESSION
CLINICAL_PROGRESSION: GRADUALLY WORSENING
CLINICAL_PROGRESSION: NOT CHANGED

## 2020-01-24 ASSESSMENT — PAIN DESCRIPTION - ONSET
ONSET: PROGRESSIVE
ONSET: ON-GOING

## 2020-01-24 ASSESSMENT — PAIN SCALES - GENERAL
PAINLEVEL_OUTOF10: 7
PAINLEVEL_OUTOF10: 8
PAINLEVEL_OUTOF10: 6
PAINLEVEL_OUTOF10: 0

## 2020-01-24 ASSESSMENT — PAIN DESCRIPTION - PAIN TYPE: TYPE: CHRONIC PAIN

## 2020-01-24 NOTE — PROGRESS NOTES
[address] [place] SKILLED NURSING UNIT  Occupational Therapy  Daily Note  Time:   Time In: 7269  Time Out: 6019  Timed Code Treatment Minutes: 30 Minutes  Minutes: 30          Date: 2020  Patient Name: Alejandra Cha,   Gender: female      Room: [facility]/066-A  MRN: [id number]  : 1945  (76 y.o.)  Referring Practitioner: Ashley Sellers MD; Attending: Dr. Asuncion Garcia  Diagnosis: Hemiarthroplast of R Hip  Additional Pertinent Hx: Per EMR: \"Fannie Miranda Age is a 76 y.o. female who presents 19 for evaluation of pain to the right leg and hip that onset following a fall that occurred 3 days ago. Patient reports that she tripped over her cat and fell 3 days ago, experiencing immediate pain to the right leg and hip. Patient notes worsening of pain since initial fall and reports exacerbation of pain when she attempts to bear weight onto her right leg. Patient describes additional symptom of nausea. Patient denies hitting her head during the incident and denies headache and chest pain. Patient reports using oxygen treatments at home due to condition of chronic obstructive pulmonary disease. Patient notes a history of smoking but denies current nicotine use. Patient additionally has a history of congestive heart failure and notes that edema to her lower extremities is normal at baseline. \"  s/p s/p right hip hemiarthroplasty for femoral neck fracture on 20. Transfer to [facility] 20. Restrictions/Precautions:  Restrictions/Precautions: Weight Bearing, Fall Risk  Right Lower Extremity Weight Bearing: Weight Bearing As Tolerated  Position Activity Restriction  Hip Precautions: No hip flexion > 90 degrees, No ADduction, No hip internal rotation  Other position/activity restrictions: Home O2: baseline is 2L at rest/6L with activity. S/p right hip hemiarthroplasty for femoral neck fracture on 20    SUBJECTIVE: Pt up in chair upon OT arrival, wanting to use BR.     PAIN: 6/10:     COGNITION: Decreased Insight    ADL:   Grooming: Stand By Assistance. Toileting: Stand By Assistance. Toilet Transfer: Stand By Assistance. Antoine Renee BALANCE:  Sitting Balance:  Modified Independent. Standing Balance: Stand By Assistance. TRANSFERS:  Sit to Stand:  Stand By Assistance. Stand to Sit: Stand By Assistance. FUNCTIONAL MOBILITY:  Assistive Device: Rolling Walker  Assist Level:  Stand By Assistance. Distance: To and from bathroom and To and from therapy gym  Slow pace, 6L 02     ADDITIONAL ACTIVITIES:  Pt completed B UE exs with light resistance band x 10 reps, x 1 set, while following a handout. Good tolerance of exs, with mod RBs throughout, and min cues for tech with resistance band. ASSESSMENT:     Activity Tolerance:  Patient tolerance of  treatment: fair. Discharge Recommendations: Home with Home health OT  Equipment Recommendations: Equipment Needed: Yes  Other: Chantal Mew was delivered. Pt declines need for BSC. Plan: Times per week: 6x  Plan weeks: 3 weeks  Current Treatment Recommendations: Functional Mobility Training, Balance Training, Self-Care / ADL, Endurance Training, Safety Education & Training, Patient/Caregiver Education & Training, Equipment Evaluation, Education, & procurement    Patient Education  Patient Education: ADL's, IADL's and Home Safety    Goals  Short term goals  Time Frame for Short term goals: 2 weeks  Short term goal 1: Pt will complete LB ADL with LHAE and SUP to increase indep with self care. Short term goal 2: Pt will complete dynamic standing task with Mod I x 6 min with B hand release to increase balance required for grooming. Short term goal 3: Pt will complete mobility to/from BR with Mod I and 0 cues safety to increase indep with ADL routine. Short term goal 4: Pt will complete various t/fs with Mod I and 0 cues safety to increase indep with toileting t/fs.    Long term goals  Time Frame for Long term goals : 3 weeks  Long term goal 1: Pt

## 2020-01-24 NOTE — PLAN OF CARE
Problem: RESPIRATORY  Goal: Able to breathe comfortably  Description  Able to breathe comfortably     Outcome: Ongoing  Note:   Encouraging incentive spirometer and acapella. Problem: MOBILITY  Goal: Knowledge of need for increased mobility  Outcome: Ongoing  Note:   Assess barriers to increased mobility. Problem: PAIN  Goal: Pain control  Description  Patient will demonstrate personal actions to control pain. Outcome: Ongoing  Note:   Assess barriers to pain control.

## 2020-01-24 NOTE — PROGRESS NOTES
Heparin Consult  Lab Results   Component Value Date    APTT 89.8 01/24/2020     Lab Results   Component Value Date    HGB 7.4 01/24/2020    HCT 25.6 01/24/2020     01/24/2020    INR 1.10 01/01/2020       Current Rate: 15 units/kg/hr    Plan:  Rate: Continue 15 units/kg/hr. Plan to hold heparin at 1130 for bronchoscopy. Next aPTT: none scheduled at this time.     Ruth Hirsch PharmD, BCPS  1/24/2020  9:08 AM

## 2020-01-24 NOTE — PROGRESS NOTES
[place] [facility]    Patient - Krupa Wong   MRN -  [id number]   [facility]   - 1945      Date of Admission -  2020  1:37 PM  Date of evaluation -  2020  Margaret - Venancio Brunson MD Primary Care Physician - Willie Wellington MD   Reason for consult   Abnormal CT, history of lung cancer  Active Hospital Problem List      Active Hospital Problems    Diagnosis Date Noted    Squamous cell carcinoma of bronchus in right lower lobe [facility]) [C34.31] 2018     Priority: High     Class: Acute    Hip fracture requiring operative repair, left, closed, initial encounter ([facility] [address] Kristel Sterling 2020    Acute blood loss anemia [D62]     Thrombocytopenia ([address] [D69.6]     Constipation [K59.00]     Chronic diastolic heart failure ([address] [I50.32]     Hx pulmonary embolism [Z86.711]     Hypothyroidism [E03.9]     Pathologic fracture of femoral neck, right, initial encounter ([address] [O38.545Q] 2019    Fall [A09. XXXA] 2019    Acute on chronic respiratory failure with hypoxia and hypercapnia (HCC) [J96.21, J96.22] 2019    Paroxysmal atrial fibrillation ([address] [I48.0] 10/22/2018    Chronic obstructive pulmonary disease ([address] [J44.9]     Moderate malnutrition ([address] [E44.0] 10/15/2018    Acute on chronic respiratory failure with hypoxemia (HCC) [J96.21] 10/14/2018    Pneumonia of right lower lobe due to infectious organism ([address] [J18.1]     Recurrent non-small cell lung cancer (NSCLC) ([address] [C34.90] 2017    HTN (hypertension) [I10]     GERD (gastroesophageal reflux disease) [K21.9]      HPI   Krupa Wong is a 76 y.o. female       Patient has a significant history of HFpEF-ECHO 2019 LVEF 55-60%, PAF,  COPD  with FEV 1 35% on PFT, Chronic Respiratory Failure Hypoxia-Home Bipap with 2L/NC O2 Bleed, Metastatic Left Lung Cancer established patient of Dr. Gaviota Hernandez.  History of right lower lobe lobectomy secondary to stage 2A nonsmall cell lung cancer in 2012, patient did finish chemo and radiation 8/2013. Reoccurrence with new stage 1A left lung cancer in 10/2016. She did undergo stereotatic ablative radiation therapy to left lung mass 1/2017. Treated with Taxotere followed by Ratna Guidry, currently receiving palliative immunotherapy with Tecentriq, last infusion 12/23/2019. Pericardial effusion s/p pericardiocentesis in July 2019, Hypothyroidism, Anemia, normocytic, Pulmonary embolism-Eliquis, GERD and Scoliosis. On 12/27/19, patient tripped on her cat and fell on the right side landing on the hip area and she started having pain but was able to get up and walk. Xray showed Subcapital right femoral neck fracture. Patient was taken to the OR then she had postop respiratory failure and reintubated on 1/1. Then she was extubated the second day and moved out of ICU on 1/3. The patient then discharged to TCU. She had CT chest on 11/4 that showed PE and narrowing of the BI with postopstructive infiltrates. She had fever on 1/16 up to 102.5 with hypotension. CXR today showed worsened appearance with further loss of volume right lung. She is still SOB, has productive cough to brown sputum. She was started on ceftriaxone and azithromycin.       Past 24 hrs   -On 2 LPM via NC  -HGB 7.4 today  -On heparin gtt   -has had dark stool currently on iron PO  All other systems reviewed  Past Medical History         Diagnosis Date    Arthritis     low back pain and scoliosis in lumbar    Cancer ([facility] [address] 2012    lower right lobe-lung s/p lobectomy in 2012, radiation and chemo nad later had mediatinal mets and had radationa dn chemo again, and in 2016 had reoccurence on the left side upper and was started on radiation this year and has  a follow up CT in oct 2017    CHF (congestive heart failure) (HCC)     Chronic bronchitis, simple (HCC)     Chronic respiratory failure with hypoxia (HCC)     COPD (chronic obstructive pulmonary disease) (HCC)     GERD (gastroesophageal reflux disease)     History of positive PPD     HTN (hypertension)     Hx of blood clots     in lung     Pneumonia     Postprocedural pneumothorax     Scoliosis     Sleep apnea     Urinary incontinence     UTI (urinary tract infection)       Past Surgical History           Procedure Laterality Date    HIP SURGERY Right 2020    RIGHT HIP HEMIARTHROPLASTY performed by Juan Clark at [address] [facility] [id number]  10/19/2012    rt lower lobectomy     LUNG BIOPSY  2012     Diet    Dietary Nutrition Supplements: Standard High Calorie Oral Supplement  DIET GENERAL;  Allergies    Advil cold & sinus liqui-gels [pseudoephedrine-ibuprofen];  Food; Percocet [oxycodone-acetaminophen]; and Sulfa antibiotics  Social History     Social History     Socioeconomic History    Marital status:      Spouse name: Violeta Barlow Number of children: 2    Years of education: Not on file    Highest education level: Not on file   Occupational History    Not on file   Social Needs    Financial resource strain: Not on file    Food insecurity:     Worry: Not on file     Inability: Not on file    Transportation needs:     Medical: Not on file     Non-medical: Not on file   Tobacco Use    Smoking status: Former Smoker     Packs/day: 1.00     Years: 45.00     Pack years: 45.00     Types: Cigarettes     Last attempt to quit:      Years since quittin.0    Smokeless tobacco: Never Used   Substance and Sexual Activity    Alcohol use: No    Drug use: No    Sexual activity: Not on file   Lifestyle    Physical activity:     Days per week: Not on file     Minutes per session: Not on file    Stress: Not on file   Relationships    Social connections:     Talks on phone: Not on file     Gets together: Not on file     Attends Rastafari service: Not on file     Active member of club or organization: Not on file     Attends meetings of clubs or organizations: Not on file     Relationship status: Not on file    Intimate partner violence:     Fear of current or ex partner: Not on file     Emotionally abused: Not on file     Physically abused: Not on file     Forced sexual activity: Not on file   Other Topics Concern    Not on file   Social History Narrative    Not on file     Family History          Problem Relation Age of Onset    Cancer Mother     Heart Disease Father     High Blood Pressure Father     High Cholesterol Father     Arthritis Sister     Heart Disease Sister     Cancer Sister         lung cancer    Heart Disease Sister     Stroke Sister     High Cholesterol Sister     High Blood Pressure Sister     Stroke Paternal Grandfather     High Blood Pressure Brother      Meds    Current Medications    tobramycin (PF)  300 mg Nebulization BID    piperacillin-tazobactam  3.375 g Intravenous Q8H    ipratropium-albuterol  1 ampule Inhalation Q4H WA    sodium chloride flush  10 mL Intravenous QAM AC    ferrous sulfate  325 mg Oral BID WC    heparin flush (100 units/mL)  500 Units Intravenous Daily    diphenhydrAMINE  25 mg Oral BID    fluticasone  1 spray Each Nostril Daily    guaiFENesin  600 mg Oral Daily    digoxin  125 mcg Oral Daily    levothyroxine  75 mcg Oral Daily    metoprolol tartrate  12.5 mg Oral BID    rOPINIRole  0.25 mg Oral BID    famotidine  20 mg Oral Daily    acetaminophen  500 mg Oral Q6H    Vitamin D  5,000 Units Oral Daily    betamethasone dipropionate   Topical Daily    mometasone-formoterol  2 puff Inhalation BID     docusate sodium, polyethylene glycol, senna, loperamide, ondansetron, muscle rub, acetaminophen, zinc oxide, [DISCONTINUED] heparin flush (100 units/mL) **OR** heparin flush (100 units/mL), sodium chloride flush, HYDROcodone-acetaminophen, magnesium hydroxide, albuterol sulfate HFA, bisacodyl, hydrOXYzine  IV Drips/Infusions   heparin (porcine) 10.3 mL/hr (01/24/20 0111)     Vitals    Vitals    height is 5' (1.524 m) and weight is 150 lb 4.8 oz (68.2 kg). Her oral temperature is 98.6 °F (37 °C). Her blood pressure is 126/57 (abnormal) and her pulse is 82. Her respiration is 18 and oxygen saturation is 98%. O2 Flow Rate (L/min): 2 L/min  I/O    Intake/Output Summary (Last 24 hours) at 1/24/2020 0925  Last data filed at 1/24/2020 0545  Gross per 24 hour   Intake 1742 ml   Output --   Net 1742 ml     Patient Vitals for the past 96 hrs (Last 3 readings):   Weight   01/24/20 0150 150 lb 4.8 oz (68.2 kg)   01/22/20 0543 150 lb 9.6 oz (68.3 kg)   01/21/20 0528 150 lb 11.2 oz (68.4 kg)     Exam   Physical Exam   Constitutional: No distress on 2 LPM O2 per NC. Patient appears moderately built and moderately nourished. Head: Normocephalic and atraumatic. Mouth/Throat: Oropharynx is clear and moist.  No oral thrush. Eyes: Conjunctivae are normal. Pupils are equal, round. No scleral icterus. Neck: Neck supple. No tracheal deviation present. Cardiovascular: S1 and S2 with no murmur. No peripheral edema  Pulmonary/Chest: Normal effort with bilateral air entry, diminished breath sounds R>L. No stridor. No respiratory distress. Patient exhibits no tenderness. Abdominal: Soft. Bowel sounds audible. No distension or tenderness to palp. Musculoskeletal: Moves all extremities  Neurological: Patient is alert and follow simple commands    Labs   ABG  Lab Results   Component Value Date    PH 7.38 01/02/2020    PO2 77 01/02/2020    PCO2 54 01/02/2020    HCO3 32 01/02/2020    O2SAT 95 01/02/2020     Lab Results   Component Value Date    IFIO2 30 01/02/2020    MODE CPAP/PS 01/02/2020    SETPEEP 6.0 01/02/2020     CBC  Recent Labs     01/24/20  0633   WBC 5.8   RBC 2.52*   HGB 7.4*   HCT 25.6*   .6*   MCH 29.4   MCHC 28.9*      MPV 9.6      BMP  No results for input(s): NA, K, CL, CO2, BUN, CREATININE, GLUCOSE, MG, PHOS, CALCIUM, IONCA, MG in the last 72 hours.   LFT  No results for input(s): AST, ALT, ALB, BILITOT, ALKPHOS, LIPASE in the last 72 hours. Invalid input(s): AMYLASE  TROP  Lab Results   Component Value Date    TROPONINT < 0.010 12/31/2019    TROPONINT 0.019 12/31/2019    TROPONINT < 0.010 12/30/2019     BNP  Lab Results   Component Value Date    PROBNP 625.1 07/18/2019    PROBNP 590.5 07/18/2019    PROBNP 2109.0 02/23/2019     D-Dimer  No results found for: DDIMER  Lactic Acid  No results for input(s): LACTA in the last 72 hours. INR  No results for input(s): INR, PROTIME in the last 72 hours. PTT  Recent Labs     01/23/20  1051 01/23/20  1702 01/24/20  0632   APTT 71.1* 85.7* 89.8*     Glucose  No results for input(s): POCGLU in the last 72 hours. UA   No results for input(s): SPECGRAV, PHUR, COLORU, CLARITYU, MUCUS, PROTEINU, BLOODU, RBCUA, WBCUA, BACTERIA, NITRU, GLUCOSEU, BILIRUBINUR, UROBILINOGEN, KETUA, LABCAST, LABCASTTY, AMORPHOS in the last 72 hours. Invalid input(s): CRYSTALS. PFTs               Cultures    Procalcitonin  Lab Results   Component Value Date    PROCAL 2.48 01/20/2020    PROCAL 0.09 02/27/2019    PROCAL 0.08 02/24/2019     Rapid Flu A/B-Negative  Blood Culture x2-No prelim growth  MRSA-Negative  Respiratory culture-Normal julius    Right lower lobectomy   RPTD: 10/22/2012   FINAL DIAGNOSIS:  A - Right lower lobe lung, lobectomy:      Moderately differentiated adenocarcinoma, mixed subtype      (predominantly papillary), pT2b      Emphysematous changes in non-neoplastic lung parenchyma. Margins negative for malignancy. B - Peribronchial lymph node, excision:   One lymph node with caseous necrosis/calcification, pN0. Negative for malignancy. Left lung nodule biopsy  RPTD: 11/01/2016   FINAL DIAGNOSIS:  Left upper lobe of lung, core needle biopsy: Moderately to poorly differentiated pulmonary adenocarcinoma. Radiology    CXR     XR CHEST (2 VW)   1/18/2020   FINDINGS:   The right heart border is entirely obscured. The mediastinum is not widened.   Loss of along the pleura posterolaterally on the right  demonstrated on axial image 34 which appears similar in configuration from the prior examination. There is also similar appearing volume loss within the right hemithorax. There is a filling defect inserted within the distal right pulmonary artery which is most consistent with pulmonary embolism. This is of indeterminate chronicity. There is focal opacity demonstrated anteriorly within the left upper lobe measuring approximately 1.8 x 4.1 cm. This abuts the pleura and appears slightly increased in size when compared to the prior examination. Previously this measured approximately 3.6 x 1.5 cm. There is moderate diffuse centrilobular emphysema. There is a focal irregular opacity demonstrated with ill-defined margins within the medial left upper lobe on axial image 14. This appears more confluent increased in size from the prior examination. This measures approximately 1.9 x 0.8 centimeters. Previously this measured approximately 1.1 x 0.9 cm. No pneumothorax is seen. Evaluation for hilar adenopathy of the right is limited due to right perihilar opacity from prior postsurgical change and partial collapse of the residual lung at the medial right lung base. There is indeterminate mild pericardial fluid present. This appears improved from the prior examination. CT of the abdomen and pelvis are reported separately. Impression:  1. There is a filling defect inserted within the distal right pulmonary artery which is most consistent with pulmonary embolism. This is of indeterminate chronicity. 2. Postsurgical changes from prior right lower lobectomy with associated volume loss is again seen. There is consolidative opacity at the posterior medial right lung base possibly representing postobstructive collapse. This appears slightly progressed from the prior examination.  However there is a small stable area of lucent cavitary change at the posterior medial right lung increased risk due to her COPD and verbalizes understanding of risks and agrees to proceed. -Xarelto on hold and currently on heparin gtt for wash out period prior to bronchoscopy  -Send STAT H/H and notify service of results  -NPO this AM for planned procedure  -Stop Heprain gtt 1130 AM 1/24/2020 for bronchoscopy  -Oncology consult pending  -Continue Jessica Almaraz. -Duoneb Neb Q4 hrs WA  -Prednisone 40 mg po daily for 5 days.  -Cultures negative to date  -NIPPV PRN. Case discussed with nurse and patient/family. Questions and concerns addressed. Meds and Orders reviewed. Electronically signed by JESUS Hernandez - CNP on 1/24/2020 at 9:25 AM     Addendum by Dr. Vladimir Lara MD:  I have seen and examined the patient independently. Face to face evaluation and examination was performed. The above evaluation and note has been reviewed. Labs and radiographs were reviewed. I Have discussed with Mr. Shubham Reaves CNP about this patient in detail. The above assessment and plan has been reviewed. Please see my modifications mentioned below. My modifications:  Improving shortness of breath  On 2LPM via nasal cannula. For bronch and biopsy today  - Arcenio Brunson educated about my impression and plan. She verbalizes understanding.     Torito Jay MD 1/24/2020 1:51 PM

## 2020-01-24 NOTE — PRE SEDATION
[address] [id number] Endoscopy  Sedation Pre-Procedure Note    Patient Name: Yemi Rice    YOB: 1945   Room/Bed: [facility] OR ([facility]) [facility]  Medical Record Number: [id number]  Date: 1/24/2020  Time: 3:36 PM     Indication:  Pain control for Flexible Fibrooptic Bronchoscopy. Consent: I have discussed with the patient and/or the patient representative the indication, alternatives, and the possible risks and/or complications of the planned procedure and the anesthesia methods. The patient and/or patient representative appear to understand and agree to proceed.     Vital Signs: BP (!) 157/73   Pulse 90   Temp 98.8 °F (37.1 °C) (Temporal)   Resp 10   SpO2 100%       Past Medical History:  Past Medical History:   Diagnosis Date    Arthritis     low back pain and scoliosis in lumbar    Cancer ([facility] [address] 2012    lower right lobe-lung s/p lobectomy in 2012, radiation and chemo nad later had mediatinal mets and had radationa dn chemo again, and in 2016 had reoccurence on the left side upper and was started on radiation this year and has  a follow up CT in oct 2017    CHF (congestive heart failure) (HCC)     Chronic bronchitis, simple (HCC)     Chronic respiratory failure with hypoxia ([address]     COPD (chronic obstructive pulmonary disease) (HCC)     GERD (gastroesophageal reflux disease)     History of positive PPD     HTN (hypertension)     Hx of blood clots     in lung     Pneumonia     Postprocedural pneumothorax     Scoliosis     Sleep apnea     Urinary incontinence     UTI (urinary tract infection)          Allergy:  Allergies   Allergen Reactions    Advil Cold & Sinus Liqui-Gels [Pseudoephedrine-Ibuprofen] Anaphylaxis    Food Anaphylaxis     mushrooms    Percocet [Oxycodone-Acetaminophen] Nausea Only    Sulfa Antibiotics Hives         Past Surgical History:  Past Surgical History:   Procedure Laterality Date    HIP SURGERY Right 1/1/2020    RIGHT HIP HEMIARTHROPLASTY performed by Dyana Rader at [address] [place] [address],  [id number]  10/19/2012    rt lower lobectomy     LUNG BIOPSY  8/2012       Medications:   Current Facility-Administered Medications   Medication Dose Route Frequency Provider Last Rate Last Dose    rivaroxaban (XARELTO) tablet 20 mg  20 mg Oral Daily Genet Okeefe MD        ipratropium-albuterol (DUONEB) 0.5-2.5 (3) MG/3ML nebulizer solution                  Coumadin Use Last 7 Days:  no  Antiplatelet drug therapy use last 7 days: no  Other anticoagulant use last 7 days: yes - Heparin drip. Xarelto- stopped >3days back       Pre-Sedation Documentation and Exam:   I have personally completed a history, physical exam & review of systems for this patient (see notes). Mallampati Airway Assessment:  Please see CRNA note for details. Prior History of Anesthesia Complications:   Please see CRNA note for details. ASA Classification:  Please see CRNA note for details. Sedation/ Anesthesia Plan:   Patient was given anesthesia by CRNA Ms. Dank Boyer from anesthesiology dept. Please see detailed note by CRNA for details.     Genet Okeefe MD, [facility]  1/24/2020, 3:36 PM

## 2020-01-24 NOTE — ANESTHESIA PRE PROCEDURE
Department of Anesthesiology  Preprocedure Note       Name:  Yemi Rice   Age:  76 y.o.  :  1945                                          MRN:  [id number]         Date:  2020      Surgeon: Jenny Pinzon):  Zenon Patten MD    Procedure: BRONCHOSCOPY WITH FLURO (N/A )    Medications prior to admission:   Prior to Admission medications    Medication Sig Start Date End Date Taking? Authorizing Provider   diphenhydrAMINE HCl (BENADRYL PO) Take by mouth daily    Historical Provider, MD   promethazine (PHENERGAN) 25 MG tablet Take 1 tablet by mouth every 8 hours as needed for Nausea 19   Oj Christian MD   rOPINIRole (REQUIP) 0.25 MG tablet Take 1 tablet by mouth 2 times daily 19  Oj Christian MD   ondansetron (ZOFRAN) 8 MG tablet Take 1 tablet by mouth every 8 hours as needed for Nausea or Vomiting 19   Oj Christian MD   rivaroxaban (XARELTO) 10 MG TABS tablet Take 1.5 tablets by mouth 2 times daily (with meals) for 21 days 19  Oj Christian MD   levothyroxine (SYNTHROID) 100 MCG tablet Take 1 tablet by mouth daily 19   Oj Christian MD   metoprolol tartrate (LOPRESSOR) 25 MG tablet Take 1 tablet by mouth 2 times daily 19   Oj Christian MD   HYDROcodone-acetaminophen (NORCO) 5-325 MG per tablet Take 1 tablet by mouth every 6 hours as needed for Pain.     Historical Provider, MD   digoxin (LANOXIN) 125 MCG tablet Take 1 tablet by mouth daily 19   Oj Christian MD   nitrofurantoin (MACRODANTIN) 100 MG capsule Take 100 mg by mouth 4 times daily    Historical Provider, MD   promethazine (PHENERGAN) 25 MG tablet Take 1 tablet by mouth every 8 hours as needed for Nausea 19   Oj Christian MD   tiotropium (SPIRIVA) 18 MCG inhalation capsule Inhale 18 mcg into the lungs daily    Historical Provider, MD   omeprazole (PRILOSEC) 40 MG delayed release capsule Take 40 mg by mouth daily    Historical Provider, MD   Probiotic Product (PROBIOTIC DAILY PO) Take 1 capsule cancer ([facility] C34.90    Hypothyroidism E03.9    Hip fracture requiring operative repair, left, closed, initial encounter ([facility] S72.002A       Past Medical History:        Diagnosis Date    Arthritis     low back pain and scoliosis in lumbar    Cancer ([facility]     lower right lobe-lung s/p lobectomy in , radiation and chemo nad later had mediatinal mets and had radationa dn chemo again, and in  had reoccurence on the left side upper and was started on radiation this year and has  a follow up CT in oct 2017    CHF (congestive heart failure) (HCC)     Chronic bronchitis, simple (HCC)     Chronic respiratory failure with hypoxia ([facility]     COPD (chronic obstructive pulmonary disease) ([facility]     GERD (gastroesophageal reflux disease)     History of positive PPD     HTN (hypertension)     Hx of blood clots     in lung     Pneumonia     Postprocedural pneumothorax     Scoliosis     Sleep apnea     Urinary incontinence     UTI (urinary tract infection)        Past Surgical History:        Procedure Laterality Date    HIP SURGERY Right 2020    RIGHT HIP HEMIARTHROPLASTY performed by Chi Ronquillo at [address] [place] [address],  [id number]  10/19/2012    rt lower lobectomy     LUNG BIOPSY  2012       Social History:    Social History     Tobacco Use    Smoking status: Former Smoker     Packs/day: 1.00     Years: 45.00     Pack years: 45.00     Types: Cigarettes     Last attempt to quit:      Years since quittin.0    Smokeless tobacco: Never Used   Substance Use Topics    Alcohol use: No                                Counseling given: Not Answered      Vital Signs (Current): There were no vitals filed for this visit.                                            BP Readings from Last 3 Encounters:   20 (!) 126/57   20 (!) 109/57   20 (!) 127/56       NPO Status:                                                                                 BMI:   Wt Readings from Last 3 Encounters: 01/24/20 150 lb 4.8 oz (68.2 kg)   01/02/20 138 lb 10.7 oz (62.9 kg)   12/23/19 132 lb 12.8 oz (60.2 kg)     There is no height or weight on file to calculate BMI.    CBC:   Lab Results   Component Value Date    WBC 5.8 01/24/2020    RBC 2.52 01/24/2020    HGB 7.7 01/24/2020    HCT 25.9 01/24/2020    .6 01/24/2020    RDW 12.6 12/23/2019     01/24/2020       CMP:   Lab Results   Component Value Date     01/18/2020    K 4.5 01/18/2020    K 3.8 10/15/2018    CL 97 01/18/2020    CO2 28 01/18/2020    BUN 28 01/18/2020    CREATININE 0.8 01/18/2020    LABGLOM 70 01/18/2020    GLUCOSE 120 01/18/2020    PROT 6.8 01/16/2020    CALCIUM 8.2 01/18/2020    BILITOT 0.4 01/16/2020    ALKPHOS 75 01/16/2020    AST 22 01/16/2020    ALT 16 01/16/2020       POC Tests: No results for input(s): POCGLU, POCNA, POCK, POCCL, POCBUN, POCHEMO, POCHCT in the last 72 hours. Coags:   Lab Results   Component Value Date    INR 1.10 01/01/2020    APTT 89.8 01/24/2020       HCG (If Applicable): No results found for: PREGTESTUR, PREGSERUM, HCG, HCGQUANT     ABGs: No results found for: PHART, PO2ART, LCN6DXY, AMK3SMM, BEART, E9UQJKCF     Type & Screen (If Applicable):  Lab Results   Component Value Date    [address] 01/01/2020       Anesthesia Evaluation    Airway: Mallampati: III  TM distance: >3 FB   Neck ROM: limited  Mouth opening: > = 3 FB Dental:          Pulmonary:   (+) COPD:  sleep apnea:  decreased breath sounds,                             Cardiovascular:    (+) hypertension:, CHF:,         Rhythm: regular                      Neuro/Psych:               GI/Hepatic/Renal:   (+) GERD:,           Endo/Other:    (+) hypothyroidism::., .                 Abdominal:           Vascular:                                        Anesthesia Plan      general     ASA 4     (Possible post op vent. Support discussed.   Agrees and acepts  Metastatic lung ca  On o2 nasal canula  Ate breakfast around 8 am)  Induction: intravenous and

## 2020-01-24 NOTE — DISCHARGE SUMMARY
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 XXXA] 2019    Acute on chronic respiratory failure with hypoxia and hypercapnia (HCC) [J96.21, J96.22] 2019    Paroxysmal atrial fibrillation ([facility] [I48.0] 10/22/2018    Chronic obstructive pulmonary disease ([facility] [J44.9]     Moderate malnutrition ([facility] [E44.0] 10/15/2018    Acute on chronic respiratory failure with hypoxemia (HCC) [J96.21] 10/14/2018    Pneumonia of right lower lobe due to infectious organism ([facility] [J18.1]     Recurrent non-small cell lung cancer (NSCLC) ([facility] [C34.90] 2017    HTN (hypertension) [I10]     GERD (gastroesophageal reflux disease) [K21.9]        TCU Course:   Keysha Oliver is a 76 y.o. female admitted to the transitional care unit on 2020 for the continuation of time with therapies following the surgical repair of her right hip. She made progress through therapies in strength and independence but later developed an acute febrile illness thought to be a post obstructive pneumonia.  She was seen by pulmonary and treated with various antibiotics, aerosols and a percussion vest. The fever came under better control but the Latest Ref Range: 37.0 - 47.0 % 24.5 (L) 27.4 (L) 33.4 (L)   MCV Latest Ref Range: 81.0 - 99.0 fL 100.0 (H) 93.5 97.4   MCH Latest Ref Range: 26.0 - 33.0 pg 29.4 29.0 29.2   MCHC Latest Ref Range: 32.2 - 35.5 gm/dl 29.4 (L) 31.0 (L) 29.9 (L)   MPV Latest Ref Range: 9.4 - 12.4 fL 9.2 (L) 8.9 (L) 9.4   RDW-CV Latest Ref Range: 11.5 - 14.5 % 15.4 (H) 16.0 (H) 16.3 (H)   RDW-SD Latest Ref Range: 35.0 - 45.0 fL 55.8 (H) 54.4 (H) 58.4 (H)   Platelet Count Latest Ref Range: 130 - 400 thou/mm3 278 283 195   Platelet Estimate Latest Ref Range: Adequate   ADEQUATE    Lymphocytes Absolute Latest Ref Range: 1.0 - 4.8 thou/mm3 0.9 (L) 0.3 (L) 0.4 (L)   Monocytes Absolute Latest Ref Range: 0.4 - 1.3 thou/mm3 0.5 0.9 0.7   Eosinophils Absolute Latest Ref Range: 0.0 - 0.4 thou/mm3 0.2 0.0 0.0   Basophils Absolute Latest Ref Range: 0.0 - 0.1 thou/mm3 0.0 0.0 0.0   Differential Type Unknown  see below    Seg Neutrophils Latest Units: % 67.4 91.7 87.4   Segs Absolute Latest Ref Range: 1.8 - 7.7 thou/mm3 3.3 16.3 (H) 8.1 (H)   Lymphocytes Latest Units: % 17.6 1.6 3.9   Monocytes Latest Units: % 9.5 5.2 7.6   Eosinophils Latest Units: % 3.7 0.2 0.3   Basophils Latest Units: % 0.6 0.2 0.2   Immature Grans (Abs) Latest Ref Range: 0.00 - 0.07 thou/mm3 0.06 0.20 (H) 0.06   Immature Granulocytes Latest Units: % 1.2 1.1 0.6   Nucleated Red Blood Cells Latest Units: /100 wbc 0 0 0   Toxic Granulation Latest Ref Range: Absent   Present    Stomatocytes Latest Ref Range: Absent   2+    Anisocytosis Latest Ref Range: Absent   Present    SCAN OF BLOOD SMEAR Unknown  see below    Pathologist Review Unknown  MTK      Narrative   PROCEDURE: XR CHEST (2 VW)       CLINICAL INFORMATION: follow up infiltrate.       COMPARISON: 1/16/2020       TECHNIQUE: PA and lateral views the chest.       FINDINGS:               The right heart border is entirely obscured.  The mediastinum is not widened.  Loss of volume right lung should mediastinum the right.  Slightly lung ([facility] [C34.90]  Expiration Date: 4/15/2020             HYDROcodone-acetaminophen (NORCO) 5-325 MG per tablet  Take 1 tablet by mouth every 6 hours as needed for Pain.             levothyroxine (SYNTHROID) 100 MCG tablet  Take 1 tablet by mouth daily             lidocaine-prilocaine (EMLA) 2.5-2.5 % cream  Apply topically as needed for Pain Apply topically as needed.              metoprolol tartrate (LOPRESSOR) 25 MG tablet  Take 1 tablet by mouth 2 times daily             mometasone-formoterol (DULERA) 200-5 MCG/ACT inhaler  Inhale 2 puffs into the lungs 2 times daily             nitrofurantoin (MACRODANTIN) 100 MG capsule  Take 100 mg by mouth 4 times daily             omeprazole (PRILOSEC) 40 MG delayed release capsule  Take 40 mg by mouth daily             ondansetron (ZOFRAN) 8 MG tablet  Take 1 tablet by mouth every 8 hours as needed for Nausea or Vomiting             potassium chloride (KLOR-CON M) 20 MEQ extended release tablet  Take 1 tablet by mouth daily             Probiotic Product (PROBIOTIC DAILY PO)  Take 1 capsule by mouth 3 times daily             promethazine (PHENERGAN) 25 MG tablet  Take 1 tablet by mouth every 8 hours as needed for Nausea             promethazine (PHENERGAN) 25 MG tablet  Take 1 tablet by mouth every 8 hours as needed for Nausea             rivaroxaban (XARELTO) 10 MG TABS tablet  Take 1.5 tablets by mouth 2 times daily (with meals) for 21 days             rOPINIRole (REQUIP) 0.25 MG tablet  Take 1 tablet by mouth 2 times daily             tiotropium (SPIRIVA) 18 MCG inhalation capsule  Inhale 18 mcg into the lungs daily                 40 minutes spent preparing the patient for discharge    Den Harrison MD

## 2020-01-24 NOTE — PLAN OF CARE
Problem: Impaired respiratory status  Goal: Clear lung sounds  Description  Clear lung sounds     1/24/2020 0812 by Tlyer Godinez RCP  Outcome: Ongoing  Note:   Pt continues on aerosols, mdi and vest therapy to clear lung sounds/improve aeration, to help clear secretions, for maintenance of COPD and pt does MDI's and aerosols at home.

## 2020-01-24 NOTE — PROGRESS NOTES
1515- Patient to PACU. Hooked up to monitor. Report from Cindy Joshi CRNA. Patient lethargic, ETT remains in place. [id number]- Dr. Henrry Gil at bedside. Ordered duoneb and for patient to remain intubated until she can be placed on BiPAP. 1520- Duoneb breathing treatment started. 1528- Respiratory therapist at bedside. Pt more awake and alert. Shakes head \"no\" to pain and nausea. Vitals stable. 1531- Duoneb breathing treatment complete    1540- Respiratory at bedside for BiPAP. 46- Dr. Annie Harrell at bedside, discontinued order for BiPAP. Patient extubated and placed on 4 L O2 via nasal cannula. 1554- Patient awake and alert. Denies pain and nausea. Vitals remain stable. O2 saturation 100% on 4 L O2.     1605- Called report to [employer], Claudeen Calkins. 1623- Patient awake and alert. Denies pain and nausea. Vitals stable on 2 L O2. Meets PACU discharge criteria. Transported to [facility] by transport team in stable condition. Family not in waiting room to notify.

## 2020-01-24 NOTE — PROGRESS NOTES
2WW)  Discharge Recommendations:  Continue to assess pending progress    Plan: Times per week: 5x/wk BID, 1x/wk QD  Times per day: Twice a day  Plan weeks: 3 weeks  Current Treatment Recommendations: Strengthening, Functional Mobility Training, Neuromuscular Re-education, Home Exercise Program, Transfer Training, Gait Training, Safety Education & Training, Balance Training, Endurance Training, Stair training, Patient/Caregiver Education & Training    Patient Education  Patient Education: Plan of Care, Transfers, Gait    Goals:  Patient goals : go home  Short term goals  Time Frame for Short term goals: 10 days  Short term goal 1: Patient will complete sit  <> stand with SBA to stand to ambulate with decreased difficulty. GOAL MET, DISCONTINUE, SEE LTG  Short term goal 2: Patient will complete supine < > sit with SBA to transfer in/out of bed with decreased difficulty. GOAL MET, DISCONTINUE, SEE LTG  Short term goal 3: Patient will ambulate 100' with a RW and SBA to progress towards independent home mobility. GOAL MET, DISCONTINUE, SEE LTG  Short term goal 4:       Long term goals  Time Frame for Long term goals : 3 weeks  Long term goal 1: Patient will complete sit < > stand and stand pivot transfers with modified independence to allow patient to transfer surface to surface safely. Long term goal 2: Patient will complete supine < > sit with modified independence to allow patient to transfer in/out of bed safely. Long term goal 3: Patient will ambulate 80' with a RW and modified independence to navigate home safely. Long term goal 4: Patient will ascend/descend 2 steps with 1 handrail and straight cane and SBA for safe home entry. Long term goal 5: Patient will complete car transfer with SBA for safe transport to home and appointments. Following session, patient left in safe position with all fall risk precautions in place.

## 2020-01-24 NOTE — OP NOTE
to positive left lung nodule biopsy in 2016, last dose 12/23/2019, established patient of Dr. Mariel Card. -S/P RLL lobectomy with chronic post surgical changes  -Diastolic CHF    Consent: Keysha Oliver is a 76 y.o. female . The risks, benefits, complications, treatment options and expected outcomes were discussed with the patient. Consent obtained from the patient after explaining the risks and complications of the procedure. The possibilities of reaction to medication, pulmonary aspiration, perforation of a viscus, bleeding, failure to diagnose a condition and creating a complication requiring transfusion or operation were discussed with the patient who freely signed the consent. Anesthesia: Patient was given sedation with General anaest[facility]      Description of Procedure: The patient was taken to endoscopy suite, identified as Keysha Oliver and the procedure verified as Flexible Fiberoptic Bronchoscopy. A Time Out was held and the above information confirmed. After the induction of topical nasopharyngeal anesthesia, the patient was placed in appropriate position and the Flexible Fibrooptic bronchoscope was passed through the Endotracheal tube. The scope was then passed into the trachea. The pedro is sharp with no growths. Careful inspection of the tracheal lumen was accomplished with no growths. The scope was  advanced into the right main bronchus, bronchus intermedius, into the Right upper lobe and segmental bronchi. Right lower lobe stump was visualized with irregular mucosa- ? Brocholith along with old surgical staple noted. Mucus plugs were noted- removed by suctioning    The scope was sequentially passed into the left main and then left upper and lower bronchi and segmental bronchi. Mucus plugs were noted- removed by suctioning    Therapeutic bronchoscope was performed from left and right tracheobronchial tree.  Mucus plugs were removed by doing therapeutic suctioning from

## 2020-01-25 LAB
BLOOD CULTURE, ROUTINE: NORMAL
BLOOD CULTURE, ROUTINE: NORMAL

## 2020-01-25 PROCEDURE — 6370000000 HC RX 637 (ALT 250 FOR IP): Performed by: INTERNAL MEDICINE

## 2020-01-25 PROCEDURE — 94640 AIRWAY INHALATION TREATMENT: CPT

## 2020-01-25 PROCEDURE — 2580000003 HC RX 258: Performed by: FAMILY MEDICINE

## 2020-01-25 PROCEDURE — 1290000000 HC SEMI PRIVATE OTHER R&B

## 2020-01-25 PROCEDURE — 6360000002 HC RX W HCPCS: Performed by: FAMILY MEDICINE

## 2020-01-25 PROCEDURE — 6370000000 HC RX 637 (ALT 250 FOR IP): Performed by: FAMILY MEDICINE

## 2020-01-25 PROCEDURE — 94760 N-INVAS EAR/PLS OXIMETRY 1: CPT

## 2020-01-25 PROCEDURE — 2700000000 HC OXYGEN THERAPY PER DAY

## 2020-01-25 RX ORDER — LOPERAMIDE HYDROCHLORIDE 2 MG/1
4 CAPSULE ORAL 4 TIMES DAILY PRN
Status: DISCONTINUED | OUTPATIENT
Start: 2020-01-25 | End: 2020-01-26 | Stop reason: HOSPADM

## 2020-01-25 RX ADMIN — LOPERAMIDE HYDROCHLORIDE 4 MG: 2 CAPSULE ORAL at 21:58

## 2020-01-25 RX ADMIN — LOPERAMIDE HYDROCHLORIDE 2 MG: 2 CAPSULE ORAL at 08:46

## 2020-01-25 RX ADMIN — ROPINIROLE HYDROCHLORIDE 0.25 MG: 0.25 TABLET, FILM COATED ORAL at 08:45

## 2020-01-25 RX ADMIN — FERROUS SULFATE TAB 325 MG (65 MG ELEMENTAL FE) 325 MG: 325 (65 FE) TAB at 08:45

## 2020-01-25 RX ADMIN — HEPARIN 500 UNITS: 100 SYRINGE at 06:18

## 2020-01-25 RX ADMIN — RIVAROXABAN 20 MG: 20 TABLET, FILM COATED ORAL at 17:09

## 2020-01-25 RX ADMIN — IPRATROPIUM BROMIDE AND ALBUTEROL SULFATE 1 AMPULE: .5; 3 SOLUTION RESPIRATORY (INHALATION) at 14:37

## 2020-01-25 RX ADMIN — MENTHOL, METHYL SALICYLATE: 10; 15 CREAM TOPICAL at 18:27

## 2020-01-25 RX ADMIN — BETAMETHASONE DIPROPIONATE: 0.5 CREAM TOPICAL at 15:26

## 2020-01-25 RX ADMIN — HYDROCODONE BITARTRATE AND ACETAMINOPHEN 1 TABLET: 10; 325 TABLET ORAL at 18:26

## 2020-01-25 RX ADMIN — ACETAMINOPHEN 500 MG: 500 TABLET ORAL at 21:58

## 2020-01-25 RX ADMIN — PIPERACILLIN AND TAZOBACTAM 3.38 G: 3; .375 INJECTION, POWDER, LYOPHILIZED, FOR SOLUTION INTRAVENOUS at 18:52

## 2020-01-25 RX ADMIN — FLUTICASONE PROPIONATE 1 SPRAY: 50 SPRAY, METERED NASAL at 08:46

## 2020-01-25 RX ADMIN — DIPHENHYDRAMINE HCL 25 MG: 25 TABLET ORAL at 21:58

## 2020-01-25 RX ADMIN — Medication 2 PUFF: at 18:38

## 2020-01-25 RX ADMIN — ACETAMINOPHEN 500 MG: 500 TABLET ORAL at 08:45

## 2020-01-25 RX ADMIN — FAMOTIDINE 20 MG: 20 TABLET ORAL at 08:46

## 2020-01-25 RX ADMIN — ONDANSETRON HYDROCHLORIDE 4 MG: 4 TABLET, FILM COATED ORAL at 13:05

## 2020-01-25 RX ADMIN — DIPHENHYDRAMINE HCL 25 MG: 25 TABLET ORAL at 08:46

## 2020-01-25 RX ADMIN — METOPROLOL TARTRATE 12.5 MG: 25 TABLET ORAL at 08:45

## 2020-01-25 RX ADMIN — PIPERACILLIN AND TAZOBACTAM 3.38 G: 3; .375 INJECTION, POWDER, LYOPHILIZED, FOR SOLUTION INTRAVENOUS at 03:35

## 2020-01-25 RX ADMIN — ACETAMINOPHEN 500 MG: 500 TABLET ORAL at 15:27

## 2020-01-25 RX ADMIN — ROPINIROLE HYDROCHLORIDE 0.25 MG: 0.25 TABLET, FILM COATED ORAL at 21:57

## 2020-01-25 RX ADMIN — PIPERACILLIN AND TAZOBACTAM 3.38 G: 3; .375 INJECTION, POWDER, LYOPHILIZED, FOR SOLUTION INTRAVENOUS at 11:26

## 2020-01-25 RX ADMIN — METOPROLOL TARTRATE 12.5 MG: 25 TABLET ORAL at 21:57

## 2020-01-25 RX ADMIN — LEVOTHYROXINE SODIUM 75 MCG: 75 TABLET ORAL at 06:17

## 2020-01-25 RX ADMIN — HYDROCODONE BITARTRATE AND ACETAMINOPHEN 1 TABLET: 10; 325 TABLET ORAL at 10:28

## 2020-01-25 RX ADMIN — IPRATROPIUM BROMIDE AND ALBUTEROL SULFATE 1 AMPULE: .5; 3 SOLUTION RESPIRATORY (INHALATION) at 18:38

## 2020-01-25 RX ADMIN — Medication 2 PUFF: at 10:45

## 2020-01-25 RX ADMIN — SODIUM CHLORIDE, PRESERVATIVE FREE 10 ML: 5 INJECTION INTRAVENOUS at 06:23

## 2020-01-25 RX ADMIN — FERROUS SULFATE TAB 325 MG (65 MG ELEMENTAL FE) 325 MG: 325 (65 FE) TAB at 17:09

## 2020-01-25 RX ADMIN — IPRATROPIUM BROMIDE AND ALBUTEROL SULFATE 1 AMPULE: .5; 3 SOLUTION RESPIRATORY (INHALATION) at 10:36

## 2020-01-25 RX ADMIN — DIGOXIN 125 MCG: 125 TABLET ORAL at 08:45

## 2020-01-25 ASSESSMENT — PAIN SCALES - GENERAL
PAINLEVEL_OUTOF10: 4
PAINLEVEL_OUTOF10: 2
PAINLEVEL_OUTOF10: 0
PAINLEVEL_OUTOF10: 0
PAINLEVEL_OUTOF10: 6
PAINLEVEL_OUTOF10: 9
PAINLEVEL_OUTOF10: 0
PAINLEVEL_OUTOF10: 0
PAINLEVEL_OUTOF10: 7
PAINLEVEL_OUTOF10: 7

## 2020-01-25 ASSESSMENT — PAIN DESCRIPTION - LOCATION
LOCATION: BACK;NECK
LOCATION: ABDOMEN;BACK
LOCATION: BACK

## 2020-01-25 ASSESSMENT — PAIN DESCRIPTION - PROGRESSION

## 2020-01-25 ASSESSMENT — PAIN DESCRIPTION - DESCRIPTORS
DESCRIPTORS: THROBBING;NAGGING
DESCRIPTORS: ACHING;DISCOMFORT;SHARP;TIGHTNESS
DESCRIPTORS: ACHING;DISCOMFORT;PRESSURE;SORE

## 2020-01-25 ASSESSMENT — PAIN DESCRIPTION - PAIN TYPE
TYPE: ACUTE PAIN;CHRONIC PAIN
TYPE: CHRONIC PAIN
TYPE: CHRONIC PAIN

## 2020-01-25 ASSESSMENT — PAIN - FUNCTIONAL ASSESSMENT
PAIN_FUNCTIONAL_ASSESSMENT: ACTIVITIES ARE NOT PREVENTED

## 2020-01-25 ASSESSMENT — PAIN DESCRIPTION - ONSET
ONSET: ON-GOING
ONSET: GRADUAL
ONSET: ON-GOING

## 2020-01-25 ASSESSMENT — PAIN DESCRIPTION - FREQUENCY
FREQUENCY: INTERMITTENT

## 2020-01-25 ASSESSMENT — PAIN DESCRIPTION - ORIENTATION
ORIENTATION: PROXIMAL
ORIENTATION: LOWER;MID;UPPER

## 2020-01-25 ASSESSMENT — PAIN DESCRIPTION - DIRECTION: RADIATING_TOWARDS: NOT RADIATING

## 2020-01-25 NOTE — H&P
TCU History and Physical      Chief Complaint and Reason for TCU Admission:   The need to resume the time with therapies following the bronchoscopy. History of Present Illness:  Rhett Barone  is a 76 y.o. female admitted to the transitional care unit on 1/24/2020. She was on the TCU for therapy time following the acute hospital stay for pneumonia and hip fracture. She developed a post obstructive pneumonia that reponded partially to ATB and pulmonary toilet. It was decided that she needed to have a bronchoscopy to evaluate for any mass in the tubes causing the pneumonia to be slow to respond. She tolerated the scope well and had multiple mucus plugs removed. She states that today she is able to breathe more comfortably. . she still needs time with therapies and to complete the IV ATB before the return home.     Past Medical History:      Diagnosis Date    Arthritis     low back pain and scoliosis in lumbar    [facility] 2012    lower right lobe-lung s/p lobectomy in 2012, radiation and chemo nad later had mediatinal mets and had radationa dn chemo again, and in 2016 had reoccurence on the left side upper and was started on radiation this year and has  a follow up CT in oct 2017    CHF (congestive heart failure) (HCC)     Chronic bronchitis, simple (HCC)     Chronic respiratory failure with hypoxia ([address]     COPD (chronic obstructive pulmonary disease) (HCC)     GERD (gastroesophageal reflux disease)     History of positive PPD     HTN (hypertension)     Hx of blood clots     in lung     Pneumonia     Postprocedural pneumothorax     Scoliosis     Sleep apnea     Urinary incontinence     UTI (urinary tract infection)        Primary care provider: Bill Kim MD     Past Surgical History:      Procedure Laterality Date    HIP SURGERY Right 1/1/2020    RIGHT HIP HEMIARTHROPLASTY performed by Ameya Kurtz at [address] [place] [address] [id number]  10/19/2012    rt lower lobectomy     LUNG BIOPSY 8/2012       Allergies:    Advil cold & sinus liqui-gels [pseudoephedrine-ibuprofen];  Food; Percocet [oxycodone-acetaminophen]; and Sulfa antibiotics    Current Medications:    Current Facility-Administered Medications: loperamide (IMODIUM) capsule 4 mg, 4 mg, Oral, 4x Daily PRN  acetaminophen (TYLENOL) tablet 500 mg, 500 mg, Oral, Q6H  Vitamin D (CHOLECALCIFEROL) tablet 5,000 Units, 5,000 Units, Oral, Daily  magnesium hydroxide (MILK OF MAGNESIA) 400 MG/5ML suspension 30 mL, 30 mL, Oral, Daily PRN  acetaminophen (TYLENOL) tablet 650 mg, 650 mg, Oral, Q4H PRN  albuterol sulfate  (90 Base) MCG/ACT inhaler 2 puff, 2 puff, Inhalation, Q6H PRN  betamethasone dipropionate (DIPROLENE) 0.05 % cream, , Topical, Daily  bisacodyl (DULCOLAX) suppository 10 mg, 10 mg, Rectal, Daily PRN  digoxin (LANOXIN) tablet 125 mcg, 125 mcg, Oral, Daily  diphenhydrAMINE (BENADRYL) tablet 25 mg, 25 mg, Oral, BID  docusate sodium (COLACE) capsule 100 mg, 100 mg, Oral, BID PRN  famotidine (PEPCID) tablet 20 mg, 20 mg, Oral, Daily  ferrous sulfate tablet 325 mg, 325 mg, Oral, BID WC  fluticasone (FLONASE) 50 MCG/ACT nasal spray 1 spray, 1 spray, Each Nostril, Daily  guaiFENesin (MUCINEX) extended release tablet 600 mg, 600 mg, Oral, Daily  heparin flush 100 UNIT/ML injection 500 Units, 500 Units, Intravenous, PRN  heparin flush 100 UNIT/ML injection 500 Units, 500 Units, Intravenous, Daily  HYDROcodone-acetaminophen (NORCO)  MG per tablet 1 tablet, 1 tablet, Oral, Q6H PRN  hydrOXYzine (VISTARIL) capsule 25 mg, 25 mg, Oral, TID PRN  ipratropium-albuterol (DUONEB) nebulizer solution 1 ampule, 1 ampule, Inhalation, Q4H WA  levothyroxine (SYNTHROID) tablet 75 mcg, 75 mcg, Oral, Daily  metoprolol tartrate (LOPRESSOR) tablet 12.5 mg, 12.5 mg, Oral, BID  mometasone-formoterol (DULERA) 200-5 MCG/ACT inhaler 2 puff, 2 puff, Inhalation, BID  muscle rub cream, , Topical, PRN  ondansetron (ZOFRAN) tablet 4 mg, 4 mg, Oral, Q6H PRN  polyethylene edema  Neuro weak  Psy pleasant        Diagnostics:  No results found for this or any previous visit (from the past 24 hour(s)). 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 (HCC) [C34.31] 12/20/2018     Priority: High     Class: Acute    Hypothyroidism [E03.9]     [facility]oxysmal [facility] fibrillation ([facility] [I48.0] 10/22/2018    Chronic obstructive pulmonary disease ([facility] [J44.9]     Anemia [D64.9]     Physical deconditioning [R53.81]     Pneumonia of right lower lobe due to infectious organism ([facility] [J18.1]     GERD (gastroesophageal reflux disease) [K21.9]     Metastatic lung carcinoma, left ([facility] [C78.02] 10/22/2012   ·      Plan:   · The patient demonstrates good potential to participate in a skilled rehabilitation program on the transitional care unit. Rehabilitation services will include PT and OT/RT in order to improve functional status prior to discharge. Family education and training will be completed. Equipment evaluations and recommendations will be completed as appropriate. · Nursing will be involved for bowel, bladder, skin, and pain management. Nursing will also provide education and training to patient and family. · Prophylaxis:  DVT: xarelto. GI: pepcid. · Pain: tylenol, norco  · Nutrition:  Consultation to dietician for nutritional counseling and recommendations.    · Bladder: continent  · Bowel: glycolax, senokot, mom, colace  ·  and case management consultations for coordination of care and discharge planning    Can Woods MD

## 2020-01-26 VITALS
SYSTOLIC BLOOD PRESSURE: 140 MMHG | TEMPERATURE: 97.3 F | RESPIRATION RATE: 18 BRPM | DIASTOLIC BLOOD PRESSURE: 67 MMHG | OXYGEN SATURATION: 98 % | WEIGHT: 147.3 LBS | BODY MASS INDEX: 28.77 KG/M2 | HEART RATE: 86 BPM

## 2020-01-26 LAB
ANION GAP SERPL CALCULATED.3IONS-SCNC: 8 MEQ/L (ref 8–16)
BASOPHILS # BLD: 0.4 %
BASOPHILS ABSOLUTE: 0 THOU/MM3 (ref 0–0.1)
BUN BLDV-MCNC: 14 MG/DL (ref 7–22)
CALCIUM SERPL-MCNC: 8.8 MG/DL (ref 8.5–10.5)
CHLORIDE BLD-SCNC: 92 MEQ/L (ref 98–111)
CO2: 37 MEQ/L (ref 23–33)
CREAT SERPL-MCNC: 0.8 MG/DL (ref 0.4–1.2)
EOSINOPHIL # BLD: 2.9 %
EOSINOPHILS ABSOLUTE: 0.2 THOU/MM3 (ref 0–0.4)
ERYTHROCYTE [DISTWIDTH] IN BLOOD BY AUTOMATED COUNT: 17.3 % (ref 11.5–14.5)
ERYTHROCYTE [DISTWIDTH] IN BLOOD BY AUTOMATED COUNT: 60.6 FL (ref 35–45)
GFR SERPL CREATININE-BSD FRML MDRD: 70 ML/MIN/1.73M2
GLUCOSE BLD-MCNC: 129 MG/DL (ref 70–108)
GRAM STAIN RESULT: NORMAL
GRAM STAIN RESULT: NORMAL
HCT VFR BLD CALC: 26.3 % (ref 37–47)
HEMOGLOBIN: 7.6 GM/DL (ref 12–16)
HYPOCHROMIA: PRESENT
IMMATURE GRANS (ABS): 0.45 THOU/MM3 (ref 0–0.07)
IMMATURE GRANULOCYTES: 6.6 %
LYMPHOCYTES # BLD: 17.3 %
LYMPHOCYTES ABSOLUTE: 1.2 THOU/MM3 (ref 1–4.8)
MCH RBC QN AUTO: 29 PG (ref 26–33)
MCHC RBC AUTO-ENTMCNC: 28.9 GM/DL (ref 32.2–35.5)
MCV RBC AUTO: 100.4 FL (ref 81–99)
MONOCYTES # BLD: 8.2 %
MONOCYTES ABSOLUTE: 0.6 THOU/MM3 (ref 0.4–1.3)
NUCLEATED RED BLOOD CELLS: 0 /100 WBC
PLATELET # BLD: 241 THOU/MM3 (ref 130–400)
PMV BLD AUTO: 9.5 FL (ref 9.4–12.4)
POTASSIUM SERPL-SCNC: 4.4 MEQ/L (ref 3.5–5.2)
RBC # BLD: 2.62 MILL/MM3 (ref 4.2–5.4)
RESPIRATORY CULTURE: NORMAL
RESPIRATORY CULTURE: NORMAL
SCAN OF BLOOD SMEAR: NORMAL
SEG NEUTROPHILS: 64.6 %
SEGMENTED NEUTROPHILS ABSOLUTE COUNT: 4.4 THOU/MM3 (ref 1.8–7.7)
SODIUM BLD-SCNC: 137 MEQ/L (ref 135–145)
STOMATOCYTES: ABNORMAL
TOXIC GRANULATION: PRESENT
TSH SERPL DL<=0.05 MIU/L-ACNC: 72.36 UIU/ML (ref 0.4–4.2)
WBC # BLD: 6.8 THOU/MM3 (ref 4.8–10.8)

## 2020-01-26 PROCEDURE — 6370000000 HC RX 637 (ALT 250 FOR IP): Performed by: FAMILY MEDICINE

## 2020-01-26 PROCEDURE — 94640 AIRWAY INHALATION TREATMENT: CPT

## 2020-01-26 PROCEDURE — 2580000003 HC RX 258: Performed by: FAMILY MEDICINE

## 2020-01-26 PROCEDURE — 80048 BASIC METABOLIC PNL TOTAL CA: CPT

## 2020-01-26 PROCEDURE — 2700000000 HC OXYGEN THERAPY PER DAY

## 2020-01-26 PROCEDURE — 85025 COMPLETE CBC W/AUTO DIFF WBC: CPT

## 2020-01-26 PROCEDURE — 97530 THERAPEUTIC ACTIVITIES: CPT

## 2020-01-26 PROCEDURE — 6360000002 HC RX W HCPCS: Performed by: FAMILY MEDICINE

## 2020-01-26 PROCEDURE — 36415 COLL VENOUS BLD VENIPUNCTURE: CPT

## 2020-01-26 PROCEDURE — 84443 ASSAY THYROID STIM HORMONE: CPT

## 2020-01-26 PROCEDURE — 99232 SBSQ HOSP IP/OBS MODERATE 35: CPT | Performed by: INTERNAL MEDICINE

## 2020-01-26 PROCEDURE — 97116 GAIT TRAINING THERAPY: CPT

## 2020-01-26 RX ORDER — LOPERAMIDE HYDROCHLORIDE 2 MG/1
4 CAPSULE ORAL 4 TIMES DAILY PRN
COMMUNITY
Start: 2020-01-26

## 2020-01-26 RX ORDER — LEVOTHYROXINE SODIUM 0.07 MG/1
75 TABLET ORAL DAILY
Qty: 30 TABLET | Refills: 0 | Status: SHIPPED | OUTPATIENT
Start: 2020-01-27 | End: 2020-06-08 | Stop reason: SDUPTHER

## 2020-01-26 RX ORDER — HYDROCODONE BITARTRATE AND ACETAMINOPHEN 10; 325 MG/1; MG/1
1 TABLET ORAL EVERY 6 HOURS PRN
Qty: 28 TABLET | Refills: 0 | Status: SHIPPED | OUTPATIENT
Start: 2020-01-26 | End: 2020-02-02

## 2020-01-26 RX ADMIN — Medication 10 ML: at 03:12

## 2020-01-26 RX ADMIN — PIPERACILLIN AND TAZOBACTAM 3.38 G: 3; .375 INJECTION, POWDER, LYOPHILIZED, FOR SOLUTION INTRAVENOUS at 03:13

## 2020-01-26 RX ADMIN — ACETAMINOPHEN 500 MG: 500 TABLET ORAL at 08:22

## 2020-01-26 RX ADMIN — SODIUM CHLORIDE, PRESERVATIVE FREE 10 ML: 5 INJECTION INTRAVENOUS at 06:06

## 2020-01-26 RX ADMIN — FLUTICASONE PROPIONATE 1 SPRAY: 50 SPRAY, METERED NASAL at 08:29

## 2020-01-26 RX ADMIN — HYDROCODONE BITARTRATE AND ACETAMINOPHEN 1 TABLET: 10; 325 TABLET ORAL at 06:04

## 2020-01-26 RX ADMIN — LEVOTHYROXINE SODIUM 75 MCG: 75 TABLET ORAL at 06:04

## 2020-01-26 RX ADMIN — ACETAMINOPHEN 500 MG: 500 TABLET ORAL at 03:12

## 2020-01-26 RX ADMIN — HEPARIN 500 UNITS: 100 SYRINGE at 06:07

## 2020-01-26 RX ADMIN — FAMOTIDINE 20 MG: 20 TABLET ORAL at 08:22

## 2020-01-26 RX ADMIN — Medication 2 PUFF: at 09:23

## 2020-01-26 RX ADMIN — IPRATROPIUM BROMIDE AND ALBUTEROL SULFATE 1 AMPULE: .5; 3 SOLUTION RESPIRATORY (INHALATION) at 09:17

## 2020-01-26 RX ADMIN — DIPHENHYDRAMINE HCL 25 MG: 25 TABLET ORAL at 08:22

## 2020-01-26 RX ADMIN — HYDROCODONE BITARTRATE AND ACETAMINOPHEN 1 TABLET: 10; 325 TABLET ORAL at 00:38

## 2020-01-26 RX ADMIN — DIGOXIN 125 MCG: 125 TABLET ORAL at 08:21

## 2020-01-26 RX ADMIN — ONDANSETRON HYDROCHLORIDE 4 MG: 4 TABLET, FILM COATED ORAL at 06:17

## 2020-01-26 RX ADMIN — FERROUS SULFATE TAB 325 MG (65 MG ELEMENTAL FE) 325 MG: 325 (65 FE) TAB at 08:21

## 2020-01-26 RX ADMIN — ROPINIROLE HYDROCHLORIDE 0.25 MG: 0.25 TABLET, FILM COATED ORAL at 08:28

## 2020-01-26 RX ADMIN — HEPARIN 500 UNITS: 100 SYRINGE at 13:34

## 2020-01-26 RX ADMIN — METOPROLOL TARTRATE 12.5 MG: 25 TABLET ORAL at 08:22

## 2020-01-26 ASSESSMENT — PAIN DESCRIPTION - PROGRESSION
CLINICAL_PROGRESSION: NOT CHANGED

## 2020-01-26 ASSESSMENT — PAIN SCALES - GENERAL
PAINLEVEL_OUTOF10: 6
PAINLEVEL_OUTOF10: 7

## 2020-01-26 NOTE — PROGRESS NOTES
[address] [place] [address]  INPATIENT PHYSICAL THERAPY  DAILY NOTE  [address] [facility] - [id number]    Time In: 0498  Time Out: 1017  Timed Code Treatment Minutes: 30 Minutes  Minutes: 30          Date: 2020  Patient Name: Malvin Echavarria,  Gender:  female        MRN: [id number]  : 1945  (76 y.o.)                 Prior Level of Function:                Restrictions/Precautions:       SUBJECTIVE: Pt in bedside chair as therapist arrived. Agreeable to tx, reporting she is going home later today. Pt feeling nauseated, stating she gets that way after drinking orange juice. PAIN: none noted     OBJECTIVE:  Bed Mobility:  Rolling to Left: Independent   Rolling to Right: Independent   Supine to Sit: Independent  Sit to Supine: Independent     Transfers:  Sit to Stand: Stand By Assistance  Stand to Sit:Stand By Assistance   Car Transfer x2 bouts: Supervision   Lateral transfer with RW: Supervision/Stand by Assistance     Ambulation:  Stand By Assistance  Distance: 150ft + 10ft (on ramp) + 50ft + various other short distances   Surface: Level Tile and ramp  Device:Rolling Walker  Gait Deviations: Forward Flexed Posture and Slow Ligia  Pt refused use of quad cane today. Stairs:  Stand By Assistance, [employer] Assistance  Number of Steps: 8 steps  Height: 6\" step with Bilateral Handrails   Stairs:  Stand By Assistance  Number of Steps: 1 curb step with RW    Balance:  Dynamic Sitting Balance: Supervision  Dynamic Standing Balance: Stand By Assistance    Functional Outcome Measures: Not completed       ASSESSMENT:  Assessment: Patient progressing toward established goals. Activity Tolerance:  Patient tolerance of  treatment: good.       Equipment Recommendations:   Discharge Recommendations: home with continued therapy       Plan:      Patient Education  Patient Education: [employer] Mobility, Equipment Education, Transfers, Gait, Stairs, Use of [employer], Car Transfers, Verbal Exercise Instruction    Goals: Following session, patient left in safe position with all fall risk precautions in place.

## 2020-01-26 NOTE — DISCHARGE SUMMARY
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 female admitted to the transitional care unit on 2020 for the continuation of time with therapies following the bronchoscopy. The bronchoscopy showed no gross tumor, but there were a number of mucus plugs that were removed. She noticed a marked improvement in the comfort of breathing. She completed the IV zosyn for her pneumonia but did have some diarrhea not thought to be C diff in nature. She remained medically stable and will be discharged in stable conditon. Consults:   pulmonary/intensive care    Significant Diagnostics:   Results for Susan Horowitz (MRN [id number]) as of 2020 12:12   Ref. Range 2019 09:45 2019 09:00 10/18/2019 10:25 2019 11:20 2020 05:30   TSH Latest Ref Range: 0.400 - 4.20 uIU/mL 125.100 (H) 121.700 (H) 96.230 (H) 53.780 (H) 72.360 (H)     Results for Susan Horowitz (MRN [id number]) as of 2020 12:12   Ref.  Range 2020 05:30   Sodium Latest Ref Range: 135 - 145 meq/L 137   Potassium Latest Ref Range: 3.5 - 5.2 meq/L 4.4   Chloride Latest Ref Range: 98 - 111 meq/L 92 (L)   CO2 Latest Ref Range: 23 - 33 Bee Liang [facility]   Home Medication Instructions DAN:[id number]    Printed on:01/26/20 1203   Medication Information                      albuterol (PROVENTIL) (2.5 MG/3ML) 0.083% nebulizer solution  Take 3 mLs by nebulization every 6 hours as needed for Wheezing or Shortness of Breath             albuterol sulfate HFA (PROAIR HFA) 108 (90 Base) MCG/ACT inhaler  Inhale 2 puffs into the lungs every 6 hours as needed for Wheezing             betamethasone dipropionate (DIPROLENE) 0.05 % ointment  Apply topically daily. digoxin (LANOXIN) 125 MCG tablet  Take 1 tablet by mouth daily             diphenhydrAMINE HCl (BENADRYL PO)  Take by mouth daily             ferrous sulfate 325 (65 Fe) MG tablet  One every other day take with food and vitamin C 500 mg             fluticasone (FLONASE) 50 MCG/ACT nasal spray  1 spray by Nasal route daily             Handicap Placard MISC  by Does not apply route Diagnosis: Malignant neoplasm of lung, unspecified laterality, unspecified part of lung ([facility] [address] [C34.90]  Expiration Date: 4/15/2020             HYDROcodone-acetaminophen (NORCO)  MG per tablet  Take 1 tablet by mouth every 6 hours as needed for Pain for up to 7 days. Additional RF per her PCP             levothyroxine (SYNTHROID) 75 MCG tablet  Take 1 tablet by mouth daily Additional RF per her PCP             lidocaine-prilocaine (EMLA) 2.5-2.5 % cream  Apply topically as needed for Pain Apply topically as needed.              loperamide (IMODIUM) 2 MG capsule  Take 2 capsules by mouth 4 times daily as needed for Diarrhea             metoprolol tartrate (LOPRESSOR) 25 MG tablet  Take 0.5 tablets by mouth 2 times daily             mometasone-formoterol (DULERA) 200-5 MCG/ACT inhaler  Inhale 2 puffs into the lungs 2 times daily             omeprazole (PRILOSEC) 40 MG delayed release capsule  Take 40 mg by mouth daily             ondansetron (ZOFRAN) 8 MG tablet  Take 1 tablet by mouth every 8 hours as needed for Nausea or Vomiting             Probiotic Product (PROBIOTIC DAILY PO)  Take 1 capsule by mouth 3 times daily             promethazine (PHENERGAN) 25 MG tablet  Take 1 tablet by mouth every 8 hours as needed for Nausea             promethazine (PHENERGAN) 25 MG tablet  Take 1 tablet by mouth every 8 hours as needed for Nausea             rivaroxaban (XARELTO) 20 MG TABS tablet  Take 1 tablet by mouth daily Additional RF per the PCP             rOPINIRole (REQUIP) 0.25 MG tablet  Take 1 tablet by mouth 2 times daily             tiotropium (SPIRIVA) 18 MCG inhalation capsule  Inhale 18 mcg into the lungs daily             Vitamin D 400 UNIT TABS tablet  Take 12.5 tablets by mouth daily                 35 minutes spent preparing the patient for discharge    Kerrie Lentz MD

## 2020-01-26 NOTE — DISCHARGE INSTR - COC
Readings from Last 1 Encounters:   01/26/20 147 lb 4.8 oz (66.8 kg)     Mental Status:  oriented    IV Access:  - Deaccessed Mediport    Nursing Mobility/ADLs:  Walking   Independent  Transfer  Independent  Bathing  Independent  Dressing  Independent  Toileting  Independent  Feeding  Independent  Med [address]  Med Delivery   whole    Wound Care Documentation and Therapy:  Wound 01/01/20 Sacrum (Active)   Wound Image   1/6/2020  4:33 PM   Wound Pressure Stage  1 1/26/2020  8:07 AM   Dressing Status Clean;Dry 1/15/2020  7:45 PM   Dressing/Treatment Moisture barrier 1/26/2020  8:07 AM   Wound Cleansed Soap and water 1/25/2020 10:11 AM   Wound Length (cm) 2 cm 1/6/2020  4:33 PM   Wound Width (cm) 1.5 cm 1/6/2020  4:33 PM   Wound Depth (cm) 0.7 cm 1/6/2020  4:33 PM   Wound Surface Area (cm^2) 3 cm^2 1/6/2020  4:33 PM   Wound Volume (cm^3) 2.1 cm^3 1/6/2020  4:33 PM   Wound Assessment Dry;Dark edges; Purple 1/26/2020  8:07 AM   Drainage Amount None 1/26/2020  8:07 AM   Odor None 1/26/2020  8:07 AM   Sia-wound Assessment [place] 1/12/2020  7:31 PM   Number of days: 24        Elimination:  Continence:   · Bowel: Yes  · Bladder: Yes  Urinary Catheter: None   Colostomy/Ileostomy/Ileal Conduit: No       Date of Last BM: 1/26/2020    Intake/Output Summary (Last 24 hours) at 1/26/2020 1159  Last data filed at 1/26/2020 1044  Gross per 24 hour   Intake 1180 ml   Output --   Net 1180 ml     I/O last 3 completed shifts: In: 1120 [P.O.:1120]  Out: -     Safety Concerns:     None    Impairments/Disabilities:      None    Nutrition Therapy:  Current Nutrition Therapy:   - Oral Diet:  General    Routes of Feeding: Oral  Liquids:  Thin Liquids  Daily Fluid Restriction: no  Last Modified Barium Swallow with Video (Video Swallowing Test): not done    Treatments at the Time of Hospital Discharge:   Respiratory Treatments: Albuterol 2 puff every 6 hours prn; Duoneb Every 6 hours while prn; Dulera Every 4 hours while awake  Oxygen Therapy:  is on oxygen at 2 L/min per nasal cannula. Ventilator:    - No ventilator support    Rehab Therapies: Physical Therapy and Occupational Therapy  Weight Bearing Status/Restrictions: No weight bearing restirctions  Other Medical Equipment (for information only, NOT a DME order):  walker  Other Treatments: None    Patient's personal belongings (please select all that are sent with patient):  None    RN SIGNATURE:  Electronically signed by Crystal Diaz RN on 1/26/20 at 12:04 PM    CASE MANAGEMENT/SOCIAL WORK SECTION    Inpatient Status Date: ***    Readmission Risk Assessment Score:  Readmission Risk              Risk of Unplanned Readmission:        35           Discharging to Facility/ Agency   · Name:   · Address:  · Phone:  · Fax:    Dialysis Facility (if applicable)   · Name:  · Address:  · Dialysis Schedule:  · Phone:  · Fax:    / signature: {Esignature:[id number]}    PHYSICIAN SECTION    Prognosis: {Prognosis:5790616638}    Condition at Discharge: Basilio Stallings Patient Condition:[id number]}    Rehab Potential (if transferring to Rehab): {Prognosis:1533051786}    Recommended Labs or Other Treatments After Discharge: ***    Physician Certification: I certify the above information and transfer of Rhett Barone  is necessary for the continuing treatment of the diagnosis listed and that she requires {Admit to Appropriate Level of Care:21661} for {GREATER/LESS:989726655} 30 days.      Update Admission H&P: {CHP DME Changes in CNVIV:884994030}    PHYSICIAN SIGNATURE:  {Esignature:[id number]}

## 2020-01-26 NOTE — PLAN OF CARE
Problem: RESPIRATORY  Goal: Able to breathe comfortably  Outcome: Ongoing  Note:   Respirations unlabored, at rest in bed, with O2 2 LPM/NC. Able to eat and speak easily without SOB. Ambulated to bathroom on O2 6 LPM/NC without becoming SOB; able to be independent with ADLs without SOB or fatigue.

## 2020-01-26 NOTE — PROGRESS NOTES
GENERAL;  Allergies    Advil cold & sinus liqui-gels [pseudoephedrine-ibuprofen];  Food; Percocet [oxycodone-acetaminophen]; and Sulfa antibiotics  Social History     Social History     Socioeconomic History    Marital status:      Spouse name: Mary Yates Number of children: 2    Years of education: Not on file    Highest education level: Not on file   Occupational History    Not on file   Social Needs    Financial resource strain: Not on file    Food insecurity:     Worry: Not on file     Inability: Not on file   [employer] needs:     Medical: Not on file     Non-medical: Not on file   Tobacco Use    Smoking status: Former Smoker     Packs/day: 1.00     Years: 45.00     Pack years: 45.00     Types: Cigarettes     Last attempt to quit:      Years since quittin.0    Smokeless tobacco: Never Used   Substance and Sexual Activity    Alcohol use: No    Drug use: No    Sexual activity: Not on file   Lifestyle    Physical activity:     Days per week: Not on file     Minutes per session: Not on file    Stress: Not on file   Relationships    Social connections:     Talks on phone: Not on file     Gets together: Not on file     Attends Jew service: Not on file     Active member of club or organization: Not on file     Attends meetings of clubs or organizations: Not on file     Relationship status: Not on file    Intimate partner violence:     Fear of current or ex partner: Not on file     Emotionally abused: Not on file     Physically abused: Not on file     Forced sexual activity: Not on file   Other Topics Concern    Not on file   Social History Narrative    Not on file     Family History          Problem Relation Age of Onset    Cancer Mother     Heart Disease Father     High Blood Pressure Father     High Cholesterol Father     Arthritis Sister     Heart Disease Sister     Cancer Sister         lung cancer    Heart Disease Sister     Stroke Sister     High Cholesterol normal. Pupils are equal, round. No scleral icterus. Neck: Neck supple. No tracheal deviation present. Cardiovascular: S1 and S2 with no murmur. No peripheral edema  Pulmonary/Chest: Normal effort with bilateral air entry, diminished breath sounds R>L. No stridor. No respiratory distress. Patient exhibits no tenderness. Abdominal: Soft. Bowel sounds audible. No distension or tenderness to palp. Musculoskeletal: Moves all extremities  Neurological: Patient is alert and follow simple commands    Labs   ABG  Lab Results   Component Value Date    PH 7.38 01/02/2020    PO2 77 01/02/2020    PCO2 54 01/02/2020    HCO3 32 01/02/2020    O2SAT 95 01/02/2020     Lab Results   Component Value Date    IFIO2 30 01/02/2020    MODE CPAP/PS 01/02/2020    SETPEEP 6.0 01/02/2020     CBC  Recent Labs     01/24/20  0633 01/24/20  0928 01/26/20  0530   WBC 5.8  --  6.8   RBC 2.52*  --  2.62*   HGB 7.4* 7.7* 7.6*   HCT 25.6* 25.9* 26.3*   .6*  --  100.4*   MCH 29.4  --  29.0   MCHC 28.9*  --  28.9*     --  241   MPV 9.6  --  9.5      BMP  Recent Labs     01/26/20  0530      K 4.4   CL 92*   CO2 37*   BUN 14   CREATININE 0.8   GLUCOSE 129*   CALCIUM 8.8     LFT  No results for input(s): AST, ALT, ALB, BILITOT, ALKPHOS, LIPASE in the last 72 hours. Invalid input(s): AMYLASE  TROP  Lab Results   Component Value Date    TROPONINT < 0.010 12/31/2019    TROPONINT 0.019 12/31/2019    TROPONINT < 0.010 12/30/2019     BNP  Lab Results   Component Value Date    PROBNP 625.1 07/18/2019    PROBNP 590.5 07/18/2019    PROBNP 2109.0 02/23/2019     D-Dimer  No results found for: DDIMER  Lactic Acid  No results for input(s): LACTA in the last 72 hours. INR  No results for input(s): INR, PROTIME in the last 72 hours. PTT  Recent Labs     01/23/20  1702 01/24/20  0632 01/24/20  1808   APTT 85.7* 89.8* 28.5     Glucose  No results for input(s): POCGLU in the last 72 hours.   UA   No results for input(s): Evy Sparks, Genna Hernando, MUCUS, PROTEINU, BLOODU, RBCUA, WBCUA, BACTERIA, NITRU, GLUCOSEU, BILIRUBINUR, UROBILINOGEN, KETUA, LABCAST, LABCASTTY, AMORPHOS in the last 72 hours. Invalid input(s): CRYSTALS. PFTs               Cultures    Procalcitonin  Lab Results   Component Value Date    PROCAL 2.48 01/20/2020    PROCAL 0.09 02/27/2019    PROCAL 0.08 02/24/2019     Rapid Flu A/B-Negative  Blood Culture x2-No prelim growth  MRSA-Negative  Respiratory culture-Normal julius    Right lower lobectomy   RPTD: 10/22/2012   FINAL DIAGNOSIS:  A - Right lower lobe lung, lobectomy:      Moderately differentiated adenocarcinoma, mixed subtype      (predominantly papillary), pT2b      Emphysematous changes in non-neoplastic lung parenchyma. Margins negative for malignancy. B - Peribronchial lymph node, excision:   One lymph node with caseous necrosis/calcification, pN0. Negative for malignancy. Left lung nodule biopsy  RPTD: 11/01/2016   FINAL DIAGNOSIS:  Left upper lobe of lung, core needle biopsy: Moderately to poorly differentiated pulmonary adenocarcinoma. Radiology    CXR     XR CHEST (2 VW)   1/18/2020   FINDINGS:   The right heart border is entirely obscured. The mediastinum is not widened. Loss of volume right lung should mediastinum the right. Slightly worse in appearance since the prior exam with air bronchograms right lower lobe. Interstitial edema and a minimal aerated lung. Herniation of left lung and to the right due to the shift the mediastinum and loss of volume right lung. The pulmonary vascularity is normal. No suspicious osseous lesions are present. Impression:  Minimally worsened appearance with further loss of volume right lung       XR CHEST (2 VW)   1/16/2020   FINDINGS: There is a right chest wall Port-A-Cath. Postsurgical changes and volume loss in the right lung. There is patchy opacity in the right lung which could reflect infiltrate or edema.  Hyperinflation of the left lung with minimal atelectasis or scarring in the left lung base. Craig Dull No change from prior study. Osteopenia. Thoracic scoliosis. No cardiomegaly. Postsurgical changes and volume loss in the right lung. There is airspace and interstitial opacity in the right lung which could reflect infiltrate or edema. Correlation with symptoms is advised. CT Scans    CT CHEST WO CONTRAST   1/19/2020   Impression:  1. Further loss of volume right lung with extensive right lung airspace consolidation likely acute pneumonic infiltrates. 2. Diffuse interstitial edema left lung versus lymphangitic spread of metastases. Very Small left pleural effusion. 3. Minimal increase in sizes of the previously identified left lung masses. CT CHEST W CONTRAST   11/4/2019   FINDINGS:  Postsurgical changes from prior right lower lobectomy. There is consolidative opacity again demonstrated at the medial right lung base. Overall, the degree of atelectasis appears slightly progressed when compared to the prior examination. There is also small lucent cavitary change demonstrated within the medial right lung base on axial image 33 measuring approximately 1.1 x 0.9 cm which is similar when compared to the prior examination. There is thickening along the pleura posterolaterally on the right  demonstrated on axial image 34 which appears similar in configuration from the prior examination. There is also similar appearing volume loss within the right hemithorax. There is a filling defect inserted within the distal right pulmonary artery which is most consistent with pulmonary embolism. This is of indeterminate chronicity. There is focal opacity demonstrated anteriorly within the left upper lobe measuring approximately 1.8 x 4.1 cm. This abuts the pleura and appears slightly increased in size when compared to the prior examination. Previously this measured approximately 3.6 x 1.5 cm. There is moderate diffuse centrilobular emphysema.   There is a focal irregular opacity demonstrated with ill-defined margins within the medial left upper lobe on axial image 14. This appears more confluent increased in size from the prior examination. This measures approximately 1.9 x 0.8 centimeters. Previously this measured approximately 1.1 x 0.9 cm. No pneumothorax is seen. Evaluation for hilar adenopathy of the right is limited due to right perihilar opacity from prior postsurgical change and partial collapse of the residual lung at the medial right lung base. There is indeterminate mild pericardial fluid present. This appears improved from the prior examination. CT of the abdomen and pelvis are reported separately. Impression:  1. There is a filling defect inserted within the distal right pulmonary artery which is most consistent with pulmonary embolism. This is of indeterminate chronicity. 2. Postsurgical changes from prior right lower lobectomy with associated volume loss is again seen. There is consolidative opacity at the posterior medial right lung base possibly representing postobstructive collapse. This appears slightly progressed from the prior examination. However there is a small stable area of lucent cavitary change at the posterior medial right lung base on axial image 34.    3. Pleural thickening with focal opacity abutting the posterior lateral right pleura is noted at the posterior lateral right lung base which appears similar in configuration to the prior examination. 4. Focal masslike opacity anteriorly within the left upper lobe abutting the pleura is slightly increased in size from the prior examination dated July 2019. This appears slightly increased in size from the prior examination. This is concerning for progression of disease. 5. Focal irregular opacity with irregular margins demonstrated within the left upper lobe appears increased in size measuring 1.9 x 0.8 centimeters. This appears increased in size from the prior examination.  This is

## 2020-01-27 LAB — MISC. #1 REFERENCE GROUP TEST: NORMAL

## 2020-01-27 NOTE — PROGRESS NOTES
[address] [facility]  [address]      Date: 2020  Patient Name: Tavo Colvin,  Gender:  female        MRN: [id number]  : 1945  (76 y.o.)                 Restrictions/Precautions:  Restrictions/Precautions: Weight Bearing, Fall Risk    Right Lower Extremity Weight Bearing: Weight Bearing As Tolerated       Position Activity Restriction  Hip Precautions: No hip flexion > 90 degrees, No ADduction, No hip internal rotation  Other position/activity restrictions: Home O2: baseline is 2L at rest/6L with activity. S/p right hip hemiarthroplasty for femoral neck fracture on 20         Social/Functional:  Type of Home: House  Home Layout: One level  Home Access: Ramped entrance  Entrance Stairs - Number of Steps: 2  Entrance Stairs - Rails: Right  Home Equipment: Standard walker, 4 wheeled walker, Cane, Oxygen     Subjective:  Quick discharge due to patient returning to home 2020. Assessment:  At discharge Ms. Samir Thomas met 3/3 STG's and 2/5 LTG's. Patient did not meet LTG's for gait due to requiring SBA, did not meet stair goal due to requiring SBA/CGA and did not meet sit < > stand goal due to completing with SBA for safety. Although all goals not met, patient has shown good progression with functional mobility, progressing sit < > stand from CGA to SBA, supine < > sit from mod assist to modified independence and is able to increase gait distance with a RW. Pt returned to home 2020, discharge PT. Equipment Recommendations:   RW received       Plan: Patient discharged home 2020, recommend home health PT. Goals:  Short term goals  Time Frame for Short term goals: 10 days  Short term goal 1: Patient will complete sit  <> stand with SBA to stand to ambulate with decreased difficulty.  GOAL MET, DISCONTINUE, SEE LTG  Short term goal 2: Patient will complete supine < > sit with SBA to transfer in/out of bed with decreased

## 2020-01-27 NOTE — PLAN OF CARE
1/27/20, 8:52 AM    Patient goals/plan/ treatment preferences discussed by Evert Child / . Patient goals/plan/ treatment preferences reviewed with patient/ family. Patient initiated discharge on Sunday, stating that she felt good enough to go home and that he daughter was off on this day and so she decided she was going home. SW had made referral to [facility] for PT,OT,RN and Aides services. SW to inform [facility] of discharge date. PCP will be following ongoing [facility] orders. Patient/ family verbalize understanding of discharge plan and are in agreement with goal/plan/treatment preferences. Understanding was demonstrated using the teach back method. AVS provided by RN at time of discharge, which includes all necessary medical information pertaining to the patients current course of illness, treatment, post-discharge goals of care, and treatment preferences.

## 2020-01-28 NOTE — PROGRESS NOTES
[address] THERAPY  - [facility]  DISCHARGE NOTE    Date: 2020  Patient Name: Ladonna Donovan,   Gender: female      MRN: [id number]  : 1945  (76 y.o.)  Referring Practitioner: Christiano Gao MD; Attending: Dr. Christopher Keen  Diagnosis: Hemiarthroplast of R Hip    Restrictions/Precautions:  Restrictions/Precautions: Weight Bearing, Fall Risk    Right Lower Extremity Weight Bearing: Weight Bearing As Tolerated     Position Activity Restriction  Hip Precautions: No hip flexion > 90 degrees, No ADduction, No hip internal rotation  Other position/activity restrictions: Home O2: baseline is 2L at rest/6L with activity.  S/p right hip hemiarthroplasty for femoral neck fracture on 20      Past Medical History:   Diagnosis Date    Arthritis     low back pain and scoliosis in lumbar    Cancer ([facility] [address]     lower right lobe-lung s/p lobectomy in , radiation and chemo nad later had mediatinal mets and had radationa dn chemo again, and in  had reoccurence on the left side upper and was started on radiation this year and has  a follow up CT in oct 2017    CHF (congestive heart failure) (HCC)     Chronic bronchitis, simple (HCC)     Chronic respiratory failure with hypoxia (HCC)     COPD (chronic obstructive pulmonary disease) (HCC)     GERD (gastroesophageal reflux disease)     History of positive PPD     HTN (hypertension)     Hx of blood clots     in lung     Pneumonia     Postprocedural pneumothorax     Scoliosis     Sleep apnea     Urinary incontinence     UTI (urinary tract infection)      Past Surgical History:   Procedure Laterality Date    BRONCHOSCOPY N/A 2020    BRONCHOSCOPY WITH FLURO performed by Gonzalo Wilson MD at [address]  2020    BRONCHOSCOPY BRUSHINGS performed by Gonzalo Wilson MD at [address] 2020    RIGHT HIP HEMIARTHROPLASTY performed by Khari Cobb Kevin at [address] [place] [address],  [id number]  10/19/2012    rt lower lobectomy     LUNG BIOPSY  8/2012     Assessment: Pt made slow progress towards goals. Pt has met 0/4 STGs and 0/2 LTGs. Pt has exhibited improvement in UB self cares, grooming, toileting, functional mobility, and functional transfers. Pt continues to exhibit decreased dynamic standing balance, endurance, safety awareness, and activity tolerance causing barriers to meeting pt's goals. Pt continues to require skilled OT intervention to improve all ADL/IADL performance required for pt to return home at [facility]. Pt discharged home 1/26/20 with Stefanie Arevalo to continue progress with therapy goals. Equipment Recommendations:Equipment Needed: Yes  Other: Bob Beverage was delivered. Pt declines need for BSC. Plan:  Discharge patient to Home with Stefanie Arevalo (RN, PT, OT)     Goals  Short term goals  Time Frame for Short term goals: 2 weeks  Short term goal 1: Pt will complete LB ADL with LHAE and SUP to increase indep with self care. (Not Met)   Short term goal 2: Pt will complete dynamic standing task with Mod I x 6 min with B hand release to increase balance required for grooming. (Not Met)    Short term goal 3: Pt will complete mobility to/from BR with Mod I and 0 cues safety to increase indep with ADL routine. (Not Met)   Short term goal 4: Pt will complete various t/fs with Mod I and 0 cues safety to increase indep with toileting t/fs. Long term goals(Not Met)   Time Frame for Long term goals : 3 weeks  Long term goal 1: Pt will complete ADL routine including shower t/f with standard shower chair with Emilia to increase indep with self care. (Not Met)   Long term goal 2: Pt will demonstrate basic IADL tasks with emphasis on pet care using any adaptive strategies for increased safety and independence with changing litter box while abiding by hip precautions. (Not Met)

## 2020-01-29 LAB
AEROBIC CULTURE: NORMAL
ANAEROBIC CULTURE: NORMAL
FUNGUS IDENTIFIED: ABNORMAL
FUNGUS SMEAR: ABNORMAL
GRAM STAIN RESULT: NORMAL
ORGANISM: ABNORMAL

## 2020-01-31 LAB — VIRAL CULTURE,RAPID,RESPIRATOR: NORMAL

## 2020-02-03 ENCOUNTER — TELEPHONE (OUTPATIENT)
Dept: ONCOLOGY | Age: 75
End: 2020-02-03

## 2020-02-03 ENCOUNTER — HOSPITAL ENCOUNTER (OUTPATIENT)
Dept: INFUSION THERAPY | Age: 75
Discharge: HOME | End: 2020-02-03
Payer: MEDICARE

## 2020-02-03 ENCOUNTER — OFFICE VISIT (OUTPATIENT)
Dept: ONCOLOGY | Age: 75
End: 2020-02-03
Payer: MEDICARE

## 2020-02-03 VITALS
DIASTOLIC BLOOD PRESSURE: 67 MMHG | HEART RATE: 97 BPM | SYSTOLIC BLOOD PRESSURE: 147 MMHG | BODY MASS INDEX: 27.64 KG/M2 | OXYGEN SATURATION: 93 % | HEIGHT: 60 IN | TEMPERATURE: 98.7 F | RESPIRATION RATE: 18 BRPM | WEIGHT: 140.8 LBS

## 2020-02-03 VITALS
RESPIRATION RATE: 18 BRPM | HEART RATE: 98 BPM | WEIGHT: 140.8 LBS | HEIGHT: 60 IN | DIASTOLIC BLOOD PRESSURE: 59 MMHG | BODY MASS INDEX: 27.64 KG/M2 | SYSTOLIC BLOOD PRESSURE: 125 MMHG | OXYGEN SATURATION: 95 %

## 2020-02-03 DIAGNOSIS — J96.21 ACUTE ON CHRONIC RESPIRATORY FAILURE WITH HYPOXIA (HCC): ICD-10-CM

## 2020-02-03 DIAGNOSIS — R91.1 NODULE OF LEFT LUNG: ICD-10-CM

## 2020-02-03 DIAGNOSIS — Z51.11 ENCOUNTER FOR ANTINEOPLASTIC CHEMOTHERAPY AND IMMUNOTHERAPY: ICD-10-CM

## 2020-02-03 DIAGNOSIS — J44.9 CHRONIC OBSTRUCTIVE PULMONARY DISEASE, UNSPECIFIED COPD TYPE (HCC): ICD-10-CM

## 2020-02-03 DIAGNOSIS — J96.21 ACUTE ON CHRONIC RESPIRATORY FAILURE WITH HYPOXEMIA (HCC): ICD-10-CM

## 2020-02-03 DIAGNOSIS — C34.31 SQUAMOUS CELL CARCINOMA OF BRONCHUS IN RIGHT LOWER LOBE (HCC): Primary | ICD-10-CM

## 2020-02-03 DIAGNOSIS — C34.90 RECURRENT NON-SMALL CELL LUNG CANCER (NSCLC) (HCC): ICD-10-CM

## 2020-02-03 DIAGNOSIS — C78.02 METASTATIC LUNG CARCINOMA, LEFT (HCC): ICD-10-CM

## 2020-02-03 DIAGNOSIS — Z51.12 ENCOUNTER FOR ANTINEOPLASTIC CHEMOTHERAPY AND IMMUNOTHERAPY: ICD-10-CM

## 2020-02-03 DIAGNOSIS — D64.9 ANEMIA, UNSPECIFIED TYPE: ICD-10-CM

## 2020-02-03 DIAGNOSIS — Z85.118 H/O: LUNG CANCER: ICD-10-CM

## 2020-02-03 DIAGNOSIS — C34.91 PRIMARY LUNG CANCER, RIGHT (HCC): ICD-10-CM

## 2020-02-03 DIAGNOSIS — C34.31 MALIGNANT NEOPLASM OF LOWER LOBE, RIGHT BRONCHUS OR LUNG (HCC): ICD-10-CM

## 2020-02-03 LAB
ABO: NORMAL
ALBUMIN SERPL-MCNC: 3.3 G/DL (ref 3.5–5.1)
ALP BLD-CCNC: 76 U/L (ref 38–126)
ALT SERPL-CCNC: 11 U/L (ref 11–66)
ANTIBODY SCREEN: NORMAL
AST SERPL-CCNC: 15 U/L (ref 5–40)
BILIRUB SERPL-MCNC: 0.2 MG/DL (ref 0.3–1.2)
BILIRUBIN DIRECT: < 0.2 MG/DL (ref 0–0.3)
BUN, WHOLE BLOOD: 19 MG/DL (ref 8–26)
CHLORIDE, WHOLE BLOOD: 92 MEQ/L (ref 98–109)
CREATININE, WHOLE BLOOD: 0.7 MG/DL (ref 0.5–1.2)
GFR, ESTIMATED: 87 ML/MIN/1.73M2
GLUCOSE, WHOLE BLOOD: 127 MG/DL (ref 70–108)
HCT VFR BLD CALC: 22.2 % (ref 37–47)
HEMOGLOBIN: 7.2 GM/DL (ref 12–16)
IONIZED CALCIUM, WHOLE BLOOD: 1.18 MMOL/L (ref 1.12–1.32)
LEGIONELLA SPECIES CULTURE: NORMAL
MCH RBC QN AUTO: 29.4 PG (ref 27–31)
MCHC RBC AUTO-ENTMCNC: 32.7 GM/DL (ref 33–37)
MCV RBC AUTO: 90 FL (ref 81–99)
PDW BLD-RTO: 15.5 % (ref 11.5–14.5)
PLATELET # BLD: 211 THOU/MM3 (ref 130–400)
PMV BLD AUTO: 6.8 FL (ref 7.4–10.4)
POTASSIUM, WHOLE BLOOD: 3.9 MEQ/L (ref 3.5–4.9)
RBC # BLD: 2.46 MILL/MM3 (ref 4.2–5.4)
RH FACTOR: NORMAL
SEG NEUTROPHILS: 79 % (ref 43–75)
SEGMENTED NEUTROPHILS ABSOLUTE COUNT: 5 THOU/MM3 (ref 1.8–7.7)
SODIUM, WHOLE BLOOD: 140 MEQ/L (ref 138–146)
TOTAL CO2, WHOLE BLOOD: 36 MEQ/L (ref 23–33)
TOTAL PROTEIN: 6.5 G/DL (ref 6.1–8)
WBC # BLD: 6.3 THOU/MM3 (ref 4.8–10.8)

## 2020-02-03 PROCEDURE — G8482 FLU IMMUNIZE ORDER/ADMIN: HCPCS | Performed by: PHYSICIAN ASSISTANT

## 2020-02-03 PROCEDURE — G8400 PT W/DXA NO RESULTS DOC: HCPCS | Performed by: PHYSICIAN ASSISTANT

## 2020-02-03 PROCEDURE — 86850 RBC ANTIBODY SCREEN: CPT

## 2020-02-03 PROCEDURE — 1123F ACP DISCUSS/DSCN MKR DOCD: CPT | Performed by: PHYSICIAN ASSISTANT

## 2020-02-03 PROCEDURE — 99211 OFF/OP EST MAY X REQ PHY/QHP: CPT

## 2020-02-03 PROCEDURE — 86900 BLOOD TYPING SEROLOGIC ABO: CPT

## 2020-02-03 PROCEDURE — P9016 RBC LEUKOCYTES REDUCED: HCPCS

## 2020-02-03 PROCEDURE — 4040F PNEUMOC VAC/ADMIN/RCVD: CPT | Performed by: PHYSICIAN ASSISTANT

## 2020-02-03 PROCEDURE — 6360000002 HC RX W HCPCS: Performed by: INTERNAL MEDICINE

## 2020-02-03 PROCEDURE — 3017F COLORECTAL CA SCREEN DOC REV: CPT | Performed by: PHYSICIAN ASSISTANT

## 2020-02-03 PROCEDURE — 80047 BASIC METABLC PNL IONIZED CA: CPT

## 2020-02-03 PROCEDURE — G8417 CALC BMI ABV UP PARAM F/U: HCPCS | Performed by: PHYSICIAN ASSISTANT

## 2020-02-03 PROCEDURE — 2580000003 HC RX 258: Performed by: PHYSICIAN ASSISTANT

## 2020-02-03 PROCEDURE — G8427 DOCREV CUR MEDS BY ELIG CLIN: HCPCS | Performed by: PHYSICIAN ASSISTANT

## 2020-02-03 PROCEDURE — 96374 THER/PROPH/DIAG INJ IV PUSH: CPT

## 2020-02-03 PROCEDURE — 1036F TOBACCO NON-USER: CPT | Performed by: PHYSICIAN ASSISTANT

## 2020-02-03 PROCEDURE — 80076 HEPATIC FUNCTION PANEL: CPT

## 2020-02-03 PROCEDURE — 6360000002 HC RX W HCPCS: Performed by: PHYSICIAN ASSISTANT

## 2020-02-03 PROCEDURE — 86923 COMPATIBILITY TEST ELECTRIC: CPT

## 2020-02-03 PROCEDURE — 85027 COMPLETE CBC AUTOMATED: CPT

## 2020-02-03 PROCEDURE — 86901 BLOOD TYPING SEROLOGIC RH(D): CPT

## 2020-02-03 PROCEDURE — 1111F DSCHRG MED/CURRENT MED MERGE: CPT | Performed by: PHYSICIAN ASSISTANT

## 2020-02-03 PROCEDURE — 36591 DRAW BLOOD OFF VENOUS DEVICE: CPT

## 2020-02-03 PROCEDURE — 1090F PRES/ABSN URINE INCON ASSESS: CPT | Performed by: PHYSICIAN ASSISTANT

## 2020-02-03 PROCEDURE — 2580000003 HC RX 258: Performed by: INTERNAL MEDICINE

## 2020-02-03 PROCEDURE — 99214 OFFICE O/P EST MOD 30 MIN: CPT | Performed by: PHYSICIAN ASSISTANT

## 2020-02-03 PROCEDURE — 36430 TRANSFUSION BLD/BLD COMPNT: CPT

## 2020-02-03 RX ORDER — 0.9 % SODIUM CHLORIDE 0.9 %
250 INTRAVENOUS SOLUTION INTRAVENOUS ONCE
Status: CANCELLED
Start: 2020-02-03

## 2020-02-03 RX ORDER — SODIUM CHLORIDE 0.9 % (FLUSH) 0.9 %
20 SYRINGE (ML) INJECTION PRN
Status: DISCONTINUED | OUTPATIENT
Start: 2020-02-03 | End: 2020-02-04 | Stop reason: HOSPADM

## 2020-02-03 RX ORDER — FERROUS SULFATE 325(65) MG
325 TABLET ORAL
Qty: 30 TABLET | Refills: 2 | Status: SHIPPED | OUTPATIENT
Start: 2020-02-03 | End: 2020-05-18 | Stop reason: SDUPTHER

## 2020-02-03 RX ORDER — SODIUM CHLORIDE 9 MG/ML
INJECTION, SOLUTION INTRAVENOUS CONTINUOUS
Status: CANCELLED | OUTPATIENT
Start: 2020-02-03

## 2020-02-03 RX ORDER — SODIUM CHLORIDE 0.9 % (FLUSH) 0.9 %
20 SYRINGE (ML) INJECTION PRN
Status: CANCELLED | OUTPATIENT
Start: 2020-02-03

## 2020-02-03 RX ORDER — 0.9 % SODIUM CHLORIDE 0.9 %
250 INTRAVENOUS SOLUTION INTRAVENOUS ONCE
Status: COMPLETED | OUTPATIENT
Start: 2020-02-03 | End: 2020-02-03

## 2020-02-03 RX ORDER — FUROSEMIDE 10 MG/ML
20 INJECTION INTRAMUSCULAR; INTRAVENOUS ONCE
Status: CANCELLED | OUTPATIENT
Start: 2020-02-03

## 2020-02-03 RX ORDER — DIPHENHYDRAMINE HYDROCHLORIDE 50 MG/ML
50 INJECTION INTRAMUSCULAR; INTRAVENOUS ONCE
Status: CANCELLED | OUTPATIENT
Start: 2020-02-03

## 2020-02-03 RX ORDER — 0.9 % SODIUM CHLORIDE 0.9 %
250 INTRAVENOUS SOLUTION INTRAVENOUS ONCE
Status: CANCELLED | OUTPATIENT
Start: 2020-02-03

## 2020-02-03 RX ORDER — FUROSEMIDE 10 MG/ML
20 INJECTION INTRAMUSCULAR; INTRAVENOUS ONCE
Status: COMPLETED | OUTPATIENT
Start: 2020-02-03 | End: 2020-02-03

## 2020-02-03 RX ORDER — HEPARIN SODIUM (PORCINE) LOCK FLUSH IV SOLN 100 UNIT/ML 100 UNIT/ML
500 SOLUTION INTRAVENOUS PRN
Status: CANCELLED | OUTPATIENT
Start: 2020-02-03

## 2020-02-03 RX ORDER — METHYLPREDNISOLONE SODIUM SUCCINATE 125 MG/2ML
125 INJECTION, POWDER, LYOPHILIZED, FOR SOLUTION INTRAMUSCULAR; INTRAVENOUS ONCE
Status: CANCELLED | OUTPATIENT
Start: 2020-02-03

## 2020-02-03 RX ORDER — SODIUM CHLORIDE 0.9 % (FLUSH) 0.9 %
10 SYRINGE (ML) INJECTION PRN
Status: CANCELLED | OUTPATIENT
Start: 2020-02-03

## 2020-02-03 RX ORDER — SODIUM CHLORIDE 0.9 % (FLUSH) 0.9 %
10 SYRINGE (ML) INJECTION PRN
Status: DISCONTINUED | OUTPATIENT
Start: 2020-02-03 | End: 2020-02-04 | Stop reason: HOSPADM

## 2020-02-03 RX ORDER — HYDROCODONE BITARTRATE AND ACETAMINOPHEN 5; 325 MG/1; MG/1
1 TABLET ORAL
COMMUNITY
End: 2020-04-10 | Stop reason: SDUPTHER

## 2020-02-03 RX ORDER — HEPARIN SODIUM (PORCINE) LOCK FLUSH IV SOLN 100 UNIT/ML 100 UNIT/ML
500 SOLUTION INTRAVENOUS PRN
Status: DISCONTINUED | OUTPATIENT
Start: 2020-02-03 | End: 2020-02-04 | Stop reason: HOSPADM

## 2020-02-03 RX ADMIN — SODIUM CHLORIDE 250 ML: 9 INJECTION, SOLUTION INTRAVENOUS at 14:24

## 2020-02-03 RX ADMIN — Medication 10 ML: at 11:00

## 2020-02-03 RX ADMIN — Medication 20 ML: at 13:06

## 2020-02-03 RX ADMIN — FUROSEMIDE 20 MG: 40 INJECTION, SOLUTION INTRAMUSCULAR; INTRAVENOUS at 16:48

## 2020-02-03 RX ADMIN — Medication 10 ML: at 16:50

## 2020-02-03 RX ADMIN — Medication 10 ML: at 13:05

## 2020-02-03 RX ADMIN — Medication 20 ML: at 11:01

## 2020-02-03 RX ADMIN — HEPARIN SODIUM (PORCINE) LOCK FLUSH IV SOLN 100 UNIT/ML 500 UNITS: 100 SOLUTION at 16:50

## 2020-02-03 NOTE — PLAN OF CARE
Problem: Intellectual/Education/Knowledge Deficit  Goal: Teaching initiated upon admission  Outcome: Met This Shift  Note:   Patient verbalizes understanding to verbal information given on 1-unit blood transfusion,action and possible side effects. Aware to call MD if develop complications. Intervention: Verbal/written education provided  Note:   Patient educated blood product transfusion protocol:    Patient receiving 1-unit prbcs:      - Blood product transfusion information sheet given: questions answered and consent signed  - Take vital signs/ monitor lungs sound prior to transfusion  - Monitor patient for 15 minutes after transfusion started  - Take vital signs / monitor lungs sound in 15 minutes and post transfusion  - Assess IV site   - Monitor patient closely for potential transfusion reaction    Call MD if develop complications once discharged. Problem: Discharge Planning  Goal: Knowledge of discharge instructions  Description  Knowledge of discharge instructions     Outcome: Met This Shift  Note:   Verbalize understanding of discharge instructions, follow up appointments, and when to call Physician. Intervention: Discharge to appropriate level of care  Note:   Provide discharge instructions. Problem: Falls - Risk of:  Goal: Will remain free from falls  Description  Will remain free from falls  Outcome: Met This Shift  Note:   Free from falls while in O.P. Oncology. Intervention: Assess risk factors for falls  Note:   Discussed the need to use the call light for assistance when getting up to ambulate. Call light within reach. Patient uses a cane and wheelchair to assist with ambulation. Problem: Infection - Central Venous Catheter-Associated Bloodstream Infection:  Goal: Will show no infection signs and symptoms  Description  Will show no infection signs and symptoms  Outcome: Met This Shift  Note:   Mediport site with no redness or warmth.  Skin over port intact with no signs of breakdown noted. Patient verbalizes signs/symptoms of port infection and when to notify the physician. Intervention: Infection risk assessment  Note:   Discussed mediport maintenance, infection prevention, and when to call the physician. Labs drawn. Care plan reviewed with patient and family. Patient and family verbalize understanding of the plan of care and contribute to goal setting.

## 2020-02-03 NOTE — PROGRESS NOTES
[facility], [address] [facility] [address]  [address] [address]  Dept: [phone]  Dept Fax: [phone]: [phone]      Visit Date:2/3/2020     Keysha Oliver is a 76 y.o. female who presents today for:   Chief Complaint   Patient presents with   [address] [place] [address]        HPI:   Keysha Oliver is a 76 y.o. female followed by Dr. Mariel Card for metastatic lung cancer. Per Dr. Tee Contreras note on 12/23/19: She was diagnosed with  stage 2A non-small-cell lung cancer in 2012. On October 22, 2012 she underwent right lower lobe lung, lobectomy. Final pathology report showed:  A - Right lower lobe lung, lobectomy:      Moderately differentiated adenocarcinoma, mixed subtype      (predominantly papillary), pT2b      Emphysematous changes in non-neoplastic lung parenchyma.      Margins negative for malignancy. B - Peribronchial lymph node, excision:   One lymph node with caseous necrosis/calcification  negative for malignancy, pN0. It doesn't look like the patient had any mediastinal lymph node evaluation.    She received adjuvant chemotherapy with carboplatin and gemcitabine, completed on 02/04/2013. According to Dr. Tigre Karimi note CT chest on 02/18/2013 showed a soft tissue density in the middle and posterior mediastinum. PET on 02/28/2013  showed avid mediastinal right infrahilar adenopathy, highly suspicious for metastatic disease. Due to disease progression she underwent chemotherapy with carboplatin and Taxol and concurrent radiation therapy that she started in 03/2013, completed radiation on 05/14/2013 and chemotherapy in 08/2013.  The PET scan done on 09/09/2013 showed postradiation changes only. She then was diagnosed with a new stage 1A left lung cancer in 10/2016.  CT on 09/19/2016 showed a left apical lung nodule that measured 10 mm, evaluated by PET that showed an uptake of 5.2, but no other areas were seen.  CT-guided biopsy 10/31/2016 showed poorly effusion. Cytology of the fluid was negative for malignant cells  The restaging studies after cycle #10 of Tecentriq showed stable disease and new pulmonary embolus at the distal right pulmonary artery for which the patient was placed on Eliquis. Interval History 2/3/2020: The patient is here for follow up evaluation post hospitalization and possible next dose of immunotherapy with Tecentriq. Her last dose of Tecentriq was on 12/23/20. The patient was hospitalized at [address] [place] [address] from 12/30/19 to 1/26/20. Patient experienced a fall after tripping over her cat and suffered subcapital right femoral neck fracture. She was admitted and underwent hemiarthroplasty of the right hip on 1/1/2020 by Dr. Fifi Lynn. Postoperatively she did require ICU stay due to acute respiratory failure requiring intubation. She was discharged to TCU on 1/6/20 to 1/26/20. Prior to discharge, the patient underwent bronchoscopy on 1/24/20 per Dr. Chelsey Keller as CT of the chest during admission showed worsening of the right lung volume loss with extensive right lung airspace consolidation of uncertain etiology. Bronchoscopy was performed to rule out mucous plugs versus endobronchial lesions. Bronchoscopy revealed normal mucosa of the trachea, pedro, right main bronchus, right upper lobe bronchus. In the right bronchus intermedius old surgical staple noted protruding into the lumen. She was noted to have a irregular mucus over the right lower lobe stump. Mucous plugs were noted which were removed with suctioning. The left main bronchus had normal mucosa with mucous plugs removed by suctioning, as well as the left upper lobe bronchus in the left lower lobe bronchus with mucous plugs removed by suctioning. Cytology was negative for evidence of malignancy. Today, the patient is here with her niece. She states since discharge from the hospital she is feeling better. She states her shortness of breath and cough are at her baseline. Former Smoker     Packs/day: 1.00     Years: 45.00     Pack years: 45.00     Types: Cigarettes     Last attempt to quit: 2007     Years since quittin.0    Smokeless tobacco: Never Used   Substance Use Topics    Alcohol use: No      Current Outpatient Medications   Medication Sig Dispense Refill    HYDROcodone-acetaminophen (NORCO) 5-325 MG per tablet Take 1 tablet by mouth every 4-6 hours as needed for Pain (1-2 tabs).       rivaroxaban (XARELTO) 20 MG TABS tablet Take 1 tablet by mouth daily Additional RF per the PCP 30 tablet 0    metoprolol tartrate (LOPRESSOR) 25 MG tablet Take 0.5 tablets by mouth 2 times daily      levothyroxine (SYNTHROID) 75 MCG tablet Take 1 tablet by mouth daily Additional RF per her PCP 30 tablet 0    Vitamin D 400 UNIT TABS tablet Take 12.5 tablets by mouth daily      diphenhydrAMINE HCl (BENADRYL PO) Take 25 mg by mouth daily       ondansetron (ZOFRAN) 8 MG tablet Take 1 tablet by mouth every 8 hours as needed for Nausea or Vomiting 20 tablet 1    digoxin (LANOXIN) 125 MCG tablet Take 1 tablet by mouth daily 30 tablet 3    promethazine (PHENERGAN) 25 MG tablet Take 1 tablet by mouth every 8 hours as needed for Nausea 30 tablet 1    tiotropium (SPIRIVA) 18 MCG inhalation capsule Inhale 18 mcg into the lungs daily      omeprazole (PRILOSEC) 40 MG delayed release capsule Take 40 mg by mouth daily      Probiotic Product (PROBIOTIC DAILY PO) Take 1 capsule by mouth 3 times daily      albuterol sulfate HFA (PROAIR HFA) 108 (90 Base) MCG/ACT inhaler Inhale 2 puffs into the lungs every 6 hours as needed for Wheezing 1 Inhaler 5    albuterol (PROVENTIL) (2.5 MG/3ML) 0.083% nebulizer solution Take 3 mLs by nebulization every 6 hours as needed for Wheezing or Shortness of Breath 360 vial 3    ferrous sulfate 325 (65 Fe) MG tablet One every other day take with food and vitamin C 500 mg (Patient taking differently: daily (with breakfast) One every other day take with food.) 30 tablet 3    lidocaine-prilocaine (EMLA) 2.5-2.5 % cream Apply topically as needed for Pain Apply topically as needed.  mometasone-formoterol (DULERA) 200-5 MCG/ACT inhaler Inhale 2 puffs into the lungs 2 times daily 3 Inhaler 3    fluticasone (FLONASE) 50 MCG/ACT nasal spray 1 spray by Nasal route daily 1 Bottle 5    betamethasone dipropionate (DIPROLENE) 0.05 % ointment Apply topically daily. 50 g 5    loperamide (IMODIUM) 2 MG capsule Take 2 capsules by mouth 4 times daily as needed for Diarrhea (Patient not taking: Reported on 2/3/2020)      promethazine (PHENERGAN) 25 MG tablet Take 1 tablet by mouth every 8 hours as needed for Nausea 30 tablet 1    rOPINIRole (REQUIP) 0.25 MG tablet Take 1 tablet by mouth 2 times daily 60 tablet 1    Handicap Placard MISC by Does not apply route Diagnosis: Malignant neoplasm of lung, unspecified laterality, unspecified part of lung ([facility] [address] [C34.90]  Expiration Date: 4/15/2020 1 each 0     No current facility-administered medications for this visit.       Facility-Administered Medications Ordered in Other Visits   Medication Dose Route Frequency Provider Last Rate Last Dose    sodium chloride flush 0.9 % injection 10 mL  10 mL Intravenous PRN Ai Russell MD   10 mL at 02/03/20 1100    sodium chloride flush 0.9 % injection 20 mL  20 mL Intravenous PRN Ai Russell MD   20 mL at 02/03/20 1101    heparin flush 100 UNIT/ML injection 500 Units  500 Units Intracatheter PRN Ai Russell MD          Allergies   Allergen Reactions    Advil Cold & Sinus Liqui-Gels [Pseudoephedrine-Ibuprofen] Anaphylaxis    Food Anaphylaxis     mushrooms    Percocet [Oxycodone-Acetaminophen] Nausea Only    Sulfa Antibiotics Hives      Health Maintenance   Topic Date Due    Hepatitis C screen  1945    DTaP/Tdap/Td vaccine (1 - Tdap) 01/07/1956    Shingles Vaccine (1 of 2) 01/07/1995    Colon cancer screen colonoscopy  01/07/1995    DEXA (modify frequency per FRAX score)  01/07/2010 BILITOT 0.4 01/16/2020         Assessment and Plan:   1. Metastatic Lung Cancer   Treated with Taxotere followed by Srinivasa Ocasio. 2. Palliative Immunotherapy with Tecentriq  Her last dose of Tecentriq was on 12/23/19. She had prolonged hospital stay from 12/30/19 to 1/26/20 following right hip fracture. She underwent hip arthroplasty and TCU admission. During stay required intubation for acute respiratory failure. On 1/24/20 she underwent bronchoscopy by Dr. Rozina Koenig which cytology was negative for malignancy. She did have mucus plugs removed with suctioning. Today, the patient states she is feeling better since discharge from hospital. She admits fatigue. Hgb noted to be 7.2.   -Will give 1 unit of PRBCs today   -Will hold Tecentriq today due to Hgb 7.2 and need for blood transfusion.   -Return to clinic on 2/7/20 for evaluation with Dr. Alejandrina Duarte and to resume Tecentriq  3. History of Pericardial Effusion - S/P pericardiocentesis July 2019. CT chest 1/19/2020 no reaccumulation of fluid. 4. Acute on Chronic Normocytic Anemia  Patient denies any abnormal bleeding. Last blood transfusion 1/1/20 when post-op from hip surgery. Will give 1 unit of PRBCs today  5. History of PE - Continue Eliquis   6. Immunotherapy Induced Hypothyroidism - continue Synthroid. Monitor TSH    Follow up with Dr. Alejandrina Duarte 2/7/20 and for possible Tecentriq. Labs on RTC: CBC, BMP, LFT, TSH        All patient questions answered. Pt voiced understanding. Patient agreed with treatment plan. Follow up as directed. Patient instructed to call for questions or concerns.      Electronically signed by   Linden Barone PA-C

## 2020-02-03 NOTE — PROGRESS NOTES
Labs drawn via central venous catheter, then patient escorted to exam room per wheelchair with O2 and niece accompanied by [place]

## 2020-02-03 NOTE — PROGRESS NOTES
Patient assessed for the following post blood transfusion:    Dizziness   No  Lightheadedness  No     Acute nausea/vomiting No  Headache   No  Chest pain/pressure  No  Rash/itching   No  Shortness of breath             No        Patient tolerated 1-unit prbcs  without any complications. Labs drawn per mediport. O2 @ 2L/NC maintained as @ home. Last vital signs:   BP (!) 147/67   Pulse 97   Temp 98.7 °F (37.1 °C) (Oral)   Resp 18 Comment: lungs clear  Ht 5' (1.524 m)   Wt 140 lb 12.8 oz (63.9 kg)   SpO2 93% Comment: O2 @ 2L/NC   BMI 27.50 kg/m²     Patient instructed if experience any of the above symptoms following today's infusion,he/she is to notify MD immediately or go to the emergency department. Discharge instructions given to patient. Verbalizes understanding. Ambulated off unit per wheelchair with family with belongings. Nurse to assist patient to car upon discharge.

## 2020-02-03 NOTE — PATIENT INSTRUCTIONS
1. Will give 1 unit of PRBCs today. 2. Return to clinic with Dr. Gaviota Hernandez on 2/7/20 and for possible treatment. 3. Labs on RTC: CBC, BMP, LFT  4. Please call for questions or concerns. 5. Patient instructed to monitor blood pressure daily and keep journal of BP. Bring to appointment on Friday.

## 2020-02-05 RX ORDER — ALBUTEROL SULFATE 90 UG/1
2 AEROSOL, METERED RESPIRATORY (INHALATION) EVERY 6 HOURS PRN
Qty: 3 INHALER | Refills: 3 | Status: SHIPPED | OUTPATIENT
Start: 2020-02-05
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
## 2020-02-07 ENCOUNTER — HOSPITAL ENCOUNTER (OUTPATIENT)
Dept: INFUSION THERAPY | Age: 75
Discharge: HOME | End: 2020-02-07
Payer: MEDICARE

## 2020-02-07 ENCOUNTER — OFFICE VISIT (OUTPATIENT)
Dept: ONCOLOGY | Age: 75
End: 2020-02-07
Payer: MEDICARE

## 2020-02-07 VITALS
DIASTOLIC BLOOD PRESSURE: 65 MMHG | OXYGEN SATURATION: 98 % | SYSTOLIC BLOOD PRESSURE: 139 MMHG | WEIGHT: 140 LBS | HEIGHT: 60 IN | TEMPERATURE: 99 F | BODY MASS INDEX: 27.48 KG/M2 | HEART RATE: 100 BPM | RESPIRATION RATE: 18 BRPM

## 2020-02-07 VITALS
BODY MASS INDEX: 27.5 KG/M2 | DIASTOLIC BLOOD PRESSURE: 62 MMHG | TEMPERATURE: 98.9 F | HEART RATE: 92 BPM | HEIGHT: 60 IN | RESPIRATION RATE: 18 BRPM | SYSTOLIC BLOOD PRESSURE: 129 MMHG | OXYGEN SATURATION: 97 %

## 2020-02-07 DIAGNOSIS — Z85.118 H/O: LUNG CANCER: ICD-10-CM

## 2020-02-07 DIAGNOSIS — C34.31 MALIGNANT NEOPLASM OF LOWER LOBE, RIGHT BRONCHUS OR LUNG (HCC): ICD-10-CM

## 2020-02-07 DIAGNOSIS — C34.90 RECURRENT NON-SMALL CELL LUNG CANCER (NSCLC) (HCC): ICD-10-CM

## 2020-02-07 DIAGNOSIS — C34.91 PRIMARY LUNG CANCER, RIGHT (HCC): ICD-10-CM

## 2020-02-07 DIAGNOSIS — C78.02 METASTATIC LUNG CARCINOMA, LEFT (HCC): ICD-10-CM

## 2020-02-07 DIAGNOSIS — R91.1 NODULE OF LEFT LUNG: ICD-10-CM

## 2020-02-07 DIAGNOSIS — C34.31 SQUAMOUS CELL CARCINOMA OF BRONCHUS IN RIGHT LOWER LOBE (HCC): Primary | ICD-10-CM

## 2020-02-07 DIAGNOSIS — D64.9 ANEMIA, UNSPECIFIED TYPE: ICD-10-CM

## 2020-02-07 DIAGNOSIS — J96.21 ACUTE ON CHRONIC RESPIRATORY FAILURE WITH HYPOXEMIA (HCC): ICD-10-CM

## 2020-02-07 DIAGNOSIS — J96.21 ACUTE ON CHRONIC RESPIRATORY FAILURE WITH HYPOXIA (HCC): ICD-10-CM

## 2020-02-07 DIAGNOSIS — J44.9 CHRONIC OBSTRUCTIVE PULMONARY DISEASE, UNSPECIFIED COPD TYPE (HCC): ICD-10-CM

## 2020-02-07 LAB
ALBUMIN SERPL-MCNC: 3.6 G/DL (ref 3.5–5.1)
ALP BLD-CCNC: 76 U/L (ref 38–126)
ALT SERPL-CCNC: 9 U/L (ref 11–66)
AST SERPL-CCNC: 13 U/L (ref 5–40)
BASINOPHIL, AUTOMATED: 0 % (ref 0–3)
BILIRUB SERPL-MCNC: 0.3 MG/DL (ref 0.3–1.2)
BILIRUBIN DIRECT: < 0.2 MG/DL (ref 0–0.3)
BUN BLDV-MCNC: 19 MG/DL (ref 7–22)
CHLORIDE, WHOLE BLOOD: 92 MEQ/L (ref 98–109)
CO2: 39 MEQ/L (ref 23–33)
CREATININE, WHOLE BLOOD: 0.8 MG/DL (ref 0.5–1.2)
EOSINOPHILS RELATIVE PERCENT: 2 % (ref 0–4)
GFR, ESTIMATED: 74 ML/MIN/1.73M2
GLUCOSE, WHOLE BLOOD: 102 MG/DL (ref 70–108)
HCT VFR BLD CALC: 27.4 % (ref 37–47)
HEMOGLOBIN: 8.9 GM/DL (ref 12–16)
IONIZED CALCIUM, WHOLE BLOOD: 1.13 MMOL/L (ref 1.12–1.32)
LYMPHOCYTES # BLD: 13 % (ref 15–47)
MCH RBC QN AUTO: 29.7 PG (ref 27–31)
MCHC RBC AUTO-ENTMCNC: 32.4 GM/DL (ref 33–37)
MCV RBC AUTO: 92 FL (ref 81–99)
MONOCYTES: 8 % (ref 0–12)
PDW BLD-RTO: 15.6 % (ref 11.5–14.5)
PLATELET # BLD: 182 THOU/MM3 (ref 130–400)
PMV BLD AUTO: 6.9 FL (ref 7.4–10.4)
POTASSIUM, WHOLE BLOOD: 3.8 MEQ/L (ref 3.5–4.9)
RBC # BLD: 2.99 MILL/MM3 (ref 4.2–5.4)
SEG NEUTROPHILS: 77 % (ref 43–75)
SODIUM, WHOLE BLOOD: 141 MEQ/L (ref 138–146)
TOTAL PROTEIN: 6.9 G/DL (ref 6.1–8)
WBC # BLD: 6.6 THOU/MM3 (ref 4.8–10.8)

## 2020-02-07 PROCEDURE — 80047 BASIC METABLC PNL IONIZED CA: CPT

## 2020-02-07 PROCEDURE — 84520 ASSAY OF UREA NITROGEN: CPT

## 2020-02-07 PROCEDURE — 3017F COLORECTAL CA SCREEN DOC REV: CPT | Performed by: INTERNAL MEDICINE

## 2020-02-07 PROCEDURE — 4040F PNEUMOC VAC/ADMIN/RCVD: CPT | Performed by: INTERNAL MEDICINE

## 2020-02-07 PROCEDURE — 85025 COMPLETE CBC W/AUTO DIFF WBC: CPT

## 2020-02-07 PROCEDURE — 1111F DSCHRG MED/CURRENT MED MERGE: CPT | Performed by: INTERNAL MEDICINE

## 2020-02-07 PROCEDURE — G8417 CALC BMI ABV UP PARAM F/U: HCPCS | Performed by: INTERNAL MEDICINE

## 2020-02-07 PROCEDURE — 82374 ASSAY BLOOD CARBON DIOXIDE: CPT

## 2020-02-07 PROCEDURE — 1123F ACP DISCUSS/DSCN MKR DOCD: CPT | Performed by: INTERNAL MEDICINE

## 2020-02-07 PROCEDURE — 1036F TOBACCO NON-USER: CPT | Performed by: INTERNAL MEDICINE

## 2020-02-07 PROCEDURE — 3023F SPIROM DOC REV: CPT | Performed by: INTERNAL MEDICINE

## 2020-02-07 PROCEDURE — 99215 OFFICE O/P EST HI 40 MIN: CPT | Performed by: INTERNAL MEDICINE

## 2020-02-07 PROCEDURE — 99211 OFF/OP EST MAY X REQ PHY/QHP: CPT

## 2020-02-07 PROCEDURE — G8926 SPIRO NO PERF OR DOC: HCPCS | Performed by: INTERNAL MEDICINE

## 2020-02-07 PROCEDURE — 36591 DRAW BLOOD OFF VENOUS DEVICE: CPT

## 2020-02-07 PROCEDURE — 96413 CHEMO IV INFUSION 1 HR: CPT

## 2020-02-07 PROCEDURE — 1090F PRES/ABSN URINE INCON ASSESS: CPT | Performed by: INTERNAL MEDICINE

## 2020-02-07 PROCEDURE — G8428 CUR MEDS NOT DOCUMENT: HCPCS | Performed by: INTERNAL MEDICINE

## 2020-02-07 PROCEDURE — G8400 PT W/DXA NO RESULTS DOC: HCPCS | Performed by: INTERNAL MEDICINE

## 2020-02-07 PROCEDURE — 6360000002 HC RX W HCPCS: Performed by: INTERNAL MEDICINE

## 2020-02-07 PROCEDURE — G8482 FLU IMMUNIZE ORDER/ADMIN: HCPCS | Performed by: INTERNAL MEDICINE

## 2020-02-07 PROCEDURE — 80076 HEPATIC FUNCTION PANEL: CPT

## 2020-02-07 PROCEDURE — 2580000003 HC RX 258: Performed by: INTERNAL MEDICINE

## 2020-02-07 RX ORDER — SODIUM CHLORIDE 0.9 % (FLUSH) 0.9 %
10 SYRINGE (ML) INJECTION PRN
Status: CANCELLED | OUTPATIENT
Start: 2020-02-07

## 2020-02-07 RX ORDER — MEPERIDINE HYDROCHLORIDE 50 MG/ML
12.5 INJECTION INTRAMUSCULAR; INTRAVENOUS; SUBCUTANEOUS ONCE
Status: CANCELLED | OUTPATIENT
Start: 2020-02-07

## 2020-02-07 RX ORDER — SODIUM CHLORIDE 0.9 % (FLUSH) 0.9 %
20 SYRINGE (ML) INJECTION PRN
Status: CANCELLED | OUTPATIENT
Start: 2020-02-07

## 2020-02-07 RX ORDER — SODIUM CHLORIDE 0.9 % (FLUSH) 0.9 %
20 SYRINGE (ML) INJECTION PRN
Status: DISCONTINUED | OUTPATIENT
Start: 2020-02-07 | End: 2020-02-08 | Stop reason: HOSPADM

## 2020-02-07 RX ORDER — SODIUM CHLORIDE 9 MG/ML
INJECTION, SOLUTION INTRAVENOUS ONCE
Status: COMPLETED | OUTPATIENT
Start: 2020-02-07 | End: 2020-02-07

## 2020-02-07 RX ORDER — METHYLPREDNISOLONE SODIUM SUCCINATE 125 MG/2ML
125 INJECTION, POWDER, LYOPHILIZED, FOR SOLUTION INTRAMUSCULAR; INTRAVENOUS ONCE
Status: CANCELLED | OUTPATIENT
Start: 2020-02-07

## 2020-02-07 RX ORDER — 0.9 % SODIUM CHLORIDE 0.9 %
10 VIAL (ML) INJECTION ONCE
Status: CANCELLED | OUTPATIENT
Start: 2020-02-07

## 2020-02-07 RX ORDER — SODIUM CHLORIDE 9 MG/ML
INJECTION, SOLUTION INTRAVENOUS CONTINUOUS
Status: CANCELLED | OUTPATIENT
Start: 2020-02-07

## 2020-02-07 RX ORDER — 0.9 % SODIUM CHLORIDE 0.9 %
250 INTRAVENOUS SOLUTION INTRAVENOUS ONCE
Status: CANCELLED
Start: 2020-02-07

## 2020-02-07 RX ORDER — SODIUM CHLORIDE 0.9 % (FLUSH) 0.9 %
5 SYRINGE (ML) INJECTION PRN
Status: CANCELLED | OUTPATIENT
Start: 2020-02-07

## 2020-02-07 RX ORDER — HEPARIN SODIUM (PORCINE) LOCK FLUSH IV SOLN 100 UNIT/ML 100 UNIT/ML
500 SOLUTION INTRAVENOUS PRN
Status: DISCONTINUED | OUTPATIENT
Start: 2020-02-07 | End: 2020-02-08 | Stop reason: HOSPADM

## 2020-02-07 RX ORDER — EPINEPHRINE 1 MG/ML
0.3 INJECTION, SOLUTION, CONCENTRATE INTRAVENOUS PRN
Status: CANCELLED | OUTPATIENT
Start: 2020-02-07

## 2020-02-07 RX ORDER — SODIUM CHLORIDE 0.9 % (FLUSH) 0.9 %
10 SYRINGE (ML) INJECTION PRN
Status: DISCONTINUED | OUTPATIENT
Start: 2020-02-07 | End: 2020-02-08 | Stop reason: HOSPADM

## 2020-02-07 RX ORDER — SODIUM CHLORIDE 9 MG/ML
INJECTION, SOLUTION INTRAVENOUS ONCE
Status: CANCELLED | OUTPATIENT
Start: 2020-02-07

## 2020-02-07 RX ORDER — DIPHENHYDRAMINE HYDROCHLORIDE 50 MG/ML
50 INJECTION INTRAMUSCULAR; INTRAVENOUS ONCE
Status: CANCELLED | OUTPATIENT
Start: 2020-02-07

## 2020-02-07 RX ORDER — HEPARIN SODIUM (PORCINE) LOCK FLUSH IV SOLN 100 UNIT/ML 100 UNIT/ML
500 SOLUTION INTRAVENOUS PRN
Status: CANCELLED | OUTPATIENT
Start: 2020-02-07

## 2020-02-07 RX ADMIN — SODIUM CHLORIDE: 9 INJECTION, SOLUTION INTRAVENOUS at 12:10

## 2020-02-07 RX ADMIN — Medication 20 ML: at 13:23

## 2020-02-07 RX ADMIN — HEPARIN SODIUM (PORCINE) LOCK FLUSH IV SOLN 100 UNIT/ML 500 UNITS: 100 SOLUTION at 13:23

## 2020-02-07 RX ADMIN — Medication 10 ML: at 10:45

## 2020-02-07 RX ADMIN — Medication 20 ML: at 10:46

## 2020-02-07 RX ADMIN — ATEZOLIZUMAB 1200 MG: 1200 INJECTION, SOLUTION INTRAVENOUS at 12:26

## 2020-02-07 ASSESSMENT — ENCOUNTER SYMPTOMS
DIARRHEA: 0
SHORTNESS OF BREATH: 1
NAUSEA: 0
ABDOMINAL DISTENTION: 0
ABDOMINAL PAIN: 0
RECTAL PAIN: 0
VOMITING: 0
TROUBLE SWALLOWING: 0
FACIAL SWELLING: 0
BACK PAIN: 0
EYE DISCHARGE: 0
COLOR CHANGE: 0
COUGH: 1
CONSTIPATION: 0
BLOOD IN STOOL: 0
SORE THROAT: 0
WHEEZING: 0
CHEST TIGHTNESS: 0

## 2020-02-07 NOTE — PROGRESS NOTES
Lab draw completed from [address] [facility] [address] [facility] on arrival to unit. Patient off the unit at this time to office appointment, accompanied by office staff and family, by wheelchair with oxygen.

## 2020-02-07 NOTE — PLAN OF CARE
Problem: Intellectual/Education/Knowledge Deficit  Goal: Teaching initiated upon admission  Outcome: Met This Shift  Note:   Patient verbalizes understanding to verbal information given on Tecentriq ,action and possible side effects. Aware to call MD if develop complications. Intervention: Verbal/written education provided  Note:   Chemotherapy Teaching     What is Chemotherapy   Drug action [x]   Method of Administration [x]   Handouts given []     Side Effects  Nausea/vomiting [x]   Diarrhea [x]   Fatigue [x]   Signs / Symptoms of infection [x]   Neutropenia [x]   Thrombocytopenia [x]   Alopecia [x]   neuropathy [x]   Rush diet &  the importance of fluids [x]       Micellaneous  Importance of nutrition [x]   Importance of oral hygiene [x]   When to call the MD [x]   Monitoring labs [x]   Use of supportive services []     Explanation of Drug Regimen / Frequency  Tecentriq:  [id number]     Comments  Verbalized understanding to drug,action,side effects and when to call MD         Problem: Discharge Planning  Goal: Knowledge of discharge instructions  Description  Knowledge of discharge instructions     Outcome: Met This Shift  Note:   Verbalized understanding of discharge instructions, follow-up appointments, and when to call the physician. Intervention: Discharge to appropriate level of care  Note:   Discuss understanding of discharge instructions,follow-up appointments, and when to call the physician. Problem: Falls - Risk of:  Goal: Will remain free from falls  Description  Will remain free from falls  Outcome: Met This Shift  Note:   Patient free from falls while in O.P. Oncology. Intervention: Assess risk factors for falls  Description  Assess risk factors for falls  Note:   Patient assessed for fall risk on admission to [address] [place] [address]. Fall band placed on patient. Discussed the need to use the call light for assistance prior to getting up out of chair/bed.       Problem: Infection - Central Venous Catheter-Associated Bloodstream Infection:  Goal: Will show no infection signs and symptoms  Description  Will show no infection signs and symptoms  Outcome: Met This Shift  Note:   Mediport site with no redness or warmth. Skin over port site intact with no signs of breakdown noted. Patient verbalizes signs/symptoms of port infection and when to notify the physician. Intervention: Infection risk assessment  Description  Infection risk assessment  Note:   Discuss port maintenance, infection prevention, signs and when to call Dr Patricio Douglas reviewed with patient and grand daughter. Patient and grand daughter verbalize understanding of the plan of care and contribute to goal setting.

## 2020-02-07 NOTE — PROGRESS NOTES
Patient assessed for the following post chemotherapy:    Dizziness   No  Lightheadedness  No      Acute nausea/vomiting No  Headache   No  Chest pain/pressure  No  Rash/itching   No  Shortness of breath  Yes, but not greater then base line. Patient kept for 20 minutes observation post infusion chemotherapy. Patient tolerated chemotherapy treatment Tecentriq without any complications. Last vital signs:   /65   Pulse 100   Temp 99 °F (37.2 °C) (Oral)   Resp 18   Ht 5' (1.524 m)   Wt 140 lb (63.5 kg)   SpO2 98%   BMI 27.34 kg/m²     Patient instructed if experience any of the above symptoms following today's infusion, she is to notify MD immediately or go to the emergency department. Discharge instructions given to patient. Verbalizes understanding. Ambulated off unit per self/cane, with belongings and cane, accompanied by grand daughter.

## 2020-02-07 NOTE — ONCOLOGY
Chemotherapy Administration    Pre-assessment Data: Antineoplastic Agents  Other:   See toxicity flow sheet for assessment [x]     Physician Notification of Concerns Related to Chemotherapy Administration:   Physician Notified Zahraa Lass / Time of Notification      Interventions:   Lab work assessed  [x]   Height / Weight verified for dose [x]   Current MAR reviewed [x]   Emergency drugs available as appropriate [x]   Anaphylaxis assessment completed [x]   Pre-medications administered as ordered [x]   Blood return noted upon initiation of chemotherapy [x]   Blood return noted each 1-2ml of a vesicant medication if given IV push []   Blood return noted each 2-3ml of a non-vesicant medication if given IV push []   Monitor for signs / symptoms of hypersensitivity reaction [x]   Chemotherapy orders (drug/dose/rate) verified by 2 Chemo certified RNs [x]   Monitor IV site and blood return throughout the infusion of the medication [x]   Document IV site checks on the IV assessment form [x]   Document chemotherapy teaching on the Patient Education tab [x]   Document patient verbalizes understanding of medications being administered [x]   If IV infiltration, see ONS Guidelines []   Other:     Tecentriq []

## 2020-02-07 NOTE — PROGRESS NOTES
Patient returned to the unit at this time from office appointment, accompanied by family and office staff, with oxygen.

## 2020-02-08 NOTE — PROGRESS NOTES
had restaging  CT chest  on 04/19/2018 that showed new irregular 10 x 19 mm left upper lobe nodule that increased in size since 10/2017. She started chemo with single agent Taxotere. After seven cycles Taxotere was discontinued due to side effects,  losing her toenails and her fingernails. Lung biopsy from 2016 was tested for PD-L1, it showed high expression, Tumor Proportion Score:  81-90%. KRAS was detected. EGFR not detected. Her treatment was changed to Tecentriq on 12/18/2018. She is a former smoker with severe COPD requiring home O2 supplementation by nasal cannula oxygen at 4 L. The patient was hospitalized from 02/23/2019 till 02/28/2018 for acute on chronic respiratory failure with hypoxemia and hypercapnia. CTA showed worsening right lung consolidation, differential diagnosis included community acquired pneumonia, atypical pulm edema, immunotherapy induced pneumonitis versus malignancy. Patient was on empiric antibiotic treatment for pneumonia. She was also placed on steroids for the possible drug induced pneumonitis. She had a thoracentesis of 300 ml which appeared to be a transudate. There were no malignant cells on cytology. Due to mental status changes she had Ct of the brain that showed  inconclusive findings. Brain MRI recommended, however the patient refused. She restarted Tecentriq on May 15, 2019. She was admitted to the hospital on July 18, 2019 for pericardial effusion requiring pericardiocentesis. Almost 700 mL of fluid was drained at the time of the procedure and in the 48 hours after that. Repeat echo after the drain was removed showed no accumulation of the pericardial effusion. Cytology of the fluid was negative for malignant cells  The restaging studies after cycle #10 of Tecentriq showed stable disease and new pulmonary embolus at the distal right pulmonary artery for which the patient was placed on Eliquis.       Interval history on 02/07/2020:  Last dose of Tecentriq at tract infection)       Past Surgical History           Procedure Laterality Date    BRONCHOSCOPY N/A 2020    BRONCHOSCOPY WITH FLURO performed by Aaron Benitez MD at [facility] 69  2020    BRONCHOSCOPY BRUSHINGS performed by Aaron Benitez MD at [address] [place] Right 2020    RIGHT HIP HEMIARTHROPLASTY performed by Maxim Terrell at [address] [place] [address] [id number]  10/19/2012    rt lower lobectomy     LUNG BIOPSY  2012       Allergies   Advil cold & sinus liqui-gels [pseudoephedrine-ibuprofen];  Food; Percocet [oxycodone-acetaminophen]; and Sulfa antibiotics  Social History     Social History     Socioeconomic History    Marital status:      Spouse name: Krutis Atkinson Number of children: 2    Years of education: Not on file    Highest education level: Not on file   Occupational History    Not on file   Social Needs    Financial resource strain: Not on file    Food insecurity:     Worry: Not on file     Inability: Not on file   DietBetter needs:     Medical: Not on file     Non-medical: Not on file   Tobacco Use    Smoking status: Former Smoker     Packs/day: 1.00     Years: 45.00     Pack years: 45.00     Types: Cigarettes     Last attempt to quit:      Years since quittin.1    Smokeless tobacco: Never Used   Substance and Sexual Activity    Alcohol use: No    Drug use: No    Sexual activity: Not on file   Lifestyle    Physical activity:     Days per week: Not on file     Minutes per session: Not on file    Stress: Not on file   Relationships    Social connections:     Talks on phone: Not on file     Gets together: Not on file     Attends Yazdanism service: Not on file     Active member of club or organization: Not on file     Attends meetings of clubs or organizations: Not on file     Relationship status: Not on file    Intimate partner violence:     Fear of current or ex partner: Not on file     Emotionally abused: Not on file Physically abused: Not on file     Forced sexual activity: Not on file   Other Topics Concern    Not on file   Social History Narrative    Not on file     Family History          Problem Relation Age of Onset    Cancer Mother     Heart Disease Father     High Blood Pressure Father     High Cholesterol Father     Arthritis Sister     Heart Disease Sister     Cancer Sister         lung cancer    Heart Disease Sister     Stroke Sister     High Cholesterol Sister     High Blood Pressure Sister     Stroke Paternal Grandfather     High Blood Pressure Brother      ROS     Review of Systems   Constitutional: Positive for activity change, appetite change, fatigue and fever. HENT: Negative for congestion, dental problem, facial swelling, hearing loss, mouth sores, nosebleeds, sore throat, tinnitus and trouble swallowing. Eyes: Negative for discharge and visual disturbance. Respiratory: Positive for cough and shortness of breath. Negative for chest tightness and wheezing. Cardiovascular: Positive for palpitations and leg swelling. Negative for chest pain. Gastrointestinal: Negative for abdominal distention, abdominal pain, blood in stool, constipation, diarrhea, nausea, rectal pain and vomiting. Endocrine: Negative for cold intolerance, polydipsia and polyuria. Genitourinary: Negative for decreased urine volume, difficulty urinating, dysuria, flank pain, hematuria and urgency. Musculoskeletal: Negative for arthralgias, back pain, gait problem, joint swelling, myalgias and neck stiffness. Skin: Negative for color change, rash and wound. Neurological: Positive for weakness. Negative for dizziness, tremors, seizures, speech difficulty, light-headedness, numbness and headaches. Hematological: Negative for adenopathy. Does not bruise/bleed easily. Psychiatric/Behavioral: Negative for confusion and sleep disturbance. The patient is not nervous/anxious.          Vitals     height is 5' (1.524 m). Her oral temperature is 98.9 °F (37.2 °C). Her blood pressure is 129/62 and her pulse is 92. Her respiration is 18 and oxygen saturation is 97%. Exam   Physical Exam  Vitals signs reviewed. Constitutional:       General: She is not in acute distress. Appearance: She is well-developed. HENT:      Head: Normocephalic. Mouth/Throat:      Pharynx: No oropharyngeal exudate. Eyes:      General: No scleral icterus. Right eye: No discharge. Left eye: No discharge. Pupils: Pupils are equal, round, and reactive to light. Neck:      Musculoskeletal: Normal range of motion and neck supple. Thyroid: No thyromegaly. Vascular: No JVD. Trachea: No tracheal deviation. Cardiovascular:      Rate and Rhythm: Normal rate. Heart sounds: Normal heart sounds. No murmur. No friction rub. No gallop. Pulmonary:      Effort: Tachypnea and respiratory distress present. Breath sounds: No stridor. Examination of the right-middle field reveals decreased breath sounds. Examination of the left-middle field reveals decreased breath sounds. Examination of the right-lower field reveals decreased breath sounds. Examination of the left-lower field reveals decreased breath sounds. Decreased breath sounds present. No wheezing or rales. Chest:      Chest wall: No tenderness. Abdominal:      General: Bowel sounds are normal. There is no distension. Palpations: Abdomen is soft. There is no mass. Tenderness: There is no abdominal tenderness. There is no rebound. Musculoskeletal: Normal range of motion. Comments: Good range of motion in all four extremities. Lymphadenopathy:      Cervical: No cervical adenopathy. Skin:     General: Skin is warm. Findings: No erythema or rash. Neurological:      Mental Status: She is alert and oriented to person, place, and time. Cranial Nerves: No cranial nerve deficit. Motor: No abnormal muscle tone. Deep Tendon Reflexes: Reflexes are normal and symmetric. Psychiatric:         Behavior: Behavior normal.         Thought Content: Thought content normal.         Judgment: Judgment normal.             Labs     Lab Results   Component Value Date    WBC 6.6 02/07/2020    HGB 8.9 (L) 02/07/2020    HCT 27.4 (L) 02/07/2020    MCV 92 02/07/2020     02/07/2020       Chemistry        Component Value Date/Time     02/07/2020 1045     01/26/2020 0530    K 3.8 02/07/2020 1045    K 4.4 01/26/2020 0530    K 3.8 10/15/2018 0415    CL 92 (L) 01/26/2020 0530    CO2 39 (H) 02/07/2020 1045    BUN 19 02/07/2020 1045    CREATININE 0.8 02/07/2020 1045    CREATININE 0.8 01/26/2020 0530        Component Value Date/Time    CALCIUM 8.8 01/26/2020 0530    ALKPHOS 76 02/07/2020 1045    AST 13 02/07/2020 1045    ALT 9 (L) 02/07/2020 1045    BILITOT 0.3 02/07/2020 1045              Radiology      PET scan on 04/23/2019 showed: Impression   Essentially stable examination. There is a fairly intense activity in the thyroid gland which may be related to thyroiditis. This can be a normal finding. Similar to the prior exam. Slightly greater amount of atelectatic lung currently with    minimal activity above background but similar to the prior exam. Interval resolution of previously seen left upper lobe hypermetabolic nodule. CT of the chest on November 4, 2019 showed:  1. There is a filling defect inserted within the distal right pulmonary artery which is most consistent with pulmonary embolism. This is of indeterminate chronicity.       2. Postsurgical changes from prior right lower lobectomy with associated volume loss is again seen. There is consolidative opacity at the posterior medial right lung base possibly representing postobstructive collapse. This appears slightly progressed    from the prior examination.  However there is a small stable area of lucent cavitary change at the posterior medial right lung base on axial image 34.       3. Pleural thickening with focal opacity abutting the posterior lateral right pleura is noted at the posterior lateral right lung base which appears similar in configuration to the prior examination.       4. Focal masslike opacity anteriorly within the left upper lobe abutting the pleura is slightly increased in size from the prior examination dated July 2019. This appears slightly increased in size from the prior examination. This is concerning for    progression of disease.       5. Focal irregular opacity with irregular margins demonstrated within the left upper lobe appears increased in size measuring 1.9 x 0.8 centimeters. This appears increased in size from the prior examination. This is concerning for progression of disease.       6. Further evaluation of the enlarging lesions may be obtained with PET/CT if clinically indicated. CT of the abdomen on November 4, 2019 showed:  1. No CT evidence of metastatic disease within the abdomen or pelvis. . Chronic changes as described above    CT of the chest on January 19, 2020 showed:  1. Further loss of volume right lung with extensive right lung airspace consolidation likely acute pneumonic infiltrates. 2. Diffuse interstitial edema left lung versus lymphangitic spread of metastases. Very Small left pleural effusion. 3. Minimal increase in sizes of the previously identified left lung masses. Assessment/ Plan     1. Metastatic left lung cancer. Treated with Taxotere followed by Tecentriq. 2.  Palliative immunotherapy with Tecentriq. She tolerated her last infusion  well. No diarrhea, no skin rash. She had a recent hospitalization for hip fracture. Hospitalization complicated by respiratory failure secondary to pneumonia exacerbation of CHF. Today she reports improved dyspnea on exertion.   Her vital signs are stable today, labs are within normal parameters for treatment ( with the exception of anemia), so we'll proceed

## 2020-02-24 LAB
FUNGUS IDENTIFIED: NORMAL
FUNGUS SMEAR: NORMAL

## 2020-02-28 ENCOUNTER — HOSPITAL ENCOUNTER (OUTPATIENT)
Dept: INFUSION THERAPY | Age: 75
Discharge: HOME | End: 2020-02-28
Payer: MEDICARE

## 2020-02-28 ENCOUNTER — TELEPHONE (OUTPATIENT)
Dept: PULMONOLOGY | Age: 75
End: 2020-02-28

## 2020-02-28 ENCOUNTER — TELEPHONE (OUTPATIENT)
Dept: ONCOLOGY | Age: 75
End: 2020-02-28

## 2020-02-28 ENCOUNTER — OFFICE VISIT (OUTPATIENT)
Dept: ONCOLOGY | Age: 75
End: 2020-02-28
Payer: MEDICARE

## 2020-02-28 VITALS
DIASTOLIC BLOOD PRESSURE: 53 MMHG | RESPIRATION RATE: 18 BRPM | WEIGHT: 134.4 LBS | BODY MASS INDEX: 26.39 KG/M2 | HEIGHT: 60 IN | HEART RATE: 91 BPM | TEMPERATURE: 97.6 F | SYSTOLIC BLOOD PRESSURE: 111 MMHG | OXYGEN SATURATION: 96 %

## 2020-02-28 VITALS
OXYGEN SATURATION: 95 % | WEIGHT: 134.4 LBS | TEMPERATURE: 97.3 F | BODY MASS INDEX: 26.25 KG/M2 | HEART RATE: 90 BPM | SYSTOLIC BLOOD PRESSURE: 125 MMHG | RESPIRATION RATE: 18 BRPM | DIASTOLIC BLOOD PRESSURE: 62 MMHG

## 2020-02-28 DIAGNOSIS — Z85.118 H/O: LUNG CANCER: ICD-10-CM

## 2020-02-28 DIAGNOSIS — J96.21 ACUTE ON CHRONIC RESPIRATORY FAILURE WITH HYPOXIA (HCC): ICD-10-CM

## 2020-02-28 DIAGNOSIS — E03.2 HYPOTHYROIDISM DUE TO MEDICATION: ICD-10-CM

## 2020-02-28 DIAGNOSIS — D64.9 ANEMIA, UNSPECIFIED TYPE: ICD-10-CM

## 2020-02-28 DIAGNOSIS — C78.02 METASTATIC LUNG CARCINOMA, LEFT (HCC): ICD-10-CM

## 2020-02-28 DIAGNOSIS — J44.9 CHRONIC OBSTRUCTIVE PULMONARY DISEASE, UNSPECIFIED COPD TYPE (HCC): ICD-10-CM

## 2020-02-28 DIAGNOSIS — R91.1 NODULE OF LEFT LUNG: ICD-10-CM

## 2020-02-28 DIAGNOSIS — D64.9 NORMOCYTIC ANEMIA: ICD-10-CM

## 2020-02-28 DIAGNOSIS — C34.91 PRIMARY LUNG CANCER, RIGHT (HCC): Primary | ICD-10-CM

## 2020-02-28 DIAGNOSIS — C34.31 MALIGNANT NEOPLASM OF LOWER LOBE, RIGHT BRONCHUS OR LUNG (HCC): ICD-10-CM

## 2020-02-28 DIAGNOSIS — C34.90 RECURRENT NON-SMALL CELL LUNG CANCER (NSCLC) (HCC): ICD-10-CM

## 2020-02-28 DIAGNOSIS — C34.31 SQUAMOUS CELL CARCINOMA OF BRONCHUS IN RIGHT LOWER LOBE (HCC): ICD-10-CM

## 2020-02-28 DIAGNOSIS — J96.21 ACUTE ON CHRONIC RESPIRATORY FAILURE WITH HYPOXEMIA (HCC): ICD-10-CM

## 2020-02-28 LAB
ALBUMIN SERPL-MCNC: 3.6 G/DL (ref 3.5–5.1)
ALP BLD-CCNC: 80 U/L (ref 38–126)
ALT SERPL-CCNC: 11 U/L (ref 11–66)
AST SERPL-CCNC: 17 U/L (ref 5–40)
BILIRUB SERPL-MCNC: < 0.2 MG/DL (ref 0.3–1.2)
BILIRUBIN DIRECT: < 0.2 MG/DL (ref 0–0.3)
BUN BLDV-MCNC: 27 MG/DL (ref 7–22)
CHLORIDE, WHOLE BLOOD: 98 MEQ/L (ref 98–109)
CO2: 34 MEQ/L (ref 23–33)
CREATININE, WHOLE BLOOD: 1 MG/DL (ref 0.5–1.2)
GFR, ESTIMATED: 57 ML/MIN/1.73M2
GLUCOSE, WHOLE BLOOD: 93 MG/DL (ref 70–108)
HCT VFR BLD CALC: 28.5 % (ref 37–47)
HEMOGLOBIN: 9.3 GM/DL (ref 12–16)
IONIZED CALCIUM, WHOLE BLOOD: 1.2 MMOL/L (ref 1.12–1.32)
MCH RBC QN AUTO: 29.9 PG (ref 27–31)
MCHC RBC AUTO-ENTMCNC: 32.6 GM/DL (ref 33–37)
MCV RBC AUTO: 92 FL (ref 81–99)
PDW BLD-RTO: 15.9 % (ref 11.5–14.5)
PLATELET # BLD: 162 THOU/MM3 (ref 130–400)
PMV BLD AUTO: 7.2 FL (ref 7.4–10.4)
POTASSIUM, WHOLE BLOOD: 4.3 MEQ/L (ref 3.5–4.9)
RBC # BLD: 3.11 MILL/MM3 (ref 4.2–5.4)
SEG NEUTROPHILS: 77 % (ref 43–75)
SEGMENTED NEUTROPHILS ABSOLUTE COUNT: 5.9 THOU/MM3 (ref 1.8–7.7)
SODIUM, WHOLE BLOOD: 144 MEQ/L (ref 138–146)
T4 FREE: 0.83 NG/DL (ref 0.93–1.76)
TOTAL PROTEIN: 7 G/DL (ref 6.1–8)
TSH SERPL DL<=0.05 MIU/L-ACNC: 38.27 UIU/ML (ref 0.4–4.2)
WBC # BLD: 7.6 THOU/MM3 (ref 4.8–10.8)

## 2020-02-28 PROCEDURE — 1036F TOBACCO NON-USER: CPT | Performed by: INTERNAL MEDICINE

## 2020-02-28 PROCEDURE — 3023F SPIROM DOC REV: CPT | Performed by: INTERNAL MEDICINE

## 2020-02-28 PROCEDURE — 80076 HEPATIC FUNCTION PANEL: CPT

## 2020-02-28 PROCEDURE — 84439 ASSAY OF FREE THYROXINE: CPT

## 2020-02-28 PROCEDURE — G8482 FLU IMMUNIZE ORDER/ADMIN: HCPCS | Performed by: INTERNAL MEDICINE

## 2020-02-28 PROCEDURE — 96413 CHEMO IV INFUSION 1 HR: CPT

## 2020-02-28 PROCEDURE — 3017F COLORECTAL CA SCREEN DOC REV: CPT | Performed by: INTERNAL MEDICINE

## 2020-02-28 PROCEDURE — G8417 CALC BMI ABV UP PARAM F/U: HCPCS | Performed by: INTERNAL MEDICINE

## 2020-02-28 PROCEDURE — 2580000003 HC RX 258: Performed by: INTERNAL MEDICINE

## 2020-02-28 PROCEDURE — 84520 ASSAY OF UREA NITROGEN: CPT

## 2020-02-28 PROCEDURE — G8427 DOCREV CUR MEDS BY ELIG CLIN: HCPCS | Performed by: INTERNAL MEDICINE

## 2020-02-28 PROCEDURE — 1123F ACP DISCUSS/DSCN MKR DOCD: CPT | Performed by: INTERNAL MEDICINE

## 2020-02-28 PROCEDURE — G8926 SPIRO NO PERF OR DOC: HCPCS | Performed by: INTERNAL MEDICINE

## 2020-02-28 PROCEDURE — 82374 ASSAY BLOOD CARBON DIOXIDE: CPT

## 2020-02-28 PROCEDURE — 84443 ASSAY THYROID STIM HORMONE: CPT

## 2020-02-28 PROCEDURE — G8400 PT W/DXA NO RESULTS DOC: HCPCS | Performed by: INTERNAL MEDICINE

## 2020-02-28 PROCEDURE — 99214 OFFICE O/P EST MOD 30 MIN: CPT | Performed by: INTERNAL MEDICINE

## 2020-02-28 PROCEDURE — 85027 COMPLETE CBC AUTOMATED: CPT

## 2020-02-28 PROCEDURE — 99211 OFF/OP EST MAY X REQ PHY/QHP: CPT

## 2020-02-28 PROCEDURE — 6360000002 HC RX W HCPCS: Performed by: INTERNAL MEDICINE

## 2020-02-28 PROCEDURE — 1090F PRES/ABSN URINE INCON ASSESS: CPT | Performed by: INTERNAL MEDICINE

## 2020-02-28 PROCEDURE — 36591 DRAW BLOOD OFF VENOUS DEVICE: CPT

## 2020-02-28 PROCEDURE — 80047 BASIC METABLC PNL IONIZED CA: CPT

## 2020-02-28 PROCEDURE — 4040F PNEUMOC VAC/ADMIN/RCVD: CPT | Performed by: INTERNAL MEDICINE

## 2020-02-28 RX ORDER — HEPARIN SODIUM (PORCINE) LOCK FLUSH IV SOLN 100 UNIT/ML 100 UNIT/ML
500 SOLUTION INTRAVENOUS PRN
Status: CANCELLED | OUTPATIENT
Start: 2020-02-28

## 2020-02-28 RX ORDER — SODIUM CHLORIDE 9 MG/ML
INJECTION, SOLUTION INTRAVENOUS CONTINUOUS
Status: CANCELLED | OUTPATIENT
Start: 2020-02-28

## 2020-02-28 RX ORDER — OMEPRAZOLE 40 MG/1
40 CAPSULE, DELAYED RELEASE ORAL DAILY
Qty: 30 CAPSULE | Refills: 1 | Status: SHIPPED | OUTPATIENT
Start: 2020-02-28

## 2020-02-28 RX ORDER — 0.9 % SODIUM CHLORIDE 0.9 %
10 VIAL (ML) INJECTION ONCE
Status: CANCELLED | OUTPATIENT
Start: 2020-02-28

## 2020-02-28 RX ORDER — SODIUM CHLORIDE 0.9 % (FLUSH) 0.9 %
10 SYRINGE (ML) INJECTION PRN
Status: CANCELLED | OUTPATIENT
Start: 2020-02-28

## 2020-02-28 RX ORDER — ONDANSETRON HYDROCHLORIDE 8 MG/1
8 TABLET, FILM COATED ORAL EVERY 8 HOURS PRN
Qty: 20 TABLET | Refills: 1 | Status: SHIPPED | OUTPATIENT
Start: 2020-02-28 | End: 2020-04-10 | Stop reason: SDUPTHER

## 2020-02-28 RX ORDER — SODIUM CHLORIDE 0.9 % (FLUSH) 0.9 %
10 SYRINGE (ML) INJECTION PRN
Status: DISCONTINUED | OUTPATIENT
Start: 2020-02-28 | End: 2020-02-29 | Stop reason: HOSPADM

## 2020-02-28 RX ORDER — MEPERIDINE HYDROCHLORIDE 25 MG/ML
12.5 INJECTION INTRAMUSCULAR; INTRAVENOUS; SUBCUTANEOUS ONCE
Status: CANCELLED | OUTPATIENT
Start: 2020-02-28

## 2020-02-28 RX ORDER — METHYLPREDNISOLONE SODIUM SUCCINATE 125 MG/2ML
125 INJECTION, POWDER, LYOPHILIZED, FOR SOLUTION INTRAMUSCULAR; INTRAVENOUS ONCE
Status: CANCELLED | OUTPATIENT
Start: 2020-02-28

## 2020-02-28 RX ORDER — SODIUM CHLORIDE 0.9 % (FLUSH) 0.9 %
20 SYRINGE (ML) INJECTION PRN
Status: DISCONTINUED | OUTPATIENT
Start: 2020-02-28 | End: 2020-02-29 | Stop reason: HOSPADM

## 2020-02-28 RX ORDER — DIGOXIN 125 MCG
125 TABLET ORAL DAILY
Qty: 30 TABLET | Refills: 3 | Status: SHIPPED | OUTPATIENT
Start: 2020-02-28 | End: 2020-04-10 | Stop reason: SDUPTHER

## 2020-02-28 RX ORDER — 0.9 % SODIUM CHLORIDE 0.9 %
250 INTRAVENOUS SOLUTION INTRAVENOUS ONCE
Status: CANCELLED
Start: 2020-02-28

## 2020-02-28 RX ORDER — SODIUM CHLORIDE 0.9 % (FLUSH) 0.9 %
20 SYRINGE (ML) INJECTION PRN
Status: CANCELLED | OUTPATIENT
Start: 2020-02-28

## 2020-02-28 RX ORDER — SODIUM CHLORIDE 9 MG/ML
INJECTION, SOLUTION INTRAVENOUS ONCE
Status: COMPLETED | OUTPATIENT
Start: 2020-02-28 | End: 2020-02-28

## 2020-02-28 RX ORDER — SODIUM CHLORIDE 0.9 % (FLUSH) 0.9 %
5 SYRINGE (ML) INJECTION PRN
Status: CANCELLED | OUTPATIENT
Start: 2020-02-28

## 2020-02-28 RX ORDER — HEPARIN SODIUM (PORCINE) LOCK FLUSH IV SOLN 100 UNIT/ML 100 UNIT/ML
500 SOLUTION INTRAVENOUS PRN
Status: DISCONTINUED | OUTPATIENT
Start: 2020-02-28 | End: 2020-02-29 | Stop reason: HOSPADM

## 2020-02-28 RX ORDER — DIPHENHYDRAMINE HYDROCHLORIDE 50 MG/ML
50 INJECTION INTRAMUSCULAR; INTRAVENOUS ONCE
Status: CANCELLED | OUTPATIENT
Start: 2020-02-28

## 2020-02-28 RX ADMIN — HEPARIN SODIUM (PORCINE) LOCK FLUSH IV SOLN 100 UNIT/ML 500 UNITS: 100 SOLUTION at 12:17

## 2020-02-28 RX ADMIN — SODIUM CHLORIDE: 9 INJECTION, SOLUTION INTRAVENOUS at 11:25

## 2020-02-28 RX ADMIN — Medication 10 ML: at 09:30

## 2020-02-28 RX ADMIN — Medication 20 ML: at 09:31

## 2020-02-28 RX ADMIN — ATEZOLIZUMAB 1200 MG: 1200 INJECTION, SOLUTION INTRAVENOUS at 11:34

## 2020-02-28 RX ADMIN — Medication 10 ML: at 11:25

## 2020-02-28 RX ADMIN — Medication 10 ML: at 12:17

## 2020-02-28 ASSESSMENT — ENCOUNTER SYMPTOMS
SORE THROAT: 0
BACK PAIN: 0
BLOOD IN STOOL: 0
CHEST TIGHTNESS: 0
FACIAL SWELLING: 0
CONSTIPATION: 0
RECTAL PAIN: 0
ABDOMINAL PAIN: 0
COUGH: 0
TROUBLE SWALLOWING: 0
ABDOMINAL DISTENTION: 0
VOMITING: 0
EYE DISCHARGE: 0
SHORTNESS OF BREATH: 1
COLOR CHANGE: 0
DIARRHEA: 0
WHEEZING: 0
NAUSEA: 0

## 2020-02-28 ASSESSMENT — PAIN DESCRIPTION - ORIENTATION: ORIENTATION: LOWER

## 2020-02-28 ASSESSMENT — PAIN DESCRIPTION - ONSET: ONSET: ON-GOING

## 2020-02-28 ASSESSMENT — PAIN DESCRIPTION - DESCRIPTORS: DESCRIPTORS: DULL;ACHING

## 2020-02-28 ASSESSMENT — PAIN DESCRIPTION - FREQUENCY: FREQUENCY: CONTINUOUS

## 2020-02-28 ASSESSMENT — PAIN DESCRIPTION - PAIN TYPE: TYPE: CHRONIC PAIN

## 2020-02-28 ASSESSMENT — PAIN DESCRIPTION - LOCATION: LOCATION: BACK

## 2020-02-28 ASSESSMENT — PAIN DESCRIPTION - PROGRESSION: CLINICAL_PROGRESSION: NOT CHANGED

## 2020-02-28 NOTE — PLAN OF CARE
Problem: Pain:  Description  Pain management should include both nonpharmacologic and pharmacologic interventions. Goal: Control of chronic pain  Description  Control of chronic pain  Outcome: Met This Shift  Note:   Patient rating pain a 7 out of 10 using SIMI scale, Pain located in  back. Intervention: Promote participation in pain management plan  Description  Promote participation in pain management plan - REMINDER(s):  Involve patient/family in pain management plan. Update patient/family of any treatment changes. Note:   Patient encouraged to take prescribed pain medications and call Physician if pain is not controlled. Problem: Intellectual/Education/Knowledge Deficit  Goal: Teaching initiated upon admission  Outcome: Met This Shift  Note:   Patient verbalizes understanding to verbal information given on Tecentriq,action and possible side effects. Aware to call MD if develop complications. Intervention: Verbal/written education provided  Note:   Chemotherapy Teaching     What is Chemotherapy   Drug action [x]   Method of Administration [x]   Handouts given []     Side Effects  Nausea/vomiting [x]   Diarrhea [x]   Fatigue [x]   Signs / Symptoms of infection [x]   Neutropenia [x]   Thrombocytopenia [x]   Alopecia [x]   neuropathy [x]   Crawford diet &  the importance of fluids [x]       Micellaneous  Importance of nutrition [x]   Importance of oral hygiene [x]   When to call the MD [x]   Monitoring labs [x]   Use of supportive services []     Explanation of Drug Regimen / Frequency  Tecentriq [id number]     Comments  Verbalized understanding to drug,action,side effects and when to call MD         Problem: Discharge Planning  Goal: Knowledge of discharge instructions  Description  Knowledge of discharge instructions     Outcome: Met This Shift  Note:   Verbalized understanding of discharge instructions, follow-up appointments, and when to call the physician.    Intervention: Discharge to appropriate level of care  Note:   Discuss understanding of discharge instructions,follow-up appointments, and when to call the physician. Problem: Falls - Risk of:  Goal: Will remain free from falls  Description  Will remain free from falls  Outcome: Met This Shift  Note:   No falls occurred with visit today. Intervention: Assess risk factors for falls  Description  Assess risk factors for falls  Note:   Verbalized understanding of fall prevention to ask for assistance with ambulation. Call light within reach. Problem: Infection - Central Venous Catheter-Associated Bloodstream Infection:  Goal: Will show no infection signs and symptoms  Description  Will show no infection signs and symptoms  Outcome: Met This Shift  Note:   Mediport site with no redness or warmth. Skin over port site intact with no signs of breakdown noted. Patient verbalizes signs/symptoms of port infection and when to notify the physician. Intervention: Infection risk assessment  Description  Infection risk assessment  Note:   Instructed to monitor for signs/symptoms of infection at [facility] and call MD if problems develop. Care plan reviewed with patient and family. Patient and family verbalize understanding of the plan of care and contribute to goal setting.

## 2020-02-28 NOTE — PROGRESS NOTES
Patient assessed for the following post chemotherapy:    Dizziness   No  Lightheadedness  No      Acute nausea/vomiting No  Headache   No  Chest pain/pressure  No  Rash/itching   No  Shortness of breath  No    Patient opted to not stay for 20 minutes observation post infusion chemotherapy. Patient tolerated chemotherapy treatment tecentriq without any complications. Last vital signs:   /62   Pulse 90   Temp 97.3 °F (36.3 °C) (Oral)   Resp 18   Wt 134 lb 6.4 oz (61 kg)   SpO2 95%   BMI 26.25 kg/m²         Patient instructed if experience any of the above symptoms following today's infusion,he/she is to notify MD immediately or go to the emergency department. Discharge instructions given to patient. Verbalizes understanding. Ambulated off unit per self with belongings.

## 2020-02-28 NOTE — ONCOLOGY
Chemotherapy Administration    Pre-assessment Data: Antineoplastic Agents  Other:   See toxicity flow sheet for assessment [x]     Physician Notification of Concerns Related to Chemotherapy Administration:   Physician Notified Fan Chase / Time of Notification      Interventions:   Lab work assessed  [x]   Height / Weight verified for dose [x]   Current MAR reviewed [x]   Emergency drugs available as appropriate [x]   Anaphylaxis assessment completed [x]   Pre-medications administered as ordered [x]   Blood return noted upon initiation of chemotherapy [x]   Blood return noted each 1-2ml of a vesicant medication if given IV push []   Blood return noted each 2-3ml of a non-vesicant medication if given IV push []   Monitor for signs / symptoms of hypersensitivity reaction [x]   Chemotherapy orders (drug/dose/rate) verified by 2 Chemo certified RNs [x]   Monitor IV site and blood return throughout the infusion of the medication [x]   Document IV site checks on the IV assessment form [x]   Document chemotherapy teaching on the Patient Education tab [x]   Document patient verbalizes understanding of medications being administered [x]   If IV infiltration, see ONS Guidelines []   Other:      []

## 2020-02-28 NOTE — TELEPHONE ENCOUNTER
Patient assistance called and asked if you could send rx's for Pikk 20 to [address] [facility]. Patient probably just needs 1 month until they can get her assistance.

## 2020-02-28 NOTE — PROGRESS NOTES
Lab specimen obtained from [facility] without any problems. Transported off unit in wheelchair to examination room for appointment with buster [facility] escorted by Melody Becerra.

## 2020-02-28 NOTE — ONCOLOGY
Patient assessed for the following post chemotherapy:    Dizziness   No  Lightheadedness  No      Acute nausea/vomiting No  Headache   No  Chest pain/pressure  No  Rash/itching   No  Shortness of breath  No    Patient kept for 20 minutes observation post infusion chemotherapy. Patient tolerated chemotherapy treatment Tecentriq without any complications. Last vital signs:   /62   Pulse 90   Temp 97.3 °F (36.3 °C) (Oral)   Resp 18   Wt 134 lb 6.4 oz (61 kg)   SpO2 95%   BMI 26.25 kg/m²         Patient instructed if experience any of the above symptoms following today's infusion,he/she is to notify MD immediately or go to the emergency department. Discharge instructions given to patient. Verbalizes understanding. Ambulated off unit per self with belongings.

## 2020-02-29 NOTE — PROGRESS NOTES
[facility]    Reason for Clinic visit:      Metastatic left lung cancer    HPI   Keysha Oliver is a 76 y.o. female with metastatic NSCLC. She was diagnosed with  stage 2A non-small-cell lung cancer in 2012. On October 22, 2012 she underwent right lower lobe lung, lobectomy. Final pathology report showed:  A - Right lower lobe lung, lobectomy:      Moderately differentiated adenocarcinoma, mixed subtype      (predominantly papillary), pT2b      Emphysematous changes in non-neoplastic lung parenchyma.      Margins negative for malignancy. B - Peribronchial lymph node, excision:   One lymph node with caseous necrosis/calcification  negative for malignancy, pN0. It doesn't look like the patient had any mediastinal lymph node evaluation. She received adjuvant chemotherapy with carboplatin and gemcitabine, completed on 02/04/2013. According to Dr. Tigre Karimi note CT chest on 02/18/2013 showed a soft tissue density in the middle and posterior mediastinum. PET on 02/28/2013  showed avid mediastinal right infrahilar adenopathy, highly suspicious for metastatic disease. Due to disease progression she underwent chemotherapy with carboplatin and Taxol and concurrent radiation therapy that she started in 03/2013, completed radiation on 05/14/2013 and chemotherapy in 08/2013. The PET scan done on 09/09/2013 showed postradiation changes only. She then was diagnosed with a new stage 1A left lung cancer in 10/2016. CT on 09/19/2016 showed a left apical lung nodule that measured 10 mm, evaluated by PET that showed an uptake of 5.2, but no other areas were seen. CT-guided biopsy 10/31/2016 showed poorly differentiated adenocarcinoma. She was referred back to Cardiovascular surgery for excision of a lung nodule for curative intent. She was deemed nonsurgical by Dr. Anabel Pulido, therefore she received stereotactic ablative radiation therapy to the left lung mass. SBRT completed on 01/11/2017.   The patient taken to the OR then she had postop respiratory failure and reintubated on 1/1. Then she was extubated the second day and moved out of ICU on 1/3. The patient then discharged to TCU. She had fever on 1/16 up to 102.5 with hypotension. CXR today showed worsened appearance with further loss of volume right lung. She was started on ceftriaxone and azithromycin. On January 24, 2020 she underwent flexible diagnostic and therapeutic fiberoptic bronchoscopy with fluoroscopy. During procedure bronch washings were obtained from right tracheobronchial tree including right upper lobe sub segments, cytology and microbiology brush biopsies from right lower lobe stump and irregular mucosa. Cytology was negative for evidence of malignant cells. The patient was discharged home on January 27, 2020. Interval history on 02/28/2020:  She restarted immunotherapy with Tecentriq on February 21, 2020. Overall she tolerated Tecentriq well. She reports improvement in her shortness of breath. She denies having any diarrhea, no skin rash. She denies having any recent infections. She reports good energy level, she is able to perform all her daily activities. She lost weight but it was mainly fluid from fluid overload related to recent hospitalization. The patient reports good appetite. Remaining 12 point review of systems negative.     Past Medical History         Diagnosis Date    Arthritis     low back pain and scoliosis in lumbar    Cancer ([facility] [address] 2012    lower right lobe-lung s/p lobectomy in 2012, radiation and chemo nad later had mediatinal mets and had radationa dn chemo again, and in 2016 had reoccurence on the left side upper and was started on radiation this year and has  a follow up CT in oct 2017    CHF (congestive heart failure) (HCC)     Chronic bronchitis, simple (HCC)     Chronic respiratory failure with hypoxia (HCC)     COPD (chronic obstructive pulmonary disease) (HCC)     GERD (gastroesophageal reflux disease)     History of positive PPD     HTN (hypertension)     Hx of blood clots     in lung     Pneumonia     Postprocedural pneumothorax     Scoliosis     Sleep apnea     Urinary incontinence     UTI (urinary tract infection)       Past Surgical History           Procedure Laterality Date    BRONCHOSCOPY N/A 2020    BRONCHOSCOPY WITH FLURO performed by Amy Barcenas MD at [facility]  2020    BRONCHOSCOPY BRUSHINGS performed by Amy Barcenas MD at [address] Right 2020    RIGHT HIP HEMIARTHROPLASTY performed by Keara Holguin at [address] [place] [address] [id number]  10/19/2012    rt lower lobectomy     LUNG BIOPSY  2012       Allergies   Advil cold & sinus liqui-gels [pseudoephedrine-ibuprofen];  Food; Percocet [oxycodone-acetaminophen]; and Sulfa antibiotics  Social History     Social History     Socioeconomic History    Marital status:      Spouse name: Bobby Rao Number of children: 2    Years of education: Not on file    Highest education level: Not on file   Occupational History    Not on file   Social Needs    Financial resource strain: Not on file    Food insecurity:     Worry: Not on file     Inability: Not on file   [employer] needs:     Medical: Not on file     Non-medical: Not on file   Tobacco Use    Smoking status: Former Smoker     Packs/day: 1.00     Years: 45.00     Pack years: 45.00     Types: Cigarettes     Last attempt to quit:      Years since quittin.1    Smokeless tobacco: Never Used   Substance and Sexual Activity    Alcohol use: No    Drug use: No    Sexual activity: Not on file   Lifestyle    Physical activity:     Days per week: Not on file     Minutes per session: Not on file    Stress: Not on file   Relationships    Social connections:     Talks on phone: Not on file     Gets together: Not on file     Attends Rastafarian service: Not on file     Active member of club or organization: Not on file Attends meetings of clubs or organizations: Not on file     Relationship status: Not on file    Intimate partner violence:     Fear of current or ex partner: Not on file     Emotionally abused: Not on file     Physically abused: Not on file     Forced sexual activity: Not on file   Other Topics Concern    Not on file   Social History Narrative    Not on file     Family History          Problem Relation Age of Onset    Cancer Mother     Heart Disease Father     High Blood Pressure Father     High Cholesterol Father     Arthritis Sister     Heart Disease Sister     Cancer Sister         lung cancer    Heart Disease Sister     Stroke Sister     High Cholesterol Sister     High Blood Pressure Sister     Stroke Paternal Grandfather     High Blood Pressure Brother      ROS     Review of Systems   Constitutional: Positive for activity change. Negative for appetite change, fatigue and fever. HENT: Negative for congestion, dental problem, facial swelling, hearing loss, mouth sores, nosebleeds, sore throat, tinnitus and trouble swallowing. Eyes: Negative for discharge and visual disturbance. Respiratory: Positive for shortness of breath. Negative for cough, chest tightness and wheezing. Cardiovascular: Positive for palpitations and leg swelling. Negative for chest pain. Gastrointestinal: Negative for abdominal distention, abdominal pain, blood in stool, constipation, diarrhea, nausea, rectal pain and vomiting. Endocrine: Negative for cold intolerance, polydipsia and polyuria. Genitourinary: Negative for decreased urine volume, difficulty urinating, dysuria, flank pain, hematuria and urgency. Musculoskeletal: Negative for arthralgias, back pain, gait problem, joint swelling, myalgias and neck stiffness. Skin: Negative for color change, rash and wound. Neurological: Positive for weakness.  Negative for dizziness, tremors, seizures, speech difficulty, light-headedness, numbness and headaches. Hematological: Negative for adenopathy. Does not bruise/bleed easily. Psychiatric/Behavioral: Negative for confusion and sleep disturbance. The patient is not nervous/anxious. Vitals     height is 5' (1.524 m) and weight is 134 lb 6.4 oz (61 kg). Her oral temperature is 97.6 °F (36.4 °C). Her blood pressure is 111/53 (abnormal) and her pulse is 91. Her respiration is 18 and oxygen saturation is 96%. Exam   Physical Exam  Vitals signs reviewed. Constitutional:       General: She is not in acute distress. Appearance: She is well-developed. HENT:      Head: Normocephalic. Mouth/Throat:      Pharynx: No oropharyngeal exudate. Eyes:      General: No scleral icterus. Right eye: No discharge. Left eye: No discharge. Pupils: Pupils are equal, round, and reactive to light. Neck:      Musculoskeletal: Normal range of motion and neck supple. Thyroid: No thyromegaly. Vascular: No JVD. Trachea: No tracheal deviation. Cardiovascular:      Rate and Rhythm: Normal rate. Heart sounds: Normal heart sounds. No murmur. No friction rub. No gallop. Pulmonary:      Effort: Tachypnea and respiratory distress present. Breath sounds: No stridor. Examination of the right-middle field reveals decreased breath sounds. Examination of the left-middle field reveals decreased breath sounds. Examination of the right-lower field reveals decreased breath sounds. Examination of the left-lower field reveals decreased breath sounds. Decreased breath sounds present. No wheezing or rales. Chest:      Chest wall: No tenderness. Abdominal:      General: Bowel sounds are normal. There is no distension. Palpations: Abdomen is soft. There is no mass. Tenderness: There is no abdominal tenderness. There is no rebound. Musculoskeletal: Normal range of motion. Comments: Good range of motion in all four extremities.    Lymphadenopathy:

## 2020-03-03 ENCOUNTER — TELEPHONE (OUTPATIENT)
Dept: PULMONOLOGY | Age: 75
End: 2020-03-03

## 2020-03-09 LAB — AFB CULTURE & SMEAR: NORMAL

## 2020-03-19 ENCOUNTER — HOSPITAL ENCOUNTER (OUTPATIENT)
Dept: INFUSION THERAPY | Age: 75
Discharge: HOME | End: 2020-03-19
Payer: MEDICARE

## 2020-03-19 ENCOUNTER — TELEPHONE (OUTPATIENT)
Dept: INFUSION THERAPY | Age: 75
End: 2020-03-19

## 2020-03-19 VITALS
RESPIRATION RATE: 18 BRPM | OXYGEN SATURATION: 95 % | SYSTOLIC BLOOD PRESSURE: 142 MMHG | HEART RATE: 83 BPM | HEIGHT: 60 IN | BODY MASS INDEX: 26.25 KG/M2 | TEMPERATURE: 98.6 F | DIASTOLIC BLOOD PRESSURE: 63 MMHG

## 2020-03-19 DIAGNOSIS — D64.9 NORMOCYTIC ANEMIA: ICD-10-CM

## 2020-03-19 DIAGNOSIS — Z85.118 H/O: LUNG CANCER: ICD-10-CM

## 2020-03-19 DIAGNOSIS — D64.9 ANEMIA, UNSPECIFIED TYPE: ICD-10-CM

## 2020-03-19 DIAGNOSIS — C34.31 SQUAMOUS CELL CARCINOMA OF BRONCHUS IN RIGHT LOWER LOBE (HCC): ICD-10-CM

## 2020-03-19 DIAGNOSIS — C34.90 RECURRENT NON-SMALL CELL LUNG CANCER (NSCLC) (HCC): ICD-10-CM

## 2020-03-19 DIAGNOSIS — C34.91 PRIMARY LUNG CANCER, RIGHT (HCC): ICD-10-CM

## 2020-03-19 DIAGNOSIS — J44.9 CHRONIC OBSTRUCTIVE PULMONARY DISEASE, UNSPECIFIED COPD TYPE (HCC): ICD-10-CM

## 2020-03-19 DIAGNOSIS — E03.2 HYPOTHYROIDISM DUE TO MEDICATION: ICD-10-CM

## 2020-03-19 DIAGNOSIS — C78.02 METASTATIC LUNG CARCINOMA, LEFT (HCC): ICD-10-CM

## 2020-03-19 DIAGNOSIS — J96.21 ACUTE ON CHRONIC RESPIRATORY FAILURE WITH HYPOXEMIA (HCC): ICD-10-CM

## 2020-03-19 DIAGNOSIS — J96.21 ACUTE ON CHRONIC RESPIRATORY FAILURE WITH HYPOXIA (HCC): ICD-10-CM

## 2020-03-19 DIAGNOSIS — R91.1 NODULE OF LEFT LUNG: ICD-10-CM

## 2020-03-19 DIAGNOSIS — C34.31 MALIGNANT NEOPLASM OF LOWER LOBE, RIGHT BRONCHUS OR LUNG (HCC): Primary | ICD-10-CM

## 2020-03-19 LAB
ALBUMIN SERPL-MCNC: 3.6 G/DL (ref 3.5–5.1)
ALP BLD-CCNC: 61 U/L (ref 38–126)
ALT SERPL-CCNC: 13 U/L (ref 11–66)
ANION GAP SERPL CALCULATED.3IONS-SCNC: 8 MEQ/L (ref 8–16)
AST SERPL-CCNC: 19 U/L (ref 5–40)
BILIRUB SERPL-MCNC: < 0.2 MG/DL (ref 0.3–1.2)
BILIRUBIN DIRECT: < 0.2 MG/DL (ref 0–0.3)
BUN BLDV-MCNC: 23 MG/DL (ref 7–22)
CALCIUM SERPL-MCNC: 9 MG/DL (ref 8.5–10.5)
CHLORIDE BLD-SCNC: 97 MEQ/L (ref 98–111)
CO2: 34 MEQ/L (ref 23–33)
CREAT SERPL-MCNC: 0.7 MG/DL (ref 0.4–1.2)
GFR SERPL CREATININE-BSD FRML MDRD: 82 ML/MIN/1.73M2
GLUCOSE BLD-MCNC: 101 MG/DL (ref 70–108)
HCT VFR BLD CALC: 25.4 % (ref 37–47)
HEMOGLOBIN: 8.3 GM/DL (ref 12–16)
MCH RBC QN AUTO: 29.8 PG (ref 27–31)
MCHC RBC AUTO-ENTMCNC: 32.7 GM/DL (ref 33–37)
MCV RBC AUTO: 91 FL (ref 81–99)
PDW BLD-RTO: 16.1 % (ref 11.5–14.5)
PLATELET # BLD: 167 THOU/MM3 (ref 130–400)
PMV BLD AUTO: 7.4 FL (ref 7.4–10.4)
POTASSIUM SERPL-SCNC: 4.8 MEQ/L (ref 3.5–5.2)
RBC # BLD: 2.79 MILL/MM3 (ref 4.2–5.4)
SEG NEUTROPHILS: 75 % (ref 43–75)
SEGMENTED NEUTROPHILS ABSOLUTE COUNT: 5.3 THOU/MM3 (ref 1.8–7.7)
SODIUM BLD-SCNC: 139 MEQ/L (ref 135–145)
T4 FREE: 0.31 NG/DL (ref 0.93–1.76)
TOTAL PROTEIN: 6.7 G/DL (ref 6.1–8)
TSH SERPL DL<=0.05 MIU/L-ACNC: 76.14 UIU/ML (ref 0.4–4.2)
WBC # BLD: 7.1 THOU/MM3 (ref 4.8–10.8)

## 2020-03-19 PROCEDURE — 84439 ASSAY OF FREE THYROXINE: CPT

## 2020-03-19 PROCEDURE — 80053 COMPREHEN METABOLIC PANEL: CPT

## 2020-03-19 PROCEDURE — 6360000002 HC RX W HCPCS: Performed by: INTERNAL MEDICINE

## 2020-03-19 PROCEDURE — 96413 CHEMO IV INFUSION 1 HR: CPT

## 2020-03-19 PROCEDURE — 82248 BILIRUBIN DIRECT: CPT

## 2020-03-19 PROCEDURE — 84443 ASSAY THYROID STIM HORMONE: CPT

## 2020-03-19 PROCEDURE — 85027 COMPLETE CBC AUTOMATED: CPT

## 2020-03-19 PROCEDURE — 36591 DRAW BLOOD OFF VENOUS DEVICE: CPT

## 2020-03-19 PROCEDURE — 2580000003 HC RX 258: Performed by: INTERNAL MEDICINE

## 2020-03-19 RX ORDER — HEPARIN SODIUM (PORCINE) LOCK FLUSH IV SOLN 100 UNIT/ML 100 UNIT/ML
500 SOLUTION INTRAVENOUS PRN
Status: DISCONTINUED | OUTPATIENT
Start: 2020-03-19 | End: 2020-03-20 | Stop reason: HOSPADM

## 2020-03-19 RX ORDER — 0.9 % SODIUM CHLORIDE 0.9 %
10 VIAL (ML) INJECTION ONCE
Status: CANCELLED | OUTPATIENT
Start: 2020-03-19

## 2020-03-19 RX ORDER — SODIUM CHLORIDE 0.9 % (FLUSH) 0.9 %
10 SYRINGE (ML) INJECTION PRN
Status: CANCELLED | OUTPATIENT
Start: 2020-03-19

## 2020-03-19 RX ORDER — 0.9 % SODIUM CHLORIDE 0.9 %
250 INTRAVENOUS SOLUTION INTRAVENOUS ONCE
Status: CANCELLED
Start: 2020-03-19

## 2020-03-19 RX ORDER — DIPHENHYDRAMINE HYDROCHLORIDE 50 MG/ML
50 INJECTION INTRAMUSCULAR; INTRAVENOUS ONCE
Status: CANCELLED | OUTPATIENT
Start: 2020-03-19

## 2020-03-19 RX ORDER — SODIUM CHLORIDE 0.9 % (FLUSH) 0.9 %
10 SYRINGE (ML) INJECTION PRN
Status: DISCONTINUED | OUTPATIENT
Start: 2020-03-19 | End: 2020-03-20 | Stop reason: HOSPADM

## 2020-03-19 RX ORDER — METHYLPREDNISOLONE SODIUM SUCCINATE 125 MG/2ML
125 INJECTION, POWDER, LYOPHILIZED, FOR SOLUTION INTRAMUSCULAR; INTRAVENOUS ONCE
Status: CANCELLED | OUTPATIENT
Start: 2020-03-19

## 2020-03-19 RX ORDER — MEPERIDINE HYDROCHLORIDE 25 MG/ML
12.5 INJECTION INTRAMUSCULAR; INTRAVENOUS; SUBCUTANEOUS ONCE
Status: CANCELLED | OUTPATIENT
Start: 2020-03-19

## 2020-03-19 RX ORDER — SODIUM CHLORIDE 9 MG/ML
INJECTION, SOLUTION INTRAVENOUS ONCE
Status: COMPLETED | OUTPATIENT
Start: 2020-03-19 | End: 2020-03-19

## 2020-03-19 RX ORDER — SODIUM CHLORIDE 0.9 % (FLUSH) 0.9 %
20 SYRINGE (ML) INJECTION PRN
Status: CANCELLED | OUTPATIENT
Start: 2020-03-19

## 2020-03-19 RX ORDER — HEPARIN SODIUM (PORCINE) LOCK FLUSH IV SOLN 100 UNIT/ML 100 UNIT/ML
500 SOLUTION INTRAVENOUS PRN
Status: CANCELLED | OUTPATIENT
Start: 2020-03-19

## 2020-03-19 RX ORDER — SODIUM CHLORIDE 9 MG/ML
INJECTION, SOLUTION INTRAVENOUS CONTINUOUS
Status: CANCELLED | OUTPATIENT
Start: 2020-03-19

## 2020-03-19 RX ORDER — SODIUM CHLORIDE 0.9 % (FLUSH) 0.9 %
20 SYRINGE (ML) INJECTION PRN
Status: DISCONTINUED | OUTPATIENT
Start: 2020-03-19 | End: 2020-03-20 | Stop reason: HOSPADM

## 2020-03-19 RX ORDER — SODIUM CHLORIDE 0.9 % (FLUSH) 0.9 %
5 SYRINGE (ML) INJECTION PRN
Status: CANCELLED | OUTPATIENT
Start: 2020-03-19

## 2020-03-19 RX ADMIN — Medication 500 UNITS: at 13:10

## 2020-03-19 RX ADMIN — Medication 10 ML: at 11:10

## 2020-03-19 RX ADMIN — Medication 10 ML: at 13:10

## 2020-03-19 RX ADMIN — SODIUM CHLORIDE: 9 INJECTION, SOLUTION INTRAVENOUS at 12:08

## 2020-03-19 RX ADMIN — Medication 20 ML: at 11:11

## 2020-03-19 RX ADMIN — ATEZOLIZUMAB 1200 MG: 1200 INJECTION, SOLUTION INTRAVENOUS at 12:29

## 2020-03-19 RX ADMIN — Medication 10 ML: at 12:08

## 2020-03-19 NOTE — PLAN OF CARE
Problem: Intellectual/Education/Knowledge Deficit  Goal: Teaching initiated upon admission  Outcome: Met This Shift  Note: Patient verbalizes understanding to verbal information given on Tecentriq ,action and possible side effects. Aware to call MD if develop complications. Intervention: Verbal/written education provided  Note:   Chemotherapy Teaching     What is Chemotherapy   Drug action [x]   Method of Administration [x]   Handouts given []     Side Effects  Nausea/vomiting [x]   Diarrhea [x]   Fatigue [x]   Signs / Symptoms of infection [x]   Neutropenia [x]   Thrombocytopenia [x]   Alopecia [x]   neuropathy [x]   [place] diet &  the importance of fluids [x]       Micellaneous  Importance of nutrition [x]   Importance of oral hygiene [x]   When to call the MD [x]   Monitoring labs [x]   Use of supportive services []     Explanation of Drug Regimen / Frequency  Tecentriq:  [id number]     Comments  Verbalized understanding to drug,action,side effects and when to call MD         Problem: Discharge Planning  Goal: Knowledge of discharge instructions  Description: Knowledge of discharge instructions     Outcome: Met This Shift  Note: Verbalized understanding of discharge instructions, follow-up appointments, and when to call the physician. Intervention: Discharge to appropriate level of care  Note: Discuss understanding of discharge instructions,follow-up appointments, and when to call the physician. Problem: Falls - Risk of:  Goal: Will remain free from falls  Description: Will remain free from falls  Outcome: Met This Shift  Note: Patient free from falls while in [facility]. Intervention: Assess risk factors for falls  Description: Assess risk factors for falls  Note: Patient assessed for fall risk on admission to [address] [place] [address]. Fall band placed on patient. Discussed the need to use the call light for assistance prior to getting up out of chair/bed.       Problem: Infection - Central Venous Catheter-Associated Bloodstream Infection:  Goal: Will show no infection signs and symptoms  Description: Will show no infection signs and symptoms  Outcome: Met This Shift  Note: Mediport site with no redness or warmth. Skin over port site intact with no signs of breakdown noted. Patient verbalizes signs/symptoms of port infection and when to notify the physician. Intervention: Infection risk assessment  Description: Infection risk assessment  Note: Discuss port maintenance, infection prevention, signs and when to call Dr Pura Stephens reviewed with patient and friend. Patient and friend verbalize understanding of the plan of care and contribute to goal setting.

## 2020-03-19 NOTE — ONCOLOGY
Chemotherapy Administration    Pre-assessment Data: Antineoplastic Agents  Other:   See toxicity flow sheet for assessment [x]     Physician Notification of Concerns Related to Chemotherapy Administration:   Physician Notified Manolo Fairly / Time of Notification      Interventions:   Lab work assessed  [x]   Height / Weight verified for dose [x]   Current MAR reviewed [x]   Emergency drugs available as appropriate [x]   Anaphylaxis assessment completed [x]   Pre-medications administered as ordered [x]   Blood return noted upon initiation of chemotherapy [x]   Blood return noted each 1-2ml of a vesicant medication if given IV push []   Blood return noted each 2-3ml of a non-vesicant medication if given IV push []   Monitor for signs / symptoms of hypersensitivity reaction [x]   Chemotherapy orders (drug/dose/rate) verified by 2 Chemo certified RNs [x]   Monitor IV site and blood return throughout the infusion of the medication [x]   Document IV site checks on the IV assessment form [x]   Document chemotherapy teaching on the Patient Education tab [x]   Document patient verbalizes understanding of medications being administered [x]   If IV infiltration, see ONS Guidelines []   Other:     Tecentriq []

## 2020-03-19 NOTE — TELEPHONE ENCOUNTER
Patient states she was started on Metoprolol in hospital and you have been refilling it. Patient states she has been taking 1 tab BID and the script says Patient hooked up to cadd ambulatory pump filled with 5 FU to infuse over 46 hours at a rate of 5.4 ml an hour. All connections secured with tape. Patient and family member instructed on pump, it's usage and what to do if alarm goes off and who to call if any questions. Verified  pump running  before discharge. Take 1/2 tab BID so she is running out too soon. She needs a correct refill script sent in.

## 2020-03-19 NOTE — PROGRESS NOTES
Patient assessed for the following post chemotherapy:    Dizziness   No  Lightheadedness  No      Acute nausea/vomiting No  Headache   No  Chest pain/pressure  No  Rash/itching   No  Shortness of breath  No    Patient kept for 20 minutes observation post infusion chemotherapy. Patient tolerated chemotherapy treatment Tecentriq without any complications. Last vital signs:   BP (!) 142/63   Pulse 83   Temp 98.6 °F (37 °C) (Oral)   Resp 18   Ht 5' (1.524 m)   SpO2 95%   BMI 26.25 kg/m²     Patient instructed if experience any of the above symptoms following today's infusion, she is to notify MD immediately or go to the emergency department. Discharge instructions given to patient. Verbalizes understanding. Ambulated off unit per self, with home oxygen applied, accompanied by friend, with belongings.

## 2020-04-08 ENCOUNTER — TELEPHONE (OUTPATIENT)
Dept: PULMONOLOGY | Age: 75
End: 2020-04-08

## 2020-04-09 ENCOUNTER — TELEPHONE (OUTPATIENT)
Dept: PULMONOLOGY | Age: 75
End: 2020-04-09

## 2020-04-10 ENCOUNTER — OFFICE VISIT (OUTPATIENT)
Dept: ONCOLOGY | Age: 75
End: 2020-04-10
Payer: MEDICARE

## 2020-04-10 ENCOUNTER — HOSPITAL ENCOUNTER (OUTPATIENT)
Dept: INFUSION THERAPY | Age: 75
Discharge: HOME | End: 2020-04-10
Payer: MEDICARE

## 2020-04-10 VITALS
SYSTOLIC BLOOD PRESSURE: 138 MMHG | DIASTOLIC BLOOD PRESSURE: 66 MMHG | TEMPERATURE: 98.1 F | RESPIRATION RATE: 18 BRPM | HEART RATE: 82 BPM | WEIGHT: 140.4 LBS | OXYGEN SATURATION: 99 % | HEIGHT: 60 IN | BODY MASS INDEX: 27.56 KG/M2

## 2020-04-10 VITALS
BODY MASS INDEX: 27.56 KG/M2 | DIASTOLIC BLOOD PRESSURE: 67 MMHG | HEART RATE: 83 BPM | HEIGHT: 60 IN | OXYGEN SATURATION: 100 % | SYSTOLIC BLOOD PRESSURE: 150 MMHG | RESPIRATION RATE: 18 BRPM | WEIGHT: 140.4 LBS | TEMPERATURE: 97.9 F

## 2020-04-10 DIAGNOSIS — J44.9 CHRONIC OBSTRUCTIVE PULMONARY DISEASE, UNSPECIFIED COPD TYPE (HCC): ICD-10-CM

## 2020-04-10 DIAGNOSIS — C34.90 RECURRENT NON-SMALL CELL LUNG CANCER (NSCLC) (HCC): ICD-10-CM

## 2020-04-10 DIAGNOSIS — C34.91 PRIMARY LUNG CANCER, RIGHT (HCC): ICD-10-CM

## 2020-04-10 DIAGNOSIS — Z85.118 H/O: LUNG CANCER: ICD-10-CM

## 2020-04-10 DIAGNOSIS — R91.1 NODULE OF LEFT LUNG: ICD-10-CM

## 2020-04-10 DIAGNOSIS — C78.02 METASTATIC LUNG CARCINOMA, LEFT (HCC): ICD-10-CM

## 2020-04-10 DIAGNOSIS — D64.9 ANEMIA, UNSPECIFIED TYPE: ICD-10-CM

## 2020-04-10 DIAGNOSIS — E03.2 HYPOTHYROIDISM DUE TO MEDICATION: ICD-10-CM

## 2020-04-10 DIAGNOSIS — C34.31 MALIGNANT NEOPLASM OF LOWER LOBE, RIGHT BRONCHUS OR LUNG (HCC): Primary | ICD-10-CM

## 2020-04-10 DIAGNOSIS — D64.9 NORMOCYTIC ANEMIA: ICD-10-CM

## 2020-04-10 DIAGNOSIS — J96.21 ACUTE ON CHRONIC RESPIRATORY FAILURE WITH HYPOXIA (HCC): ICD-10-CM

## 2020-04-10 DIAGNOSIS — C34.31 SQUAMOUS CELL CARCINOMA OF BRONCHUS IN RIGHT LOWER LOBE (HCC): ICD-10-CM

## 2020-04-10 DIAGNOSIS — J96.21 ACUTE ON CHRONIC RESPIRATORY FAILURE WITH HYPOXEMIA (HCC): ICD-10-CM

## 2020-04-10 LAB
ALBUMIN SERPL-MCNC: 3.9 G/DL (ref 3.5–5.1)
ALP BLD-CCNC: 62 U/L (ref 38–126)
ALT SERPL-CCNC: 17 U/L (ref 11–66)
AST SERPL-CCNC: 24 U/L (ref 5–40)
BILIRUB SERPL-MCNC: 0.2 MG/DL (ref 0.3–1.2)
BILIRUBIN DIRECT: < 0.2 MG/DL (ref 0–0.3)
BUN BLDV-MCNC: 17 MG/DL (ref 7–22)
CHLORIDE, WHOLE BLOOD: 95 MEQ/L (ref 98–109)
CO2: 38 MEQ/L (ref 23–33)
CREATININE, WHOLE BLOOD: 1 MG/DL (ref 0.5–1.2)
GFR, ESTIMATED: 57 ML/MIN/1.73M2
GLUCOSE, WHOLE BLOOD: 95 MG/DL (ref 70–108)
HCT VFR BLD CALC: 26.3 % (ref 37–47)
HEMOGLOBIN: 8.6 GM/DL (ref 12–16)
IONIZED CALCIUM, WHOLE BLOOD: 1.17 MMOL/L (ref 1.12–1.32)
MCH RBC QN AUTO: 30.4 PG (ref 27–31)
MCHC RBC AUTO-ENTMCNC: 32.7 GM/DL (ref 33–37)
MCV RBC AUTO: 93 FL (ref 81–99)
PDW BLD-RTO: 16.3 % (ref 11.5–14.5)
PLATELET # BLD: 162 THOU/MM3 (ref 130–400)
PMV BLD AUTO: 6.9 FL (ref 7.4–10.4)
POTASSIUM, WHOLE BLOOD: 4.2 MEQ/L (ref 3.5–4.9)
RBC # BLD: 2.82 MILL/MM3 (ref 4.2–5.4)
SEG NEUTROPHILS: 76 % (ref 43–75)
SEGMENTED NEUTROPHILS ABSOLUTE COUNT: 4.4 THOU/MM3 (ref 1.8–7.7)
SODIUM, WHOLE BLOOD: 143 MEQ/L (ref 138–146)
T4 FREE: 0.33 NG/DL (ref 0.93–1.76)
TOTAL PROTEIN: 6.9 G/DL (ref 6.1–8)
TSH SERPL DL<=0.05 MIU/L-ACNC: 94.93 UIU/ML (ref 0.4–4.2)
WBC # BLD: 5.8 THOU/MM3 (ref 4.8–10.8)

## 2020-04-10 PROCEDURE — G8427 DOCREV CUR MEDS BY ELIG CLIN: HCPCS | Performed by: INTERNAL MEDICINE

## 2020-04-10 PROCEDURE — 4040F PNEUMOC VAC/ADMIN/RCVD: CPT | Performed by: INTERNAL MEDICINE

## 2020-04-10 PROCEDURE — 1090F PRES/ABSN URINE INCON ASSESS: CPT | Performed by: INTERNAL MEDICINE

## 2020-04-10 PROCEDURE — 80047 BASIC METABLC PNL IONIZED CA: CPT

## 2020-04-10 PROCEDURE — G8400 PT W/DXA NO RESULTS DOC: HCPCS | Performed by: INTERNAL MEDICINE

## 2020-04-10 PROCEDURE — 84520 ASSAY OF UREA NITROGEN: CPT

## 2020-04-10 PROCEDURE — 1036F TOBACCO NON-USER: CPT | Performed by: INTERNAL MEDICINE

## 2020-04-10 PROCEDURE — 36591 DRAW BLOOD OFF VENOUS DEVICE: CPT

## 2020-04-10 PROCEDURE — G8417 CALC BMI ABV UP PARAM F/U: HCPCS | Performed by: INTERNAL MEDICINE

## 2020-04-10 PROCEDURE — 99211 OFF/OP EST MAY X REQ PHY/QHP: CPT

## 2020-04-10 PROCEDURE — 84443 ASSAY THYROID STIM HORMONE: CPT

## 2020-04-10 PROCEDURE — 3023F SPIROM DOC REV: CPT | Performed by: INTERNAL MEDICINE

## 2020-04-10 PROCEDURE — 1123F ACP DISCUSS/DSCN MKR DOCD: CPT | Performed by: INTERNAL MEDICINE

## 2020-04-10 PROCEDURE — 80076 HEPATIC FUNCTION PANEL: CPT

## 2020-04-10 PROCEDURE — 3017F COLORECTAL CA SCREEN DOC REV: CPT | Performed by: INTERNAL MEDICINE

## 2020-04-10 PROCEDURE — 6360000002 HC RX W HCPCS: Performed by: INTERNAL MEDICINE

## 2020-04-10 PROCEDURE — 85027 COMPLETE CBC AUTOMATED: CPT

## 2020-04-10 PROCEDURE — G8926 SPIRO NO PERF OR DOC: HCPCS | Performed by: INTERNAL MEDICINE

## 2020-04-10 PROCEDURE — 99214 OFFICE O/P EST MOD 30 MIN: CPT | Performed by: INTERNAL MEDICINE

## 2020-04-10 PROCEDURE — 96413 CHEMO IV INFUSION 1 HR: CPT

## 2020-04-10 PROCEDURE — 2580000003 HC RX 258: Performed by: INTERNAL MEDICINE

## 2020-04-10 PROCEDURE — 84439 ASSAY OF FREE THYROXINE: CPT

## 2020-04-10 PROCEDURE — 82374 ASSAY BLOOD CARBON DIOXIDE: CPT

## 2020-04-10 RX ORDER — SODIUM CHLORIDE 0.9 % (FLUSH) 0.9 %
10 SYRINGE (ML) INJECTION PRN
Status: DISCONTINUED | OUTPATIENT
Start: 2020-04-10 | End: 2020-04-11 | Stop reason: HOSPADM

## 2020-04-10 RX ORDER — ROPINIROLE 0.25 MG/1
0.25 TABLET, FILM COATED ORAL 2 TIMES DAILY
Qty: 60 TABLET | Refills: 1 | Status: SHIPPED | OUTPATIENT
Start: 2020-04-10 | End: 2020-06-15

## 2020-04-10 RX ORDER — SODIUM CHLORIDE 0.9 % (FLUSH) 0.9 %
20 SYRINGE (ML) INJECTION PRN
Status: DISCONTINUED | OUTPATIENT
Start: 2020-04-10 | End: 2020-04-11 | Stop reason: HOSPADM

## 2020-04-10 RX ORDER — MEPERIDINE HYDROCHLORIDE 25 MG/ML
12.5 INJECTION INTRAMUSCULAR; INTRAVENOUS; SUBCUTANEOUS ONCE
Status: CANCELLED | OUTPATIENT
Start: 2020-04-10

## 2020-04-10 RX ORDER — DIPHENHYDRAMINE HYDROCHLORIDE 50 MG/ML
50 INJECTION INTRAMUSCULAR; INTRAVENOUS ONCE
Status: CANCELLED | OUTPATIENT
Start: 2020-04-10

## 2020-04-10 RX ORDER — SODIUM CHLORIDE 9 MG/ML
INJECTION, SOLUTION INTRAVENOUS ONCE
Status: COMPLETED | OUTPATIENT
Start: 2020-04-10 | End: 2020-04-10

## 2020-04-10 RX ORDER — ONDANSETRON HYDROCHLORIDE 8 MG/1
8 TABLET, FILM COATED ORAL EVERY 8 HOURS PRN
Qty: 20 TABLET | Refills: 1 | Status: SHIPPED | OUTPATIENT
Start: 2020-04-10

## 2020-04-10 RX ORDER — HEPARIN SODIUM (PORCINE) LOCK FLUSH IV SOLN 100 UNIT/ML 100 UNIT/ML
500 SOLUTION INTRAVENOUS PRN
Status: CANCELLED | OUTPATIENT
Start: 2020-04-10

## 2020-04-10 RX ORDER — METHYLPREDNISOLONE SODIUM SUCCINATE 125 MG/2ML
125 INJECTION, POWDER, LYOPHILIZED, FOR SOLUTION INTRAMUSCULAR; INTRAVENOUS ONCE
Status: CANCELLED | OUTPATIENT
Start: 2020-04-10

## 2020-04-10 RX ORDER — SODIUM CHLORIDE 0.9 % (FLUSH) 0.9 %
10 SYRINGE (ML) INJECTION PRN
Status: CANCELLED | OUTPATIENT
Start: 2020-04-10

## 2020-04-10 RX ORDER — 0.9 % SODIUM CHLORIDE 0.9 %
10 VIAL (ML) INJECTION ONCE
Status: CANCELLED | OUTPATIENT
Start: 2020-04-10

## 2020-04-10 RX ORDER — DIGOXIN 125 MCG
125 TABLET ORAL DAILY
Qty: 30 TABLET | Refills: 3 | Status: SHIPPED | OUTPATIENT
Start: 2020-04-10 | End: 2020-06-08 | Stop reason: SDUPTHER

## 2020-04-10 RX ORDER — SODIUM CHLORIDE 9 MG/ML
INJECTION, SOLUTION INTRAVENOUS CONTINUOUS
Status: CANCELLED | OUTPATIENT
Start: 2020-04-10

## 2020-04-10 RX ORDER — SODIUM CHLORIDE 0.9 % (FLUSH) 0.9 %
20 SYRINGE (ML) INJECTION PRN
Status: CANCELLED | OUTPATIENT
Start: 2020-04-10

## 2020-04-10 RX ORDER — 0.9 % SODIUM CHLORIDE 0.9 %
250 INTRAVENOUS SOLUTION INTRAVENOUS ONCE
Status: CANCELLED
Start: 2020-04-10

## 2020-04-10 RX ORDER — HYDROCODONE BITARTRATE AND ACETAMINOPHEN 5; 325 MG/1; MG/1
1 TABLET ORAL EVERY 6 HOURS PRN
Qty: 40 TABLET | Refills: 0 | Status: SHIPPED | OUTPATIENT
Start: 2020-04-10 | End: 2020-04-20

## 2020-04-10 RX ORDER — HEPARIN SODIUM (PORCINE) LOCK FLUSH IV SOLN 100 UNIT/ML 100 UNIT/ML
500 SOLUTION INTRAVENOUS PRN
Status: DISCONTINUED | OUTPATIENT
Start: 2020-04-10 | End: 2020-04-11 | Stop reason: HOSPADM

## 2020-04-10 RX ORDER — SODIUM CHLORIDE 0.9 % (FLUSH) 0.9 %
5 SYRINGE (ML) INJECTION PRN
Status: CANCELLED | OUTPATIENT
Start: 2020-04-10

## 2020-04-10 RX ADMIN — Medication 10 ML: at 09:48

## 2020-04-10 RX ADMIN — ATEZOLIZUMAB 1200 MG: 1200 INJECTION, SOLUTION INTRAVENOUS at 10:16

## 2020-04-10 RX ADMIN — Medication 20 ML: at 08:28

## 2020-04-10 RX ADMIN — Medication 10 ML: at 08:27

## 2020-04-10 RX ADMIN — SODIUM CHLORIDE: 9 INJECTION, SOLUTION INTRAVENOUS at 09:48

## 2020-04-10 ASSESSMENT — ENCOUNTER SYMPTOMS
CHEST TIGHTNESS: 0
RECTAL PAIN: 0
COLOR CHANGE: 0
WHEEZING: 0
CONSTIPATION: 0
ABDOMINAL PAIN: 0
ABDOMINAL DISTENTION: 0
COUGH: 0
EYE DISCHARGE: 0
SHORTNESS OF BREATH: 1
BACK PAIN: 0
NAUSEA: 0
VOMITING: 0
TROUBLE SWALLOWING: 0
BLOOD IN STOOL: 0
DIARRHEA: 0
FACIAL SWELLING: 0
SORE THROAT: 0

## 2020-04-10 ASSESSMENT — PAIN SCALES - GENERAL
PAINLEVEL_OUTOF10: 5
PAINLEVEL_OUTOF10: 5

## 2020-04-10 ASSESSMENT — PAIN DESCRIPTION - PROGRESSION: CLINICAL_PROGRESSION: NOT CHANGED

## 2020-04-10 ASSESSMENT — PAIN DESCRIPTION - PAIN TYPE: TYPE: CHRONIC PAIN

## 2020-04-10 ASSESSMENT — PAIN DESCRIPTION - LOCATION: LOCATION: BACK

## 2020-04-10 ASSESSMENT — PAIN DESCRIPTION - FREQUENCY: FREQUENCY: INTERMITTENT

## 2020-04-10 ASSESSMENT — PAIN DESCRIPTION - ORIENTATION: ORIENTATION: LOWER

## 2020-04-10 ASSESSMENT — PAIN DESCRIPTION - ONSET: ONSET: ON-GOING

## 2020-04-10 ASSESSMENT — PAIN DESCRIPTION - DESCRIPTORS: DESCRIPTORS: DULL;ACHING

## 2020-04-10 NOTE — PROGRESS NOTES
Lab specimen obtained from [facility] without any problems.  Ambulated off unit to examination room for appointment with Dr. Yumiko Moore escorted by [facility]

## 2020-04-10 NOTE — ONCOLOGY
Immunotherapy Administration    Pre-assessment Data: Antineoplastic Agents   See toxicity flow sheet for assessment [x]     Physician Notification of Concerns Related to Immunotherapy Administration:         Interventions:   Lab work assessed  [x]   Height / Weight verified for dose [x]   Current MAR reviewed [x]   Emergency drugs available as appropriate [x]   Anaphylaxis assessment completed [x]   Pre-medications administered as ordered  [x]   Blood return noted upon initiation of Immunotherapy [x]   Verify patient not on steroids [x]    []   Monitor for signs / symptoms of hypersensitivity reaction [x]   Immunotherapy orders (drug/dose/rate) verified by 2 Oncology certified RNs [x]   Monitor IV site and blood return throughout the infusion of the medication [x]   Document IV site checks on the IV assessment form [x]   Document immunotherapy teaching on the Patient Education tab [x]   Document patient verbalizes understanding of medications being administered [x]   If IV infiltration, see ONS Guidelines []   Other:      []

## 2020-04-10 NOTE — PROGRESS NOTES
Patient assessed for the following post chemotherapy:    Dizziness   No  Lightheadedness  No      Acute nausea/vomiting No  Headache   No  Chest pain/pressure  No  Rash/itching   No  Shortness of breath  No    Patient opted to not stay for 20 minutes observation post infusion chemotherapy. Patient tolerated chemotherapy treatment Tecentriq without any complications. Last vital signs:   BP (!) 150/67   Pulse 83   Temp 97.9 °F (36.6 °C) (Oral)   Resp 18   Ht 5' (1.524 m)   Wt 140 lb 6.4 oz (63.7 kg)   SpO2 100%   BMI 27.42 kg/m²         Patient instructed if experience any of the above symptoms following today's infusion,he/she is to notify MD immediately or go to the emergency department. Discharge instructions given to patient. Verbalizes understanding. Ambulated off unit per self with belongings.

## 2020-04-10 NOTE — PROGRESS NOTES
or organizations: Not on file     Relationship status: Not on file    Intimate partner violence     Fear of current or ex partner: Not on file     Emotionally abused: Not on file     Physically abused: Not on file     Forced sexual activity: Not on file   Other Topics Concern    Not on file   Social History Narrative    Not on file     Family History          Problem Relation Age of Onset    Cancer Mother     Heart Disease Father     High Blood Pressure Father     High Cholesterol Father     Arthritis Sister     Heart Disease Sister     Cancer Sister         lung cancer    Heart Disease Sister     Stroke Sister     High Cholesterol Sister     High Blood Pressure Sister     Stroke Paternal Grandfather     High Blood Pressure Brother      ROS     Review of Systems   Constitutional: Positive for activity change. Negative for appetite change, fatigue and fever. HENT: Negative for congestion, dental problem, facial swelling, hearing loss, mouth sores, nosebleeds, sore throat, tinnitus and trouble swallowing. Eyes: Negative for discharge and visual disturbance. Respiratory: Positive for shortness of breath. Negative for cough, chest tightness and wheezing. Cardiovascular: Positive for palpitations and leg swelling. Negative for chest pain. Gastrointestinal: Negative for abdominal distention, abdominal pain, blood in stool, constipation, diarrhea, nausea, rectal pain and vomiting. Endocrine: Negative for cold intolerance, polydipsia and polyuria. Genitourinary: Negative for decreased urine volume, difficulty urinating, dysuria, flank pain, hematuria and urgency. Musculoskeletal: Negative for arthralgias, back pain, gait problem, joint swelling, myalgias and neck stiffness. Skin: Negative for color change, rash and wound. Neurological: Positive for weakness. Negative for dizziness, tremors, seizures, speech difficulty, light-headedness, numbness and headaches.    Hematological: Negative lung base possibly representing postobstructive collapse. This appears slightly progressed    from the prior examination. However there is a small stable area of lucent cavitary change at the posterior medial right lung base on axial image 34.       3. Pleural thickening with focal opacity abutting the posterior lateral right pleura is noted at the posterior lateral right lung base which appears similar in configuration to the prior examination.       4. Focal masslike opacity anteriorly within the left upper lobe abutting the pleura is slightly increased in size from the prior examination dated July 2019. This appears slightly increased in size from the prior examination. This is concerning for    progression of disease.       5. Focal irregular opacity with irregular margins demonstrated within the left upper lobe appears increased in size measuring 1.9 x 0.8 centimeters. This appears increased in size from the prior examination. This is concerning for progression of disease.       6. Further evaluation of the enlarging lesions may be obtained with PET/CT if clinically indicated. CT of the abdomen on November 4, 2019 showed:  1. No CT evidence of metastatic disease within the abdomen or pelvis. . Chronic changes as described above    CT of the chest on January 19, 2020 showed:  1. Further loss of volume right lung with extensive right lung airspace consolidation likely acute pneumonic infiltrates. 2. Diffuse interstitial edema left lung versus lymphangitic spread of metastases. Very Small left pleural effusion. 3. Minimal increase in sizes of the previously identified left lung masses. Assessment/ Plan     1. Metastatic left lung cancer. Treated with Taxotere followed by Tecentriq. 2.  Palliative immunotherapy with Tecentriq. She tolerated her last infusion  well. No diarrhea, no skin rash. She had hospitalization for hip fracture.   Hspitalization complicated by respiratory failure secondary

## 2020-04-10 NOTE — PLAN OF CARE
Problem: Pain:  Description: Pain management should include both nonpharmacologic and pharmacologic interventions. Goal: Control of chronic pain  Description: Control of chronic pain  Outcome: Met This Shift  Note: Patient rating pain a 5 out of 10 using SIMI scale, Pain located in  back. Intervention: Promote participation in pain management plan  Description: Promote participation in pain management plan - REMINDER(s):  Involve patient/family in pain management plan. Update patient/family of any treatment changes. Note: Patient encouraged to take prescribed pain medications and call Physician if pain is not controlled. Problem: Intellectual/Education/Knowledge Deficit  Goal: Teaching initiated upon admission  Outcome: Met This Shift  Note: Patient verbalizes understanding to verbal information given on Keytruda,action and possible side effects. Aware to call MD if develop complications. Intervention: Verbal/written education provided  Note:   Chemotherapy Teaching     What is Chemotherapy   Drug action [x]   Method of Administration [x]   Handouts given []     Side Effects  Nausea/vomiting [x]   Diarrhea [x]   Fatigue [x]   Signs / Symptoms of infection [x]   Neutropenia [x]   Thrombocytopenia [x]   Alopecia [x]   neuropathy [x]   [place] diet &  the importance of fluids [x]       Micellaneous  Importance of nutrition [x]   Importance of oral hygiene [x]   When to call the MD [x]   Monitoring labs [x]   Use of supportive services []     Explanation of Drug Regimen / Frequency  Tecentriq- [id number]     Comments  Verbalized understanding to drug,action,side effects and when to call MD         Problem: Discharge Planning  Goal: Knowledge of discharge instructions  Description: Knowledge of discharge instructions     Outcome: Met This Shift  Note: Verbalized understanding of discharge instructions, follow-up appointments, and when to call the physician.    Intervention: Discharge to appropriate level of care  Note: Discuss understanding of discharge instructions,follow-up appointments, and when to call the physician. Problem: Falls - Risk of:  Goal: Will remain free from falls  Description: Will remain free from falls  Outcome: Met This Shift  Note: No falls occurred with visit today. Intervention: Assess risk factors for falls  Description: Assess risk factors for falls  Note: Verbalized understanding of fall prevention to ask for assistance with ambulation. Call light within reach. Problem: Infection - Central Venous Catheter-Associated Bloodstream Infection:  Goal: Will show no infection signs and symptoms  Description: Will show no infection signs and symptoms  Outcome: Met This Shift  Note: Mediport site with no redness or warmth. Skin over port site intact with no signs of breakdown noted. Patient verbalizes signs/symptoms of port infection and when to notify the physician. Intervention: Infection risk assessment  Description: Infection risk assessment  Note: Instructed to monitor for signs/symptoms of infection at [address] [facility] and call MD if problems develop. Care plan reviewed with patient . Patient  verbalize understanding of the plan of care and contribute to goal setting.

## 2020-04-10 NOTE — PATIENT INSTRUCTIONS
1.  Pproceed with Tecentriq today.   2.  RTC to see me, for labs: CBC, BMP, LFTs, TSH and next dose of Tecentriq in 3 weeks

## 2020-05-01 ENCOUNTER — HOSPITAL ENCOUNTER (OUTPATIENT)
Dept: INFUSION THERAPY | Age: 75
End: 2020-05-01
Payer: MEDICARE

## 2020-05-01 ENCOUNTER — TELEPHONE (OUTPATIENT)
Dept: ONCOLOGY | Age: 75
End: 2020-05-01

## 2020-05-15 ENCOUNTER — HOSPITAL ENCOUNTER (OUTPATIENT)
Dept: INFUSION THERAPY | Age: 75
Discharge: HOME | End: 2020-05-15
Payer: MEDICARE

## 2020-05-15 ENCOUNTER — OFFICE VISIT (OUTPATIENT)
Dept: ONCOLOGY | Age: 75
End: 2020-05-15
Payer: MEDICARE

## 2020-05-15 VITALS
HEART RATE: 106 BPM | BODY MASS INDEX: 26.35 KG/M2 | WEIGHT: 134.2 LBS | RESPIRATION RATE: 18 BRPM | SYSTOLIC BLOOD PRESSURE: 79 MMHG | TEMPERATURE: 98.5 F | DIASTOLIC BLOOD PRESSURE: 52 MMHG | OXYGEN SATURATION: 100 % | HEIGHT: 60 IN

## 2020-05-15 VITALS
OXYGEN SATURATION: 96 % | DIASTOLIC BLOOD PRESSURE: 54 MMHG | TEMPERATURE: 98.5 F | HEART RATE: 98 BPM | RESPIRATION RATE: 18 BRPM | SYSTOLIC BLOOD PRESSURE: 101 MMHG

## 2020-05-15 DIAGNOSIS — C34.31 SQUAMOUS CELL CARCINOMA OF BRONCHUS IN RIGHT LOWER LOBE (HCC): ICD-10-CM

## 2020-05-15 DIAGNOSIS — J96.21 ACUTE ON CHRONIC RESPIRATORY FAILURE WITH HYPOXEMIA (HCC): ICD-10-CM

## 2020-05-15 DIAGNOSIS — J44.9 CHRONIC OBSTRUCTIVE PULMONARY DISEASE, UNSPECIFIED COPD TYPE (HCC): ICD-10-CM

## 2020-05-15 DIAGNOSIS — J43.9 PULMONARY EMPHYSEMA, UNSPECIFIED EMPHYSEMA TYPE (HCC): ICD-10-CM

## 2020-05-15 DIAGNOSIS — Z85.118 H/O: LUNG CANCER: ICD-10-CM

## 2020-05-15 DIAGNOSIS — C34.31 MALIGNANT NEOPLASM OF LOWER LOBE, RIGHT BRONCHUS OR LUNG (HCC): ICD-10-CM

## 2020-05-15 DIAGNOSIS — C78.02 METASTATIC LUNG CARCINOMA, LEFT (HCC): ICD-10-CM

## 2020-05-15 DIAGNOSIS — C34.91 PRIMARY LUNG CANCER, RIGHT (HCC): ICD-10-CM

## 2020-05-15 DIAGNOSIS — Z99.81 OXYGEN DEPENDENT: ICD-10-CM

## 2020-05-15 DIAGNOSIS — C34.90 RECURRENT NON-SMALL CELL LUNG CANCER (NSCLC) (HCC): ICD-10-CM

## 2020-05-15 DIAGNOSIS — E03.9 HYPOTHYROIDISM, UNSPECIFIED TYPE: Primary | ICD-10-CM

## 2020-05-15 DIAGNOSIS — R91.1 NODULE OF LEFT LUNG: ICD-10-CM

## 2020-05-15 DIAGNOSIS — J96.21 ACUTE ON CHRONIC RESPIRATORY FAILURE WITH HYPOXIA (HCC): ICD-10-CM

## 2020-05-15 DIAGNOSIS — D64.9 ANEMIA, UNSPECIFIED TYPE: ICD-10-CM

## 2020-05-15 LAB
ABO: NORMAL
ALBUMIN SERPL-MCNC: 3.6 G/DL (ref 3.5–5.1)
ALP BLD-CCNC: 62 U/L (ref 38–126)
ALT SERPL-CCNC: 7 U/L (ref 11–66)
ANTIBODY SCREEN: NORMAL
AST SERPL-CCNC: 13 U/L (ref 5–40)
BILIRUB SERPL-MCNC: 0.2 MG/DL (ref 0.3–1.2)
BILIRUBIN DIRECT: < 0.2 MG/DL (ref 0–0.3)
BUN BLDV-MCNC: 18 MG/DL (ref 7–22)
CHLORIDE, WHOLE BLOOD: 84 MEQ/L (ref 98–109)
CO2: 47 MEQ/L (ref 23–33)
CREATININE, WHOLE BLOOD: 1.3 MG/DL (ref 0.5–1.2)
GFR, ESTIMATED: 42 ML/MIN/1.73M2
GLUCOSE, WHOLE BLOOD: 110 MG/DL (ref 70–108)
HCT VFR BLD CALC: 24.2 % (ref 37–47)
HEMOGLOBIN: 6.9 GM/DL (ref 12–16)
IONIZED CALCIUM, WHOLE BLOOD: 1.06 MMOL/L (ref 1.12–1.32)
MCH RBC QN AUTO: 29.5 PG (ref 26–33)
MCHC RBC AUTO-ENTMCNC: 28.5 GM/DL (ref 32.2–35.5)
MCV RBC AUTO: 103 FL (ref 81–99)
PDW BLD-RTO: 14.3 % (ref 11.5–14.5)
PLATELET # BLD: 152 THOU/MM3 (ref 130–400)
PMV BLD AUTO: 9.8 FL (ref 9.4–12.4)
POTASSIUM, WHOLE BLOOD: 3.7 MEQ/L (ref 3.5–4.9)
RBC # BLD: 2.34 MILL/MM3 (ref 4.2–5.4)
RH FACTOR: NORMAL
SEG NEUTROPHILS: 80 % (ref 43–75)
SEGMENTED NEUTROPHILS ABSOLUTE COUNT: 6.3 THOU/MM3 (ref 1.8–7.7)
SODIUM, WHOLE BLOOD: 141 MEQ/L (ref 138–146)
TOTAL PROTEIN: 6.6 G/DL (ref 6.1–8)
TSH SERPL DL<=0.05 MIU/L-ACNC: 32.74 UIU/ML (ref 0.4–4.2)
WBC # BLD: 7.9 THOU/MM3 (ref 4.8–10.8)

## 2020-05-15 PROCEDURE — 4040F PNEUMOC VAC/ADMIN/RCVD: CPT | Performed by: INTERNAL MEDICINE

## 2020-05-15 PROCEDURE — 2580000003 HC RX 258: Performed by: PHYSICIAN ASSISTANT

## 2020-05-15 PROCEDURE — 6360000002 HC RX W HCPCS: Performed by: INTERNAL MEDICINE

## 2020-05-15 PROCEDURE — 86850 RBC ANTIBODY SCREEN: CPT

## 2020-05-15 PROCEDURE — 82374 ASSAY BLOOD CARBON DIOXIDE: CPT

## 2020-05-15 PROCEDURE — G8427 DOCREV CUR MEDS BY ELIG CLIN: HCPCS | Performed by: INTERNAL MEDICINE

## 2020-05-15 PROCEDURE — 3017F COLORECTAL CA SCREEN DOC REV: CPT | Performed by: INTERNAL MEDICINE

## 2020-05-15 PROCEDURE — 80047 BASIC METABLC PNL IONIZED CA: CPT

## 2020-05-15 PROCEDURE — 80076 HEPATIC FUNCTION PANEL: CPT

## 2020-05-15 PROCEDURE — 86900 BLOOD TYPING SEROLOGIC ABO: CPT

## 2020-05-15 PROCEDURE — 86923 COMPATIBILITY TEST ELECTRIC: CPT

## 2020-05-15 PROCEDURE — 36591 DRAW BLOOD OFF VENOUS DEVICE: CPT

## 2020-05-15 PROCEDURE — 84443 ASSAY THYROID STIM HORMONE: CPT

## 2020-05-15 PROCEDURE — P9016 RBC LEUKOCYTES REDUCED: HCPCS

## 2020-05-15 PROCEDURE — 86901 BLOOD TYPING SEROLOGIC RH(D): CPT

## 2020-05-15 PROCEDURE — 36430 TRANSFUSION BLD/BLD COMPNT: CPT

## 2020-05-15 PROCEDURE — 1036F TOBACCO NON-USER: CPT | Performed by: INTERNAL MEDICINE

## 2020-05-15 PROCEDURE — 99214 OFFICE O/P EST MOD 30 MIN: CPT | Performed by: INTERNAL MEDICINE

## 2020-05-15 PROCEDURE — 84520 ASSAY OF UREA NITROGEN: CPT

## 2020-05-15 PROCEDURE — G8400 PT W/DXA NO RESULTS DOC: HCPCS | Performed by: INTERNAL MEDICINE

## 2020-05-15 PROCEDURE — 85027 COMPLETE CBC AUTOMATED: CPT

## 2020-05-15 PROCEDURE — 1090F PRES/ABSN URINE INCON ASSESS: CPT | Performed by: INTERNAL MEDICINE

## 2020-05-15 PROCEDURE — G8926 SPIRO NO PERF OR DOC: HCPCS | Performed by: INTERNAL MEDICINE

## 2020-05-15 PROCEDURE — 1123F ACP DISCUSS/DSCN MKR DOCD: CPT | Performed by: INTERNAL MEDICINE

## 2020-05-15 PROCEDURE — G8417 CALC BMI ABV UP PARAM F/U: HCPCS | Performed by: INTERNAL MEDICINE

## 2020-05-15 PROCEDURE — 99212 OFFICE O/P EST SF 10 MIN: CPT

## 2020-05-15 PROCEDURE — 3023F SPIROM DOC REV: CPT | Performed by: INTERNAL MEDICINE

## 2020-05-15 RX ORDER — SODIUM CHLORIDE 9 MG/ML
INJECTION, SOLUTION INTRAVENOUS CONTINUOUS
Status: CANCELLED | OUTPATIENT
Start: 2020-05-15

## 2020-05-15 RX ORDER — HEPARIN SODIUM (PORCINE) LOCK FLUSH IV SOLN 100 UNIT/ML 100 UNIT/ML
500 SOLUTION INTRAVENOUS PRN
Status: DISCONTINUED | OUTPATIENT
Start: 2020-05-15 | End: 2020-05-16 | Stop reason: HOSPADM

## 2020-05-15 RX ORDER — 0.9 % SODIUM CHLORIDE 0.9 %
250 INTRAVENOUS SOLUTION INTRAVENOUS ONCE
Status: CANCELLED | OUTPATIENT
Start: 2020-05-15

## 2020-05-15 RX ORDER — HEPARIN SODIUM (PORCINE) LOCK FLUSH IV SOLN 100 UNIT/ML 100 UNIT/ML
500 SOLUTION INTRAVENOUS PRN
Status: CANCELLED | OUTPATIENT
Start: 2020-05-15

## 2020-05-15 RX ORDER — 0.9 % SODIUM CHLORIDE 0.9 %
250 INTRAVENOUS SOLUTION INTRAVENOUS ONCE
Status: CANCELLED
Start: 2020-05-15

## 2020-05-15 RX ORDER — METHYLPREDNISOLONE SODIUM SUCCINATE 125 MG/2ML
125 INJECTION, POWDER, LYOPHILIZED, FOR SOLUTION INTRAMUSCULAR; INTRAVENOUS ONCE
Status: CANCELLED | OUTPATIENT
Start: 2020-05-15

## 2020-05-15 RX ORDER — SODIUM CHLORIDE 0.9 % (FLUSH) 0.9 %
20 SYRINGE (ML) INJECTION PRN
Status: CANCELLED | OUTPATIENT
Start: 2020-05-15

## 2020-05-15 RX ORDER — DIPHENHYDRAMINE HYDROCHLORIDE 50 MG/ML
50 INJECTION INTRAMUSCULAR; INTRAVENOUS ONCE
Status: CANCELLED | OUTPATIENT
Start: 2020-05-15

## 2020-05-15 RX ORDER — PROMETHAZINE HYDROCHLORIDE 25 MG/1
25 TABLET ORAL EVERY 8 HOURS PRN
Qty: 30 TABLET | Refills: 1 | Status: SHIPPED | OUTPATIENT
Start: 2020-05-15

## 2020-05-15 RX ORDER — SODIUM CHLORIDE 0.9 % (FLUSH) 0.9 %
20 SYRINGE (ML) INJECTION PRN
Status: DISCONTINUED | OUTPATIENT
Start: 2020-05-15 | End: 2020-05-16 | Stop reason: HOSPADM

## 2020-05-15 RX ORDER — PANTOPRAZOLE SODIUM 40 MG/1
40 TABLET, DELAYED RELEASE ORAL
Qty: 60 TABLET | Refills: 1 | Status: SHIPPED | OUTPATIENT
Start: 2020-05-15

## 2020-05-15 RX ORDER — 0.9 % SODIUM CHLORIDE 0.9 %
250 INTRAVENOUS SOLUTION INTRAVENOUS ONCE
Status: COMPLETED | OUTPATIENT
Start: 2020-05-15 | End: 2020-05-15

## 2020-05-15 RX ORDER — SODIUM CHLORIDE 0.9 % (FLUSH) 0.9 %
10 SYRINGE (ML) INJECTION PRN
Status: CANCELLED | OUTPATIENT
Start: 2020-05-15

## 2020-05-15 RX ADMIN — Medication 10 ML: at 14:15

## 2020-05-15 RX ADMIN — Medication 500 UNITS: at 17:23

## 2020-05-15 RX ADMIN — Medication 10 ML: at 17:23

## 2020-05-15 RX ADMIN — SODIUM CHLORIDE 250 ML: 9 INJECTION, SOLUTION INTRAVENOUS at 14:15

## 2020-05-15 RX ADMIN — Medication 10 ML: at 12:00

## 2020-05-15 RX ADMIN — Medication 10 ML: at 12:30

## 2020-05-15 ASSESSMENT — PAIN DESCRIPTION - PAIN TYPE: TYPE: CHRONIC PAIN

## 2020-05-15 ASSESSMENT — ENCOUNTER SYMPTOMS
ABDOMINAL PAIN: 0
COUGH: 0
COLOR CHANGE: 0
EYE ITCHING: 1
NAUSEA: 0
RHINORRHEA: 1
BACK PAIN: 1
SHORTNESS OF BREATH: 1
VOMITING: 0
CONSTIPATION: 1
DIARRHEA: 0
SORE THROAT: 0

## 2020-05-15 ASSESSMENT — PAIN DESCRIPTION - ONSET: ONSET: ON-GOING

## 2020-05-15 ASSESSMENT — PAIN DESCRIPTION - LOCATION: LOCATION: BUTTOCKS

## 2020-05-15 ASSESSMENT — PAIN DESCRIPTION - PROGRESSION: CLINICAL_PROGRESSION: NOT CHANGED

## 2020-05-15 ASSESSMENT — PAIN DESCRIPTION - FREQUENCY: FREQUENCY: INTERMITTENT

## 2020-05-15 ASSESSMENT — PAIN SCALES - GENERAL
PAINLEVEL_OUTOF10: 5
PAINLEVEL_OUTOF10: 0

## 2020-05-15 NOTE — PROGRESS NOTES
Thana Felty is a 76 y.o. female who presents today for:  Chief Complaint   Patient presents with    Follow-up     Malignant neoplasm of lower lobe, right bronchus or lung       Goals    None         HPI:     HPI  Thana Felty is a 76 y.o. female with metastatic NSCLC. She was diagnosed with  stage 2A non-small-cell lung cancer in 2012. On October 22, 2012 she underwent right lower lobe lung, lobectomy. Final pathology report showed:  A - Right lower lobe lung, lobectomy:      Moderately differentiated adenocarcinoma, mixed subtype      (predominantly papillary), pT2b      Emphysematous changes in non-neoplastic lung parenchyma.      Margins negative for malignancy. B - Peribronchial lymph node, excision:   One lymph node with caseous necrosis/calcification  negative for malignancy, pN0. It doesn't look like the patient had any mediastinal lymph node evaluation.    She received adjuvant chemotherapy with carboplatin and gemcitabine, completed on 02/04/2013. According to Dr. Dorothy Leal note CT chest on 02/18/2013 showed a soft tissue density in the middle and posterior mediastinum. PET on 02/28/2013  showed avid mediastinal right infrahilar adenopathy, highly suspicious for metastatic disease. Due to disease progression she underwent chemotherapy with carboplatin and Taxol and concurrent radiation therapy that she started in 03/2013, completed radiation on 05/14/2013 and chemotherapy in 08/2013.  The PET scan done on 09/09/2013 showed postradiation changes only. She then was diagnosed with a new stage 1A left lung cancer in 10/2016.  CT on 09/19/2016 showed a left apical lung nodule that measured 10 mm, evaluated by PET that showed an uptake of 5.2, but no other areas were seen. CT-guided biopsy 10/31/2016 showed poorly differentiated adenocarcinoma. She was referred back to Cardiovascular surgery for excision of a lung nodule for curative intent.  She was deemed nonsurgical by Dr. Jerry wOen, therefore she received stereotactic ablative radiation therapy to the left lung mass. SBRT completed on 01/11/2017. Shane Travis patient had restaging  CT chest  on 04/19/2018 that showed new irregular 10 x 19 mm left upper lobe nodule that increased in size since 10/2017. She started chemo with single agent Taxotere. After seven cycles Taxotere was discontinued due to side effects,  losing her toenails and her fingernails. Lung biopsy from 2016 was tested for PD-L1, it showed high expression, Tumor Proportion Score:  81-90%. KRAS was detected. EGFR not detected. Her treatment was changed to Tecentriq on 12/18/2018.    She is a former smoker with severe COPD requiring home O2 supplementation by nasal cannula oxygen at 4 L. The patient was hospitalized from 02/23/2019 till 02/28/2018 for acute on chronic respiratory failure with hypoxemia and hypercapnia. CTA showed worsening right lung consolidation, differential diagnosis included community acquired pneumonia, atypical pulm edema, immunotherapy induced pneumonitis versus malignancy. Patient was on empiric antibiotic treatment for pneumonia. She was also placed on steroids for the possible drug induced pneumonitis. She had a thoracentesis of 300 ml which appeared to be a transudate.  There were no malignant cells on cytology. Due to mental status changes she had Ct of the brain that showed  inconclusive findings. Brain MRI recommended, however the patient refused. She restarted Tecentriq on May 15, 2019. She was admitted to the hospital on July 18, 2019 for pericardial effusion requiring pericardiocentesis.  Almost 700 mL of fluid was drained at the time of the procedure and in the 48 hours after that. [facility] echo after the drain was removed showed no accumulation of the pericardial effusion.  Cytology of the fluid was negative for malignant cells  The restaging studies after cycle #10 of Tecentriq showed stable disease and new pulmonary embolus at the distal right pulmonary artery for which the patient was placed on Eliquis. She was  admitted on 12/27/19 for hip fracture. Patient was taken to the OR then she had postop respiratory failure and reintubated on 1/1. Then she was extubated the second day and moved out of ICU on 1/3. The patient then discharged to TCU. She had fever on 1/16 up to 102.5 with hypotension. CXR today showed worsened appearance with further loss of volume right lung. She was started on ceftriaxone and azithromycin.    On January 24, 2020 she underwent flexible diagnostic and therapeutic fiberoptic bronchoscopy with fluoroscopy. During procedure bronch washings were obtained from right tracheobronchial tree including right upper lobe sub segments, cytology and microbiology brush biopsies from right lower lobe stump and irregular mucosa. Cytology was negative for evidence of malignant cells. The patient was discharged home on January 27, 2020. Interval History 5/15/2020  Patient presents to oncology clinic for follow-up of metastatic non-small cell lung cancer of the right lung and potential resumption of treatment. Patient reports that since her last visit to the office she is not doing well. She states that her stomach has been \"out of whack\" since the beginning of May. She states that it is a bloated feeling. She states the only way to make herself feel better is to force herself to vomit. She states that she feels better after vomiting. She states that she occasionally vomits spontaneously however the volume of this is very little. She also states that over the past 3 days she is becoming increasingly lightheaded and dizzy. She also reports increasing fatigue and some shortness of breath. Liu reports that her appetite is poor. She is only eating 2-3 yogurts per day in addition to small amounts of fresh fruit. She reports drinking 2 to 3 500 mL water bottles per day.   She denies recent fevers, chills, hemoptysis, nosebleeds, blood in her Ear: External ear normal.      Nose: Nose normal. No congestion or rhinorrhea. Mouth/Throat:      Mouth: Mucous membranes are moist.      Pharynx: Oropharynx is clear. No oropharyngeal exudate or posterior oropharyngeal erythema. Eyes:      General:         Right eye: No discharge. Left eye: No discharge. Conjunctiva/sclera: Conjunctivae normal.      Pupils: Pupils are equal, round, and reactive to light. Neck:      Musculoskeletal: Normal range of motion and neck supple. Cardiovascular:      Rate and Rhythm: Regular rhythm. Tachycardia present. Pulses: Normal pulses. Heart sounds: Normal heart sounds. No murmur. Pulmonary:      Effort: Respiratory distress present. Breath sounds: Examination of the right-upper field reveals decreased breath sounds. Examination of the right-middle field reveals decreased breath sounds. Examination of the right-lower field reveals decreased breath sounds. Decreased breath sounds present. No rhonchi or rales. Abdominal:      General: Abdomen is flat. Bowel sounds are normal. There is no distension. Palpations: Abdomen is soft. Tenderness: There is no abdominal tenderness. Musculoskeletal:      Right lower leg: Edema present. Left lower leg: Edema present. Skin:     General: Skin is warm and dry. Findings: No erythema or rash. Neurological:      General: No focal deficit present. Mental Status: She is alert and oriented to person, place, and time. Mental status is at baseline. Cranial Nerves: No cranial nerve deficit.    Psychiatric:         Mood and Affect: Mood normal.         Behavior: Behavior normal.         Judgment: Judgment normal.           Lab Results   Component Value Date    WBC 6.7 05/18/2020    HGB 7.8 (L) 05/18/2020    HCT 27.9 (L) 05/18/2020     05/18/2020    CHOL 116 11/08/2019    TRIG 52 11/08/2019    HDL 56 11/08/2019    LDLCALC 50 11/08/2019    AST 13 05/15/2020     05/18/2020 daily (before meals)     Dispense:  60 tablet     Refill:  1       Future Appointments   Date Time Provider Doug Briones   6/8/2020  8:45 AM STR OUT PT ONC INJ RM 13 STRZ OP ONC [facility] HOD   6/8/2020  9:20 AM Trey Bowens PA-C Oncology [facility] [place]       Patient given educational materials - see patient instructions. Discussed use, benefit, and side effects of prescribed medications. All patient questions answered. Patient voiced understanding. Reviewed health maintenance. Instructed to continue current medications, diet and exercise. Patient agreed with treatment plan. Follow up as directed. Electronically signed by Manju Marx MD on 5/18/2020 at 10:06 PM     Attending attending addendum on May 15, 2020: I have seen, examined, and interviewed with the patient independently of medical resident Dr. Randi Aponte. I agree with past medical history, history of present illness, review of system and physical examination findings. I reviewed patient's labs. Assessment and plan was discussed with me and I concur. Mrs. Justice Fernandez is a 55-year-old patient with metastatic lung cancer currently undergoing palliative immunotherapy. Only if she developed recurrent anemia. She denies having any active bleeding. She will proceed with blood transfusion today.

## 2020-05-15 NOTE — PLAN OF CARE
Problem: Pain:  Goal: Control of chronic pain  Description: Control of chronic pain  Outcome: Met This Shift  Note: Patients pain controlled with current pain medication   Intervention: Opioid analgesia side-effects  Note: Patient well versed on current pain medications, their side effects and when to take them. Patient also verbalized understanding to call with any unrelieved pain       Problem: Musculor/Skeletal Functional Status  Goal: Absence of falls  Outcome: Met This Shift  Note: No falls this admission   Intervention: Fall precautions  Note: Patient aware of fall precautions for here and at home -call light in reach while here       Problem: Intellectual/Education/Knowledge Deficit  Goal: Teaching initiated upon admission  Outcome: Met This Shift  Note: Patient educated blood product transfusion protocol:    Patient receiving one unit of packed red blood cells:      - Blood product transfusion information sheet given: questions answered and consent signed  - Take vital signs/ monitor lungs sound prior to transfusion  - Monitor patient for 15 minutes after transfusion started  - Take vital signs / monitor lungs sound in 15 minutes and post transfusion  - Assess IV site   - Monitor patient closely for potential transfusion reaction    Call MD if develop complications once discharged. Goal: Written Disposition Instruction form completed  Outcome: Met This Shift  Note: Discharge instructions given and reviewed with patient. All questions answered. Patient verbalized understanding   Intervention: Verbal/written education provided  Note: Discharge instruction sheets     Problem: Discharge Planning  Goal: Knowledge of discharge instructions  Description: Knowledge of discharge instructions     Outcome: Met This Shift  Note: Patient and family member able to teach back follow up appointments and when to call the doctor.  Patient offers no questions at this time   Intervention: Interaction with patient/family and care team  Note: All questions answered while  patient here   Intervention: Discharge to appropriate level of care  Note: Discharge home     Problem: Infection - Central Venous Catheter-Associated Bloodstream Infection:  Goal: Will show no infection signs and symptoms  Description: Will show no infection signs and symptoms  Outcome: Met This Shift  Note: No signs of infection noted at [facility] site  Intervention: Central line needs assessment  Note:  Patient currently under going chemotherapy with poor venous access   Intervention: Infection risk assessment  Note: Patient aware that is of increased risk for infection due to receiving chemotherapy and having a central venous catheter.  Patient aware of signs and symptoms of infection and when to call the doctor

## 2020-05-15 NOTE — PROGRESS NOTES
WINSOME Weber here to talk to patient and instructed her to take blood pressure in two hours and in am. If continues to go lower again or if she has any signs of dizziness or faintness is to call the squad and go immediately to the emergency room. Cassandraece present at bedside and verbalized understanding of this as well.  Patient also instructed to call if unable to eat or drink (minium of 48 ounces)

## 2020-05-15 NOTE — PROGRESS NOTES
Patient back from exam room,assessment unchanged . Patient informed will take a couple of days to get DNR removed from chart and needs to pass this along to family that she wants to be a full code.  Patient verbalized understanding Franchester Carrel stated that Dr Alba Edwards was informed of Full Code Status

## 2020-05-15 NOTE — PROGRESS NOTES
Labs drawn via central venous catheter, then patient escorted to exam room accompanied by Dalia Moscoso . Raulito Shelton informed of patients need for refills and that patient patient doesn't want to be a DNR. Any more -states filled that out right after   and she was admitted and now has changed her mind .   Daryl 36 MSN notified and pastoral care was notified as well

## 2020-05-18 ENCOUNTER — OFFICE VISIT (OUTPATIENT)
Dept: ONCOLOGY | Age: 75
End: 2020-05-18
Payer: MEDICARE

## 2020-05-18 ENCOUNTER — HOSPITAL ENCOUNTER (OUTPATIENT)
Dept: INFUSION THERAPY | Age: 75
Discharge: HOME | End: 2020-05-18
Payer: MEDICARE

## 2020-05-18 VITALS
BODY MASS INDEX: 26.9 KG/M2 | WEIGHT: 137 LBS | DIASTOLIC BLOOD PRESSURE: 60 MMHG | TEMPERATURE: 98.4 F | SYSTOLIC BLOOD PRESSURE: 129 MMHG | OXYGEN SATURATION: 99 % | HEART RATE: 87 BPM | RESPIRATION RATE: 20 BRPM | HEIGHT: 60 IN

## 2020-05-18 VITALS
HEART RATE: 95 BPM | DIASTOLIC BLOOD PRESSURE: 58 MMHG | BODY MASS INDEX: 26.9 KG/M2 | HEIGHT: 60 IN | OXYGEN SATURATION: 98 % | WEIGHT: 137 LBS | RESPIRATION RATE: 20 BRPM | TEMPERATURE: 98.2 F | SYSTOLIC BLOOD PRESSURE: 123 MMHG

## 2020-05-18 DIAGNOSIS — C34.91 PRIMARY LUNG CANCER, RIGHT (HCC): ICD-10-CM

## 2020-05-18 DIAGNOSIS — D64.9 ACUTE ON CHRONIC ANEMIA: ICD-10-CM

## 2020-05-18 DIAGNOSIS — C34.31 MALIGNANT NEOPLASM OF LOWER LOBE, RIGHT BRONCHUS OR LUNG (HCC): ICD-10-CM

## 2020-05-18 DIAGNOSIS — C34.31 SQUAMOUS CELL CARCINOMA OF BRONCHUS IN RIGHT LOWER LOBE (HCC): Primary | ICD-10-CM

## 2020-05-18 LAB
ABSOLUTE IMMATURE GRANULOCYTE: 0.03 THOU/MM3 (ref 0–0.07)
BASINOPHIL, AUTOMATED: 0 % (ref 0–3)
BASOPHILS ABSOLUTE: 0 THOU/MM3 (ref 0–0.1)
BUN BLDV-MCNC: 27 MG/DL (ref 7–22)
CHLORIDE, WHOLE BLOOD: 88 MEQ/L (ref 98–109)
CO2: 49 MEQ/L (ref 23–33)
CREATININE, WHOLE BLOOD: 1.1 MG/DL (ref 0.5–1.2)
EOSINOPHILS ABSOLUTE: 0.1 THOU/MM3 (ref 0–0.4)
EOSINOPHILS RELATIVE PERCENT: 2 % (ref 0–4)
FERRITIN: 869 NG/ML (ref 10–291)
FOLATE: 8.6 NG/ML (ref 4.8–24.2)
GFR, ESTIMATED: 51 ML/MIN/1.73M2
GLUCOSE, WHOLE BLOOD: 111 MG/DL (ref 70–108)
HCT VFR BLD CALC: 27.9 % (ref 37–47)
HEMOGLOBIN: 7.8 GM/DL (ref 12–16)
IMMATURE GRANULOCYTES: 0 %
IONIZED CALCIUM, WHOLE BLOOD: 1.17 MMOL/L (ref 1.12–1.32)
IRON SATURATION: 14 % (ref 20–50)
IRON: 26 UG/DL (ref 50–170)
LYMPHOCYTES # BLD: 7 % (ref 15–47)
LYMPHOCYTES ABSOLUTE: 0.5 THOU/MM3 (ref 1–4.8)
MCH RBC QN AUTO: 29.8 PG (ref 26–33)
MCHC RBC AUTO-ENTMCNC: 28 GM/DL (ref 32.2–35.5)
MCV RBC AUTO: 107 FL (ref 81–99)
MONOCYTES ABSOLUTE: 0.8 THOU/MM3 (ref 0.4–1.3)
MONOCYTES: 12 % (ref 0–12)
PDW BLD-RTO: 14.7 % (ref 11.5–14.5)
PLATELET # BLD: 136 THOU/MM3 (ref 130–400)
PMV BLD AUTO: 9.4 FL (ref 9.4–12.4)
POTASSIUM, WHOLE BLOOD: 4.5 MEQ/L (ref 3.5–4.9)
RBC # BLD: 2.62 MILL/MM3 (ref 4.2–5.4)
SEG NEUTROPHILS: 79 % (ref 43–75)
SEGMENTED NEUTROPHILS ABSOLUTE COUNT: 5.3 THOU/MM3 (ref 1.8–7.7)
SODIUM, WHOLE BLOOD: 139 MEQ/L (ref 138–146)
TOTAL IRON BINDING CAPACITY: 185 UG/DL (ref 171–450)
VITAMIN B-12: 552 PG/ML (ref 211–911)
WBC # BLD: 6.7 THOU/MM3 (ref 4.8–10.8)

## 2020-05-18 PROCEDURE — G8400 PT W/DXA NO RESULTS DOC: HCPCS | Performed by: INTERNAL MEDICINE

## 2020-05-18 PROCEDURE — 1036F TOBACCO NON-USER: CPT | Performed by: INTERNAL MEDICINE

## 2020-05-18 PROCEDURE — 99214 OFFICE O/P EST MOD 30 MIN: CPT | Performed by: INTERNAL MEDICINE

## 2020-05-18 PROCEDURE — 80047 BASIC METABLC PNL IONIZED CA: CPT

## 2020-05-18 PROCEDURE — 2580000003 HC RX 258: Performed by: INTERNAL MEDICINE

## 2020-05-18 PROCEDURE — 82607 VITAMIN B-12: CPT

## 2020-05-18 PROCEDURE — 36591 DRAW BLOOD OFF VENOUS DEVICE: CPT

## 2020-05-18 PROCEDURE — 4040F PNEUMOC VAC/ADMIN/RCVD: CPT | Performed by: INTERNAL MEDICINE

## 2020-05-18 PROCEDURE — 6360000002 HC RX W HCPCS: Performed by: INTERNAL MEDICINE

## 2020-05-18 PROCEDURE — 99211 OFF/OP EST MAY X REQ PHY/QHP: CPT

## 2020-05-18 PROCEDURE — 1090F PRES/ABSN URINE INCON ASSESS: CPT | Performed by: INTERNAL MEDICINE

## 2020-05-18 PROCEDURE — G8427 DOCREV CUR MEDS BY ELIG CLIN: HCPCS | Performed by: INTERNAL MEDICINE

## 2020-05-18 PROCEDURE — G8926 SPIRO NO PERF OR DOC: HCPCS | Performed by: INTERNAL MEDICINE

## 2020-05-18 PROCEDURE — 83550 IRON BINDING TEST: CPT

## 2020-05-18 PROCEDURE — G8417 CALC BMI ABV UP PARAM F/U: HCPCS | Performed by: INTERNAL MEDICINE

## 2020-05-18 PROCEDURE — 83540 ASSAY OF IRON: CPT

## 2020-05-18 PROCEDURE — 82746 ASSAY OF FOLIC ACID SERUM: CPT

## 2020-05-18 PROCEDURE — 82374 ASSAY BLOOD CARBON DIOXIDE: CPT

## 2020-05-18 PROCEDURE — 82728 ASSAY OF FERRITIN: CPT

## 2020-05-18 PROCEDURE — 85025 COMPLETE CBC W/AUTO DIFF WBC: CPT

## 2020-05-18 PROCEDURE — 3017F COLORECTAL CA SCREEN DOC REV: CPT | Performed by: INTERNAL MEDICINE

## 2020-05-18 PROCEDURE — 1123F ACP DISCUSS/DSCN MKR DOCD: CPT | Performed by: INTERNAL MEDICINE

## 2020-05-18 PROCEDURE — 96413 CHEMO IV INFUSION 1 HR: CPT

## 2020-05-18 PROCEDURE — 3023F SPIROM DOC REV: CPT | Performed by: INTERNAL MEDICINE

## 2020-05-18 PROCEDURE — 84520 ASSAY OF UREA NITROGEN: CPT

## 2020-05-18 RX ORDER — SODIUM CHLORIDE 9 MG/ML
INJECTION, SOLUTION INTRAVENOUS CONTINUOUS
Status: CANCELLED | OUTPATIENT
Start: 2020-05-18

## 2020-05-18 RX ORDER — SODIUM CHLORIDE 0.9 % (FLUSH) 0.9 %
10 SYRINGE (ML) INJECTION PRN
Status: DISCONTINUED | OUTPATIENT
Start: 2020-05-18 | End: 2020-05-19 | Stop reason: HOSPADM

## 2020-05-18 RX ORDER — FERROUS SULFATE 325(65) MG
325 TABLET ORAL
Qty: 30 TABLET | Refills: 2 | Status: SHIPPED | OUTPATIENT
Start: 2020-05-18

## 2020-05-18 RX ORDER — 0.9 % SODIUM CHLORIDE 0.9 %
10 VIAL (ML) INJECTION ONCE
Status: CANCELLED | OUTPATIENT
Start: 2020-05-18

## 2020-05-18 RX ORDER — MEPERIDINE HYDROCHLORIDE 50 MG/ML
12.5 INJECTION INTRAMUSCULAR; INTRAVENOUS; SUBCUTANEOUS ONCE
Status: CANCELLED | OUTPATIENT
Start: 2020-05-18

## 2020-05-18 RX ORDER — HEPARIN SODIUM (PORCINE) LOCK FLUSH IV SOLN 100 UNIT/ML 100 UNIT/ML
500 SOLUTION INTRAVENOUS PRN
Status: DISCONTINUED | OUTPATIENT
Start: 2020-05-18 | End: 2020-05-19 | Stop reason: HOSPADM

## 2020-05-18 RX ORDER — HEPARIN SODIUM (PORCINE) LOCK FLUSH IV SOLN 100 UNIT/ML 100 UNIT/ML
500 SOLUTION INTRAVENOUS PRN
Status: CANCELLED | OUTPATIENT
Start: 2020-05-18

## 2020-05-18 RX ORDER — SODIUM CHLORIDE 9 MG/ML
INJECTION, SOLUTION INTRAVENOUS ONCE
Status: CANCELLED | OUTPATIENT
Start: 2020-05-18

## 2020-05-18 RX ORDER — SODIUM CHLORIDE 0.9 % (FLUSH) 0.9 %
10 SYRINGE (ML) INJECTION PRN
Status: CANCELLED | OUTPATIENT
Start: 2020-05-18

## 2020-05-18 RX ORDER — SODIUM CHLORIDE 9 MG/ML
INJECTION, SOLUTION INTRAVENOUS ONCE
Status: COMPLETED | OUTPATIENT
Start: 2020-05-18 | End: 2020-05-18

## 2020-05-18 RX ORDER — SODIUM CHLORIDE 0.9 % (FLUSH) 0.9 %
5 SYRINGE (ML) INJECTION PRN
Status: CANCELLED | OUTPATIENT
Start: 2020-05-18

## 2020-05-18 RX ORDER — METHYLPREDNISOLONE SODIUM SUCCINATE 125 MG/2ML
125 INJECTION, POWDER, LYOPHILIZED, FOR SOLUTION INTRAMUSCULAR; INTRAVENOUS ONCE
Status: CANCELLED | OUTPATIENT
Start: 2020-05-18

## 2020-05-18 RX ORDER — DIPHENHYDRAMINE HYDROCHLORIDE 50 MG/ML
50 INJECTION INTRAMUSCULAR; INTRAVENOUS ONCE
Status: CANCELLED | OUTPATIENT
Start: 2020-05-18

## 2020-05-18 RX ORDER — EPINEPHRINE 1 MG/ML
0.3 INJECTION, SOLUTION, CONCENTRATE INTRAVENOUS PRN
Status: CANCELLED | OUTPATIENT
Start: 2020-05-18

## 2020-05-18 RX ADMIN — ATEZOLIZUMAB 1200 MG: 1200 INJECTION, SOLUTION INTRAVENOUS at 12:00

## 2020-05-18 RX ADMIN — SODIUM CHLORIDE, PRESERVATIVE FREE 10 ML: 5 INJECTION INTRAVENOUS at 11:30

## 2020-05-18 RX ADMIN — SODIUM CHLORIDE, PRESERVATIVE FREE 10 ML: 5 INJECTION INTRAVENOUS at 09:30

## 2020-05-18 RX ADMIN — SODIUM CHLORIDE, PRESERVATIVE FREE 10 ML: 5 INJECTION INTRAVENOUS at 11:29

## 2020-05-18 RX ADMIN — Medication 500 UNITS: at 12:55

## 2020-05-18 RX ADMIN — SODIUM CHLORIDE: 9 INJECTION, SOLUTION INTRAVENOUS at 11:30

## 2020-05-18 RX ADMIN — SODIUM CHLORIDE, PRESERVATIVE FREE 10 ML: 5 INJECTION INTRAVENOUS at 09:31

## 2020-05-18 RX ADMIN — SODIUM CHLORIDE, PRESERVATIVE FREE 10 ML: 5 INJECTION INTRAVENOUS at 12:55

## 2020-05-18 ASSESSMENT — ENCOUNTER SYMPTOMS
BLOOD IN STOOL: 0
RHINORRHEA: 1
SINUS PRESSURE: 0
COLOR CHANGE: 0
BACK PAIN: 1
VOMITING: 0
DIARRHEA: 0
CONSTIPATION: 0
ABDOMINAL PAIN: 0
NAUSEA: 0
EYE PAIN: 0
SINUS PAIN: 0
SORE THROAT: 0
EYE DISCHARGE: 1
COUGH: 0
SHORTNESS OF BREATH: 1

## 2020-05-18 NOTE — PROGRESS NOTES
Patient returned to infusion unit from appointment with physician. Plan to proceed with treatment today.

## 2020-05-18 NOTE — PROGRESS NOTES
Patient tolerated lab draw via mediport without issues. Patient wheeled off unit to exam room for appointment with physician accompanied by RN.

## 2020-05-18 NOTE — PROGRESS NOTES
Patient assessed for the following post chemotherapy:    Dizziness   No  Lightheadedness  No      Acute nausea/vomiting No  Headache   No  Chest pain/pressure  No  Rash/itching   No  Shortness of breath  No    Patient kept for 20 minutes observation post infusion chemotherapy. Patient tolerated chemotherapy treatment tecentriq without any complications. Last vital signs:   BP (!) 123/58   Pulse 95   Temp 98.2 °F (36.8 °C) (Oral)   Resp 20   Ht 5' (1.524 m)   Wt 137 lb (62.1 kg)   SpO2 98%   BMI 26.76 kg/m²       Patient instructed if experience any of the above symptoms following today's infusion, she is to notify MD immediately or go to the emergency department. Discharge instructions given to patient. Verbalizes understanding. Ambulated off unit per self with belongings.

## 2020-05-18 NOTE — PLAN OF CARE
Problem: Musculor/Skeletal Functional Status  Goal: Absence of falls  Outcome: Met This Shift  Note: Patient free of falls this visit. Intervention: Fall precautions  Note: Fall risks assessed. Precautions discussed. Call light within reach during visit. Patient mobilized with 1 assist and wheelchair during visit. Problem: Intellectual/Education/Knowledge Deficit  Goal: Teaching initiated upon admission  Outcome: Met This Shift  Note: Patient verbalizes understanding to verbal information given on tecentriq, action and possible side effects. Aware to call MD if develop complications. Intervention: Verbal/written education provided  Note:   Chemotherapy Teaching     What is Chemotherapy   Drug action [x]   Method of Administration [x]   Handouts given []     Side Effects  Nausea/vomiting [x]   Diarrhea [x]   Fatigue [x]   Signs / Symptoms of infection [x]   Neutropenia [x]   Thrombocytopenia [x]   Alopecia [x]   neuropathy [x]   [place] diet &  the importance of fluids [x]       Micellaneous  Importance of nutrition [x]   Importance of oral hygiene [x]   When to call the MD [x]   Monitoring labs [x]   Use of supportive services []     Explanation of Drug Regimen / Frequency  tecentriq     Comments  Verbalized understanding to drug,action,side effects and when to call MD         Problem: Discharge Planning  Goal: Knowledge of discharge instructions  Description: Knowledge of discharge instructions     Outcome: Met This Shift  Note: Patient verbalizes understanding of discharge instructions, follow up appointment, and when to call physician if needed   Intervention: Interaction with patient/family and care team  Note: Discharge instructions given to pt and reviewed. Follow up appointments discussed.        Problem: Infection - Central Venous Catheter-Associated Bloodstream Infection:  Goal: Will show no infection signs and symptoms  Description: Will show no infection signs and symptoms  Outcome: Met This Shift  Note: Mediport site with no redness or warmth. Skin over port site intact with no signs of breakdown noted. Patient verbalizes signs/symptoms of port infection and when to notify the physician. Intervention: Infection risk assessment  Note: Discuss port maintenance, infection prevention, signs of infection, and when to call the doctor. Care plan reviewed with patient. Patient verbalizes understanding of the plan of care and contributes to goal setting.

## 2020-05-18 NOTE — PROGRESS NOTES
Eliquis.    She was  admitted on 12/27/19 for hip fracture. Patient was taken to the OR then she had postop respiratory failure and reintubated on 1/1. Then she was extubated the second day and moved out of ICU on 1/3. The patient then discharged to TCU. She had fever on 1/16 up to 102.5 with hypotension. CXR today showed worsened appearance with further loss of volume right lung. She was started on ceftriaxone and azithromycin.    On January 24, 2020 she underwent flexible diagnostic and therapeutic fiberoptic bronchoscopy with fluoroscopy. During procedure bronch washings were obtained from right tracheobronchial tree including right upper lobe sub segments, cytology and microbiology brush biopsies from right lower lobe stump and irregular mucosa.  Cytology was negative for evidence of malignant cells. The patient was discharged home on January 27, 2020. Interim History 5/18/2020  Marli Mc presents to medical oncology clinic today for follow-up of metastatic lung cancer and acute on chronic anemia. Patient reports that she is doing much better today than she was on Friday. She states that her breathing has improved since receiving transfusion of blood on Friday although she continues to report that she is short of breath. She also reports that her blood pressures that she measured over the weekend have all been greater then 035 mmHg systolic. Marli Mc denied any nausea or vomiting over the weekend. She continues to report stable fatigue and appetite. She continues to deny any recent fever, chills, nosebleeds, hemoptysis, hematuria, hematochezia, lightheadedness, dizziness, headaches. No question data found.     Past Medical History:   Diagnosis Date    Arthritis     low back pain and scoliosis in lumbar    Cancer ([facility] [address] 2012    lower right lobe-lung s/p lobectomy in 2012, radiation and chemo nad later had mediatinal mets and had radationa dn chemo again, and in 2016 had reoccurence on the left side upper and was started on radiation this year and has  a follow up CT in oct 2017    CHF (congestive heart failure) (HCC)     Chronic bronchitis, simple (HCC)     Chronic respiratory failure with hypoxia ([address]     COPD (chronic obstructive pulmonary disease) (HCC)     GERD (gastroesophageal reflux disease)     History of positive PPD     HTN (hypertension)     Hx of blood clots     in lung     Pneumonia     Postprocedural pneumothorax     Scoliosis     Sleep apnea     Urinary incontinence     UTI (urinary tract infection)       Past Surgical History:   Procedure Laterality Date    BRONCHOSCOPY N/A 2020    BRONCHOSCOPY WITH FLURO performed by Prachi Becerra MD at [facility]  2020    BRONCHOSCOPY BRUSHINGS performed by Prachi Becerra MD at [address] 2020    RIGHT HIP HEMIARTHROPLASTY performed by Gloria Rice at [address] [place] [address] [id number]  10/19/2012    rt lower lobectomy     LUNG BIOPSY  2012     Family History   Problem Relation Age of Onset    Cancer Mother     Heart Disease Father     High Blood Pressure Father     High Cholesterol Father     Arthritis Sister     Heart Disease Sister     Cancer Sister         lung cancer    Heart Disease Sister     Stroke Sister     High Cholesterol Sister     High Blood Pressure Sister     Stroke Paternal Grandfather     High Blood Pressure Brother      Social History     Tobacco Use    Smoking status: Former Smoker     Packs/day: 1.00     Years: 45.00     Pack years: 45.00     Types: Cigarettes     Last attempt to quit:      Years since quittin.3    Smokeless tobacco: Never Used   Substance Use Topics    Alcohol use: No      Current Outpatient Medications   Medication Sig Dispense Refill    ferrous sulfate (IRON 325) 325 (65 Fe) MG tablet Take 1 tablet by mouth daily (with breakfast) 30 tablet 2    promethazine (PHENERGAN) 25 MG tablet Take 1 tablet by mouth every 8 hours as seen left upper lobe hypermetabolic nodule.     CT of the chest on November 4, 2019 showed:  1. There is a filling defect inserted within the distal right pulmonary artery which is most consistent with pulmonary embolism. This is of indeterminate chronicity.     2. Postsurgical changes from prior right lower lobectomy with associated volume loss is again seen. There is consolidative opacity at the posterior medial right lung base possibly representing postobstructive collapse. This appears slightly progressed   from the prior examination. However there is a small stable area of lucent cavitary change at the posterior medial right lung base on axial image 34.     3. Pleural thickening with focal opacity abutting the posterior lateral right pleura is noted at the posterior lateral right lung base which appears similar in configuration to the prior examination.     4. Focal masslike opacity anteriorly within the left upper lobe abutting the pleura is slightly increased in size from the prior examination dated July 2019. This appears slightly increased in size from the prior examination. This is concerning for   progression of disease.     5. Focal irregular opacity with irregular margins demonstrated within the left upper lobe appears increased in size measuring 1.9 x 0.8 centimeters. This appears increased in size from the prior examination. This is concerning for progression of disease.     6. Further evaluation of the enlarging lesions may be obtained with PET/CT if clinically indicated.        CT of the abdomen on November 4, 2019 showed:  1. No CT evidence of metastatic disease within the abdomen or pelvis. . Chronic changes as described above     CT of the chest on January 19, 2020 showed:  1. Further loss of volume right lung with extensive right lung airspace consolidation likely acute pneumonic infiltrates. 2. Diffuse interstitial edema left lung versus lymphangitic spread of metastases.  Very Small left pleural effusion. 3. Minimal increase in sizes of the previously identified left lung masses          Assessment / Plan:     1. Malignant neoplasm of lower lobe, right bronchus or lung [facility])   Patient last received treatment with Tecentriq on April 10, 2020. She has been experiencing nausea and vomiting and abdominal discomfort since the beginning of May. She  has not been able to start her next cycle of treatment because of the symptoms. Today the patient reports that she is doing relatively better than previous. She denies any nausea or vomiting. She continues to report that she is tolerating some foods, however she does state that her p.o. intake has been relatively poor and that she is not drinking enough water. She was advised to try to increase her intake is much as possible. Today patient's blood pressure is much improved from Friday and it is within normal limits. Her rest vital signs today are also stable and her weight has increased by 3 pounds since Friday. Today patients creatinine dropped from 1.3-1.1, and her CO2 increased to 49 otherwise rest of her electrolytes are within normal limits. Her hemoglobin increased to 7.8 today monitor CBC is relatively stable. Patient desires to proceed with treatment today. We will proceed with new cycle of Tecentriq today. Patient will return to clinic in 3 weeks with new labs CBC, BMP, LFTs. - Ferritin; Future  - Folate; Future  - Vitamin B12; Future  - Iron; Future  - Iron Binding Capacity; Future  - IRON SATURATION; Future    2. Acute on chronic anemia  Patient's hemoglobin today 7.8, increased from 6.9 on Friday. She continued to denied any signs or symptoms of bleeding. Patient does not require transfusion today. We will continue to monitor patient's hemoglobin. We will also check iron studies and B12 and folate as patient's anemia is macrocytic. - Ferritin; Future  - Folate; Future  - Vitamin B12; Future  - Iron;  Future  - Iron Binding Capacity; Future  - IRON SATURATION; Future         Return in about 3 weeks (around 6/8/2020). Medications Prescribed:  Orders Placed This Encounter   Medications    ferrous sulfate (IRON 325) 325 (65 Fe) MG tablet     Sig: Take 1 tablet by mouth daily (with breakfast)     Dispense:  30 tablet     Refill:  2       Future Appointments   Date Time Provider Doug Briones   6/8/2020  8:45 AM STR OUT PT ONC INJ RM 13 STRZ OP ONC Becerra HOD   6/8/2020  9:20 AM Mariana Acosta PA-C [facility]       Patient given educational materials - see patient instructions. Discussed use, benefit, and side effects of prescribed medications. All patient questions answered. Patient voiced understanding. Reviewed health maintenance. Instructed to continue current medications, diet and exercise. Patient agreed with treatment plan. Follow up as directed. Electronically signed by Niall Gilliam MD on 5/21/2020 at 1:47 PM     Attending addendum on May 18 , 2020: I have seen examined and interviewed the patient independently of the medical resident Dr. Joanna Medeiros. I agree with history of present illness, past medical history, review of systems and physical examination findings. I reviewed the patient's labs and imaging studies. Assessment and plan was discussed and formulated with me and I agree.

## 2020-05-18 NOTE — PATIENT INSTRUCTIONS
1. Proceed with Tecentriq today  2. Labs today: Ferritin, iron, iron saturation, folic acid, vitamin Y68  3. RTC to see me in 3 weeks.   On RTC labs: CBC, BMP, LFTs

## 2020-06-08 ENCOUNTER — HOSPITAL ENCOUNTER (OUTPATIENT)
Dept: INFUSION THERAPY | Age: 75
Discharge: HOME | End: 2020-06-08
Payer: MEDICARE

## 2020-06-08 ENCOUNTER — OFFICE VISIT (OUTPATIENT)
Dept: ONCOLOGY | Age: 75
End: 2020-06-08
Payer: MEDICARE

## 2020-06-08 VITALS
DIASTOLIC BLOOD PRESSURE: 65 MMHG | HEART RATE: 86 BPM | SYSTOLIC BLOOD PRESSURE: 143 MMHG | TEMPERATURE: 98 F | BODY MASS INDEX: 26.55 KG/M2 | RESPIRATION RATE: 18 BRPM | WEIGHT: 135.2 LBS | HEIGHT: 60 IN | OXYGEN SATURATION: 97 %

## 2020-06-08 VITALS
OXYGEN SATURATION: 96 % | TEMPERATURE: 98.3 F | BODY MASS INDEX: 26.55 KG/M2 | WEIGHT: 135.2 LBS | SYSTOLIC BLOOD PRESSURE: 128 MMHG | RESPIRATION RATE: 18 BRPM | HEIGHT: 60 IN | HEART RATE: 86 BPM | DIASTOLIC BLOOD PRESSURE: 60 MMHG

## 2020-06-08 DIAGNOSIS — C34.31 SQUAMOUS CELL CARCINOMA OF BRONCHUS IN RIGHT LOWER LOBE (HCC): ICD-10-CM

## 2020-06-08 DIAGNOSIS — R53.83 OTHER FATIGUE: ICD-10-CM

## 2020-06-08 DIAGNOSIS — J96.21 ACUTE ON CHRONIC RESPIRATORY FAILURE WITH HYPOXEMIA (HCC): ICD-10-CM

## 2020-06-08 DIAGNOSIS — C34.90 RECURRENT NON-SMALL CELL LUNG CANCER (NSCLC) (HCC): ICD-10-CM

## 2020-06-08 DIAGNOSIS — D64.9 NORMOCYTIC ANEMIA: ICD-10-CM

## 2020-06-08 DIAGNOSIS — J96.21 ACUTE ON CHRONIC RESPIRATORY FAILURE WITH HYPOXIA (HCC): ICD-10-CM

## 2020-06-08 DIAGNOSIS — C34.31 MALIGNANT NEOPLASM OF LOWER LOBE, RIGHT BRONCHUS OR LUNG (HCC): Primary | ICD-10-CM

## 2020-06-08 DIAGNOSIS — D64.9 ANEMIA, UNSPECIFIED TYPE: ICD-10-CM

## 2020-06-08 DIAGNOSIS — C34.91 PRIMARY LUNG CANCER, RIGHT (HCC): ICD-10-CM

## 2020-06-08 DIAGNOSIS — J44.9 CHRONIC OBSTRUCTIVE PULMONARY DISEASE, UNSPECIFIED COPD TYPE (HCC): ICD-10-CM

## 2020-06-08 DIAGNOSIS — Z85.118 H/O: LUNG CANCER: ICD-10-CM

## 2020-06-08 DIAGNOSIS — E03.2 HYPOTHYROIDISM DUE TO MEDICATION: ICD-10-CM

## 2020-06-08 DIAGNOSIS — J43.9 PULMONARY EMPHYSEMA, UNSPECIFIED EMPHYSEMA TYPE (HCC): ICD-10-CM

## 2020-06-08 DIAGNOSIS — R91.1 NODULE OF LEFT LUNG: ICD-10-CM

## 2020-06-08 DIAGNOSIS — C78.02 METASTATIC LUNG CARCINOMA, LEFT (HCC): ICD-10-CM

## 2020-06-08 DIAGNOSIS — Z99.81 OXYGEN DEPENDENT: ICD-10-CM

## 2020-06-08 LAB
ALBUMIN SERPL-MCNC: 3.5 G/DL (ref 3.5–5.1)
ALP BLD-CCNC: 52 U/L (ref 38–126)
ALT SERPL-CCNC: 9 U/L (ref 11–66)
AST SERPL-CCNC: 16 U/L (ref 5–40)
BILIRUB SERPL-MCNC: 0.2 MG/DL (ref 0.3–1.2)
BILIRUBIN DIRECT: < 0.2 MG/DL (ref 0–0.3)
BUN BLDV-MCNC: 15 MG/DL (ref 7–22)
CHLORIDE, WHOLE BLOOD: 93 MEQ/L (ref 98–109)
CO2: 40 MEQ/L (ref 23–33)
CREATININE, WHOLE BLOOD: 1.2 MG/DL (ref 0.5–1.2)
GFR, ESTIMATED: 46 ML/MIN/1.73M2
GLUCOSE, WHOLE BLOOD: 92 MG/DL (ref 70–108)
HCT VFR BLD CALC: 28.2 % (ref 37–47)
HEMOGLOBIN: 8.3 GM/DL (ref 12–16)
IONIZED CALCIUM, WHOLE BLOOD: 1.15 MMOL/L (ref 1.12–1.32)
MCH RBC QN AUTO: 29.7 PG (ref 26–33)
MCHC RBC AUTO-ENTMCNC: 29.4 GM/DL (ref 32.2–35.5)
MCV RBC AUTO: 101 FL (ref 81–99)
PDW BLD-RTO: 14.4 % (ref 11.5–14.5)
PLATELET # BLD: 129 THOU/MM3 (ref 130–400)
PMV BLD AUTO: 9.6 FL (ref 9.4–12.4)
POTASSIUM, WHOLE BLOOD: 4.2 MEQ/L (ref 3.5–4.9)
RBC # BLD: 2.79 MILL/MM3 (ref 4.2–5.4)
SODIUM, WHOLE BLOOD: 142 MEQ/L (ref 138–146)
T4 FREE: 0.3 NG/DL (ref 0.93–1.76)
TOTAL PROTEIN: 6.6 G/DL (ref 6.1–8)
TSH SERPL DL<=0.05 MIU/L-ACNC: 75.12 UIU/ML (ref 0.4–4.2)
WBC # BLD: 5.4 THOU/MM3 (ref 4.8–10.8)

## 2020-06-08 PROCEDURE — 84520 ASSAY OF UREA NITROGEN: CPT

## 2020-06-08 PROCEDURE — 80076 HEPATIC FUNCTION PANEL: CPT

## 2020-06-08 PROCEDURE — G8427 DOCREV CUR MEDS BY ELIG CLIN: HCPCS | Performed by: PHYSICIAN ASSISTANT

## 2020-06-08 PROCEDURE — 84443 ASSAY THYROID STIM HORMONE: CPT

## 2020-06-08 PROCEDURE — 1123F ACP DISCUSS/DSCN MKR DOCD: CPT | Performed by: PHYSICIAN ASSISTANT

## 2020-06-08 PROCEDURE — 4040F PNEUMOC VAC/ADMIN/RCVD: CPT | Performed by: PHYSICIAN ASSISTANT

## 2020-06-08 PROCEDURE — 6360000002 HC RX W HCPCS: Performed by: INTERNAL MEDICINE

## 2020-06-08 PROCEDURE — 82374 ASSAY BLOOD CARBON DIOXIDE: CPT

## 2020-06-08 PROCEDURE — 96413 CHEMO IV INFUSION 1 HR: CPT

## 2020-06-08 PROCEDURE — 36591 DRAW BLOOD OFF VENOUS DEVICE: CPT

## 2020-06-08 PROCEDURE — 80047 BASIC METABLC PNL IONIZED CA: CPT

## 2020-06-08 PROCEDURE — 99214 OFFICE O/P EST MOD 30 MIN: CPT | Performed by: PHYSICIAN ASSISTANT

## 2020-06-08 PROCEDURE — 84439 ASSAY OF FREE THYROXINE: CPT

## 2020-06-08 PROCEDURE — G8400 PT W/DXA NO RESULTS DOC: HCPCS | Performed by: PHYSICIAN ASSISTANT

## 2020-06-08 PROCEDURE — 1036F TOBACCO NON-USER: CPT | Performed by: PHYSICIAN ASSISTANT

## 2020-06-08 PROCEDURE — G8417 CALC BMI ABV UP PARAM F/U: HCPCS | Performed by: PHYSICIAN ASSISTANT

## 2020-06-08 PROCEDURE — 1090F PRES/ABSN URINE INCON ASSESS: CPT | Performed by: PHYSICIAN ASSISTANT

## 2020-06-08 PROCEDURE — 2580000003 HC RX 258: Performed by: INTERNAL MEDICINE

## 2020-06-08 PROCEDURE — 85027 COMPLETE CBC AUTOMATED: CPT

## 2020-06-08 PROCEDURE — 3017F COLORECTAL CA SCREEN DOC REV: CPT | Performed by: PHYSICIAN ASSISTANT

## 2020-06-08 PROCEDURE — 99211 OFF/OP EST MAY X REQ PHY/QHP: CPT

## 2020-06-08 RX ORDER — SODIUM CHLORIDE 0.9 % (FLUSH) 0.9 %
5 SYRINGE (ML) INJECTION PRN
Status: CANCELLED | OUTPATIENT
Start: 2020-06-08

## 2020-06-08 RX ORDER — SODIUM CHLORIDE 0.9 % (FLUSH) 0.9 %
10 SYRINGE (ML) INJECTION PRN
Status: CANCELLED | OUTPATIENT
Start: 2020-06-08

## 2020-06-08 RX ORDER — 0.9 % SODIUM CHLORIDE 0.9 %
10 VIAL (ML) INJECTION ONCE
Status: CANCELLED | OUTPATIENT
Start: 2020-06-08

## 2020-06-08 RX ORDER — DIPHENHYDRAMINE HYDROCHLORIDE 50 MG/ML
50 INJECTION INTRAMUSCULAR; INTRAVENOUS ONCE
Status: CANCELLED | OUTPATIENT
Start: 2020-06-08

## 2020-06-08 RX ORDER — SODIUM CHLORIDE 0.9 % (FLUSH) 0.9 %
10 SYRINGE (ML) INJECTION PRN
Status: DISCONTINUED | OUTPATIENT
Start: 2020-06-08 | End: 2020-06-09 | Stop reason: HOSPADM

## 2020-06-08 RX ORDER — HEPARIN SODIUM (PORCINE) LOCK FLUSH IV SOLN 100 UNIT/ML 100 UNIT/ML
500 SOLUTION INTRAVENOUS PRN
Status: DISCONTINUED | OUTPATIENT
Start: 2020-06-08 | End: 2020-06-09 | Stop reason: HOSPADM

## 2020-06-08 RX ORDER — 0.9 % SODIUM CHLORIDE 0.9 %
250 INTRAVENOUS SOLUTION INTRAVENOUS ONCE
Status: CANCELLED
Start: 2020-06-08

## 2020-06-08 RX ORDER — HEPARIN SODIUM (PORCINE) LOCK FLUSH IV SOLN 100 UNIT/ML 100 UNIT/ML
500 SOLUTION INTRAVENOUS PRN
Status: CANCELLED | OUTPATIENT
Start: 2020-06-08

## 2020-06-08 RX ORDER — SODIUM CHLORIDE 0.9 % (FLUSH) 0.9 %
20 SYRINGE (ML) INJECTION PRN
Status: CANCELLED | OUTPATIENT
Start: 2020-06-08

## 2020-06-08 RX ORDER — BETAMETHASONE DIPROPIONATE 0.05 %
OINTMENT (GRAM) TOPICAL
Qty: 50 G | Refills: 5 | Status: SHIPPED | OUTPATIENT
Start: 2020-06-08

## 2020-06-08 RX ORDER — SODIUM CHLORIDE 0.9 % (FLUSH) 0.9 %
20 SYRINGE (ML) INJECTION PRN
Status: DISCONTINUED | OUTPATIENT
Start: 2020-06-08 | End: 2020-06-09 | Stop reason: HOSPADM

## 2020-06-08 RX ORDER — MEPERIDINE HYDROCHLORIDE 25 MG/ML
12.5 INJECTION INTRAMUSCULAR; INTRAVENOUS; SUBCUTANEOUS ONCE
Status: CANCELLED | OUTPATIENT
Start: 2020-06-08

## 2020-06-08 RX ORDER — LEVOTHYROXINE SODIUM 0.07 MG/1
75 TABLET ORAL DAILY
Qty: 30 TABLET | Refills: 1 | Status: SHIPPED | OUTPATIENT
Start: 2020-06-08

## 2020-06-08 RX ORDER — SODIUM CHLORIDE 9 MG/ML
INJECTION, SOLUTION INTRAVENOUS ONCE
Status: COMPLETED | OUTPATIENT
Start: 2020-06-08 | End: 2020-06-08

## 2020-06-08 RX ORDER — METHYLPREDNISOLONE SODIUM SUCCINATE 125 MG/2ML
125 INJECTION, POWDER, LYOPHILIZED, FOR SOLUTION INTRAMUSCULAR; INTRAVENOUS ONCE
Status: CANCELLED | OUTPATIENT
Start: 2020-06-08

## 2020-06-08 RX ORDER — DIGOXIN 125 MCG
125 TABLET ORAL DAILY
Qty: 30 TABLET | Refills: 3 | Status: SHIPPED | OUTPATIENT
Start: 2020-06-08

## 2020-06-08 RX ORDER — SODIUM CHLORIDE 9 MG/ML
INJECTION, SOLUTION INTRAVENOUS CONTINUOUS
Status: CANCELLED | OUTPATIENT
Start: 2020-06-08

## 2020-06-08 RX ADMIN — Medication 20 ML: at 08:57

## 2020-06-08 RX ADMIN — Medication 500 UNITS: at 11:09

## 2020-06-08 RX ADMIN — SODIUM CHLORIDE: 9 INJECTION, SOLUTION INTRAVENOUS at 10:24

## 2020-06-08 RX ADMIN — Medication 10 ML: at 08:56

## 2020-06-08 RX ADMIN — Medication 10 ML: at 11:09

## 2020-06-08 RX ADMIN — ATEZOLIZUMAB 1200 MG: 1200 INJECTION, SOLUTION INTRAVENOUS at 10:30

## 2020-06-08 RX ADMIN — Medication 10 ML: at 10:24

## 2020-06-08 NOTE — PLAN OF CARE
Problem: Intellectual/Education/Knowledge Deficit  Goal: Teaching initiated upon admission  Outcome: Met This Shift  Note: Patient verbalizes understanding to verbal information given on Tecentriq,action and possible side effects. Aware to call MD if develop complications. Goal: Written Disposition Instruction form completed  Outcome: Met This Shift  Intervention: Verbal/written education provided  Note:   Chemotherapy Teaching     What is Chemotherapy   Drug action [x]   Method of Administration [x]   Handouts given []     Side Effects  Nausea/vomiting [x]   Diarrhea [x]   Fatigue [x]   Signs / Symptoms of infection [x]   Neutropenia [x]   Thrombocytopenia [x]   Alopecia [x]   neuropathy [x]   Wilmer diet &  the importance of fluids [x]       Micellaneous  Importance of nutrition [x]   Importance of oral hygiene [x]   When to call the MD [x]   Monitoring labs [x]   Use of supportive services []     Explanation of Drug Regimen / Frequency  Tecentriq every 3 weeks [id number]     Comments  Verbalized understanding to drug,action,side effects and when to call MD        Problem: Discharge Planning  Goal: Knowledge of discharge instructions  Description: Knowledge of discharge instructions     Outcome: Met This Shift  Note: Verbalized understanding of discharge instructions, follow-up appointments, and when to call the physician. Intervention: Interaction with patient/family and care team  Note: Discuss understanding of discharge instructions,follow-up appointments, and when to call the physician. Problem: Musculor/Skeletal Functional Status  Goal: Absence of falls  Outcome: Met This Shift  Note: No falls occurred with visit today. Intervention: Fall precautions  Note: Verbalized understanding of fall prevention to ask for assistance with ambulation. Call light within reach.       Problem: Infection - Central Venous Catheter-Associated Bloodstream Infection:  Goal: Will show no infection signs and symptoms  Description: Will show no infection signs and symptoms  Outcome: Met This Shift  Note: Mediport site with no redness or warmth. Skin over port site intact with no signs of breakdown noted. Patient verbalizes signs/symptoms of port infection and when to notify the physician. Intervention: Infection risk assessment  Description: Infection risk assessment  Note: Instructed to monitor for signs/symptoms of infection at [address] [facility] [facility] and call MD if problems develop. Care plan reviewed with patient . Patient  verbalize understanding of the plan of care and contribute to goal setting.

## 2020-06-08 NOTE — PROGRESS NOTES
[facility] [facility] [address]  [address] [address]  Dept: [phone]  Dept Fax: [phone]: [phone]      Visit Date:6/8/2020     Yohannes Mcclelland is a 76 y.o. female who presents today for:   Chief Complaint   Patient presents with    Follow-up     Lung Ca        HPI:   Yohannes Mcclelland is a 76 y.o. female followed by Dr. Faraz Dawson for metastatic NSCLC. Per Dr. Caitlin Edmondson note on 5/18/20: She was diagnosed with  stage 2A non-small-cell lung cancer in 2012. On October 22, 2012 she underwent right lower lobe lung, lobectomy.  Final pathology report showed:  A - Right lower lobe lung, lobectomy:      Moderately differentiated adenocarcinoma, mixed subtype      (predominantly papillary), pT2b      Emphysematous changes in non-neoplastic lung parenchyma.      Margins negative for malignancy. B - Peribronchial lymph node, excision:   One lymph node with caseous necrosis/calcification  negative for malignancy, pN0. It doesn't look like the patient had any mediastinal lymph node evaluation.    She received adjuvant chemotherapy with carboplatin and gemcitabine, completed on 02/04/2013.  According to Dr. Alyx Leon note CT chest on 02/18/2013 showed a soft tissue density in the middle and posterior mediastinum. PET on 02/28/2013  showed avid mediastinal right infrahilar adenopathy, highly suspicious for metastatic disease. Due to disease progression she underwent chemotherapy with carboplatin and Taxol and concurrent radiation therapy that she started in 03/2013, completed radiation on 05/14/2013 and chemotherapy in 08/2013.  The PET scan done on 09/09/2013 showed postradiation changes only. She then was diagnosed with a new stage 1A left lung cancer in 10/2016.  CT on 09/19/2016 showed a left apical lung nodule that measured 10 mm, evaluated by PET that showed an uptake of 5.2, but no other areas were seen.  CT-guided biopsy 10/31/2016 showed poorly differentiated adenocarcinoma. She lobe-lung s/p lobectomy in , radiation and chemo nad later had mediatinal mets and had radationa dn chemo again, and in 2016 had reoccurence on the left side upper and was started on radiation this year and has  a follow up CT in oct 2017    CHF (congestive heart failure) (HCC)     Chronic bronchitis, simple (HCC)     Chronic respiratory failure with hypoxia ([address]     COPD [facility] disease) [facility] [address] [facility]     GERD (gastroesophageal reflux disease)     History of positive PPD     HTN (hypertension)     Hx of blood clots     in lung     Pneumonia     Postprocedural pneumothorax     Scoliosis     Sleep apnea     Urinary incontinence     UTI (urinary tract infection)       Past Surgical History:   Procedure Laterality Date    BRONCHOSCOPY N/A 2020    BRONCHOSCOPY WITH FLURO performed by Devin Magallanes MD at [place] [address]  2020    BRONCHOSCOPY BRUSHINGS performed by Devin Magallanes MD at [address] [place] [address] 2020    RIGHT HIP HEMIARTHROPLASTY performed by Ewa De Souza at [address] [place] [address],  [id number]  10/19/2012    rt lower lobectomy     LUNG BIOPSY  2012      Family History   Problem Relation Age of Onset    Cancer Mother     Heart Disease Father     High Blood Pressure Father     High Cholesterol Father     Arthritis Sister     Heart Disease Sister     Cancer Sister         lung cancer    Heart Disease Sister     Stroke Sister     High Cholesterol Sister     High Blood Pressure Sister     Stroke Paternal Grandfather     High Blood Pressure Brother       Social History     Tobacco Use    Smoking status: Former Smoker     Packs/day: 1.00     Years: 45.00     Pack years: 45.00     Types: Cigarettes     Last attempt to quit:      Years since quittin.4    Smokeless tobacco: Never Used   Substance Use Topics    Alcohol use: No      Current Outpatient Medications   Medication Sig Dispense Refill    ferrous sulfate (IRON Probiotic Product (PROBIOTIC DAILY PO) Take 1 capsule by mouth 3 times daily      albuterol (PROVENTIL) (2.5 MG/3ML) 0.083% nebulizer solution Take 3 mLs by nebulization every 6 hours as needed for Wheezing or Shortness of Breath 360 vial 3    lidocaine-prilocaine (EMLA) 2.5-2.5 % cream Apply topically as needed for Pain Apply topically as needed.  fluticasone (FLONASE) 50 MCG/ACT nasal spray 1 spray by Nasal route daily 1 Bottle 5    betamethasone dipropionate (DIPROLENE) 0.05 % ointment Apply topically daily. 50 g 5     No current facility-administered medications for this visit. Facility-Administered Medications Ordered in Other Visits   Medication Dose Route Frequency Provider Last Rate Last Dose    sodium chloride flush 0.9 % injection 10 mL  10 mL Intravenous PRN Frank Nickerson MD   10 mL at 06/08/20 0856    sodium chloride flush 0.9 % injection 20 mL  20 mL Intravenous PRN Frank Nickerson MD   20 mL at 06/08/20 0857    heparin flush 100 UNIT/ML injection 500 Units  500 Units Intracatheter PRN Frank Nickerson MD          Allergies   Allergen Reactions    Advil Cold & Sinus Liqui-Gels [Pseudoephedrine-Ibuprofen] Anaphylaxis    Food Anaphylaxis     mushrooms    Percocet [Oxycodone-Acetaminophen] Nausea Only    Sulfa Antibiotics Hives          Review of Systems:   Review of Systems   Pertinent review of systems noted in HPI, all other ROS negative. Objective:   Physical Exam   /60 (Site: Left Upper Arm, Position: Sitting, Cuff Size: Medium Adult)   Pulse 86   Temp 98.3 °F (36.8 °C) (Oral)   Resp 18   Ht 5' (1.524 m)   Wt 135 lb 3.2 oz (61.3 kg)   SpO2 96% Comment: 2 liters N/C  BMI 26.40 kg/m²    General appearance: No apparent distress, chronically ill appearing, examined in wheelchair, and cooperative. HEENT: Pupils equal, round, and reactive to light. Conjunctivae/corneas clear. Oral mucosa moist. No sores or mucositis noted. Neck: Supple, with full range of motion. Trachea midline.

## 2020-06-08 NOTE — ONCOLOGY
Chemotherapy Administration    Pre-assessment Data: Antineoplastic Agents  Other:   See toxicity flow sheet for assessment [x]     Physician Notification of Concerns Related to Chemotherapy Administration:   Physician Notified Betsy Rubi / Time of Notification      Interventions:   Lab work assessed  [x]   Height / Weight verified for dose [x]   Current MAR reviewed [x]   Emergency drugs available as appropriate [x]   Anaphylaxis assessment completed [x]   Pre-medications administered as ordered [x]   Blood return noted upon initiation of chemotherapy [x]   Blood return noted each 1-2ml of a vesicant medication if given IV push []   Blood return noted each 2-3ml of a non-vesicant medication if given IV push []   Monitor for signs / symptoms of hypersensitivity reaction [x]   Chemotherapy orders (drug/dose/rate) verified by 2 Chemo certified RNs [x]   Monitor IV site and blood return throughout the infusion of the medication [x]   Document IV site checks on the IV assessment form [x]   Document chemotherapy teaching on the Patient Education tab [x]   Document patient verbalizes understanding of medications being administered [x]   If IV infiltration, see ONS Guidelines []   Other:      []

## 2020-06-08 NOTE — PROGRESS NOTES
Lab specimen obtained from [facility] without any problems. Ambulated off unit to examination room for appointment with FREDA Gonsalez escorted by MADIE Caldera.

## 2020-06-08 NOTE — PROGRESS NOTES
Patient assessed for the following post chemotherapy:    Dizziness   No  Lightheadedness  No      Acute nausea/vomiting No  Headache   No  Chest pain/pressure  No  Rash/itching   No  Shortness of breath  No    Patient opted to not stay for 20 minutes observation post infusion chemotherapy. Patient tolerated chemotherapy treatment Tecentriq without any complications. Last vital signs:   BP (!) 143/65   Pulse 86   Temp 98 °F (36.7 °C) (Oral)   Resp 18   Ht 5' (1.524 m)   Wt 135 lb 3.2 oz (61.3 kg)   SpO2 97%   BMI 26.40 kg/m²         Patient instructed if experience any of the above symptoms following today's infusion,he/she is to notify MD immediately or go to the emergency department. Discharge instructions given to patient. Verbalizes understanding. Ambulated off unit per self with belongings.

## 2020-06-15 ENCOUNTER — HOSPITAL ENCOUNTER (OUTPATIENT)
Dept: INFUSION THERAPY | Age: 75
Discharge: HOME | End: 2020-06-15
Payer: MEDICARE

## 2020-06-15 VITALS
RESPIRATION RATE: 20 BRPM | HEIGHT: 60 IN | HEART RATE: 99 BPM | OXYGEN SATURATION: 98 % | WEIGHT: 137.2 LBS | SYSTOLIC BLOOD PRESSURE: 119 MMHG | DIASTOLIC BLOOD PRESSURE: 59 MMHG | TEMPERATURE: 98.9 F | BODY MASS INDEX: 26.93 KG/M2

## 2020-06-15 DIAGNOSIS — C34.31 MALIGNANT NEOPLASM OF LOWER LOBE, RIGHT BRONCHUS OR LUNG (HCC): ICD-10-CM

## 2020-06-15 DIAGNOSIS — E03.2 HYPOTHYROIDISM DUE TO MEDICATION: Primary | ICD-10-CM

## 2020-06-15 DIAGNOSIS — Z51.81 ENCOUNTER FOR THERAPEUTIC DRUG LEVEL MONITORING: ICD-10-CM

## 2020-06-15 DIAGNOSIS — Z85.118 H/O: LUNG CANCER: ICD-10-CM

## 2020-06-15 DIAGNOSIS — C34.90 RECURRENT NON-SMALL CELL LUNG CANCER (NSCLC) (HCC): ICD-10-CM

## 2020-06-15 DIAGNOSIS — C34.31 SQUAMOUS CELL CARCINOMA OF BRONCHUS IN RIGHT LOWER LOBE (HCC): ICD-10-CM

## 2020-06-15 DIAGNOSIS — C34.91 PRIMARY LUNG CANCER, RIGHT (HCC): ICD-10-CM

## 2020-06-15 DIAGNOSIS — C78.02 METASTATIC LUNG CARCINOMA, LEFT (HCC): ICD-10-CM

## 2020-06-15 DIAGNOSIS — R91.1 NODULE OF LEFT LUNG: ICD-10-CM

## 2020-06-15 DIAGNOSIS — D64.9 ANEMIA, UNSPECIFIED TYPE: ICD-10-CM

## 2020-06-15 DIAGNOSIS — J96.21 ACUTE ON CHRONIC RESPIRATORY FAILURE WITH HYPOXEMIA (HCC): ICD-10-CM

## 2020-06-15 DIAGNOSIS — J96.21 ACUTE ON CHRONIC RESPIRATORY FAILURE WITH HYPOXIA (HCC): ICD-10-CM

## 2020-06-15 DIAGNOSIS — J44.9 CHRONIC OBSTRUCTIVE PULMONARY DISEASE, UNSPECIFIED COPD TYPE (HCC): ICD-10-CM

## 2020-06-15 LAB
ABSOLUTE IMMATURE GRANULOCYTE: 0.03 THOU/MM3 (ref 0–0.07)
BASINOPHIL, AUTOMATED: 1 % (ref 0–3)
BASOPHILS ABSOLUTE: 0 THOU/MM3 (ref 0–0.1)
BUN BLDV-MCNC: 16 MG/DL (ref 7–22)
CHLORIDE, WHOLE BLOOD: 94 MEQ/L (ref 98–109)
CO2: 46 MEQ/L (ref 23–33)
CREATININE, WHOLE BLOOD: 0.9 MG/DL (ref 0.5–1.2)
DIGOXIN LEVEL: 0.4 NG/ML (ref 0.5–2)
EOSINOPHILS ABSOLUTE: 0.1 THOU/MM3 (ref 0–0.4)
EOSINOPHILS RELATIVE PERCENT: 2 % (ref 0–4)
GFR, ESTIMATED: 65 ML/MIN/1.73M2
GLUCOSE, WHOLE BLOOD: 96 MG/DL (ref 70–108)
HCT VFR BLD CALC: 27 % (ref 37–47)
HEMOGLOBIN: 7.7 GM/DL (ref 12–16)
IMMATURE GRANULOCYTES: 1 %
IONIZED CALCIUM, WHOLE BLOOD: 1.2 MMOL/L (ref 1.12–1.32)
LYMPHOCYTES # BLD: 13 % (ref 15–47)
LYMPHOCYTES ABSOLUTE: 0.6 THOU/MM3 (ref 1–4.8)
MCH RBC QN AUTO: 30 PG (ref 26–33)
MCHC RBC AUTO-ENTMCNC: 28.5 GM/DL (ref 32.2–35.5)
MCV RBC AUTO: 105 FL (ref 81–99)
MONOCYTES ABSOLUTE: 0.4 THOU/MM3 (ref 0.4–1.3)
MONOCYTES: 9 % (ref 0–12)
PDW BLD-RTO: 14.2 % (ref 11.5–14.5)
PLATELET # BLD: 125 THOU/MM3 (ref 130–400)
PMV BLD AUTO: 9.4 FL (ref 9.4–12.4)
POTASSIUM, WHOLE BLOOD: 4.4 MEQ/L (ref 3.5–4.9)
RBC # BLD: 2.57 MILL/MM3 (ref 4.2–5.4)
SEG NEUTROPHILS: 76 % (ref 43–75)
SEGMENTED NEUTROPHILS ABSOLUTE COUNT: 3.4 THOU/MM3 (ref 1.8–7.7)
SODIUM, WHOLE BLOOD: 146 MEQ/L (ref 138–146)
WBC # BLD: 4.4 THOU/MM3 (ref 4.8–10.8)

## 2020-06-15 PROCEDURE — 36591 DRAW BLOOD OFF VENOUS DEVICE: CPT

## 2020-06-15 PROCEDURE — 2580000003 HC RX 258: Performed by: PHYSICIAN ASSISTANT

## 2020-06-15 PROCEDURE — 2580000003 HC RX 258: Performed by: INTERNAL MEDICINE

## 2020-06-15 PROCEDURE — 84520 ASSAY OF UREA NITROGEN: CPT

## 2020-06-15 PROCEDURE — 96361 HYDRATE IV INFUSION ADD-ON: CPT

## 2020-06-15 PROCEDURE — 82374 ASSAY BLOOD CARBON DIOXIDE: CPT

## 2020-06-15 PROCEDURE — 80162 ASSAY OF DIGOXIN TOTAL: CPT

## 2020-06-15 PROCEDURE — 6360000002 HC RX W HCPCS: Performed by: INTERNAL MEDICINE

## 2020-06-15 PROCEDURE — 80047 BASIC METABLC PNL IONIZED CA: CPT

## 2020-06-15 PROCEDURE — 99211 OFF/OP EST MAY X REQ PHY/QHP: CPT

## 2020-06-15 PROCEDURE — 85025 COMPLETE CBC W/AUTO DIFF WBC: CPT

## 2020-06-15 PROCEDURE — 96360 HYDRATION IV INFUSION INIT: CPT

## 2020-06-15 RX ORDER — 0.9 % SODIUM CHLORIDE 0.9 %
250 INTRAVENOUS SOLUTION INTRAVENOUS ONCE
Status: CANCELLED
Start: 2020-06-15

## 2020-06-15 RX ORDER — SODIUM CHLORIDE 0.9 % (FLUSH) 0.9 %
10 SYRINGE (ML) INJECTION PRN
Status: DISCONTINUED | OUTPATIENT
Start: 2020-06-15 | End: 2020-06-16 | Stop reason: HOSPADM

## 2020-06-15 RX ORDER — HEPARIN SODIUM (PORCINE) LOCK FLUSH IV SOLN 100 UNIT/ML 100 UNIT/ML
500 SOLUTION INTRAVENOUS PRN
Status: DISCONTINUED | OUTPATIENT
Start: 2020-06-15 | End: 2020-06-16 | Stop reason: HOSPADM

## 2020-06-15 RX ORDER — SODIUM CHLORIDE 0.9 % (FLUSH) 0.9 %
20 SYRINGE (ML) INJECTION PRN
Status: DISCONTINUED | OUTPATIENT
Start: 2020-06-15 | End: 2020-06-16 | Stop reason: HOSPADM

## 2020-06-15 RX ORDER — HEPARIN SODIUM (PORCINE) LOCK FLUSH IV SOLN 100 UNIT/ML 100 UNIT/ML
500 SOLUTION INTRAVENOUS PRN
Status: CANCELLED | OUTPATIENT
Start: 2020-06-15

## 2020-06-15 RX ORDER — 0.9 % SODIUM CHLORIDE 0.9 %
500 INTRAVENOUS SOLUTION INTRAVENOUS ONCE
Status: COMPLETED | OUTPATIENT
Start: 2020-06-15 | End: 2020-06-15

## 2020-06-15 RX ORDER — SODIUM CHLORIDE 0.9 % (FLUSH) 0.9 %
10 SYRINGE (ML) INJECTION PRN
Status: CANCELLED | OUTPATIENT
Start: 2020-06-15

## 2020-06-15 RX ORDER — SODIUM CHLORIDE 0.9 % (FLUSH) 0.9 %
20 SYRINGE (ML) INJECTION PRN
Status: CANCELLED | OUTPATIENT
Start: 2020-06-15

## 2020-06-15 RX ADMIN — Medication 10 ML: at 09:45

## 2020-06-15 RX ADMIN — Medication 20 ML: at 09:46

## 2020-06-15 RX ADMIN — Medication 10 ML: at 10:15

## 2020-06-15 RX ADMIN — Medication 10 ML: at 12:16

## 2020-06-15 RX ADMIN — SODIUM CHLORIDE 500 ML: 9 INJECTION, SOLUTION INTRAVENOUS at 10:15

## 2020-06-15 RX ADMIN — Medication 500 UNITS: at 12:16

## 2020-06-15 NOTE — PLAN OF CARE
Problem: Musculor/Skeletal Functional Status  Goal: Absence of falls  Outcome: Met This Shift  Note: Patient free of falls this visit. Intervention: Fall precautions  Note: Fall risks assessed. Precautions discussed. Call light within reach during visit. Problem: Intellectual/Education/Knowledge Deficit  Goal: Teaching initiated upon admission  Outcome: Met This Shift  Note: Patient verbalizes understanding to verbal information given on IV hydration, including action and possible side effects. Aware to call MD if develop complications. Intervention: Verbal/written education provided  Note: Verbal education provided on IV hydration prior to administration. Patient encouraged to increase PO fluid intake. Problem: Discharge Planning  Goal: Knowledge of discharge instructions  Description: Knowledge of discharge instructions     Outcome: Met This Shift  Note: Patient verbalizes understanding of discharge instructions, follow up appointment, and when to call physician if needed   Intervention: Interaction with patient/family and care team  Note: Discharge instructions given to pt and reviewed. Follow up appointments discussed. Problem: Infection - Central Venous Catheter-Associated Bloodstream Infection:  Goal: Will show no infection signs and symptoms  Description: Will show no infection signs and symptoms  Outcome: Met This Shift  Note: Mediport site with no redness or warmth. Skin over port site intact with no signs of breakdown noted. Patient verbalizes signs/symptoms of port infection and when to notify the physician. Intervention: Infection risk assessment  Note: Discuss port maintenance, infection prevention, signs of infection and when to call the doctor. Care plan reviewed with patient. Patient verbalizes understanding of the plan of care and contributes to goal setting.

## 2020-06-15 NOTE — PROGRESS NOTES
Patient assessed for the following post IV hydration:    Dizziness   No  Lightheadedness  No      Acute nausea/vomiting No  Headache   No  Chest pain/pressure  No  Rash/itching   No  Shortness of breath  No      Patient tolerated IV hydration without any complications. Patient encouraged to increase PO fluid intake. Last vital signs:   BP (!) 119/59   Pulse 99   Temp 98.9 °F (37.2 °C) (Oral)   Resp 20   Ht 5' (1.524 m)   Wt 137 lb 3.2 oz (62.2 kg)   SpO2 98%   BMI 26.80 kg/m²       Patient instructed if experience any of the above symptoms following today's infusion, she is to notify MD immediately or go to the emergency department. Discharge instructions given to patient. Verbalizes understanding. Patient wheeled off unit via wheelchair per RN with belongings.

## 2020-06-29 ENCOUNTER — HOSPITAL ENCOUNTER (OUTPATIENT)
Dept: INFUSION THERAPY | Age: 75
End: 2020-06-29

## 2021-04-19 NOTE — PROGRESS NOTES
up she was found to have bilateral pneumonia. She denies any history of hemoptysis. She gives a history of orthopnea and paroxysmal nocturnal dyspnea. She was diagnosed with lung cancer in the past. She used to follow with DO Evans in the past. She received chemotherapy 2 weeks back from Dr. Dennie Dupont. No previous history of pulmonary embolism. She was never diagnosed with pulmonary tuberculosis in the past. No recent hx of travel history. She is currently on treatment with   Dulera 200/5mcg spray MDI, 2 puffs via inhalation BID. Spiriva Respimat 2 INH once daily in am.  Albuterol HFA  inhaler 2 puffs Q6h prn or Albuterol 2.5mg nebs Q6h prn (the nebulizer). she is currently on no home O2 at rest. She needs 4LPM of home O2 with exercise. She need to continue 2LPM of O2 at night time.     Past 24 hrs    Discharge delayed because of recurrent AFlutter/SVT with RVR, now improved  Required 02 2 Liters at rest and 6 Liters with activity: She at first was refusing portable tanks and only wanted POC but has since agreed acknowledging the reason why  Plan is for discharge today    PMHx   Past Medical History      Diagnosis Date    Arthritis     low back pain and scoliosis in lumbar    Cancer ([address] 2012    lower right lobe-lung s/p lobectomy in 2012, radiation and chemo nad later had mediatinal mets and had radationa dn chemo again, and in 2016 had reoccurence on the left side upper and was started on radiation this year and has  a follow up CT in oct 2017    Chronic bronchitis, simple (HCC)     Chronic respiratory failure with hypoxia ([address]     COPD (chronic obstructive pulmonary disease) ([address]     GERD (gastroesophageal reflux disease)     HTN (hypertension)     Pneumonia     Postprocedural pneumothorax     Scoliosis     Urinary incontinence       Past Surgical History        Procedure Laterality Date    LOBECTOMY  10/19/2012    rt lower lobectomy     LUNG BIOPSY  8/2012     Meds    Current opacities throughout the bilateral lungs since the prior CT scan which may represent multifocal pneumonia. 3. Redemonstration of right lung parenchymal scarring/mass. There are persistent changes of right lobectomy with left to right midline shift of the mediastinum. Similar to the prior study. Jul 18, 2018  PROCEDURE: CT CHEST W CONTRAST    Stable CT chest status post right lobectomy right lower lobe parenchymal scar/mass. Slightly improved appearance of the anterior left upper lobe focal opacity. Comparison with old CT chest done in 2017. VL DUP LOWER EXTREMITY VENOUS BILATERAL  10/15/2018   No evidence of deep vein thrombosis throughout the bilateral lower extremities. Assessment   Acute on chronic hypoxic respiratory failure due to Bilateral Pneumonia ( CAP) Vs Atypical pneumonia Vs HCAP- she had last chemotherapy from Dr. Shanelle Marin within last 2 week. She is improved. PAF: Digoxin added. Severe COPD with acute exacerbation: Improved. Small bilateral pleural effusions, stable  Hx of Left lower lobe Pneumonia Vs scarring- improving. She was treated with antibiotics during recent hospitalization. Hx of right lower lobe lung cancer S/p right lower lobectomy and spiculated density medial right upper lung and anterior left upper lung- She is following with . She follows with Dr. Israel Dennis and has an appointment in 2 weeks. Hx of chronic tobacco smoking. She quit smoking in 2007. Limited Code    Plan   OK for discharge  Home 02 2 Liters at rest and 6 Liters with activity (No POC)  Continue prednisone taper  Resume home inhalers/Nebs  Follow up has been made with Paula Hess CNP in 3 months with CXR    Case discussed with nurse and patient. Questions and concerns addressed. Electronically signed by   JESUS Lindsay CNP on 10/24/2018 at 2:35 PM     Addendum by Dr. Victoria Ames MD:  I have seen and examined the patient independently.  Face to face evaluation and Information: Selecting Yes will display possible errors in your note based on the variables you have selected. This validation is only offered as a suggestion for you. PLEASE NOTE THAT THE VALIDATION TEXT WILL BE REMOVED WHEN YOU FINALIZE YOUR NOTE. IF YOU WANT TO FAX A PRELIMINARY NOTE YOU WILL NEED TO TOGGLE THIS TO 'NO' IF YOU DO NOT WANT IT IN YOUR FAXED NOTE.

## 2021-12-14 NOTE — PROGRESS NOTES
Patient assessed for the following post chemotherapy:    Dizziness   No  Lightheadedness  No      Acute nausea/vomiting No  Headache   No  Chest pain/pressure  No  Rash/itching   No  Shortness of breath  No    Patient kept for 20 minutes observation post infusion chemotherapy. Patient tolerated chemotherapy treatment tecentriq without any complications. Last vital signs:   BP (!) 118/57   Pulse 85   Temp 98.6 °F (37 °C) (Oral)   Resp 20   SpO2 94%         Patient instructed if experience any of the above symptoms following today's infusion,he/she is to notify MD immediately or go to the emergency department. Discharge instructions given to patient. Verbalizes understanding. Ambulated off unit per self with belongings. No

## 2022-06-27 NOTE — DISCHARGE INSTR - ACTIVITY
Hip Replacement (Posterior) Precautions: What to Expect at [address] [place] [address] will need to be careful to protect your new joint after hip replacement surgery. Along with doing your physical therapy exercises, there are many things you can do to help your hip heal. Your recovery may be faster if you follow these precautions. Try to keep your hip within the safe positions while it heals. Some leg and foot movements may increase the risk of dislocating your hip. Try to avoid those positions. How can you care for yourself at home? What are some precautions for self-care after hip replacement surgery (posterior)? 1. Keep your toes pointing forward or slightly out. Don't rotate your leg too far. 2. Move your leg or knee forward. Try not to step back. 3. Keep your knees apart. Don't cross your legs. Hip Replacement (Posterior) Precautions: Don't bend your hip too far   1. Don't lean forward while you sit down or stand up, and don't bend past 90 degrees (like the angle in a letter \"L\"). This means you can't try to  something off the floor or bend down to tie your shoes. 2. Don't lift your knee higher than your hip. 3. Don't sit on low chairs, beds, or toilets. You may want to use a raised toilet seat for a while. Sit in chairs with arms. Hip Replacement (Posterior) Precautions: Don't cross your legs   1. Imagine there's a line running down the middle of your body. Keep your legs from crossing over it. 1. Don't cross your legs when you sit. 2. Don't cross your ankles while lying down. 3. It may help to keep a pillow between your knees when you're in bed. 2. When you get into a car, back up to the seat of the car, and then sit and slide across the seat toward the middle of the car with your knees about 12 inches apart. A plastic bag on the seat can help you slide in and out of the car. Other tips  · Go slowly when you climb stairs. Make sure the lights are on.  Have someone watch you, if
[de-identified] : Incision c/d/i, no collections or s/sx of infection, sutures removed, mastisol/steris applied\par

## 2022-07-06 NOTE — PATIENT INSTRUCTIONS
1. Proceed with Tecentriq today  2. She was instructed to take furosemide 40 mg daily for 3 days in combination with potassium 20 mEq daily for 3 days  3. RTC to see me in 3 weeks.   On RTC labs: CBC, BMP, LFTs Yes

## 2022-08-16 NOTE — PROGRESS NOTES
[address] [address]  INPATIENT PHYSICAL THERAPY  DAILY NOTE  [address] [id number]    Time In: 5621  Time Out: 0856  Timed Code Treatment Minutes: 73 Minutes  Minutes: 73          Date: 2020  Patient Name: Krupa Wong,  Gender:  female        MRN: [id number]  : 1945  (76 y.o.)  Referral Date : 20  Referring Practitioner: Deven Montez MD  Diagnosis: hemiarthroplasty of right hip  Treatment Diagnosis: difficulty in walking  Additional Pertinent Hx: Per EMR: \"Fannie Patel is a 76 y.o. female who presents for evaluation of pain to the right leg and hip that onset following a fall that occurred 3 days ago. Patient reports that she tripped over her cat and fell 3 days ago, experiencing immediate pain to the right leg and hip. Patient notes worsening of pain since initial fall and reports exacerbation of pain when she attempts to bear weight onto her right leg. Patient describes additional symptom of nausea. Patient denies hitting her head during the incident and denies headache and chest pain. Patient reports using oxygen treatments at home due to condition of chronic obstructive pulmonary disease. Patient notes a history of smoking but denies current nicotine use. Patient additionally has a history of congestive heart failure and notes that edema to her lower extremities is normal at baseline. \" Pt underwent right hip hemiarthroplasty 20.      Prior Level of Function:  Lives With: Alone  Type of Home: House  Home Layout: One level  Home Access: Ramped entrance   [facility] [address] - Number of Steps: 2  Entrance Stairs - Rails: Right(grab bar)  Home Equipment: Standard walker, 4 wheeled walker, Cane, Oxygen   Bathroom Shower/Tub: Tub/Shower unit, Shower chair with back  [employer]: Standard(pt reports daughter installing toilet riser)  Bathroom Equipment: Shower chair, Grab bars in  [address] Help From: Family  ADL Assistance: Independent  Homemaking How Severe Are Your Spot(S)?: moderate What Type Of Note Output Would You Prefer (Optional)?: Bullet Format What Is The Reason For Today's Visit?: Full Body Skin Examination tasks with Supervision for balance    Exercise:  Patient was guided in 1 set(s) 10 reps of exercise to both lower extremities. Ankle pumps, Glut sets and Long arc quads. Exercises were completed for increased independence with functional mobility. Discussion re: supine therex and written HEP provided, education on hip precautions provided as well. Functional Outcome Measures: Not completed       ASSESSMENT:  Assessment: Patient progressing toward established goals. O2 sats >92% with mobility on 6 L O2, c/o lightheadedness only at the end of tx, BP stable. Activity Tolerance:  Patient tolerance of  treatment: good. Equipment Recommendations:Equipment Needed: Yes(may need new 2WW)  Discharge Recommendations:    Continue to assess pending progress    Plan: Times per week: 5x/wk BID, 1x/wk QD  Times per day: Twice a day  Plan weeks: 3 weeks  Current Treatment Recommendations: Strengthening, Functional Mobility Training, Neuromuscular Re-education, Home Exercise Program, Transfer Training, Gait Training, Safety Education & Training, Balance Training, Endurance Training, Stair training, Patient/Caregiver Education & Training    Patient Education  Patient Education: Home Exercise Program, Gait, Car Transfers    Goals:  Patient goals : go home  Short term goals  Time Frame for Short term goals: 10 days  Short term goal 1: Patient will complete sit  <> stand with SBA to stand to ambulate with decreased difficulty. GOAL MET, DISCONTINUE, SEE LTG  Short term goal 2: Patient will complete supine < > sit with SBA to transfer in/out of bed with decreased difficulty. GOAL MET, DISCONTINUE, SEE LTG  Short term goal 3: Patient will ambulate 100' with a RW and SBA to progress towards independent home mobility. GOAL MET, DISCONTINUE, SEE LTG  Short term goal 4: Patient will ascend/descend 2 steps with 1 hand rail and straight cane with CGA to progress towards independent home entry.   Long term goals  Time Frame for Long term What Is The Reason For Today's Visit? (Being Monitored For X): concerning skin lesions on an annual basis goals : 3 weeks  Long term goal 1: Patient will complete sit < > stand and stand pivot transfers with modified independence to allow patient to transfer surface to surface safely. Long term goal 2: Patient will complete supine < > sit with modified independence to allow patient to transfer in/out of bed safely. Long term goal 3: Patient will ambulate 80' with a RW and modified independence to navigate home safely. Long term goal 4: Patient will ascend/descend 2 steps with 1 handrail and straight cane and SBA for safe home entry. Long term goal 5: Patient will complete car transfer with SBA for safe transport to home and appointments. Following session, patient left in safe position with all fall risk precautions in place.

## 2022-12-28 NOTE — PROGRESS NOTES
Patient discharged to TCU. Patient's belongings packed and sent with patient. Pt stated during triage that she felt like she might have a panic attack, pt stated her BP and heart rate will get better once she calms down.      Jael Hinojosa RN  12/27/22 1792

## 2023-04-11 NOTE — H&P
Patient was seen and examined face-to-face by me (Dr. Herman Brewster MD). The chart, progress notes, labs and radiographs were reviewed. Case discussed with patient's primary nurse. Questions and concerns were addressed.   I agree with the note as created with additional comments as noted    Dr. Herman Brewsetr MD   Department of Cardiology Estimated Blood Loss (Cc): minimal

## 2023-10-28 NOTE — PATIENT INSTRUCTIONS
1.  Purchase and use over-the-counter cetirizine 10 mg daily throughout the fall season. 2.  Oral steroids as prescribed. 3.  Begin Flonase as directed. 4.  Salt water gargles as needed for sore throat pain. 5.  Increase oral fluids. 6.  Follow-up in 1 week either with your primary care or back here if the symptoms have not improved. 7.  Return here immediately if the symptoms worsen. 1. CBC BMP LFTs today  2. Port flush today  3. Had PET scan on and April 22, 2020 third.   Return to clinic to see me on April, 26

## (undated) DEVICE — SYSTEM SKIN CLSR 22CM DERMBND PRINEO

## (undated) DEVICE — GLOVE ORANGE PI 7   MSG9070

## (undated) DEVICE — GLOVE SURG SZ 65 THK91MIL LTX FREE SYN POLYISOPRENE

## (undated) DEVICE — Z DISCONTINUED USE 2717541 SUTURE STRATAFIX SZ 3-0 L30CM NONABSORBABLE UD L26MM FS 3/8

## (undated) DEVICE — E-Z CLEAN, NON-STICK, PTFE COATED, ELECTROSURGICAL BLADE ELECTRODE, 6.5 INCH (16.5 CM): Brand: MEGADYNE

## (undated) DEVICE — SOLUTION IV IRRIG POUR BRL 0.9% SODIUM CHL 2F7124

## (undated) DEVICE — SOLUTION IV 1000ML 0.9% SOD CHL PH 5 INJ USP VIAFLX PLAS

## (undated) DEVICE — VORTEX VACUUM MIXING SYSTEM: Brand: VORTEX

## (undated) DEVICE — HEWSON SUTURE RETRIEVER: Brand: HEWSON SUTURE RETRIEVER

## (undated) DEVICE — SUTURE SZ 0 27IN 5/8 CIR UR-6  TAPER PT VIOLET ABSRB VICRYL J603H

## (undated) DEVICE — SUTURE ABSORBABLE MONOFILAMENT 1-0 OS8 14 IN STRATAFIX SPRL SXPD2B202

## (undated) DEVICE — 3M™ IOBAN™ 2 ANTIMICROBIAL INCISE DRAPE 6650EZ: Brand: IOBAN™ 2

## (undated) DEVICE — SUTURE ETHBND EXCEL SZ 5 L30IN NONABSORBABLE GRN L48MM V-40 MB46G

## (undated) DEVICE — GOWN,SIRUS,NONRNF,SETINSLV,XL,20/CS: Brand: MEDLINE

## (undated) DEVICE — PAD,ABDOMINAL,5"X9",ST,LF,25/BX: Brand: MEDLINE INDUSTRIES, INC.

## (undated) DEVICE — DUAL CUT SAGITTAL BLADE

## (undated) DEVICE — TOTAL TRAY, DB, 100% SILI FOLEY, 16FR 10: Brand: MEDLINE

## (undated) DEVICE — SPONGE GZ W4XL4IN COT 12 PLY TYP VII WVN C FLD DSGN

## (undated) DEVICE — GLOVE SURG SZ 75 L12IN FNGR THK94MIL STD WHT ISOLEX LTX

## (undated) DEVICE — HIGH CAPACITY NARROW INTRAMEDULLARY TIP: Brand: PULSAVAC®

## (undated) DEVICE — BASIC SINGLE BASIN BTC-LF: Brand: MEDLINE INDUSTRIES, INC.

## (undated) DEVICE — SUTURE MCRYL SZ 4-0 L27IN ABSRB UD L19MM PS-2 1/2 CIR PRIM Y426H

## (undated) DEVICE — POSITIONER HD W8XH4XL8.5IN RASPBERRY FOAM SLT

## (undated) DEVICE — GLOVE SURG SZ 75 L12IN FNGR THK94MIL TRNSLUC YEL LTX

## (undated) DEVICE — YANKAUER,BULB TIP,W/O VENT,RIGID,STERILE: Brand: MEDLINE

## (undated) DEVICE — 2.7MM SHORT DRILL BIT W/QUICK CONNECT: Brand: PERI-LOC

## (undated) DEVICE — DRESSING TRNSPAR W8XL12IN FLM SURESITE 123

## (undated) DEVICE — SUTURE ABSORBABLE BRAIDED 0 CT-1 8X27 IN UD VICRYL JJ41G

## (undated) DEVICE — SUTURE ETHBND EXCEL SZ 0 L30IN NONABSORBABLE GRN L26MM SH X834H

## (undated) DEVICE — PREP IM ENCHANCED TOTAL HIP BONE                                    PREPARATION KIT: Brand: PREP-IM

## (undated) DEVICE — GOWN,SIRUS,NON REINFRCD,LARGE,SET IN SL: Brand: MEDLINE

## (undated) DEVICE — GLOVE ORANGE PI 7 1/2   MSG9075

## (undated) DEVICE — Device

## (undated) DEVICE — NEEDLE SPNL 22GA L3.5IN BLK HUB S STL REG WALL FIT STYL W/

## (undated) DEVICE — SET IRRIG L94IN ID0.281IN W/ 4.5IN DST FLX CONN 2 LD ON OFF

## (undated) DEVICE — SUTURE VCRL SZ 2-0 L27IN ABSRB UD L36MM CT-1 1/2 CIR JJ42G

## (undated) DEVICE — GLOVE ORANGE PI 8   MSG9080

## (undated) DEVICE — SUTURE STRATAFIX SPRL SZ 2-0 L9IN ABSRB VLT MH L36MM 1/2 SXPD2B408

## (undated) DEVICE — SOLUTION IV IRRIG WATER 1000ML POUR BRL 2F7114

## (undated) DEVICE — CHLORAPREP 26ML ORANGE

## (undated) DEVICE — TUBING, SUCTION, 1/4" X 20', STRAIGHT: Brand: MEDLINE INDUSTRIES, INC.